# Patient Record
Sex: MALE | Race: BLACK OR AFRICAN AMERICAN | NOT HISPANIC OR LATINO | Employment: UNEMPLOYED | RURAL
[De-identification: names, ages, dates, MRNs, and addresses within clinical notes are randomized per-mention and may not be internally consistent; named-entity substitution may affect disease eponyms.]

---

## 2019-05-03 ENCOUNTER — HISTORICAL (OUTPATIENT)
Dept: ADMINISTRATIVE | Facility: HOSPITAL | Age: 84
End: 2019-05-03

## 2019-05-09 LAB
LAB AP CLINICAL INFORMATION: NORMAL
LAB AP DIAGNOSIS - HISTORICAL: NORMAL
LAB AP GROSS PATHOLOGY - HISTORICAL: NORMAL
LAB AP SPECIMEN SUBMITTED - HISTORICAL: NORMAL

## 2020-06-18 ENCOUNTER — HISTORICAL (OUTPATIENT)
Dept: ADMINISTRATIVE | Facility: HOSPITAL | Age: 85
End: 2020-06-18

## 2020-06-18 LAB
ALBUMIN SERPL BCP-MCNC: 3.7 G/DL (ref 3.5–5)
ALBUMIN/GLOB SERPL: 0.9 {RATIO}
ALP SERPL-CCNC: 69 U/L (ref 45–115)
ALT SERPL W P-5'-P-CCNC: 17 U/L (ref 16–61)
AMYLASE SERPL-CCNC: 144 U/L (ref 25–115)
ANISOCYTOSIS BLD QL SMEAR: ABNORMAL
AST SERPL W P-5'-P-CCNC: 22 U/L (ref 15–37)
BASOPHILS # BLD AUTO: 0.05 X10E3/UL (ref 0–0.2)
BASOPHILS NFR BLD AUTO: 0.3 % (ref 0–1)
BILIRUB SERPL-MCNC: 0.5 MG/DL (ref 0–1.2)
BUN SERPL-MCNC: 19 MG/DL (ref 7–18)
BUN/CREAT SERPL: 3.1
CALCIUM SERPL-MCNC: 8.9 MG/DL (ref 8.5–10.1)
CHLORIDE SERPL-SCNC: 92 MMOL/L (ref 98–107)
CO2 SERPL-SCNC: 27 MMOL/L (ref 21–32)
CREAT SERPL-MCNC: 6.22 MG/DL (ref 0.7–1.3)
EOSINOPHIL # BLD AUTO: 0 X10E3/UL (ref 0–0.5)
EOSINOPHIL NFR BLD AUTO: 0 % (ref 1–4)
ERYTHROCYTE [DISTWIDTH] IN BLOOD BY AUTOMATED COUNT: 14.9 % (ref 11.5–14.5)
GLOBULIN SER-MCNC: 4.1 G/DL (ref 2–4)
GLUCOSE SERPL-MCNC: 178 MG/DL (ref 74–106)
HCT VFR BLD AUTO: 37 % (ref 40–54)
HGB BLD-MCNC: 11.7 G/DL (ref 13.5–18)
HYPOCHROMIA BLD QL SMEAR: SLIGHT
IMM GRANULOCYTES # BLD AUTO: 0.12 X10E3/UL (ref 0–0.04)
IMM GRANULOCYTES NFR BLD: 0.8 % (ref 0–0.4)
LIPASE SERPL-CCNC: 100 U/L (ref 73–393)
LYMPHOCYTES # BLD AUTO: 0.22 X10E3/UL (ref 1–4.8)
LYMPHOCYTES NFR BLD AUTO: 1.5 % (ref 27–41)
LYMPHOCYTES NFR BLD MANUAL: 2 % (ref 27–41)
MCH RBC QN AUTO: 30.7 PG (ref 27–31)
MCHC RBC AUTO-ENTMCNC: 31.6 G/DL (ref 32–36)
MCV RBC AUTO: 97.1 FL (ref 80–96)
MONOCYTES # BLD AUTO: 0.7 X10E3/UL (ref 0–0.8)
MONOCYTES NFR BLD AUTO: 4.8 % (ref 2–6)
MONOCYTES NFR BLD MANUAL: 3 % (ref 2–6)
MPC BLD CALC-MCNC: 12 FL (ref 9.4–12.4)
NEUTROPHILS # BLD AUTO: 13.64 X10E3/UL (ref 1.8–7.7)
NEUTROPHILS NFR BLD AUTO: 92.6 % (ref 53–65)
NEUTS BAND NFR BLD MANUAL: 12 % (ref 1–5)
NEUTS SEG NFR BLD MANUAL: 83 % (ref 50–62)
NRBC # BLD AUTO: 0 X10E3/UL (ref 0–0)
NRBC, AUTO (.00): 0 /100 (ref 0–0)
PLATELET # BLD AUTO: 177 X10E3/UL (ref 150–400)
PLATELET MORPHOLOGY: ABNORMAL
POLYCHROMASIA BLD QL SMEAR: ABNORMAL
POTASSIUM SERPL-SCNC: 4.2 MMOL/L (ref 3.5–5.1)
PROT SERPL-MCNC: 7.8 G/DL (ref 6.4–8.2)
RBC # BLD AUTO: 3.81 X10E6/UL (ref 4.6–6.2)
SODIUM SERPL-SCNC: 129 MMOL/L (ref 136–145)
WBC # BLD AUTO: 14.73 X10E3/UL (ref 4.5–11)

## 2020-07-10 ENCOUNTER — HISTORICAL (OUTPATIENT)
Dept: ADMINISTRATIVE | Facility: HOSPITAL | Age: 85
End: 2020-07-10

## 2020-07-13 ENCOUNTER — HISTORICAL (OUTPATIENT)
Dept: ADMINISTRATIVE | Facility: HOSPITAL | Age: 85
End: 2020-07-13

## 2020-07-13 LAB
GLUCOSE SERPL-MCNC: 92 MG/DL (ref 70–105)
GLUCOSE SERPL-MCNC: 93 MG/DL (ref 70–105)

## 2020-07-14 LAB

## 2021-02-12 ENCOUNTER — HISTORICAL (OUTPATIENT)
Dept: ADMINISTRATIVE | Facility: HOSPITAL | Age: 86
End: 2021-02-12

## 2021-02-12 LAB
GLUCOSE SERPL-MCNC: 82 MG/DL (ref 70–105)
GLUCOSE SERPL-MCNC: 86 MG/DL (ref 70–105)
POTASSIUM SERPL-SCNC: 4.7 MMOL/L (ref 3.5–5.1)

## 2021-02-15 LAB

## 2022-01-02 ENCOUNTER — HOSPITAL ENCOUNTER (EMERGENCY)
Facility: HOSPITAL | Age: 87
Discharge: HOME | End: 2022-01-02
Payer: COMMERCIAL

## 2022-01-02 VITALS
DIASTOLIC BLOOD PRESSURE: 58 MMHG | HEIGHT: 66 IN | TEMPERATURE: 98 F | WEIGHT: 170 LBS | BODY MASS INDEX: 27.32 KG/M2 | RESPIRATION RATE: 16 BRPM | SYSTOLIC BLOOD PRESSURE: 177 MMHG | OXYGEN SATURATION: 98 % | HEART RATE: 61 BPM

## 2022-01-02 DIAGNOSIS — D63.1 ANEMIA IN CHRONIC KIDNEY DISEASE: ICD-10-CM

## 2022-01-02 DIAGNOSIS — I20.9 ANGINA PECTORIS, UNSPECIFIED: ICD-10-CM

## 2022-01-02 DIAGNOSIS — Z99.2 DEPENDENCE ON RENAL DIALYSIS: ICD-10-CM

## 2022-01-02 DIAGNOSIS — E88.09 OTHER DISORDERS OF PLASMA-PROTEIN METABOLISM, NOT ELSEWHERE CLASSIFIED: ICD-10-CM

## 2022-01-02 DIAGNOSIS — I10 ESSENTIAL (PRIMARY) HYPERTENSION: ICD-10-CM

## 2022-01-02 DIAGNOSIS — D50.9 IRON DEFICIENCY ANEMIA, UNSPECIFIED: ICD-10-CM

## 2022-01-02 DIAGNOSIS — N18.6 END STAGE RENAL DISEASE: ICD-10-CM

## 2022-01-02 DIAGNOSIS — N18.9 ANEMIA IN CHRONIC KIDNEY DISEASE: ICD-10-CM

## 2022-01-02 DIAGNOSIS — E11.29 TYPE 2 DIABETES MELLITUS WITH OTHER DIABETIC KIDNEY COMPLICATION: ICD-10-CM

## 2022-01-02 DIAGNOSIS — N25.81 SECONDARY HYPERPARATHYROIDISM OF RENAL ORIGIN: ICD-10-CM

## 2022-01-02 DIAGNOSIS — R07.9 CHEST PAIN: ICD-10-CM

## 2022-01-02 DIAGNOSIS — D68.9 COAGULATION DEFECT, UNSPECIFIED: ICD-10-CM

## 2022-01-02 DIAGNOSIS — R11.2 NON-INTRACTABLE VOMITING WITH NAUSEA, UNSPECIFIED VOMITING TYPE: Primary | ICD-10-CM

## 2022-01-02 DIAGNOSIS — E83.39 OTHER DISORDERS OF PHOSPHORUS METABOLISM: ICD-10-CM

## 2022-01-02 DIAGNOSIS — E46 UNSPECIFIED PROTEIN-CALORIE MALNUTRITION: ICD-10-CM

## 2022-01-02 LAB
ALBUMIN SERPL BCP-MCNC: 3.8 G/DL (ref 3.5–5)
ALBUMIN/GLOB SERPL: 0.9 {RATIO}
ALP SERPL-CCNC: 78 U/L (ref 45–115)
ALT SERPL W P-5'-P-CCNC: 13 U/L (ref 16–61)
ANION GAP SERPL CALCULATED.3IONS-SCNC: 16 MMOL/L (ref 7–16)
AST SERPL W P-5'-P-CCNC: 17 U/L (ref 15–37)
BASOPHILS # BLD AUTO: 0.05 K/UL (ref 0–0.2)
BASOPHILS NFR BLD AUTO: 0.6 % (ref 0–1)
BILIRUB SERPL-MCNC: 0.4 MG/DL (ref 0–1.2)
BUN SERPL-MCNC: 34 MG/DL (ref 7–18)
BUN/CREAT SERPL: 4 (ref 6–20)
CALCIUM SERPL-MCNC: 8.5 MG/DL (ref 8.5–10.1)
CHLORIDE SERPL-SCNC: 95 MMOL/L (ref 98–107)
CO2 SERPL-SCNC: 29 MMOL/L (ref 21–32)
CREAT SERPL-MCNC: 7.79 MG/DL (ref 0.7–1.3)
DIFFERENTIAL METHOD BLD: ABNORMAL
EOSINOPHIL # BLD AUTO: 0.02 K/UL (ref 0–0.5)
EOSINOPHIL NFR BLD AUTO: 0.2 % (ref 1–4)
EOSINOPHIL NFR BLD MANUAL: 2 % (ref 1–4)
ERYTHROCYTE [DISTWIDTH] IN BLOOD BY AUTOMATED COUNT: 14 % (ref 11.5–14.5)
GLOBULIN SER-MCNC: 4.3 G/DL (ref 2–4)
GLUCOSE SERPL-MCNC: 129 MG/DL (ref 74–106)
HCT VFR BLD AUTO: 32.2 % (ref 40–54)
HGB BLD-MCNC: 11.1 G/DL (ref 13.5–18)
IMM GRANULOCYTES # BLD AUTO: 0.05 K/UL (ref 0–0.04)
IMM GRANULOCYTES NFR BLD: 0.6 % (ref 0–0.4)
LYMPHOCYTES # BLD AUTO: 0.39 K/UL (ref 1–4.8)
LYMPHOCYTES NFR BLD AUTO: 4.7 % (ref 27–41)
LYMPHOCYTES NFR BLD MANUAL: 2 % (ref 27–41)
MCH RBC QN AUTO: 30.2 PG (ref 27–31)
MCHC RBC AUTO-ENTMCNC: 34.5 G/DL (ref 32–36)
MCV RBC AUTO: 87.5 FL (ref 80–96)
MONOCYTES # BLD AUTO: 0.39 K/UL (ref 0–0.8)
MONOCYTES NFR BLD AUTO: 4.7 % (ref 2–6)
MONOCYTES NFR BLD MANUAL: 3 % (ref 2–6)
MPC BLD CALC-MCNC: 12.3 FL (ref 9.4–12.4)
NEUTROPHILS # BLD AUTO: 7.45 K/UL (ref 1.8–7.7)
NEUTROPHILS NFR BLD AUTO: 89.2 % (ref 53–65)
NEUTS BAND NFR BLD MANUAL: 1 % (ref 1–5)
NEUTS SEG NFR BLD MANUAL: 92 % (ref 50–62)
NRBC # BLD AUTO: 0 X10E3/UL
NRBC, AUTO (.00): 0 %
PLATELET # BLD AUTO: 165 K/UL (ref 150–400)
PLATELET MORPHOLOGY: ABNORMAL
POTASSIUM SERPL-SCNC: 3.6 MMOL/L (ref 3.5–5.1)
PROT SERPL-MCNC: 8.1 G/DL (ref 6.4–8.2)
RBC # BLD AUTO: 3.68 M/UL (ref 4.6–6.2)
RBC MORPH BLD: NORMAL
SODIUM SERPL-SCNC: 136 MMOL/L (ref 136–145)
WBC # BLD AUTO: 8.35 K/UL (ref 4.5–11)

## 2022-01-02 PROCEDURE — 85025 COMPLETE CBC W/AUTO DIFF WBC: CPT | Performed by: NURSE PRACTITIONER

## 2022-01-02 PROCEDURE — 96374 THER/PROPH/DIAG INJ IV PUSH: CPT

## 2022-01-02 PROCEDURE — 36415 COLL VENOUS BLD VENIPUNCTURE: CPT | Performed by: NURSE PRACTITIONER

## 2022-01-02 PROCEDURE — 99285 EMERGENCY DEPT VISIT HI MDM: CPT | Mod: 25

## 2022-01-02 PROCEDURE — 96375 TX/PRO/DX INJ NEW DRUG ADDON: CPT

## 2022-01-02 PROCEDURE — 93010 ELECTROCARDIOGRAM REPORT: CPT | Mod: ,,, | Performed by: HOSPITALIST

## 2022-01-02 PROCEDURE — 93005 ELECTROCARDIOGRAM TRACING: CPT

## 2022-01-02 PROCEDURE — 99284 PR EMERGENCY DEPT VISIT,LEVEL IV: ICD-10-PCS | Mod: ,,, | Performed by: NURSE PRACTITIONER

## 2022-01-02 PROCEDURE — 96376 TX/PRO/DX INJ SAME DRUG ADON: CPT

## 2022-01-02 PROCEDURE — 80053 COMPREHEN METABOLIC PANEL: CPT | Performed by: NURSE PRACTITIONER

## 2022-01-02 PROCEDURE — 99284 EMERGENCY DEPT VISIT MOD MDM: CPT | Mod: ,,, | Performed by: NURSE PRACTITIONER

## 2022-01-02 PROCEDURE — 63600175 PHARM REV CODE 636 W HCPCS: Performed by: NURSE PRACTITIONER

## 2022-01-02 PROCEDURE — 93010 EKG 12-LEAD: ICD-10-PCS | Mod: ,,, | Performed by: HOSPITALIST

## 2022-01-02 RX ORDER — ASPIRIN 325 MG
325 TABLET ORAL
Status: DISCONTINUED | OUTPATIENT
Start: 2022-01-02 | End: 2022-01-02

## 2022-01-02 RX ORDER — HYDRALAZINE HYDROCHLORIDE 20 MG/ML
10 INJECTION INTRAMUSCULAR; INTRAVENOUS
Status: DISCONTINUED | OUTPATIENT
Start: 2022-01-02 | End: 2022-01-02

## 2022-01-02 RX ORDER — ONDANSETRON 4 MG/1
4 TABLET, FILM COATED ORAL EVERY 6 HOURS
Qty: 12 TABLET | Refills: 0 | Status: ON HOLD | OUTPATIENT
Start: 2022-01-02 | End: 2023-08-04 | Stop reason: HOSPADM

## 2022-01-02 RX ORDER — ONDANSETRON 2 MG/ML
4 INJECTION INTRAMUSCULAR; INTRAVENOUS
Status: COMPLETED | OUTPATIENT
Start: 2022-01-02 | End: 2022-01-02

## 2022-01-02 RX ORDER — HYDRALAZINE HYDROCHLORIDE 20 MG/ML
10 INJECTION INTRAMUSCULAR; INTRAVENOUS
Status: COMPLETED | OUTPATIENT
Start: 2022-01-02 | End: 2022-01-02

## 2022-01-02 RX ADMIN — HYDRALAZINE HYDROCHLORIDE 10 MG: 20 INJECTION INTRAMUSCULAR; INTRAVENOUS at 12:01

## 2022-01-02 RX ADMIN — ONDANSETRON 4 MG: 2 INJECTION INTRAMUSCULAR; INTRAVENOUS at 12:01

## 2022-01-02 NOTE — ED PROVIDER NOTES
Encounter Date: 1/2/2022       History     Chief Complaint   Patient presents with    Vomiting     86-year-old male patient presents emergency department complaint of nausea and vomiting that started while on hemodialysis this a.m..  States the nausea has improved.  He reports that he is dizzy.  States when he was sitting in a chair he felt as if the chair was sliding back and forth.  Denies chest discomfort.  Denies shortness of breath.           Review of patient's allergies indicates:  No Known Allergies  Past Medical History:   Diagnosis Date    Chronic kidney disease (CKD)     Diabetes mellitus     Hypertension     PVD (peripheral vascular disease)      No past surgical history on file.  No family history on file.  Social History     Tobacco Use    Smoking status: Never Smoker    Smokeless tobacco: Never Used   Substance Use Topics    Alcohol use: Not Currently    Drug use: Never     Review of Systems   Constitutional: Negative for activity change, appetite change, fatigue and fever.   Respiratory: Negative for cough, choking, chest tightness and shortness of breath.    Cardiovascular: Negative for chest pain and palpitations.   Gastrointestinal: Positive for nausea and vomiting. Negative for abdominal pain, constipation and diarrhea.   Neurological: Positive for dizziness. Negative for syncope, speech difficulty, weakness, light-headedness, numbness and headaches.       Physical Exam     Initial Vitals   BP Pulse Resp Temp SpO2   01/02/22 1126 01/02/22 1122 01/02/22 1122 01/02/22 1122 01/02/22 1122   (!) 217/70 60 18 97.6 °F (36.4 °C) 99 %      MAP       --                Physical Exam    Constitutional: He appears well-developed and well-nourished.   HENT:   Head: Normocephalic and atraumatic.   Eyes: Conjunctivae and EOM are normal. Pupils are equal, round, and reactive to light.   Neck: Neck supple. No JVD present.   Normal range of motion.  Cardiovascular: Normal rate, regular rhythm, normal heart  sounds and intact distal pulses.   Pulmonary/Chest: Breath sounds normal.   Abdominal: Abdomen is soft. Bowel sounds are normal. There is no abdominal tenderness. There is no guarding.   Musculoskeletal:      Cervical back: Normal range of motion and neck supple.     Neurological: He is alert and oriented to person, place, and time. He has normal strength. GCS score is 15. GCS eye subscore is 4. GCS verbal subscore is 5. GCS motor subscore is 6.   Skin: Skin is warm and dry. Capillary refill takes less than 2 seconds.   Psychiatric: He has a normal mood and affect. His behavior is normal. Judgment and thought content normal.         Medical Screening Exam   See Full Note    ED Course   Procedures  Labs Reviewed   COMPREHENSIVE METABOLIC PANEL - Abnormal; Notable for the following components:       Result Value    Chloride 95 (*)     Glucose 129 (*)     BUN 34 (*)     Creatinine 7.79 (*)     BUN/Creatinine Ratio 4 (*)     Globulin 4.3 (*)     ALT 13 (*)     eGFR 7 (*)     All other components within normal limits   CBC WITH DIFFERENTIAL - Abnormal; Notable for the following components:    RBC 3.68 (*)     Hemoglobin 11.1 (*)     Hematocrit 32.2 (*)     Neutrophils % 89.2 (*)     Lymphocytes % 4.7 (*)     Eosinophils % 0.2 (*)     Immature Granulocytes % 0.6 (*)     Lymphocytes, Absolute 0.39 (*)     Immature Granulocytes, Absolute 0.05 (*)     All other components within normal limits   MANUAL DIFFERENTIAL - Abnormal; Notable for the following components:    Segmented Neutrophils, Man % 92 (*)     Lymphocytes, Man % 2 (*)     Platelet Morphology Few Large Platelets (*)     All other components within normal limits   CBC W/ AUTO DIFFERENTIAL    Narrative:     The following orders were created for panel order CBC auto differential.  Procedure                               Abnormality         Status                     ---------                               -----------         ------                     CBC with  Differential[[id number]]        Abnormal            Final result               Manual Differential[[id number]]          Abnormal            Final result                 Please view results for these tests on the individual orders.        ECG Results          EKG 12-lead (Final result)  Result time 01/02/22 14:24:33    Final result by Interface, Lab In [facility] (01/02/22 14:24:33)                 Narrative:    Test Reason : R07.9,    Vent. Rate : 065 BPM     Atrial Rate : 000 BPM     P-R Int : 136 ms          QRS Dur : 118 ms      QT Int : 434 ms       P-R-T Axes : 058 009 081 degrees     QTc Int : 443 ms    Sinus rhythm  Q in V1/V2 may be due to lead placement error though septal infarct cannot  be excluded  Lateral T wave abnormality  may be due to myocardial ischemia  Abnormal ECG    Confirmed by Tab Gray MD ([id number]) on 1/2/2022 2:24:23 PM    Referred By: [email or web address]   SELF           Confirmed By:Tab Gray MD                            Imaging Results          X-Ray Chest AP Portable (Final result)  Result time 01/02/22 11:51:02    Final result by Miladis Jacobsen MD (01/02/22 11:51:02)                 Impression:      No acute cardiopulmonary abnormality is seen.      Electronically signed by: Miladis Jacobsen  Date:    01/02/2022  Time:    11:51             Narrative:    EXAMINATION:  XR CHEST AP PORTABLE    CLINICAL HISTORY:  Chest Pain;    TECHNIQUE:  Single frontal view of the chest was performed.    COMPARISON:  07/10/2020    FINDINGS:  The heart size is normal.  There is mild uncoiling of the thoracic aorta, a stable finding.  Calcific plaque is present within the aortic knob.  The pulmonary vasculature is normal.  The lung fields are clear.  No pneumothorax.  No pleural effusion.  Degenerative changes of the acromioclavicular joints and spinal column with bridging osteophytes of the thoracic level.                                 Medications   ondansetron injection 4 mg (4 mg Intravenous Given 1/2/22 1203)    hydrALAZINE injection 10 mg (10 mg Intravenous Given 1/2/22 1203)                 ED Course as of 01/06/22 1009   Sun Jan 02, 2022   1319 Nausea resolved.  Patient denies pain or discomfort.  Denies weakness or dizziness. [CM]      ED Course User Index  [CM] USHA Tilley          Clinical Impression:   Final diagnoses:  [R07.9] Chest pain  [R11.2] Non-intractable vomiting with nausea, unspecified vomiting type (Primary)          ED Disposition Condition    Discharge Stable        ED Prescriptions     Medication Sig Dispense Start Date End Date Auth. Provider    ondansetron (ZOFRAN) 4 MG tablet Take 1 tablet (4 mg total) by mouth every 6 (six) hours. 12 tablet 1/2/2022  USHA Tilley        Follow-up Information    None          USHA Tilley  01/02/22 1317       USHA Tilley  01/06/22 1009

## 2022-01-02 NOTE — ED TRIAGE NOTES
Patient present with vomiting that started during dialysis this morning. Patient did finish dialysis.

## 2022-01-02 NOTE — DISCHARGE INSTRUCTIONS
Medications as directed.  Follow-up with primary care provider.  Call their office in the a.m. of 01/03/2022.  Return emergency department as needed.

## 2022-03-24 ENCOUNTER — HOSPITAL ENCOUNTER (EMERGENCY)
Facility: HOSPITAL | Age: 87
Discharge: HOME | End: 2022-03-25
Attending: EMERGENCY MEDICINE
Payer: COMMERCIAL

## 2022-03-24 VITALS
SYSTOLIC BLOOD PRESSURE: 173 MMHG | DIASTOLIC BLOOD PRESSURE: 49 MMHG | TEMPERATURE: 98 F | RESPIRATION RATE: 18 BRPM | OXYGEN SATURATION: 98 % | HEART RATE: 73 BPM

## 2022-03-24 DIAGNOSIS — N25.81 SECONDARY HYPERPARATHYROIDISM OF RENAL ORIGIN: ICD-10-CM

## 2022-03-24 DIAGNOSIS — D63.1 ANEMIA IN CHRONIC KIDNEY DISEASE: ICD-10-CM

## 2022-03-24 DIAGNOSIS — E46 UNSPECIFIED PROTEIN-CALORIE MALNUTRITION: ICD-10-CM

## 2022-03-24 DIAGNOSIS — E83.39 OTHER DISORDERS OF PHOSPHORUS METABOLISM: ICD-10-CM

## 2022-03-24 DIAGNOSIS — N18.6 END STAGE RENAL DISEASE: ICD-10-CM

## 2022-03-24 DIAGNOSIS — D68.9 COAGULATION DEFECT, UNSPECIFIED: ICD-10-CM

## 2022-03-24 DIAGNOSIS — N18.9 ANEMIA IN CHRONIC KIDNEY DISEASE: ICD-10-CM

## 2022-03-24 DIAGNOSIS — E88.09 OTHER DISORDERS OF PLASMA-PROTEIN METABOLISM, NOT ELSEWHERE CLASSIFIED: ICD-10-CM

## 2022-03-24 DIAGNOSIS — D50.9 IRON DEFICIENCY ANEMIA, UNSPECIFIED: ICD-10-CM

## 2022-03-24 DIAGNOSIS — E16.2 HYPOGLYCEMIA: Primary | ICD-10-CM

## 2022-03-24 DIAGNOSIS — I20.9 ANGINA PECTORIS, UNSPECIFIED: ICD-10-CM

## 2022-03-24 DIAGNOSIS — I10 ESSENTIAL (PRIMARY) HYPERTENSION: ICD-10-CM

## 2022-03-24 DIAGNOSIS — Z99.2 DEPENDENCE ON RENAL DIALYSIS: ICD-10-CM

## 2022-03-24 DIAGNOSIS — E11.29 TYPE 2 DIABETES MELLITUS WITH OTHER DIABETIC KIDNEY COMPLICATION: ICD-10-CM

## 2022-03-24 LAB
ALBUMIN SERPL BCP-MCNC: 3.4 G/DL (ref 3.5–5)
ALBUMIN/GLOB SERPL: 0.8 {RATIO}
ALP SERPL-CCNC: 72 U/L (ref 45–115)
ALT SERPL W P-5'-P-CCNC: 18 U/L (ref 16–61)
ANION GAP SERPL CALCULATED.3IONS-SCNC: 8 MMOL/L (ref 7–16)
AST SERPL W P-5'-P-CCNC: 18 U/L (ref 15–37)
BASOPHILS # BLD AUTO: 0.08 K/UL (ref 0–0.2)
BASOPHILS NFR BLD AUTO: 0.9 % (ref 0–1)
BILIRUB SERPL-MCNC: 0.5 MG/DL (ref 0–1.2)
BUN SERPL-MCNC: 26 MG/DL (ref 7–18)
BUN/CREAT SERPL: 4 (ref 6–20)
CALCIUM SERPL-MCNC: 8.9 MG/DL (ref 8.5–10.1)
CHLORIDE SERPL-SCNC: 100 MMOL/L (ref 98–107)
CO2 SERPL-SCNC: 34 MMOL/L (ref 21–32)
CREAT SERPL-MCNC: 6.59 MG/DL (ref 0.7–1.3)
DIFFERENTIAL METHOD BLD: ABNORMAL
EOSINOPHIL # BLD AUTO: 0.14 K/UL (ref 0–0.5)
EOSINOPHIL NFR BLD AUTO: 1.5 % (ref 1–4)
ERYTHROCYTE [DISTWIDTH] IN BLOOD BY AUTOMATED COUNT: 15.3 % (ref 11.5–14.5)
GLOBULIN SER-MCNC: 4.5 G/DL (ref 2–4)
GLUCOSE SERPL-MCNC: 107 MG/DL (ref 70–105)
GLUCOSE SERPL-MCNC: 30 MG/DL (ref 74–106)
GLUCOSE SERPL-MCNC: 71 MG/DL (ref 70–105)
GLUCOSE SERPL-MCNC: 89 MG/DL (ref 70–105)
HCT VFR BLD AUTO: 30.4 % (ref 40–54)
HGB BLD-MCNC: 9.2 G/DL (ref 13.5–18)
IMM GRANULOCYTES # BLD AUTO: 0.04 K/UL (ref 0–0.04)
IMM GRANULOCYTES NFR BLD: 0.4 % (ref 0–0.4)
LYMPHOCYTES # BLD AUTO: 0.58 K/UL (ref 1–4.8)
LYMPHOCYTES NFR BLD AUTO: 6.2 % (ref 27–41)
MCH RBC QN AUTO: 28.8 PG (ref 27–31)
MCHC RBC AUTO-ENTMCNC: 30.3 G/DL (ref 32–36)
MCV RBC AUTO: 95 FL (ref 80–96)
MONOCYTES # BLD AUTO: 0.67 K/UL (ref 0–0.8)
MONOCYTES NFR BLD AUTO: 7.2 % (ref 2–6)
MPC BLD CALC-MCNC: 13 FL (ref 9.4–12.4)
NEUTROPHILS # BLD AUTO: 7.81 K/UL (ref 1.8–7.7)
NEUTROPHILS NFR BLD AUTO: 83.8 % (ref 53–65)
NRBC # BLD AUTO: 0 X10E3/UL
NRBC, AUTO (.00): 0 %
PLATELET # BLD AUTO: 154 K/UL (ref 150–400)
POTASSIUM SERPL-SCNC: 3.5 MMOL/L (ref 3.5–5.1)
PROT SERPL-MCNC: 7.9 G/DL (ref 6.4–8.2)
RBC # BLD AUTO: 3.2 M/UL (ref 4.6–6.2)
SODIUM SERPL-SCNC: 138 MMOL/L (ref 136–145)
WBC # BLD AUTO: 9.32 K/UL (ref 4.5–11)

## 2022-03-24 PROCEDURE — 99283 EMERGENCY DEPT VISIT LOW MDM: CPT | Mod: ,,, | Performed by: EMERGENCY MEDICINE

## 2022-03-24 PROCEDURE — 82962 GLUCOSE BLOOD TEST: CPT

## 2022-03-24 PROCEDURE — 85025 COMPLETE CBC W/AUTO DIFF WBC: CPT | Performed by: EMERGENCY MEDICINE

## 2022-03-24 PROCEDURE — 36415 COLL VENOUS BLD VENIPUNCTURE: CPT | Performed by: EMERGENCY MEDICINE

## 2022-03-24 PROCEDURE — 80053 COMPREHEN METABOLIC PANEL: CPT | Performed by: EMERGENCY MEDICINE

## 2022-03-24 PROCEDURE — 99283 EMERGENCY DEPT VISIT LOW MDM: CPT

## 2022-03-24 PROCEDURE — 99283 PR EMERGENCY DEPT VISIT,LEVEL III: ICD-10-PCS | Mod: ,,, | Performed by: EMERGENCY MEDICINE

## 2022-03-24 NOTE — ED PROVIDER NOTES
"Encounter Date: 3/24/2022    SCRIBE #1 NOTE: I, Yeni Ennis, am scribing for, and in the presence of,  Keegan Lerma MD. . I have scribed the entire note.       History     Chief Complaint   Patient presents with    Altered Mental Status    Weakness     Ems called - bs low - 21 - a/a/o p d50 given - pt tried to refuse coming      This is an 86 y/o black  male,who presents to the ED via EMS with complaints of AMS. Per EMS, pt's blood sugar was 21 and he was given an amp of D50 which increased his BS. His family states the pt was seen at dialysis this morning. He denies a fever, cough, SOB, CP, nausea, vomiting, or diarrhea. His daughter notes when she was able to get to the pt he was diaphoretic and confused.  He reports he "feels fine" now in the ED. He takes insulin but is unsure of how much. There are no other complaints/pain in the ED at this time.     The history is provided by the patient, the EMS personnel and a relative. No  was used.     Review of patient's allergies indicates:  No Known Allergies  Past Medical History:   Diagnosis Date    Chronic kidney disease (CKD)     Diabetes mellitus     Hypertension     PVD (peripheral vascular disease)      No past surgical history on file.  No family history on file.  Social History     Tobacco Use    Smoking status: Never Smoker    Smokeless tobacco: Never Used   Substance Use Topics    Alcohol use: Not Currently    Drug use: Never     Review of Systems   Constitutional: Positive for diaphoresis. Negative for fever.        Decreased BS.    Respiratory: Negative for cough and shortness of breath.    Cardiovascular: Negative for chest pain.   Gastrointestinal: Negative for diarrhea, nausea and vomiting.   Neurological:        Confused.    All other systems reviewed and are negative.      Physical Exam     Initial Vitals   BP Pulse Resp Temp SpO2   03/24/22 1845 03/24/22 1904 03/24/22 1845 03/24/22 1939 03/24/22 1904   (!) 157/52 " (!) 57 18 97.8 °F (36.6 °C) 98 %      MAP       --                Physical Exam    Nursing note and vitals reviewed.  Constitutional: He appears well-developed and well-nourished.   HENT:   Head: Normocephalic and atraumatic.   Eyes: Conjunctivae and EOM are normal. Pupils are equal, round, and reactive to light.   Neck: Neck supple.   Normal range of motion.  Cardiovascular: Normal rate, regular rhythm, normal heart sounds and intact distal pulses.   Pulmonary/Chest: Breath sounds normal.   Abdominal: Abdomen is soft. Bowel sounds are normal.   Musculoskeletal:         General: Normal range of motion.      Cervical back: Normal range of motion and neck supple.     Neurological: He is alert and oriented to person, place, and time. He has normal strength.   Skin: Skin is warm and dry. Capillary refill takes less than 2 seconds.   Psychiatric: He has a normal mood and affect. Thought content normal.         ED Course   Procedures  Labs Reviewed   COMPREHENSIVE METABOLIC PANEL - Abnormal; Notable for the following components:       Result Value    CO2 34 (*)     Glucose 30 (*)     BUN 26 (*)     Creatinine 6.59 (*)     BUN/Creatinine Ratio 4 (*)     Albumin 3.4 (*)     Globulin 4.5 (*)     eGFR 9 (*)     All other components within normal limits   CBC WITH DIFFERENTIAL - Abnormal; Notable for the following components:    RBC 3.20 (*)     Hemoglobin 9.2 (*)     Hematocrit 30.4 (*)     MCHC 30.3 (*)     RDW 15.3 (*)     MPV 13.0 (*)     Neutrophils % 83.8 (*)     Lymphocytes % 6.2 (*)     Monocytes % 7.2 (*)     Neutrophils, Abs 7.81 (*)     Lymphocytes, Absolute 0.58 (*)     All other components within normal limits   POCT GLUCOSE MONITORING CONTINUOUS - Abnormal; Notable for the following components:    POC Glucose 107 (*)     All other components within normal limits   CBC W/ AUTO DIFFERENTIAL    Narrative:     The following orders were created for panel order CBC auto differential.  Procedure                                Abnormality         Status                     ---------                               -----------         ------                     CBC with Differential[[id number]]        Abnormal            Final result                 Please view results for these tests on the individual orders.   EXTRA TUBES    Narrative:     The following orders were created for panel order EXTRA TUBES.  Procedure                               Abnormality         Status                     ---------                               -----------         ------                     Light Blue Top Hold[[id number]]                              In process                   Please view results for these tests on the individual orders.   LIGHT BLUE TOP HOLD   POCT GLUCOSE MONITORING CONTINUOUS          Imaging Results    None          Medications - No data to display             Attending Attestation:           Physician Attestation for Scribe:  Physician Attestation Statement for Scribe #1: I, Raphael Lerma MD. , reviewed documentation, as scribed by Yeni Ennis in my presence, and it is both accurate and complete.                      Clinical Impression:   Final diagnoses:  [E16.2] Hypoglycemia (Primary)          ED Disposition Condition    Discharge Stable        ED Prescriptions     None        Follow-up Information    None          Keegan Lerma MD  03/24/22 7119

## 2022-03-24 NOTE — Clinical Note
Yany Ferrara accompanied their father to the emergency department on 3/24/2022. They may return to work on 03/26/2022.      If you have any questions or concerns, please don't hesitate to call.      BERTA Bermudez RN

## 2022-03-25 LAB — GLUCOSE SERPL-MCNC: 133 MG/DL (ref 70–105)

## 2022-03-25 PROCEDURE — 82962 GLUCOSE BLOOD TEST: CPT

## 2022-03-25 NOTE — DISCHARGE INSTRUCTIONS
DECREASE YOUR INSULIN DOSE BY 5 UNITS PENDING FOLLOW UP WITH YOUR PRIMARY CARE PROVIDER.  MAKE SURE YOU ARE EATING REGULAR MEALS.  RETURN TO THE EMERGENCY DEPARTMENT AS NEEDED.

## 2023-08-01 ENCOUNTER — HOSPITAL ENCOUNTER (OUTPATIENT)
Facility: HOSPITAL | Age: 88
Discharge: HOME | End: 2023-08-04
Attending: FAMILY MEDICINE | Admitting: INTERNAL MEDICINE
Payer: MEDICARE

## 2023-08-01 DIAGNOSIS — I10 ESSENTIAL (PRIMARY) HYPERTENSION: ICD-10-CM

## 2023-08-01 DIAGNOSIS — I73.9 PAD (PERIPHERAL ARTERY DISEASE): ICD-10-CM

## 2023-08-01 DIAGNOSIS — D63.1 ANEMIA DUE TO CHRONIC KIDNEY DISEASE, ON CHRONIC DIALYSIS: ICD-10-CM

## 2023-08-01 DIAGNOSIS — D62 ACUTE BLOOD LOSS ANEMIA: ICD-10-CM

## 2023-08-01 DIAGNOSIS — N18.6 ANEMIA IN CHRONIC KIDNEY DISEASE, ON CHRONIC DIALYSIS: ICD-10-CM

## 2023-08-01 DIAGNOSIS — I48.91 NEW ONSET A-FIB: Primary | ICD-10-CM

## 2023-08-01 DIAGNOSIS — D63.1 ANEMIA IN CHRONIC KIDNEY DISEASE, ON CHRONIC DIALYSIS: ICD-10-CM

## 2023-08-01 DIAGNOSIS — Z99.2 ANEMIA DUE TO CHRONIC KIDNEY DISEASE, ON CHRONIC DIALYSIS: ICD-10-CM

## 2023-08-01 DIAGNOSIS — I48.91 A-FIB: ICD-10-CM

## 2023-08-01 DIAGNOSIS — Z99.2 DEPENDENCE ON RENAL DIALYSIS: ICD-10-CM

## 2023-08-01 DIAGNOSIS — I50.9 ACUTE ON CHRONIC CONGESTIVE HEART FAILURE, UNSPECIFIED HEART FAILURE TYPE: ICD-10-CM

## 2023-08-01 DIAGNOSIS — Z99.2 ANEMIA IN CHRONIC KIDNEY DISEASE, ON CHRONIC DIALYSIS: ICD-10-CM

## 2023-08-01 DIAGNOSIS — R79.89 ELEVATED TROPONIN: ICD-10-CM

## 2023-08-01 DIAGNOSIS — R06.02 SHORTNESS OF BREATH: ICD-10-CM

## 2023-08-01 DIAGNOSIS — R94.31 ABNORMAL EKG: ICD-10-CM

## 2023-08-01 DIAGNOSIS — N18.6 ANEMIA DUE TO CHRONIC KIDNEY DISEASE, ON CHRONIC DIALYSIS: ICD-10-CM

## 2023-08-01 DIAGNOSIS — I25.10 CORONARY ARTERY DISEASE, UNSPECIFIED VESSEL OR LESION TYPE, UNSPECIFIED WHETHER ANGINA PRESENT, UNSPECIFIED WHETHER NATIVE OR TRANSPLANTED HEART: ICD-10-CM

## 2023-08-01 DIAGNOSIS — N18.6 END STAGE RENAL DISEASE: ICD-10-CM

## 2023-08-01 DIAGNOSIS — R07.9 CHEST PAIN: ICD-10-CM

## 2023-08-01 LAB
ABO + RH BLD: NORMAL
ABO + RH BLD: NORMAL
ALBUMIN SERPL BCP-MCNC: 2.9 G/DL (ref 3.5–5)
ALBUMIN/GLOB SERPL: 0.9 {RATIO}
ALP SERPL-CCNC: 79 U/L (ref 45–115)
ALT SERPL W P-5'-P-CCNC: 25 U/L (ref 16–61)
ANION GAP SERPL CALCULATED.3IONS-SCNC: 9 MMOL/L (ref 7–16)
ANISOCYTOSIS BLD QL SMEAR: ABNORMAL
APTT PPP: 36.9 SECONDS (ref 25.2–37.3)
AST SERPL W P-5'-P-CCNC: 22 U/L (ref 15–37)
BASOPHILS # BLD AUTO: 0.05 K/UL (ref 0–0.2)
BASOPHILS NFR BLD AUTO: 0.7 % (ref 0–1)
BILIRUB SERPL-MCNC: 0.5 MG/DL (ref ?–1.2)
BLD PROD TYP BPU: NORMAL
BLD PROD TYP BPU: NORMAL
BLOOD UNIT EXPIRATION DATE: NORMAL
BLOOD UNIT EXPIRATION DATE: NORMAL
BLOOD UNIT TYPE CODE: 5100
BLOOD UNIT TYPE CODE: 5100
BUN SERPL-MCNC: 19 MG/DL (ref 7–18)
BUN/CREAT SERPL: 4 (ref 6–20)
CALCIUM SERPL-MCNC: 8.3 MG/DL (ref 8.5–10.1)
CHLORIDE SERPL-SCNC: 98 MMOL/L (ref 98–107)
CO2 SERPL-SCNC: 34 MMOL/L (ref 21–32)
CREAT SERPL-MCNC: 4.35 MG/DL (ref 0.7–1.3)
CROSSMATCH INTERPRETATION: NORMAL
CROSSMATCH INTERPRETATION: NORMAL
DIFFERENTIAL METHOD BLD: ABNORMAL
DISPENSE STATUS: NORMAL
DISPENSE STATUS: NORMAL
EGFR (NO RACE VARIABLE) (RUSH/TITUS): 12 ML/MIN/1.73M2
EOSINOPHIL # BLD AUTO: 0.08 K/UL (ref 0–0.5)
EOSINOPHIL NFR BLD AUTO: 1.2 % (ref 1–4)
ERYTHROCYTE [DISTWIDTH] IN BLOOD BY AUTOMATED COUNT: 17.2 % (ref 11.5–14.5)
GLOBULIN SER-MCNC: 3.4 G/DL (ref 2–4)
GLUCOSE SERPL-MCNC: 114 MG/DL (ref 74–106)
HCT VFR BLD AUTO: 18.5 % (ref 40–54)
HGB BLD-MCNC: 5.9 G/DL (ref 13.5–18)
IMM GRANULOCYTES # BLD AUTO: 0.01 K/UL (ref 0–0.04)
IMM GRANULOCYTES NFR BLD: 0.1 % (ref 0–0.4)
INDIRECT COOMBS: NORMAL
INR BLD: 1.34
LYMPHOCYTES # BLD AUTO: 0.53 K/UL (ref 1–4.8)
LYMPHOCYTES NFR BLD AUTO: 7.8 % (ref 27–41)
MAGNESIUM SERPL-MCNC: 2.3 MG/DL (ref 1.7–2.3)
MCH RBC QN AUTO: 29.9 PG (ref 27–31)
MCHC RBC AUTO-ENTMCNC: 31.9 G/DL (ref 32–36)
MCV RBC AUTO: 93.9 FL (ref 80–96)
MONOCYTES # BLD AUTO: 0.48 K/UL (ref 0–0.8)
MONOCYTES NFR BLD AUTO: 7 % (ref 2–6)
MPC BLD CALC-MCNC: 12.5 FL (ref 9.4–12.4)
NEUTROPHILS # BLD AUTO: 5.66 K/UL (ref 1.8–7.7)
NEUTROPHILS NFR BLD AUTO: 83.2 % (ref 53–65)
NRBC # BLD AUTO: 0 X10E3/UL
NRBC, AUTO (.00): 0 %
PLATELET # BLD AUTO: 144 K/UL (ref 150–400)
PLATELET MORPHOLOGY: ABNORMAL
POTASSIUM SERPL-SCNC: 3.5 MMOL/L (ref 3.5–5.1)
PROT SERPL-MCNC: 6.3 G/DL (ref 6.4–8.2)
PROTHROMBIN TIME: 16.4 SECONDS (ref 11.7–14.7)
RBC # BLD AUTO: 1.97 M/UL (ref 4.6–6.2)
RH BLD: NORMAL
SODIUM SERPL-SCNC: 137 MMOL/L (ref 136–145)
SPECIMEN OUTDATE: NORMAL
TROPONIN I SERPL DL<=0.01 NG/ML-MCNC: 111.1 PG/ML
TROPONIN I SERPL DL<=0.01 NG/ML-MCNC: 112.1 PG/ML
TROPONIN I SERPL DL<=0.01 NG/ML-MCNC: 118.1 PG/ML
UNIT NUMBER: NORMAL
UNIT NUMBER: NORMAL
WBC # BLD AUTO: 6.81 K/UL (ref 4.5–11)

## 2023-08-01 PROCEDURE — 99223 1ST HOSP IP/OBS HIGH 75: CPT | Mod: ,,, | Performed by: INTERNAL MEDICINE

## 2023-08-01 PROCEDURE — 80053 COMPREHEN METABOLIC PANEL: CPT | Performed by: NURSE PRACTITIONER

## 2023-08-01 PROCEDURE — 85025 COMPLETE CBC W/AUTO DIFF WBC: CPT | Performed by: NURSE PRACTITIONER

## 2023-08-01 PROCEDURE — 86900 BLOOD TYPING SEROLOGIC ABO: CPT | Performed by: NURSE PRACTITIONER

## 2023-08-01 PROCEDURE — 99285 PR EMERGENCY DEPT VISIT,LEVEL V: ICD-10-PCS | Mod: ,,, | Performed by: NURSE PRACTITIONER

## 2023-08-01 PROCEDURE — 86923 COMPATIBILITY TEST ELECTRIC: CPT | Mod: 91 | Performed by: INTERNAL MEDICINE

## 2023-08-01 PROCEDURE — 25000003 PHARM REV CODE 250: Performed by: NURSE PRACTITIONER

## 2023-08-01 PROCEDURE — 84484 ASSAY OF TROPONIN QUANT: CPT | Performed by: NURSE PRACTITIONER

## 2023-08-01 PROCEDURE — 93005 ELECTROCARDIOGRAM TRACING: CPT

## 2023-08-01 PROCEDURE — 93010 EKG 12-LEAD: ICD-10-PCS | Mod: 76,,, | Performed by: INTERNAL MEDICINE

## 2023-08-01 PROCEDURE — 86923 COMPATIBILITY TEST ELECTRIC: CPT | Performed by: NURSE PRACTITIONER

## 2023-08-01 PROCEDURE — 99285 EMERGENCY DEPT VISIT HI MDM: CPT | Mod: ,,, | Performed by: NURSE PRACTITIONER

## 2023-08-01 PROCEDURE — P9016 RBC LEUKOCYTES REDUCED: HCPCS | Performed by: NURSE PRACTITIONER

## 2023-08-01 PROCEDURE — 36430 TRANSFUSION BLD/BLD COMPNT: CPT

## 2023-08-01 PROCEDURE — 83735 ASSAY OF MAGNESIUM: CPT | Performed by: NURSE PRACTITIONER

## 2023-08-01 PROCEDURE — 99285 EMERGENCY DEPT VISIT HI MDM: CPT | Mod: 25

## 2023-08-01 PROCEDURE — 85730 THROMBOPLASTIN TIME PARTIAL: CPT | Performed by: NURSE PRACTITIONER

## 2023-08-01 PROCEDURE — 99223 PR INITIAL HOSPITAL CARE,LEVL III: ICD-10-PCS | Mod: ,,, | Performed by: INTERNAL MEDICINE

## 2023-08-01 PROCEDURE — 85610 PROTHROMBIN TIME: CPT | Performed by: NURSE PRACTITIONER

## 2023-08-01 PROCEDURE — 93010 ELECTROCARDIOGRAM REPORT: CPT | Mod: ,,, | Performed by: INTERNAL MEDICINE

## 2023-08-01 RX ORDER — NEBIVOLOL 2.5 MG/1
1 TABLET ORAL
Status: ON HOLD | COMMUNITY
Start: 2023-06-08 | End: 2023-08-03 | Stop reason: HOSPADM

## 2023-08-01 RX ORDER — SEVELAMER CARBONATE 800 MG/1
3 TABLET, FILM COATED ORAL
COMMUNITY
Start: 2021-06-03

## 2023-08-01 RX ORDER — HYDRALAZINE HYDROCHLORIDE 100 MG/1
1 TABLET, FILM COATED ORAL
COMMUNITY
Start: 2023-06-08

## 2023-08-01 RX ORDER — HYDROCODONE BITARTRATE AND ACETAMINOPHEN 500; 5 MG/1; MG/1
TABLET ORAL
Status: DISCONTINUED | OUTPATIENT
Start: 2023-08-01 | End: 2023-08-04 | Stop reason: HOSPADM

## 2023-08-01 RX ADMIN — SODIUM CHLORIDE: 9 INJECTION, SOLUTION INTRAVENOUS at 03:08

## 2023-08-01 NOTE — Clinical Note
Patient transported to [facility] via stretcher.  Patient tolerated well.  Bedside report and assessment with RENATO Ring.  Puncture site stable.  No signs of bleeding or hematoma.  Dressing in place.  Pulse dopplered.  VS stable.  Patient education complete.  Family at bedside.

## 2023-08-01 NOTE — Clinical Note
The catheter was inserted over the wire into the left ventricle. The angiography was performed via power injection. The injected amount was 25 mL contrast at 14 mL/s. The PSI from the power injection was 800.

## 2023-08-01 NOTE — Clinical Note
The right groin was prepped. The site was prepped with ChloraPrep. The site was clipped. The patient was draped. Chloraprep at 1214  Shailesh @ 1633

## 2023-08-01 NOTE — Clinical Note
The catheter was removed from the ostium   right coronary artery. An angiography was performed of the right coronary arteries.

## 2023-08-01 NOTE — Clinical Note
The catheter was inserted over the wire into the ostium   right coronary artery. Hemodynamics were performed.  An angiography was performed of the right coronary arteries. Multiple views were taken.

## 2023-08-01 NOTE — Clinical Note
The catheter was inserted into the and was inserted over the wire into the ostium   right coronary artery. An angiography was performed of the right coronary arteries.

## 2023-08-02 ENCOUNTER — ANESTHESIA EVENT (OUTPATIENT)
Dept: GASTROENTEROLOGY | Facility: HOSPITAL | Age: 88
End: 2023-08-02
Payer: MEDICARE

## 2023-08-02 ENCOUNTER — ANESTHESIA (OUTPATIENT)
Dept: GASTROENTEROLOGY | Facility: HOSPITAL | Age: 88
End: 2023-08-02
Payer: MEDICARE

## 2023-08-02 PROBLEM — R79.89 ELEVATED TROPONIN: Status: ACTIVE | Noted: 2023-08-02

## 2023-08-02 PROBLEM — I48.0 PAROXYSMAL A-FIB: Status: ACTIVE | Noted: 2023-08-02

## 2023-08-02 PROBLEM — R94.31 ABNORMAL EKG: Status: ACTIVE | Noted: 2023-08-02

## 2023-08-02 PROBLEM — R63.4 WEIGHT LOSS: Status: ACTIVE | Noted: 2023-08-02

## 2023-08-02 LAB
ANION GAP SERPL CALCULATED.3IONS-SCNC: 10 MMOL/L (ref 7–16)
ANISOCYTOSIS BLD QL SMEAR: ABNORMAL
BASOPHILS # BLD AUTO: 0.07 K/UL (ref 0–0.2)
BASOPHILS # BLD AUTO: 0.07 K/UL (ref 0–0.2)
BASOPHILS NFR BLD AUTO: 0.9 % (ref 0–1)
BASOPHILS NFR BLD AUTO: 1 % (ref 0–1)
BUN SERPL-MCNC: 27 MG/DL (ref 7–18)
BUN/CREAT SERPL: 5 (ref 6–20)
CALCIUM SERPL-MCNC: 8.3 MG/DL (ref 8.5–10.1)
CHLORIDE SERPL-SCNC: 100 MMOL/L (ref 98–107)
CO2 SERPL-SCNC: 32 MMOL/L (ref 21–32)
CREAT SERPL-MCNC: 5.46 MG/DL (ref 0.7–1.3)
DIFFERENTIAL METHOD BLD: ABNORMAL
DIFFERENTIAL METHOD BLD: ABNORMAL
EGFR (NO RACE VARIABLE) (RUSH/TITUS): 10 ML/MIN/1.73M2
EOSINOPHIL # BLD AUTO: 0.1 K/UL (ref 0–0.5)
EOSINOPHIL # BLD AUTO: 0.11 K/UL (ref 0–0.5)
EOSINOPHIL NFR BLD AUTO: 1.4 % (ref 1–4)
EOSINOPHIL NFR BLD AUTO: 1.5 % (ref 1–4)
ERYTHROCYTE [DISTWIDTH] IN BLOOD BY AUTOMATED COUNT: 17.6 % (ref 11.5–14.5)
ERYTHROCYTE [DISTWIDTH] IN BLOOD BY AUTOMATED COUNT: 17.6 % (ref 11.5–14.5)
GLUCOSE SERPL-MCNC: 90 MG/DL (ref 70–105)
GLUCOSE SERPL-MCNC: 94 MG/DL (ref 70–105)
GLUCOSE SERPL-MCNC: 99 MG/DL (ref 74–106)
HCT VFR BLD AUTO: 23.1 % (ref 40–54)
HCT VFR BLD AUTO: 25.7 % (ref 40–54)
HCT VFR BLD AUTO: 26.8 % (ref 40–54)
HGB BLD-MCNC: 7.7 G/DL (ref 13.5–18)
HGB BLD-MCNC: 8.6 G/DL (ref 13.5–18)
HGB BLD-MCNC: 8.8 G/DL (ref 13.5–18)
IMM GRANULOCYTES # BLD AUTO: 0.02 K/UL (ref 0–0.04)
IMM GRANULOCYTES # BLD AUTO: 0.03 K/UL (ref 0–0.04)
IMM GRANULOCYTES NFR BLD: 0.3 % (ref 0–0.4)
IMM GRANULOCYTES NFR BLD: 0.4 % (ref 0–0.4)
LYMPHOCYTES # BLD AUTO: 0.38 K/UL (ref 1–4.8)
LYMPHOCYTES # BLD AUTO: 0.51 K/UL (ref 1–4.8)
LYMPHOCYTES NFR BLD AUTO: 5.5 % (ref 27–41)
LYMPHOCYTES NFR BLD AUTO: 6.7 % (ref 27–41)
MCH RBC QN AUTO: 30.1 PG (ref 27–31)
MCH RBC QN AUTO: 30.1 PG (ref 27–31)
MCHC RBC AUTO-ENTMCNC: 33.3 G/DL (ref 32–36)
MCHC RBC AUTO-ENTMCNC: 33.5 G/DL (ref 32–36)
MCV RBC AUTO: 89.9 FL (ref 80–96)
MCV RBC AUTO: 90.2 FL (ref 80–96)
MONOCYTES # BLD AUTO: 0.56 K/UL (ref 0–0.8)
MONOCYTES # BLD AUTO: 0.6 K/UL (ref 0–0.8)
MONOCYTES NFR BLD AUTO: 7.4 % (ref 2–6)
MONOCYTES NFR BLD AUTO: 8.7 % (ref 2–6)
MPC BLD CALC-MCNC: 11.1 FL (ref 9.4–12.4)
MPC BLD CALC-MCNC: 11.3 FL (ref 9.4–12.4)
NEUTROPHILS # BLD AUTO: 5.71 K/UL (ref 1.8–7.7)
NEUTROPHILS # BLD AUTO: 6.33 K/UL (ref 1.8–7.7)
NEUTROPHILS NFR BLD AUTO: 82.9 % (ref 53–65)
NEUTROPHILS NFR BLD AUTO: 83.3 % (ref 53–65)
NRBC # BLD AUTO: 0 X10E3/UL
NRBC # BLD AUTO: 0 X10E3/UL
NRBC, AUTO (.00): 0 %
NRBC, AUTO (.00): 0 %
NT-PROBNP SERPL-MCNC: ABNORMAL PG/ML (ref 1–450)
PLATELET # BLD AUTO: 138 K/UL (ref 150–400)
PLATELET # BLD AUTO: 163 K/UL (ref 150–400)
PLATELET MORPHOLOGY: ABNORMAL
POLYCHROMASIA BLD QL SMEAR: ABNORMAL
POTASSIUM SERPL-SCNC: 3.4 MMOL/L (ref 3.5–5.1)
RBC # BLD AUTO: 2.56 M/UL (ref 4.6–6.2)
RBC # BLD AUTO: 2.86 M/UL (ref 4.6–6.2)
SODIUM SERPL-SCNC: 139 MMOL/L (ref 136–145)
WBC # BLD AUTO: 6.89 K/UL (ref 4.5–11)
WBC # BLD AUTO: 7.6 K/UL (ref 4.5–11)

## 2023-08-02 PROCEDURE — 85025 COMPLETE CBC W/AUTO DIFF WBC: CPT

## 2023-08-02 PROCEDURE — G0378 HOSPITAL OBSERVATION PER HR: HCPCS

## 2023-08-02 PROCEDURE — 97161 PT EVAL LOW COMPLEX 20 MIN: CPT

## 2023-08-02 PROCEDURE — 99223 PR INITIAL HOSPITAL CARE,LEVL III: ICD-10-PCS | Mod: AI,,, | Performed by: INTERNAL MEDICINE

## 2023-08-02 PROCEDURE — 82962 GLUCOSE BLOOD TEST: CPT | Mod: 91

## 2023-08-02 PROCEDURE — 83880 ASSAY OF NATRIURETIC PEPTIDE: CPT

## 2023-08-02 PROCEDURE — 97165 OT EVAL LOW COMPLEX 30 MIN: CPT

## 2023-08-02 PROCEDURE — 43235 EGD DIAGNOSTIC BRUSH WASH: CPT | Mod: ,,, | Performed by: INTERNAL MEDICINE

## 2023-08-02 PROCEDURE — 85014 HEMATOCRIT: CPT | Mod: 91 | Performed by: NURSE PRACTITIONER

## 2023-08-02 PROCEDURE — C9113 INJ PANTOPRAZOLE SODIUM, VIA: HCPCS | Performed by: INTERNAL MEDICINE

## 2023-08-02 PROCEDURE — 99499 NO LOS: ICD-10-PCS | Mod: $0,,, | Performed by: HOSPITALIST

## 2023-08-02 PROCEDURE — 99223 1ST HOSP IP/OBS HIGH 75: CPT | Mod: AI,,, | Performed by: INTERNAL MEDICINE

## 2023-08-02 PROCEDURE — D9220A PRA ANESTHESIA: ICD-10-PCS | Mod: ,,, | Performed by: NURSE ANESTHETIST, CERTIFIED REGISTERED

## 2023-08-02 PROCEDURE — 63600175 PHARM REV CODE 636 W HCPCS: Performed by: NURSE ANESTHETIST, CERTIFIED REGISTERED

## 2023-08-02 PROCEDURE — 99499 UNLISTED E&M SERVICE: CPT | Mod: $0,,, | Performed by: HOSPITALIST

## 2023-08-02 PROCEDURE — 96374 THER/PROPH/DIAG INJ IV PUSH: CPT | Mod: 59

## 2023-08-02 PROCEDURE — 43235 PR EGD, FLEX, DIAGNOSTIC: ICD-10-PCS | Mod: ,,, | Performed by: INTERNAL MEDICINE

## 2023-08-02 PROCEDURE — 80048 BASIC METABOLIC PNL TOTAL CA: CPT

## 2023-08-02 PROCEDURE — 25000003 PHARM REV CODE 250

## 2023-08-02 PROCEDURE — 63600175 PHARM REV CODE 636 W HCPCS: Performed by: INTERNAL MEDICINE

## 2023-08-02 PROCEDURE — 83036 HEMOGLOBIN GLYCOSYLATED A1C: CPT | Mod: 90 | Performed by: HOSPITALIST

## 2023-08-02 PROCEDURE — 99223 1ST HOSP IP/OBS HIGH 75: CPT | Mod: 25,,, | Performed by: INTERNAL MEDICINE

## 2023-08-02 PROCEDURE — 25000003 PHARM REV CODE 250: Performed by: NURSE ANESTHETIST, CERTIFIED REGISTERED

## 2023-08-02 PROCEDURE — 99223 PR INITIAL HOSPITAL CARE,LEVL III: ICD-10-PCS | Mod: 25,,, | Performed by: INTERNAL MEDICINE

## 2023-08-02 PROCEDURE — 96376 TX/PRO/DX INJ SAME DRUG ADON: CPT | Mod: 59

## 2023-08-02 PROCEDURE — D9220A PRA ANESTHESIA: Mod: ,,, | Performed by: NURSE ANESTHETIST, CERTIFIED REGISTERED

## 2023-08-02 PROCEDURE — 25000003 PHARM REV CODE 250: Performed by: HOSPITALIST

## 2023-08-02 PROCEDURE — 43235 EGD DIAGNOSTIC BRUSH WASH: CPT | Performed by: INTERNAL MEDICINE

## 2023-08-02 RX ORDER — ONDANSETRON 2 MG/ML
4 INJECTION INTRAMUSCULAR; INTRAVENOUS EVERY 8 HOURS PRN
Status: DISCONTINUED | OUTPATIENT
Start: 2023-08-02 | End: 2023-08-04 | Stop reason: HOSPADM

## 2023-08-02 RX ORDER — SODIUM CHLORIDE 0.9 % (FLUSH) 0.9 %
10 SYRINGE (ML) INJECTION EVERY 12 HOURS PRN
Status: DISCONTINUED | OUTPATIENT
Start: 2023-08-02 | End: 2023-08-04 | Stop reason: HOSPADM

## 2023-08-02 RX ORDER — HYDRALAZINE HYDROCHLORIDE 100 MG/1
100 TABLET, FILM COATED ORAL EVERY 8 HOURS
Status: DISCONTINUED | OUTPATIENT
Start: 2023-08-02 | End: 2023-08-04 | Stop reason: HOSPADM

## 2023-08-02 RX ORDER — INSULIN ASPART 100 [IU]/ML
0-5 INJECTION, SOLUTION INTRAVENOUS; SUBCUTANEOUS
Status: DISCONTINUED | OUTPATIENT
Start: 2023-08-02 | End: 2023-08-02

## 2023-08-02 RX ORDER — EPHEDRINE SULFATE 50 MG/ML
INJECTION, SOLUTION INTRAVENOUS
Status: DISCONTINUED | OUTPATIENT
Start: 2023-08-02 | End: 2023-08-02

## 2023-08-02 RX ORDER — PANTOPRAZOLE SODIUM 40 MG/10ML
40 INJECTION, POWDER, LYOPHILIZED, FOR SOLUTION INTRAVENOUS 2 TIMES DAILY
Status: DISCONTINUED | OUTPATIENT
Start: 2023-08-02 | End: 2023-08-02

## 2023-08-02 RX ORDER — PROPOFOL 10 MG/ML
VIAL (ML) INTRAVENOUS
Status: DISCONTINUED | OUTPATIENT
Start: 2023-08-02 | End: 2023-08-02

## 2023-08-02 RX ORDER — SODIUM CHLORIDE 9 MG/ML
INJECTION, SOLUTION INTRAVENOUS CONTINUOUS
Status: DISCONTINUED | OUTPATIENT
Start: 2023-08-02 | End: 2023-08-02

## 2023-08-02 RX ORDER — METOPROLOL TARTRATE 25 MG/1
12.5 TABLET ORAL 2 TIMES DAILY
Status: DISCONTINUED | OUTPATIENT
Start: 2023-08-02 | End: 2023-08-03

## 2023-08-02 RX ORDER — IBUPROFEN 200 MG
16 TABLET ORAL
Status: DISCONTINUED | OUTPATIENT
Start: 2023-08-02 | End: 2023-08-04 | Stop reason: HOSPADM

## 2023-08-02 RX ORDER — NALOXONE HCL 0.4 MG/ML
0.02 VIAL (ML) INJECTION
Status: DISCONTINUED | OUTPATIENT
Start: 2023-08-02 | End: 2023-08-04 | Stop reason: HOSPADM

## 2023-08-02 RX ORDER — PANTOPRAZOLE SODIUM 40 MG/1
40 TABLET, DELAYED RELEASE ORAL DAILY
Status: DISCONTINUED | OUTPATIENT
Start: 2023-08-03 | End: 2023-08-04 | Stop reason: HOSPADM

## 2023-08-02 RX ORDER — GLUCAGON 1 MG
1 KIT INJECTION
Status: DISCONTINUED | OUTPATIENT
Start: 2023-08-02 | End: 2023-08-04 | Stop reason: HOSPADM

## 2023-08-02 RX ORDER — PANTOPRAZOLE SODIUM 40 MG/10ML
80 INJECTION, POWDER, LYOPHILIZED, FOR SOLUTION INTRAVENOUS ONCE
Status: COMPLETED | OUTPATIENT
Start: 2023-08-02 | End: 2023-08-02

## 2023-08-02 RX ORDER — ACETAMINOPHEN 325 MG/1
650 TABLET ORAL EVERY 4 HOURS PRN
Status: DISCONTINUED | OUTPATIENT
Start: 2023-08-02 | End: 2023-08-04 | Stop reason: HOSPADM

## 2023-08-02 RX ORDER — LIDOCAINE HYDROCHLORIDE 20 MG/ML
INJECTION, SOLUTION EPIDURAL; INFILTRATION; INTRACAUDAL; PERINEURAL
Status: DISCONTINUED | OUTPATIENT
Start: 2023-08-02 | End: 2023-08-02

## 2023-08-02 RX ORDER — INSULIN ASPART 100 [IU]/ML
0-5 INJECTION, SOLUTION INTRAVENOUS; SUBCUTANEOUS EVERY 6 HOURS PRN
Status: DISCONTINUED | OUTPATIENT
Start: 2023-08-02 | End: 2023-08-04 | Stop reason: HOSPADM

## 2023-08-02 RX ORDER — ETOMIDATE 2 MG/ML
INJECTION INTRAVENOUS
Status: DISCONTINUED | OUTPATIENT
Start: 2023-08-02 | End: 2023-08-02

## 2023-08-02 RX ORDER — IBUPROFEN 200 MG
24 TABLET ORAL
Status: DISCONTINUED | OUTPATIENT
Start: 2023-08-02 | End: 2023-08-04 | Stop reason: HOSPADM

## 2023-08-02 RX ADMIN — ETOMIDATE 4 MG: 20 INJECTION, SOLUTION INTRAVENOUS at 03:08

## 2023-08-02 RX ADMIN — PROPOFOL 20 MG: 10 INJECTION, EMULSION INTRAVENOUS at 03:08

## 2023-08-02 RX ADMIN — PANTOPRAZOLE SODIUM 80 MG: 40 INJECTION, POWDER, FOR SOLUTION INTRAVENOUS at 06:08

## 2023-08-02 RX ADMIN — ETOMIDATE 2 MG: 20 INJECTION, SOLUTION INTRAVENOUS at 03:08

## 2023-08-02 RX ADMIN — PANTOPRAZOLE SODIUM 40 MG: 40 INJECTION, POWDER, FOR SOLUTION INTRAVENOUS at 09:08

## 2023-08-02 RX ADMIN — HYDRALAZINE HYDROCHLORIDE 100 MG: 100 TABLET, FILM COATED ORAL at 06:08

## 2023-08-02 RX ADMIN — LIDOCAINE HYDROCHLORIDE 100 MG: 20 INJECTION, SOLUTION INTRAVENOUS at 03:08

## 2023-08-02 RX ADMIN — HYDRALAZINE HYDROCHLORIDE 100 MG: 100 TABLET, FILM COATED ORAL at 04:08

## 2023-08-02 RX ADMIN — EPHEDRINE SULFATE 10 MG: 50 INJECTION INTRAVENOUS at 03:08

## 2023-08-02 RX ADMIN — SODIUM CHLORIDE: 9 INJECTION, SOLUTION INTRAVENOUS at 03:08

## 2023-08-02 RX ADMIN — POTASSIUM BICARBONATE 50 MEQ: 977.5 TABLET, EFFERVESCENT ORAL at 09:08

## 2023-08-02 NOTE — H&P
I placed an order for naproxen 500 mg twice daily with food instead of Celebrex and sent it to pharmacy.  This should be covered by insurance.  Please notify patient   [facility] Short [facility] Medicine  History & Physical    Patient Name: Joseph Mcdonald  MRN: [id number]  Patient Class: OP- Observation  Admission Date: 8/1/2023  Attending Physician: Jim Hennessy MD   Primary Care Provider: [facility]         Patient information was obtained from patient, relative(s) and ER records.     Subjective:     Principal Problem:Anemia in chronic kidney disease    Chief Complaint:   Chief Complaint   Patient presents with    Abnormal Lab     Low H&H according to dialysis        HPI: 88 year old male with PMHX ESRD on dialysis on T/TH/Sat came to the ED due to abnormal labs. Patient went to dialysis this morning and had a CBC done that resulted Hgb of 5.9 after he left the dialysis. dialysis called the daughter and asked her to bring him into the ED. At Ed he received 2 units of pRBCs and found to be A fib. He is not on anticoagulation and reported to the ED doctor he has never had any heart disease or A fib. He has no complaints, feel at his baseline, denies palpitation, chest pain, fever chills, headaches, numbness or weakness, black stool or blood in stool, denies any issues with urine. He reports he has lost 50 lbs in the last year and has a very low appetite. Patient has had colonoscopies and EGDs in the past but does not remember the date.     Patient has ESRD on dialysis managed by Dr. Herman. Patient reports he is diabetic but is not on any medication for diabetes. 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     Upon questioning the patient and the daughter and explaining what are plans are including getting an Echo, patient all the sudden said he did an Echo last week at [facility]. Then he was asked again if he has ever seen a heart doctor for the second time and he said he has seen Dr. Rivers 2 weeks ago at [facility]. He said his PCP, Dr Genao, sent him to [facility] for some heart issues that he has  no idea what it is.     Patient will be placed on observation until more information is gathered regarding his heart disease.             Past Medical History:   Diagnosis Date    Chronic kidney disease (CKD)     Diabetes mellitus     Hypertension     PVD (peripheral vascular disease)        History reviewed. No pertinent surgical history.    Review of patient's allergies indicates:  No Known Allergies    No current facility-administered medications on file prior to encounter.     Current Outpatient Medications on File Prior to Encounter   Medication Sig    hydrALAZINE (APRESOLINE) 100 MG tablet Take 1 tablet by mouth.    methoxy peg-epoetin beta (MIRCERA INJ) 50 mcg.    nebivoloL (BYSTOLIC) 2.5 MG Tab Take 1 tablet by mouth.    sevelamer carbonate (RENVELA) 800 mg Tab Take 3 tablets by mouth.    ondansetron (ZOFRAN) 4 MG tablet Take 1 tablet (4 mg total) by mouth every 6 (six) hours.     Family History    None       Tobacco Use    Smoking status: Former     Current packs/day: 0.00     Types: Cigarettes    Smokeless tobacco: Never   Substance and Sexual Activity    Alcohol use: Not Currently    Drug use: Never    Sexual activity: Not on file     Review of Systems   Constitutional:  Positive for unexpected weight change. Negative for activity change, chills and fever.   HENT:  Negative for congestion.    Respiratory:  Negative for cough, chest tightness, shortness of breath, wheezing and stridor.    Cardiovascular:  Negative for chest pain, palpitations and leg swelling.   Gastrointestinal:  Negative for abdominal distention, abdominal pain, anal bleeding, blood in stool, constipation, diarrhea, nausea, rectal pain and vomiting.   Genitourinary:  Negative for difficulty urinating, dysuria and enuresis.   Musculoskeletal:  Negative for arthralgias.   Skin:  Negative for color change.   Neurological:  Negative for dizziness, facial asymmetry, weakness and headaches.   Hematological:  Negative for adenopathy.    Psychiatric/Behavioral:  Negative for agitation.      Objective:     Vital Signs (Most Recent):  Temp: 98.1 °F (36.7 °C) (08/01/23 2200)  Pulse: 67 (08/02/23 0101)  Resp: 19 (08/02/23 0101)  BP: (!) 167/56 (08/01/23 2343)  SpO2: 98 % (08/02/23 0101) Vital Signs (24h Range):  Temp:  [97.6 °F (36.4 °C)-98.4 °F (36.9 °C)] 98.1 °F (36.7 °C)  Pulse:  [54-67] 67  Resp:  [11-22] 19  SpO2:  [93 %-99 %] 98 %  BP: (119-185)/(17-56) 167/56        Body mass index is 27.44 kg/m².     Physical Exam  Vitals and nursing note reviewed.   Constitutional:       General: He is not in acute distress.     Appearance: Normal appearance. He is normal weight. He is not ill-appearing, toxic-appearing or diaphoretic.   HENT:      Head: Normocephalic and atraumatic.      Right Ear: External ear normal.      Left Ear: External ear normal.      Nose: Nose normal. No congestion or rhinorrhea.      Mouth/Throat:      Mouth: Mucous membranes are moist.   Eyes:      Conjunctiva/sclera: Conjunctivae normal.   Neck:      Vascular: No carotid bruit.   Cardiovascular:      Rate and Rhythm: Normal rate and regular rhythm.      Pulses: Normal pulses.      Heart sounds: Normal heart sounds. No murmur heard.     No friction rub.   Pulmonary:      Effort: Pulmonary effort is normal. No respiratory distress.      Breath sounds: Normal breath sounds. No wheezing or rales.   Abdominal:      General: Abdomen is flat. Bowel sounds are normal. There is no distension.      Palpations: Abdomen is soft.      Tenderness: There is no abdominal tenderness. There is no guarding.      Hernia: No hernia is present.   Musculoskeletal:         General: No swelling or tenderness. Normal range of motion.      Cervical back: Neck supple. No tenderness.      Right lower leg: No edema.      Left lower leg: No edema.   Skin:     General: Skin is warm.      Coloration: Skin is not jaundiced.      Findings: No bruising or lesion.   Neurological:      Mental Status: He is alert and  "oriented to person, place, and time.   Psychiatric:         Behavior: Behavior normal.                Significant Labs: All pertinent labs within the past 24 hours have been reviewed.    Significant Imaging: I have reviewed all pertinent imaging results/findings within the past 24 hours.    Assessment/Plan:     * Anemia in chronic kidney disease    Presented with H/H of 5.9/18.5  Normocytic Anemia with MCV of over 90 most likely due to CKD. Patient on dialysis most likely gets a CBC on weekly basis and this seems acute. Bleeding has not been r/o completley. FOBT pending and will consult GI.   Patient received 2 units of pRBC  CBC pending currently and another one after that  Patient reports he has lost 50 lbs in the last year due to low appetite. Malignancies should be in considerations. Protein GAP was 3.4, WBC is normal, Platelets are ~150. FOBT pending.     Paroxysmal A-fib  Patient with A fib and rate of 67  Will have to contac [facility] and make sure they are aware of the situation. I do wonder when patient visited them 2 weeks ago for the very first time and was sent to get an Echo at [facility] a week ago was due to him being A fob  Continue equivalent dose of his beta blocker at home with lopressor 12.5 mg BID  Will hold off on cardiology consult and Echo for now since patient is currently under the care at [facility] and being worked up for heart disease.   Cardiac monitoring  Will hold of on eliquis 2.5 mg BID for now until Anemia due to bleeding is r/o    End stage renal disease    Received dialysis this morning  He gets dialysis T/TH/ Sat  Patient is currently on observation and might be discharged tomorrow. If not discharged nephrology needs to be consulted to proceed with dialysis on Thursday.     DM2 (diabetes mellitus, type 2)  Patient's FSGs are controlled on current medication regimen.  Last A1c reviewed- No results found for: "LABA1C", "HGBA1C"- A1C is pending   Most recent fingerstick glucose reviewed- No " "results for input(s): "POCTGLUCOSE" in the last 24 hours.  Current correctional scale  Medium  Maintain anti-hyperglycemic dose as follows-   Antihyperglycemics (From admission, onward)      Start     Stop Route Frequency Ordered    08/02/23 0244  insulin aspart U-100 injection 0-5 Units         -- SubQ Before meals & nightly PRN 08/02/23 0144          Hold Oral hypoglycemics while patient is in the hospital.    Essential (primary) hypertension    Continue home meds which includes a beta blocker and Hydaralazine 100 mg TID      VTE Risk Mitigation (From admission, onward)           Ordered     apixaban tablet 2.5 mg  2 times daily         08/02/23 0145     Reason for No Pharmacological VTE Prophylaxis  Once        Question:  Reasons:  Answer:  Physician Provided (leave comment)  Comment:  Starting patient on eliquis    08/02/23 0144     IP VTE HIGH RISK PATIENT  Once         08/02/23 0144     Place sequential compression device  Until discontinued         08/02/23 0144                           On 08/02/2023, patient should be placed in hospital observation services under my care in collaboration with Dr. Jim Hennessy.    Michael Best MD  Department of Hospital Medicine  [facility] Short Stay [facility]  "

## 2023-08-02 NOTE — PROGRESS NOTES
[facility] - Short Stay [facility] Medicine  Progress Note    Patient Name: Joseph Mcdonald  MRN: [id number]  Patient Class: OP- Observation   Admission Date: 8/1/2023  Length of Stay: 0 days  Attending Physician: Shiv Mcknight MD  Primary Care Provider: [facility]        Subjective:     Principal Problem:Acute blood loss anemia    HPI:  88 year old male with PMHX ESRD on dialysis on T/TH/Sat came to the ED due to abnormal labs. Patient went to dialysis this morning and had a CBC done that resulted Hgb of 5.9 after he left the dialysis. dialysis called the daughter and asked her to bring him into the ED. At Ed he received 2 units of pRBCs and found to be A fib. He is not on anticoagulation and reported to the ED doctor he has never had any heart disease or A fib. He has no complaints, feel at his baseline, denies palpitation, chest pain, fever chills, headaches, numbness or weakness, black stool or blood in stool, denies any issues with urine. He reports he has lost 50 lbs in the last year and has a very low appetite. Patient has had colonoscopies and EGDs in the past but does not remember the date.     Patient has ESRD on dialysis managed by Dr. Herman. Patient reports he is diabetic but is not on any medication for diabetes. 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     Upon questioning the patient and the daughter and explaining what are plans are including getting an Echo, patient all the sudden said he did an Echo last week at [facility]. Then he was asked again if he has ever seen a heart doctor for the second time and he said he has seen Dr. Rivers 2 weeks ago at [facility]. He said his PCP, Dr Genao, sent him to [facility] for some heart issues that he has no idea what it is.     Patient will be placed on observation until more information is gathered regarding his heart disease.             Overview/Hospital Course:  No notes  on file    Interval History:     Review of Systems   Constitutional:  Positive for unexpected weight change. Negative for activity change, chills and fever.   HENT:  Negative for congestion.    Respiratory:  Positive for shortness of breath. Negative for cough, chest tightness, wheezing and stridor.    Cardiovascular:  Positive for palpitations. Negative for chest pain and leg swelling.   Gastrointestinal:  Negative for abdominal distention, abdominal pain, anal bleeding, blood in stool, constipation, diarrhea, nausea, rectal pain and vomiting.   Genitourinary:  Negative for difficulty urinating, dysuria and enuresis.   Musculoskeletal:  Negative for arthralgias.   Skin:  Negative for color change.   Neurological:  Negative for dizziness, facial asymmetry, weakness and headaches.   Hematological:  Negative for adenopathy.   Psychiatric/Behavioral:  Negative for agitation.      Objective:     Vital Signs (Most Recent):  Temp: 98 °F (36.7 °C) (08/02/23 0713)  Pulse: (!) 59 (08/02/23 0713)  Resp: 16 (08/02/23 0713)  BP: (!) 116/35 (08/02/23 0713)  SpO2: 96 % (08/02/23 0713) Vital Signs (24h Range):  Temp:  [97.6 °F (36.4 °C)-98.4 °F (36.9 °C)] 98 °F (36.7 °C)  Pulse:  [54-68] 59  Resp:  [11-22] 16  SpO2:  [93 %-99 %] 96 %  BP: (116-185)/(17-58) 116/35     Weight: 70.4 kg (155 lb 2.6 oz)  Body mass index is 25.04 kg/m².    Intake/Output Summary (Last 24 hours) at 8/2/2023 1029  Last data filed at 8/1/2023 2201  Gross per 24 hour   Intake 931 ml   Output --   Net 931 ml         Physical Exam  Vitals and nursing note reviewed.   Constitutional:       General: He is not in acute distress.     Appearance: Normal appearance. He is normal weight. He is not ill-appearing, toxic-appearing or diaphoretic.   HENT:      Head: Normocephalic and atraumatic.      Right Ear: External ear normal.      Left Ear: External ear normal.      Nose: Nose normal. No congestion or rhinorrhea.      Mouth/Throat:      Mouth: Mucous membranes are  moist.   Eyes:      Conjunctiva/sclera: Conjunctivae normal.   Neck:      Vascular: No carotid bruit.   Cardiovascular:      Rate and Rhythm: Normal rate and regular rhythm.      Pulses: Normal pulses.      Heart sounds: Normal heart sounds. No murmur heard.     No friction rub.   Pulmonary:      Effort: Pulmonary effort is normal. No respiratory distress.      Breath sounds: Normal breath sounds. No wheezing or rales.   Abdominal:      General: Abdomen is flat. Bowel sounds are normal. There is no distension.      Palpations: Abdomen is soft.      Tenderness: There is no abdominal tenderness. There is no guarding.      Hernia: No hernia is present.   Musculoskeletal:         General: No swelling or tenderness. Normal range of motion.      Cervical back: Neck supple. No tenderness.      Right lower leg: No edema.      Left lower leg: No edema.   Skin:     General: Skin is warm.      Coloration: Skin is not jaundiced.      Findings: No bruising or lesion.   Neurological:      Mental Status: He is alert and oriented to person, place, and time.   Psychiatric:         Behavior: Behavior normal.             Significant Labs: All pertinent labs within the past 24 hours have been reviewed.    Significant Imaging: I have reviewed all pertinent imaging results/findings within the past 24 hours.      Assessment/Plan:      * Acute blood loss anemia    Presented with H/H of 5.9/18.5  Normocytic Anemia with MCV of over 90. Patient on dialysis most likely gets a CBC on weekly basis and this seems acute. Bleeding has not been r/o completley. FOBT pending and will consult GI.   Patient received 2 units of pRBC  CBC pending currently and another one after that  Patient reports he has lost 50 lbs in the last year due to low appetite. Malignancies should be in considerations. Protein GAP was 3.4, WBC is normal, Platelets are ~150. FOBT pending.     8/2: FOBT pending.  GI consulted.      End stage renal disease    Received dialysis  "this morning  He gets dialysis T/TH/ Sat  Patient is currently on observation and might be discharged tomorrow. If not discharged nephrology needs to be consulted to proceed with dialysis on Thursday.     8/2: nephrology consulted.      Paroxysmal A-fib  Patient with A fib and rate of 67  Will have to contac [facility] and make sure they are aware of the situation. I do wonder when patient visited them 2 weeks ago for the very first time and was sent to get an Echo at [place] a week ago was due to him being A fob  Continue equivalent dose of his beta blocker at home with lopressor 12.5 mg BID  Will hold off on cardiology consult and Echo for now since patient is currently under the care at [facility] and being worked up for heart disease.   Cardiac monitoring  Will hold of on eliquis 2.5 mg BID for now until Anemia due to bleeding is r/o.     8/2: cardiology consulted for new onset atrial fibrillation.   Irregular rhythm this morning more consistent with PVCs or other abnormality rather than atrial fibrillation.      Weight loss  Nutrition consulted. Most recent weight and BMI monitored-     Measurements:  Wt Readings from Last 1 Encounters:   08/02/23 70.4 kg (155 lb 2.6 oz)   Body mass index is 25.04 kg/m².    Patient has been screened and assessed by RD.    Malnutrition Type:  Context:    Level:      Malnutrition Characteristic Summary:   Dietary consult.    Interventions/Recommendations (treatment strategy):   f/u with nutrition/dietary.     Age appropriate malignancy screening with PCP.      DM2 (diabetes mellitus, type 2)  Patient's FSGs are controlled on current medication regimen.  Last A1c reviewed- No results found for: "LABA1C", "HGBA1C"- A1C is pending   Most recent fingerstick glucose reviewed- No results for input(s): "POCTGLUCOSE" in the last 24 hours.  Current correctional scale  Medium  Maintain anti-hyperglycemic dose as follows-   Antihyperglycemics (From admission, onward)    Start     Stop Route Frequency " Ordered    08/02/23 0624  insulin aspart U-100 injection 0-5 Units         -- SubQ Every 6 hours PRN 08/02/23 0524        Hold Oral hypoglycemics while patient is in the hospital.    F/u A1c.      Essential (primary) hypertension    Continue home meds which includes a beta blocker and Hydaralazine 100 mg TID      VTE Risk Mitigation (From admission, onward)         Ordered     Reason for No Pharmacological VTE Prophylaxis  Once        Question:  Reasons:  Answer:  Physician Provided (leave comment)  Comment:  Starting patient on eliquis    08/02/23 0144     IP VTE HIGH RISK PATIENT  Once         08/02/23 0144     Place sequential compression device  Until discontinued         08/02/23 0144                Discharge Planning   BRIAN: 8/3/2023     Code Status: Full Code   Is the patient medically ready for discharge?:     Reason for patient still in hospital (select all that apply): Treatment  Discharge Plan A: Home                  Shiv Mcknight MD  Department of Hospital Medicine   [facility] - Short Stay Unit

## 2023-08-02 NOTE — ANESTHESIA PREPROCEDURE EVALUATION
08/02/2023  Joseph Mcdonald is a 88 y.o., male.      Pre-op Assessment    I have reviewed the Patient Summary Reports.    I have reviewed the NPO Status.   I have reviewed the Medications.     Review of Systems  Anesthesia Hx:  History of prior surgery of interest to airway management or planning: Denies Family Hx of Anesthesia complications.   Denies Personal Hx of Anesthesia complications.   Social:  Former Smoker    Cardiovascular:   Hypertension PVD    Renal/:   Chronic Renal Disease, ESRD, Dialysis    Endocrine:   Diabetes, type 2        Physical Exam  General: Well nourished and Cooperative    Airway:  Mallampati: III / II  Mouth Opening: Normal  TM Distance: Normal  Tongue: Normal  Neck ROM: Normal ROM    Dental:  Intact        Anesthesia Plan  Type of Anesthesia, risks & benefits discussed:    Anesthesia Type: Gen Natural Airway  Intra-op Monitoring Plan: Standard ASA Monitors  Post Op Pain Control Plan: multimodal analgesia  Induction:  IV  Informed Consent: Informed consent signed with the Patient and all parties understand the risks and agree with anesthesia plan.  All questions answered. Patient consented to blood products? Yes  ASA Score: 3  Day of Surgery Review of History & Physical: I have interviewed and examined the patient. I have reviewed the patient's H&P dated: There are no significant changes.     Ready For Surgery From Anesthesia Perspective.     .    Past Medical History:   Diagnosis Date    Chronic kidney disease (CKD)     Diabetes mellitus     Hypertension     PVD (peripheral vascular disease)        Past Surgical History:   Procedure Laterality Date    left toe amputation      PLACEMENT OF DIALYSIS ACCESS         History reviewed. No pertinent family history.    Social History     Socioeconomic History    Marital status:    Tobacco Use    Smoking status: Former      Current packs/day: 0.00     Types: Cigarettes    Smokeless tobacco: Never   Substance and Sexual Activity    Alcohol use: Not Currently    Drug use: Never     Social Determinants of Health     Financial Resource Strain: Low Risk  (8/2/2023)    Overall Financial Resource Strain (CARDIA)     Difficulty of Paying Living Expenses: Not hard at all   Food Insecurity: No Food Insecurity (8/2/2023)    Hunger Vital Sign     Worried About Running Out of Food in the Last Year: Never true     Ran Out of Food in the Last Year: Never true   Transportation Needs: No Transportation Needs (8/2/2023)    PRAPARE - Transportation     Lack of Transportation (Medical): No     Lack of Transportation (Non-Medical): No   Physical Activity: Sufficiently Active (8/2/2023)    Exercise Vital Sign     Days of Exercise per Week: 5 days     Minutes of Exercise per Session: 30 min   Stress: No Stress Concern Present (8/2/2023)    [place] [employer] - Occupational Stress Questionnaire     Feeling of Stress : Not at all   Social Connections: Moderately Isolated (8/2/2023)    Social Connection and Isolation Panel [NHANES]     Frequency of Communication with Friends and Family: More than three times a week     Frequency of Social Gatherings with Friends and Family: More than three times a week     Attends Christian Services: More than 4 times per year     Active Member of Clubs or Organizations: No     Attends Club or Organization Meetings: Never     Marital Status:    Housing Stability: Low Risk  (8/2/2023)    Housing Stability Vital Sign     Unable to Pay for Housing in the Last Year: No     Number of Places Lived in the Last Year: 1     Unstable Housing in the Last Year: No       Current Facility-Administered Medications   Medication Dose Route Frequency Provider Last Rate Last Admin    0.9%  NaCl infusion (for blood administration)   Intravenous Q24H PRN USHA Barfield   Stopped at 08/01/23 7047     acetaminophen tablet 650 mg  650 mg Oral Q4H PRN Michael Best MD        glucagon (human recombinant) injection 1 mg  1 mg Intramuscular PRN Michael Best MD        glucose chewable tablet 16 g  16 g Oral PRN Michael Best MD        glucose chewable tablet 24 g  24 g Oral PRN Michael Best MD        hydrALAZINE tablet 100 mg  100 mg Oral Q8H Michael Best MD   100 mg at 08/02/23 0628    insulin aspart U-100 injection 0-5 Units  0-5 Units Subcutaneous Q6H PRN Jim Hennessy MD        metoprolol tartrate (LOPRESSOR) split tablet 12.5 mg  12.5 mg Oral BID Michael Best MD        naloxone 0.4 mg/mL injection 0.02 mg  0.02 mg Intravenous PRN Michael Best MD        ondansetron injection 4 mg  4 mg Intravenous Q8H PRN Michael Best MD        pantoprazole injection 40 mg  40 mg Intravenous BID Jim Hennessy MD   40 mg at 08/02/23 0940    sodium chloride 0.9% flush 10 mL  10 mL Intravenous Q12H PRN Michael Best MD        trazodone split tablet 25 mg  25 mg Oral Nightly PRN Michael Best MD         No current facility-administered medications on file prior to encounter.     Current Outpatient Medications on File Prior to Encounter   Medication Sig Dispense Refill    hydrALAZINE (APRESOLINE) 100 MG tablet Take 1 tablet by mouth 3 (three) times daily with meals.      methoxy peg-epoetin beta (MIRCERA INJ) 50 mcg.      nebivoloL (BYSTOLIC) 2.5 MG Tab Take 1 tablet by mouth.      ondansetron (ZOFRAN) 4 MG tablet Take 1 tablet (4 mg total) by mouth every 6 (six) hours. 12 tablet 0    sevelamer carbonate (RENVELA) 800 mg Tab Take 3 tablets by mouth.         Review of patient's allergies indicates:  No Known Allergies       Chemistry        Component Value Date/Time     08/02/2023 0441    K 3.4 (L) 08/02/2023 0441     08/02/2023 0441    CO2 32 08/02/2023 0441    BUN 27 (H) 08/02/2023 0441    CREATININE 5.46 (H) 08/02/2023 0441    GLU 99 08/02/2023 0441        Component Value  Date/Time    CALCIUM 8.3 (L) 08/02/2023 0441    ALKPHOS 79 08/01/2023 1235    AST 22 08/01/2023 1235    ALT 25 08/01/2023 1235    BILITOT 0.5 08/01/2023 1235    ESTGFRAFRICA 11 06/18/2020 1715    EGFRNONAA 9 (L) 03/24/2022 1918        Component Ref Range & Units 04:41  (8/2/23) 1 d ago  (8/1/23) 1 d ago  (8/1/23) 1 yr ago  (3/24/22) 1 yr ago  (1/2/22) 1 yr ago  (1/2/22) 3 yr ago  (6/18/20)   WBC 4.50 - 11.00 K/uL 6.89  7.60  6.81  9.32   8.35  14.73 High  R    RBC 4.60 - 6.20 M/uL 2.56 Low   2.86 Low   1.97 Low   3.20 Low    3.68 Low   3.81 Low  R    Hemoglobin 13.5 - 18.0 g/dL 7.7 Low   8.6 Low   5.9 Low Panic   9.2 Low    11.1 Low   11.7 Low     Hematocrit 40.0 - 54.0 % 23.1 Low

## 2023-08-02 NOTE — ASSESSMENT & PLAN NOTE
Received dialysis this morning  He gets dialysis T/TH/ Sat  Patient is currently on observation and might be discharged tomorrow. If not discharged nephrology needs to be consulted to proceed with dialysis on Thursday.     8/2: nephrology consulted.

## 2023-08-02 NOTE — ASSESSMENT & PLAN NOTE
Patient with A fib and rate of 67  Will have to contac [facility] and make sure they are aware of the situation. I do wonder when patient visited them 2 weeks ago for the very first time and was sent to get an Echo at [place] a week ago was due to him being A fob  Continue equivalent dose of his beta blocker at home with lopressor 12.5 mg BID  Will hold off on cardiology consult and Echo for now since patient is currently under the care at [facility] and being worked up for heart disease.   Cardiac monitoring  Will hold of on eliquis 2.5 mg BID for now until Anemia due to bleeding is r/o.     8/2: cardiology consulted for new onset atrial fibrillation.   Irregular rhythm this morning more consistent with PVCs or other abnormality rather than atrial fibrillation.

## 2023-08-02 NOTE — ASSESSMENT & PLAN NOTE
Patient with A fib and rate of 67  Will have to contac [facility] and make sure they are aware of the situation. I do wonder when patient visited them 2 weeks ago for the very first time and was sent to get an Echo at [place] a week ago was due to him being A fob  Continue equivalent dose of his beta blocker at home with lopressor 12.5 mg BID  Will hold off on cardiology consult and Echo for now since patient is currently under the care at [facility] and being worked up for heart disease.   Cardiac monitoring  Started patient on Eliquis 2.5 mg BID

## 2023-08-02 NOTE — PT/OT/SLP EVAL
"Physical Therapy Evaluation and Treatment    Patient Name: Joseph Mcdonald   MRN: [id number]  Recent Surgery: Procedure(s) (LRB):  Left heart cath (N/A)      Recommendations:     Discharge Recommendations: home, home with home health   Discharge Equipment Recommendations: none   Barriers to discharge: Ongoing medical treatment    Assessment:     Joseph Mcdonald is a 88 y.o. male admitted with a medical diagnosis of Acute blood loss anemia. He presents with the following impairments/functional limitations: impaired endurance, weakness, impaired self care skills, impaired functional mobility, impaired balance, gait instability.     Patient demonstrates a mild decline in functional mobility from reported baseline but no BRIGHT/SOB during gait trial. Pt will follow to encourage increased activity as medical work up completed. Anticipate home when medically stable. HHPT if needed.    Rehab Prognosis: Good; patient would benefit from acute PT services to address these deficits and reach maximum level of function.    Plan:     During this hospitalization, patient to be seen 5 x/week to address the above listed problems via gait training, therapeutic activities, therapeutic exercises    Plan of Care Expires: 09/02/23    Subjective     Chief Complaint: anemia; SOB/weakness at dialysis treatment  Patient Comments/Goals: "I can usually do for myself but I got weak and SOB going in to dialysis"  Pain/Comfort:  Pain Rating 1: 0/10  Pain Rating Post-Intervention 1: 0/10    Social History:  Living Environment: Patient lives alone in a single story home with number of outside stair(s): 0  Prior Level of Function: Prior to admission, patient was modified independent with ADLs using straight cane for mobility; including driving and getting groceries  Equipment Used at Home: cane, straight, rollator  DME owned (not currently used): rollator  Assistance Upon Discharge: family, friend(s), and home health if required    Objective: "     Communicated with Liliane Mulligan LPN prior to session. Patient found supine with peripheral IV, telemetry upon PT entry to room.    General Precautions: Standard, fall   Orthopedic Precautions: N/A   Braces: N/A    Respiratory Status: Room air    Exams:  Cognition: Patient is oriented to Person, Place, Time, Situation  RLE ROM: WFL  RLE Strength: WFL  LLE ROM: WFL except remote TMA  LLE Strength: WFL  Sensation:    -       Impaired  light/touch in left toes but deep pressure intact    Functional Mobility:  Gait belt applied - Yes  Bed Mobility  Supine to Sit: modified independence   Transfers  Sit to Stand: stand by assistance with straight cane  Bed to Chair: stand by assistance with straight cane using Step Transfer  Gait  Patient ambulated 80 with straight cane and stand by assistance. Patient demonstrates steady gait, decreased step length, decreased foot clearance, decreased keiko, decreased arm swing, mild guarding, and no LOB or SOB/BRIGHT. All lines remained intact throughout ambulation trail.  Balance  Sitting: modified independence  Standing: stand by assistance and spc      Therapeutic Activities and Exercises:   Patient educated on role of acute care PT and PT POC, safety while in hospital including calling nurse for mobility, and call light usage  Patient educated about importance of OOB mobility and remaining up in chair most of the day.      AM-PAC 6 CLICK MOBILITY  Total Score:23    Patient left up in chair with all lines intact, call button in reach, and RN notified.    GOALS:   Multidisciplinary Problems       Physical Therapy Goals          Problem: Physical Therapy    Goal Priority Disciplines Outcome Goal Variances Interventions   Physical Therapy Goal     PT, PT/OT Ongoing, Progressing     Description: Short Term Goals to be met by: 2023    Patient will increase functional independence with mobility by performin. Sit to stand transfer modified independently using straight  cane  2. Bed to chair transfer modified independently using straight cane  3. Gait  x 400 feet modified independently using straight cane  4. Standing lower extremity exercise program x30 reps per handout, with assistance as needed    Long Term Goals to be met by: 9/2/2023    Pt will modified independent functional mobility with straight cane to return to home situation and prior activities of daily living.                        History:     Past Medical History:   Diagnosis Date    Chronic kidney disease (CKD)     Diabetes mellitus     Hypertension     PVD (peripheral vascular disease)        Past Surgical History:   Procedure Laterality Date    left toe amputation      PLACEMENT OF DIALYSIS ACCESS         Time Tracking:     PT Received On: 08/02/23  PT Start Time: 1331  PT Stop Time: 1348  PT Total Time (min): 17 min     Billable Minutes: Evaluation low complexity    8/2/2023

## 2023-08-02 NOTE — ASSESSMENT & PLAN NOTE
"Patient's FSGs are controlled on current medication regimen.  Last A1c reviewed- No results found for: "LABA1C", "HGBA1C"- A1C is pending   Most recent fingerstick glucose reviewed- No results for input(s): "POCTGLUCOSE" in the last 24 hours.  Current correctional scale  Medium  Maintain anti-hyperglycemic dose as follows-   Antihyperglycemics (From admission, onward)    Start     Stop Route Frequency Ordered    08/02/23 0244  insulin aspart U-100 injection 0-5 Units         -- SubQ Before meals & nightly PRN 08/02/23 0144        Hold Oral hypoglycemics while patient is in the hospital.  "

## 2023-08-02 NOTE — ASSESSMENT & PLAN NOTE
Received dialysis this morning  He gets dialysis T/TH/ Sat  Patient is currently on observation and might be discharged tomorrow. If not discharged nephrology needs to be consulted to proceed with dialysis on Thursday.

## 2023-08-02 NOTE — PLAN OF CARE
Problem: Physical Therapy  Goal: Physical Therapy Goal  Description: Short Term Goals to be met by: 2023    Patient will increase functional independence with mobility by performin. Sit to stand transfer modified independently using straight cane  2. Bed to chair transfer modified independently using straight cane  3. Gait  x 400 feet modified independently using straight cane  4. Standing lower extremity exercise program x30 reps per handout, with assistance as needed    Long Term Goals to be met by: 2023    Pt will modified independent functional mobility with straight cane to return to home situation and prior activities of daily living.   Outcome: Ongoing, Progressing     Patient demonstrates a mild decline in functional mobility from reported baseline but no BRIGHT/SOB during gait trial. Pt will follow to encourage increased activity as medical work up completed. Anticipate home when medically stable. HHPT if needed.

## 2023-08-02 NOTE — PLAN OF CARE
Problem: Occupational Therapy  Goal: Occupational Therapy Goal  Description: STG:  Pt will perform grooming with setup  Pt will bathe with SBA with setup at EOB  Pt will perform UE dressing with Rubin  Pt will perform LE dressing with Rubin   Pt will sit EOB x 10 min with SBA   Pt will transfer bed/chair/bsc with SBA with cane  Pt will perform standing task x 2 min with SBA with cane   Pt will tolerate 15 minutes of tx without fatigue      LT.Restore to max I with self care and mobility.      Outcome: Ongoing, Progressing

## 2023-08-02 NOTE — ASSESSMENT & PLAN NOTE
Presented with H/H of 5.9/18.5  Normocytic Anemia with MCV of over 90. Patient on dialysis most likely gets a CBC on weekly basis and this seems acute. Bleeding has not been r/o completley. FOBT pending and will consult GI.   Patient received 2 units of pRBC  CBC pending currently and another one after that  Patient reports he has lost 50 lbs in the last year due to low appetite. Malignancies should be in considerations. Protein GAP was 3.4, WBC is normal, Platelets are ~150. FOBT pending.     8/2: FOBT pending.  GI consulted.

## 2023-08-02 NOTE — SUBJECTIVE & OBJECTIVE
Past Medical History:   Diagnosis Date    Chronic kidney disease (CKD)     Diabetes mellitus     Hypertension     PVD (peripheral vascular disease)        History reviewed. No pertinent surgical history.    Review of patient's allergies indicates:  No Known Allergies    No current facility-administered medications on file prior to encounter.     Current Outpatient Medications on File Prior to Encounter   Medication Sig    hydrALAZINE (APRESOLINE) 100 MG tablet Take 1 tablet by mouth.    methoxy peg-epoetin beta (MIRCERA INJ) 50 mcg.    nebivoloL (BYSTOLIC) 2.5 MG Tab Take 1 tablet by mouth.    sevelamer carbonate (RENVELA) 800 mg Tab Take 3 tablets by mouth.    ondansetron (ZOFRAN) 4 MG tablet Take 1 tablet (4 mg total) by mouth every 6 (six) hours.     Family History    None       Tobacco Use    Smoking status: Former     Current packs/day: 0.00     Types: Cigarettes    Smokeless tobacco: Never   Substance and Sexual Activity    Alcohol use: Not Currently    Drug use: Never    Sexual activity: Not on file     Review of Systems   Constitutional:  Positive for unexpected weight change. Negative for activity change, chills and fever.   HENT:  Negative for congestion.    Respiratory:  Negative for cough, chest tightness, shortness of breath, wheezing and stridor.    Cardiovascular:  Negative for chest pain, palpitations and leg swelling.   Gastrointestinal:  Negative for abdominal distention, abdominal pain, anal bleeding, blood in stool, constipation, diarrhea, nausea, rectal pain and vomiting.   Genitourinary:  Negative for difficulty urinating, dysuria and enuresis.   Musculoskeletal:  Negative for arthralgias.   Skin:  Negative for color change.   Neurological:  Negative for dizziness, facial asymmetry, weakness and headaches.   Hematological:  Negative for adenopathy.   Psychiatric/Behavioral:  Negative for agitation.      Objective:     Vital Signs (Most Recent):  Temp: 98.1 °F (36.7 °C) (08/01/23 2200)  Pulse:  67 (08/02/23 0101)  Resp: 19 (08/02/23 0101)  BP: (!) 167/56 (08/01/23 2343)  SpO2: 98 % (08/02/23 0101) Vital Signs (24h Range):  Temp:  [97.6 °F (36.4 °C)-98.4 °F (36.9 °C)] 98.1 °F (36.7 °C)  Pulse:  [54-67] 67  Resp:  [11-22] 19  SpO2:  [93 %-99 %] 98 %  BP: (119-185)/(17-56) 167/56        Body mass index is 27.44 kg/m².     Physical Exam  Vitals and nursing note reviewed.   Constitutional:       General: He is not in acute distress.     Appearance: Normal appearance. He is normal weight. He is not ill-appearing, toxic-appearing or diaphoretic.   HENT:      Head: Normocephalic and atraumatic.      Right Ear: External ear normal.      Left Ear: External ear normal.      Nose: Nose normal. No congestion or rhinorrhea.      Mouth/Throat:      Mouth: Mucous membranes are moist.   Eyes:      Conjunctiva/sclera: Conjunctivae normal.   Neck:      Vascular: No carotid bruit.   Cardiovascular:      Rate and Rhythm: Normal rate and regular rhythm.      Pulses: Normal pulses.      Heart sounds: Normal heart sounds. No murmur heard.     No friction rub.   Pulmonary:      Effort: Pulmonary effort is normal. No respiratory distress.      Breath sounds: Normal breath sounds. No wheezing or rales.   Abdominal:      General: Abdomen is flat. Bowel sounds are normal. There is no distension.      Palpations: Abdomen is soft.      Tenderness: There is no abdominal tenderness. There is no guarding.      Hernia: No hernia is present.   Musculoskeletal:         General: No swelling or tenderness. Normal range of motion.      Cervical back: Neck supple. No tenderness.      Right lower leg: No edema.      Left lower leg: No edema.   Skin:     General: Skin is warm.      Coloration: Skin is not jaundiced.      Findings: No bruising or lesion.   Neurological:      Mental Status: He is alert and oriented to person, place, and time.   Psychiatric:         Behavior: Behavior normal.                Significant Labs: All pertinent labs within  the past 24 hours have been reviewed.    Significant Imaging: I have reviewed all pertinent imaging results/findings within the past 24 hours.

## 2023-08-02 NOTE — PLAN OF CARE
[facility] - Short Stay Unit  Initial Discharge Assessment       Primary Care Provider: [facility]    Admission Diagnosis: Shortness of breath [R06.02]  New onset a-fib [I48.91]  Elevated troponin [R77.8]  Chest pain [R07.9]  Acute on chronic congestive heart failure, unspecified heart failure type [I50.9]  Anemia due to chronic kidney disease, on chronic dialysis [N18.6, D63.1, Z99.2]    Admission Date: 8/1/2023  Expected Discharge Date: 8/3/2023    Transition of Care Barriers: None    Payor: HU[employer] MANAGED MEDICARE / Plan: HU[employer]NA MEDICARE PPO / Product Type: Medicare Advantage /     Extended Emergency Contact Information  Primary Emergency Contact: Yany Ferrara  Mobile Phone: [phone]  Relation: Daughter  Preferred language: English   needed? No    Discharge Plan A: Home  Discharge Plan B: Home, Home Health      [facility] [address] [place] [address] [address] [place] [address]  [address] [place], [place] [address]  Phone: [phone] Fax: [phone]    [facility] [place] [place] - [address] [place] [address]  [address] [place] [id number]  Phone: [phone] Fax: [phone]      Initial Assessment (most recent)       Adult Discharge Assessment - 08/02/23 0944          Discharge Assessment    Assessment Type Discharge Planning Assessment     Source of Information patient     Communicated BRIAN with patient/caregiver Date not available/Unable to determine     People in Home alone     Do you expect to return to your current living situation? Yes     Do you have help at home or someone to help you manage your care at home? No     Prior to hospitilization cognitive status: Unable to Assess     Current cognitive status: Alert/Oriented     Walking or Climbing Stairs ambulation difficulty, requires equipment     Mobility Management cane/ rollator     Home Accessibility wheelchair accessible     Home Layout Able to live on 1st floor     Equipment Currently Used at Home  cane, straight;rollator     Patient currently being followed by outpatient case management? No     Do you currently have service(s) that help you manage your care at home? No     Do you take prescription medications? Yes     Do you have prescription coverage? Yes     Do you have any problems affording any of your prescribed medications? No     Is the patient taking medications as prescribed? yes     How do you get to doctors appointments? family or friend will provide     Are you on dialysis? No     Do you take coumadin? No     Discharge Plan A Home     Discharge Plan B Home;Home Health     DME Needed Upon Discharge  none     Discharge Plan discussed with: Patient     Transition of Care Barriers None        Physical Activity    On average, how many days per week do you engage in moderate to strenuous exercise (like a brisk walk)? 5 days     On average, how many minutes do you engage in exercise at this level? 30 min        Financial Resource Strain    How hard is it for you to pay for the very basics like food, housing, medical care, and heating? Not hard at all        Housing Stability    In the last 12 months, was there a time when you were not able to pay the mortgage or rent on time? No     In the last 12 months, how many places have you lived? 1     In the last 12 months, was there a time when you did not have a steady place to sleep or slept in a shelter (including now)? No        Transportation Needs    In the past 12 months, has lack of transportation kept you from medical appointments or from getting medications? No     In the past 12 months, has lack of transportation kept you from meetings, work, or from getting things needed for daily living? No        Food Insecurity    Within the past 12 months, you worried that your food would run out before you got the money to buy more. Never true     Within the past 12 months, the food you bought just didn't last and you didn't have money to get more. Never true         Stress    Do you feel stress - tense, restless, nervous, or anxious, or unable to sleep at night because your mind is troubled all the time - these days? Not at all        Social Connections    In a typical week, how many times do you talk on the phone with family, friends, or neighbors? More than three times a week     How often do you get together with friends or relatives? More than three times a week     How often do you attend Anglican or Yarsanism services? More than 4 times per year     Do you belong to any clubs or organizations such as Anglican groups, unions, fraternal or athletic groups, or school groups? No     How often do you attend meetings of the clubs or organizations you belong to? Never     Are you , , , , never , or living with a partner?         Alcohol Use    Q1: How often do you have a drink containing alcohol? Never     Q2: How many drinks containing alcohol do you have on a typical day when you are drinking? Patient does not drink     Q3: How often do you have six or more drinks on one occasion? Never                          SS spoke with pt in room. Pt lives at home alone. Pt is not current with home health. Pt uses a straight cane to get around. Pt goes to dialysis on TTS schedule. Discharge plan is to return home with no anticipated needs at this time.

## 2023-08-02 NOTE — CONSULTS
[facility] - Short Stay Unit  Nephrology  Consult Note    Patient Name: Joseph Mcdonald  MRN: [id number]  Admission Date: 8/1/2023  Hospital Length of Stay: 0 days  Attending Provider: Shiv Mcknight MD   Primary Care Physician: [facility]  Principal Problem:Anemia in chronic kidney disease    Consults  Subjective:     HPI: Mr. Mcdonald has ESRD and dialyzes in [facility]. He says his legs have been weak and he's been tired for one week. Felt poorly post dialysis yesterday and came to ER. Profoundly anemic there. He denies hematochezia. Says stools have been dark.     Past Medical History:   Diagnosis Date    Chronic kidney disease (CKD)     Diabetes mellitus     Hypertension     PVD (peripheral vascular disease)        History reviewed. No pertinent surgical history.    Review of patient's allergies indicates:  No Known Allergies  Current Facility-Administered Medications   Medication Frequency    0.9%  NaCl infusion (for blood administration) Q24H PRN    acetaminophen tablet 650 mg Q4H PRN    glucagon (human recombinant) injection 1 mg PRN    glucose chewable tablet 16 g PRN    glucose chewable tablet 24 g PRN    hydrALAZINE tablet 100 mg Q8H    insulin aspart U-100 injection 0-5 Units Q6H PRN    metoprolol tartrate (LOPRESSOR) split tablet 12.5 mg BID    naloxone 0.4 mg/mL injection 0.02 mg PRN    ondansetron injection 4 mg Q8H PRN    pantoprazole injection 40 mg BID    potassium bicarbonate disintegrating tablet 50 mEq Once    sodium chloride 0.9% flush 10 mL Q12H PRN    trazodone split tablet 25 mg Nightly PRN     Family History    None       Tobacco Use    Smoking status: Former     Current packs/day: 0.00     Types: Cigarettes    Smokeless tobacco: Never   Substance and Sexual Activity    Alcohol use: Not Currently    Drug use: Never    Sexual activity: Not on file     Review of Systems  Objective:     Vital Signs (Most Recent):  Temp: 98 °F (36.7 °C) (08/02/23 0713)  Pulse:  (!) 59 (08/02/23 0713)  Resp: 16 (08/02/23 0713)  BP: (!) 116/35 (08/02/23 0713)  SpO2: 96 % (08/02/23 0713) Vital Signs (24h Range):  Temp:  [97.6 °F (36.4 °C)-98.4 °F (36.9 °C)] 98 °F (36.7 °C)  Pulse:  [54-68] 59  Resp:  [11-22] 16  SpO2:  [93 %-99 %] 96 %  BP: (116-185)/(17-58) 116/35     Weight: 70.4 kg (155 lb 2.6 oz) (08/02/23 0239)  Body mass index is 25.04 kg/m².  Body surface area is 1.81 meters squared.    I/O last 3 completed shifts:  In: 931 [I.V.:200; Blood:731]  Out: -     Physical ExamNo distress lying flat. Chest clear. No pericardial rub. No edema    Significant Labs:  BMP:   Recent Labs   Lab 08/01/23  1235 08/02/23  0441   * 99    139   K 3.5 3.4*   CL 98 100   CO2 34* 32   BUN 19* 27*   CREATININE 4.35* 5.46*   CALCIUM 8.3* 8.3*   MG 2.3  --      CBC:   Recent Labs   Lab 08/02/23  0441   WBC 6.89   RBC 2.56*   HGB 7.7*   HCT 23.1*   *   MCV 90.2   MCH 30.1   MCHC 33.3     All labs within the past 24 hours have been reviewed.    Significant Imaging:  Labs: Reviewed    Assessment/Plan:     Active Diagnoses:    Diagnosis Date Noted POA    PRINCIPAL PROBLEM:  Anemia in chronic kidney disease [N18.9, D63.1] 11/06/2014 Yes    Paroxysmal A-fib [I48.0] 08/02/2023 Yes    DM2 (diabetes mellitus, type 2) [E11.9] 11/18/2014 Yes    End stage renal disease [N18.6] 11/06/2014 Yes    Essential (primary) hypertension [I10] 11/06/2014 Yes      Problems Resolved During this Admission:       Hemodialysis tomorrow. Will likely require further transfusion tomorrow.  GI blood loss seems likely.    Thank you for your consult. I will follow-up with patient. Please contact us if you have any additional questions.    Ovi Alvarado MD  Nephrology  [facility] Short Stay [facility]

## 2023-08-02 NOTE — ASSESSMENT & PLAN NOTE
Nutrition consulted. Most recent weight and BMI monitored-     Measurements:  Wt Readings from Last 1 Encounters:   08/02/23 70.4 kg (155 lb 2.6 oz)   Body mass index is 25.04 kg/m².    Patient has been screened and assessed by RD.    Malnutrition Type:  Context:    Level:      Malnutrition Characteristic Summary:   Dietary consult.    Interventions/Recommendations (treatment strategy):   f/u with nutrition/dietary.     Age appropriate malignancy screening with PCP.

## 2023-08-02 NOTE — HPI
88 year old male with PMHX ESRD on dialysis on T/TH/Sat came to the ED due to abnormal labs. Patient went to dialysis this morning and had a CBC done that resulted Hgb of 5.9 after he left the dialysis. dialysis called the daughter and asked her to bring him into the ED. At Ed he received 2 units of pRBCs and found to be A fib. He is not on anticoagulation and reported to the ED doctor he has never had any heart disease or A fib. He has no complaints, feel at his baseline, denies palpitation, chest pain, fever chills, headaches, numbness or weakness, black stool or blood in stool, denies any issues with urine. He reports he has lost 50 lbs in the last year and has a very low appetite. Patient has had colonoscopies and EGDs in the past but does not remember the date.     Patient has ESRD on dialysis managed by Dr. Herman. Patient reports he is diabetic but is not on any medication for diabetes. 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     Upon questioning the patient and the daughter and explaining what are plans are including getting an Echo, patient all the sudden said he did an Echo last week at [facility]. Then he was asked again if he has ever seen a heart doctor for the second time and he said he has seen Dr. Rivers 2 weeks ago at [facility]. He said his PCP, Dr Genao, sent him to [facility] for some heart issues that he has no idea what it is.     Patient will be placed on observation until more information is gathered regarding his heart disease.

## 2023-08-02 NOTE — ANESTHESIA POSTPROCEDURE EVALUATION
Anesthesia Post Evaluation    Patient: Joseph Mcdonald    Procedure(s) Performed: * No procedures listed *    Final Anesthesia Type: general      Patient location during evaluation: GI PACU  Patient participation: Yes- Able to Participate  Level of consciousness: awake and alert  Post-procedure vital signs: reviewed and stable  Pain management: adequate  Airway patency: patent  DANE mitigation strategies: Multimodal analgesia  PONV status at discharge: No PONV  Anesthetic complications: no      Cardiovascular status: blood pressure returned to baseline and hemodynamically stable  Respiratory status: spontaneous ventilation and room air  Hydration status: euvolemic  Follow-up not needed.          Vitals Value Taken Time   /59 08/02/23 1550   Temp 36.7 08/02/23 1553   Pulse 59 08/02/23 1552   Resp 18 08/02/23 1552   SpO2 100 % 08/02/23 1552   Vitals shown include unvalidated device data.      No case tracking events are documented in the log.      Pain/Abbey Score: Abbey Score: 8 (8/2/2023  3:30 PM)

## 2023-08-02 NOTE — ASSESSMENT & PLAN NOTE
Presented with H/H of 5.9/18.5  Normocytic Anemia with MCV of over 90  Patient received 2 units of pRBC  CBC pending currently and another one after that  FOBT  Patient reports he has lost 50 lbs in the last year due to low appetite. Malignancies should be in considerations. Protein GAP was 3.4, WBC is normal, Platelets are ~150. FOBT pending.

## 2023-08-02 NOTE — ED PROVIDER NOTES
Encounter Date: 8/1/2023       History     Chief Complaint   Patient presents with    Abnormal Lab     Low H&H according to dialysis     Patient presents to the ER with complaint of abnormal lab work.  Patient was referred to the ER from the dialysis clinic.  He was told that his blood count was low.  Patient reports shortness of breath.  He denies chest pain.  Patient was history of anemia diabetes hypertension and peripheral vascular disease.  He states his shortness of breath is worse when he is moving around/walking.  He denies nausea vomiting or diarrhea.  He does report dizziness but denies syncope.  Patient was a Tuesday Thursday Saturday dialysis patient.    The history is provided by the patient. No  was used.     Review of patient's allergies indicates:  No Known Allergies  Past Medical History:   Diagnosis Date    Chronic kidney disease (CKD)     Diabetes mellitus     Hypertension     PVD (peripheral vascular disease)      Past Surgical History:   Procedure Laterality Date    LEFT HEART CATHETERIZATION N/A 8/3/2023    Procedure: Left heart cath;  Surgeon: Vazquez Aguilar MD;  Location: [facility] CATH LAB;  Service: Cardiology;  Laterality: N/A;    left toe amputation      PLACEMENT OF DIALYSIS ACCESS       History reviewed. No pertinent family history.  Social History     Tobacco Use    Smoking status: Former     Current packs/day: 0.00     Types: Cigarettes    Smokeless tobacco: Never   Substance Use Topics    Alcohol use: Not Currently    Drug use: Never     Review of Systems   Constitutional:  Positive for activity change and fatigue.        Abnormal lab work   Respiratory:  Positive for shortness of breath.    Neurological:  Positive for dizziness and weakness.   All other systems reviewed and are negative.      Physical Exam     Initial Vitals [08/01/23 1144]   BP Pulse Resp Temp SpO2   (!) 148/40 (!) 59 18 98.4 °F (36.9 °C) 96 %      MAP       --         Physical  Exam    Constitutional: He appears well-developed and well-nourished.   HENT:   Head: Normocephalic.   Right Ear: External ear normal.   Left Ear: External ear normal.   Nose: Nose normal.   Mouth/Throat: Oropharynx is clear and moist.   Eyes: Conjunctivae and EOM are normal. Pupils are equal, round, and reactive to light.   Neck: Neck supple.   Normal range of motion.  Cardiovascular:  Normal rate, normal heart sounds and intact distal pulses.           Pulmonary/Chest: He has rhonchi.   Abdominal: Abdomen is soft. Bowel sounds are normal.   Musculoskeletal:         General: Normal range of motion.      Cervical back: Normal range of motion and neck supple.     Neurological: He is alert and oriented to person, place, and time. He has normal strength. GCS score is 15. GCS eye subscore is 4. GCS verbal subscore is 5. GCS motor subscore is 6.   Skin: Skin is warm and dry. Capillary refill takes less than 2 seconds. There is pallor.   Psychiatric: He has a normal mood and affect. His behavior is normal. Judgment and thought content normal.         Medical Screening Exam   See Full Note    ED Course   Procedures  Labs Reviewed   COMPREHENSIVE METABOLIC PANEL - Abnormal; Notable for the following components:       Result Value    CO2 34 (*)     Glucose 114 (*)     BUN 19 (*)     Creatinine 4.35 (*)     BUN/Creatinine Ratio 4 (*)     Calcium 8.3 (*)     Total Protein 6.3 (*)     Albumin 2.9 (*)     eGFR 12 (*)     All other components within normal limits   TROPONIN I - Abnormal; Notable for the following components:    Troponin I High Sensitivity 118.1 (*)     All other components within normal limits   PROTIME-INR - Abnormal; Notable for the following components:    PT 16.4 (*)     All other components within normal limits   CBC WITH DIFFERENTIAL - Abnormal; Notable for the following components:    RBC 1.97 (*)     Hemoglobin 5.9 (*)     Hematocrit 18.5 (*)     MCHC 31.9 (*)     RDW 17.2 (*)     Platelet Count 144 (*)      MPV 12.5 (*)     Neutrophils % 83.2 (*)     Lymphocytes % 7.8 (*)     Monocytes % 7.0 (*)     Lymphocytes, Absolute 0.53 (*)     All other components within normal limits   CBC MORPHOLOGY - Abnormal; Notable for the following components:    Platelet Morphology Few Large Platelets (*)     All other components within normal limits   TROPONIN I - Abnormal; Notable for the following components:    Troponin I High Sensitivity 112.1 (*)     All other components within normal limits   TROPONIN I - Abnormal; Notable for the following components:    Troponin I High Sensitivity 111.1 (*)     All other components within normal limits   MAGNESIUM - Normal   APTT - Normal   CBC W/ AUTO DIFFERENTIAL    Narrative:     The following orders were created for panel order CBC auto differential.  Procedure                               Abnormality         Status                     ---------                               -----------         ------                     CBC with Differential[[id number]]        Abnormal            Final result                 Please view results for these tests on the individual orders.   EXTRA TUBES    Narrative:     The following orders were created for panel order EXTRA TUBES.  Procedure                               Abnormality         Status                     ---------                               -----------         ------                     Gold Top Hold[[id number]]                                    In process                   Please view results for these tests on the individual orders.   GOLD TOP HOLD   TYPE & SCREEN   PREPARE RBC SOFT        ECG Results              EKG 12-lead (Final result)  Result time 08/03/23 04:34:43      Final result by Interface, Lab In [facility] (08/03/23 04:34:43)                   Narrative:    Test Reason : R77.8,    Vent. Rate : 060 BPM     Atrial Rate : 000 BPM     P-R Int : 000 ms          QRS Dur : 100 ms      QT Int : 450 ms       P-R-T Axes : 000 -19 238  degrees     QTc Int : 450 ms    Atrial fibrillation with PVC(s)  rSr'(V1) - probable normal variant  Left ventricular hypertrophy  Widespread ST-T abnormality may be due to the hypertrophy and/or ischemia  Abnormal ECG    Confirmed by Adin Serna MD ([id number]) on 8/3/2023 4:34:34 AM    Referred By: [email or web address]   SELF           Confirmed By:Adin Serna MD                                     EKG 12-lead (Final result)  Result time 08/03/23 04:35:35      Final result by Interface, Lab In [facility] (08/03/23 04:35:35)                   Narrative:    Test Reason : R06.02,    Vent. Rate : 056 BPM     Atrial Rate : 000 BPM     P-R Int : 000 ms          QRS Dur : 104 ms      QT Int : 462 ms       P-R-T Axes : 000 -26 237 degrees     QTc Int : 455 ms    Atrial fibrillation with PVC(s) or aberrant ventricular conduction  Leftward axis  Left ventricular hypertrophy  Widespread ST-T abnormality may be due to the hypertrophy and/or ischemia  Abnormal ECG    Confirmed by Adin Serna MD ([id number]) on 8/3/2023 4:35:22 AM    Referred By: [email or web address]   SELF           Confirmed By:Adin Serna MD                                  Imaging Results              X-Ray Chest AP Portable (Final result)  Result time 08/01/23 13:05:37      Final result by Dimitrios Levin MD (08/01/23 13:05:37)                   Impression:      Cardiomegaly and interstitial prominence favor CHF changes.      Electronically signed by: Dimitrios Levin  Date:    08/01/2023  Time:    13:05               Narrative:    EXAMINATION:  XR CHEST AP PORTABLE    CLINICAL HISTORY:  shortness of breath;    TECHNIQUE:  Single frontal view of the chest was performed.    COMPARISON:  01/02/2022    FINDINGS:  Cardiomegaly is again seen.  Interstitial prominence.  There is linear atelectasis of the right mid lung.  No pneumothorax.  Suspect trace pleural effusions.                                       Medications   pantoprazole injection 80 mg (80 mg Intravenous Given  8/2/23 5631)   potassium bicarbonate disintegrating tablet 50 mEq (50 mEq Oral Given 8/2/23 0946)     Medical Decision Making:   Initial Assessment:   Patient presents to the ER with complaint of abnormal lab work.  Patient was referred to the ER from the dialysis clinic.  He was told that his blood count was low.  Patient reports shortness of breath.  He denies chest pain.  Patient was history of anemia diabetes hypertension and peripheral vascular disease.  He states his shortness of breath is worse when he is moving around/walking.  He denies nausea vomiting or diarrhea.  He does report dizziness but denies syncope.  Patient was a Tuesday Thursday Saturday dialysis patient.    Differential Diagnosis:   Anemic r/t CRF  Chf exacerbation   Caridiac dysrhythmia    Clinical Tests:   Lab Tests: Ordered and Reviewed  The following lab test(s) were unremarkable: CBC       <> Summary of Lab: H/H 5.9/18.5  Creatinine 4.35  BUN 19  Potassium 3.5  Sodium 137  White blood count 6.8  Platelets 144  Troponin 118.1, 112.1, 111.  Radiological Study: Ordered and Reviewed  Medical Tests: Ordered and Reviewed  ED Management:  EKG AFib with RVR rate of 102 beats per minute    Initiate IV, collect lab work  Type and screen  Transfused 2 units packed red blood cells in ER.  Repeat troponin at 2 and 4 hour intervals  Repeat H&H when blood transfusion is done  Discussed patient with Dr. Lerma who is also assisting in the medical management of this patient.  Recommend transfused 2 units and have patient follow up with Cardiology.     Other:   I have discussed this case with another health care provider.       <> Summary of the Discussion: Dr Hennessy called in consult, will admit patient for observation to hospitalist service.                         Clinical Impression:   Final diagnoses:  [R06.02] Shortness of breath  [R77.8] Elevated troponin  [I48.91] New onset a-fib (Primary)  [I50.9] Acute on chronic congestive heart failure,  unspecified heart failure type  [N18.6, D63.1, Z99.2] Anemia due to chronic kidney disease, on chronic dialysis        ED Disposition Condition    Observation Stable                USHA Barfield  08/02/23 0050       Milady Barrera FNP  08/10/23 0002

## 2023-08-02 NOTE — PT/OT/SLP EVAL
Occupational Therapy   Evaluation    Name: Joseph Mcdonald  MRN: [id number]  Admitting Diagnosis: Acute blood loss anemia  Recent Surgery: Procedure(s) (LRB):  Left heart cath (N/A)      Recommendations:     Discharge Recommendations: home with home health  Discharge Equipment Recommendations:  none  Barriers to discharge:  None    Assessment:     Joseph Mcdonald is a 88 y.o. male with a medical diagnosis of Acute blood loss anemia.  He presents with weakness and decline in ADLs. Performance deficits affecting function: weakness, impaired endurance, impaired self care skills, impaired functional mobility, gait instability, impaired balance.      Rehab Prognosis: Good; patient would benefit from acute skilled OT services to address these deficits and reach maximum level of function.       Plan:     Patient to be seen 5 x/week to address the above listed problems via self-care/home management, therapeutic activities, therapeutic exercises  Plan of Care Expires: 08/30/23  Plan of Care Reviewed with: patient    Subjective     Chief Complaint: SOB  Patient/Family Comments/goals: pt agreeable to OT eval    Occupational Profile:  Living Environment: pt lives alone in one story home with no steps to enter  Previous level of function: independent with all ADL tasks, IADL tasks, and utilized cane for functional mobility   Roles and Routines: perform self care  Equipment Used at Home: rollator, cane, straight  Assistance upon Discharge: home with home health    Pain/Comfort:  Pain Rating 1: 0/10    Patients cultural, spiritual, L[employer]-day conflicts given the current situation: no    Objective:     Communicated with: SUNNI Momin prior to session.  Patient found supine with peripheral IV, telemetry upon OT entry to room.    General Precautions: Standard, fall  Orthopedic Precautions: N/A  Braces: N/A  Respiratory Status: Room air    Occupational Performance:    Bed Mobility:    Patient completed Supine to Sit with modified  independence    Functional Mobility/Transfers:  Patient completed Sit <> Stand Transfer with stand by assistance  with  straight cane   Patient completed Bed <> Chair Transfer using Step Transfer technique with stand by assistance with straight cane  Functional Mobility: pt performed functional mobility into hallway and back to chair with SBA with cane    Activities of Daily Living:  Lower Body Dressing: stand by assistance to librado sock with increased time and effort    Cognitive/Visual Perceptual:  Cognitive/Psychosocial Skills:     -       Oriented to: Person, Place, Time, and Situation   -       Follows Commands/attention:Follows multistep  commands  -       Mood/Affect/Coping skills/emotional control: Cooperative    Physical Exam:  Balance:    -       sitting balance WFL; standing balance fair  Upper Extremity Range of Motion:     -       Right Upper Extremity: WFL  -       Left Upper Extremity: WFL  Upper Extremity Strength:    -       Right Upper Extremity: 4/5  -       Left Upper Extremity: 4/5  Gross motor coordination:   WFL    AMPAC 6 Click ADL:  AMPAC Total Score: 22    Treatment & Education:  Pt educated on OT role/POC.   Importance of OOB activity with staff assistance.  Importance of sitting up in the chair throughout the day as tolerated, especially for meals   Safety during functional t/f and mobility with use of cane  Importance of assisting with self-care activities   All questions/concerns answered within OT scope of practice      Patient left up in chair with all lines intact, call button in reach, and SUNNI Momin notified    GOALS:   Multidisciplinary Problems       Occupational Therapy Goals          Problem: Occupational Therapy    Goal Priority Disciplines Outcome Interventions   Occupational Therapy Goal     OT, PT/OT Ongoing, Progressing    Description: STG:  Pt will perform grooming with setup  Pt will bathe with SBA with setup at EOB  Pt will perform UE dressing with Rubin  Pt will perform  LE dressing with Rubin   Pt will sit EOB x 10 min with SBA   Pt will transfer bed/chair/bsc with SBA with cane  Pt will perform standing task x 2 min with SBA with cane   Pt will tolerate 15 minutes of tx without fatigue      LT.Restore to max I with self care and mobility.                           History:     Past Medical History:   Diagnosis Date    Chronic kidney disease (CKD)     Diabetes mellitus     Hypertension     PVD (peripheral vascular disease)        History reviewed. No pertinent surgical history.    Time Tracking:     OT Date of Treatment: 23  OT Start Time: 1331  OT Stop Time: 1348  OT Total Time (min): 17 min    Billable Minutes:Evaluation OT min complexity eval    2023

## 2023-08-02 NOTE — SUBJECTIVE & OBJECTIVE
Interval History:     Review of Systems   Constitutional:  Positive for unexpected weight change. Negative for activity change, chills and fever.   HENT:  Negative for congestion.    Respiratory:  Positive for shortness of breath. Negative for cough, chest tightness, wheezing and stridor.    Cardiovascular:  Positive for palpitations. Negative for chest pain and leg swelling.   Gastrointestinal:  Negative for abdominal distention, abdominal pain, anal bleeding, blood in stool, constipation, diarrhea, nausea, rectal pain and vomiting.   Genitourinary:  Negative for difficulty urinating, dysuria and enuresis.   Musculoskeletal:  Negative for arthralgias.   Skin:  Negative for color change.   Neurological:  Negative for dizziness, facial asymmetry, weakness and headaches.   Hematological:  Negative for adenopathy.   Psychiatric/Behavioral:  Negative for agitation.      Objective:     Vital Signs (Most Recent):  Temp: 98 °F (36.7 °C) (08/02/23 0713)  Pulse: (!) 59 (08/02/23 0713)  Resp: 16 (08/02/23 0713)  BP: (!) 116/35 (08/02/23 0713)  SpO2: 96 % (08/02/23 0713) Vital Signs (24h Range):  Temp:  [97.6 °F (36.4 °C)-98.4 °F (36.9 °C)] 98 °F (36.7 °C)  Pulse:  [54-68] 59  Resp:  [11-22] 16  SpO2:  [93 %-99 %] 96 %  BP: (116-185)/(17-58) 116/35     Weight: 70.4 kg (155 lb 2.6 oz)  Body mass index is 25.04 kg/m².    Intake/Output Summary (Last 24 hours) at 8/2/2023 1029  Last data filed at 8/1/2023 2201  Gross per 24 hour   Intake 931 ml   Output --   Net 931 ml         Physical Exam  Vitals and nursing note reviewed.   Constitutional:       General: He is not in acute distress.     Appearance: Normal appearance. He is normal weight. He is not ill-appearing, toxic-appearing or diaphoretic.   HENT:      Head: Normocephalic and atraumatic.      Right Ear: External ear normal.      Left Ear: External ear normal.      Nose: Nose normal. No congestion or rhinorrhea.      Mouth/Throat:      Mouth: Mucous membranes are moist.    Eyes:      Conjunctiva/sclera: Conjunctivae normal.   Neck:      Vascular: No carotid bruit.   Cardiovascular:      Rate and Rhythm: Normal rate and regular rhythm.      Pulses: Normal pulses.      Heart sounds: Normal heart sounds. No murmur heard.     No friction rub.   Pulmonary:      Effort: Pulmonary effort is normal. No respiratory distress.      Breath sounds: Normal breath sounds. No wheezing or rales.   Abdominal:      General: Abdomen is flat. Bowel sounds are normal. There is no distension.      Palpations: Abdomen is soft.      Tenderness: There is no abdominal tenderness. There is no guarding.      Hernia: No hernia is present.   Musculoskeletal:         General: No swelling or tenderness. Normal range of motion.      Cervical back: Neck supple. No tenderness.      Right lower leg: No edema.      Left lower leg: No edema.   Skin:     General: Skin is warm.      Coloration: Skin is not jaundiced.      Findings: No bruising or lesion.   Neurological:      Mental Status: He is alert and oriented to person, place, and time.   Psychiatric:         Behavior: Behavior normal.             Significant Labs: All pertinent labs within the past 24 hours have been reviewed.    Significant Imaging: I have reviewed all pertinent imaging results/findings within the past 24 hours.

## 2023-08-02 NOTE — ASSESSMENT & PLAN NOTE
"Patient's FSGs are controlled on current medication regimen.  Last A1c reviewed- No results found for: "LABA1C", "HGBA1C"- A1C is pending   Most recent fingerstick glucose reviewed- No results for input(s): "POCTGLUCOSE" in the last 24 hours.  Current correctional scale  Medium  Maintain anti-hyperglycemic dose as follows-   Antihyperglycemics (From admission, onward)    Start     Stop Route Frequency Ordered    08/02/23 0624  insulin aspart U-100 injection 0-5 Units         -- SubQ Every 6 hours PRN 08/02/23 0524        Hold Oral hypoglycemics while patient is in the hospital.    F/u A1c.    "

## 2023-08-02 NOTE — CONSULTS
"Gastroenterology Consult Note    Chief Complaint: Acute blood loss anemia    Consulted by:   Dr. Mcknight    HPI:  Joseph Mcdonald is a 88 y.o. AAM with HTN, DM, ESRD on iHD (RUE AVF) that presents from home with symptomatic anemia. Patient reports several days of melenic stools; denies N/V, hematemesis, dysphagia, odynophagia, abdominal pain, NSAIDs, alcohol, illicits, hematochezia. He started feeling weak after dialysis yesterday and labs revealed a relatively acute anemia, and he was told to go to the ER. He was also found to have new onset Afib. Reports a possible +FMH CRC in father? Has had colonoscopy previously, but it was many years ago.     Review of Systems   Constitutional:  Positive for malaise/fatigue. Negative for weight loss.   Gastrointestinal:  Positive for melena. Negative for abdominal pain, blood in stool, constipation, diarrhea, heartburn, nausea and vomiting.   All other systems reviewed and are negative.      Past Medical History:   Diagnosis Date    Chronic kidney disease (CKD)     Diabetes mellitus     Hypertension     PVD (peripheral vascular disease)      Past Surgical History:   Procedure Laterality Date    left toe amputation      PLACEMENT OF DIALYSIS ACCESS       History reviewed. No pertinent family history.    OBJECTIVE:  BP (!) 180/48 (Patient Position: Lying)   Pulse 62   Temp 97.9 °F (36.6 °C)   Resp 12   Ht 5' 6" (1.676 m)   Wt 70.3 kg (155 lb)   SpO2 96%   BMI 25.02 kg/m²   GEN: non-toxic appearing, cooperative, NAD  HEENT: NCAT, OP benign, MMM  NECK: Supple, no LAD  CV: normal rate, irregular rhythm  RESP: CTA bilaterally, unlabored  ABD: NABS, ND, NT, soft, no guarding  EXT: No clubbing, cyanosis, or edema. RUE AVF with thrill  SKIN: Warm and dry  NEURO: AAO x4. Afocal.    LABS:  CMP  Sodium   Date Value Ref Range Status   08/02/2023 139 136 - 145 mmol/L Final     Potassium   Date Value Ref Range Status   08/02/2023 3.4 (L) 3.5 - 5.1 mmol/L Final     Chloride   Date Value " Ref Range Status   08/02/2023 100 98 - 107 mmol/L Final     CO2   Date Value Ref Range Status   08/02/2023 32 21 - 32 mmol/L Final     Glucose   Date Value Ref Range Status   08/02/2023 99 74 - 106 mg/dL Final     BUN   Date Value Ref Range Status   08/02/2023 27 (H) 7 - 18 mg/dL Final     Creatinine   Date Value Ref Range Status   08/02/2023 5.46 (H) 0.70 - 1.30 mg/dL Final     Calcium   Date Value Ref Range Status   08/02/2023 8.3 (L) 8.5 - 10.1 mg/dL Final     Total Protein   Date Value Ref Range Status   08/01/2023 6.3 (L) 6.4 - 8.2 g/dL Final     Albumin   Date Value Ref Range Status   08/01/2023 2.9 (L) 3.5 - 5.0 g/dL Final     Bilirubin, Total   Date Value Ref Range Status   08/01/2023 0.5 >0.0 - 1.2 mg/dL Final     Alk Phos   Date Value Ref Range Status   08/01/2023 79 45 - 115 U/L Final     AST   Date Value Ref Range Status   08/01/2023 22 15 - 37 U/L Final     ALT   Date Value Ref Range Status   08/01/2023 25 16 - 61 U/L Final     Anion Gap   Date Value Ref Range Status   08/02/2023 10 7 - 16 mmol/L Final     eGFR   Date Value Ref Range Status   08/02/2023 10 (L) >=60 mL/min/1.73m2 Final     Recent Results (from the past 336 hour(s))   CBC with Differential    Collection Time: 08/02/23  4:41 AM   Result Value Ref Range    WBC 6.89 4.50 - 11.00 K/uL    Hemoglobin 7.7 (L) 13.5 - 18.0 g/dL    Hematocrit 23.1 (L) 40.0 - 54.0 %    Platelet Count 138 (L) 150 - 400 K/uL   CBC with Differential    Collection Time: 08/01/23 11:21 PM   Result Value Ref Range    WBC 7.60 4.50 - 11.00 K/uL    Hemoglobin 8.6 (L) 13.5 - 18.0 g/dL    Hematocrit 25.7 (L) 40.0 - 54.0 %    Platelet Count 163 150 - 400 K/uL   CBC with Differential    Collection Time: 08/01/23 12:35 PM   Result Value Ref Range    WBC 6.81 4.50 - 11.00 K/uL    Hemoglobin 5.9 (LL) 13.5 - 18.0 g/dL    Hematocrit 18.5 (L) 40.0 - 54.0 %    Platelet Count 144 (L) 150 - 400 K/uL     Lab Results   Component Value Date    INR 1.34 08/01/2023          IMAGING:  Imaging  Results              X-Ray Chest AP Portable (Final result)  Result time 08/01/23 13:05:37      Final result by Dimitrios Levin MD (08/01/23 13:05:37)                   Impression:      Cardiomegaly and interstitial prominence favor CHF changes.      Electronically signed by: Dimitrios Levin  Date:    08/01/2023  Time:    13:05               Narrative:    EXAMINATION:  XR CHEST AP PORTABLE    CLINICAL HISTORY:  shortness of breath;    TECHNIQUE:  Single frontal view of the chest was performed.    COMPARISON:  01/02/2022    FINDINGS:  Cardiomegaly is again seen.  Interstitial prominence.  There is linear atelectasis of the right mid lung.  No pneumothorax.  Suspect trace pleural effusions.                                  ASSESSMENT:    88 y.o. AAM with HTN, DM, ESRD on iHD (RUE AVF) that presents from home with symptomatic anemia and melena with 3g drop in Hgb and new onset Afib.    PLAN:    Acute GI Hemorrhage   - Hgb 5.9 (9), melena, elevated BUN, 2u pRBC transfusion  - PUD vs AVM vs dieulafoy vs esophagitis/gastritis  - agree with PPI BID, monitoring H&H, pRBC as needed  - NPO for EGD today       Thank you for the consult and including me in the care of this patient. Please call me with questions.     Enrrique Ac MD  Gastroenterology

## 2023-08-02 NOTE — DISCHARGE INSTRUCTIONS
Physical Therapy Instructions    Complete the exercises standing at the kitchen sink for stability. Work up to 30 repetitions each 2 times a day. Take rest breaks as needed and keep a chair close by to sit down if needed.      Toe/Heel Raises            Stand while holding a stable object. Rise up on toes. Then rock back on heels. Hold each position 2 seconds.  Repeat 30 times per session. Do 2 sessions per day.            Squat: Double Leg (Supported)        With feet shoulder width apart, hold support. Squat, keeping lower leg vertical, knee in line with second toe. Use legs, do not pull up and down with arms. Only squat down as low as you are comfortable. Repeat 30 times with rest breaks as needed. Do 2 sessions per day         Hip Flexion (Standing)        Stand with support. Lift right knee upward. Repeat 30 times. Repeat with other leg.   Do 2 sessions per day.      HIP: Abduction - Standing        Lift one leg out to the side holding on to a support. Keep toe pointing forward to focus the exercise on the muscles on the outside of your hip.  Repeat 30 times per side. 2 times per day        SINGLE LIMB STANCE        Stand facing your kitchen counter for safety.  Balance on one leg. Hold as long as possible.    Repeat with 5 times per leg.    Copyright © I. All rights reserved.     Procedure Date  8/2/23     Impression  Overall Impression:   Subcentimeter polyp in the cardia  The upper third of the esophagus, middle third of the esophagus, lower third of the esophagus, Z-line, cardia, fundus of the stomach, body of the stomach, incisura, antrum, duodenal bulb, 1st part of the duodenum, 2nd part of the duodenum, 3rd part of the duodenum and 4th part of the duodenum appeared normal.  No esophagitis, varices, blood/bleeding, peptic ulcer, AVM        Recommendation    Deescalate PPI to once daily dosing  No findings on EGD to explain his acute anemia  H&H has responded well to transfusion - seemingly no ongoing  acute blood loss (last BM prior to presentation)  OK to advance diet to clear liquids  If he has further significant drop in H&H or additional overt bleeding, will plan to prep for colonoscopy; otherwise, will monitor for now given his acute cardiac issues; if he is amenable to it, he will likely need colonoscopy in the near future either inpatient or shortly following discharge      Outcome of procedure: successful EGD  Disposition: patient to recovery following procedure; return to [facility] when appropriate parameters met  Provisions for follow up: please call my office for any unexpected symptoms like chest or abdominal pain or bleeding following your procedure.  Final Diagnosis: acute blood loss anemia

## 2023-08-03 LAB
BASOPHILS # BLD AUTO: 0.07 K/UL (ref 0–0.2)
BASOPHILS NFR BLD AUTO: 0.9 % (ref 0–1)
CATH EF QUANTITATIVE: 45 %
DIFFERENTIAL METHOD BLD: ABNORMAL
EOSINOPHIL # BLD AUTO: 0.22 K/UL (ref 0–0.5)
EOSINOPHIL NFR BLD AUTO: 2.9 % (ref 1–4)
ERYTHROCYTE [DISTWIDTH] IN BLOOD BY AUTOMATED COUNT: 17.8 % (ref 11.5–14.5)
GLUCOSE SERPL-MCNC: 124 MG/DL (ref 70–105)
GLUCOSE SERPL-MCNC: 80 MG/DL (ref 70–105)
GLUCOSE SERPL-MCNC: 87 MG/DL (ref 70–105)
HBA1C MFR BLD: 4.7 % (ref 4–5.6)
HCT VFR BLD AUTO: 24.9 % (ref 40–54)
HGB BLD-MCNC: 8.3 G/DL (ref 13.5–18)
IMM GRANULOCYTES # BLD AUTO: 0.02 K/UL (ref 0–0.04)
IMM GRANULOCYTES NFR BLD: 0.3 % (ref 0–0.4)
LYMPHOCYTES # BLD AUTO: 0.55 K/UL (ref 1–4.8)
LYMPHOCYTES NFR BLD AUTO: 7.3 % (ref 27–41)
MCH RBC QN AUTO: 30.5 PG (ref 27–31)
MCHC RBC AUTO-ENTMCNC: 33.3 G/DL (ref 32–36)
MCV RBC AUTO: 91.5 FL (ref 80–96)
MONOCYTES # BLD AUTO: 0.65 K/UL (ref 0–0.8)
MONOCYTES NFR BLD AUTO: 8.6 % (ref 2–6)
MPC BLD CALC-MCNC: 10.9 FL (ref 9.4–12.4)
NEUTROPHILS # BLD AUTO: 6.02 K/UL (ref 1.8–7.7)
NEUTROPHILS NFR BLD AUTO: 80 % (ref 53–65)
NRBC # BLD AUTO: 0 X10E3/UL
NRBC, AUTO (.00): 0 %
PLATELET # BLD AUTO: 141 K/UL (ref 150–400)
RBC # BLD AUTO: 2.72 M/UL (ref 4.6–6.2)
WBC # BLD AUTO: 7.53 K/UL (ref 4.5–11)

## 2023-08-03 PROCEDURE — C1894 INTRO/SHEATH, NON-LASER: HCPCS | Performed by: INTERNAL MEDICINE

## 2023-08-03 PROCEDURE — 99152 MOD SED SAME PHYS/QHP 5/>YRS: CPT | Mod: ,,, | Performed by: INTERNAL MEDICINE

## 2023-08-03 PROCEDURE — 25500020 PHARM REV CODE 255: Performed by: INTERNAL MEDICINE

## 2023-08-03 PROCEDURE — 99233 SBSQ HOSP IP/OBS HIGH 50: CPT | Mod: ,,, | Performed by: FAMILY MEDICINE

## 2023-08-03 PROCEDURE — G0378 HOSPITAL OBSERVATION PER HR: HCPCS

## 2023-08-03 PROCEDURE — 27201423 OPTIME MED/SURG SUP & DEVICES STERILE SUPPLY: Performed by: INTERNAL MEDICINE

## 2023-08-03 PROCEDURE — 93458 L HRT ARTERY/VENTRICLE ANGIO: CPT | Performed by: INTERNAL MEDICINE

## 2023-08-03 PROCEDURE — 99152 MOD SED SAME PHYS/QHP 5/>YRS: CPT | Performed by: INTERNAL MEDICINE

## 2023-08-03 PROCEDURE — 90935 HEMODIALYSIS ONE EVALUATION: CPT

## 2023-08-03 PROCEDURE — 93458 PR CATH PLACE/CORON ANGIO, IMG SUPER/INTERP,W LEFT HEART VENTRICULOGRAPHY: ICD-10-PCS | Mod: 26,,, | Performed by: INTERNAL MEDICINE

## 2023-08-03 PROCEDURE — 99152 PR MOD CONSCIOUS SEDATION, SAME PHYS, 5+ YRS, FIRST 15 MIN: ICD-10-PCS | Mod: ,,, | Performed by: INTERNAL MEDICINE

## 2023-08-03 PROCEDURE — 99232 PR SUBSEQUENT HOSPITAL CARE,LEVL II: ICD-10-PCS | Mod: ,,, | Performed by: INTERNAL MEDICINE

## 2023-08-03 PROCEDURE — 99232 SBSQ HOSP IP/OBS MODERATE 35: CPT | Mod: ,,, | Performed by: INTERNAL MEDICINE

## 2023-08-03 PROCEDURE — 99153 MOD SED SAME PHYS/QHP EA: CPT | Performed by: INTERNAL MEDICINE

## 2023-08-03 PROCEDURE — 25000003 PHARM REV CODE 250: Performed by: INTERNAL MEDICINE

## 2023-08-03 PROCEDURE — 25000003 PHARM REV CODE 250: Performed by: NURSE PRACTITIONER

## 2023-08-03 PROCEDURE — C1760 CLOSURE DEV, VASC: HCPCS | Performed by: INTERNAL MEDICINE

## 2023-08-03 PROCEDURE — 93458 L HRT ARTERY/VENTRICLE ANGIO: CPT | Mod: 26,,, | Performed by: INTERNAL MEDICINE

## 2023-08-03 PROCEDURE — 85025 COMPLETE CBC W/AUTO DIFF WBC: CPT | Performed by: INTERNAL MEDICINE

## 2023-08-03 PROCEDURE — 99233 PR SUBSEQUENT HOSPITAL CARE,LEVL III: ICD-10-PCS | Mod: ,,, | Performed by: FAMILY MEDICINE

## 2023-08-03 PROCEDURE — 63600175 PHARM REV CODE 636 W HCPCS: Performed by: INTERNAL MEDICINE

## 2023-08-03 PROCEDURE — 82962 GLUCOSE BLOOD TEST: CPT

## 2023-08-03 PROCEDURE — G0257 UNSCHED DIALYSIS ESRD PT HOS: HCPCS

## 2023-08-03 RX ORDER — LIDOCAINE HYDROCHLORIDE 10 MG/ML
INJECTION, SOLUTION EPIDURAL; INFILTRATION; INTRACAUDAL; PERINEURAL
Status: DISCONTINUED | OUTPATIENT
Start: 2023-08-03 | End: 2023-08-03 | Stop reason: HOSPADM

## 2023-08-03 RX ORDER — HEPARIN SOD,PORCINE/0.9 % NACL 1000/500ML
INTRAVENOUS SOLUTION INTRAVENOUS
Status: DISCONTINUED | OUTPATIENT
Start: 2023-08-03 | End: 2023-08-03 | Stop reason: HOSPADM

## 2023-08-03 RX ORDER — SODIUM CHLORIDE 450 MG/100ML
INJECTION, SOLUTION INTRAVENOUS CONTINUOUS
Status: DISCONTINUED | OUTPATIENT
Start: 2023-08-03 | End: 2023-08-04

## 2023-08-03 RX ORDER — MIDAZOLAM HYDROCHLORIDE 1 MG/ML
INJECTION INTRAMUSCULAR; INTRAVENOUS
Status: DISCONTINUED | OUTPATIENT
Start: 2023-08-03 | End: 2023-08-03 | Stop reason: HOSPADM

## 2023-08-03 RX ORDER — FENTANYL CITRATE 50 UG/ML
INJECTION, SOLUTION INTRAMUSCULAR; INTRAVENOUS
Status: DISCONTINUED | OUTPATIENT
Start: 2023-08-03 | End: 2023-08-03 | Stop reason: HOSPADM

## 2023-08-03 RX ORDER — ISOSORBIDE MONONITRATE 30 MG/1
30 TABLET, EXTENDED RELEASE ORAL DAILY
Status: DISCONTINUED | OUTPATIENT
Start: 2023-08-03 | End: 2023-08-04 | Stop reason: HOSPADM

## 2023-08-03 RX ORDER — ASPIRIN 81 MG/1
81 TABLET ORAL DAILY
Status: DISCONTINUED | OUTPATIENT
Start: 2023-08-04 | End: 2023-08-04 | Stop reason: HOSPADM

## 2023-08-03 RX ORDER — ATORVASTATIN CALCIUM 40 MG/1
40 TABLET, FILM COATED ORAL NIGHTLY
Status: DISCONTINUED | OUTPATIENT
Start: 2023-08-03 | End: 2023-08-04 | Stop reason: HOSPADM

## 2023-08-03 RX ORDER — METOPROLOL SUCCINATE 25 MG/1
25 TABLET, EXTENDED RELEASE ORAL DAILY
Status: DISCONTINUED | OUTPATIENT
Start: 2023-08-03 | End: 2023-08-04 | Stop reason: HOSPADM

## 2023-08-03 RX ORDER — SODIUM CHLORIDE 9 MG/ML
INJECTION, SOLUTION INTRAVENOUS
Status: DISCONTINUED | OUTPATIENT
Start: 2023-08-03 | End: 2023-08-04 | Stop reason: HOSPADM

## 2023-08-03 RX ADMIN — ATORVASTATIN CALCIUM 40 MG: 40 TABLET, FILM COATED ORAL at 08:08

## 2023-08-03 RX ADMIN — ISOSORBIDE MONONITRATE 30 MG: 30 TABLET, EXTENDED RELEASE ORAL at 06:08

## 2023-08-03 RX ADMIN — PANTOPRAZOLE SODIUM 40 MG: 40 TABLET, DELAYED RELEASE ORAL at 09:08

## 2023-08-03 RX ADMIN — METOPROLOL SUCCINATE 25 MG: 25 TABLET, EXTENDED RELEASE ORAL at 06:08

## 2023-08-03 NOTE — ASSESSMENT & PLAN NOTE
- Hgb 5.9 on admit, received 2 units PRBCs, now Hgb 8.8  - Being followed by primary team and GI; EGD scheduled for today

## 2023-08-03 NOTE — PROGRESS NOTES
"Gastroenterology Consult Note    Chief Complaint: Acute blood loss anemia    Consulted by:   Dr. Mcknight    HPI:  Joseph Mcdonald is a 88 y.o. AAM with HTN, DM, ESRD on iHD (RUE AVF) that presents from home with symptomatic anemia. Patient reports several days of melenic stools; denies N/V, hematemesis, dysphagia, odynophagia, abdominal pain, NSAIDs, alcohol, illicits, hematochezia. He started feeling weak after dialysis yesterday and labs revealed a relatively acute anemia, and he was told to go to the ER. He was also found to have new onset Afib. Reports a possible +FMH CRC in father? Has had colonoscopy previously, but it was many years ago.     INTERVAL  - doing well this morning  - feels better overall  - no overt bleeding, plan for for C today     Review of Systems   Constitutional:  Positive for malaise/fatigue. Negative for weight loss.   Gastrointestinal:  Negative for abdominal pain, blood in stool, constipation, diarrhea, heartburn, melena, nausea and vomiting.   All other systems reviewed and are negative.      Past Medical History:   Diagnosis Date    Chronic kidney disease (CKD)     Diabetes mellitus     Hypertension     PVD (peripheral vascular disease)      Past Surgical History:   Procedure Laterality Date    left toe amputation      PLACEMENT OF DIALYSIS ACCESS       History reviewed. No pertinent family history.    OBJECTIVE:  BP (!) 123/33 (BP Location: Left arm, Patient Position: Lying)   Pulse 62   Temp 98.2 °F (36.8 °C)   Resp 18   Ht 5' 6" (1.676 m)   Wt 70.3 kg (155 lb)   SpO2 (!) 94%   BMI 25.02 kg/m²   GEN: non-toxic appearing, cooperative, NAD  HEENT: NCAT, OP benign, MMM  NECK: Supple, no LAD  CV: normal rate, irregular rhythm  RESP: CTA bilaterally, unlabored  ABD: NABS, ND, NT, soft, no guarding  EXT: No clubbing, cyanosis, or edema. RUE AVF with thrill  SKIN: Warm and dry  NEURO: AAO x4. Afocal.    LABS:  CMP  Sodium   Date Value Ref Range Status   08/02/2023 139 136 - 145 " mmol/L Final     Potassium   Date Value Ref Range Status   08/02/2023 3.4 (L) 3.5 - 5.1 mmol/L Final     Chloride   Date Value Ref Range Status   08/02/2023 100 98 - 107 mmol/L Final     CO2   Date Value Ref Range Status   08/02/2023 32 21 - 32 mmol/L Final     Glucose   Date Value Ref Range Status   08/02/2023 99 74 - 106 mg/dL Final     BUN   Date Value Ref Range Status   08/02/2023 27 (H) 7 - 18 mg/dL Final     Creatinine   Date Value Ref Range Status   08/02/2023 5.46 (H) 0.70 - 1.30 mg/dL Final     Calcium   Date Value Ref Range Status   08/02/2023 8.3 (L) 8.5 - 10.1 mg/dL Final     Total Protein   Date Value Ref Range Status   08/01/2023 6.3 (L) 6.4 - 8.2 g/dL Final     Albumin   Date Value Ref Range Status   08/01/2023 2.9 (L) 3.5 - 5.0 g/dL Final     Bilirubin, Total   Date Value Ref Range Status   08/01/2023 0.5 >0.0 - 1.2 mg/dL Final     Alk Phos   Date Value Ref Range Status   08/01/2023 79 45 - 115 U/L Final     AST   Date Value Ref Range Status   08/01/2023 22 15 - 37 U/L Final     ALT   Date Value Ref Range Status   08/01/2023 25 16 - 61 U/L Final     Anion Gap   Date Value Ref Range Status   08/02/2023 10 7 - 16 mmol/L Final     eGFR   Date Value Ref Range Status   08/02/2023 10 (L) >=60 mL/min/1.73m2 Final     Recent Results (from the past 336 hour(s))   CBC with Differential    Collection Time: 08/03/23  3:04 AM   Result Value Ref Range    WBC 7.53 4.50 - 11.00 K/uL    Hemoglobin 8.3 (L) 13.5 - 18.0 g/dL    Hematocrit 24.9 (L) 40.0 - 54.0 %    Platelet Count 141 (L) 150 - 400 K/uL   CBC with Differential    Collection Time: 08/02/23  4:41 AM   Result Value Ref Range    WBC 6.89 4.50 - 11.00 K/uL    Hemoglobin 7.7 (L) 13.5 - 18.0 g/dL    Hematocrit 23.1 (L) 40.0 - 54.0 %    Platelet Count 138 (L) 150 - 400 K/uL   CBC with Differential    Collection Time: 08/01/23 11:21 PM   Result Value Ref Range    WBC 7.60 4.50 - 11.00 K/uL    Hemoglobin 8.6 (L) 13.5 - 18.0 g/dL    Hematocrit 25.7 (L) 40.0 - 54.0 %     Platelet Count 163 150 - 400 K/uL     Lab Results   Component Value Date    INR 1.34 08/01/2023          IMAGING:  Imaging Results              X-Ray Chest AP Portable (Final result)  Result time 08/01/23 13:05:37      Final result by Dimitrios Levin MD (08/01/23 13:05:37)                   Impression:      Cardiomegaly and interstitial prominence favor CHF changes.      Electronically signed by: Dimitrios Levin  Date:    08/01/2023  Time:    13:05               Narrative:    EXAMINATION:  XR CHEST AP PORTABLE    CLINICAL HISTORY:  shortness of breath;    TECHNIQUE:  Single frontal view of the chest was performed.    COMPARISON:  01/02/2022    FINDINGS:  Cardiomegaly is again seen.  Interstitial prominence.  There is linear atelectasis of the right mid lung.  No pneumothorax.  Suspect trace pleural effusions.                                  ASSESSMENT:    88 y.o. AAM with HTN, DM, ESRD on iHD (RUE AVF) that presents from home with symptomatic anemia and melena with 3g drop in Hgb and new onset Afib.    PLAN:    Acute GI Hemorrhage   - Hgb 5.9 (9), melena, elevated BUN, 2u pRBC transfusion  - EGD was unremarkable with no definitive source of anemia  - hemorrhage has resolved - Hgb stable and no further bleeding  - Cardiology proceed with [facility] today  - given resolution/stabilization of bleeding, no urgent indication for colonoscopy  - I did discuss outpatient colonoscopy with patient, and he was not ready to commit but will think about it and make a decision prior to discharge; it sounds like he has +FMH CRC in father, and I would encourage outpatient colonoscopy  - if patient is amenable to colonoscopy at time of discharge, please message me, and we will set him up for an expedited outpatient colonoscopy  - please alert me if there is a clinical change       Thank you for the consult and including me in the care of this patient. Please call me with questions.     Enrrique Ac MD  Gastroenterology

## 2023-08-03 NOTE — ASSESSMENT & PLAN NOTE
- Patient seen and evaluated by Dr. Berger  - Troponinin 118, 112, 111; flat. Likely demand ischemia in setting of acute anemia and ESRD on dialysis  - However, with abnormal EKG and multiple risk factors (HTN, DM, HLD, former smoker, PAD, hx of cardiac arrest) Dr. Berger recommends diagnostic LHC to eval for high risk ischemia. Will plan to evaluate coronaries but hold off on intervention until GI workup complete and anemia stable.  - Procedure, risk, and benefits discussed in detail with patient that morning and again later with daughter at bedside (family via speaker phone). They verbalized understanding and agree with plan of care. Consent obtained by pt's daughter and placed on chart.  - EGD scheduled for today, will plan for LHC once EGD complete  - Further recommendations to follow

## 2023-08-03 NOTE — SUBJECTIVE & OBJECTIVE
Past Medical History:   Diagnosis Date    Chronic kidney disease (CKD)     Diabetes mellitus     Hypertension     PVD (peripheral vascular disease)        Past Surgical History:   Procedure Laterality Date    left toe amputation      PLACEMENT OF DIALYSIS ACCESS         Review of patient's allergies indicates:  No Known Allergies    No current facility-administered medications on file prior to encounter.     Current Outpatient Medications on File Prior to Encounter   Medication Sig    hydrALAZINE (APRESOLINE) 100 MG tablet Take 1 tablet by mouth 3 (three) times daily with meals.    methoxy peg-epoetin beta (MIRCERA INJ) 50 mcg.    nebivoloL (BYSTOLIC) 2.5 MG Tab Take 1 tablet by mouth.    ondansetron (ZOFRAN) 4 MG tablet Take 1 tablet (4 mg total) by mouth every 6 (six) hours.    sevelamer carbonate (RENVELA) 800 mg Tab Take 3 tablets by mouth.     Family History    None       Tobacco Use    Smoking status: Former     Current packs/day: 0.00     Types: Cigarettes    Smokeless tobacco: Never   Substance and Sexual Activity    Alcohol use: Not Currently    Drug use: Never    Sexual activity: Not on file     Review of Systems   Constitutional: Positive for malaise/fatigue. Negative for chills, diaphoresis and fever.   Cardiovascular:  Negative for chest pain, leg swelling, orthopnea, palpitations and syncope.   Respiratory:  Positive for shortness of breath.    Gastrointestinal:  Positive for melena. Negative for abdominal pain, nausea and vomiting.   Neurological:  Positive for weakness.   All other systems reviewed and are negative.    Objective:     Vital Signs (Most Recent):  Temp: 97.9 °F (36.6 °C) (08/02/23 1207)  Pulse: 66 (08/02/23 1630)  Resp: 17 (08/02/23 1630)  BP: (!) 170/42 (08/02/23 1630)  SpO2: 95 % (08/02/23 1630) Vital Signs (24h Range):  Temp:  [97.6 °F (36.4 °C)-98.1 °F (36.7 °C)] 97.9 °F (36.6 °C)  Pulse:  [59-68] 66  Resp:  [12-23] 17  SpO2:  [93 %-100 %] 95 %  BP: ()/(14-61) 170/42  "    Weight: 70.3 kg (155 lb)  Body mass index is 25.02 kg/m².    SpO2: 95 %         Intake/Output Summary (Last 24 hours) at 8/2/2023 1959  Last data filed at 8/2/2023 1602  Gross per 24 hour   Intake 687 ml   Output 0 ml   Net 687 ml       Lines/Drains/Airways       Peripheral Intravenous Line  Duration                  Hemodialysis AV Fistula Right upper arm -- days         Peripheral IV - Single Lumen 08/01/23 1325 20 G Left;Posterior Hand 1 day                     Physical Exam  Vitals reviewed.   Constitutional:       General: He is not in acute distress.  HENT:      Nose: Nose normal.      Mouth/Throat:      Mouth: Mucous membranes are moist.      Pharynx: Oropharynx is clear.   Eyes:      General: No scleral icterus.     Pupils: Pupils are equal, round, and reactive to light.   Cardiovascular:      Rate and Rhythm: Normal rate and regular rhythm.      Heart sounds: Normal heart sounds.   Pulmonary:      Effort: Pulmonary effort is normal.      Breath sounds: Normal breath sounds. No wheezing, rhonchi or rales.   Abdominal:      General: Bowel sounds are normal.      Palpations: Abdomen is soft.   Musculoskeletal:      Cervical back: Neck supple.      Right lower leg: No edema.      Left lower leg: No edema.   Skin:     General: Skin is warm and dry.      Coloration: Skin is not jaundiced or pale.   Neurological:      Mental Status: He is alert and oriented to person, place, and time.   Psychiatric:         Behavior: Behavior normal.          Significant Labs: ABG: No results for input(s): "PH", "PCO2", "HCO3", "POCSATURATED", "BE" in the last 48 hours., Blood Culture: No results for input(s): "LABBLOO" in the last 48 hours., BMP:   Recent Labs   Lab 08/01/23  1235 08/02/23  0441   * 99    139   K 3.5 3.4*   CL 98 100   CO2 34* 32   BUN 19* 27*   CREATININE 4.35* 5.46*   CALCIUM 8.3* 8.3*   MG 2.3  --    , CMP   Recent Labs   Lab 08/01/23  1235 08/02/23  0441    139   K 3.5 3.4*   CL 98 100 " "  CO2 34* 32   * 99   BUN 19* 27*   CREATININE 4.35* 5.46*   CALCIUM 8.3* 8.3*   PROT 6.3*  --    ALBUMIN 2.9*  --    BILITOT 0.5  --    ALKPHOS 79  --    AST 22  --    ALT 25  --    ANIONGAP 9 10   , CBC   Recent Labs   Lab 08/01/23  1235 08/01/23  2321 08/02/23  0441 08/02/23  1314   WBC 6.81 7.60 6.89  --    HGB 5.9* 8.6* 7.7* 8.8*   HCT 18.5* 25.7* 23.1* 26.8*   * 163 138*  --    , Lipid Panel No results for input(s): "CHOL", "HDL", "LDLCALC", "TRIG", "CHOLHDL" in the last 48 hours., and Troponin No results for input(s): "TROPONINI" in the last 48 hours.    Significant Imaging: Cardiac Cath: No results found for this or any previous visit. , Echocardiogram: Transthoracic echo (TTE) complete ([facility] Only):   Results for orders placed or performed during the hospital encounter of 08/01/23   Echo   Result Value Ref Range    BSA 1.81 m2    LVOT stroke volume 66.96 cm3    LVIDd 4.74 3.5 - 6.0 cm    LV Systolic Volume 26.21 mL    LV Systolic Volume Index 14.6 mL/m2    LVIDs 2.67 2.1 - 4.0 cm    LV Diastolic Volume 104.16 mL    LV Diastolic Volume Index 57.87 mL/m2    IVS 1.70 (A) 0.6 - 1.1 cm    LVOT diameter 1.73 cm    LVOT area 2.3 cm2    FS 44 28 - 44 %    Left Ventricle Relative Wall Thickness 0.70 cm    Posterior Wall 1.66 (A) 0.6 - 1.1 cm    LV mass 354.15 g    LV Mass Index 197 g/m2    MV Peak E Mark 1.31 m/s    TDI LATERAL 0.06 m/s    TDI SEPTAL 0.04 m/s    E/E' ratio 26.20 m/s    MV Peak A Mark 0.64 m/s    TR Max Mark 4.19 m/s    E/A ratio 2.05     E wave deceleration time 259.41 msec    LV SEPTAL E/E' RATIO 32.75 m/s    LV LATERAL E/E' RATIO 21.83 m/s    LVOT peak mark 1.09 m/s    Left Ventricular Outflow Tract Mean Velocity 0.75 cm/s    Left Ventricular Outflow Tract Mean Gradient 2.46 mmHg    LA size 4.18 cm    TAPSE 1.30 cm    RA Major Axis 4.08 cm    AV mean gradient 4 mmHg    AV peak gradient 9 mmHg    Ao peak mark 1.50 m/s    Ao VTI 34.40 cm    LVOT peak VTI 28.50 cm    AV valve area 1.95 cm² "    AV Velocity Ratio 0.73     AV index (prosthetic) 0.83     NORA by Velocity Ratio 1.71 cm²    Mr max moni 4.72 m/s    MV mean gradient 3 mmHg    MV peak gradient 9 mmHg    MV stenosis pressure 1/2 time 95.20 ms    MV valve area p 1/2 method 2.31 cm2    MV valve area by continuity eq 1.33 cm2    MV VTI 50.2 cm    Triscuspid Valve Regurgitation Peak Gradient 70 mmHg    PV PEAK VELOCITY 0.62 m/s    PV peak gradient 2 mmHg    Ao root annulus 2.76 cm    IVC diameter 2.21 cm    Mean e' 0.05 m/s    ZLVIDS -1.12     ZLVIDD -0.49     RVDD 5.07 cm    AORTIC VALVE CUSP SEPERATION 2.05 cm   , EKG:   Results for orders placed or performed during the hospital encounter of 01/02/22   EKG 12-lead    Collection Time: 01/02/22 11:37 AM    Narrative    Test Reason : R07.9,    Vent. Rate : 065 BPM     Atrial Rate : 000 BPM     P-R Int : 136 ms          QRS Dur : 118 ms      QT Int : 434 ms       P-R-T Axes : 058 009 081 degrees     QTc Int : 443 ms    Sinus rhythm  Q in V1/V2 may be due to lead placement error though septal infarct cannot  be excluded  Lateral T wave abnormality  may be due to myocardial ischemia  Abnormal ECG    Confirmed by Isaac PORRAS, Tab IRELAND ([id number]) on 1/2/2022 2:24:23 PM    Referred By: [email or web address]   SELF           Confirmed By:Tab Gray MD    , and X-Ray: CXR: X-Ray Chest 1 View (CXR): No results found for this visit on 08/01/23.

## 2023-08-03 NOTE — SUBJECTIVE & OBJECTIVE
Interval History: Patient s/p C yesterday which revealed 2 vessel CAD; 75% ostial-prox RCA and 75% mid Lcx, no intervention. Can consider high-risk angioplasty if remains symptomatic on good medical therapy. Calcification of the vessel raises level of difficulty.    Review of Systems   Constitutional: Positive for malaise/fatigue. Negative for chills, diaphoresis and fever.   Cardiovascular:  Negative for chest pain, leg swelling, orthopnea, palpitations and syncope.   Respiratory:  Positive for shortness of breath.    Gastrointestinal:  Positive for melena. Negative for abdominal pain, nausea and vomiting.   Neurological:  Positive for weakness.   All other systems reviewed and are negative.    Objective:     Vital Signs (Most Recent):  Temp: 98.2 °F (36.8 °C) (08/03/23 0657)  Pulse: 64 (08/03/23 1400)  Resp: 18 (08/03/23 1400)  BP: (!) 174/47 (08/03/23 1400)  SpO2: (!) 94 % (08/03/23 0657) Vital Signs (24h Range):  Temp:  [97.1 °F (36.2 °C)-98.2 °F (36.8 °C)] 98.2 °F (36.8 °C)  Pulse:  [60-66] 64  Resp:  [12-23] 18  SpO2:  [93 %-100 %] 94 %  BP: ()/(14-93) 174/47     Weight: 70.3 kg (155 lb)  Body mass index is 25.02 kg/m².     SpO2: (!) 94 %         Intake/Output Summary (Last 24 hours) at 8/3/2023 1427  Last data filed at 8/2/2023 1602  Gross per 24 hour   Intake 100 ml   Output 0 ml   Net 100 ml       Lines/Drains/Airways       Peripheral Intravenous Line  Duration                  Hemodialysis AV Fistula Right upper arm -- days         Peripheral IV - Single Lumen 08/01/23 1325 20 G Left;Posterior Hand 2 days                       Physical Exam  Vitals reviewed.   Constitutional:       General: He is not in acute distress.  HENT:      Nose: Nose normal.      Mouth/Throat:      Mouth: Mucous membranes are moist.      Pharynx: Oropharynx is clear.   Eyes:      General: No scleral icterus.     Pupils: Pupils are equal, round, and reactive to light.   Cardiovascular:      Rate and Rhythm: Normal rate and  "regular rhythm.      Heart sounds: Normal heart sounds.   Pulmonary:      Effort: Pulmonary effort is normal.      Breath sounds: Normal breath sounds. No wheezing, rhonchi or rales.   Abdominal:      General: Bowel sounds are normal.      Palpations: Abdomen is soft.   Musculoskeletal:      Cervical back: Neck supple.      Right lower leg: No edema.      Left lower leg: No edema.   Skin:     General: Skin is warm and dry.      Coloration: Skin is not jaundiced or pale.   Neurological:      Mental Status: He is alert and oriented to person, place, and time.   Psychiatric:         Behavior: Behavior normal.            Significant Labs: ABG: No results for input(s): "PH", "PCO2", "HCO3", "POCSATURATED", "BE" in the last 48 hours., Blood Culture: No results for input(s): "LABBLOO" in the last 48 hours., BMP:   Recent Labs   Lab 08/02/23  0441   GLU 99      K 3.4*      CO2 32   BUN 27*   CREATININE 5.46*   CALCIUM 8.3*   , CMP   Recent Labs   Lab 08/02/23  0441      K 3.4*      CO2 32   GLU 99   BUN 27*   CREATININE 5.46*   CALCIUM 8.3*   ANIONGAP 10   , CBC   Recent Labs   Lab 08/01/23  2321 08/02/23  0441 08/02/23  1314 08/03/23  0304   WBC 7.60 6.89  --  7.53   HGB 8.6* 7.7* 8.8* 8.3*   HCT 25.7* 23.1* 26.8* 24.9*    138*  --  141*   , INR No results for input(s): "INR", "PROTIME" in the last 48 hours., Lipid Panel No results for input(s): "CHOL", "HDL", "LDLCALC", "TRIG", "CHOLHDL" in the last 48 hours., and Troponin No results for input(s): "TROPONINI" in the last 48 hours.    Significant Imaging: Cardiac Cath: Accession #: [id number]  Cardiac catheterization    Height:  5' 6"   Weight:  155 lb   Blood Pressure:  Not recorded   Heart Rate:  Not recorded    Date of Study:  8/3/23   Ordering Provider:  Shana Tang FNP   Referring:  Self, Aaareferral   Clinical Indications:  Shortness of breath [R06.02 (ICD-10-CM)], Elevated troponin [R77.8 (ICD-10-CM)], End stage renal disease " [N18.6 (ICD-10-CM)], Essential (primary) hypertension [I10 (ICD-10-CM)], Dependence on renal dialysis [Z99.2 (ICD-10-CM)], PAD (peripheral artery disease) [I73.9 (ICD-10-CM)]          Patient Information    Name MRN Adriana Mcdonald [id number] 88 y.o. male     Physicians    Panel Physicians Referring Physician Case Authorizing Physician   Vazquez Aguilar MD (Primary) Self, Aaareferral Shana Tang, FNP     Indications    Shortness of breath [R06.02 (ICD-10-CM)]   Elevated troponin [R77.8 (ICD-10-CM)]   End stage renal disease [N18.6 (ICD-10-CM)]   Essential (primary) hypertension [I10 (ICD-10-CM)]   Dependence on renal dialysis [Z99.2 (ICD-10-CM)]   PAD (peripheral artery disease) [I73.9 (ICD-10-CM)]     Summary         The Mid Cx lesion was 75% stenosed.    The Ost RCA to Prox RCA lesion was 75% stenosed.    The ejection fraction was calculated to be 45%.    There was mild left ventricular systolic dysfunction.    The pre-procedure left ventricular end diastolic pressure was 30.    The estimated blood loss was none.    There was two vessel coronary artery disease.    There was diastolic dysfunction.    There was no mitral valve regurgitation.     The procedure log was documented by Documenter: Nenita Ward RN and verified by Vazquez Aguilar MD.     Date: 8/3/2023  Time: 1:21 PM     Impression:      1. Severe two-vessel coronary artery disease involving the ostial RCA and distal left circumflex.       2. Top-normal left ventricular size with inferior wall hypokinesis and mildly impaired left ventricular systolic function, ejection fraction 45%.       3. No significant mitral regurgitation.       4. Left ventricular diastolic dysfunction, LVEDP 30 mmHg.       Plan:     1. Medical management of coronary artery disease.     2. High-risk angioplasty of ostial RCA if remains symptomatic on good medical therapy.  Calcification of this vessel raises level of difficulty.      , Echocardiogram:  Transthoracic echo (TTE) complete (Cupid Only):   Results for orders placed or performed during the hospital encounter of 08/01/23   Echo   Result Value Ref Range    BSA 1.81 m2    LVOT stroke volume 66.96 cm3    LVIDd 4.74 3.5 - 6.0 cm    LV Systolic Volume 26.21 mL    LV Systolic Volume Index 14.6 mL/m2    LVIDs 2.67 2.1 - 4.0 cm    LV Diastolic Volume 104.16 mL    LV Diastolic Volume Index 57.87 mL/m2    IVS 1.70 (A) 0.6 - 1.1 cm    LVOT diameter 1.73 cm    LVOT area 2.3 cm2    FS 44 28 - 44 %    Left Ventricle Relative Wall Thickness 0.70 cm    Posterior Wall 1.66 (A) 0.6 - 1.1 cm    LV mass 354.15 g    LV Mass Index 197 g/m2    MV Peak E Mark 1.31 m/s    TDI LATERAL 0.06 m/s    TDI SEPTAL 0.04 m/s    E/E' ratio 26.20 m/s    MV Peak A Mark 0.64 m/s    TR Max Mark 4.19 m/s    E/A ratio 2.05     E wave deceleration time 259.41 msec    LV SEPTAL E/E' RATIO 32.75 m/s    LV LATERAL E/E' RATIO 21.83 m/s    LVOT peak mark 1.09 m/s    Left Ventricular Outflow Tract Mean Velocity 0.75 cm/s    Left Ventricular Outflow Tract Mean Gradient 2.46 mmHg    LA size 4.18 cm    TAPSE 1.30 cm    RA Major Axis 4.08 cm    AV mean gradient 4 mmHg    AV peak gradient 9 mmHg    Ao peak mark 1.50 m/s    Ao VTI 34.40 cm    LVOT peak VTI 28.50 cm    AV valve area 1.95 cm²    AV Velocity Ratio 0.73     AV index (prosthetic) 0.83     NORA by Velocity Ratio 1.71 cm²    Mr max mark 4.72 m/s    MV mean gradient 3 mmHg    MV peak gradient 9 mmHg    MV stenosis pressure 1/2 time 95.20 ms    MV valve area p 1/2 method 2.31 cm2    MV valve area by continuity eq 1.33 cm2    MV VTI 50.2 cm    Triscuspid Valve Regurgitation Peak Gradient 70 mmHg    PV PEAK VELOCITY 0.62 m/s    PV peak gradient 2 mmHg    Ao root annulus 2.76 cm    IVC diameter 2.21 cm    Mean e' 0.05 m/s    ZLVIDS -1.12     ZLVIDD -0.49     RVDD 5.07 cm    AORTIC VALVE CUSP SEPERATION 2.05 cm   , EKG:   Results for orders placed or performed during the hospital encounter of 08/01/23   EKG  12-lead    Collection Time: 08/01/23  3:23 PM    Narrative    Test Reason : R77.8,    Vent. Rate : 060 BPM     Atrial Rate : 000 BPM     P-R Int : 000 ms          QRS Dur : 100 ms      QT Int : 450 ms       P-R-T Axes : 000 -19 238 degrees     QTc Int : 450 ms    Atrial fibrillation with PVC(s)  rSr'(V1) - probable normal variant  Left ventricular hypertrophy  Widespread ST-T abnormality may be due to the hypertrophy and/or ischemia  Abnormal ECG    Confirmed by Kareem PORRAS, Adin AGGARWAL ([id number]) on 8/3/2023 4:34:34 AM    Referred By: AAAREFERR   SELF           Confirmed By:Adin Serna MD    , and X-Ray: CXR: X-Ray Chest 1 View (CXR): No results found for this visit on 08/01/23.

## 2023-08-03 NOTE — ASSESSMENT & PLAN NOTE
- Hgb 5.9 on admit, received 2 units PRBCs, now Hgb 8.8  - Being followed by primary team and GI; EGD scheduled for today    8/3/2023:  - Hgb 8.3, stable  - EGD without source of bleeding, colonoscopy to be performed outpatient as Hgb remains stable    8/4/2023:  - Hgb 7.7

## 2023-08-03 NOTE — PT/OT/SLP PROGRESS
Occupational Therapy      Patient Name:  Joseph Mcdonald   MRN:  [id number]    Patient not seen today secondary to  (being taken for heart cath). Will follow-up on next treatment day.    8/3/2023

## 2023-08-03 NOTE — HPI
88 year old male with PMH of HTN, HLD, DM, ESRD on dialysis on T/TH/Sat, and PAD (s/p left SFA stent 2019 [facility]) who came to the ED due to abnormal labs. Patient went to dialysis this morning and had a CBC done that resulted Hgb of 5.9 after he left the dialysis. Dialysis called the daughter and asked her to bring him into the ED. At Ed he received 2 units of pRBCs and found to be A fib. He is not on anticoagulation and reported to the ED doctor he has never had any heart disease or A fib. He has no complaints, feel at his baseline, denies palpitation, chest pain, fever chills, headaches, numbness or weakness, black stool or blood in stool, denies any issues with urine. He reports he has lost 50 lbs in the last year and has a very low appetite. Patient has had colonoscopies and EGDs in the past but does not remember the date.      Patient has ESRD on dialysis managed by Dr. Herman. Patient reports he is diabetic but is not on any medication for diabetes. 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      Upon questioning the patient and the daughter and explaining what are plans are including getting an Echo, patient all the sudden said he did an Echo last week at [facility]. Then he was asked again if he has ever seen a heart doctor for the second time and he said he has seen Dr. Rivers 2 weeks ago at [facility]. He said his PCP, Dr Genao, sent him to [facility] for some heart issues that he has no idea what it is.     Cardiology 8/2/2023:  Cardiology consulted for preop eval for possible GI intervention, elevated troponin, new onset afib with severe anemia. Patient seen today, unable to give a reliable hx, thinks he may have had a LHC many years ago. Records received from [facility], he was last seen there 4/1/2022 for PAD (s/p left SFA stent 2019, Dr. Segovia) at which time he was told to follow up as needed. Echo there 4/26/2019 with LVH, normal EF 60%, mild MR, moderate TR, and  pulmonary HTN with PASP 78mmHg. No mention of afib or hx of CAD. After talking with his daughter later today at bedside, she reports history of cardiac arrest a few years ago and patient was brought here to [facility]. He had a C 9/9/2019 here, report not available. Patient is scheduled for EGD today.    EKG reviewed by Dr. Berger, SB with PACs and SR with PACs/PVCs both with widespread ST-T abnormality. Troponin 118, 112, 111. Echo pending.

## 2023-08-03 NOTE — PROGRESS NOTES
[facility] - Short Stay Unit  Cardiology  Progress Note    Patient Name: Joseph Mcdonald  MRN: [id number]  Admission Date: 8/1/2023  Hospital Length of Stay: 0 days  Code Status: Full Code   Attending Physician: Manas Sin Jr., MD   Primary Care Physician: Prakash, [facility]  Expected Discharge Date: 8/3/2023  Principal Problem:Acute blood loss anemia    Subjective:     Hospital Course:   No notes on file    Interval History: Patient s/p LHC yesterday which revealed 2 vessel CAD; 75% ostial-prox RCA and 75% mid Lcx, no intervention. Can consider high-risk angioplasty if remains symptomatic on good medical therapy. Calcification of the vessel raises level of difficulty.    Review of Systems   Constitutional: Positive for malaise/fatigue. Negative for chills, diaphoresis and fever.   Cardiovascular:  Negative for chest pain, leg swelling, orthopnea, palpitations and syncope.   Respiratory:  Positive for shortness of breath.    Gastrointestinal:  Positive for melena. Negative for abdominal pain, nausea and vomiting.   Neurological:  Positive for weakness.   All other systems reviewed and are negative.    Objective:     Vital Signs (Most Recent):  Temp: 98.2 °F (36.8 °C) (08/03/23 0657)  Pulse: 64 (08/03/23 1400)  Resp: 18 (08/03/23 1400)  BP: (!) 174/47 (08/03/23 1400)  SpO2: (!) 94 % (08/03/23 0657) Vital Signs (24h Range):  Temp:  [97.1 °F (36.2 °C)-98.2 °F (36.8 °C)] 98.2 °F (36.8 °C)  Pulse:  [60-66] 64  Resp:  [12-23] 18  SpO2:  [93 %-100 %] 94 %  BP: ()/(14-93) 174/47     Weight: 70.3 kg (155 lb)  Body mass index is 25.02 kg/m².     SpO2: (!) 94 %         Intake/Output Summary (Last 24 hours) at 8/3/2023 1427  Last data filed at 8/2/2023 1602  Gross per 24 hour   Intake 100 ml   Output 0 ml   Net 100 ml       Lines/Drains/Airways       Peripheral Intravenous Line  Duration                  Hemodialysis AV Fistula Right upper arm -- days         Peripheral IV - Single Lumen  "08/01/23 1325 20 G Left;Posterior Hand 2 days                       Physical Exam  Vitals reviewed.   Constitutional:       General: He is not in acute distress.  HENT:      Nose: Nose normal.      Mouth/Throat:      Mouth: Mucous membranes are moist.      Pharynx: Oropharynx is clear.   Eyes:      General: No scleral icterus.     Pupils: Pupils are equal, round, and reactive to light.   Cardiovascular:      Rate and Rhythm: Normal rate and regular rhythm.      Heart sounds: Normal heart sounds.   Pulmonary:      Effort: Pulmonary effort is normal.      Breath sounds: Normal breath sounds. No wheezing, rhonchi or rales.   Abdominal:      General: Bowel sounds are normal.      Palpations: Abdomen is soft.   Musculoskeletal:      Cervical back: Neck supple.      Right lower leg: No edema.      Left lower leg: No edema.   Skin:     General: Skin is warm and dry.      Coloration: Skin is not jaundiced or pale.   Neurological:      Mental Status: He is alert and oriented to person, place, and time.   Psychiatric:         Behavior: Behavior normal.            Significant Labs: ABG: No results for input(s): "PH", "PCO2", "HCO3", "POCSATURATED", "BE" in the last 48 hours., Blood Culture: No results for input(s): "LABBLOO" in the last 48 hours., BMP:   Recent Labs   Lab 08/02/23  0441   GLU 99      K 3.4*      CO2 32   BUN 27*   CREATININE 5.46*   CALCIUM 8.3*   , CMP   Recent Labs   Lab 08/02/23  0441      K 3.4*      CO2 32   GLU 99   BUN 27*   CREATININE 5.46*   CALCIUM 8.3*   ANIONGAP 10   , CBC   Recent Labs   Lab 08/01/23  2321 08/02/23  0441 08/02/23  1314 08/03/23  0304   WBC 7.60 6.89  --  7.53   HGB 8.6* 7.7* 8.8* 8.3*   HCT 25.7* 23.1* 26.8* 24.9*    138*  --  141*   , INR No results for input(s): "INR", "PROTIME" in the last 48 hours., Lipid Panel No results for input(s): "CHOL", "HDL", "LDLCALC", "TRIG", "CHOLHDL" in the last 48 hours., and Troponin No results for input(s): " ""TROPONINI" in the last 48 hours.    Significant Imaging: Cardiac Cath: Accession #: [id number]  Cardiac catheterization    Height:  5' 6"   Weight:  155 lb   Blood Pressure:  Not recorded   Heart Rate:  Not recorded    Date of Study:  8/3/23   Ordering Provider:  Shana Tang FNP   Referring:  Self, Aaareferral   Clinical Indications:  Shortness of breath [R06.02 (ICD-10-CM)], Elevated troponin [R77.8 (ICD-10-CM)], End stage renal disease [N18.6 (ICD-10-CM)], Essential (primary) hypertension [I10 (ICD-10-CM)], Dependence on renal dialysis [Z99.2 (ICD-10-CM)], PAD (peripheral artery disease) [I73.9 (ICD-10-CM)]          Patient Information    Name MRN Adriana Mcdonald [id number] 88 y.o. male     Physicians    Panel Physicians Referring Physician Case Authorizing Physician   Vazquez Aguilar MD (Primary) Self, Aaareferral Shana Tang FNP     Indications    Shortness of breath [R06.02 (ICD-10-CM)]   Elevated troponin [R77.8 (ICD-10-CM)]   End stage renal disease [N18.6 (ICD-10-CM)]   Essential (primary) hypertension [I10 (ICD-10-CM)]   Dependence on renal dialysis [Z99.2 (ICD-10-CM)]   PAD (peripheral artery disease) [I73.9 (ICD-10-CM)]     Summary         The Mid Cx lesion was 75% stenosed.    The Ost RCA to Prox RCA lesion was 75% stenosed.    The ejection fraction was calculated to be 45%.    There was mild left ventricular systolic dysfunction.    The pre-procedure left ventricular end diastolic pressure was 30.    The estimated blood loss was none.    There was two vessel coronary artery disease.    There was diastolic dysfunction.    There was no mitral valve regurgitation.     The procedure log was documented by Documenter: Nenita Ward RN and verified by Vazquez Aguilar MD.     Date: 8/3/2023  Time: 1:21 PM     Impression:      1. Severe two-vessel coronary artery disease involving the ostial RCA and distal left circumflex.       2. Top-normal left ventricular size with " inferior wall hypokinesis and mildly impaired left ventricular systolic function, ejection fraction 45%.       3. No significant mitral regurgitation.       4. Left ventricular diastolic dysfunction, LVEDP 30 mmHg.       Plan:     1. Medical management of coronary artery disease.     2. High-risk angioplasty of ostial RCA if remains symptomatic on good medical therapy.  Calcification of this vessel raises level of difficulty.      , Echocardiogram: Transthoracic echo (TTE) complete (Cupid Only):   Results for orders placed or performed during the hospital encounter of 08/01/23   Echo   Result Value Ref Range    BSA 1.81 m2    LVOT stroke volume 66.96 cm3    LVIDd 4.74 3.5 - 6.0 cm    LV Systolic Volume 26.21 mL    LV Systolic Volume Index 14.6 mL/m2    LVIDs 2.67 2.1 - 4.0 cm    LV Diastolic Volume 104.16 mL    LV Diastolic Volume Index 57.87 mL/m2    IVS 1.70 (A) 0.6 - 1.1 cm    LVOT diameter 1.73 cm    LVOT area 2.3 cm2    FS 44 28 - 44 %    Left Ventricle Relative Wall Thickness 0.70 cm    Posterior Wall 1.66 (A) 0.6 - 1.1 cm    LV mass 354.15 g    LV Mass Index 197 g/m2    MV Peak E Mark 1.31 m/s    TDI LATERAL 0.06 m/s    TDI SEPTAL 0.04 m/s    E/E' ratio 26.20 m/s    MV Peak A Mark 0.64 m/s    TR Max Mark 4.19 m/s    E/A ratio 2.05     E wave deceleration time 259.41 msec    LV SEPTAL E/E' RATIO 32.75 m/s    LV LATERAL E/E' RATIO 21.83 m/s    LVOT peak mark 1.09 m/s    Left Ventricular Outflow Tract Mean Velocity 0.75 cm/s    Left Ventricular Outflow Tract Mean Gradient 2.46 mmHg    LA size 4.18 cm    TAPSE 1.30 cm    RA Major Axis 4.08 cm    AV mean gradient 4 mmHg    AV peak gradient 9 mmHg    Ao peak mark 1.50 m/s    Ao VTI 34.40 cm    LVOT peak VTI 28.50 cm    AV valve area 1.95 cm²    AV Velocity Ratio 0.73     AV index (prosthetic) 0.83     NORA by Velocity Ratio 1.71 cm²    Mr max mark 4.72 m/s    MV mean gradient 3 mmHg    MV peak gradient 9 mmHg    MV stenosis pressure 1/2 time 95.20 ms    MV valve area p  1/2 method 2.31 cm2    MV valve area by continuity eq 1.33 cm2    MV VTI 50.2 cm    Triscuspid Valve Regurgitation Peak Gradient 70 mmHg    PV PEAK VELOCITY 0.62 m/s    PV peak gradient 2 mmHg    Ao root annulus 2.76 cm    IVC diameter 2.21 cm    Mean e' 0.05 m/s    ZLVIDS -1.12     ZLVIDD -0.49     RVDD 5.07 cm    AORTIC VALVE CUSP SEPERATION 2.05 cm   , EKG:   Results for orders placed or performed during the hospital encounter of 08/01/23   EKG 12-lead    Collection Time: 08/01/23  3:23 PM    Narrative    Test Reason : R77.8,    Vent. Rate : 060 BPM     Atrial Rate : 000 BPM     P-R Int : 000 ms          QRS Dur : 100 ms      QT Int : 450 ms       P-R-T Axes : 000 -19 238 degrees     QTc Int : 450 ms    Atrial fibrillation with PVC(s)  rSr'(V1) - probable normal variant  Left ventricular hypertrophy  Widespread ST-T abnormality may be due to the hypertrophy and/or ischemia  Abnormal ECG    Confirmed by Adin Serna MD ([id number]) on 8/3/2023 4:34:34 AM    Referred By: [email or web address]   SELF           Confirmed By:Adin Serna MD    , and X-Ray: CXR: X-Ray Chest 1 View (CXR): No results found for this visit on 08/01/23.    Assessment and Plan:     Brief HPI: 88 year old male with PMH of HTN, HLD, DM, ESRD on dialysis on T/TH/Sat, and PAD (s/p left SFA stent 2019 [facility]) who came to the ED due to abnormal labs. Patient went to dialysis this morning and had a CBC done that resulted Hgb of 5.9 after he left the dialysis. Dialysis called the daughter and asked her to bring him into the ED. At Ed he received 2 units of pRBCs and found to be A fib. He is not on anticoagulation and reported to the ED doctor he has never had any heart disease or A fib. He has no complaints, feel at his baseline, denies palpitation, chest pain, fever chills, headaches, numbness or weakness, black stool or blood in stool, denies any issues with urine. He reports he has lost 50 lbs in the last year and has a very low appetite. Patient has  had colonoscopies and EGDs in the past but does not remember the date.      Patient has ESRD on dialysis managed by Dr. Herman. Patient reports he is diabetic but is not on any medication for diabetes. 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      Upon questioning the patient and the daughter and explaining what are plans are including getting an Echo, patient all the sudden said he did an Echo last week at [facility]. Then he was asked again if he has ever seen a heart doctor for the second time and he said he has seen Dr. Rivers 2 weeks ago at [facility]. He said his PCP, Dr Genao, sent him to [facility] for some heart issues that he has no idea what it is.     Cardiology 8/2/2023:  Cardiology consulted for preop eval for possible GI intervention, elevated troponin, new onset afib with severe anemia. Patient seen today, unable to give a reliable hx, thinks he may have had a LHC many years ago. Records received from [facility], he was last seen there 4/1/2022 for PAD (s/p left SFA stent 2019, Dr. Segovia) at which time he was told to follow up as needed. Echo there 4/26/2019 with LVH, normal EF 60%, mild MR, moderate TR, and pulmonary HTN with PASP 78mmHg. No mention of afib or hx of CAD. After talking with his daughter later today at bedside, she reports history of cardiac arrest a few years ago and patient was brought here to [facility]. He had a LHC 9/9/2019 here, report not available. Patient is scheduled for EGD today.    EKG reviewed by Dr. Berger, SB with PACs and SR with PACs/PVCs both with widespread ST-T abnormality. Troponin 118, 112, 111. Echo pending.    * Acute blood loss anemia  - Hgb 5.9 on admit, received 2 units PRBCs, now Hgb 8.8  - Being followed by primary team and GI; EGD scheduled for today    8/3/2023:  - Hgb 8.3, stable  - EGD without source of bleeding, colonoscopy to be performed outpatient as Hgb remains stable    8/4/2023:  - Hgb 7.7    Elevated  troponin  - Patient seen and evaluated by Dr. Berger  - Troponinin 118, 112, 111; flat. Likely demand ischemia in setting of acute anemia and ESRD on dialysis  - However, with abnormal EKG and multiple risk factors (HTN, DM, HLD, former smoker, PAD, hx of cardiac arrest) Dr. Berger recommends diagnostic LHC to eval for high risk ischemia. Will plan to evaluate coronaries but hold off on intervention until GI workup complete and anemia stable.  - Procedure, risk, and benefits discussed in detail with patient that morning and again later with daughter at bedside (family via speaker phone). They verbalized understanding and agree with plan of care. Consent obtained by pt's daughter and placed on chart.  - EGD scheduled for today, will plan for LHC once EGD complete  - Further recommendations to follow    8/4/2023:  - LHC yesterday revealed 2 vessel CAD; 75% ost-prox RCA & 75% mid LCx  - Recommend good medical therapy, may consider high risk angioplasty of ostial RCA once GI workup complete (colonoscopy planned as outpatient) if becomes symptomatic  - Will need to discharge home on asa, BB, Imdur, and statin  - Follow up with [facility] in 2 weeks; Dr. Berger in 6 weeks    Abnormal EKG  - Patient seen and evaluated by Dr. Berger  - EKGs reviewed, both appear sinus rhythm with PACs and PVCs and widespread ST-T wave abnormality  - Telemetry reviewed, sinus rhythm with PACs/PVCs, no evidence of atrial fibrillation  - No anticoagulation needed at this time    End stage renal disease  - On dialysis        VTE Risk Mitigation (From admission, onward)           Ordered     Reason for No Pharmacological VTE Prophylaxis  Once        Question:  Reasons:  Answer:  Physician Provided (leave comment)  Comment:  Starting patient on eliquis    08/02/23 0144     IP VTE HIGH RISK PATIENT  Once         08/02/23 0144     Place sequential compression device  Until discontinued         08/02/23 0144                    Shana Tang,  FNP  Cardiology  [facility] - Short Stay Unit

## 2023-08-03 NOTE — PROGRESS NOTES
LHC rescheduled for today due to patient being in GI lab yesterday afternoon for EGD. Will follow up with patient tomorrow after LHC.

## 2023-08-03 NOTE — ASSESSMENT & PLAN NOTE
- Patient seen and evaluated by Dr. Berger  - EKGs reviewed, both appear sinus rhythm with PACs and PVCs and widespread ST-T wave abnormality  - Telemetry reviewed, sinus rhythm with PACs/PVCs, no evidence of atrial fibrillation  - No anticoagulation needed at this time

## 2023-08-03 NOTE — CONSULTS
[facility] - Short Stay Unit  Cardiology  Consult Note    Patient Name: Joseph Mcdonald  MRN: [id number]  Admission Date: 8/1/2023  Hospital Length of Stay: 0 days  Code Status: Full Code   Attending Provider: Shiv Mcknight MD   Consulting Provider: USHA Fajardo  Primary Care Physician: Clinic, [facility]  Principal Problem:Acute blood loss anemia    Patient information was obtained from patient, relative(s), past medical records, ER records and primary team.     Inpatient consult to Cardiology  Consult performed by: Shana Tang FNP  Consult ordered by: Shiv Mcknight MD  Reason for consult: preop eval for possible GI intervention, elevated troponin, new onset atrial fibrillation with severe anemia.        Subjective:     Chief Complaint:  Abnormal lab work/acute anemia     HPI:   88 year old male with PMH of HTN, HLD, DM, ESRD on dialysis on T/TH/Sat, and PAD (s/p left SFA stent 2019 [facility]) who came to the ED due to abnormal labs. Patient went to dialysis this morning and had a CBC done that resulted Hgb of 5.9 after he left the dialysis. Dialysis called the daughter and asked her to bring him into the ED. At Ed he received 2 units of pRBCs and found to be A fib. He is not on anticoagulation and reported to the ED doctor he has never had any heart disease or A fib. He has no complaints, feel at his baseline, denies palpitation, chest pain, fever chills, headaches, numbness or weakness, black stool or blood in stool, denies any issues with urine. He reports he has lost 50 lbs in the last year and has a very low appetite. Patient has had colonoscopies and EGDs in the past but does not remember the date.      Patient has ESRD on dialysis managed by Dr. Herman. Patient reports he is diabetic but is not on any medication for diabetes. 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      Upon questioning the patient and the  daughter and explaining what are plans are including getting an Echo, patient all the sudden said he did an Echo last week at [facility]. Then he was asked again if he has ever seen a heart doctor for the second time and he said he has seen Dr. Rivers 2 weeks ago at [facility]. He said his PCP, Dr Genao, sent him to [facility] for some heart issues that he has no idea what it is.     Cardiology 8/2/2023:  Cardiology consulted for preop eval for possible GI intervention, elevated troponin, new onset afib with severe anemia. Patient seen today, unable to give a reliable hx, thinks he may have had a LHC many years ago. Records received from [facility], he was last seen there 4/1/2022 for PAD (s/p left SFA stent 2019, Dr. Segovia) at which time he was told to follow up as needed. Echo there 4/26/2019 with LVH, normal EF 60%, mild MR, moderate TR, and pulmonary HTN with PASP 78mmHg. No mention of afib or hx of CAD. After talking with his daughter later today at bedside, she reports history of cardiac arrest a few years ago and patient was brought here to [facility]. He had a LHC 9/9/2019 here, report not available. Patient is scheduled for EGD today.    EKG reviewed by Dr. Berger, SB with PACs and SR with PACs/PVCs both with widespread ST-T abnormality. Troponin 118, 112, 111. Echo pending.              Past Medical History:   Diagnosis Date    Chronic kidney disease (CKD)     Diabetes mellitus     Hypertension     PVD (peripheral vascular disease)        Past Surgical History:   Procedure Laterality Date    left toe amputation      PLACEMENT OF DIALYSIS ACCESS         Review of patient's allergies indicates:  No Known Allergies    No current facility-administered medications on file prior to encounter.     Current Outpatient Medications on File Prior to Encounter   Medication Sig    hydrALAZINE (APRESOLINE) 100 MG tablet Take 1 tablet by mouth 3 (three) times daily with meals.    methoxy peg-epoetin beta (MIRCERA INJ) 50 mcg.    nebivoloL  (BYSTOLIC) 2.5 MG Tab Take 1 tablet by mouth.    ondansetron (ZOFRAN) 4 MG tablet Take 1 tablet (4 mg total) by mouth every 6 (six) hours.    sevelamer carbonate (RENVELA) 800 mg Tab Take 3 tablets by mouth.     Family History    None       Tobacco Use    Smoking status: Former     Current packs/day: 0.00     Types: Cigarettes    Smokeless tobacco: Never   Substance and Sexual Activity    Alcohol use: Not Currently    Drug use: Never    Sexual activity: Not on file     Review of Systems   Constitutional: Positive for malaise/fatigue. Negative for chills, diaphoresis and fever.   Cardiovascular:  Negative for chest pain, leg swelling, orthopnea, palpitations and syncope.   Respiratory:  Positive for shortness of breath.    Gastrointestinal:  Positive for melena. Negative for abdominal pain, nausea and vomiting.   Neurological:  Positive for weakness.   All other systems reviewed and are negative.    Objective:     Vital Signs (Most Recent):  Temp: 97.9 °F (36.6 °C) (08/02/23 1207)  Pulse: 66 (08/02/23 1630)  Resp: 17 (08/02/23 1630)  BP: (!) 170/42 (08/02/23 1630)  SpO2: 95 % (08/02/23 1630) Vital Signs (24h Range):  Temp:  [97.6 °F (36.4 °C)-98.1 °F (36.7 °C)] 97.9 °F (36.6 °C)  Pulse:  [59-68] 66  Resp:  [12-23] 17  SpO2:  [93 %-100 %] 95 %  BP: ()/(14-61) 170/42     Weight: 70.3 kg (155 lb)  Body mass index is 25.02 kg/m².    SpO2: 95 %         Intake/Output Summary (Last 24 hours) at 8/2/2023 1959  Last data filed at 8/2/2023 1602  Gross per 24 hour   Intake 687 ml   Output 0 ml   Net 687 ml       Lines/Drains/Airways       Peripheral Intravenous Line  Duration                  Hemodialysis AV Fistula Right upper arm -- days         Peripheral IV - Single Lumen 08/01/23 1325 20 G Left;Posterior Hand 1 day                     Physical Exam  Vitals reviewed.   Constitutional:       General: He is not in acute distress.  HENT:      Nose: Nose normal.      Mouth/Throat:      Mouth: Mucous membranes are moist.  "     Pharynx: Oropharynx is clear.   Eyes:      General: No scleral icterus.     Pupils: Pupils are equal, round, and reactive to light.   Cardiovascular:      Rate and Rhythm: Normal rate and regular rhythm.      Heart sounds: Normal heart sounds.   Pulmonary:      Effort: Pulmonary effort is normal.      Breath sounds: Normal breath sounds. No wheezing, rhonchi or rales.   Abdominal:      General: Bowel sounds are normal.      Palpations: Abdomen is soft.   Musculoskeletal:      Cervical back: Neck supple.      Right lower leg: No edema.      Left lower leg: No edema.   Skin:     General: Skin is warm and dry.      Coloration: Skin is not jaundiced or pale.   Neurological:      Mental Status: He is alert and oriented to person, place, and time.   Psychiatric:         Behavior: Behavior normal.          Significant Labs: ABG: No results for input(s): "PH", "PCO2", "HCO3", "POCSATURATED", "BE" in the last 48 hours., Blood Culture: No results for input(s): "LABBLOO" in the last 48 hours., BMP:   Recent Labs   Lab 08/01/23  1235 08/02/23  0441   * 99    139   K 3.5 3.4*   CL 98 100   CO2 34* 32   BUN 19* 27*   CREATININE 4.35* 5.46*   CALCIUM 8.3* 8.3*   MG 2.3  --    , CMP   Recent Labs   Lab 08/01/23 1235 08/02/23  0441    139   K 3.5 3.4*   CL 98 100   CO2 34* 32   * 99   BUN 19* 27*   CREATININE 4.35* 5.46*   CALCIUM 8.3* 8.3*   PROT 6.3*  --    ALBUMIN 2.9*  --    BILITOT 0.5  --    ALKPHOS 79  --    AST 22  --    ALT 25  --    ANIONGAP 9 10   , CBC   Recent Labs   Lab 08/01/23  1235 08/01/23  2321 08/02/23  0441 08/02/23  1314   WBC 6.81 7.60 6.89  --    HGB 5.9* 8.6* 7.7* 8.8*   HCT 18.5* 25.7* 23.1* 26.8*   * 163 138*  --    , Lipid Panel No results for input(s): "CHOL", "HDL", "LDLCALC", "TRIG", "CHOLHDL" in the last 48 hours., and Troponin No results for input(s): "TROPONINI" in the last 48 hours.    Significant Imaging: Cardiac Cath: No results found for this or any " previous visit. , Echocardiogram: Transthoracic echo (TTE) complete (Cupid Only):   Results for orders placed or performed during the hospital encounter of 08/01/23   Echo   Result Value Ref Range    BSA 1.81 m2    LVOT stroke volume 66.96 cm3    LVIDd 4.74 3.5 - 6.0 cm    LV Systolic Volume 26.21 mL    LV Systolic Volume Index 14.6 mL/m2    LVIDs 2.67 2.1 - 4.0 cm    LV Diastolic Volume 104.16 mL    LV Diastolic Volume Index 57.87 mL/m2    IVS 1.70 (A) 0.6 - 1.1 cm    LVOT diameter 1.73 cm    LVOT area 2.3 cm2    FS 44 28 - 44 %    Left Ventricle Relative Wall Thickness 0.70 cm    Posterior Wall 1.66 (A) 0.6 - 1.1 cm    LV mass 354.15 g    LV Mass Index 197 g/m2    MV Peak E Mark 1.31 m/s    TDI LATERAL 0.06 m/s    TDI SEPTAL 0.04 m/s    E/E' ratio 26.20 m/s    MV Peak A Mark 0.64 m/s    TR Max Mark 4.19 m/s    E/A ratio 2.05     E wave deceleration time 259.41 msec    LV SEPTAL E/E' RATIO 32.75 m/s    LV LATERAL E/E' RATIO 21.83 m/s    LVOT peak mark 1.09 m/s    Left Ventricular Outflow Tract Mean Velocity 0.75 cm/s    Left Ventricular Outflow Tract Mean Gradient 2.46 mmHg    LA size 4.18 cm    TAPSE 1.30 cm    RA Major Axis 4.08 cm    AV mean gradient 4 mmHg    AV peak gradient 9 mmHg    Ao peak mark 1.50 m/s    Ao VTI 34.40 cm    LVOT peak VTI 28.50 cm    AV valve area 1.95 cm²    AV Velocity Ratio 0.73     AV index (prosthetic) 0.83     NORA by Velocity Ratio 1.71 cm²    Mr max mark 4.72 m/s    MV mean gradient 3 mmHg    MV peak gradient 9 mmHg    MV stenosis pressure 1/2 time 95.20 ms    MV valve area p 1/2 method 2.31 cm2    MV valve area by continuity eq 1.33 cm2    MV VTI 50.2 cm    Triscuspid Valve Regurgitation Peak Gradient 70 mmHg    PV PEAK VELOCITY 0.62 m/s    PV peak gradient 2 mmHg    Ao root annulus 2.76 cm    IVC diameter 2.21 cm    Mean e' 0.05 m/s    ZLVIDS -1.12     ZLVIDD -0.49     RVDD 5.07 cm    AORTIC VALVE CUSP SEPERATION 2.05 cm   , EKG:   Results for orders placed or performed during the  hospital encounter of 01/02/22   EKG 12-lead    Collection Time: 01/02/22 11:37 AM    Narrative    Test Reason : R07.9,    Vent. Rate : 065 BPM     Atrial Rate : 000 BPM     P-R Int : 136 ms          QRS Dur : 118 ms      QT Int : 434 ms       P-R-T Axes : 058 009 081 degrees     QTc Int : 443 ms    Sinus rhythm  Q in V1/V2 may be due to lead placement error though septal infarct cannot  be excluded  Lateral T wave abnormality  may be due to myocardial ischemia  Abnormal ECG    Confirmed by Tab Gray MD ([id number]) on 1/2/2022 2:24:23 PM    Referred By: GIO   SELF           Confirmed By:Tab Gray MD    , and X-Ray: CXR: X-Ray Chest 1 View (CXR): No results found for this visit on 08/01/23.    Assessment and Plan:     * Acute blood loss anemia  - Hgb 5.9 on admit, received 2 units PRBCs, now Hgb 8.8  - Being followed by primary team and GI; EGD scheduled for today    Elevated troponin  - Patient seen and evaluated by Dr. Berger  - Troponinin 118, 112, 111; flat. Likely demand ischemia in setting of acute anemia and ESRD on dialysis  - However, with abnormal EKG and multiple risk factors (HTN, DM, HLD, former smoker, PAD, hx of cardiac arrest) Dr. Berger recommends diagnostic LHC to eval for high risk ischemia. Will plan to evaluate coronaries but hold off on intervention until GI workup complete and anemia stable.  - Procedure, risk, and benefits discussed in detail with patient that morning and again later with daughter at bedside (family via speaker phone). They verbalized understanding and agree with plan of care. Consent obtained by pt's daughter and placed on chart.  - EGD scheduled for today, will plan for LHC once EGD complete  - Further recommendations to follow    Abnormal EKG  - Patient seen and evaluated by Dr. Berger  - EKGs reviewed, both appear sinus rhythm with PACs and PVCs and widespread ST-T wave abnormality  - Telemetry reviewed, sinus rhythm with PACs/PVCs, no evidence of atrial  fibrillation  - No anticoagulation needed at this time    End stage renal disease  - On dialysis        VTE Risk Mitigation (From admission, onward)           Ordered     Reason for No Pharmacological VTE Prophylaxis  Once        Question:  Reasons:  Answer:  Physician Provided (leave comment)  Comment:  Starting patient on eliquis    08/02/23 0144     IP VTE HIGH RISK PATIENT  Once         08/02/23 0144     Place sequential compression device  Until discontinued         08/02/23 0144                    Thank you for your consult. I will follow-up with patient. Please contact us if you have any additional questions.    USHA Fajardo  Cardiology   [facility] Short Stay [facility]  Pt seen and examined, chart reviewed, essentially agree with findings above.  Discussed risks and benefits of diagnostic LHC with pts caregivers, elect to proceed.

## 2023-08-03 NOTE — PROGRESS NOTES
[facility] Short Stay Unit  Adult Nutrition  First Assessment Note         Reason for Assessment  Reason For Assessment: identified at risk by screening criteria   Nutrition Risk Screen: no indicators present     Patient seen for MST. RD spoke with patient to obtain weight history. Medical history reviewed. Patient is a weight loss of 8.9% x 1 year-no significant. NFPE performed. Patient currently does not meet ASPEN criteria for malnutrition.     Patient is 155#. He receives dialysis 3 x per week. His diet is currently clear liquids. Recommend advancing diet to reanl diet as medically appropriate. Recommend addition of Nepro BID.     Malnutrition  Is Patient Malnourished: No  Skin Integrity  Cornel Risk Assessment  Sensory Perception: 4-->no impairment  Moisture: 3-->occasionally moist  Activity: 3-->walks occasionally  Mobility: 3-->slightly limited  Nutrition: 4-->excellent  Friction and Shear: 3-->no apparent problem  Cornel Score: 20  Comments on skin integrity: no issues  Nutrition Diagnosis  Altered Nutrition Related Lab Values   related to Renal dysfunction as evidenced by ESRD with dialysis    Nutrition Diagnosis Status: Chronic/ continues      Nutrition Risk  Level of Risk/Frequency of Follow-up: moderate    Recent Labs   Lab 08/02/23  0441 08/02/23  0554 08/02/23  2350   GLU 99  --   --    POCGLU  --    < > 80    < > = values in this interval not displayed.     Comments on Glucose: WNL  Nutrition Prescription / Recommendations  Recommendation/Intervention: Recommend advance diet to renal high protein with Nepro BID as medically tolerated/able.  Goals: weight maintenance, intake %  Nutrition Goal Status: new  Current Diet Order: clear liquids  Chewing or Swallowing Difficulty?: No Chewing or swallowing difficulty  Recommended Diet: Renal (High Protein)  Recommended Oral Supplement: No Oral Supplements  Is Nutrition Support Recommended: No  Is Education Recommended: No  Monitor and  "Evaluation  % current Intake: Unknown/ No P.O. intake documented  % intake to meet estimated needs: 75 - 100 %  Food and Nutrient Intake: energy intake  Food and Nutrient Adminstration: diet order  Anthropometric Measurements: weight, weight change, body mass index  Biochemical Data, Medical Tests and Procedures: electrolyte and renal panel, gastrointestinal profile, glucose/endocrine profile, inflammatory profile, lipid profile  Nutrition-Focused Physical Findings: overall appearance, extremities, muscles and bones, head and eyes, skin     Current Medical Diagnosis and Past Medical History     Past Medical History:   Diagnosis Date    Chronic kidney disease (CKD)     Diabetes mellitus     Hypertension     PVD (peripheral vascular disease)      Nutrition/Diet History  Spiritual, Cultural Beliefs, Congregation Practices, Values that Affect Care: no  Factors Affecting Nutritional Intake: clear liquid diet  Lab/Procedures/Meds  Recent Labs   Lab 08/01/23  1235 08/02/23  0441    139   K 3.5 3.4*   BUN 19* 27*   CREATININE 4.35* 5.46*   CALCIUM 8.3* 8.3*   ALBUMIN 2.9*  --    CL 98 100   ALT 25  --    AST 22  --      Last A1c: No results found for: "HGBA1C"  Lab Results   Component Value Date    RBC 2.72 (L) 08/03/2023    HGB 8.3 (L) 08/03/2023    HCT 24.9 (L) 08/03/2023    MCV 91.5 08/03/2023    MCH 30.5 08/03/2023    MCHC 33.3 08/03/2023     Pertinent Labs Reviewed: reviewed  Pertinent Labs Comments: Sodium: 139  Potassium: 3.4 (L)  Chloride: 100  CO2: 32  Anion Gap: 10  BUN: 27 (H)  Creatinine: 5.46 (H)  BUN/CREAT RATIO: 5 (L)  eGFR: 10 (L)  Glucose: 99  Calcium: 8.3 (L)  Pertinent Medications Reviewed: reviewed  Pertinent Medications Comments: pantoprazole, lopressor, hydralazine  Anthropometrics  Temp: 98.2 °F (36.8 °C)  Height Method: Stated  Height: 5' 6" (167.6 cm)  Height (inches): 66 in  Weight Method: Bed Scale  Weight: 70.3 kg (155 lb)  Weight (lb): 155 lb  Ideal Body Weight (IBW), Male: 142 lb  % " Ideal Body Weight, Male (lb): 109.15 %  BMI (Calculated): 25  BMI Grade: 25 - 29.9 - overweight     Estimated/Assessed Needs      Temp: 98.2 °F (36.8 °C)Oral  Weight Used For Calorie Calculations: 70.3 kg (154 lb 15.7 oz)   Energy Need Method: Kcal/kg Energy Calorie Requirements (kcal): 4310-2708  Weight Used For Protein Calculations: 70.3 kg (154 lb 15.7 oz)  Protein Requirements:        RDA Method (mL): 1757     Nutrition by Nursing              Last Bowel Movement: 08/03/23              Nutrition Follow-Up  RD Follow-up?: Yes

## 2023-08-03 NOTE — PT/OT/SLP PROGRESS
Physical Therapy      Patient Name:  Joseph Mcdonald   MRN:  [id number]    Patient not seen today secondary to Off the floor for procedure/surgery (pt gone to cath lab). Will follow-up 8/4/23.

## 2023-08-03 NOTE — ASSESSMENT & PLAN NOTE
- Patient seen and evaluated by Dr. Berger  - Troponinin 118, 112, 111; flat. Likely demand ischemia in setting of acute anemia and ESRD on dialysis  - However, with abnormal EKG and multiple risk factors (HTN, DM, HLD, former smoker, PAD, hx of cardiac arrest) Dr. Berger recommends diagnostic LHC to eval for high risk ischemia. Will plan to evaluate coronaries but hold off on intervention until GI workup complete and anemia stable.  - Procedure, risk, and benefits discussed in detail with patient that morning and again later with daughter at bedside (family via speaker phone). They verbalized understanding and agree with plan of care. Consent obtained by pt's daughter and placed on chart.  - EGD scheduled for today, will plan for LHC once EGD complete  - Further recommendations to follow    8/4/2023:  - LHC yesterday revealed 2 vessel CAD; 75% ost-prox RCA & 75% mid LCx  - Recommend good medical therapy, may consider high risk angioplasty of ostial RCA once GI workup complete (colonoscopy planned as outpatient) if becomes symptomatic  - Will need to discharge home on asa, BB, Imdur, and statin  - Follow up with USHA Coleman in 2 weeks; Dr. Berger in 6 weeks

## 2023-08-03 NOTE — PROGRESS NOTES
[facility]  Nephrology  Progress Note    Patient Name: Joseph Mcdonald  MRN: [id number]  Admission Date: 8/1/2023  Hospital Length of Stay: 0 days  Attending Provider: Manas Sin Jr., MD   Primary Care Physician: [facility], [facility]  Principal Problem:Acute blood loss anemia    Consults  Subjective:     Interval History: Mr. Mcdonald is seen in f/u of his ESRD. Hct stable at 25. EGD w/o source of blood loss. He underwent cardiac cath today and is stable post procedure.    Review of patient's allergies indicates:  No Known Allergies  Current Facility-Administered Medications   Medication Frequency    0.45% NaCl infusion Continuous    0.9%  NaCl infusion (for blood administration) Q24H PRN    acetaminophen tablet 650 mg Q4H PRN    fentaNYL injection PRN    glucagon (human recombinant) injection 1 mg PRN    glucose chewable tablet 16 g PRN    glucose chewable tablet 24 g PRN    heparin infusion 1,000 units/500 ml in 0.9% NaCl (on sterile field) PRN    hydrALAZINE tablet 100 mg Q8H    insulin aspart U-100 injection 0-5 Units Q6H PRN    iopamidoL (ISOVUE-370) injection PRN    LIDOcaine (PF) 10 mg/ml (1%) injection PRN    metoprolol tartrate (LOPRESSOR) split tablet 12.5 mg BID    midazolam (VERSED) 1 mg/mL injection PRN    naloxone 0.4 mg/mL injection 0.02 mg PRN    ondansetron injection 4 mg Q8H PRN    pantoprazole EC tablet 40 mg Daily    sodium chloride 0.9% flush 10 mL Q12H PRN    trazodone split tablet 25 mg Nightly PRN       Objective:     Vital Signs (Most Recent):  Temp: 98.2 °F (36.8 °C) (08/03/23 0657)  Pulse: 64 (08/03/23 0657)  Resp: 16 (08/03/23 0657)  BP: (!) 159/41 (08/03/23 0657)  SpO2: (!) 94 % (08/03/23 0657) Vital Signs (24h Range):  Temp:  [97.1 °F (36.2 °C)-98.2 °F (36.8 °C)] 98.2 °F (36.8 °C)  Pulse:  [60-66] 64  Resp:  [12-23] 16  SpO2:  [93 %-100 %] 94 %  BP: ()/(14-93) 159/41     Weight: 70.3 kg (155 lb) (08/02/23 1452)  Body mass index is 25.02  kg/m².  Body surface area is 1.81 meters squared.    I/O last 3 completed shifts:  In: 687 [I.V.:250; Blood:387; IV Piggyback:50]  Out: 0     Physical Exam No edema. Chest clear. Alert    Significant Labs:sureBMP:   Recent Labs   Lab 08/01/23  1235 08/02/23  0441   * 99    139   K 3.5 3.4*   CL 98 100   CO2 34* 32   BUN 19* 27*   CREATININE 4.35* 5.46*   CALCIUM 8.3* 8.3*   MG 2.3  --      CBC:   Recent Labs   Lab 08/03/23  0304   WBC 7.53   RBC 2.72*   HGB 8.3*   HCT 24.9*   *   MCV 91.5   MCH 30.5   MCHC 33.3     All labs within the past 24 hours have been reviewed.    Significant Imaging:  Labs: Reviewed    Assessment/Plan:     Active Diagnoses:    Diagnosis Date Noted POA    PRINCIPAL PROBLEM:  Acute blood loss anemia [D62] 11/06/2014 Yes    Abnormal EKG [R94.31] 08/02/2023 Yes    Weight loss [R63.4] 08/02/2023 Yes    Elevated troponin [R77.8] 08/02/2023 Unknown    DM2 (diabetes mellitus, type 2) [E11.9] 11/18/2014 Yes    End stage renal disease [N18.6] 11/06/2014 Yes    Essential (primary) hypertension [I10] 11/06/2014 Yes      Problems Resolved During this Admission:       Dialysis today.   C scope as outpatient as per GI    Thank you for your consult. I will follow-up with patient. Please contact us if you have any additional questions.    Ovi Alvarado MD  Nephrology  [facility] Lab

## 2023-08-04 VITALS
SYSTOLIC BLOOD PRESSURE: 158 MMHG | HEIGHT: 66 IN | RESPIRATION RATE: 18 BRPM | DIASTOLIC BLOOD PRESSURE: 50 MMHG | TEMPERATURE: 98 F | WEIGHT: 155 LBS | OXYGEN SATURATION: 96 % | HEART RATE: 62 BPM | BODY MASS INDEX: 24.91 KG/M2

## 2023-08-04 PROBLEM — I25.10 CORONARY ARTERY DISEASE: Status: ACTIVE | Noted: 2023-08-04

## 2023-08-04 LAB
ABO + RH BLD: NORMAL
ANISOCYTOSIS BLD QL SMEAR: ABNORMAL
BASOPHILS # BLD AUTO: 0.06 K/UL (ref 0–0.2)
BASOPHILS NFR BLD AUTO: 0.6 % (ref 0–1)
BLD PROD TYP BPU: NORMAL
BLOOD UNIT EXPIRATION DATE: NORMAL
BLOOD UNIT TYPE CODE: 5100
CROSSMATCH INTERPRETATION: NORMAL
DIFFERENTIAL METHOD BLD: ABNORMAL
DISPENSE STATUS: NORMAL
EOSINOPHIL # BLD AUTO: 0.18 K/UL (ref 0–0.5)
EOSINOPHIL NFR BLD AUTO: 1.8 % (ref 1–4)
ERYTHROCYTE [DISTWIDTH] IN BLOOD BY AUTOMATED COUNT: 17.2 % (ref 11.5–14.5)
GLUCOSE SERPL-MCNC: 209 MG/DL (ref 70–105)
HCT VFR BLD AUTO: 23.4 % (ref 40–54)
HGB BLD-MCNC: 7.7 G/DL (ref 13.5–18)
IMM GRANULOCYTES # BLD AUTO: 0.03 K/UL (ref 0–0.04)
IMM GRANULOCYTES NFR BLD: 0.3 % (ref 0–0.4)
LYMPHOCYTES # BLD AUTO: 0.35 K/UL (ref 1–4.8)
LYMPHOCYTES NFR BLD AUTO: 3.5 % (ref 27–41)
MCH RBC QN AUTO: 30.4 PG (ref 27–31)
MCHC RBC AUTO-ENTMCNC: 32.9 G/DL (ref 32–36)
MCV RBC AUTO: 92.5 FL (ref 80–96)
MONOCYTES # BLD AUTO: 0.7 K/UL (ref 0–0.8)
MONOCYTES NFR BLD AUTO: 7 % (ref 2–6)
MPC BLD CALC-MCNC: 11.7 FL (ref 9.4–12.4)
NEUTROPHILS # BLD AUTO: 8.61 K/UL (ref 1.8–7.7)
NEUTROPHILS NFR BLD AUTO: 86.8 % (ref 53–65)
NRBC # BLD AUTO: 0 X10E3/UL
NRBC, AUTO (.00): 0 %
PLATELET # BLD AUTO: 122 K/UL (ref 150–400)
PLATELET MORPHOLOGY: ABNORMAL
POLYCHROMASIA BLD QL SMEAR: ABNORMAL
RBC # BLD AUTO: 2.53 M/UL (ref 4.6–6.2)
UNIT NUMBER: NORMAL
WBC # BLD AUTO: 9.93 K/UL (ref 4.5–11)

## 2023-08-04 PROCEDURE — 25000003 PHARM REV CODE 250

## 2023-08-04 PROCEDURE — 99233 SBSQ HOSP IP/OBS HIGH 50: CPT | Mod: ,,, | Performed by: NURSE PRACTITIONER

## 2023-08-04 PROCEDURE — 99233 PR SUBSEQUENT HOSPITAL CARE,LEVL III: ICD-10-PCS | Mod: ,,, | Performed by: NURSE PRACTITIONER

## 2023-08-04 PROCEDURE — 82962 GLUCOSE BLOOD TEST: CPT

## 2023-08-04 PROCEDURE — 25000003 PHARM REV CODE 250: Performed by: INTERNAL MEDICINE

## 2023-08-04 PROCEDURE — 99239 HOSP IP/OBS DSCHRG MGMT >30: CPT | Mod: ,,, | Performed by: INTERNAL MEDICINE

## 2023-08-04 PROCEDURE — 99239 PR HOSPITAL DISCHARGE DAY,>30 MIN: ICD-10-PCS | Mod: ,,, | Performed by: INTERNAL MEDICINE

## 2023-08-04 PROCEDURE — 25000003 PHARM REV CODE 250: Performed by: NURSE PRACTITIONER

## 2023-08-04 PROCEDURE — P9016 RBC LEUKOCYTES REDUCED: HCPCS | Performed by: INTERNAL MEDICINE

## 2023-08-04 PROCEDURE — 85025 COMPLETE CBC W/AUTO DIFF WBC: CPT | Performed by: INTERNAL MEDICINE

## 2023-08-04 PROCEDURE — 94761 N-INVAS EAR/PLS OXIMETRY MLT: CPT

## 2023-08-04 PROCEDURE — 36430 TRANSFUSION BLD/BLD COMPNT: CPT

## 2023-08-04 RX ORDER — ASPIRIN 81 MG/1
81 TABLET ORAL DAILY
Qty: 30 TABLET | Refills: 0 | Status: SHIPPED | OUTPATIENT
Start: 2023-08-05 | End: 2023-09-04

## 2023-08-04 RX ORDER — HYDROCODONE BITARTRATE AND ACETAMINOPHEN 500; 5 MG/1; MG/1
TABLET ORAL
Status: DISCONTINUED | OUTPATIENT
Start: 2023-08-04 | End: 2023-08-04 | Stop reason: HOSPADM

## 2023-08-04 RX ORDER — ISOSORBIDE MONONITRATE 30 MG/1
30 TABLET, EXTENDED RELEASE ORAL DAILY
Qty: 30 TABLET | Refills: 0 | Status: SHIPPED | OUTPATIENT
Start: 2023-08-05 | End: 2023-09-04

## 2023-08-04 RX ORDER — ATORVASTATIN CALCIUM 40 MG/1
40 TABLET, FILM COATED ORAL NIGHTLY
Qty: 30 TABLET | Refills: 0 | Status: SHIPPED | OUTPATIENT
Start: 2023-08-04 | End: 2023-09-03

## 2023-08-04 RX ORDER — METOPROLOL SUCCINATE 25 MG/1
25 TABLET, EXTENDED RELEASE ORAL DAILY
Qty: 30 TABLET | Refills: 0 | Status: SHIPPED | OUTPATIENT
Start: 2023-08-05 | End: 2023-09-04

## 2023-08-04 RX ORDER — PANTOPRAZOLE SODIUM 40 MG/1
40 TABLET, DELAYED RELEASE ORAL DAILY
Qty: 30 TABLET | Refills: 0 | Status: SHIPPED | OUTPATIENT
Start: 2023-08-05 | End: 2023-09-04

## 2023-08-04 RX ADMIN — SODIUM CHLORIDE: 9 INJECTION, SOLUTION INTRAVENOUS at 01:08

## 2023-08-04 RX ADMIN — PANTOPRAZOLE SODIUM 40 MG: 40 TABLET, DELAYED RELEASE ORAL at 09:08

## 2023-08-04 RX ADMIN — HYDRALAZINE HYDROCHLORIDE 100 MG: 100 TABLET, FILM COATED ORAL at 03:08

## 2023-08-04 RX ADMIN — ASPIRIN 81 MG: 81 TABLET, COATED ORAL at 09:08

## 2023-08-04 RX ADMIN — ISOSORBIDE MONONITRATE 30 MG: 30 TABLET, EXTENDED RELEASE ORAL at 09:08

## 2023-08-04 NOTE — ASSESSMENT & PLAN NOTE
Presented with H/H of 5.9/18.5  Normocytic Anemia with MCV of over 90. Patient on dialysis most likely gets a CBC on weekly basis and this seems acute. Bleeding has not been r/o completley. FOBT pending and will consult GI.   Patient received 2 units of pRBC  CBC pending currently and another one after that  Patient reports he has lost 50 lbs in the last year due to low appetite. Malignancies should be in considerations. Protein GAP was 3.4, WBC is normal, Platelets are ~150. FOBT pending.     8/2: FOBT pending.  GI consulted.      8/3 - H/H stable. EGD done yesterday, no evidence of bleeding. Colonoscopy recommended as outpatient if patient agrees. Considering.

## 2023-08-04 NOTE — PLAN OF CARE
Problem: Adult Inpatient Plan of Care  Goal: Plan of Care Review  Outcome: Ongoing, Progressing  Goal: Patient-Specific Goal (Individualized)  Outcome: Ongoing, Progressing  Goal: Absence of Hospital-Acquired Illness or Injury  Outcome: Ongoing, Progressing  Goal: Optimal Comfort and Wellbeing  Outcome: Ongoing, Progressing  Goal: Readiness for Transition of Care  Outcome: Ongoing, Progressing     Problem: Diabetes Comorbidity  Goal: Blood Glucose Level Within Targeted Range  Outcome: Ongoing, Progressing     Problem: Fall Injury Risk  Goal: Absence of Fall and Fall-Related Injury  Outcome: Ongoing, Progressing     Problem: Device-Related Complication Risk (Hemodialysis)  Goal: Safe, Effective Therapy Delivery  Outcome: Ongoing, Progressing     Problem: Hemodynamic Instability (Hemodialysis)  Goal: Effective Tissue Perfusion  Outcome: Ongoing, Progressing     Problem: Infection (Hemodialysis)  Goal: Absence of Infection Signs and Symptoms  Outcome: Ongoing, Progressing

## 2023-08-04 NOTE — ASSESSMENT & PLAN NOTE
Patient with A fib and rate of 67  Will have to contac [facility] and make sure they are aware of the situation. I do wonder when patient visited them 2 weeks ago for the very first time and was sent to get an Echo at [facility] a week ago was due to him being A fob  Continue equivalent dose of his beta blocker at home with lopressor 12.5 mg BID  Will hold off on cardiology consult and Echo for now since patient is currently under the care at [facility] and being worked up for heart disease.   Cardiac monitoring  Will hold of on eliquis 2.5 mg BID for now until Anemia due to bleeding is r/o.     8/2: cardiology consulted for new onset atrial fibrillation.   Irregular rhythm this morning more consistent with PVCs or other abnormality rather than atrial fibrillation.      8/3 - Abnormal EKG with elevated troponins. [facility] today. 2 vessel disease, further recs pending.

## 2023-08-04 NOTE — HOSPITAL COURSE
For a more detailed hospital course see IP notes briefly, pt was a/w anemia 2/2 gi bleeding and nstemi. Pt underwent egd and LHC, he has severe CAD for which cardiology recommended medical mx. Per GI cont present mx, pt not wanting c scope. H/h has been stable in the last 24 hours.   Pt is to see cardiology , gi and pcp in clinic. Pt is HDS and wants to go home today.    Dispo-home  Condition-[facility]

## 2023-08-04 NOTE — NURSING
Sn reviewed discharge instructions with pt and son in law. Pt and cg aware of follow up appts and medications to take and to follow dc med sheet.  [facility] has delivered medications to \pt and they are in hand. Iv dcd with cath tip intact and dressing applied,  pt taken to pov with son in law as  per wc. Nad observed.

## 2023-08-04 NOTE — PROGRESS NOTES
[facility] - Short Stay [facility] Medicine  Progress Note    Patient Name: Joseph Mcdonald  MRN: [id number]  Patient Class: OP- Outpatient Recovery   Admission Date: 8/1/2023  Length of Stay: 0 days  Attending Physician: Manas Sin Jr., MD  Primary Care Provider: Clinic, [facility]        Subjective:     Principal Problem:Acute blood loss anemia        HPI:  88 year old male with PMHX ESRD on dialysis on T/TH/Sat came to the ED due to abnormal labs. Patient went to dialysis this morning and had a CBC done that resulted Hgb of 5.9 after he left the dialysis. dialysis called the daughter and asked her to bring him into the ED. At Ed he received 2 units of pRBCs and found to be A fib. He is not on anticoagulation and reported to the ED doctor he has never had any heart disease or A fib. He has no complaints, feel at his baseline, denies palpitation, chest pain, fever chills, headaches, numbness or weakness, black stool or blood in stool, denies any issues with urine. He reports he has lost 50 lbs in the last year and has a very low appetite. Patient has had colonoscopies and EGDs in the past but does not remember the date.     Patient has ESRD on dialysis managed by Dr. Herman. Patient reports he is diabetic but is not on any medication for diabetes. 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     Upon questioning the patient and the daughter and explaining what are plans are including getting an Echo, patient all the sudden said he did an Echo last week at [facility]. Then he was asked again if he has ever seen a heart doctor for the second time and he said he has seen Dr. Rivers 2 weeks ago at [facility]. He said his PCP, Dr Genao, sent him to [facility] for some heart issues that he has no idea what it is.     Patient will be placed on observation until more information is gathered regarding his heart disease.             Overview/Hospital  Course:  No notes on file    Interval History: LHC done today. 80% ostial RCA and 75% Lcx.  Medical management recommended with close f/u . Could require high risk intervention to RCA.      No chest pain.     Review of Systems  Objective:     Vital Signs (Most Recent):  Temp: 97.4 °F (36.3 °C) (08/03/23 1946)  Pulse: 77 (08/03/23 1946)  Resp: 18 (08/03/23 1946)  BP: (!) 111/43 (08/03/23 1946)  SpO2: 95 % (08/03/23 1946) Vital Signs (24h Range):  Temp:  [97.1 °F (36.2 °C)-98.2 °F (36.8 °C)] 97.4 °F (36.3 °C)  Pulse:  [62-77] 77  Resp:  [16-19] 18  SpO2:  [94 %-97 %] 95 %  BP: (111-188)/(26-93) 111/43     Weight: 70.3 kg (155 lb)  Body mass index is 25.02 kg/m².    Intake/Output Summary (Last 24 hours) at 8/3/2023 2022  Last data filed at 8/3/2023 1705  Gross per 24 hour   Intake 0 ml   Output 2500 ml   Net -2500 ml         Physical Exam  Vitals and nursing note reviewed.   Constitutional:       General: He is not in acute distress.     Appearance: Normal appearance. He is normal weight. He is not ill-appearing, toxic-appearing or diaphoretic.   HENT:      Head: Normocephalic and atraumatic.      Right Ear: External ear normal.      Left Ear: External ear normal.      Nose: Nose normal. No congestion or rhinorrhea.      Mouth/Throat:      Mouth: Mucous membranes are moist.   Eyes:      Conjunctiva/sclera: Conjunctivae normal.   Neck:      Vascular: No carotid bruit.   Cardiovascular:      Rate and Rhythm: Normal rate and regular rhythm.      Pulses: Normal pulses.      Heart sounds: Normal heart sounds. No murmur heard.     No friction rub.   Pulmonary:      Effort: Pulmonary effort is normal. No respiratory distress.      Breath sounds: Normal breath sounds. No wheezing or rales.   Abdominal:      General: Abdomen is flat. Bowel sounds are normal. There is no distension.      Palpations: Abdomen is soft.      Tenderness: There is no abdominal tenderness. There is no guarding.      Hernia: No hernia is present.    Musculoskeletal:         General: No swelling or tenderness. Normal range of motion.      Cervical back: Neck supple. No tenderness.      Right lower leg: No edema.      Left lower leg: No edema.   Skin:     General: Skin is warm.      Coloration: Skin is not jaundiced.      Findings: No bruising or lesion.   Neurological:      Mental Status: He is alert and oriented to person, place, and time.   Psychiatric:         Behavior: Behavior normal.             Significant Labs: All pertinent labs within the past 24 hours have been reviewed.  BMP:   Recent Labs   Lab 08/02/23  0441   GLU 99      K 3.4*      CO2 32   BUN 27*   CREATININE 5.46*   CALCIUM 8.3*     CBC:   Recent Labs   Lab 08/01/23  2321 08/02/23  0441 08/02/23  1314 08/03/23  0304   WBC 7.60 6.89  --  7.53   HGB 8.6* 7.7* 8.8* 8.3*   HCT 25.7* 23.1* 26.8* 24.9*    138*  --  141*       Significant Imaging: I have reviewed all pertinent imaging results/findings within the past 24 hours.      Assessment/Plan:      * Acute blood loss anemia    Presented with H/H of 5.9/18.5  Normocytic Anemia with MCV of over 90. Patient on dialysis most likely gets a CBC on weekly basis and this seems acute. Bleeding has not been r/o completley. FOBT pending and will consult GI.   Patient received 2 units of pRBC  CBC pending currently and another one after that  Patient reports he has lost 50 lbs in the last year due to low appetite. Malignancies should be in considerations. Protein GAP was 3.4, WBC is normal, Platelets are ~150. FOBT pending.     8/2: FOBT pending.  GI consulted.      8/3 - H/H stable. EGD done yesterday, no evidence of bleeding. Colonoscopy recommended as outpatient if patient agrees. Considering.     Abnormal EKG  Patient with A fib and rate of 67  Will have to contac [facility] and make sure they are aware of the situation. I do wonder when patient visited them 2 weeks ago for the very first time and was sent to get an Echo at [place] a week  "ago was due to him being A fob  Continue equivalent dose of his beta blocker at home with lopressor 12.5 mg BID  Will hold off on cardiology consult and Echo for now since patient is currently under the care at [facility] and being worked up for heart disease.   Cardiac monitoring  Will hold of on eliquis 2.5 mg BID for now until Anemia due to bleeding is r/o.     8/2: cardiology consulted for new onset atrial fibrillation.   Irregular rhythm this morning more consistent with PVCs or other abnormality rather than atrial fibrillation.      8/3 - Abnormal EKG with elevated troponins. [facility] today. 2 vessel disease, further recs pending.     End stage renal disease    Received dialysis this morning  He gets dialysis T/TH/ Sat  Patient is currently on observation and might be discharged tomorrow. If not discharged nephrology needs to be consulted to proceed with dialysis on Thursday.     8/2: nephrology consulted.      8/3 - T/TH/Sat    DM2 (diabetes mellitus, type 2)  Patient's FSGs are controlled on current medication regimen.  Last A1c reviewed- No results found for: "LABA1C", "HGBA1C"- A1C is pending   Most recent fingerstick glucose reviewed- No results for input(s): "POCTGLUCOSE" in the last 24 hours.  Current correctional scale  Medium  Maintain anti-hyperglycemic dose as follows-   Antihyperglycemics (From admission, onward)    Start     Stop Route Frequency Ordered    08/02/23 0624  insulin aspart U-100 injection 0-5 Units         -- SubQ Every 6 hours PRN 08/02/23 0524        Hold Oral hypoglycemics while patient is in the hospital.    F/u A1c.      Weight loss  Nutrition consulted. Most recent weight and BMI monitored-     Measurements:  Wt Readings from Last 1 Encounters:   08/02/23 70.4 kg (155 lb 2.6 oz)   Body mass index is 25.04 kg/m².    Patient has been screened and assessed by RD.    Malnutrition Type:  Context:    Level:      Malnutrition Characteristic Summary:   Dietary " consult.    Interventions/Recommendations (treatment strategy):   f/u with nutrition/dietary.     Age appropriate malignancy screening with PCP.      Essential (primary) hypertension    Continue home meds which includes a beta blocker and Hydaralazine 100 mg TID      VTE Risk Mitigation (From admission, onward)         Ordered     Reason for No Pharmacological VTE Prophylaxis  Once        Question:  Reasons:  Answer:  Physician Provided (leave comment)  Comment:  Starting patient on eliquis    08/02/23 0144     IP VTE HIGH RISK PATIENT  Once         08/02/23 0144     Place sequential compression device  Until discontinued         08/02/23 0144                Discharge Planning   BRIAN: 8/3/2023     Code Status: Full Code   Is the patient medically ready for discharge?:     Reason for patient still in hospital (select all that apply): Treatment  Discharge Plan A: Home                  Manas Sin Jr, MD  Department of Hospital Medicine   [facility] - Short Stay Unit

## 2023-08-04 NOTE — PROGRESS NOTES
[facility] - Short Stay Unit  Nephrology  Progress Note    Patient Name: Joseph Mcdonald  MRN: [id number]  Admission Date: 8/1/2023  Hospital Length of Stay: 0 days  Attending Provider: Caitlin Gaspar MD   Primary Care Physician: Prakash, [facility]  Principal Problem:Acute blood loss anemia    Consults  Subjective:     Interval History: Mr. Mcdonald is seen in f/u of his ESRD and acute anemia. He's received two units prbc's since admit and hgb is 7.7. He feels well but is not moving about much in his room.    Review of patient's allergies indicates:  No Known Allergies  Current Facility-Administered Medications   Medication Frequency    0.9%  NaCl infusion (for blood administration) Q24H PRN    0.9%  NaCl infusion (for blood administration) Q24H PRN    0.9%  NaCl infusion PRN    acetaminophen tablet 650 mg Q4H PRN    aspirin EC tablet 81 mg Daily    atorvastatin tablet 40 mg QHS    glucagon (human recombinant) injection 1 mg PRN    glucose chewable tablet 16 g PRN    glucose chewable tablet 24 g PRN    hydrALAZINE tablet 100 mg Q8H    insulin aspart U-100 injection 0-5 Units Q6H PRN    isosorbide mononitrate 24 hr tablet 30 mg Daily    metoprolol succinate (TOPROL-XL) 24 hr tablet 25 mg Daily    naloxone 0.4 mg/mL injection 0.02 mg PRN    ondansetron injection 4 mg Q8H PRN    pantoprazole EC tablet 40 mg Daily    sodium chloride 0.9% flush 10 mL Q12H PRN    trazodone split tablet 25 mg Nightly PRN       Objective:     Vital Signs (Most Recent):  Temp: 98.2 °F (36.8 °C) (08/04/23 0709)  Pulse: 60 (08/04/23 0709)  Resp: 16 (08/04/23 0709)  BP: (!) 157/45 (08/04/23 0709)  SpO2: 96 % (08/04/23 0709) Vital Signs (24h Range):  Temp:  [97.4 °F (36.3 °C)-98.3 °F (36.8 °C)] 98.2 °F (36.8 °C)  Pulse:  [60-77] 60  Resp:  [16-18] 16  SpO2:  [92 %-97 %] 96 %  BP: (111-188)/(20-56) 157/45     Weight: 70.3 kg (155 lb) (08/02/23 1452)  Body mass index is 25.02 kg/m².  Body surface area is 1.81 meters  squared.    I/O last 3 completed shifts:  In: 0   Out: 2500 [Other:2500]    Physical Exam No edema. Chest clear. Stable bp    Significant Labs:sureCBC:   Recent Labs   Lab 08/04/23  0256   WBC 9.93   RBC 2.53*   HGB 7.7*   HCT 23.4*   *   MCV 92.5   MCH 30.4   MCHC 32.9     All labs within the past 24 hours have been reviewed.    Significant Imaging:  Labs: Reviewed    Assessment/Plan:     Active Diagnoses:    Diagnosis Date Noted POA    PRINCIPAL PROBLEM:  Acute blood loss anemia [D62] 11/06/2014 Yes    Abnormal EKG [R94.31] 08/02/2023 Yes    Weight loss [R63.4] 08/02/2023 Yes    Elevated troponin [R77.8] 08/02/2023 Unknown    DM2 (diabetes mellitus, type 2) [E11.9] 11/18/2014 Yes    End stage renal disease [N18.6] 11/06/2014 Yes    Essential (primary) hypertension [I10] 11/06/2014 Yes      Problems Resolved During this Admission:       Have ordered one unit prbc's to be given prior to planned discharge. We'll follow in the outpatient dialysis unit and have encouraged him to proceed with colonoscopy as an outpatient.    Thank you for your consult. I will follow-up with patient. Please contact us if you have any additional questions.    Ovi Alvarado MD  Nephrology  [facility] - Short Stay Unit

## 2023-08-04 NOTE — DISCHARGE SUMMARY
[facility] - Short Stay Unit  Hospital Medicine  Discharge Summary      Patient Name: Joseph Mcdonald  MRN: [id number]  LUKE: [id number]  Patient Class: OP- Outpatient Recovery  Admission Date: 8/1/2023  Hospital Length of Stay: 0 days  Discharge Date and Time:  08/04/2023 12:20 PM  Attending Physician: Caitlin Gaspar MD   Discharging Provider: Caitlin Gaspar MD  Primary Care Provider: Clinic, [facility]    Primary Care Team: Networked reference to record PCT     HPI:   88 year old male with PMHX ESRD on dialysis on T/TH/Sat came to the ED due to abnormal labs. Patient went to dialysis this morning and had a CBC done that resulted Hgb of 5.9 after he left the dialysis. dialysis called the daughter and asked her to bring him into the ED. At Ed he received 2 units of pRBCs and found to be A fib. He is not on anticoagulation and reported to the ED doctor he has never had any heart disease or A fib. He has no complaints, feel at his baseline, denies palpitation, chest pain, fever chills, headaches, numbness or weakness, black stool or blood in stool, denies any issues with urine. He reports he has lost 50 lbs in the last year and has a very low appetite. Patient has had colonoscopies and EGDs in the past but does not remember the date.     Patient has ESRD on dialysis managed by Dr. Herman. Patient reports he is diabetic but is not on any medication for diabetes. 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     Upon questioning the patient and the daughter and explaining what are plans are including getting an Echo, patient all the sudden said he did an Echo last week at [facility]. Then he was asked again if he has ever seen a heart doctor for the second time and he said he has seen Dr. Rivers 2 weeks ago at [facility]. He said his PCP, Dr Genao, sent him to [facility] for some heart issues that he has no idea what it is.     Patient will be placed on  observation until more information is gathered regarding his heart disease.             Procedure(s) (LRB):  Left heart cath (N/A)      Hospital Course:   For a more detailed hospital course see IP notes briefly, pt was a/w anemia 2/2 gi bleeding and nstemi. Pt underwent egd and LHC, he has severe CAD for which cardiology recommended medical mx. Per GI cont present mx, pt not wanting c scope. H/h has been stable in the last 24 hours.   Pt is to see cardiology , gi and pcp in clinic. Pt is HDS and wants to go home today.    Dispo-home  Condition-stalbe       Goals of Care Treatment Preferences:  Code Status: Full Code      Consults:   Consults (From admission, onward)        Status Ordering Provider     Inpatient consult to Social Work  Once        Provider:  (Not yet assigned)    Ordered CHELITA, REHMAT U     Inpatient consult to Social Work  Once        Provider:  (Not yet assigned)    Acknowledged CHELITA, REHMAT U     Inpatient consult to Cardiology  Once        Provider:  Benito Berger DO    Completed TELLY HUTN     Inpatient consult to Nephrology  Once        Provider:  Ovi Alvarado MD    Acknowledged ASHLEY SIMS     Inpatient consult to Gastroenterology  Once        Provider:  (Not yet assigned)    Completed HERBERT TINSLEY          No new Assessment & Plan notes have been filed under this hospital service since the last note was generated.  Service: Hospital Medicine    Final Active Diagnoses:    Diagnosis Date Noted POA    PRINCIPAL PROBLEM:  Acute blood loss anemia [D62] 11/06/2014 Yes    Abnormal EKG [R94.31] 08/02/2023 Yes    Weight loss [R63.4] 08/02/2023 Yes    Elevated troponin [R77.8] 08/02/2023 Unknown    DM2 (diabetes mellitus, type 2) [E11.9] 11/18/2014 Yes    End stage renal disease [N18.6] 11/06/2014 Yes    Essential (primary) hypertension [I10] 11/06/2014 Yes      Problems Resolved During this Admission:       Discharged Condition: stable    Disposition: Home or Self  Care    Follow Up:   Follow-up Information     Shana Tang FNP Follow up in 2 week(s).    Specialties: Family Medicine, Cardiology  Why: Hospital discharge; acute anemia, CAD  Contact information:  [address] [place] [address] [facility] [place] [address]  [phone]             Benito Berger DO Follow up in 8 week(s).    Specialties: Interventional Cardiology, Cardiology  Contact information:  [address] [place] [address]  [phone]             [facility] Follow up in 1 week(s).    Contact information:  [address] [place] [address]  [phone]             Enrrique Ac MD Follow up in 2 week(s).    Specialty: Gastroenterology  Contact information:  [address] [place] [address]  [phone]                       Patient Instructions:      CBC auto differential   Standing Status: Future Standing Exp. Date: 10/02/24     Ambulatory referral/consult to Gastroenterology   Standing Status: Future   Referral Priority: Routine Referral Type: Consultation   Referral Reason: Specialty Services Required   Requested Specialty: Gastroenterology   Number of Visits Requested: 1     Diet Cardiac     Notify your health care provider if you experience any of the following:  temperature >100.4     Notify your health care provider if you experience any of the following:  persistent nausea and vomiting or diarrhea     Notify your health care provider if you experience any of the following:  severe uncontrolled pain     Notify your health care provider if you experience any of the following:  difficulty breathing or increased cough     Notify your health care provider if you experience any of the following:  severe persistent headache     Notify your health care provider if you experience any of the following:  worsening rash     Notify your health care provider if you experience any of the following:  persistent dizziness, light-headedness, or visual  disturbances     Notify your health care provider if you experience any of the following:  increased confusion or weakness     Activity as tolerated       Significant Diagnostic Studies: N/A    Pending Diagnostic Studies:     Procedure Component Value Units Date/Time    EXTRA TUBES [[id number]] Collected: 08/01/23 1421    Order Status: Sent Lab Status: In process Updated: 08/01/23 1421    Specimen: Blood, Venous     Narrative:      The following orders were created for panel order EXTRA TUBES.  Procedure                               Abnormality         Status                     ---------                               -----------         ------                     Gold Top Hold[[id number]]                                    In process                   Please view results for these tests on the individual orders.    Echo [[id number]]  (Abnormal) Resulted: 08/02/23 1013    Order Status: Sent Lab Status: In process Updated: 08/02/23 1014     BSA 1.81 m2      LVOT stroke volume 66.96 cm3      LVIDd 4.74 cm      LV Systolic Volume 26.21 mL      LV Systolic Volume Index 14.6 mL/m2      LVIDs 2.67 cm      LV Diastolic Volume 104.16 mL      LV Diastolic Volume Index 57.87 mL/m2      IVS 1.70 cm      LVOT diameter 1.73 cm      LVOT area 2.3 cm2      FS 44 %      Left Ventricle Relative Wall Thickness 0.70 cm      Posterior Wall 1.66 cm      LV mass 354.15 g      LV Mass Index 197 g/m2      MV Peak E Mark 1.31 m/s      TDI LATERAL 0.06 m/s      TDI SEPTAL 0.04 m/s      E/E' ratio 26.20 m/s      MV Peak A Mark 0.64 m/s      TR Max Mark 4.19 m/s      E/A ratio 2.05     E wave deceleration time 259.41 msec      LV SEPTAL E/E' RATIO 32.75 m/s      LV LATERAL E/E' RATIO 21.83 m/s      LVOT peak mark 1.09 m/s      Left Ventricular Outflow Tract Mean Velocity 0.75 cm/s      Left Ventricular Outflow Tract Mean Gradient 2.46 mmHg      LA size 4.18 cm      TAPSE 1.30 cm      RA Major Axis 4.08 cm      AV mean gradient 4 mmHg      AV peak  gradient 9 mmHg      Ao peak moni 1.50 m/s      Ao VTI 34.40 cm      LVOT peak VTI 28.50 cm      AV valve area 1.95 cm²      AV Velocity Ratio 0.73     AV index (prosthetic) 0.83     NORA by Velocity Ratio 1.71 cm²      Mr max moni 4.72 m/s      MV mean gradient 3 mmHg      MV peak gradient 9 mmHg      MV stenosis pressure 1/2 time 95.20 ms      MV valve area p 1/2 method 2.31 cm2      MV valve area by continuity eq 1.33 cm2      MV VTI 50.2 cm      Triscuspid Valve Regurgitation Peak Gradient 70 mmHg      PV PEAK VELOCITY 0.62 m/s      PV peak gradient 2 mmHg      Ao root annulus 2.76 cm      IVC diameter 2.21 cm      Mean e' 0.05 m/s      ZLVIDS -1.12     ZLVIDD -0.49     RVDD 5.07 cm      AORTIC VALVE CUSP SEPERATION 2.05 cm     Occult blood x 1, stool [[id number]]     Order Status: Sent Lab Status: No result     Specimen: Stool          Medications:  Reconciled Home Medications:      Medication List      START taking these medications    aspirin 81 MG EC tablet  Commonly known as: ECOTRIN  Take 1 tablet (81 mg total) by mouth once daily.  Start taking on: August 5, 2023     atorvastatin 40 MG tablet  Commonly known as: LIPITOR  Take 1 tablet (40 mg total) by mouth every evening.     isosorbide mononitrate 30 MG 24 hr tablet  Commonly known as: IMDUR  Take 1 tablet (30 mg total) by mouth once daily.  Start taking on: August 5, 2023     metoprolol succinate 25 MG 24 hr tablet  Commonly known as: TOPROL-XL  Take 1 tablet (25 mg total) by mouth once daily.  Start taking on: August 5, 2023     pantoprazole 40 MG tablet  Commonly known as: PROTONIX  Take 1 tablet (40 mg total) by mouth once daily.  Start taking on: August 5, 2023        CONTINUE taking these medications    hydrALAZINE 100 MG tablet  Commonly known as: APRESOLINE  Take 1 tablet by mouth 3 (three) times daily with meals.     MIRCERA INJ  50 mcg.     RENVELA 800 mg Tab  Generic drug: sevelamer carbonate  Take 3 tablets by mouth.        STOP taking these  medications    nebivoloL 2.5 MG Tab  Commonly known as: BYSTOLIC     ondansetron 4 MG tablet  Commonly known as: ZOFRAN            Indwelling Lines/Drains at time of discharge:   Lines/Drains/Airways     Drain  Duration                Hemodialysis AV Fistula Right upper arm -- days                Time spent on the discharge of patient: >30 minutes         Rehmat SUKHJINDER Gaspar MD  Department of Hospital Medicine  [facility] - Short Stay Unit

## 2023-08-04 NOTE — PT/OT/SLP PROGRESS
Physical Therapy      Patient Name:  Joseph Mcdonald   MRN:  [id number]    Patient not seen today secondary to Blood transfusion (pt to d/c home after transfusion). Will follow-up if pt remains in hospital.

## 2023-08-04 NOTE — ASSESSMENT & PLAN NOTE
Received dialysis this morning  He gets dialysis T/TH/ Sat  Patient is currently on observation and might be discharged tomorrow. If not discharged nephrology needs to be consulted to proceed with dialysis on Thursday.     8/2: nephrology consulted.      8/3 - T/TH/Sat

## 2023-08-04 NOTE — PLAN OF CARE
[facility] - Short Stay Unit  Discharge Final Note    Primary Care Provider: Clinic, [facility]    Expected Discharge Date: 8/4/2023    Final Discharge Note (most recent)       Final Note - 08/04/23 1435          Final Note    Assessment Type Final Discharge Note     Anticipated Discharge Disposition Home-Health Care Svc        Post-Acute Status    Post-Acute Authorization Home Health     Home Health Status Set-up Complete/Auth obtained     Patient choice form signed by patient/caregiver List with quality metrics by geographic area provided;List from CMS Compare;List from System Post-Acute Care     Discharge Delays None known at this time                   SS received consult for home health. SS spoke with patient's daughter, Yany. Choice obtained for [facility]. Referral faxed at this time. Patient discharging home today. No further discharge needs at this time.         Follow-up providers       Shana Tang FNP   Specialty: Family Medicine, Cardiology    [address] [place] [address] [facility]  [place] [address]   Phone: [phone]       Next Steps: Follow up in 2 week(s)    Instructions: Hospital discharge; acute anemia, CAD    Benito Berger DO   Specialty: Interventional Cardiology, Cardiology    [address] [place] [address]   Phone: [phone]       Next Steps: Follow up in 8 week(s)    [facility]   Relationship: PCP - General    [address] [place] [address]   Phone: [phone]       Next Steps: Follow up in 1 week(s)    Enrrique Ac MD   Specialty: Gastroenterology    [address] [place] [id number]   Phone: [phone]       Next Steps: Follow up in 2 week(s)              After-discharge care                Home Medical Care       [facility]   Service: Home Health Services    [address]  [address]  [place] [id number]   Phone: [phone]

## 2023-08-04 NOTE — PT/OT/SLP PROGRESS
Occupational Therapy      Patient Name:  Joseph Mcdonald   MRN:  [id number]    Patient not seen today secondary to Blood transfusion. Will follow-up on next treatment day.    8/4/2023

## 2023-08-04 NOTE — SUBJECTIVE & OBJECTIVE
Interval History: LHC done today. 80% ostial RCA and 75% Lcx.  Medical management recommended with close f/u . Could require high risk intervention to RCA.      No chest pain.     Review of Systems  Objective:     Vital Signs (Most Recent):  Temp: 97.4 °F (36.3 °C) (08/03/23 1946)  Pulse: 77 (08/03/23 1946)  Resp: 18 (08/03/23 1946)  BP: (!) 111/43 (08/03/23 1946)  SpO2: 95 % (08/03/23 1946) Vital Signs (24h Range):  Temp:  [97.1 °F (36.2 °C)-98.2 °F (36.8 °C)] 97.4 °F (36.3 °C)  Pulse:  [62-77] 77  Resp:  [16-19] 18  SpO2:  [94 %-97 %] 95 %  BP: (111-188)/(26-93) 111/43     Weight: 70.3 kg (155 lb)  Body mass index is 25.02 kg/m².    Intake/Output Summary (Last 24 hours) at 8/3/2023 2022  Last data filed at 8/3/2023 1705  Gross per 24 hour   Intake 0 ml   Output 2500 ml   Net -2500 ml         Physical Exam  Vitals and nursing note reviewed.   Constitutional:       General: He is not in acute distress.     Appearance: Normal appearance. He is normal weight. He is not ill-appearing, toxic-appearing or diaphoretic.   HENT:      Head: Normocephalic and atraumatic.      Right Ear: External ear normal.      Left Ear: External ear normal.      Nose: Nose normal. No congestion or rhinorrhea.      Mouth/Throat:      Mouth: Mucous membranes are moist.   Eyes:      Conjunctiva/sclera: Conjunctivae normal.   Neck:      Vascular: No carotid bruit.   Cardiovascular:      Rate and Rhythm: Normal rate and regular rhythm.      Pulses: Normal pulses.      Heart sounds: Normal heart sounds. No murmur heard.     No friction rub.   Pulmonary:      Effort: Pulmonary effort is normal. No respiratory distress.      Breath sounds: Normal breath sounds. No wheezing or rales.   Abdominal:      General: Abdomen is flat. Bowel sounds are normal. There is no distension.      Palpations: Abdomen is soft.      Tenderness: There is no abdominal tenderness. There is no guarding.      Hernia: No hernia is present.   Musculoskeletal:         General:  No swelling or tenderness. Normal range of motion.      Cervical back: Neck supple. No tenderness.      Right lower leg: No edema.      Left lower leg: No edema.   Skin:     General: Skin is warm.      Coloration: Skin is not jaundiced.      Findings: No bruising or lesion.   Neurological:      Mental Status: He is alert and oriented to person, place, and time.   Psychiatric:         Behavior: Behavior normal.             Significant Labs: All pertinent labs within the past 24 hours have been reviewed.  BMP:   Recent Labs   Lab 08/02/23  0441   GLU 99      K 3.4*      CO2 32   BUN 27*   CREATININE 5.46*   CALCIUM 8.3*     CBC:   Recent Labs   Lab 08/01/23  2321 08/02/23  0441 08/02/23  1314 08/03/23  0304   WBC 7.60 6.89  --  7.53   HGB 8.6* 7.7* 8.8* 8.3*   HCT 25.7* 23.1* 26.8* 24.9*    138*  --  141*       Significant Imaging: I have reviewed all pertinent imaging results/findings within the past 24 hours.

## 2023-08-05 LAB
AORTIC ROOT ANNULUS: 2.76 CM
AORTIC VALVE CUSP SEPERATION: 2.05 CM
AV INDEX (PROSTH): 0.83
AV MEAN GRADIENT: 4 MMHG
AV PEAK GRADIENT: 9 MMHG
AV VALVE AREA BY VELOCITY RATIO: 1.71 CM²
AV VALVE AREA: 1.95 CM²
AV VELOCITY RATIO: 0.73
BSA FOR ECHO PROCEDURE: 1.81 M2
CV ECHO LV RWT: 0.7 CM
DOP CALC AO PEAK VEL: 1.5 M/S
DOP CALC AO VTI: 34.4 CM
DOP CALC LVOT AREA: 2.3 CM2
DOP CALC LVOT DIAMETER: 1.73 CM
DOP CALC LVOT PEAK VEL: 1.09 M/S
DOP CALC LVOT STROKE VOLUME: 66.96 CM3
DOP CALC MV VTI: 50.2 CM
DOP CALCLVOT PEAK VEL VTI: 28.5 CM
E WAVE DECELERATION TIME: 259.41 MSEC
E/A RATIO: 2.05
E/E' RATIO: 26.2 M/S
ECHO LV POSTERIOR WALL: 1.66 CM (ref 0.6–1.1)
FRACTIONAL SHORTENING: 44 % (ref 28–44)
INTERVENTRICULAR SEPTUM: 1.7 CM (ref 0.6–1.1)
IVC DIAMETER: 2.21 CM
LEFT ATRIUM SIZE: 4.18 CM
LEFT INTERNAL DIMENSION IN SYSTOLE: 2.67 CM (ref 2.1–4)
LEFT VENTRICLE DIASTOLIC VOLUME INDEX: 57.87 ML/M2
LEFT VENTRICLE DIASTOLIC VOLUME: 104.16 ML
LEFT VENTRICLE MASS INDEX: 197 G/M2
LEFT VENTRICLE SYSTOLIC VOLUME INDEX: 14.6 ML/M2
LEFT VENTRICLE SYSTOLIC VOLUME: 26.21 ML
LEFT VENTRICULAR INTERNAL DIMENSION IN DIASTOLE: 4.74 CM (ref 3.5–6)
LEFT VENTRICULAR MASS: 354.15 G
LV LATERAL E/E' RATIO: 21.83 M/S
LV SEPTAL E/E' RATIO: 32.75 M/S
LVOT MG: 2.46 MMHG
LVOT MV: 0.75 CM/S
MV MEAN GRADIENT: 3 MMHG
MV PEAK A VEL: 0.64 M/S
MV PEAK E VEL: 1.31 M/S
MV PEAK GRADIENT: 9 MMHG
MV STENOSIS PRESSURE HALF TIME: 95.2 MS
MV VALVE AREA BY CONTINUITY EQUATION: 1.33 CM2
MV VALVE AREA P 1/2 METHOD: 2.31 CM2
PISA MRMAX VEL: 4.72 M/S
PISA TR MAX VEL: 4.19 M/S
PV PEAK GRADIENT: 2 MMHG
PV PEAK VELOCITY: 0.62 M/S
RA MAJOR: 4.08 CM
RIGHT VENTRICULAR END-DIASTOLIC DIMENSION: 5.07 CM
TDI LATERAL: 0.06 M/S
TDI SEPTAL: 0.04 M/S
TDI: 0.05 M/S
TR MAX PG: 70 MMHG
TRICUSPID ANNULAR PLANE SYSTOLIC EXCURSION: 1.3 CM
Z-SCORE OF LEFT VENTRICULAR DIMENSION IN END DIASTOLE: -0.49
Z-SCORE OF LEFT VENTRICULAR DIMENSION IN END SYSTOLE: -1.12

## 2023-08-10 LAB — GLUCOSE SERPL-MCNC: 95 MG/DL (ref 70–105)

## 2024-01-01 ENCOUNTER — HOSPITAL ENCOUNTER (EMERGENCY)
Facility: HOSPITAL | Age: 89
End: 2024-06-21
Attending: EMERGENCY MEDICINE
Payer: MEDICARE

## 2024-01-01 ENCOUNTER — HOSPITAL ENCOUNTER (INPATIENT)
Facility: HOSPITAL | Age: 89
LOS: 26 days | Discharge: NURSING, REHAB OR LONG-TERM CARE | DRG: 871 | End: 2024-06-18
Attending: EMERGENCY MEDICINE | Admitting: STUDENT IN AN ORGANIZED HEALTH CARE EDUCATION/TRAINING PROGRAM
Payer: MEDICARE

## 2024-01-01 VITALS
OXYGEN SATURATION: 98 % | HEIGHT: 65 IN | RESPIRATION RATE: 16 BRPM | SYSTOLIC BLOOD PRESSURE: 112 MMHG | DIASTOLIC BLOOD PRESSURE: 32 MMHG | TEMPERATURE: 98 F | HEART RATE: 81 BPM | BODY MASS INDEX: 21.09 KG/M2 | WEIGHT: 126.56 LBS

## 2024-01-01 VITALS — BODY MASS INDEX: 20.8 KG/M2 | WEIGHT: 125 LBS | TEMPERATURE: 95 F

## 2024-01-01 DIAGNOSIS — E87.5 HYPERKALEMIA: Primary | ICD-10-CM

## 2024-01-01 DIAGNOSIS — E87.6 HYPOKALEMIA: ICD-10-CM

## 2024-01-01 DIAGNOSIS — I46.9 CARDIORESPIRATORY ARREST: ICD-10-CM

## 2024-01-01 DIAGNOSIS — I46.9 CARDIAC ARREST: ICD-10-CM

## 2024-01-01 DIAGNOSIS — R53.81 DEBILITY: ICD-10-CM

## 2024-01-01 DIAGNOSIS — N18.6 ESRD ON DIALYSIS: ICD-10-CM

## 2024-01-01 DIAGNOSIS — R79.89 TROPONIN LEVEL ELEVATED: ICD-10-CM

## 2024-01-01 DIAGNOSIS — Z92.89 HISTORY OF ETT: ICD-10-CM

## 2024-01-01 DIAGNOSIS — R53.1 GENERAL WEAKNESS: ICD-10-CM

## 2024-01-01 DIAGNOSIS — J90 PLEURAL EFFUSION: ICD-10-CM

## 2024-01-01 DIAGNOSIS — I50.33 ACUTE ON CHRONIC HEART FAILURE WITH PRESERVED EJECTION FRACTION: ICD-10-CM

## 2024-01-01 DIAGNOSIS — D63.1 ANEMIA OF RENAL DISEASE: ICD-10-CM

## 2024-01-01 DIAGNOSIS — J96.01 ACUTE HYPOXIC RESPIRATORY FAILURE: ICD-10-CM

## 2024-01-01 DIAGNOSIS — N18.9 ANEMIA OF RENAL DISEASE: ICD-10-CM

## 2024-01-01 DIAGNOSIS — G93.41 ACUTE METABOLIC ENCEPHALOPATHY: ICD-10-CM

## 2024-01-01 DIAGNOSIS — J90 LOCULATED PLEURAL EFFUSION: Primary | ICD-10-CM

## 2024-01-01 DIAGNOSIS — Z99.2 ESRD ON DIALYSIS: ICD-10-CM

## 2024-01-01 DIAGNOSIS — E87.20 LACTIC ACIDOSIS: ICD-10-CM

## 2024-01-01 DIAGNOSIS — R13.12 OROPHARYNGEAL DYSPHAGIA: ICD-10-CM

## 2024-01-01 DIAGNOSIS — D50.0 ANEMIA DUE TO GI BLOOD LOSS: ICD-10-CM

## 2024-01-01 DIAGNOSIS — J18.9 PARAPNEUMONIC EFFUSION: ICD-10-CM

## 2024-01-01 DIAGNOSIS — I25.10 CORONARY ARTERY DISEASE INVOLVING NATIVE CORONARY ARTERY OF NATIVE HEART WITHOUT ANGINA PECTORIS: ICD-10-CM

## 2024-01-01 DIAGNOSIS — K62.5 BLOOD PER RECTUM: ICD-10-CM

## 2024-01-01 DIAGNOSIS — N18.6 ESRD (END STAGE RENAL DISEASE): ICD-10-CM

## 2024-01-01 DIAGNOSIS — R07.9 CHEST PAIN: ICD-10-CM

## 2024-01-01 DIAGNOSIS — J91.8 PARAPNEUMONIC EFFUSION: ICD-10-CM

## 2024-01-01 LAB
ABO + RH BLD: NORMAL
ALBUMIN FLD-MCNC: 1.5 G/DL
ALBUMIN SERPL BCP-MCNC: 1.1 G/DL (ref 3.5–5)
ALBUMIN SERPL BCP-MCNC: 1.2 G/DL (ref 3.5–5)
ALBUMIN SERPL BCP-MCNC: 1.5 G/DL (ref 3.5–5)
ALBUMIN SERPL BCP-MCNC: 1.6 G/DL (ref 3.5–5)
ALBUMIN SERPL BCP-MCNC: 1.7 G/DL (ref 3.5–5)
ALBUMIN SERPL BCP-MCNC: 1.8 G/DL (ref 3.5–5)
ALBUMIN SERPL BCP-MCNC: 1.8 G/DL (ref 3.5–5)
ALBUMIN SERPL BCP-MCNC: 1.9 G/DL (ref 3.5–5)
ALBUMIN SERPL BCP-MCNC: 2.5 G/DL (ref 3.5–5)
ALBUMIN/GLOB SERPL: 0.3 {RATIO}
ALBUMIN/GLOB SERPL: 0.4 {RATIO}
ALBUMIN/GLOB SERPL: 0.5 {RATIO}
ALBUMIN/GLOB SERPL: 0.6 {RATIO}
ALP SERPL-CCNC: 109 U/L (ref 45–115)
ALP SERPL-CCNC: 110 U/L (ref 45–115)
ALP SERPL-CCNC: 189 U/L (ref 45–115)
ALP SERPL-CCNC: 66 U/L (ref 45–115)
ALP SERPL-CCNC: 72 U/L (ref 45–115)
ALP SERPL-CCNC: 81 U/L (ref 45–115)
ALP SERPL-CCNC: 82 U/L (ref 45–115)
ALP SERPL-CCNC: 93 U/L (ref 45–115)
ALP SERPL-CCNC: 99 U/L (ref 45–115)
ALT SERPL W P-5'-P-CCNC: 11 U/L (ref 16–61)
ALT SERPL W P-5'-P-CCNC: 1128 U/L (ref 16–61)
ALT SERPL W P-5'-P-CCNC: 16 U/L (ref 16–61)
ALT SERPL W P-5'-P-CCNC: 17 U/L (ref 16–61)
ALT SERPL W P-5'-P-CCNC: 17 U/L (ref 16–61)
ALT SERPL W P-5'-P-CCNC: 20 U/L (ref 16–61)
ALT SERPL W P-5'-P-CCNC: 21 U/L (ref 16–61)
ALT SERPL W P-5'-P-CCNC: 26 U/L (ref 16–61)
ALT SERPL W P-5'-P-CCNC: 9 U/L (ref 16–61)
ANION GAP SERPL CALCULATED.3IONS-SCNC: 10 MMOL/L (ref 7–16)
ANION GAP SERPL CALCULATED.3IONS-SCNC: 11 MMOL/L (ref 7–16)
ANION GAP SERPL CALCULATED.3IONS-SCNC: 12 MMOL/L (ref 7–16)
ANION GAP SERPL CALCULATED.3IONS-SCNC: 13 MMOL/L (ref 7–16)
ANION GAP SERPL CALCULATED.3IONS-SCNC: 14 MMOL/L (ref 7–16)
ANION GAP SERPL CALCULATED.3IONS-SCNC: 15 MMOL/L (ref 7–16)
ANION GAP SERPL CALCULATED.3IONS-SCNC: 15 MMOL/L (ref 7–16)
ANION GAP SERPL CALCULATED.3IONS-SCNC: 22 MMOL/L (ref 7–16)
ANION GAP SERPL CALCULATED.3IONS-SCNC: 8 MMOL/L (ref 7–16)
ANISOCYTOSIS BLD QL SMEAR: ABNORMAL
AORTIC ROOT ANNULUS: 2.6 CM
AORTIC ROOT ANNULUS: 2.77 CM
AORTIC VALVE CUSP SEPERATION: 1.75 CM
AORTIC VALVE CUSP SEPERATION: 1.76 CM
APTT PPP: 29.8 SECONDS (ref 25.2–37.3)
APTT PPP: 42.1 SECONDS (ref 25.2–37.3)
APTT PPP: 43.4 SECONDS (ref 25.2–37.3)
APTT PPP: 76.9 SECONDS (ref 25.2–37.3)
AST SERPL W P-5'-P-CCNC: 27 U/L (ref 15–37)
AST SERPL W P-5'-P-CCNC: 27 U/L (ref 15–37)
AST SERPL W P-5'-P-CCNC: 31 U/L (ref 15–37)
AST SERPL W P-5'-P-CCNC: 33 U/L (ref 15–37)
AST SERPL W P-5'-P-CCNC: 38 U/L (ref 15–37)
AST SERPL W P-5'-P-CCNC: 42 U/L (ref 15–37)
AST SERPL W P-5'-P-CCNC: 44 U/L (ref 15–37)
AST SERPL W P-5'-P-CCNC: 4801 U/L (ref 15–37)
AST SERPL W P-5'-P-CCNC: 60 U/L (ref 15–37)
ATYPICAL LYMPHOCYTES: ABNORMAL
AV INDEX (PROSTH): 0.58
AV INDEX (PROSTH): 0.6
AV MEAN GRADIENT: 10 MMHG
AV MEAN GRADIENT: 4 MMHG
AV PEAK GRADIENT: 16 MMHG
AV PEAK GRADIENT: 9 MMHG
AV VALVE AREA BY VELOCITY RATIO: 1.62 CM²
AV VALVE AREA BY VELOCITY RATIO: 2.06 CM²
AV VALVE AREA: 1.59 CM²
AV VALVE AREA: 1.64 CM²
AV VELOCITY RATIO: 0.61
AV VELOCITY RATIO: 0.73
BACTERIA BLD CULT: NORMAL
BACTERIA BLD CULT: NORMAL
BACTERIA SPEC BFLD CULT: NORMAL
BASOPHILS # BLD AUTO: 0.01 K/UL (ref 0–0.2)
BASOPHILS # BLD AUTO: 0.01 K/UL (ref 0–0.2)
BASOPHILS # BLD AUTO: 0.02 K/UL (ref 0–0.2)
BASOPHILS # BLD AUTO: 0.03 K/UL (ref 0–0.2)
BASOPHILS # BLD AUTO: 0.04 K/UL (ref 0–0.2)
BASOPHILS # BLD AUTO: 0.06 K/UL (ref 0–0.2)
BASOPHILS # BLD AUTO: 0.06 K/UL (ref 0–0.2)
BASOPHILS # BLD AUTO: 0.07 K/UL (ref 0–0.2)
BASOPHILS # BLD AUTO: 0.11 K/UL (ref 0–0.2)
BASOPHILS # BLD AUTO: 0.12 K/UL (ref 0–0.2)
BASOPHILS # BLD AUTO: 0.14 K/UL (ref 0–0.2)
BASOPHILS NFR BLD AUTO: 0.1 % (ref 0–1)
BASOPHILS NFR BLD AUTO: 0.2 % (ref 0–1)
BASOPHILS NFR BLD AUTO: 0.2 % (ref 0–1)
BASOPHILS NFR BLD AUTO: 0.3 % (ref 0–1)
BASOPHILS NFR BLD AUTO: 0.4 % (ref 0–1)
BASOPHILS NFR BLD AUTO: 0.4 % (ref 0–1)
BASOPHILS NFR BLD AUTO: 0.5 % (ref 0–1)
BASOPHILS NFR BLD AUTO: 0.6 % (ref 0–1)
BASOPHILS NFR BLD AUTO: 0.9 % (ref 0–1)
BASOPHILS NFR BLD AUTO: 1 % (ref 0–1)
BASOPHILS NFR BLD AUTO: 1.1 % (ref 0–1)
BILIRUB SERPL-MCNC: 0.4 MG/DL (ref ?–1.2)
BILIRUB SERPL-MCNC: 0.5 MG/DL (ref ?–1.2)
BILIRUB SERPL-MCNC: 0.5 MG/DL (ref ?–1.2)
BILIRUB SERPL-MCNC: 0.6 MG/DL (ref ?–1.2)
BILIRUB SERPL-MCNC: 0.7 MG/DL (ref ?–1.2)
BILIRUB SERPL-MCNC: 0.8 MG/DL (ref ?–1.2)
BILIRUB SERPL-MCNC: 0.9 MG/DL (ref ?–1.2)
BLD PROD TYP BPU: NORMAL
BLOOD UNIT EXPIRATION DATE: NORMAL
BLOOD UNIT TYPE CODE: 5100
BLOOD UNIT TYPE CODE: 9500
BSA FOR ECHO PROCEDURE: 1.72 M2
BSA FOR ECHO PROCEDURE: 1.72 M2
BUN SERPL-MCNC: 19 MG/DL (ref 7–18)
BUN SERPL-MCNC: 22 MG/DL (ref 7–18)
BUN SERPL-MCNC: 26 MG/DL (ref 7–18)
BUN SERPL-MCNC: 29 MG/DL (ref 7–18)
BUN SERPL-MCNC: 30 MG/DL (ref 7–18)
BUN SERPL-MCNC: 33 MG/DL (ref 7–18)
BUN SERPL-MCNC: 34 MG/DL (ref 7–18)
BUN SERPL-MCNC: 34 MG/DL (ref 7–18)
BUN SERPL-MCNC: 35 MG/DL (ref 7–18)
BUN SERPL-MCNC: 35 MG/DL (ref 7–18)
BUN SERPL-MCNC: 36 MG/DL (ref 7–18)
BUN SERPL-MCNC: 41 MG/DL (ref 7–18)
BUN SERPL-MCNC: 41 MG/DL (ref 7–18)
BUN SERPL-MCNC: 42 MG/DL (ref 7–18)
BUN SERPL-MCNC: 44 MG/DL (ref 7–18)
BUN SERPL-MCNC: 44 MG/DL (ref 7–18)
BUN SERPL-MCNC: 51 MG/DL (ref 7–18)
BUN SERPL-MCNC: 52 MG/DL (ref 7–18)
BUN SERPL-MCNC: 54 MG/DL (ref 7–18)
BUN SERPL-MCNC: 63 MG/DL (ref 7–18)
BUN SERPL-MCNC: 64 MG/DL (ref 7–18)
BUN SERPL-MCNC: 65 MG/DL (ref 7–18)
BUN SERPL-MCNC: 68 MG/DL (ref 7–18)
BUN/CREAT SERPL: 10 (ref 6–20)
BUN/CREAT SERPL: 11 (ref 6–20)
BUN/CREAT SERPL: 12 (ref 6–20)
BUN/CREAT SERPL: 5 (ref 6–20)
BUN/CREAT SERPL: 5 (ref 6–20)
BUN/CREAT SERPL: 6 (ref 6–20)
BUN/CREAT SERPL: 7 (ref 6–20)
BUN/CREAT SERPL: 8 (ref 6–20)
BUN/CREAT SERPL: 9 (ref 6–20)
BUN/CREAT SERPL: 9 (ref 6–20)
CALCIUM SERPL-MCNC: 10.2 MG/DL (ref 8.5–10.1)
CALCIUM SERPL-MCNC: 7.7 MG/DL (ref 8.5–10.1)
CALCIUM SERPL-MCNC: 7.8 MG/DL (ref 8.5–10.1)
CALCIUM SERPL-MCNC: 7.9 MG/DL (ref 8.5–10.1)
CALCIUM SERPL-MCNC: 7.9 MG/DL (ref 8.5–10.1)
CALCIUM SERPL-MCNC: 8 MG/DL (ref 8.5–10.1)
CALCIUM SERPL-MCNC: 8.1 MG/DL (ref 8.5–10.1)
CALCIUM SERPL-MCNC: 8.2 MG/DL (ref 8.5–10.1)
CALCIUM SERPL-MCNC: 8.3 MG/DL (ref 8.5–10.1)
CALCIUM SERPL-MCNC: 8.3 MG/DL (ref 8.5–10.1)
CALCIUM SERPL-MCNC: 8.4 MG/DL (ref 8.5–10.1)
CALCIUM SERPL-MCNC: 8.5 MG/DL (ref 8.5–10.1)
CALCIUM SERPL-MCNC: 9 MG/DL (ref 8.5–10.1)
CHLORIDE SERPL-SCNC: 100 MMOL/L (ref 98–107)
CHLORIDE SERPL-SCNC: 101 MMOL/L (ref 98–107)
CHLORIDE SERPL-SCNC: 101 MMOL/L (ref 98–107)
CHLORIDE SERPL-SCNC: 102 MMOL/L (ref 98–107)
CHLORIDE SERPL-SCNC: 102 MMOL/L (ref 98–107)
CHLORIDE SERPL-SCNC: 103 MMOL/L (ref 98–107)
CHLORIDE SERPL-SCNC: 104 MMOL/L (ref 98–107)
CHLORIDE SERPL-SCNC: 105 MMOL/L (ref 98–107)
CHLORIDE SERPL-SCNC: 108 MMOL/L (ref 98–107)
CHLORIDE SERPL-SCNC: 114 MMOL/L (ref 98–107)
CHLORIDE SERPL-SCNC: 114 MMOL/L (ref 98–107)
CHLORIDE SERPL-SCNC: 96 MMOL/L (ref 98–107)
CHLORIDE SERPL-SCNC: 97 MMOL/L (ref 98–107)
CHLORIDE SERPL-SCNC: 98 MMOL/L (ref 98–107)
CHLORIDE SERPL-SCNC: 99 MMOL/L (ref 98–107)
CHOLEST FLD-MCNC: <50 MG/DL
CK SERPL-CCNC: 36 U/L (ref 39–308)
CLARITY FLD: ABNORMAL
CO2 SERPL-SCNC: 20 MMOL/L (ref 21–32)
CO2 SERPL-SCNC: 20 MMOL/L (ref 21–32)
CO2 SERPL-SCNC: 22 MMOL/L (ref 21–32)
CO2 SERPL-SCNC: 22 MMOL/L (ref 21–32)
CO2 SERPL-SCNC: 23 MMOL/L (ref 21–32)
CO2 SERPL-SCNC: 25 MMOL/L (ref 21–32)
CO2 SERPL-SCNC: 26 MMOL/L (ref 21–32)
CO2 SERPL-SCNC: 27 MMOL/L (ref 21–32)
CO2 SERPL-SCNC: 27 MMOL/L (ref 21–32)
CO2 SERPL-SCNC: 28 MMOL/L (ref 21–32)
CO2 SERPL-SCNC: 28 MMOL/L (ref 21–32)
CO2 SERPL-SCNC: 29 MMOL/L (ref 21–32)
CO2 SERPL-SCNC: 29 MMOL/L (ref 21–32)
CO2 SERPL-SCNC: 31 MMOL/L (ref 21–32)
CO2 SERPL-SCNC: 32 MMOL/L (ref 21–32)
CO2 SERPL-SCNC: 32 MMOL/L (ref 21–32)
COLOR FLD: YELLOW
CREAT SERPL-MCNC: 3.28 MG/DL (ref 0.7–1.3)
CREAT SERPL-MCNC: 3.36 MG/DL (ref 0.7–1.3)
CREAT SERPL-MCNC: 3.66 MG/DL (ref 0.7–1.3)
CREAT SERPL-MCNC: 3.74 MG/DL (ref 0.7–1.3)
CREAT SERPL-MCNC: 3.95 MG/DL (ref 0.7–1.3)
CREAT SERPL-MCNC: 4.01 MG/DL (ref 0.7–1.3)
CREAT SERPL-MCNC: 4.16 MG/DL (ref 0.7–1.3)
CREAT SERPL-MCNC: 4.24 MG/DL (ref 0.7–1.3)
CREAT SERPL-MCNC: 4.33 MG/DL (ref 0.7–1.3)
CREAT SERPL-MCNC: 4.41 MG/DL (ref 0.7–1.3)
CREAT SERPL-MCNC: 4.41 MG/DL (ref 0.7–1.3)
CREAT SERPL-MCNC: 4.51 MG/DL (ref 0.7–1.3)
CREAT SERPL-MCNC: 4.56 MG/DL (ref 0.7–1.3)
CREAT SERPL-MCNC: 5.12 MG/DL (ref 0.7–1.3)
CREAT SERPL-MCNC: 5.21 MG/DL (ref 0.7–1.3)
CREAT SERPL-MCNC: 5.24 MG/DL (ref 0.7–1.3)
CREAT SERPL-MCNC: 5.52 MG/DL (ref 0.7–1.3)
CREAT SERPL-MCNC: 5.57 MG/DL (ref 0.7–1.3)
CREAT SERPL-MCNC: 5.57 MG/DL (ref 0.7–1.3)
CREAT SERPL-MCNC: 5.64 MG/DL (ref 0.7–1.3)
CREAT SERPL-MCNC: 6.11 MG/DL (ref 0.7–1.3)
CREAT SERPL-MCNC: 6.16 MG/DL (ref 0.7–1.3)
CREAT SERPL-MCNC: 6.19 MG/DL (ref 0.7–1.3)
CRENATED CELLS: ABNORMAL
CRENATED CELLS: ABNORMAL
CROSSMATCH INTERPRETATION: NORMAL
CULTURE, FUNGUS (OTHER): NORMAL
CV ECHO LV RWT: 0.71 CM
CV ECHO LV RWT: 1.33 CM
DIFFERENTIAL METHOD BLD: ABNORMAL
DISPENSE STATUS: NORMAL
DOP CALC AO PEAK VEL: 1.48 M/S
DOP CALC AO PEAK VEL: 2.03 M/S
DOP CALC AO VTI: 35.3 CM
DOP CALC AO VTI: 35.6 CM
DOP CALC LVOT AREA: 2.6 CM2
DOP CALC LVOT AREA: 2.8 CM2
DOP CALC LVOT DIAMETER: 1.83 CM
DOP CALC LVOT DIAMETER: 1.89 CM
DOP CALC LVOT PEAK VEL: 0.91 M/S
DOP CALC LVOT PEAK VEL: 1.49 M/S
DOP CALC LVOT STROKE VOLUME: 56.52 CM3
DOP CALC LVOT STROKE VOLUME: 57.76 CM3
DOP CALC MV VTI: 8 CM
DOP CALCLVOT PEAK VEL VTI: 20.6 CM
DOP CALCLVOT PEAK VEL VTI: 21.5 CM
E WAVE DECELERATION TIME: 121.91 MSEC
E WAVE DECELERATION TIME: 255.41 MSEC
E/A RATIO: 1.2
E/E' RATIO: 15.85 M/S
ECHO LV POSTERIOR WALL: 1.3 CM (ref 0.6–1.1)
ECHO LV POSTERIOR WALL: 1.8 CM (ref 0.6–1.1)
EGFR (NO RACE VARIABLE) (RUSH/TITUS): 10 ML/MIN/1.73M2
EGFR (NO RACE VARIABLE) (RUSH/TITUS): 12 ML/MIN/1.73M2
EGFR (NO RACE VARIABLE) (RUSH/TITUS): 13 ML/MIN/1.73M2
EGFR (NO RACE VARIABLE) (RUSH/TITUS): 13 ML/MIN/1.73M2
EGFR (NO RACE VARIABLE) (RUSH/TITUS): 14 ML/MIN/1.73M2
EGFR (NO RACE VARIABLE) (RUSH/TITUS): 14 ML/MIN/1.73M2
EGFR (NO RACE VARIABLE) (RUSH/TITUS): 15 ML/MIN/1.73M2
EGFR (NO RACE VARIABLE) (RUSH/TITUS): 15 ML/MIN/1.73M2
EGFR (NO RACE VARIABLE) (RUSH/TITUS): 17 ML/MIN/1.73M2
EGFR (NO RACE VARIABLE) (RUSH/TITUS): 17 ML/MIN/1.73M2
EGFR (NO RACE VARIABLE) (RUSH/TITUS): 8 ML/MIN/1.73M2
EGFR (NO RACE VARIABLE) (RUSH/TITUS): 9 ML/MIN/1.73M2
EOSINOPHIL # BLD AUTO: 0 K/UL (ref 0–0.5)
EOSINOPHIL # BLD AUTO: 0 K/UL (ref 0–0.5)
EOSINOPHIL # BLD AUTO: 0.01 K/UL (ref 0–0.5)
EOSINOPHIL # BLD AUTO: 0.02 K/UL (ref 0–0.5)
EOSINOPHIL # BLD AUTO: 0.02 K/UL (ref 0–0.5)
EOSINOPHIL # BLD AUTO: 0.03 K/UL (ref 0–0.5)
EOSINOPHIL # BLD AUTO: 0.04 K/UL (ref 0–0.5)
EOSINOPHIL # BLD AUTO: 0.04 K/UL (ref 0–0.5)
EOSINOPHIL # BLD AUTO: 0.05 K/UL (ref 0–0.5)
EOSINOPHIL # BLD AUTO: 0.06 K/UL (ref 0–0.5)
EOSINOPHIL # BLD AUTO: 0.06 K/UL (ref 0–0.5)
EOSINOPHIL # BLD AUTO: 0.08 K/UL (ref 0–0.5)
EOSINOPHIL # BLD AUTO: 0.08 K/UL (ref 0–0.5)
EOSINOPHIL # BLD AUTO: 0.09 K/UL (ref 0–0.5)
EOSINOPHIL # BLD AUTO: 0.1 K/UL (ref 0–0.5)
EOSINOPHIL # BLD AUTO: 0.12 K/UL (ref 0–0.5)
EOSINOPHIL # BLD AUTO: 0.13 K/UL (ref 0–0.5)
EOSINOPHIL # BLD AUTO: 0.14 K/UL (ref 0–0.5)
EOSINOPHIL NFR BLD AUTO: 0 % (ref 1–4)
EOSINOPHIL NFR BLD AUTO: 0 % (ref 1–4)
EOSINOPHIL NFR BLD AUTO: 0.1 % (ref 1–4)
EOSINOPHIL NFR BLD AUTO: 0.1 % (ref 1–4)
EOSINOPHIL NFR BLD AUTO: 0.3 % (ref 1–4)
EOSINOPHIL NFR BLD AUTO: 0.4 % (ref 1–4)
EOSINOPHIL NFR BLD AUTO: 0.6 % (ref 1–4)
EOSINOPHIL NFR BLD AUTO: 0.6 % (ref 1–4)
EOSINOPHIL NFR BLD AUTO: 0.7 % (ref 1–4)
EOSINOPHIL NFR BLD AUTO: 0.7 % (ref 1–4)
EOSINOPHIL NFR BLD AUTO: 0.8 % (ref 1–4)
EOSINOPHIL NFR BLD AUTO: 0.8 % (ref 1–4)
EOSINOPHIL NFR BLD AUTO: 0.9 % (ref 1–4)
EOSINOPHIL NFR BLD AUTO: 1 % (ref 1–4)
EOSINOPHIL NFR BLD AUTO: 1.1 % (ref 1–4)
ERYTHROCYTE [DISTWIDTH] IN BLOOD BY AUTOMATED COUNT: 16.3 % (ref 11.5–14.5)
ERYTHROCYTE [DISTWIDTH] IN BLOOD BY AUTOMATED COUNT: 16.5 % (ref 11.5–14.5)
ERYTHROCYTE [DISTWIDTH] IN BLOOD BY AUTOMATED COUNT: 16.7 % (ref 11.5–14.5)
ERYTHROCYTE [DISTWIDTH] IN BLOOD BY AUTOMATED COUNT: 17 % (ref 11.5–14.5)
ERYTHROCYTE [DISTWIDTH] IN BLOOD BY AUTOMATED COUNT: 17.1 % (ref 11.5–14.5)
ERYTHROCYTE [DISTWIDTH] IN BLOOD BY AUTOMATED COUNT: 17.1 % (ref 11.5–14.5)
ERYTHROCYTE [DISTWIDTH] IN BLOOD BY AUTOMATED COUNT: 17.2 % (ref 11.5–14.5)
ERYTHROCYTE [DISTWIDTH] IN BLOOD BY AUTOMATED COUNT: 17.4 % (ref 11.5–14.5)
ERYTHROCYTE [DISTWIDTH] IN BLOOD BY AUTOMATED COUNT: 17.5 % (ref 11.5–14.5)
ERYTHROCYTE [DISTWIDTH] IN BLOOD BY AUTOMATED COUNT: 17.6 % (ref 11.5–14.5)
ERYTHROCYTE [DISTWIDTH] IN BLOOD BY AUTOMATED COUNT: 17.6 % (ref 11.5–14.5)
ERYTHROCYTE [DISTWIDTH] IN BLOOD BY AUTOMATED COUNT: 17.8 % (ref 11.5–14.5)
ERYTHROCYTE [DISTWIDTH] IN BLOOD BY AUTOMATED COUNT: 17.8 % (ref 11.5–14.5)
ERYTHROCYTE [DISTWIDTH] IN BLOOD BY AUTOMATED COUNT: 18 % (ref 11.5–14.5)
ERYTHROCYTE [DISTWIDTH] IN BLOOD BY AUTOMATED COUNT: 18.1 % (ref 11.5–14.5)
ERYTHROCYTE [DISTWIDTH] IN BLOOD BY AUTOMATED COUNT: 18.2 % (ref 11.5–14.5)
ERYTHROCYTE [DISTWIDTH] IN BLOOD BY AUTOMATED COUNT: 18.2 % (ref 11.5–14.5)
FERRITIN SERPL-MCNC: 1925 NG/ML (ref 26–388)
FRACTIONAL SHORTENING: 15 % (ref 28–44)
FRACTIONAL SHORTENING: 31 % (ref 28–44)
GLOBULIN SER-MCNC: 3.4 G/DL (ref 2–4)
GLOBULIN SER-MCNC: 3.6 G/DL (ref 2–4)
GLOBULIN SER-MCNC: 4 G/DL (ref 2–4)
GLOBULIN SER-MCNC: 4.2 G/DL (ref 2–4)
GLOBULIN SER-MCNC: 4.3 G/DL (ref 2–4)
GLOBULIN SER-MCNC: 4.4 G/DL (ref 2–4)
GLOBULIN SER-MCNC: 4.7 G/DL (ref 2–4)
GLUCOSE FLD-MCNC: 45 MG/DL
GLUCOSE SERPL-MCNC: 101 MG/DL (ref 70–105)
GLUCOSE SERPL-MCNC: 112 MG/DL (ref 74–106)
GLUCOSE SERPL-MCNC: 112 MG/DL (ref 74–106)
GLUCOSE SERPL-MCNC: 113 MG/DL (ref 74–106)
GLUCOSE SERPL-MCNC: 114 MG/DL (ref 74–106)
GLUCOSE SERPL-MCNC: 116 MG/DL (ref 74–106)
GLUCOSE SERPL-MCNC: 118 MG/DL (ref 74–106)
GLUCOSE SERPL-MCNC: 120 MG/DL (ref 74–106)
GLUCOSE SERPL-MCNC: 134 MG/DL (ref 70–105)
GLUCOSE SERPL-MCNC: 134 MG/DL (ref 74–106)
GLUCOSE SERPL-MCNC: 143 MG/DL (ref 70–105)
GLUCOSE SERPL-MCNC: 144 MG/DL (ref 74–106)
GLUCOSE SERPL-MCNC: 152 MG/DL (ref 70–105)
GLUCOSE SERPL-MCNC: 153 MG/DL (ref 70–105)
GLUCOSE SERPL-MCNC: 170 MG/DL (ref 74–106)
GLUCOSE SERPL-MCNC: 63 MG/DL (ref 74–106)
GLUCOSE SERPL-MCNC: 75 MG/DL (ref 74–106)
GLUCOSE SERPL-MCNC: 76 MG/DL (ref 74–106)
GLUCOSE SERPL-MCNC: 77 MG/DL (ref 74–106)
GLUCOSE SERPL-MCNC: 78 MG/DL (ref 74–106)
GLUCOSE SERPL-MCNC: 84 MG/DL (ref 74–106)
GLUCOSE SERPL-MCNC: 85 MG/DL (ref 74–106)
GLUCOSE SERPL-MCNC: 86 MG/DL (ref 74–106)
GLUCOSE SERPL-MCNC: 86 MG/DL (ref 74–106)
GLUCOSE SERPL-MCNC: 89 MG/DL (ref 74–106)
GLUCOSE SERPL-MCNC: 89 MG/DL (ref 74–106)
GLUCOSE SERPL-MCNC: 90 MG/DL (ref 70–105)
GLUCOSE SERPL-MCNC: 91 MG/DL (ref 74–106)
GLUCOSE SERPL-MCNC: 92 MG/DL (ref 74–106)
GRANULOCYTES NFR FLD MANUAL: 30 % (ref 0–25)
HBA1C MFR BLD: 4.5 % (ref 4–5.6)
HCO3 UR-SCNC: 19.5 MMOL/L (ref 24–28)
HCO3 UR-SCNC: 22.7 MMOL/L (ref 21–28)
HCO3 UR-SCNC: 36.8 MMOL/L (ref 24–28)
HCT VFR BLD AUTO: 17.1 % (ref 40–54)
HCT VFR BLD AUTO: 20.1 % (ref 40–54)
HCT VFR BLD AUTO: 22.3 % (ref 40–54)
HCT VFR BLD AUTO: 23.4 % (ref 40–54)
HCT VFR BLD AUTO: 23.9 % (ref 40–54)
HCT VFR BLD AUTO: 24.1 % (ref 40–54)
HCT VFR BLD AUTO: 24.2 % (ref 40–54)
HCT VFR BLD AUTO: 24.4 % (ref 40–54)
HCT VFR BLD AUTO: 24.4 % (ref 40–54)
HCT VFR BLD AUTO: 24.9 % (ref 40–54)
HCT VFR BLD AUTO: 25.1 % (ref 40–54)
HCT VFR BLD AUTO: 25.5 % (ref 40–54)
HCT VFR BLD AUTO: 25.6 % (ref 40–54)
HCT VFR BLD AUTO: 25.9 % (ref 40–54)
HCT VFR BLD AUTO: 26 % (ref 40–54)
HCT VFR BLD AUTO: 26.6 % (ref 40–54)
HCT VFR BLD AUTO: 26.9 % (ref 40–54)
HCT VFR BLD AUTO: 27.1 % (ref 40–54)
HCT VFR BLD AUTO: 28 % (ref 40–54)
HCT VFR BLD AUTO: 28.6 % (ref 40–54)
HCT VFR BLD AUTO: 28.7 % (ref 40–54)
HCT VFR BLD AUTO: 29 % (ref 40–54)
HCT VFR BLD AUTO: 30.4 % (ref 40–54)
HCT VFR BLD AUTO: 31 % (ref 40–54)
HCT VFR BLD AUTO: 32.6 % (ref 40–54)
HCT VFR BLD AUTO: 32.7 % (ref 40–54)
HCT VFR BLD CALC: 32 % (ref 35–51)
HCT VFR BLD CALC: <15 % (ref 35–51)
HGB BLD-MCNC: 10 G/DL (ref 13.5–18)
HGB BLD-MCNC: 10.2 G/DL (ref 13.5–18)
HGB BLD-MCNC: 10.2 G/DL (ref 13.5–18)
HGB BLD-MCNC: 4.9 G/DL (ref 13.5–18)
HGB BLD-MCNC: 6 G/DL (ref 13.5–18)
HGB BLD-MCNC: 6.7 G/DL (ref 13.5–18)
HGB BLD-MCNC: 6.9 G/DL (ref 13.5–18)
HGB BLD-MCNC: 6.9 G/DL (ref 13.5–18)
HGB BLD-MCNC: 7.3 G/DL (ref 13.5–18)
HGB BLD-MCNC: 7.4 G/DL (ref 13.5–18)
HGB BLD-MCNC: 7.5 G/DL (ref 13.5–18)
HGB BLD-MCNC: 7.5 G/DL (ref 13.5–18)
HGB BLD-MCNC: 7.6 G/DL (ref 13.5–18)
HGB BLD-MCNC: 7.6 G/DL (ref 13.5–18)
HGB BLD-MCNC: 7.8 G/DL (ref 13.5–18)
HGB BLD-MCNC: 7.9 G/DL (ref 13.5–18)
HGB BLD-MCNC: 8.1 G/DL (ref 13.5–18)
HGB BLD-MCNC: 8.2 G/DL (ref 13.5–18)
HGB BLD-MCNC: 8.2 G/DL (ref 13.5–18)
HGB BLD-MCNC: 8.3 G/DL (ref 13.5–18)
HGB BLD-MCNC: 8.5 G/DL (ref 13.5–18)
HGB BLD-MCNC: 8.7 G/DL (ref 13.5–18)
HGB BLD-MCNC: 8.8 G/DL (ref 13.5–18)
HGB BLD-MCNC: 8.8 G/DL (ref 13.5–18)
HGB BLD-MCNC: 9.6 G/DL (ref 13.5–18)
HGB BLD-MCNC: 9.8 G/DL (ref 13.5–18)
HYPOCHROMIA BLD QL SMEAR: ABNORMAL
IMM GRANULOCYTES # BLD AUTO: 0.05 K/UL (ref 0–0.04)
IMM GRANULOCYTES # BLD AUTO: 0.06 K/UL (ref 0–0.04)
IMM GRANULOCYTES # BLD AUTO: 0.07 K/UL (ref 0–0.04)
IMM GRANULOCYTES # BLD AUTO: 0.08 K/UL (ref 0–0.04)
IMM GRANULOCYTES # BLD AUTO: 0.09 K/UL (ref 0–0.04)
IMM GRANULOCYTES # BLD AUTO: 0.1 K/UL (ref 0–0.04)
IMM GRANULOCYTES # BLD AUTO: 0.1 K/UL (ref 0–0.04)
IMM GRANULOCYTES # BLD AUTO: 0.11 K/UL (ref 0–0.04)
IMM GRANULOCYTES # BLD AUTO: 0.12 K/UL (ref 0–0.04)
IMM GRANULOCYTES # BLD AUTO: 0.15 K/UL (ref 0–0.04)
IMM GRANULOCYTES # BLD AUTO: 0.19 K/UL (ref 0–0.04)
IMM GRANULOCYTES # BLD AUTO: 0.28 K/UL (ref 0–0.04)
IMM GRANULOCYTES NFR BLD: 0.4 % (ref 0–0.4)
IMM GRANULOCYTES NFR BLD: 0.5 % (ref 0–0.4)
IMM GRANULOCYTES NFR BLD: 0.5 % (ref 0–0.4)
IMM GRANULOCYTES NFR BLD: 0.6 % (ref 0–0.4)
IMM GRANULOCYTES NFR BLD: 0.7 % (ref 0–0.4)
IMM GRANULOCYTES NFR BLD: 0.8 % (ref 0–0.4)
IMM GRANULOCYTES NFR BLD: 0.9 % (ref 0–0.4)
IMM GRANULOCYTES NFR BLD: 1 % (ref 0–0.4)
IMM GRANULOCYTES NFR BLD: 1.1 % (ref 0–0.4)
IMM GRANULOCYTES NFR BLD: 2.7 % (ref 0–0.4)
IMM GRANULOCYTES NFR BLD: 2.7 % (ref 0–0.4)
INDIRECT COOMBS: NORMAL
INR BLD: 1.26
INR BLD: 1.32
INR BLD: 1.42
INR BLD: 1.54
INR BLD: 2.9
INSULIN SERPL-ACNC: NORMAL U[IU]/ML
INTERVENTRICULAR SEPTUM: 1.37 CM (ref 0.6–1.1)
INTERVENTRICULAR SEPTUM: 1.8 CM (ref 0.6–1.1)
IRON SATN MFR SERPL: 22 % (ref 14–50)
IRON SERPL-MCNC: 23 ΜG/DL (ref 65–175)
IVC DIAMETER: 2.17 CM
IVC DIAMETER: 2.69 CM
LA MAJOR: 4.42 CM
LA MAJOR: 4.48 CM
LAB AP GROSS DESCRIPTION: NORMAL
LAB AP LABORATORY NOTES: NORMAL
LAB AP SPECIMEN A NON-GYN GENERAL CATEGORIZATION: NEGATIVE
LAB AP SPECIMEN A NON-GYN INTERPETATION: NORMAL
LACTATE SERPL-SCNC: 0.7 MMOL/L (ref 0.4–2)
LACTATE SERPL-SCNC: 0.8 MMOL/L (ref 0.4–2)
LACTATE SERPL-SCNC: 1 MMOL/L (ref 0.4–2)
LACTATE SERPL-SCNC: 1.6 MMOL/L (ref 0.4–2)
LACTATE SERPL-SCNC: 16.1 MMOL/L (ref 0.4–2)
LDH FLD-CCNC: 270 U/L
LDH SERPL L TO P-CCNC: 1.4 MMOL/L (ref 0.3–1.2)
LDH SERPL L TO P-CCNC: 11.7 MMOL/L (ref 0.3–1.2)
LDH SERPL-CCNC: 279 U/L (ref 87–241)
LEFT ATRIUM SIZE: 3.6 CM
LEFT ATRIUM SIZE: 4.14 CM
LEFT ATRIUM VOLUME INDEX MOD: 53.8 ML/M2
LEFT ATRIUM VOLUME MOD: 92 CM3
LEFT INTERNAL DIMENSION IN SYSTOLE: 2.29 CM (ref 2.1–4)
LEFT INTERNAL DIMENSION IN SYSTOLE: 2.52 CM (ref 2.1–4)
LEFT VENTRICLE DIASTOLIC VOLUME INDEX: 21.03 ML/M2
LEFT VENTRICLE DIASTOLIC VOLUME INDEX: 33.25 ML/M2
LEFT VENTRICLE DIASTOLIC VOLUME: 35.96 ML
LEFT VENTRICLE DIASTOLIC VOLUME: 56.85 ML
LEFT VENTRICLE MASS INDEX: 100 G/M2
LEFT VENTRICLE MASS INDEX: 112 G/M2
LEFT VENTRICLE SYSTOLIC VOLUME INDEX: 10.5 ML/M2
LEFT VENTRICLE SYSTOLIC VOLUME INDEX: 13.4 ML/M2
LEFT VENTRICLE SYSTOLIC VOLUME: 17.98 ML
LEFT VENTRICLE SYSTOLIC VOLUME: 22.84 ML
LEFT VENTRICULAR INTERNAL DIMENSION IN DIASTOLE: 2.7 CM (ref 3.5–6)
LEFT VENTRICULAR INTERNAL DIMENSION IN DIASTOLE: 3.67 CM (ref 3.5–6)
LEFT VENTRICULAR MASS: 171.5 G
LEFT VENTRICULAR MASS: 192.26 G
LV LATERAL E/E' RATIO: 14.71 M/S
LV SEPTAL E/E' RATIO: 17.17 M/S
LVOT MG: 1.52 MMHG
LVOT MG: 3.92 MMHG
LVOT MV: 0.58 CM/S
LVOT MV: 0.94 CM/S
LYMPHOCYTES # BLD AUTO: 0.17 K/UL (ref 1–4.8)
LYMPHOCYTES # BLD AUTO: 0.18 K/UL (ref 1–4.8)
LYMPHOCYTES # BLD AUTO: 0.2 K/UL (ref 1–4.8)
LYMPHOCYTES # BLD AUTO: 0.2 K/UL (ref 1–4.8)
LYMPHOCYTES # BLD AUTO: 0.22 K/UL (ref 1–4.8)
LYMPHOCYTES # BLD AUTO: 0.23 K/UL (ref 1–4.8)
LYMPHOCYTES # BLD AUTO: 0.26 K/UL (ref 1–4.8)
LYMPHOCYTES # BLD AUTO: 0.26 K/UL (ref 1–4.8)
LYMPHOCYTES # BLD AUTO: 0.28 K/UL (ref 1–4.8)
LYMPHOCYTES # BLD AUTO: 0.29 K/UL (ref 1–4.8)
LYMPHOCYTES # BLD AUTO: 0.3 K/UL (ref 1–4.8)
LYMPHOCYTES # BLD AUTO: 0.31 K/UL (ref 1–4.8)
LYMPHOCYTES # BLD AUTO: 0.33 K/UL (ref 1–4.8)
LYMPHOCYTES # BLD AUTO: 0.33 K/UL (ref 1–4.8)
LYMPHOCYTES # BLD AUTO: 0.34 K/UL (ref 1–4.8)
LYMPHOCYTES # BLD AUTO: 0.36 K/UL (ref 1–4.8)
LYMPHOCYTES # BLD AUTO: 0.36 K/UL (ref 1–4.8)
LYMPHOCYTES # BLD AUTO: 0.39 K/UL (ref 1–4.8)
LYMPHOCYTES # BLD AUTO: 1.32 K/UL (ref 1–4.8)
LYMPHOCYTES NFR BLD AUTO: 1.6 % (ref 27–41)
LYMPHOCYTES NFR BLD AUTO: 1.6 % (ref 27–41)
LYMPHOCYTES NFR BLD AUTO: 1.7 % (ref 27–41)
LYMPHOCYTES NFR BLD AUTO: 1.7 % (ref 27–41)
LYMPHOCYTES NFR BLD AUTO: 1.9 % (ref 27–41)
LYMPHOCYTES NFR BLD AUTO: 18.6 % (ref 27–41)
LYMPHOCYTES NFR BLD AUTO: 2 % (ref 27–41)
LYMPHOCYTES NFR BLD AUTO: 2.3 % (ref 27–41)
LYMPHOCYTES NFR BLD AUTO: 2.4 % (ref 27–41)
LYMPHOCYTES NFR BLD AUTO: 2.5 % (ref 27–41)
LYMPHOCYTES NFR BLD AUTO: 2.7 % (ref 27–41)
LYMPHOCYTES NFR BLD AUTO: 2.7 % (ref 27–41)
LYMPHOCYTES NFR BLD AUTO: 2.8 % (ref 27–41)
LYMPHOCYTES NFR BLD AUTO: 2.9 % (ref 27–41)
LYMPHOCYTES NFR BLD AUTO: 3.2 % (ref 27–41)
LYMPHOCYTES NFR BLD MANUAL: 29 % (ref 27–41)
LYMPHOCYTES NFR FLD MANUAL: 56 %
MAGNESIUM SERPL-MCNC: 2 MG/DL (ref 1.7–2.3)
MAGNESIUM SERPL-MCNC: 2.1 MG/DL (ref 1.7–2.3)
MAGNESIUM SERPL-MCNC: 2.9 MG/DL (ref 1.7–2.3)
MCH RBC QN AUTO: 26 PG (ref 27–31)
MCH RBC QN AUTO: 26.3 PG (ref 27–31)
MCH RBC QN AUTO: 26.4 PG (ref 27–31)
MCH RBC QN AUTO: 26.5 PG (ref 27–31)
MCH RBC QN AUTO: 26.5 PG (ref 27–31)
MCH RBC QN AUTO: 26.6 PG (ref 27–31)
MCH RBC QN AUTO: 26.7 PG (ref 27–31)
MCH RBC QN AUTO: 26.7 PG (ref 27–31)
MCH RBC QN AUTO: 26.8 PG (ref 27–31)
MCH RBC QN AUTO: 26.8 PG (ref 27–31)
MCH RBC QN AUTO: 26.9 PG (ref 27–31)
MCH RBC QN AUTO: 27 PG (ref 27–31)
MCH RBC QN AUTO: 27.1 PG (ref 27–31)
MCH RBC QN AUTO: 27.4 PG (ref 27–31)
MCH RBC QN AUTO: 27.6 PG (ref 27–31)
MCH RBC QN AUTO: 28.2 PG (ref 27–31)
MCHC RBC AUTO-ENTMCNC: 28.7 G/DL (ref 32–36)
MCHC RBC AUTO-ENTMCNC: 29.9 G/DL (ref 32–36)
MCHC RBC AUTO-ENTMCNC: 30 G/DL (ref 32–36)
MCHC RBC AUTO-ENTMCNC: 30.1 G/DL (ref 32–36)
MCHC RBC AUTO-ENTMCNC: 30.3 G/DL (ref 32–36)
MCHC RBC AUTO-ENTMCNC: 30.4 G/DL (ref 32–36)
MCHC RBC AUTO-ENTMCNC: 30.5 G/DL (ref 32–36)
MCHC RBC AUTO-ENTMCNC: 30.6 G/DL (ref 32–36)
MCHC RBC AUTO-ENTMCNC: 30.6 G/DL (ref 32–36)
MCHC RBC AUTO-ENTMCNC: 30.7 G/DL (ref 32–36)
MCHC RBC AUTO-ENTMCNC: 30.8 G/DL (ref 32–36)
MCHC RBC AUTO-ENTMCNC: 30.8 G/DL (ref 32–36)
MCHC RBC AUTO-ENTMCNC: 30.9 G/DL (ref 32–36)
MCHC RBC AUTO-ENTMCNC: 30.9 G/DL (ref 32–36)
MCHC RBC AUTO-ENTMCNC: 31 G/DL (ref 32–36)
MCHC RBC AUTO-ENTMCNC: 31 G/DL (ref 32–36)
MCHC RBC AUTO-ENTMCNC: 31.1 G/DL (ref 32–36)
MCHC RBC AUTO-ENTMCNC: 31.2 G/DL (ref 32–36)
MCHC RBC AUTO-ENTMCNC: 31.2 G/DL (ref 32–36)
MCHC RBC AUTO-ENTMCNC: 31.6 G/DL (ref 32–36)
MCHC RBC AUTO-ENTMCNC: 31.6 G/DL (ref 32–36)
MCHC RBC AUTO-ENTMCNC: 31.7 G/DL (ref 32–36)
MCV RBC AUTO: 84 FL (ref 80–96)
MCV RBC AUTO: 84.7 FL (ref 80–96)
MCV RBC AUTO: 85.4 FL (ref 80–96)
MCV RBC AUTO: 85.6 FL (ref 80–96)
MCV RBC AUTO: 85.6 FL (ref 80–96)
MCV RBC AUTO: 85.8 FL (ref 80–96)
MCV RBC AUTO: 86 FL (ref 80–96)
MCV RBC AUTO: 86 FL (ref 80–96)
MCV RBC AUTO: 86.2 FL (ref 80–96)
MCV RBC AUTO: 86.6 FL (ref 80–96)
MCV RBC AUTO: 86.9 FL (ref 80–96)
MCV RBC AUTO: 87 FL (ref 80–96)
MCV RBC AUTO: 87.4 FL (ref 80–96)
MCV RBC AUTO: 87.4 FL (ref 80–96)
MCV RBC AUTO: 87.5 FL (ref 80–96)
MCV RBC AUTO: 87.8 FL (ref 80–96)
MCV RBC AUTO: 88 FL (ref 80–96)
MCV RBC AUTO: 88.1 FL (ref 80–96)
MCV RBC AUTO: 88.4 FL (ref 80–96)
MCV RBC AUTO: 89.3 FL (ref 80–96)
MCV RBC AUTO: 89.6 FL (ref 80–96)
MCV RBC AUTO: 98.3 FL (ref 80–96)
METHICILLIN RESISTANT STAPHYLOCOCCUS AUREUS: NEGATIVE
MONOCYTES # BLD AUTO: 0.2 K/UL (ref 0–0.8)
MONOCYTES # BLD AUTO: 0.3 K/UL (ref 0–0.8)
MONOCYTES # BLD AUTO: 0.31 K/UL (ref 0–0.8)
MONOCYTES # BLD AUTO: 0.35 K/UL (ref 0–0.8)
MONOCYTES # BLD AUTO: 0.37 K/UL (ref 0–0.8)
MONOCYTES # BLD AUTO: 0.39 K/UL (ref 0–0.8)
MONOCYTES # BLD AUTO: 0.46 K/UL (ref 0–0.8)
MONOCYTES # BLD AUTO: 0.49 K/UL (ref 0–0.8)
MONOCYTES # BLD AUTO: 0.52 K/UL (ref 0–0.8)
MONOCYTES # BLD AUTO: 0.57 K/UL (ref 0–0.8)
MONOCYTES # BLD AUTO: 0.6 K/UL (ref 0–0.8)
MONOCYTES # BLD AUTO: 0.6 K/UL (ref 0–0.8)
MONOCYTES # BLD AUTO: 0.61 K/UL (ref 0–0.8)
MONOCYTES # BLD AUTO: 0.62 K/UL (ref 0–0.8)
MONOCYTES # BLD AUTO: 0.64 K/UL (ref 0–0.8)
MONOCYTES # BLD AUTO: 0.66 K/UL (ref 0–0.8)
MONOCYTES # BLD AUTO: 0.73 K/UL (ref 0–0.8)
MONOCYTES # BLD AUTO: 0.77 K/UL (ref 0–0.8)
MONOCYTES # BLD AUTO: 0.78 K/UL (ref 0–0.8)
MONOCYTES # BLD AUTO: 0.83 K/UL (ref 0–0.8)
MONOCYTES NFR BLD AUTO: 1.5 % (ref 2–6)
MONOCYTES NFR BLD AUTO: 2.3 % (ref 2–6)
MONOCYTES NFR BLD AUTO: 2.8 % (ref 2–6)
MONOCYTES NFR BLD AUTO: 3.2 % (ref 2–6)
MONOCYTES NFR BLD AUTO: 3.4 % (ref 2–6)
MONOCYTES NFR BLD AUTO: 4 % (ref 2–6)
MONOCYTES NFR BLD AUTO: 4.1 % (ref 2–6)
MONOCYTES NFR BLD AUTO: 4.4 % (ref 2–6)
MONOCYTES NFR BLD AUTO: 4.7 % (ref 2–6)
MONOCYTES NFR BLD AUTO: 4.8 % (ref 2–6)
MONOCYTES NFR BLD AUTO: 4.9 % (ref 2–6)
MONOCYTES NFR BLD AUTO: 5 % (ref 2–6)
MONOCYTES NFR BLD AUTO: 5.2 % (ref 2–6)
MONOCYTES NFR BLD AUTO: 5.2 % (ref 2–6)
MONOCYTES NFR BLD AUTO: 5.4 % (ref 2–6)
MONOCYTES NFR BLD AUTO: 5.5 % (ref 2–6)
MONOCYTES NFR BLD AUTO: 5.7 % (ref 2–6)
MONOCYTES NFR BLD AUTO: 5.8 % (ref 2–6)
MONOCYTES NFR BLD AUTO: 6.1 % (ref 2–6)
MONOCYTES NFR BLD AUTO: 6.1 % (ref 2–6)
MONOCYTES NFR BLD AUTO: 6.7 % (ref 2–6)
MONOCYTES NFR BLD AUTO: 6.9 % (ref 2–6)
MONOCYTES NFR BLD MANUAL: 3 % (ref 2–6)
MONOCYTES NFR FLD MANUAL: 14 %
MPC BLD CALC-MCNC: 10.7 FL (ref 9.4–12.4)
MPC BLD CALC-MCNC: 11 FL (ref 9.4–12.4)
MPC BLD CALC-MCNC: 11.2 FL (ref 9.4–12.4)
MPC BLD CALC-MCNC: 11.3 FL (ref 9.4–12.4)
MPC BLD CALC-MCNC: 11.4 FL (ref 9.4–12.4)
MPC BLD CALC-MCNC: 11.8 FL (ref 9.4–12.4)
MPC BLD CALC-MCNC: 11.8 FL (ref 9.4–12.4)
MPC BLD CALC-MCNC: 11.9 FL (ref 9.4–12.4)
MPC BLD CALC-MCNC: 11.9 FL (ref 9.4–12.4)
MPC BLD CALC-MCNC: 12 FL (ref 9.4–12.4)
MPC BLD CALC-MCNC: 12.3 FL (ref 9.4–12.4)
MPC BLD CALC-MCNC: 12.3 FL (ref 9.4–12.4)
MPC BLD CALC-MCNC: 12.4 FL (ref 9.4–12.4)
MPC BLD CALC-MCNC: 12.4 FL (ref 9.4–12.4)
MPC BLD CALC-MCNC: 12.5 FL (ref 9.4–12.4)
MPC BLD CALC-MCNC: 12.6 FL (ref 9.4–12.4)
MPC BLD CALC-MCNC: 12.7 FL (ref 9.4–12.4)
MPC BLD CALC-MCNC: 12.9 FL (ref 9.4–12.4)
MPC BLD CALC-MCNC: 13 FL (ref 9.4–12.4)
MPC BLD CALC-MCNC: 13 FL (ref 9.4–12.4)
MPC BLD CALC-MCNC: 13.4 FL (ref 9.4–12.4)
MPC BLD CALC-MCNC: ABNORMAL G/DL
MV MEAN GRADIENT: 1 MMHG
MV PEAK A VEL: 0.86 M/S
MV PEAK E VEL: 1.03 M/S
MV PEAK GRADIENT: 2 MMHG
MV STENOSIS PRESSURE HALF TIME: 35.35 MS
MV VALVE AREA BY CONTINUITY EQUATION: 7.22 CM2
MV VALVE AREA P 1/2 METHOD: 6.22 CM2
NEUTROPHILS # BLD AUTO: 10.34 K/UL (ref 1.8–7.7)
NEUTROPHILS # BLD AUTO: 10.35 K/UL (ref 1.8–7.7)
NEUTROPHILS # BLD AUTO: 10.37 K/UL (ref 1.8–7.7)
NEUTROPHILS # BLD AUTO: 10.63 K/UL (ref 1.8–7.7)
NEUTROPHILS # BLD AUTO: 10.63 K/UL (ref 1.8–7.7)
NEUTROPHILS # BLD AUTO: 10.96 K/UL (ref 1.8–7.7)
NEUTROPHILS # BLD AUTO: 11.12 K/UL (ref 1.8–7.7)
NEUTROPHILS # BLD AUTO: 11.27 K/UL (ref 1.8–7.7)
NEUTROPHILS # BLD AUTO: 11.39 K/UL (ref 1.8–7.7)
NEUTROPHILS # BLD AUTO: 11.58 K/UL (ref 1.8–7.7)
NEUTROPHILS # BLD AUTO: 11.95 K/UL (ref 1.8–7.7)
NEUTROPHILS # BLD AUTO: 11.99 K/UL (ref 1.8–7.7)
NEUTROPHILS # BLD AUTO: 12.23 K/UL (ref 1.8–7.7)
NEUTROPHILS # BLD AUTO: 13.03 K/UL (ref 1.8–7.7)
NEUTROPHILS # BLD AUTO: 13.44 K/UL (ref 1.8–7.7)
NEUTROPHILS # BLD AUTO: 14.49 K/UL (ref 1.8–7.7)
NEUTROPHILS # BLD AUTO: 5.23 K/UL (ref 1.8–7.7)
NEUTROPHILS # BLD AUTO: 8.23 K/UL (ref 1.8–7.7)
NEUTROPHILS # BLD AUTO: 8.47 K/UL (ref 1.8–7.7)
NEUTROPHILS # BLD AUTO: 8.95 K/UL (ref 1.8–7.7)
NEUTROPHILS # BLD AUTO: 9.11 K/UL (ref 1.8–7.7)
NEUTROPHILS # BLD AUTO: 9.9 K/UL (ref 1.8–7.7)
NEUTROPHILS NFR BLD AUTO: 73.9 % (ref 53–65)
NEUTROPHILS NFR BLD AUTO: 87.9 % (ref 53–65)
NEUTROPHILS NFR BLD AUTO: 88.4 % (ref 53–65)
NEUTROPHILS NFR BLD AUTO: 88.7 % (ref 53–65)
NEUTROPHILS NFR BLD AUTO: 89.2 % (ref 53–65)
NEUTROPHILS NFR BLD AUTO: 89.2 % (ref 53–65)
NEUTROPHILS NFR BLD AUTO: 89.6 % (ref 53–65)
NEUTROPHILS NFR BLD AUTO: 89.6 % (ref 53–65)
NEUTROPHILS NFR BLD AUTO: 89.8 % (ref 53–65)
NEUTROPHILS NFR BLD AUTO: 90 % (ref 53–65)
NEUTROPHILS NFR BLD AUTO: 90 % (ref 53–65)
NEUTROPHILS NFR BLD AUTO: 90.1 % (ref 53–65)
NEUTROPHILS NFR BLD AUTO: 90.2 % (ref 53–65)
NEUTROPHILS NFR BLD AUTO: 91 % (ref 53–65)
NEUTROPHILS NFR BLD AUTO: 91.4 % (ref 53–65)
NEUTROPHILS NFR BLD AUTO: 91.5 % (ref 53–65)
NEUTROPHILS NFR BLD AUTO: 92.2 % (ref 53–65)
NEUTROPHILS NFR BLD AUTO: 93.4 % (ref 53–65)
NEUTROPHILS NFR BLD AUTO: 93.6 % (ref 53–65)
NEUTROPHILS NFR BLD AUTO: 94.9 % (ref 53–65)
NEUTROPHILS NFR BLD AUTO: 95.3 % (ref 53–65)
NEUTROPHILS NFR BLD AUTO: 95.8 % (ref 53–65)
NEUTS SEG NFR BLD MANUAL: 68 % (ref 50–62)
NRBC # BLD AUTO: 0 X10E3/UL
NRBC # BLD AUTO: 0.02 X10E3/UL
NRBC, AUTO (.00): 0 %
NRBC, AUTO (.00): 0.3 %
OHS CV RV/LV RATIO: 1.1 CM
OHS CV RV/LV RATIO: 1.11 CM
OHS LV EJECTION FRACTION SIMPSONS BIPLANE MOD: 15 %
OHS LV EJECTION FRACTION SIMPSONS BIPLANE MOD: 55 %
OHS QRS DURATION: 104 MS
OHS QRS DURATION: 92 MS
OHS QTC CALCULATION: 462 MS
OHS QTC CALCULATION: 505 MS
OVALOCYTES BLD QL SMEAR: ABNORMAL
PCO2 BLDA: 44 MMHG (ref 35–48)
PCO2 BLDA: 53 MMHG (ref 41–51)
PCO2 BLDA: 95 MMHG (ref 41–51)
PH SMN: 6.92 [PH] (ref 7.32–7.42)
PH SMN: 7.32 [PH] (ref 7.35–7.45)
PH SMN: 7.45 [PH] (ref 7.32–7.42)
PHOSPHATE SERPL-MCNC: 2.2 MG/DL (ref 2.5–4.5)
PHOSPHATE SERPL-MCNC: 2.2 MG/DL (ref 2.5–4.5)
PHOSPHATE SERPL-MCNC: 3.1 MG/DL (ref 2.5–4.5)
PHOSPHATE SERPL-MCNC: 3.4 MG/DL (ref 2.5–4.5)
PHOSPHATE SERPL-MCNC: 3.9 MG/DL (ref 2.5–4.5)
PISA MRMAX VEL: 3.47 M/S
PISA TR MAX VEL: 4.72 M/S
PLATELET # BLD AUTO: 100 K/UL (ref 150–400)
PLATELET # BLD AUTO: 116 K/UL (ref 150–400)
PLATELET # BLD AUTO: 120 K/UL (ref 150–400)
PLATELET # BLD AUTO: 122 K/UL (ref 150–400)
PLATELET # BLD AUTO: 128 K/UL (ref 150–400)
PLATELET # BLD AUTO: 131 K/UL (ref 150–400)
PLATELET # BLD AUTO: 134 K/UL (ref 150–400)
PLATELET # BLD AUTO: 135 K/UL (ref 150–400)
PLATELET # BLD AUTO: 145 K/UL (ref 150–400)
PLATELET # BLD AUTO: 145 K/UL (ref 150–400)
PLATELET # BLD AUTO: 152 K/UL (ref 150–400)
PLATELET # BLD AUTO: 156 K/UL (ref 150–400)
PLATELET # BLD AUTO: 157 K/UL (ref 150–400)
PLATELET # BLD AUTO: 157 K/UL (ref 150–400)
PLATELET # BLD AUTO: 170 K/UL (ref 150–400)
PLATELET # BLD AUTO: 171 K/UL (ref 150–400)
PLATELET # BLD AUTO: 197 K/UL (ref 150–400)
PLATELET # BLD AUTO: 72 K/UL (ref 150–400)
PLATELET # BLD AUTO: 76 K/UL (ref 150–400)
PLATELET # BLD AUTO: 77 K/UL (ref 150–400)
PLATELET # BLD AUTO: 79 K/UL (ref 150–400)
PLATELET # BLD AUTO: 82 K/UL (ref 150–400)
PLATELET MORPHOLOGY: ABNORMAL
PO2 BLDA: 25 MMHG (ref 25–40)
PO2 BLDA: 25 MMHG (ref 25–40)
PO2 BLDA: 305 MMHG (ref 83–108)
POC BASE EXCESS: -14.5 MMOL/L (ref -2–3)
POC BASE EXCESS: -3.4 MMOL/L (ref -2–3)
POC BASE EXCESS: 11.3 MMOL/L (ref -2–3)
POC CO2: 22.4 MMOL/L
POC CO2: 38.4 MMOL/L
POC IONIZED CALCIUM: 1.08 MMOL/L (ref 1.15–1.35)
POC IONIZED CALCIUM: 1.19 MMOL/L (ref 1.15–1.35)
POC OCCULT BLOOD STOOL: NEGATIVE
POC SATURATED O2: 100 % (ref 95–98)
POC SATURATED O2: 16 % (ref 40–70)
POC SATURATED O2: 49 % (ref 40–70)
POCT GLUCOSE: 111 MG/DL (ref 60–95)
POCT GLUCOSE: >500 MG/DL (ref 60–95)
POLYCHROMASIA BLD QL SMEAR: ABNORMAL
POTASSIUM BLD-SCNC: 3.1 MMOL/L (ref 3.4–4.5)
POTASSIUM BLD-SCNC: 7.5 MMOL/L (ref 3.4–4.5)
POTASSIUM SERPL-SCNC: 3.2 MMOL/L (ref 3.5–5.1)
POTASSIUM SERPL-SCNC: 3.2 MMOL/L (ref 3.5–5.1)
POTASSIUM SERPL-SCNC: 3.5 MMOL/L (ref 3.5–5.1)
POTASSIUM SERPL-SCNC: 3.5 MMOL/L (ref 3.5–5.1)
POTASSIUM SERPL-SCNC: 3.9 MMOL/L (ref 3.5–5.1)
POTASSIUM SERPL-SCNC: 3.9 MMOL/L (ref 3.5–5.1)
POTASSIUM SERPL-SCNC: 4.1 MMOL/L (ref 3.5–5.1)
POTASSIUM SERPL-SCNC: 4.2 MMOL/L (ref 3.5–5.1)
POTASSIUM SERPL-SCNC: 4.2 MMOL/L (ref 3.5–5.1)
POTASSIUM SERPL-SCNC: 4.3 MMOL/L (ref 3.5–5.1)
POTASSIUM SERPL-SCNC: 4.4 MMOL/L (ref 3.5–5.1)
POTASSIUM SERPL-SCNC: 4.5 MMOL/L (ref 3.5–5.1)
POTASSIUM SERPL-SCNC: 4.6 MMOL/L (ref 3.5–5.1)
POTASSIUM SERPL-SCNC: 4.6 MMOL/L (ref 3.5–5.1)
POTASSIUM SERPL-SCNC: 4.7 MMOL/L (ref 3.5–5.1)
POTASSIUM SERPL-SCNC: 4.7 MMOL/L (ref 3.5–5.1)
POTASSIUM SERPL-SCNC: 4.8 MMOL/L (ref 3.5–5.1)
POTASSIUM SERPL-SCNC: 5.5 MMOL/L (ref 3.5–5.1)
POTASSIUM SERPL-SCNC: 8 MMOL/L (ref 3.5–5.1)
PROT FLD-MCNC: 3.8 G/DL
PROT SERPL-MCNC: 4.5 G/DL (ref 6.4–8.2)
PROT SERPL-MCNC: 4.8 G/DL (ref 6.4–8.2)
PROT SERPL-MCNC: 5.7 G/DL (ref 6.4–8.2)
PROT SERPL-MCNC: 5.8 G/DL (ref 6.4–8.2)
PROT SERPL-MCNC: 6.1 G/DL (ref 6.4–8.2)
PROT SERPL-MCNC: 6.2 G/DL (ref 6.4–8.2)
PROT SERPL-MCNC: 6.2 G/DL (ref 6.4–8.2)
PROT SERPL-MCNC: 6.3 G/DL (ref 6.4–8.2)
PROT SERPL-MCNC: 6.5 G/DL (ref 6.4–8.2)
PROT SERPL-MCNC: 6.9 G/DL (ref 6.4–8.2)
PROTHROMBIN TIME: 15.7 SECONDS (ref 11.7–14.7)
PROTHROMBIN TIME: 16.2 SECONDS (ref 11.7–14.7)
PROTHROMBIN TIME: 17.2 SECONDS (ref 11.7–14.7)
PROTHROMBIN TIME: 18.3 SECONDS (ref 11.7–14.7)
PROTHROMBIN TIME: 29.9 SECONDS (ref 11.7–14.7)
PV PEAK GRADIENT: 8 MMHG
PV PEAK VELOCITY: 1.41 M/S
RA MAJOR: 3.92 CM
RA MAJOR: 4.36 CM
RA PRESSURE ESTIMATED: 15 MMHG
RBC # BLD AUTO: 1.74 M/UL (ref 4.6–6.2)
RBC # BLD AUTO: 2.25 M/UL (ref 4.6–6.2)
RBC # BLD AUTO: 2.61 M/UL (ref 4.6–6.2)
RBC # BLD AUTO: 2.72 M/UL (ref 4.6–6.2)
RBC # BLD AUTO: 2.77 M/UL (ref 4.6–6.2)
RBC # BLD AUTO: 2.78 M/UL (ref 4.6–6.2)
RBC # BLD AUTO: 2.78 M/UL (ref 4.6–6.2)
RBC # BLD AUTO: 2.89 M/UL (ref 4.6–6.2)
RBC # BLD AUTO: 2.91 M/UL (ref 4.6–6.2)
RBC # BLD AUTO: 2.93 M/UL (ref 4.6–6.2)
RBC # BLD AUTO: 2.98 M/UL (ref 4.6–6.2)
RBC # BLD AUTO: 3.07 M/UL (ref 4.6–6.2)
RBC # BLD AUTO: 3.08 M/UL (ref 4.6–6.2)
RBC # BLD AUTO: 3.1 M/UL (ref 4.6–6.2)
RBC # BLD AUTO: 3.12 M/UL (ref 4.6–6.2)
RBC # BLD AUTO: 3.22 M/UL (ref 4.6–6.2)
RBC # BLD AUTO: 3.28 M/UL (ref 4.6–6.2)
RBC # BLD AUTO: 3.29 M/UL (ref 4.6–6.2)
RBC # BLD AUTO: 3.35 M/UL (ref 4.6–6.2)
RBC # BLD AUTO: 3.48 M/UL (ref 4.6–6.2)
RBC # BLD AUTO: 3.69 M/UL (ref 4.6–6.2)
RBC # BLD AUTO: 3.7 M/UL (ref 4.6–6.2)
RBC # FLD MANUAL: 3000 /CUMM
RH BLD: NORMAL
RIGHT VENTRICULAR END-DIASTOLIC DIMENSION: 2.96 CM
RIGHT VENTRICULAR END-DIASTOLIC DIMENSION: 4.06 CM
RV TB RVSP: 20 MMHG
SODIUM BLD-SCNC: 123 MMOL/L (ref 136–145)
SODIUM BLD-SCNC: 134 MMOL/L (ref 136–145)
SODIUM SERPL-SCNC: 129 MMOL/L (ref 136–145)
SODIUM SERPL-SCNC: 130 MMOL/L (ref 136–145)
SODIUM SERPL-SCNC: 132 MMOL/L (ref 136–145)
SODIUM SERPL-SCNC: 132 MMOL/L (ref 136–145)
SODIUM SERPL-SCNC: 133 MMOL/L (ref 136–145)
SODIUM SERPL-SCNC: 134 MMOL/L (ref 136–145)
SODIUM SERPL-SCNC: 135 MMOL/L (ref 136–145)
SODIUM SERPL-SCNC: 136 MMOL/L (ref 136–145)
SODIUM SERPL-SCNC: 137 MMOL/L (ref 136–145)
SODIUM SERPL-SCNC: 138 MMOL/L (ref 136–145)
SODIUM SERPL-SCNC: 142 MMOL/L (ref 136–145)
SODIUM SERPL-SCNC: 142 MMOL/L (ref 136–145)
SPECIMEN OUTDATE: NORMAL
TARGETS BLD QL SMEAR: ABNORMAL
TDI LATERAL: 0.07 M/S
TDI LATERAL: 0.14 M/S
TDI SEPTAL: 0.06 M/S
TDI SEPTAL: 0.08 M/S
TDI: 0.07 M/S
TDI: 0.11 M/S
TIBC SERPL-MCNC: 105 ΜG/DL (ref 250–450)
TR MAX PG: 89 MMHG
TRICUSPID ANNULAR PLANE SYSTOLIC EXCURSION: 0.7 CM
TRICUSPID ANNULAR PLANE SYSTOLIC EXCURSION: 1.35 CM
TROPONIN I SERPL DL<=0.01 NG/ML-MCNC: 193.9 PG/ML
TROPONIN I SERPL DL<=0.01 NG/ML-MCNC: 298.8 PG/ML
TROPONIN I SERPL DL<=0.01 NG/ML-MCNC: 3648.9 PG/ML
TROPONIN I SERPL DL<=0.01 NG/ML-MCNC: 39.9 PG/ML
TROPONIN I SERPL DL<=0.01 NG/ML-MCNC: 738.1 PG/ML
TSH SERPL DL<=0.005 MIU/L-ACNC: 2.61 UIU/ML (ref 0.36–3.74)
TV REST PULMONARY ARTERY PRESSURE: 104 MMHG
UNIT NUMBER: NORMAL
WBC # BLD AUTO: 10.36 K/UL (ref 4.5–11)
WBC # BLD AUTO: 10.9 K/UL (ref 4.5–11)
WBC # BLD AUTO: 11.05 K/UL (ref 4.5–11)
WBC # BLD AUTO: 11.55 K/UL (ref 4.5–11)
WBC # BLD AUTO: 11.68 K/UL (ref 4.5–11)
WBC # BLD AUTO: 11.92 K/UL (ref 4.5–11)
WBC # BLD AUTO: 11.96 K/UL (ref 4.5–11)
WBC # BLD AUTO: 12.03 K/UL (ref 4.5–11)
WBC # BLD AUTO: 12.05 K/UL (ref 4.5–11)
WBC # BLD AUTO: 12.34 K/UL (ref 4.5–11)
WBC # BLD AUTO: 12.67 K/UL (ref 4.5–11)
WBC # BLD AUTO: 12.67 K/UL (ref 4.5–11)
WBC # BLD AUTO: 13.27 K/UL (ref 4.5–11)
WBC # BLD AUTO: 13.4 K/UL (ref 4.5–11)
WBC # BLD AUTO: 13.44 K/UL (ref 4.5–11)
WBC # BLD AUTO: 13.6 K/UL (ref 4.5–11)
WBC # BLD AUTO: 14.39 K/UL (ref 4.5–11)
WBC # BLD AUTO: 15.21 K/UL (ref 4.5–11)
WBC # BLD AUTO: 7.08 K/UL (ref 4.5–11)
WBC # BLD AUTO: 9.15 K/UL (ref 4.5–11)
WBC # BLD AUTO: 9.18 K/UL (ref 4.5–11)
WBC # BLD AUTO: 9.95 K/UL (ref 4.5–11)
WBC # FLD MANUAL: 83 /CUMM
Z-SCORE OF LEFT VENTRICULAR DIMENSION IN END DIASTOLE: -2.58
Z-SCORE OF LEFT VENTRICULAR DIMENSION IN END DIASTOLE: -5.65
Z-SCORE OF LEFT VENTRICULAR DIMENSION IN END SYSTOLE: -1.22
Z-SCORE OF LEFT VENTRICULAR DIMENSION IN END SYSTOLE: -1.98

## 2024-01-01 PROCEDURE — 36591 DRAW BLOOD OFF VENOUS DEVICE: CPT | Performed by: NURSE PRACTITIONER

## 2024-01-01 PROCEDURE — 84484 ASSAY OF TROPONIN QUANT: CPT | Performed by: STUDENT IN AN ORGANIZED HEALTH CARE EDUCATION/TRAINING PROGRAM

## 2024-01-01 PROCEDURE — 25000003 PHARM REV CODE 250: Performed by: STUDENT IN AN ORGANIZED HEALTH CARE EDUCATION/TRAINING PROGRAM

## 2024-01-01 PROCEDURE — 36415 COLL VENOUS BLD VENIPUNCTURE: CPT | Performed by: STUDENT IN AN ORGANIZED HEALTH CARE EDUCATION/TRAINING PROGRAM

## 2024-01-01 PROCEDURE — 97110 THERAPEUTIC EXERCISES: CPT | Mod: CQ

## 2024-01-01 PROCEDURE — 80048 BASIC METABOLIC PNL TOTAL CA: CPT | Performed by: INTERNAL MEDICINE

## 2024-01-01 PROCEDURE — C9113 INJ PANTOPRAZOLE SODIUM, VIA: HCPCS | Performed by: STUDENT IN AN ORGANIZED HEALTH CARE EDUCATION/TRAINING PROGRAM

## 2024-01-01 PROCEDURE — 83735 ASSAY OF MAGNESIUM: CPT | Performed by: NURSE PRACTITIONER

## 2024-01-01 PROCEDURE — 99900035 HC TECH TIME PER 15 MIN (STAT)

## 2024-01-01 PROCEDURE — 25000003 PHARM REV CODE 250: Performed by: INTERNAL MEDICINE

## 2024-01-01 PROCEDURE — 63600175 PHARM REV CODE 636 W HCPCS: Performed by: STUDENT IN AN ORGANIZED HEALTH CARE EDUCATION/TRAINING PROGRAM

## 2024-01-01 PROCEDURE — 11000001 HC ACUTE MED/SURG PRIVATE ROOM

## 2024-01-01 PROCEDURE — 83735 ASSAY OF MAGNESIUM: CPT | Performed by: EMERGENCY MEDICINE

## 2024-01-01 PROCEDURE — 99233 SBSQ HOSP IP/OBS HIGH 50: CPT | Mod: ,,, | Performed by: INTERNAL MEDICINE

## 2024-01-01 PROCEDURE — 36415 COLL VENOUS BLD VENIPUNCTURE: CPT | Performed by: INTERNAL MEDICINE

## 2024-01-01 PROCEDURE — 94761 N-INVAS EAR/PLS OXIMETRY MLT: CPT

## 2024-01-01 PROCEDURE — 25000003 PHARM REV CODE 250

## 2024-01-01 PROCEDURE — 25000003 PHARM REV CODE 250: Performed by: NURSE PRACTITIONER

## 2024-01-01 PROCEDURE — 82550 ASSAY OF CK (CPK): CPT | Performed by: NURSE PRACTITIONER

## 2024-01-01 PROCEDURE — 92610 EVALUATE SWALLOWING FUNCTION: CPT

## 2024-01-01 PROCEDURE — 63600175 PHARM REV CODE 636 W HCPCS: Mod: JZ,JG | Performed by: NURSE PRACTITIONER

## 2024-01-01 PROCEDURE — 87102 FUNGUS ISOLATION CULTURE: CPT | Performed by: STUDENT IN AN ORGANIZED HEALTH CARE EDUCATION/TRAINING PROGRAM

## 2024-01-01 PROCEDURE — P9016 RBC LEUKOCYTES REDUCED: HCPCS | Performed by: STUDENT IN AN ORGANIZED HEALTH CARE EDUCATION/TRAINING PROGRAM

## 2024-01-01 PROCEDURE — 99285 EMERGENCY DEPT VISIT HI MDM: CPT | Mod: 25

## 2024-01-01 PROCEDURE — 99233 SBSQ HOSP IP/OBS HIGH 50: CPT | Mod: ,,, | Performed by: STUDENT IN AN ORGANIZED HEALTH CARE EDUCATION/TRAINING PROGRAM

## 2024-01-01 PROCEDURE — 83605 ASSAY OF LACTIC ACID: CPT

## 2024-01-01 PROCEDURE — 82962 GLUCOSE BLOOD TEST: CPT

## 2024-01-01 PROCEDURE — 85018 HEMOGLOBIN: CPT | Performed by: STUDENT IN AN ORGANIZED HEALTH CARE EDUCATION/TRAINING PROGRAM

## 2024-01-01 PROCEDURE — 86850 RBC ANTIBODY SCREEN: CPT | Performed by: EMERGENCY MEDICINE

## 2024-01-01 PROCEDURE — A4217 STERILE WATER/SALINE, 500 ML: HCPCS | Performed by: STUDENT IN AN ORGANIZED HEALTH CARE EDUCATION/TRAINING PROGRAM

## 2024-01-01 PROCEDURE — 80048 BASIC METABOLIC PNL TOTAL CA: CPT | Performed by: STUDENT IN AN ORGANIZED HEALTH CARE EDUCATION/TRAINING PROGRAM

## 2024-01-01 PROCEDURE — 27000221 HC OXYGEN, UP TO 24 HOURS

## 2024-01-01 PROCEDURE — 80053 COMPREHEN METABOLIC PANEL: CPT | Performed by: EMERGENCY MEDICINE

## 2024-01-01 PROCEDURE — 90935 HEMODIALYSIS ONE EVALUATION: CPT

## 2024-01-01 PROCEDURE — 99233 SBSQ HOSP IP/OBS HIGH 50: CPT | Mod: 25,,, | Performed by: STUDENT IN AN ORGANIZED HEALTH CARE EDUCATION/TRAINING PROGRAM

## 2024-01-01 PROCEDURE — 85025 COMPLETE CBC W/AUTO DIFF WBC: CPT | Performed by: INTERNAL MEDICINE

## 2024-01-01 PROCEDURE — 63600175 PHARM REV CODE 636 W HCPCS

## 2024-01-01 PROCEDURE — 97110 THERAPEUTIC EXERCISES: CPT

## 2024-01-01 PROCEDURE — 63600175 PHARM REV CODE 636 W HCPCS: Performed by: INTERNAL MEDICINE

## 2024-01-01 PROCEDURE — 86900 BLOOD TYPING SEROLOGIC ABO: CPT | Performed by: EMERGENCY MEDICINE

## 2024-01-01 PROCEDURE — 87040 BLOOD CULTURE FOR BACTERIA: CPT | Performed by: STUDENT IN AN ORGANIZED HEALTH CARE EDUCATION/TRAINING PROGRAM

## 2024-01-01 PROCEDURE — 85025 COMPLETE CBC W/AUTO DIFF WBC: CPT | Performed by: STUDENT IN AN ORGANIZED HEALTH CARE EDUCATION/TRAINING PROGRAM

## 2024-01-01 PROCEDURE — 97530 THERAPEUTIC ACTIVITIES: CPT

## 2024-01-01 PROCEDURE — 02HV33Z INSERTION OF INFUSION DEVICE INTO SUPERIOR VENA CAVA, PERCUTANEOUS APPROACH: ICD-10-PCS | Performed by: STUDENT IN AN ORGANIZED HEALTH CARE EDUCATION/TRAINING PROGRAM

## 2024-01-01 PROCEDURE — 85610 PROTHROMBIN TIME: CPT | Performed by: EMERGENCY MEDICINE

## 2024-01-01 PROCEDURE — 5A1D70Z PERFORMANCE OF URINARY FILTRATION, INTERMITTENT, LESS THAN 6 HOURS PER DAY: ICD-10-PCS | Performed by: INTERNAL MEDICINE

## 2024-01-01 PROCEDURE — 82947 ASSAY GLUCOSE BLOOD QUANT: CPT

## 2024-01-01 PROCEDURE — 83605 ASSAY OF LACTIC ACID: CPT | Performed by: STUDENT IN AN ORGANIZED HEALTH CARE EDUCATION/TRAINING PROGRAM

## 2024-01-01 PROCEDURE — 32551 INSERTION OF CHEST TUBE: CPT | Mod: RT,,, | Performed by: STUDENT IN AN ORGANIZED HEALTH CARE EDUCATION/TRAINING PROGRAM

## 2024-01-01 PROCEDURE — 84100 ASSAY OF PHOSPHORUS: CPT | Performed by: STUDENT IN AN ORGANIZED HEALTH CARE EDUCATION/TRAINING PROGRAM

## 2024-01-01 PROCEDURE — 99499 UNLISTED E&M SERVICE: CPT | Mod: ,,, | Performed by: STUDENT IN AN ORGANIZED HEALTH CARE EDUCATION/TRAINING PROGRAM

## 2024-01-01 PROCEDURE — 85610 PROTHROMBIN TIME: CPT | Performed by: NURSE PRACTITIONER

## 2024-01-01 PROCEDURE — 20000000 HC ICU ROOM

## 2024-01-01 PROCEDURE — 32562 LYSE CHEST FIBRIN SUBQ DAY: CPT | Mod: ,,, | Performed by: STUDENT IN AN ORGANIZED HEALTH CARE EDUCATION/TRAINING PROGRAM

## 2024-01-01 PROCEDURE — 82330 ASSAY OF CALCIUM: CPT

## 2024-01-01 PROCEDURE — 84484 ASSAY OF TROPONIN QUANT: CPT | Performed by: EMERGENCY MEDICINE

## 2024-01-01 PROCEDURE — 99232 SBSQ HOSP IP/OBS MODERATE 35: CPT | Mod: ,,, | Performed by: STUDENT IN AN ORGANIZED HEALTH CARE EDUCATION/TRAINING PROGRAM

## 2024-01-01 PROCEDURE — P9016 RBC LEUKOCYTES REDUCED: HCPCS | Performed by: INTERNAL MEDICINE

## 2024-01-01 PROCEDURE — 83735 ASSAY OF MAGNESIUM: CPT | Performed by: STUDENT IN AN ORGANIZED HEALTH CARE EDUCATION/TRAINING PROGRAM

## 2024-01-01 PROCEDURE — 85025 COMPLETE CBC W/AUTO DIFF WBC: CPT | Performed by: NURSE PRACTITIONER

## 2024-01-01 PROCEDURE — 89051 BODY FLUID CELL COUNT: CPT | Performed by: STUDENT IN AN ORGANIZED HEALTH CARE EDUCATION/TRAINING PROGRAM

## 2024-01-01 PROCEDURE — 92950 HEART/LUNG RESUSCITATION CPR: CPT

## 2024-01-01 PROCEDURE — 88112 CYTOPATH CELL ENHANCE TECH: CPT | Mod: 26,,, | Performed by: PATHOLOGY

## 2024-01-01 PROCEDURE — 25000003 PHARM REV CODE 250: Performed by: FAMILY MEDICINE

## 2024-01-01 PROCEDURE — 25000003 PHARM REV CODE 250: Performed by: EMERGENCY MEDICINE

## 2024-01-01 PROCEDURE — 99291 CRITICAL CARE FIRST HOUR: CPT | Mod: 25,,, | Performed by: STUDENT IN AN ORGANIZED HEALTH CARE EDUCATION/TRAINING PROGRAM

## 2024-01-01 PROCEDURE — 97530 THERAPEUTIC ACTIVITIES: CPT | Mod: CQ

## 2024-01-01 PROCEDURE — 36415 COLL VENOUS BLD VENIPUNCTURE: CPT | Performed by: EMERGENCY MEDICINE

## 2024-01-01 PROCEDURE — 94010 BREATHING CAPACITY TEST: CPT

## 2024-01-01 PROCEDURE — 63600175 PHARM REV CODE 636 W HCPCS: Performed by: EMERGENCY MEDICINE

## 2024-01-01 PROCEDURE — 83605 ASSAY OF LACTIC ACID: CPT | Performed by: INTERNAL MEDICINE

## 2024-01-01 PROCEDURE — 86923 COMPATIBILITY TEST ELECTRIC: CPT | Mod: 91 | Performed by: STUDENT IN AN ORGANIZED HEALTH CARE EDUCATION/TRAINING PROGRAM

## 2024-01-01 PROCEDURE — 63600175 PHARM REV CODE 636 W HCPCS: Performed by: NURSE PRACTITIONER

## 2024-01-01 PROCEDURE — 93005 ELECTROCARDIOGRAM TRACING: CPT

## 2024-01-01 PROCEDURE — 94660 CPAP INITIATION&MGMT: CPT

## 2024-01-01 PROCEDURE — 84100 ASSAY OF PHOSPHORUS: CPT | Performed by: NURSE PRACTITIONER

## 2024-01-01 PROCEDURE — 99900026 HC AIRWAY MAINTENANCE (STAT)

## 2024-01-01 PROCEDURE — 0W993ZZ DRAINAGE OF RIGHT PLEURAL CAVITY, PERCUTANEOUS APPROACH: ICD-10-PCS | Performed by: STUDENT IN AN ORGANIZED HEALTH CARE EDUCATION/TRAINING PROGRAM

## 2024-01-01 PROCEDURE — 25000242 PHARM REV CODE 250 ALT 637 W/ HCPCS: Performed by: STUDENT IN AN ORGANIZED HEALTH CARE EDUCATION/TRAINING PROGRAM

## 2024-01-01 PROCEDURE — 96372 THER/PROPH/DIAG INJ SC/IM: CPT

## 2024-01-01 PROCEDURE — 36556 INSERT NON-TUNNEL CV CATH: CPT | Mod: ,,, | Performed by: STUDENT IN AN ORGANIZED HEALTH CARE EDUCATION/TRAINING PROGRAM

## 2024-01-01 PROCEDURE — 82947 ASSAY GLUCOSE BLOOD QUANT: CPT | Performed by: NURSE PRACTITIONER

## 2024-01-01 PROCEDURE — 99291 CRITICAL CARE FIRST HOUR: CPT | Mod: ,,, | Performed by: STUDENT IN AN ORGANIZED HEALTH CARE EDUCATION/TRAINING PROGRAM

## 2024-01-01 PROCEDURE — 63600175 PHARM REV CODE 636 W HCPCS: Mod: JZ,JG | Performed by: STUDENT IN AN ORGANIZED HEALTH CARE EDUCATION/TRAINING PROGRAM

## 2024-01-01 PROCEDURE — 99223 1ST HOSP IP/OBS HIGH 75: CPT | Mod: ,,, | Performed by: INTERNAL MEDICINE

## 2024-01-01 PROCEDURE — 85610 PROTHROMBIN TIME: CPT | Performed by: STUDENT IN AN ORGANIZED HEALTH CARE EDUCATION/TRAINING PROGRAM

## 2024-01-01 PROCEDURE — 94660 CPAP INITIATION&MGMT: CPT | Mod: XB

## 2024-01-01 PROCEDURE — 85014 HEMATOCRIT: CPT | Performed by: INTERNAL MEDICINE

## 2024-01-01 PROCEDURE — 36000706: Performed by: SURGERY

## 2024-01-01 PROCEDURE — 0W9930Z DRAINAGE OF RIGHT PLEURAL CAVITY WITH DRAINAGE DEVICE, PERCUTANEOUS APPROACH: ICD-10-PCS | Performed by: STUDENT IN AN ORGANIZED HEALTH CARE EDUCATION/TRAINING PROGRAM

## 2024-01-01 PROCEDURE — 99239 HOSP IP/OBS DSCHRG MGMT >30: CPT | Mod: ,,, | Performed by: FAMILY MEDICINE

## 2024-01-01 PROCEDURE — 80053 COMPREHEN METABOLIC PANEL: CPT | Performed by: NURSE PRACTITIONER

## 2024-01-01 PROCEDURE — 1111F DSCHRG MED/CURRENT MED MERGE: CPT | Mod: CPTII,,, | Performed by: FAMILY MEDICINE

## 2024-01-01 PROCEDURE — 85025 COMPLETE CBC W/AUTO DIFF WBC: CPT | Performed by: EMERGENCY MEDICINE

## 2024-01-01 PROCEDURE — 84155 ASSAY OF PROTEIN SERUM: CPT | Performed by: INTERNAL MEDICINE

## 2024-01-01 PROCEDURE — 84295 ASSAY OF SERUM SODIUM: CPT

## 2024-01-01 PROCEDURE — 99233 SBSQ HOSP IP/OBS HIGH 50: CPT | Mod: ,,, | Performed by: FAMILY MEDICINE

## 2024-01-01 PROCEDURE — 82803 BLOOD GASES ANY COMBINATION: CPT

## 2024-01-01 PROCEDURE — 36415 COLL VENOUS BLD VENIPUNCTURE: CPT | Performed by: NURSE PRACTITIONER

## 2024-01-01 PROCEDURE — 0BH17EZ INSERTION OF ENDOTRACHEAL AIRWAY INTO TRACHEA, VIA NATURAL OR ARTIFICIAL OPENING: ICD-10-PCS | Performed by: SURGERY

## 2024-01-01 PROCEDURE — 86900 BLOOD TYPING SEROLOGIC ABO: CPT | Performed by: STUDENT IN AN ORGANIZED HEALTH CARE EDUCATION/TRAINING PROGRAM

## 2024-01-01 PROCEDURE — 97535 SELF CARE MNGMENT TRAINING: CPT

## 2024-01-01 PROCEDURE — 37000008 HC ANESTHESIA 1ST 15 MINUTES: Performed by: SURGERY

## 2024-01-01 PROCEDURE — 84100 ASSAY OF PHOSPHORUS: CPT | Performed by: INTERNAL MEDICINE

## 2024-01-01 PROCEDURE — 99292 CRITICAL CARE ADDL 30 MIN: CPT | Mod: 25,,, | Performed by: STUDENT IN AN ORGANIZED HEALTH CARE EDUCATION/TRAINING PROGRAM

## 2024-01-01 PROCEDURE — 84157 ASSAY OF PROTEIN OTHER: CPT | Performed by: STUDENT IN AN ORGANIZED HEALTH CARE EDUCATION/TRAINING PROGRAM

## 2024-01-01 PROCEDURE — 85730 THROMBOPLASTIN TIME PARTIAL: CPT | Performed by: STUDENT IN AN ORGANIZED HEALTH CARE EDUCATION/TRAINING PROGRAM

## 2024-01-01 PROCEDURE — 99223 1ST HOSP IP/OBS HIGH 75: CPT | Mod: ,,, | Performed by: STUDENT IN AN ORGANIZED HEALTH CARE EDUCATION/TRAINING PROGRAM

## 2024-01-01 PROCEDURE — 83605 ASSAY OF LACTIC ACID: CPT | Performed by: NURSE PRACTITIONER

## 2024-01-01 PROCEDURE — 87641 MR-STAPH DNA AMP PROBE: CPT | Performed by: INTERNAL MEDICINE

## 2024-01-01 PROCEDURE — 93010 ELECTROCARDIOGRAM REPORT: CPT | Mod: ,,, | Performed by: STUDENT IN AN ORGANIZED HEALTH CARE EDUCATION/TRAINING PROGRAM

## 2024-01-01 PROCEDURE — 97110 THERAPEUTIC EXERCISES: CPT | Mod: CO

## 2024-01-01 PROCEDURE — 94002 VENT MGMT INPAT INIT DAY: CPT

## 2024-01-01 PROCEDURE — 99900031 HC PATIENT EDUCATION (STAT)

## 2024-01-01 PROCEDURE — 99233 SBSQ HOSP IP/OBS HIGH 50: CPT | Mod: GC,,, | Performed by: INTERNAL MEDICINE

## 2024-01-01 PROCEDURE — 83615 LACTATE (LD) (LDH) ENZYME: CPT | Performed by: INTERNAL MEDICINE

## 2024-01-01 PROCEDURE — 99232 SBSQ HOSP IP/OBS MODERATE 35: CPT | Mod: ,,, | Performed by: FAMILY MEDICINE

## 2024-01-01 PROCEDURE — 99232 SBSQ HOSP IP/OBS MODERATE 35: CPT | Mod: ,,, | Performed by: INTERNAL MEDICINE

## 2024-01-01 PROCEDURE — 32551 INSERTION OF CHEST TUBE: CPT | Mod: RT | Performed by: STUDENT IN AN ORGANIZED HEALTH CARE EDUCATION/TRAINING PROGRAM

## 2024-01-01 PROCEDURE — 99223 1ST HOSP IP/OBS HIGH 75: CPT | Mod: AI,,, | Performed by: STUDENT IN AN ORGANIZED HEALTH CARE EDUCATION/TRAINING PROGRAM

## 2024-01-01 PROCEDURE — 25500020 PHARM REV CODE 255: Performed by: STUDENT IN AN ORGANIZED HEALTH CARE EDUCATION/TRAINING PROGRAM

## 2024-01-01 PROCEDURE — 88305 TISSUE EXAM BY PATHOLOGIST: CPT | Mod: TC,MCY | Performed by: STUDENT IN AN ORGANIZED HEALTH CARE EDUCATION/TRAINING PROGRAM

## 2024-01-01 PROCEDURE — 36000707: Performed by: SURGERY

## 2024-01-01 PROCEDURE — 5A12012 PERFORMANCE OF CARDIAC OUTPUT, SINGLE, MANUAL: ICD-10-PCS | Performed by: SURGERY

## 2024-01-01 PROCEDURE — 97165 OT EVAL LOW COMPLEX 30 MIN: CPT

## 2024-01-01 PROCEDURE — 97535 SELF CARE MNGMENT TRAINING: CPT | Mod: CO

## 2024-01-01 PROCEDURE — 84443 ASSAY THYROID STIM HORMONE: CPT | Performed by: STUDENT IN AN ORGANIZED HEALTH CARE EDUCATION/TRAINING PROGRAM

## 2024-01-01 PROCEDURE — 97116 GAIT TRAINING THERAPY: CPT | Mod: CQ

## 2024-01-01 PROCEDURE — 32561 LYSE CHEST FIBRIN INIT DAY: CPT | Mod: ,,, | Performed by: STUDENT IN AN ORGANIZED HEALTH CARE EDUCATION/TRAINING PROGRAM

## 2024-01-01 PROCEDURE — 86850 RBC ANTIBODY SCREEN: CPT | Performed by: STUDENT IN AN ORGANIZED HEALTH CARE EDUCATION/TRAINING PROGRAM

## 2024-01-01 PROCEDURE — 80053 COMPREHEN METABOLIC PANEL: CPT | Performed by: INTERNAL MEDICINE

## 2024-01-01 PROCEDURE — 84484 ASSAY OF TROPONIN QUANT: CPT | Performed by: NURSE PRACTITIONER

## 2024-01-01 PROCEDURE — 84132 ASSAY OF SERUM POTASSIUM: CPT

## 2024-01-01 PROCEDURE — 5A1945Z RESPIRATORY VENTILATION, 24-96 CONSECUTIVE HOURS: ICD-10-PCS | Performed by: SURGERY

## 2024-01-01 PROCEDURE — 97162 PT EVAL MOD COMPLEX 30 MIN: CPT

## 2024-01-01 PROCEDURE — 82272 OCCULT BLD FECES 1-3 TESTS: CPT

## 2024-01-01 PROCEDURE — 83540 ASSAY OF IRON: CPT | Performed by: STUDENT IN AN ORGANIZED HEALTH CARE EDUCATION/TRAINING PROGRAM

## 2024-01-01 PROCEDURE — 86923 COMPATIBILITY TEST ELECTRIC: CPT | Performed by: INTERNAL MEDICINE

## 2024-01-01 PROCEDURE — 3E0L317 INTRODUCTION OF OTHER THROMBOLYTIC INTO PLEURAL CAVITY, PERCUTANEOUS APPROACH: ICD-10-PCS | Performed by: STUDENT IN AN ORGANIZED HEALTH CARE EDUCATION/TRAINING PROGRAM

## 2024-01-01 PROCEDURE — 37000009 HC ANESTHESIA EA ADD 15 MINS: Performed by: SURGERY

## 2024-01-01 PROCEDURE — 85014 HEMATOCRIT: CPT

## 2024-01-01 PROCEDURE — 88305 TISSUE EXAM BY PATHOLOGIST: CPT | Mod: 26,,, | Performed by: PATHOLOGY

## 2024-01-01 PROCEDURE — 80100014 HC HEMODIALYSIS 1:1

## 2024-01-01 PROCEDURE — 30233N1 TRANSFUSION OF NONAUTOLOGOUS RED BLOOD CELLS INTO PERIPHERAL VEIN, PERCUTANEOUS APPROACH: ICD-10-PCS | Performed by: STUDENT IN AN ORGANIZED HEALTH CARE EDUCATION/TRAINING PROGRAM

## 2024-01-01 PROCEDURE — 94003 VENT MGMT INPAT SUBQ DAY: CPT

## 2024-01-01 PROCEDURE — 99232 SBSQ HOSP IP/OBS MODERATE 35: CPT | Mod: GC,,, | Performed by: INTERNAL MEDICINE

## 2024-01-01 PROCEDURE — 82042 OTHER SOURCE ALBUMIN QUAN EA: CPT | Performed by: STUDENT IN AN ORGANIZED HEALTH CARE EDUCATION/TRAINING PROGRAM

## 2024-01-01 PROCEDURE — 87070 CULTURE OTHR SPECIMN AEROBIC: CPT | Performed by: STUDENT IN AN ORGANIZED HEALTH CARE EDUCATION/TRAINING PROGRAM

## 2024-01-01 PROCEDURE — 85730 THROMBOPLASTIN TIME PARTIAL: CPT | Performed by: EMERGENCY MEDICINE

## 2024-01-01 PROCEDURE — 25000242 PHARM REV CODE 250 ALT 637 W/ HCPCS: Performed by: NURSE PRACTITIONER

## 2024-01-01 PROCEDURE — A4217 STERILE WATER/SALINE, 500 ML: HCPCS | Performed by: NURSE PRACTITIONER

## 2024-01-01 PROCEDURE — 82728 ASSAY OF FERRITIN: CPT | Performed by: STUDENT IN AN ORGANIZED HEALTH CARE EDUCATION/TRAINING PROGRAM

## 2024-01-01 PROCEDURE — 99900017 HC EXTUBATION W/PARAMETERS (STAT)

## 2024-01-01 PROCEDURE — 93010 ELECTROCARDIOGRAM REPORT: CPT | Mod: ,,, | Performed by: INTERNAL MEDICINE

## 2024-01-01 PROCEDURE — 85018 HEMOGLOBIN: CPT | Performed by: INTERNAL MEDICINE

## 2024-01-01 PROCEDURE — 83605 ASSAY OF LACTIC ACID: CPT | Performed by: EMERGENCY MEDICINE

## 2024-01-01 PROCEDURE — 86923 COMPATIBILITY TEST ELECTRIC: CPT | Performed by: STUDENT IN AN ORGANIZED HEALTH CARE EDUCATION/TRAINING PROGRAM

## 2024-01-01 PROCEDURE — 27000190 HC CPAP FULL FACE MASK W/VALVE

## 2024-01-01 PROCEDURE — 63600175 PHARM REV CODE 636 W HCPCS: Mod: JZ,JG | Performed by: SURGERY

## 2024-01-01 PROCEDURE — 85730 THROMBOPLASTIN TIME PARTIAL: CPT | Performed by: NURSE PRACTITIONER

## 2024-01-01 PROCEDURE — 89050 BODY FLUID CELL COUNT: CPT | Performed by: STUDENT IN AN ORGANIZED HEALTH CARE EDUCATION/TRAINING PROGRAM

## 2024-01-01 PROCEDURE — 83036 HEMOGLOBIN GLYCOSYLATED A1C: CPT | Mod: 90 | Performed by: STUDENT IN AN ORGANIZED HEALTH CARE EDUCATION/TRAINING PROGRAM

## 2024-01-01 RX ORDER — LEVOFLOXACIN 500 MG/1
500 TABLET, FILM COATED ORAL EVERY OTHER DAY
Status: DISCONTINUED | OUTPATIENT
Start: 2024-01-01 | End: 2024-01-01

## 2024-01-01 RX ORDER — POTASSIUM CHLORIDE 20 MEQ/1
40 TABLET, EXTENDED RELEASE ORAL ONCE
Status: COMPLETED | OUTPATIENT
Start: 2024-01-01 | End: 2024-01-01

## 2024-01-01 RX ORDER — DEXMEDETOMIDINE HYDROCHLORIDE 4 UG/ML
0-1.4 INJECTION, SOLUTION INTRAVENOUS CONTINUOUS
Status: DISCONTINUED | OUTPATIENT
Start: 2024-01-01 | End: 2024-01-01

## 2024-01-01 RX ORDER — HEPARIN SODIUM 5000 [USP'U]/ML
5000 INJECTION, SOLUTION INTRAVENOUS; SUBCUTANEOUS EVERY 8 HOURS
Status: DISCONTINUED | OUTPATIENT
Start: 2024-01-01 | End: 2024-01-01

## 2024-01-01 RX ORDER — ACETAMINOPHEN 500 MG
1000 TABLET ORAL EVERY 8 HOURS PRN
Status: DISCONTINUED | OUTPATIENT
Start: 2024-01-01 | End: 2024-01-01

## 2024-01-01 RX ORDER — LEVOFLOXACIN 5 MG/ML
750 INJECTION, SOLUTION INTRAVENOUS
Status: DISCONTINUED | OUTPATIENT
Start: 2024-01-01 | End: 2024-01-01

## 2024-01-01 RX ORDER — LEVOFLOXACIN 5 MG/ML
500 INJECTION, SOLUTION INTRAVENOUS
Status: DISCONTINUED | OUTPATIENT
Start: 2024-01-01 | End: 2024-01-01

## 2024-01-01 RX ORDER — NIFEDIPINE 30 MG/1
30 TABLET, EXTENDED RELEASE ORAL DAILY
Status: DISCONTINUED | OUTPATIENT
Start: 2024-01-01 | End: 2024-01-01

## 2024-01-01 RX ORDER — NOREPINEPHRINE BITARTRATE/D5W 4MG/250ML
PLASTIC BAG, INJECTION (ML) INTRAVENOUS
Status: DISPENSED
Start: 2024-01-01 | End: 2024-01-01

## 2024-01-01 RX ORDER — CHLORHEXIDINE GLUCONATE ORAL RINSE 1.2 MG/ML
15 SOLUTION DENTAL 2 TIMES DAILY
Status: DISCONTINUED | OUTPATIENT
Start: 2024-01-01 | End: 2024-01-01

## 2024-01-01 RX ORDER — PANTOPRAZOLE SODIUM 40 MG/1
40 TABLET, DELAYED RELEASE ORAL DAILY
Status: DISCONTINUED | OUTPATIENT
Start: 2024-01-01 | End: 2024-01-01

## 2024-01-01 RX ORDER — HYDROCODONE BITARTRATE AND ACETAMINOPHEN 500; 5 MG/1; MG/1
TABLET ORAL
Status: DISCONTINUED | OUTPATIENT
Start: 2024-01-01 | End: 2024-01-01

## 2024-01-01 RX ORDER — BUPIVACAINE HYDROCHLORIDE 2.5 MG/ML
INJECTION, SOLUTION EPIDURAL; INFILTRATION; INTRACAUDAL
Status: DISCONTINUED | OUTPATIENT
Start: 2024-01-01 | End: 2024-01-01 | Stop reason: HOSPADM

## 2024-01-01 RX ORDER — HYDRALAZINE HYDROCHLORIDE 20 MG/ML
20 INJECTION INTRAMUSCULAR; INTRAVENOUS EVERY 6 HOURS PRN
Status: DISCONTINUED | OUTPATIENT
Start: 2024-01-01 | End: 2024-01-01

## 2024-01-01 RX ORDER — SODIUM CHLORIDE 9 MG/ML
INJECTION, SOLUTION INTRAVENOUS
Status: COMPLETED
Start: 2024-01-01 | End: 2024-01-01

## 2024-01-01 RX ORDER — METOPROLOL SUCCINATE 25 MG/1
25 TABLET, EXTENDED RELEASE ORAL DAILY
Status: DISCONTINUED | OUTPATIENT
Start: 2024-01-01 | End: 2024-01-01

## 2024-01-01 RX ORDER — HYDRALAZINE HYDROCHLORIDE 100 MG/1
100 TABLET, FILM COATED ORAL EVERY 8 HOURS
Status: DISCONTINUED | OUTPATIENT
Start: 2024-01-01 | End: 2024-01-01

## 2024-01-01 RX ORDER — HYDRALAZINE HYDROCHLORIDE 100 MG/1
100 TABLET, FILM COATED ORAL
Status: DISCONTINUED | OUTPATIENT
Start: 2024-01-01 | End: 2024-01-01

## 2024-01-01 RX ORDER — ZIPRASIDONE MESYLATE 20 MG/ML
20 INJECTION, POWDER, LYOPHILIZED, FOR SOLUTION INTRAMUSCULAR ONCE
Status: COMPLETED | OUTPATIENT
Start: 2024-01-01 | End: 2024-01-01

## 2024-01-01 RX ORDER — MIDODRINE HYDROCHLORIDE 2.5 MG/1
2.5 TABLET ORAL 2 TIMES DAILY WITH MEALS
Status: DISCONTINUED | OUTPATIENT
Start: 2024-01-01 | End: 2024-01-01

## 2024-01-01 RX ORDER — PROPOFOL 10 MG/ML
INJECTION, EMULSION INTRAVENOUS
Status: COMPLETED
Start: 2024-01-01 | End: 2024-01-01

## 2024-01-01 RX ORDER — MICONAZOLE NITRATE 2 %
POWDER (GRAM) TOPICAL 2 TIMES DAILY
Start: 2024-01-01

## 2024-01-01 RX ORDER — CALCIUM CHLORIDE INJECTION 100 MG/ML
INJECTION, SOLUTION INTRAVENOUS CODE/TRAUMA/SEDATION MEDICATION
Status: COMPLETED | OUTPATIENT
Start: 2024-01-01 | End: 2024-01-01

## 2024-01-01 RX ORDER — ISOSORBIDE MONONITRATE 30 MG/1
30 TABLET, EXTENDED RELEASE ORAL DAILY
Status: DISCONTINUED | OUTPATIENT
Start: 2024-01-01 | End: 2024-01-01

## 2024-01-01 RX ORDER — DILTIAZEM HYDROCHLORIDE 5 MG/ML
0.25 INJECTION INTRAVENOUS ONCE
Status: COMPLETED | OUTPATIENT
Start: 2024-01-01 | End: 2024-01-01

## 2024-01-01 RX ORDER — CYPROHEPTADINE HYDROCHLORIDE 4 MG/1
4 TABLET ORAL 2 TIMES DAILY
Start: 2024-01-01

## 2024-01-01 RX ORDER — ACETAMINOPHEN 500 MG
1000 TABLET ORAL EVERY 8 HOURS PRN
Status: DISCONTINUED | OUTPATIENT
Start: 2024-01-01 | End: 2024-01-01 | Stop reason: HOSPADM

## 2024-01-01 RX ORDER — POLYETHYLENE GLYCOL 3350 17 G/17G
17 POWDER, FOR SOLUTION ORAL 2 TIMES DAILY PRN
Status: DISCONTINUED | OUTPATIENT
Start: 2024-01-01 | End: 2024-01-01 | Stop reason: HOSPADM

## 2024-01-01 RX ORDER — IBUPROFEN 200 MG
24 TABLET ORAL
Status: DISCONTINUED | OUTPATIENT
Start: 2024-01-01 | End: 2024-01-01 | Stop reason: HOSPADM

## 2024-01-01 RX ORDER — SEVELAMER CARBONATE 800 MG/1
2400 TABLET, FILM COATED ORAL
Status: DISCONTINUED | OUTPATIENT
Start: 2024-01-01 | End: 2024-01-01 | Stop reason: HOSPADM

## 2024-01-01 RX ORDER — SODIUM CHLORIDE 0.9 % (FLUSH) 0.9 %
10 SYRINGE (ML) INJECTION EVERY 12 HOURS PRN
Status: DISCONTINUED | OUTPATIENT
Start: 2024-01-01 | End: 2024-01-01 | Stop reason: HOSPADM

## 2024-01-01 RX ORDER — NOREPINEPHRINE BITARTRATE/D5W 4MG/250ML
PLASTIC BAG, INJECTION (ML) INTRAVENOUS
Status: COMPLETED
Start: 2024-01-01 | End: 2024-01-01

## 2024-01-01 RX ORDER — MIDODRINE HYDROCHLORIDE 5 MG/1
5 TABLET ORAL 2 TIMES DAILY WITH MEALS
Status: COMPLETED | OUTPATIENT
Start: 2024-01-01 | End: 2024-01-01

## 2024-01-01 RX ORDER — PANTOPRAZOLE SODIUM 40 MG/10ML
40 INJECTION, POWDER, LYOPHILIZED, FOR SOLUTION INTRAVENOUS 2 TIMES DAILY
Status: DISCONTINUED | OUTPATIENT
Start: 2024-01-01 | End: 2024-01-01 | Stop reason: HOSPADM

## 2024-01-01 RX ORDER — CYPROHEPTADINE HYDROCHLORIDE 4 MG/1
4 TABLET ORAL 2 TIMES DAILY
Status: DISCONTINUED | OUTPATIENT
Start: 2024-01-01 | End: 2024-01-01 | Stop reason: HOSPADM

## 2024-01-01 RX ORDER — MICONAZOLE NITRATE 2 %
POWDER (GRAM) TOPICAL 2 TIMES DAILY
Status: DISCONTINUED | OUTPATIENT
Start: 2024-01-01 | End: 2024-01-01 | Stop reason: HOSPADM

## 2024-01-01 RX ORDER — MIDODRINE HYDROCHLORIDE 5 MG/1
5 TABLET ORAL
Status: COMPLETED | OUTPATIENT
Start: 2024-01-01 | End: 2024-01-01

## 2024-01-01 RX ORDER — EPINEPHRINE 0.1 MG/ML
INJECTION INTRAVENOUS CODE/TRAUMA/SEDATION MEDICATION
Status: COMPLETED | OUTPATIENT
Start: 2024-01-01 | End: 2024-01-01

## 2024-01-01 RX ORDER — AMLODIPINE BESYLATE 5 MG/1
5 TABLET ORAL DAILY
Status: ON HOLD | COMMUNITY
Start: 2024-01-01 | End: 2024-01-01 | Stop reason: HOSPADM

## 2024-01-01 RX ORDER — ATORVASTATIN CALCIUM 40 MG/1
40 TABLET, FILM COATED ORAL NIGHTLY
Status: DISCONTINUED | OUTPATIENT
Start: 2024-01-01 | End: 2024-01-01 | Stop reason: HOSPADM

## 2024-01-01 RX ORDER — SEVELAMER CARBONATE 800 MG/1
2400 TABLET, FILM COATED ORAL
Status: DISCONTINUED | OUTPATIENT
Start: 2024-01-01 | End: 2024-01-01

## 2024-01-01 RX ORDER — PROPOFOL 10 MG/ML
0-50 INJECTION, EMULSION INTRAVENOUS CONTINUOUS
Status: DISCONTINUED | OUTPATIENT
Start: 2024-01-01 | End: 2024-01-01

## 2024-01-01 RX ORDER — HYDROXYZINE HYDROCHLORIDE 25 MG/1
25 TABLET, FILM COATED ORAL 4 TIMES DAILY PRN
Status: DISCONTINUED | OUTPATIENT
Start: 2024-01-01 | End: 2024-01-01 | Stop reason: HOSPADM

## 2024-01-01 RX ORDER — MUPIROCIN 20 MG/G
OINTMENT TOPICAL 2 TIMES DAILY
Status: COMPLETED | OUTPATIENT
Start: 2024-01-01 | End: 2024-01-01

## 2024-01-01 RX ORDER — CALCIUM ACETATE 667 MG/1
1334 TABLET ORAL
Qty: 180 TABLET | Refills: 11 | Status: SHIPPED | OUTPATIENT
Start: 2024-01-01 | End: 2025-06-18

## 2024-01-01 RX ORDER — TIMOLOL MALEATE 5 MG/ML
1 SOLUTION OPHTHALMIC DAILY
Status: ON HOLD | COMMUNITY
End: 2024-01-01

## 2024-01-01 RX ORDER — FENTANYL CITRATE 50 UG/ML
25 INJECTION, SOLUTION INTRAMUSCULAR; INTRAVENOUS
Status: DISCONTINUED | OUTPATIENT
Start: 2024-01-01 | End: 2024-01-01

## 2024-01-01 RX ORDER — SODIUM CHLORIDE 9 MG/ML
INJECTION, SOLUTION INTRAVENOUS ONCE
Status: DISCONTINUED | OUTPATIENT
Start: 2024-01-01 | End: 2024-01-01

## 2024-01-01 RX ORDER — PANTOPRAZOLE SODIUM 40 MG/1
40 FOR SUSPENSION ORAL DAILY
Status: DISCONTINUED | OUTPATIENT
Start: 2024-01-01 | End: 2024-01-01

## 2024-01-01 RX ORDER — SODIUM BICARBONATE 1 MEQ/ML
SYRINGE (ML) INTRAVENOUS CODE/TRAUMA/SEDATION MEDICATION
Status: COMPLETED | OUTPATIENT
Start: 2024-01-01 | End: 2024-01-01

## 2024-01-01 RX ORDER — SODIUM CHLORIDE 9 MG/ML
INJECTION, SOLUTION INTRAVENOUS
Status: ACTIVE | OUTPATIENT
Start: 2024-01-01 | End: 2024-01-01

## 2024-01-01 RX ORDER — NOREPINEPHRINE BITARTRATE/D5W 4MG/250ML
0-3 PLASTIC BAG, INJECTION (ML) INTRAVENOUS CONTINUOUS
Status: DISCONTINUED | OUTPATIENT
Start: 2024-01-01 | End: 2024-01-01

## 2024-01-01 RX ORDER — SEVELAMER CARBONATE FOR ORAL SUSPENSION 800 MG/1
2.4 POWDER, FOR SUSPENSION ORAL
Status: DISCONTINUED | OUTPATIENT
Start: 2024-01-01 | End: 2024-01-01

## 2024-01-01 RX ORDER — IBUPROFEN 200 MG
16 TABLET ORAL
Status: DISCONTINUED | OUTPATIENT
Start: 2024-01-01 | End: 2024-01-01 | Stop reason: HOSPADM

## 2024-01-01 RX ORDER — NALOXONE HCL 0.4 MG/ML
0.02 VIAL (ML) INJECTION
Status: DISCONTINUED | OUTPATIENT
Start: 2024-01-01 | End: 2024-01-01 | Stop reason: HOSPADM

## 2024-01-01 RX ORDER — NEBIVOLOL 2.5 MG/1
2.5 TABLET ORAL DAILY
Status: ON HOLD | COMMUNITY
Start: 2024-01-01 | End: 2024-01-01 | Stop reason: HOSPADM

## 2024-01-01 RX ORDER — TIMOLOL MALEATE 5 MG/ML
1 SOLUTION/ DROPS OPHTHALMIC DAILY
COMMUNITY

## 2024-01-01 RX ORDER — HYDROCODONE BITARTRATE AND ACETAMINOPHEN 500; 5 MG/1; MG/1
TABLET ORAL
Status: DISCONTINUED | OUTPATIENT
Start: 2024-01-01 | End: 2024-01-01 | Stop reason: HOSPADM

## 2024-01-01 RX ORDER — SODIUM CHLORIDE 9 MG/ML
INJECTION, SOLUTION INTRAVENOUS CONTINUOUS
Status: DISCONTINUED | OUTPATIENT
Start: 2024-01-01 | End: 2024-01-01

## 2024-01-01 RX ORDER — GLUCAGON 1 MG
1 KIT INJECTION
Status: DISCONTINUED | OUTPATIENT
Start: 2024-01-01 | End: 2024-01-01 | Stop reason: HOSPADM

## 2024-01-01 RX ORDER — EPINEPHRINE 0.1 MG/ML
INJECTION INTRAVENOUS
Status: COMPLETED
Start: 2024-01-01 | End: 2024-01-01

## 2024-01-01 RX ORDER — SODIUM CHLORIDE 9 MG/ML
INJECTION, SOLUTION INTRAVENOUS
Status: DISCONTINUED | OUTPATIENT
Start: 2024-01-01 | End: 2024-01-01 | Stop reason: HOSPADM

## 2024-01-01 RX ORDER — MIDODRINE HYDROCHLORIDE 5 MG/1
5 TABLET ORAL 2 TIMES DAILY WITH MEALS
Status: DISCONTINUED | OUTPATIENT
Start: 2024-01-01 | End: 2024-01-01

## 2024-01-01 RX ORDER — ONDANSETRON 4 MG/1
8 TABLET, ORALLY DISINTEGRATING ORAL EVERY 8 HOURS PRN
Status: DISCONTINUED | OUTPATIENT
Start: 2024-01-01 | End: 2024-01-01

## 2024-01-01 RX ORDER — PANTOPRAZOLE SODIUM 40 MG/10ML
40 INJECTION, POWDER, LYOPHILIZED, FOR SOLUTION INTRAVENOUS 2 TIMES DAILY
Start: 2024-01-01 | End: 2025-06-18

## 2024-01-01 RX ORDER — TRAZODONE HYDROCHLORIDE 150 MG/1
150 TABLET ORAL NIGHTLY
Status: DISCONTINUED | OUTPATIENT
Start: 2024-01-01 | End: 2024-01-01

## 2024-01-01 RX ADMIN — SODIUM CHLORIDE 2000 ML: 9 INJECTION, SOLUTION INTRAVENOUS at 01:06

## 2024-01-01 RX ADMIN — ALTEPLASE 10 MG: 2.2 INJECTION, POWDER, LYOPHILIZED, FOR SOLUTION INTRAVENOUS at 04:05

## 2024-01-01 RX ADMIN — PIPERACILLIN AND TAZOBACTAM 4.5 G: 4; .5 INJECTION, POWDER, FOR SOLUTION INTRAVENOUS; PARENTERAL at 09:06

## 2024-01-01 RX ADMIN — HYDRALAZINE HYDROCHLORIDE 100 MG: 100 TABLET ORAL at 09:05

## 2024-01-01 RX ADMIN — MUPIROCIN: 20 OINTMENT TOPICAL at 09:05

## 2024-01-01 RX ADMIN — ISOSORBIDE MONONITRATE 30 MG: 30 TABLET, EXTENDED RELEASE ORAL at 08:05

## 2024-01-01 RX ADMIN — MICONAZOLE NITRATE ANTIFUNGAL POWDER: 2 POWDER TOPICAL at 08:06

## 2024-01-01 RX ADMIN — SEVELAMER CARBONATE 2400 MG: 800 TABLET, FILM COATED ORAL at 06:05

## 2024-01-01 RX ADMIN — HYDRALAZINE HYDROCHLORIDE 100 MG: 100 TABLET ORAL at 12:05

## 2024-01-01 RX ADMIN — MUPIROCIN: 20 OINTMENT TOPICAL at 08:05

## 2024-01-01 RX ADMIN — DORNASE ALFA 5 MG: 1 SOLUTION RESPIRATORY (INHALATION) at 09:05

## 2024-01-01 RX ADMIN — SEVELAMER CARBONATE 2.4 G: 800 POWDER, FOR SUSPENSION ORAL at 09:06

## 2024-01-01 RX ADMIN — ATORVASTATIN CALCIUM 40 MG: 40 TABLET, FILM COATED ORAL at 08:05

## 2024-01-01 RX ADMIN — SEVELAMER CARBONATE 2400 MG: 800 TABLET, FILM COATED ORAL at 09:05

## 2024-01-01 RX ADMIN — HYDRALAZINE HYDROCHLORIDE 100 MG: 100 TABLET ORAL at 05:05

## 2024-01-01 RX ADMIN — SEVELAMER CARBONATE 2.4 G: 800 POWDER, FOR SUSPENSION ORAL at 08:06

## 2024-01-01 RX ADMIN — HYDRALAZINE HYDROCHLORIDE 100 MG: 100 TABLET ORAL at 01:06

## 2024-01-01 RX ADMIN — COLLAGENASE SANTYL: 250 OINTMENT TOPICAL at 08:06

## 2024-01-01 RX ADMIN — MICONAZOLE NITRATE ANTIFUNGAL POWDER: 2 POWDER TOPICAL at 09:06

## 2024-01-01 RX ADMIN — MIDODRINE HYDROCHLORIDE 5 MG: 5 TABLET ORAL at 09:05

## 2024-01-01 RX ADMIN — PIPERACILLIN AND TAZOBACTAM 4.5 G: 4; .5 INJECTION, POWDER, FOR SOLUTION INTRAVENOUS; PARENTERAL at 08:06

## 2024-01-01 RX ADMIN — ATORVASTATIN CALCIUM 40 MG: 40 TABLET, FILM COATED ORAL at 09:05

## 2024-01-01 RX ADMIN — SODIUM CHLORIDE: 9 INJECTION, SOLUTION INTRAVENOUS at 08:06

## 2024-01-01 RX ADMIN — SODIUM CHLORIDE: 9 INJECTION, SOLUTION INTRAVENOUS at 12:06

## 2024-01-01 RX ADMIN — SEVELAMER CARBONATE 2.4 G: 800 POWDER, FOR SUSPENSION ORAL at 05:06

## 2024-01-01 RX ADMIN — NOREPINEPHRINE BITARTRATE 1 MCG/KG/MIN: 4 INJECTION, SOLUTION INTRAVENOUS at 11:06

## 2024-01-01 RX ADMIN — SEVELAMER CARBONATE 2400 MG: 800 TABLET, FILM COATED ORAL at 12:05

## 2024-01-01 RX ADMIN — PIPERACILLIN AND TAZOBACTAM 4.5 G: 4; .5 INJECTION, POWDER, FOR SOLUTION INTRAVENOUS; PARENTERAL at 05:05

## 2024-01-01 RX ADMIN — COLLAGENASE SANTYL: 250 OINTMENT TOPICAL at 09:06

## 2024-01-01 RX ADMIN — PANTOPRAZOLE SODIUM 40 MG: 40 INJECTION, POWDER, LYOPHILIZED, FOR SOLUTION INTRAVENOUS at 03:06

## 2024-01-01 RX ADMIN — PANTOPRAZOLE SODIUM 40 MG: 40 INJECTION, POWDER, LYOPHILIZED, FOR SOLUTION INTRAVENOUS at 08:06

## 2024-01-01 RX ADMIN — ATORVASTATIN CALCIUM 40 MG: 40 TABLET, FILM COATED ORAL at 09:06

## 2024-01-01 RX ADMIN — ATORVASTATIN CALCIUM 40 MG: 40 TABLET, FILM COATED ORAL at 08:06

## 2024-01-01 RX ADMIN — CALCIUM CHLORIDE 1 G: 100 INJECTION INTRAVENOUS; INTRAVENTRICULAR at 06:06

## 2024-01-01 RX ADMIN — PANTOPRAZOLE SODIUM 40 MG: 40 INJECTION, POWDER, LYOPHILIZED, FOR SOLUTION INTRAVENOUS at 10:06

## 2024-01-01 RX ADMIN — ISOSORBIDE MONONITRATE 30 MG: 30 TABLET, EXTENDED RELEASE ORAL at 09:05

## 2024-01-01 RX ADMIN — PANTOPRAZOLE SODIUM 40 MG: 40 INJECTION, POWDER, LYOPHILIZED, FOR SOLUTION INTRAVENOUS at 11:06

## 2024-01-01 RX ADMIN — PANTOPRAZOLE SODIUM 40 MG: 40 TABLET, DELAYED RELEASE ORAL at 08:05

## 2024-01-01 RX ADMIN — HEPARIN SODIUM 5000 UNITS: 5000 INJECTION, SOLUTION INTRAVENOUS; SUBCUTANEOUS at 04:06

## 2024-01-01 RX ADMIN — TRAZODONE HYDROCHLORIDE 150 MG: 150 TABLET ORAL at 08:05

## 2024-01-01 RX ADMIN — SEVELAMER CARBONATE 2400 MG: 800 TABLET, FILM COATED ORAL at 07:06

## 2024-01-01 RX ADMIN — PANTOPRAZOLE SODIUM 40 MG: 40 TABLET, DELAYED RELEASE ORAL at 09:06

## 2024-01-01 RX ADMIN — HYDRALAZINE HYDROCHLORIDE 100 MG: 100 TABLET ORAL at 05:06

## 2024-01-01 RX ADMIN — SODIUM CHLORIDE 1000 ML: 9 INJECTION, SOLUTION INTRAVENOUS at 01:05

## 2024-01-01 RX ADMIN — ATORVASTATIN CALCIUM 40 MG: 40 TABLET, FILM COATED ORAL at 10:05

## 2024-01-01 RX ADMIN — ALTEPLASE 10 MG: 2.2 INJECTION, POWDER, LYOPHILIZED, FOR SOLUTION INTRAVENOUS at 09:05

## 2024-01-01 RX ADMIN — ACETAMINOPHEN 1000 MG: 500 TABLET ORAL at 07:06

## 2024-01-01 RX ADMIN — HEPARIN SODIUM 5000 UNITS: 5000 INJECTION, SOLUTION INTRAVENOUS; SUBCUTANEOUS at 09:06

## 2024-01-01 RX ADMIN — PANTOPRAZOLE SODIUM 40 MG: 40 INJECTION, POWDER, LYOPHILIZED, FOR SOLUTION INTRAVENOUS at 09:06

## 2024-01-01 RX ADMIN — TRAZODONE HYDROCHLORIDE 150 MG: 150 TABLET ORAL at 09:05

## 2024-01-01 RX ADMIN — HYDRALAZINE HYDROCHLORIDE 100 MG: 100 TABLET ORAL at 06:06

## 2024-01-01 RX ADMIN — DEXMEDETOMIDINE HYDROCHLORIDE 0.2 MCG/KG/HR: 4 INJECTION, SOLUTION INTRAVENOUS at 03:06

## 2024-01-01 RX ADMIN — PANTOPRAZOLE SODIUM 40 MG: 40 GRANULE, DELAYED RELEASE ORAL at 12:06

## 2024-01-01 RX ADMIN — ALTEPLASE 10 MG: 2.2 INJECTION, POWDER, LYOPHILIZED, FOR SOLUTION INTRAVENOUS at 09:06

## 2024-01-01 RX ADMIN — SODIUM CHLORIDE 1000 ML: 9 INJECTION, SOLUTION INTRAVENOUS at 12:06

## 2024-01-01 RX ADMIN — MIDODRINE HYDROCHLORIDE 5 MG: 5 TABLET ORAL at 05:05

## 2024-01-01 RX ADMIN — SEVELAMER CARBONATE 2.4 G: 800 POWDER, FOR SUSPENSION ORAL at 07:06

## 2024-01-01 RX ADMIN — SEVELAMER CARBONATE 2400 MG: 800 TABLET, FILM COATED ORAL at 08:05

## 2024-01-01 RX ADMIN — TRAZODONE HYDROCHLORIDE 25 MG: 50 TABLET ORAL at 08:05

## 2024-01-01 RX ADMIN — CYPROHEPTADINE HYDROCHLORIDE 4 MG: 4 TABLET ORAL at 08:06

## 2024-01-01 RX ADMIN — NOREPINEPHRINE BITARTRATE 0.3 MCG/KG/MIN: 1 INJECTION, SOLUTION, CONCENTRATE INTRAVENOUS at 01:06

## 2024-01-01 RX ADMIN — HEPARIN SODIUM 5000 UNITS: 5000 INJECTION, SOLUTION INTRAVENOUS; SUBCUTANEOUS at 01:05

## 2024-01-01 RX ADMIN — HYDRALAZINE HYDROCHLORIDE 100 MG: 100 TABLET ORAL at 06:05

## 2024-01-01 RX ADMIN — COLLAGENASE SANTYL: 250 OINTMENT TOPICAL at 10:06

## 2024-01-01 RX ADMIN — HYDROCORTISONE SODIUM SUCCINATE 100 MG: 100 INJECTION, POWDER, FOR SOLUTION INTRAMUSCULAR; INTRAVENOUS at 05:06

## 2024-01-01 RX ADMIN — EPINEPHRINE 1 MG: 0.1 INJECTION, SOLUTION ENDOTRACHEAL; INTRACARDIAC; INTRAVENOUS at 06:06

## 2024-01-01 RX ADMIN — NIFEDIPINE 30 MG: 30 TABLET, FILM COATED, EXTENDED RELEASE ORAL at 08:05

## 2024-01-01 RX ADMIN — POTASSIUM CHLORIDE 40 MEQ: 1500 TABLET, EXTENDED RELEASE ORAL at 09:05

## 2024-01-01 RX ADMIN — SEVELAMER CARBONATE 2400 MG: 800 TABLET, FILM COATED ORAL at 05:06

## 2024-01-01 RX ADMIN — MICONAZOLE NITRATE ANTIFUNGAL POWDER: 2 POWDER TOPICAL at 05:06

## 2024-01-01 RX ADMIN — MICONAZOLE NITRATE ANTIFUNGAL POWDER: 2 POWDER TOPICAL at 10:06

## 2024-01-01 RX ADMIN — SEVELAMER CARBONATE 2.4 G: 800 POWDER, FOR SUSPENSION ORAL at 11:06

## 2024-01-01 RX ADMIN — METOPROLOL SUCCINATE 25 MG: 25 TABLET, EXTENDED RELEASE ORAL at 09:05

## 2024-01-01 RX ADMIN — SEVELAMER CARBONATE 2.4 G: 800 POWDER, FOR SUSPENSION ORAL at 01:06

## 2024-01-01 RX ADMIN — CYPROHEPTADINE HYDROCHLORIDE 4 MG: 4 TABLET ORAL at 10:06

## 2024-01-01 RX ADMIN — VASOPRESSIN 0.04 UNITS/MIN: 20 INJECTION, SOLUTION INTRAMUSCULAR; SUBCUTANEOUS at 05:06

## 2024-01-01 RX ADMIN — NOREPINEPHRINE BITARTRATE 0.3 MCG/KG/MIN: 4 INJECTION, SOLUTION INTRAVENOUS at 12:06

## 2024-01-01 RX ADMIN — SODIUM CHLORIDE: 9 INJECTION, SOLUTION INTRAVENOUS at 09:05

## 2024-01-01 RX ADMIN — SEVELAMER CARBONATE 2400 MG: 800 TABLET, FILM COATED ORAL at 04:06

## 2024-01-01 RX ADMIN — METOPROLOL SUCCINATE 25 MG: 25 TABLET, EXTENDED RELEASE ORAL at 08:05

## 2024-01-01 RX ADMIN — SEVELAMER CARBONATE 2400 MG: 800 TABLET, FILM COATED ORAL at 08:06

## 2024-01-01 RX ADMIN — HYDRALAZINE HYDROCHLORIDE 100 MG: 100 TABLET ORAL at 01:05

## 2024-01-01 RX ADMIN — VASOPRESSIN 0.04 UNITS/MIN: 20 INJECTION, SOLUTION INTRAMUSCULAR; SUBCUTANEOUS at 09:06

## 2024-01-01 RX ADMIN — PANTOPRAZOLE SODIUM 40 MG: 40 GRANULE, DELAYED RELEASE ORAL at 10:06

## 2024-01-01 RX ADMIN — SEVELAMER CARBONATE 2.4 G: 800 POWDER, FOR SUSPENSION ORAL at 12:06

## 2024-01-01 RX ADMIN — PIPERACILLIN AND TAZOBACTAM 4.5 G: 4; .5 INJECTION, POWDER, FOR SOLUTION INTRAVENOUS; PARENTERAL at 07:06

## 2024-01-01 RX ADMIN — LEVOFLOXACIN 500 MG: 5 INJECTION, SOLUTION INTRAVENOUS at 05:05

## 2024-01-01 RX ADMIN — ALTEPLASE 10 MG: 2.2 INJECTION, POWDER, LYOPHILIZED, FOR SOLUTION INTRAVENOUS at 07:06

## 2024-01-01 RX ADMIN — SODIUM BICARBONATE 50 MEQ: 84 INJECTION INTRAVENOUS at 06:06

## 2024-01-01 RX ADMIN — HYDROCORTISONE SODIUM SUCCINATE 100 MG: 100 INJECTION, POWDER, FOR SOLUTION INTRAMUSCULAR; INTRAVENOUS at 02:06

## 2024-01-01 RX ADMIN — PANTOPRAZOLE SODIUM 40 MG: 40 TABLET, DELAYED RELEASE ORAL at 09:05

## 2024-01-01 RX ADMIN — PIPERACILLIN SODIUM,TAZOBACTAM SODIUM 4.5 G: 4; .5 INJECTION, POWDER, FOR SOLUTION INTRAVENOUS at 01:05

## 2024-01-01 RX ADMIN — HYDROCORTISONE SODIUM SUCCINATE 100 MG: 100 INJECTION, POWDER, FOR SOLUTION INTRAMUSCULAR; INTRAVENOUS at 10:06

## 2024-01-01 RX ADMIN — HYDROCORTISONE SODIUM SUCCINATE 100 MG: 100 INJECTION, POWDER, FOR SOLUTION INTRAMUSCULAR; INTRAVENOUS at 09:06

## 2024-01-01 RX ADMIN — DORNASE ALFA 5 MG: 1 SOLUTION RESPIRATORY (INHALATION) at 05:06

## 2024-01-01 RX ADMIN — MIDODRINE HYDROCHLORIDE 5 MG: 5 TABLET ORAL at 10:06

## 2024-01-01 RX ADMIN — PANTOPRAZOLE SODIUM 40 MG: 40 GRANULE, DELAYED RELEASE ORAL at 09:06

## 2024-01-01 RX ADMIN — HYDRALAZINE HYDROCHLORIDE 100 MG: 100 TABLET ORAL at 10:06

## 2024-01-01 RX ADMIN — COLLAGENASE SANTYL: 250 OINTMENT TOPICAL at 05:06

## 2024-01-01 RX ADMIN — HYDROXYZINE HYDROCHLORIDE 25 MG: 25 TABLET ORAL at 12:06

## 2024-01-01 RX ADMIN — HEPARIN SODIUM 5000 UNITS: 5000 INJECTION, SOLUTION INTRAVENOUS; SUBCUTANEOUS at 07:05

## 2024-01-01 RX ADMIN — SODIUM CHLORIDE: 9 INJECTION, SOLUTION INTRAVENOUS at 05:05

## 2024-01-01 RX ADMIN — HEPARIN SODIUM 5000 UNITS: 5000 INJECTION, SOLUTION INTRAVENOUS; SUBCUTANEOUS at 01:06

## 2024-01-01 RX ADMIN — PIPERACILLIN SODIUM,TAZOBACTAM SODIUM 4.5 G: 4; .5 INJECTION, POWDER, FOR SOLUTION INTRAVENOUS at 09:05

## 2024-01-01 RX ADMIN — MUPIROCIN: 20 OINTMENT TOPICAL at 10:05

## 2024-01-01 RX ADMIN — SODIUM CHLORIDE: 9 INJECTION, SOLUTION INTRAVENOUS at 06:05

## 2024-01-01 RX ADMIN — LEVOFLOXACIN 750 MG: 750 INJECTION, SOLUTION INTRAVENOUS at 06:05

## 2024-01-01 RX ADMIN — ALTEPLASE 10 MG: 2.2 INJECTION, POWDER, LYOPHILIZED, FOR SOLUTION INTRAVENOUS at 03:06

## 2024-01-01 RX ADMIN — HEPARIN SODIUM 5000 UNITS: 5000 INJECTION, SOLUTION INTRAVENOUS; SUBCUTANEOUS at 02:06

## 2024-01-01 RX ADMIN — MIDODRINE HYDROCHLORIDE 2.5 MG: 2.5 TABLET ORAL at 09:06

## 2024-01-01 RX ADMIN — IOPAMIDOL 100 ML: 755 INJECTION, SOLUTION INTRAVENOUS at 08:06

## 2024-01-01 RX ADMIN — SEVELAMER CARBONATE 2400 MG: 800 TABLET, FILM COATED ORAL at 09:06

## 2024-01-01 RX ADMIN — HEPARIN SODIUM 5000 UNITS: 5000 INJECTION, SOLUTION INTRAVENOUS; SUBCUTANEOUS at 10:05

## 2024-01-01 RX ADMIN — HYDRALAZINE HYDROCHLORIDE 100 MG: 100 TABLET ORAL at 08:05

## 2024-01-01 RX ADMIN — HYDROCORTISONE SODIUM SUCCINATE 100 MG: 100 INJECTION, POWDER, FOR SOLUTION INTRAMUSCULAR; INTRAVENOUS at 04:06

## 2024-01-01 RX ADMIN — SODIUM CHLORIDE: 9 INJECTION, SOLUTION INTRAVENOUS at 09:06

## 2024-01-01 RX ADMIN — HYDROXYZINE HYDROCHLORIDE 25 MG: 25 TABLET ORAL at 06:06

## 2024-01-01 RX ADMIN — SEVELAMER CARBONATE 2400 MG: 800 TABLET, FILM COATED ORAL at 01:05

## 2024-01-01 RX ADMIN — DEXMEDETOMIDINE HYDROCHLORIDE 0.2 MCG/KG/HR: 4 INJECTION, SOLUTION INTRAVENOUS at 04:06

## 2024-01-01 RX ADMIN — HEPARIN SODIUM 5000 UNITS: 5000 INJECTION, SOLUTION INTRAVENOUS; SUBCUTANEOUS at 09:05

## 2024-01-01 RX ADMIN — CHLORHEXIDINE GLUCONATE 15 ML: 1.2 RINSE ORAL at 08:06

## 2024-01-01 RX ADMIN — DILTIAZEM HYDROCHLORIDE 16 MG: 5 INJECTION INTRAVENOUS at 04:05

## 2024-01-01 RX ADMIN — PROPOFOL 1000 MG: 10 INJECTION, EMULSION INTRAVENOUS at 12:06

## 2024-01-01 RX ADMIN — HYDRALAZINE HYDROCHLORIDE 100 MG: 100 TABLET ORAL at 04:06

## 2024-01-01 RX ADMIN — PROPOFOL 15 MCG/KG/MIN: 10 INJECTION, EMULSION INTRAVENOUS at 02:06

## 2024-01-01 RX ADMIN — SEVELAMER CARBONATE 2400 MG: 800 TABLET, FILM COATED ORAL at 05:05

## 2024-01-01 RX ADMIN — HEPARIN SODIUM 5000 UNITS: 5000 INJECTION, SOLUTION INTRAVENOUS; SUBCUTANEOUS at 05:06

## 2024-01-01 RX ADMIN — MIDODRINE HYDROCHLORIDE 2.5 MG: 2.5 TABLET ORAL at 07:06

## 2024-01-01 RX ADMIN — ATORVASTATIN CALCIUM 40 MG: 40 TABLET, FILM COATED ORAL at 10:06

## 2024-01-01 RX ADMIN — ALTEPLASE 10 MG: 2.2 INJECTION, POWDER, LYOPHILIZED, FOR SOLUTION INTRAVENOUS at 12:06

## 2024-01-01 RX ADMIN — VANCOMYCIN HYDROCHLORIDE 1250 MG: 500 INJECTION, POWDER, LYOPHILIZED, FOR SOLUTION INTRAVENOUS at 06:05

## 2024-01-01 RX ADMIN — DORNASE ALFA 5 MG: 1 SOLUTION RESPIRATORY (INHALATION) at 09:06

## 2024-01-01 RX ADMIN — SEVELAMER CARBONATE 2.4 G: 800 POWDER, FOR SUSPENSION ORAL at 06:06

## 2024-01-01 RX ADMIN — SODIUM CHLORIDE 10 ML: 9 INJECTION, SOLUTION INTRAVENOUS at 06:05

## 2024-01-01 RX ADMIN — NOREPINEPHRINE BITARTRATE 2 MCG/KG/MIN: 1 INJECTION, SOLUTION, CONCENTRATE INTRAVENOUS at 06:06

## 2024-01-01 RX ADMIN — SODIUM CHLORIDE: 9 INJECTION, SOLUTION INTRAVENOUS at 07:06

## 2024-01-01 RX ADMIN — PIPERACILLIN SODIUM,TAZOBACTAM SODIUM 4.5 G: 4; .5 INJECTION, POWDER, FOR SOLUTION INTRAVENOUS at 11:05

## 2024-01-01 RX ADMIN — PANTOPRAZOLE SODIUM 40 MG: 40 GRANULE, DELAYED RELEASE ORAL at 08:06

## 2024-01-01 RX ADMIN — PIPERACILLIN AND TAZOBACTAM 4.5 G: 4; .5 INJECTION, POWDER, FOR SOLUTION INTRAVENOUS; PARENTERAL at 06:06

## 2024-01-01 RX ADMIN — SEVELAMER CARBONATE 2.4 G: 800 POWDER, FOR SUSPENSION ORAL at 10:06

## 2024-01-01 RX ADMIN — SODIUM CHLORIDE: 9 INJECTION, SOLUTION INTRAVENOUS at 11:06

## 2024-01-01 RX ADMIN — HYDRALAZINE HYDROCHLORIDE 100 MG: 100 TABLET ORAL at 09:06

## 2024-01-01 RX ADMIN — SEVELAMER CARBONATE 2400 MG: 800 TABLET, FILM COATED ORAL at 01:06

## 2024-01-01 RX ADMIN — HEPARIN SODIUM 5000 UNITS: 5000 INJECTION, SOLUTION INTRAVENOUS; SUBCUTANEOUS at 06:06

## 2024-01-01 RX ADMIN — SEVELAMER CARBONATE 2400 MG: 800 TABLET, FILM COATED ORAL at 11:06

## 2024-01-01 RX ADMIN — SEVELAMER CARBONATE 2400 MG: 800 TABLET, FILM COATED ORAL at 06:06

## 2024-01-01 RX ADMIN — PIPERACILLIN SODIUM,TAZOBACTAM SODIUM 4.5 G: 4; .5 INJECTION, POWDER, FOR SOLUTION INTRAVENOUS at 03:05

## 2024-01-01 RX ADMIN — ZIPRASIDONE MESYLATE 20 MG: 20 INJECTION, POWDER, LYOPHILIZED, FOR SOLUTION INTRAMUSCULAR at 12:06

## 2024-01-01 RX ADMIN — ALTEPLASE 10 MG: 2.2 INJECTION, POWDER, LYOPHILIZED, FOR SOLUTION INTRAVENOUS at 05:06

## 2024-01-01 RX ADMIN — MIDODRINE HYDROCHLORIDE 5 MG: 5 TABLET ORAL at 06:05

## 2024-01-01 RX ADMIN — DORNASE ALFA 5 MG: 1 SOLUTION RESPIRATORY (INHALATION) at 04:05

## 2024-01-01 RX ADMIN — HYDRALAZINE HYDROCHLORIDE 20 MG: 20 INJECTION INTRAMUSCULAR; INTRAVENOUS at 11:06

## 2024-01-01 RX ADMIN — MIDODRINE HYDROCHLORIDE 2.5 MG: 2.5 TABLET ORAL at 08:06

## 2024-01-01 RX ADMIN — VANCOMYCIN HYDROCHLORIDE 1000 MG: 1 INJECTION, POWDER, LYOPHILIZED, FOR SOLUTION INTRAVENOUS at 03:05

## 2024-01-01 RX ADMIN — SODIUM CHLORIDE: 9 INJECTION, SOLUTION INTRAVENOUS at 01:06

## 2024-01-01 RX ADMIN — SEVELAMER CARBONATE 2400 MG: 800 TABLET, FILM COATED ORAL at 03:06

## 2024-01-01 RX ADMIN — ALTEPLASE 10 MG: 2.2 INJECTION, POWDER, LYOPHILIZED, FOR SOLUTION INTRAVENOUS at 10:05

## 2024-01-01 RX ADMIN — DORNASE ALFA 5 MG: 1 SOLUTION RESPIRATORY (INHALATION) at 10:05

## 2024-01-01 RX ADMIN — TRAZODONE HYDROCHLORIDE 25 MG: 50 TABLET ORAL at 01:05

## 2024-01-01 RX ADMIN — DORNASE ALFA 5 MG: 1 SOLUTION RESPIRATORY (INHALATION) at 03:06

## 2024-01-01 RX ADMIN — DORNASE ALFA 5 MG: 1 SOLUTION RESPIRATORY (INHALATION) at 07:06

## 2024-01-01 RX ADMIN — DORNASE ALFA 5 MG: 1 SOLUTION RESPIRATORY (INHALATION) at 12:06

## 2024-01-01 RX ADMIN — ZIPRASIDONE MESYLATE 20 MG: 20 INJECTION, POWDER, LYOPHILIZED, FOR SOLUTION INTRAMUSCULAR at 11:05

## 2024-01-01 RX ADMIN — SODIUM CHLORIDE 500 ML: 9 INJECTION, SOLUTION INTRAVENOUS at 03:05

## 2024-01-01 RX ADMIN — ACETAMINOPHEN 1000 MG: 500 TABLET ORAL at 12:05

## 2024-01-01 RX ADMIN — TRAZODONE HYDROCHLORIDE 150 MG: 150 TABLET ORAL at 10:05

## 2024-01-01 RX ADMIN — SEVELAMER CARBONATE 2400 MG: 800 TABLET, FILM COATED ORAL at 10:06

## 2024-01-01 RX ADMIN — HYDROCORTISONE SODIUM SUCCINATE 100 MG: 100 INJECTION, POWDER, FOR SOLUTION INTRAMUSCULAR; INTRAVENOUS at 06:06

## 2024-01-01 RX ADMIN — ACETAMINOPHEN 1000 MG: 500 TABLET ORAL at 10:06

## 2024-01-01 RX ADMIN — PIPERACILLIN SODIUM,TAZOBACTAM SODIUM 4.5 G: 4; .5 INJECTION, POWDER, FOR SOLUTION INTRAVENOUS at 05:05

## 2024-01-01 RX ADMIN — VASOPRESSIN 0.04 UNITS/MIN: 20 INJECTION, SOLUTION INTRAMUSCULAR; SUBCUTANEOUS at 02:06

## 2024-01-01 RX ADMIN — NIFEDIPINE 30 MG: 30 TABLET, FILM COATED, EXTENDED RELEASE ORAL at 06:05

## 2024-04-15 DIAGNOSIS — N43.3 HYDROCELE IN ADULT: ICD-10-CM

## 2024-04-15 DIAGNOSIS — N50.89 SCROTAL SWELLING: Primary | ICD-10-CM

## 2024-05-23 PROBLEM — I50.30 HEART FAILURE WITH PRESERVED EJECTION FRACTION: Status: ACTIVE | Noted: 2024-05-23

## 2024-05-23 PROBLEM — A41.9 SEPSIS: Status: ACTIVE | Noted: 2024-01-01

## 2024-05-23 PROBLEM — D64.9 ANEMIA: Status: ACTIVE | Noted: 2024-01-01

## 2024-05-23 PROBLEM — R53.81 DEBILITY: Status: ACTIVE | Noted: 2024-01-01

## 2024-05-23 PROBLEM — J90 PLEURAL EFFUSION: Status: ACTIVE | Noted: 2024-05-23

## 2024-05-23 PROBLEM — J90 PLEURAL EFFUSION: Chronic | Status: ACTIVE | Noted: 2024-01-01

## 2024-05-23 PROBLEM — E87.6 HYPOKALEMIA: Status: ACTIVE | Noted: 2024-01-01

## 2024-05-23 NOTE — Clinical Note
Yany Ferrara accompanied their father to the emergency department on 5/23/2024. They may return to work on 05/24/2024.      If you have any questions or concerns, please don't hesitate to call.      Wilmer GROSS

## 2024-05-23 NOTE — LETTER
May 31, 2024         [address] [place] [address]  [place] [address]  Phone: [phone]  Fax: [phone]       Date of Visit: 05/31/2024    To Whom It May Concern:    Please be advised that under state and federal laws as it relates to patient privacy and Health Insurance Accountability Act (HIPAA), we can not release our patient(s) name without authorization. Although, we can confirm that the individual listed below did accompany a person to our facility for healthcare services to be provided.    This document confirms that Yany Ferrara accompanied a patient to our facility on 05/31/2024.    Sincerely,     Nehal Seth RN

## 2024-05-23 NOTE — LETTER
May 26, 2024         [address] [place] [address]  [facility] [place] [address]  Phone: [phone]  Fax: [phone]       Date of Visit: 05/26/2024    To Whom It May Concern:    Please be advised that under state and federal laws as it relates to patient privacy and Health Insurance Accountability Act (HIPAA), we can not release our patient(s) name without authorization. Although, we can confirm that the individual listed below did accompany a person to our facility for healthcare services to be provided.    This document confirms that Yany Ferrara was with family at this facility on           5/23/24 - 5/24/24.    Sincerely,     Tamela Benoit RN

## 2024-05-23 NOTE — LETTER
June 7, 2024                 [facility]  [address] [place] [address]  Phone: [phone]  Fax: [phone] June 7, 2024     Patient: Joseph Mcdonald   YOB: 1935       To Whom It May Concern:    Joseph Mcodnald was admitted to the hospital on 5/23/2024 10:55 AM where he currently remains admitted . Please excuse his daughter, Winsome Perez, from work during his admission.   If you have any questions or concerns, please don't hesitate to call [facility] at [phone].      Sincerely,        Annmarie Katz, [facility]  Department of Hospital Medicine

## 2024-05-23 NOTE — LETTER
May 30, 2024         [address] [place] [address]  Phone: [phone]  Fax: [phone]       Patient: Joseph Mcdonald   YOB: 1935  Date of Visit: 05/30/2024    To Whom It May Concern:    Yany Ferrara was at [facility] for the care of her father Joseph Mcdonald  on 05/29/2024 -05/30/2024. The patient may return to work/school on 05/31/2024 with no restrictions. If you have any questions or concerns, or if I can be of further assistance, please do not hesitate to contact me.    Sincerely,    Steven Dias RN

## 2024-05-23 NOTE — LETTER
June 6, 2024         [address] [place] [address]  Phone: [phone]  Fax: [phone]       Patient: Joseph Mcdonald   Date of Visit: 06/06/2024    To Whom It May Concern:    Yany Ferrara was at [facility] with love one on 06/06/2024. The patient may return to work/school on 06/10/2024. If you have any questions or concerns, or if I can be of further assistance, please do not hesitate to contact me.    Sincerely,    Tomasz Kc RN

## 2024-05-23 NOTE — ED PROVIDER NOTES
Encounter Date: 5/23/2024    SCRIBE #1 NOTE: I, Sangeetha Franklin, am scribing for, and in the presence of,  Luciano Lock MD. I have scribed the entire note.       History     Chief Complaint   Patient presents with    Shortness of Breath     The pt is an 88 y/o male coming into the ED with complaints of SOB. The daughter of the pt states this has been ongoing for the last 2-3 weeks. She also states the pt has been very weak. She states he just came from dialysis and was told his blood count was low. She states he goes to dialysis Tuesday, Thursday, and Saturday. The daughter denies a Hx of a GI bleed but states he has not had an endoscopy or colonoscopy done. The pt denies dark stool, fever, or diarrhea. He does state he coughs a lot. The daughter states he sees Dr. Herrmann at [facility]. She also states he had a blood transfusion in August 2023. She states this is the only and last time he has needed a transfusion. There are no other complaints at this time.    The history is provided by the patient and a relative. No  was used.     Review of patient's allergies indicates:   Allergen Reactions    Azithromycin Other (See Comments)     Note: - Phreesia 01/11/2019     Past Medical History:   Diagnosis Date    Chronic kidney disease (CKD)     Diabetes mellitus     Hypertension     PVD (peripheral vascular disease)      Past Surgical History:   Procedure Laterality Date    LEFT HEART CATHETERIZATION N/A 8/3/2023    Procedure: Left heart cath;  Surgeon: Vazquez Aguilar MD;  Location: [facility] CATH LAB;  Service: Cardiology;  Laterality: N/A;    left toe amputation      PLACEMENT OF DIALYSIS ACCESS       No family history on file.  Social History     Tobacco Use    Smoking status: Former     Types: Cigarettes    Smokeless tobacco: Never   Substance Use Topics    Alcohol use: Not Currently    Drug use: Never     Review of Systems   Constitutional:  Negative for fever.   Respiratory:   Positive for cough and shortness of breath.    Gastrointestinal:  Negative for blood in stool and diarrhea.   Neurological:  Positive for weakness.   All other systems reviewed and are negative.      Physical Exam     Initial Vitals [05/23/24 1055]   BP Pulse Resp Temp SpO2   (!) 127/41 73 16 97.9 °F (36.6 °C) (!) 93 %      MAP       --         Physical Exam    Nursing note and vitals reviewed.  Constitutional: He appears well-developed. He is not diaphoretic. No distress.   emaciated   HENT:   Head: Normocephalic and atraumatic.   Nose: Nose normal.   Mouth/Throat: Oropharynx is clear and moist.   Eyes: Conjunctivae and EOM are normal. Pupils are equal, round, and reactive to light. No scleral icterus.   Neck: Neck supple. No JVD present.   Normal range of motion.  Cardiovascular:  Normal rate, regular rhythm and normal heart sounds.     Exam reveals no gallop and no friction rub.       No murmur heard.  Pulmonary/Chest: Breath sounds normal. No stridor. No respiratory distress. He has no wheezes. He exhibits no tenderness.   Abdominal: Abdomen is soft. Bowel sounds are normal. He exhibits no distension and no mass. There is no abdominal tenderness. There is no rebound and no guarding.   Musculoskeletal:         General: No tenderness or edema. Normal range of motion.      Cervical back: Normal range of motion and neck supple. Normal.      Thoracic back: Normal.      Lumbar back: Normal.     Lymphadenopathy:     He has no cervical adenopathy.   Neurological: He is alert and oriented to person, place, and time. He has normal strength. No cranial nerve deficit.   Skin: Skin is warm and dry. No rash noted. There is pallor.   Psychiatric: He has a normal mood and affect. Thought content normal.         ED Course   Procedures  Labs Reviewed   COMPREHENSIVE METABOLIC PANEL - Abnormal; Notable for the following components:       Result Value    Potassium 3.2 (*)     Glucose 134 (*)     BUN 19 (*)     Creatinine 4.01 (*)      BUN/Creatinine Ratio 5 (*)     Albumin 2.5 (*)     Globulin 4.4 (*)     ALT 9 (*)     eGFR 14 (*)     All other components within normal limits   PROTIME-INR - Abnormal; Notable for the following components:    PT 15.7 (*)     All other components within normal limits   CBC WITH DIFFERENTIAL - Abnormal; Notable for the following components:    WBC 13.44 (*)     RBC 3.48 (*)     Hemoglobin 9.6 (*)     Hematocrit 30.4 (*)     MCHC 31.6 (*)     RDW 17.0 (*)     Neutrophils % 91.0 (*)     Lymphocytes % 2.5 (*)     Eosinophils % 0.1 (*)     Immature Granulocytes % 1.1 (*)     Neutrophils, Abs 12.23 (*)     Lymphocytes, Absolute 0.33 (*)     Immature Granulocytes, Absolute 0.15 (*)     All other components within normal limits   POCT GLUCOSE MONITORING CONTINUOUS - Abnormal; Notable for the following components:    POC Glucose 153 (*)     All other components within normal limits   MAGNESIUM - Normal   TROPONIN I - Normal   APTT - Normal   POCT OCCULT BLOOD (STOOL) - Normal   CULTURE, BLOOD   CULTURE, BLOOD   GRAM STAIN   CULTURE, BODY FLUID   CBC W/ AUTO DIFFERENTIAL    Narrative:     The following orders were created for panel order CBC auto differential.  Procedure                               Abnormality         Status                     ---------                               -----------         ------                     CBC with Differential[[id number]]        Abnormal            Final result                 Please view results for these tests on the individual orders.   URINALYSIS, REFLEX TO URINE CULTURE   LDH, BODY FLUID   PROTEIN, BODY FLUID   METHICILLIN-RESISTANT S. AUREUS, PCR   TYPE & SCREEN   NON-GYN CYTOLOGY          Imaging Results              X-Ray Chest 1 View (Final result)  Result time 05/23/24 11:35:25      Final result by Gerald De La Cruz DO (05/23/24 11:35:25)                   Impression:      As above.    Point of Service: [facility]      Electronically signed by: Gerald  Dorothy  Date:    05/23/2024  Time:    11:35               Narrative:    EXAMINATION:  XR CHEST 1 VIEW    CLINICAL HISTORY:  Weakness    COMPARISON:  Chest x-ray August 1, 2023    TECHNIQUE:  Frontal view/views of the chest.    FINDINGS:  Heart partially obscured.  Significant right-sided pulmonary atelectasis/infiltrate predominantly within the right mid and lower lung with of moderate to large right pleural effusion.  Visualized osseous and surrounding soft tissue structures appear grossly unchanged.                                    X-Rays:   Independently Interpreted Readings:   Other Readings:  Xray Chest 1 View:  Heart partially obscured.  Significant right-sided pulmonary atelectasis/infiltrate predominantly within the right mid and lower lung with of moderate to large right pleural effusion.  Visualized osseous and surrounding soft tissue structures appear grossly unchanged.    Medications   atorvastatin tablet 40 mg (40 mg Oral Given 5/23/24 2240)   hydrALAZINE tablet 100 mg (has no administration in time range)   isosorbide mononitrate 24 hr tablet 30 mg (has no administration in time range)   metoprolol succinate (TOPROL-XL) 24 hr tablet 25 mg (has no administration in time range)   pantoprazole EC tablet 40 mg (has no administration in time range)   sevelamer carbonate tablet 2,400 mg (has no administration in time range)   sodium chloride 0.9% flush 10 mL (has no administration in time range)   naloxone 0.4 mg/mL injection 0.02 mg (has no administration in time range)   glucose chewable tablet 16 g (has no administration in time range)   glucose chewable tablet 24 g (has no administration in time range)   glucagon (human recombinant) injection 1 mg (has no administration in time range)   heparin (porcine) injection 5,000 Units (5,000 Units Subcutaneous Given 5/23/24 2240)   acetaminophen tablet 1,000 mg (has no administration in time range)   polyethylene glycol packet 17 g (has no administration in time  range)   ondansetron disintegrating tablet 8 mg (has no administration in time range)   dextrose 10% bolus 125 mL 125 mL (has no administration in time range)   dextrose 10% bolus 250 mL 250 mL (has no administration in time range)   piperacillin-tazobactam (ZOSYN) 4.5 g in dextrose 5 % in water (D5W) 100 mL IVPB (MB+) (0 g Intravenous Stopped 5/24/24 0339)   piperacillin-tazobactam (ZOSYN) 4.5 g in dextrose 5 % in water (D5W) 100 mL IVPB (MB+) (0 g Intravenous Stopped 5/23/24 1536)   vancomycin (VANCOCIN) 1,000 mg in dextrose 5 % (D5W) 250 mL IVPB (0 mg Intravenous Stopped 5/23/24 1723)   sodium chloride 0.9% bolus 500 mL 500 mL (0 mLs Intravenous Stopped 5/23/24 1604)     Medical Decision Making  MDM    Patient presents for emergent evaluation of acute shortness of breath generalized weakness that poses a threat to life and/or bodily function.    In the ED patient found to have acute pleural effusion.  Generalized weakness.  Hypotensive episode.    I ordered labs and personally reviewed them.  Labs significant for white count 13.44.  Creatinine 4.01 history of end-stage renal disease.    I ordered X-rays and personally reviewed them and reviewed the radiologist interpretation.  Xray significant for significant right-sided pulmonary atelectasis infiltrate  I ordered EKG and personally reviewed it.  EKG significant for no ST elevation.      Admission MDM  I discussed the patient presentation and findings with the consultant for Hospital Medicine (speciality).    Patient was managed in the ED with IV normal saline vancomycin Zosyn.  See ED course notes.    The response to treatment was improved.    Patient required emergent consultation to Hospital Medicine (admitting physician) for admission.     Amount and/or Complexity of Data Reviewed  Labs: ordered.  Radiology: ordered.    Risk  Decision regarding hospitalization.              Attending Attestation:           Physician Attestation for Scribe:  Physician  Attestation Statement for Scribe #1: I, Luciano Lock MD, reviewed documentation, as scribed by Sangeetha Franklin in my presence, and it is both accurate and complete.             ED Course as of 05/24/24 0628   Thu May 23, 2024   1144 Xray Chest 1 View:  Heart partially obscured.  Significant right-sided pulmonary atelectasis/infiltrate predominantly within the right mid and lower lung with of moderate to large right pleural effusion.  Visualized osseous and surrounding soft tissue structures appear grossly unchanged. [LP]   1417   Patient was have been normal blood pressure when he 1st came in and was not suspecting infectious etiology.  It was possible this patient was sepsis after his workup came back showing a white count elevated and pleural effusion.  Patient nontoxic appearing.  However his blood pressure did decrease in the ED.  Adding lactic acid after suspicion for possible sepsis.  Case discussed with hospitalist and intensivist.  Patient will be admitted to the hospital medicine service.  Lactic acid is pending but will not order early goal-directed fluid therapy considering patient was a dialysis patient and am concerned about causing fluid overload [PK]      ED Course User Index  [LP] Sangeetha Franklin  [PK] Luciano Lock MD                             Clinical Impression:  Final diagnoses:  [R53.1] General weakness  [J90] Pleural effusion (Primary)  [N18.6] ESRD (end stage renal disease)          ED Disposition Condition    Admit Stable                Luciano Lock MD  05/24/24 0628

## 2024-05-23 NOTE — LETTER
May 30, 2024         [address] [place] [id number]  Phone: [phone]  Fax: [phone]       Patient: Joseph Mcdonald   YOB: 1935  Date of Visit: 05/30/2024    To Whom It May Concern:    Yany Ferrara was at [facility] with her father Joseph Mcdonald on 05/30/2024. The patient may return to work/school on 05/31/2024 with restrictions. If you have any questions or concerns, or if I can be of further assistance, please do not hesitate to contact me.    Sincerely,    Steven Dias RN

## 2024-05-24 PROBLEM — J96.01 ACUTE HYPOXIC RESPIRATORY FAILURE: Status: ACTIVE | Noted: 2024-01-01

## 2024-05-24 PROBLEM — J96.01 ACUTE HYPOXIC RESPIRATORY FAILURE: Chronic | Status: ACTIVE | Noted: 2024-01-01

## 2024-05-24 NOTE — PROGRESS NOTES
Right thoracentesis (therapeutic)  History: right pleural effusion   Performed by A Loraine KRUSE  Consent obtained for thoracentesis.  A formal timeout was called all staff present agreed to patient and procedure.  The right posterior thorax was prepped with ChloraPrep and sterile field was established.  1% lidocaine was used as local anesthetic.  Under ultrasound guidance and utilizing trocar technique a 6 Sinhala centesis catheter was placed into the large pleural effusion on the right.  1500 mL zenaida colored pleural fluid was removed.  The catheter was then removed and the puncture site was cleaned and bandaged.  The patient tolerated the procedure well there were no immediate postprocedure complications.  The patient will be transferred to Diagnostic Radiology for post thoracentesis chest x-ray.

## 2024-05-24 NOTE — ASSESSMENT & PLAN NOTE
Patient's anemia is currently controlled. Has not received any PRBCs to date. Etiology likely d/t  renal disease  Current CBC reviewed-   Lab Results   Component Value Date    HGB 7.8 (L) 05/24/2024    HCT 25.9 (L) 05/24/2024     Monitor serial CBC and transfuse if patient becomes hemodynamically unstable, symptomatic or H/H drops below 7/21.

## 2024-05-24 NOTE — ASSESSMENT & PLAN NOTE
"Patient's FSGs are controlled on current medication regimen.  Last A1c reviewed- No results found for: "LABA1C", "HGBA1C"  Most recent fingerstick glucose reviewed-   Recent Labs   Lab 05/23/24  1428   POCTGLUCOSE 111*     Current correctional scale  Low  Maintain anti-hyperglycemic dose as follows-   Antihyperglycemics (From admission, onward)      None          Hold Oral hypoglycemics while patient is in the hospital. A1c pending  "

## 2024-05-24 NOTE — ASSESSMENT & PLAN NOTE
Creatine stable for now. BMP reviewed- noted Estimated Creatinine Clearance: 9 mL/min (A) (based on SCr of 5.12 mg/dL (H)). according to latest data. 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  Monitor UOP and serial BMP and adjust therapy as needed. Renally dose meds. Avoid nephrotoxic medications and procedures.  Dialysis per Nephrology

## 2024-05-24 NOTE — SUBJECTIVE & OBJECTIVE
Interval History:   No acute events overnight    Review of Systems   Constitutional: Negative.  Negative for chills and fever.   HENT: Negative.     Eyes:  Negative for pain and redness.   Respiratory:  Positive for shortness of breath. Negative for cough, chest tightness and wheezing.    Cardiovascular:  Negative for chest pain and palpitations.   Gastrointestinal:  Negative for abdominal pain, diarrhea, nausea and vomiting.   Endocrine: Negative for cold intolerance, heat intolerance and polyuria.   Genitourinary:  Negative for difficulty urinating, dysuria, frequency and hematuria.   Musculoskeletal:  Negative for arthralgias, back pain, myalgias, neck pain and neck stiffness.   Skin:  Negative for pallor and rash.   Allergic/Immunologic: Negative.    Neurological:  Negative for dizziness, syncope, weakness, numbness and headaches.   Hematological:  Negative for adenopathy.   Psychiatric/Behavioral:  Negative for agitation, confusion and hallucinations. The patient is not nervous/anxious.      Objective:     Vital Signs (Most Recent):  Temp: 97.7 °F (36.5 °C) (05/24/24 1559)  Pulse: 62 (05/24/24 1559)  Resp: 18 (05/24/24 1559)  BP: (!) 162/52 (05/24/24 1559)  SpO2: 99 % (05/24/24 1559) Vital Signs (24h Range):  Temp:  [97.4 °F (36.3 °C)-99.3 °F (37.4 °C)] 97.7 °F (36.5 °C)  Pulse:  [61-82] 62  Resp:  [10-26] 18  SpO2:  [85 %-100 %] 99 %  BP: (113-183)/(29-69) 162/52     Weight: 64.5 kg (142 lb 3.2 oz)  Body mass index is 22.96 kg/m².    Intake/Output Summary (Last 24 hours) at 5/24/2024 1613  Last data filed at 5/24/2024 0900  Gross per 24 hour   Intake 1751.58 ml   Output --   Net 1751.58 ml         Physical Exam  Vitals reviewed.   Constitutional:       Appearance: Normal appearance. He is ill-appearing.   HENT:      Head: Normocephalic and atraumatic.      Nose: Nose normal.   Eyes:      Extraocular Movements: Extraocular movements intact.      Conjunctiva/sclera: Conjunctivae normal.   Neck:      Trachea:  Trachea normal.   Cardiovascular:      Rate and Rhythm: Normal rate and regular rhythm.      Pulses: Normal pulses.      Heart sounds: Normal heart sounds.   Pulmonary:      Effort: Respiratory distress present.      Breath sounds: Normal air entry.      Comments:  Absent breath sounds right lower lung field  Abdominal:      General: Bowel sounds are normal.      Palpations: Abdomen is soft.   Musculoskeletal:      Cervical back: Neck supple.      Comments:  Moves all extremities.  Normal tone   Skin:     General: Skin is warm and dry.   Neurological:      General: No focal deficit present.      Mental Status: He is alert.      Cranial Nerves: Cranial nerves 2-12 are intact.      Comments: Grossly normal motor and sensory function without focal deficit appreciated.   Psychiatric:         Mood and Affect: Mood and affect normal.         Behavior: Behavior is cooperative.             Significant Labs: All pertinent labs within the past 24 hours have been reviewed.    Significant Imaging: I have reviewed all pertinent imaging results/findings within the past 24 hours.

## 2024-05-24 NOTE — ASSESSMENT & PLAN NOTE
"Patient's FSGs are controlled on current medication regimen.  Last A1c reviewed- No results found for: "LABA1C", "HGBA1C"  Most recent fingerstick glucose reviewed-   No results for input(s): "POCTGLUCOSE" in the last 24 hours.    Current correctional scale  Low  Maintain anti-hyperglycemic dose as follows-   Antihyperglycemics (From admission, onward)      None          Hold Oral hypoglycemics while patient is in the hospital. A1c pending  Glucose looks okay  "

## 2024-05-24 NOTE — SUBJECTIVE & OBJECTIVE
Past Medical History:   Diagnosis Date    Chronic kidney disease (CKD)     Diabetes mellitus     Hypertension     PVD (peripheral vascular disease)        Past Surgical History:   Procedure Laterality Date    LEFT HEART CATHETERIZATION N/A 8/3/2023    Procedure: Left heart cath;  Surgeon: Vazquez Aguilar MD;  Location: [facility] CATH LAB;  Service: Cardiology;  Laterality: N/A;    left toe amputation      PLACEMENT OF DIALYSIS ACCESS         Review of patient's allergies indicates:   Allergen Reactions    Azithromycin Other (See Comments)     Note: - Phreesia 01/11/2019       No current facility-administered medications on file prior to encounter.     Current Outpatient Medications on File Prior to Encounter   Medication Sig    amLODIPine (NORVASC) 5 MG tablet Take 5 mg by mouth once daily.    atorvastatin (LIPITOR) 40 MG tablet Take 1 tablet (40 mg total) by mouth every evening.    hydrALAZINE (APRESOLINE) 100 MG tablet Take 1 tablet by mouth 3 (three) times daily with meals.    nebivoloL (BYSTOLIC) 2.5 MG Tab Take 2.5 mg by mouth once daily.    methoxy peg-epoetin beta (MIRCERA INJ) 50 mcg.    timolol maleate 0.5% (TIMOPTIC) 0.5 % Drop Place 1 drop into the right eye once daily.     Family History    None       Tobacco Use    Smoking status: Former     Types: Cigarettes    Smokeless tobacco: Never   Substance and Sexual Activity    Alcohol use: Not Currently    Drug use: Never    Sexual activity: Not on file     Review of Systems   Constitutional: Negative.  Negative for chills and fever.   HENT: Negative.     Eyes:  Negative for pain and redness.   Respiratory:  Positive for shortness of breath. Negative for cough, chest tightness and wheezing.    Cardiovascular:  Negative for chest pain and palpitations.   Gastrointestinal:  Negative for abdominal pain, diarrhea, nausea and vomiting.   Endocrine: Negative for cold intolerance, heat intolerance and polyuria.   Genitourinary:  Negative for difficulty  urinating, dysuria, frequency and hematuria.   Musculoskeletal:  Negative for arthralgias, back pain, myalgias, neck pain and neck stiffness.   Skin:  Negative for pallor and rash.   Allergic/Immunologic: Negative.    Neurological:  Negative for dizziness, syncope, weakness, numbness and headaches.   Hematological:  Negative for adenopathy.   Psychiatric/Behavioral:  Negative for agitation, confusion and hallucinations. The patient is not nervous/anxious.      Objective:     Vital Signs (Most Recent):  Temp: 97.5 °F (36.4 °C) (05/24/24 0725)  Pulse: 64 (05/24/24 0725)  Resp: 17 (05/24/24 0725)  BP: (!) 183/47 (05/24/24 0725)  SpO2: 99 % (05/24/24 0725) Vital Signs (24h Range):  Temp:  [97.4 °F (36.3 °C)-99.3 °F (37.4 °C)] 97.5 °F (36.4 °C)  Pulse:  [63-87] 64  Resp:  [10-26] 17  SpO2:  [85 %-100 %] 99 %  BP: ()/(29-69) 183/47     Weight: 64.5 kg (142 lb 3.2 oz)  Body mass index is 22.96 kg/m².     Physical Exam  Vitals reviewed.   Constitutional:       Appearance: Normal appearance. He is ill-appearing.   HENT:      Head: Normocephalic and atraumatic.      Nose: Nose normal.   Eyes:      Extraocular Movements: Extraocular movements intact.      Conjunctiva/sclera: Conjunctivae normal.   Neck:      Trachea: Trachea normal.   Cardiovascular:      Rate and Rhythm: Normal rate and regular rhythm.      Pulses: Normal pulses.      Heart sounds: Normal heart sounds.   Pulmonary:      Effort: Respiratory distress present.      Breath sounds: Normal air entry.      Comments:  Absent breath sounds right lower lung field  Abdominal:      General: Bowel sounds are normal.      Palpations: Abdomen is soft.   Musculoskeletal:      Cervical back: Neck supple.      Comments:  Moves all extremities.  Normal tone   Skin:     General: Skin is warm and dry.   Neurological:      General: No focal deficit present.      Mental Status: He is alert.      Cranial Nerves: Cranial nerves 2-12 are intact.      Comments: Grossly normal  motor and sensory function without focal deficit appreciated.   Psychiatric:         Mood and Affect: Mood and affect normal.         Behavior: Behavior is cooperative.                Significant Labs: All pertinent labs within the past 24 hours have been reviewed.    Significant Imaging: I have reviewed all pertinent imaging results/findings within the past 24 hours.

## 2024-05-24 NOTE — ASSESSMENT & PLAN NOTE
Patient with Hypoxic Respiratory failure which is Acute.  he is not on home oxygen. Supplemental oxygen was provided and noted-      .   Signs/symptoms of respiratory failure include- tachypnea, increased work of breathing, and respiratory distress. Contributing diagnoses includes - Pleural effusion Labs and images were reviewed. Patient Has recent ABG, which has been reviewed. Will treat underlying causes and adjust management of respiratory failure as follows     Start on antibiotics   Plan for thoracentesis today.  Pleural fluid studies pending  Nephrology consulted for dialysis

## 2024-05-24 NOTE — PLAN OF CARE
Problem: Physical Therapy  Goal: Physical Therapy Goal  Description: Short Term Goals to be met by: 24    Patient will increase functional independence with mobility by performin. Supine to sit with Stand by assist  2. Sit to stand transfer with Stand by assist using Rolling walker  3. Bed to chair transfer with Stand by assist using Rolling walker  4. Gait  x 150 feet with Stand by assist using Rolling walker  5. Lower extremity exercise program x30 reps per handout, with assistance as needed    Long Term Goals to be met by: 24    Pt will regain full independent functional mobility with straight cane to return to home situation and prior activities of daily living.   Outcome: Progressing       PT eval completed. Please see eval note for details.

## 2024-05-24 NOTE — ASSESSMENT & PLAN NOTE
Patient with Hypoxic Respiratory failure which is Acute.  he is not on home oxygen. Supplemental oxygen was provided and noted-      .   Signs/symptoms of respiratory failure include- tachypnea, increased work of breathing, and respiratory distress. Contributing diagnoses includes - Pleural effusion Labs and images were reviewed. Patient Has recent ABG, which has been reviewed. Will treat underlying causes and adjust management of respiratory failure as follows     Start on antibiotics   Plan for thoracentesis today.  Pleural fluid studies pending  Nephrology consulted for dialysis   Breathing improved, 1.5 L removed yesterday.  Continue antibiotics.  We will tap again tomorrow.

## 2024-05-24 NOTE — PT/OT/SLP EVAL
Physical Therapy Evaluation     Patient Name: Joseph Mcdonald   MRN: [id number]  Recent Surgery: * No surgery found *      Recommendations:     Discharge Recommendations: Low Intensity Therapy   Discharge Equipment Recommendations: none   Barriers to discharge: Increased level of assist and Ongoing medical treatment    Assessment:     Joseph Mcdonald is a 89 y.o. male admitted with a medical diagnosis of Acute hypoxic respiratory failure. He presents with the following impairments/functional limitations: weakness, impaired endurance, impaired functional mobility, gait instability, decreased safety awareness. Pt reports that he drives himself to dialysis and everywhere else. Pt lives alone with assistance from daughter as needed.     Rehab Prognosis: Good; patient would benefit from acute PT services to address these deficits and reach maximum level of function.    Plan:     During this hospitalization, patient to be seen 5 x/week to address the above listed problems via gait training, therapeutic activities, therapeutic exercises    Plan of Care Expires: 06/24/24    Subjective     Chief Complaint: acute hypoxic respiratory failure  Patient Comments/Goals: agreeable  Pain/Comfort:  Pain Rating 1: 0/10    Social History:  Living Environment: Patient lives alone in a single story home with number of outside stair(s): 0  Prior Level of Function: Prior to admission, patient was modified independent and driving and retired  Equipment Used at Home: cane, straight, rollator, shower chair, CPAP (does not use CPAP)  DME owned (not currently used):  CPAP  Assistance Upon Discharge: family    Objective:     Communicated with RN prior to session. Patient found up in chair with peripheral IV upon PT entry to room.    General Precautions: Standard, fall   Orthopedic Precautions: N/A   Braces: N/A    Respiratory Status:  NC on 3L but not donned    Exams:  Cognition: Patient is oriented to Person, Place, Time, Situation  RLE ROM:  WFL  RLE Strength: WFL  LLE ROM: WFL  LLE Strength: WFL  Sensation:    -       Intact    Functional Mobility:  Gait belt applied - Yes  Bed Mobility  Seated in chair at start of session and returned to chair  Transfers  Sit to Stand: minimum assistance with rolling walker and with cues for hand placement and foot placement  Gait  Patient ambulated 50ft with rolling walker and contact guard assistance. Patient demonstrates occasional unsteady gait, decreased step length, flexed posture, and decreased kekio. . All lines remained intact throughout ambulation trail.  Balance  Sitting: supervision  Standing: contact guard assistance      Therapeutic Activities and Exercises:   Patient educated on role of acute care PT and PT POC, safety while in hospital including calling nurse for mobility, and call light usage  Patient educated about importance of OOB mobility and remaining up in chair most of the day.      AM-PAC 6 CLICK MOBILITY  Total Score:17    Patient left up in chair with all lines intact, call button in reach, and family present.    GOALS:   Multidisciplinary Problems       Physical Therapy Goals          Problem: Physical Therapy    Goal Priority Disciplines Outcome Goal Variances Interventions   Physical Therapy Goal     PT, PT/OT Progressing     Description: Short Term Goals to be met by: 24    Patient will increase functional independence with mobility by performin. Supine to sit with Stand by assist  2. Sit to stand transfer with Stand by assist using Rolling walker  3. Bed to chair transfer with Stand by assist using Rolling walker  4. Gait  x 150 feet with Stand by assist using Rolling walker  5. Lower extremity exercise program x30 reps per handout, with assistance as needed    Long Term Goals to be met by: 24    Pt will regain full independent functional mobility with straight cane to return to home situation and prior activities of daily living.                        History:      Past Medical History:   Diagnosis Date    Chronic kidney disease (CKD)     Diabetes mellitus     Hypertension     PVD (peripheral vascular disease)        Past Surgical History:   Procedure Laterality Date    LEFT HEART CATHETERIZATION N/A 8/3/2023    Procedure: Left heart cath;  Surgeon: Vazquez Aguilar MD;  Location: [facility] CATH LAB;  Service: Cardiology;  Laterality: N/A;    left toe amputation      PLACEMENT OF DIALYSIS ACCESS         Time Tracking:     PT Received On: 05/24/24  PT Start Time: 1038  PT Stop Time: 1054  PT Total Time (min): 16 min     Billable Minutes: Evaluation moderate complexity    5/24/2024

## 2024-05-24 NOTE — ASSESSMENT & PLAN NOTE
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 CHF is currently uncontrolled due to Pulmonary edema/pleural effusion on CXR. Latest ECHO performed and demonstrates- Results for orders placed during the hospital encounter of 08/01/23    Echo    Interpretation Summary    Left Ventricle: The left ventricle is normal in size. Increased wall thickness. There is concentric remodeling. Normal wall motion. There is normal systolic function with a visually estimated ejection fraction of 55 - 60%. Grade II diastolic dysfunction.    Left Atrium: Left atrium is mildly dilated. There is a calcified 1.5 x 2 cm full wall thickness mass noted extending from the myocardium into the pericardial space, unclear etiology, recommend cardiac MRI.    Right Ventricle: Normal right ventricular cavity size. Systolic function is normal.    Aortic Valve: The aortic valve is a trileaflet valve. There is physiologically normal aortic regurgitation.    Mitral Valve: There is no stenosis. The mean pressure gradient across the mitral valve is 3 mmHg at a heart rate of  bpm. There is mild regurgitation with a centrally directed jet.    Tricuspid Valve: There is mild to moderate regurgitation with a centrally directed jet.    Pulmonic Valve: There is no significant stenosis.    Pericardium: Small circumferential pericardial effusion present. No indication of cardiac tamponade.  . Continue Nitrate/Vasodilator and monitor clinical status closely. Monitor on telemetry. Patient is off CHF pathway.  Monitor strict Is&Os and daily weights.  Place on fluid restriction of 1.5 L. Cardiology has not been consulted. Continue to stress to patient importance of self efficacy and  on diet for CHF. Last BNP reviewed- and noted below No results for input(s): "BNP", "BNPTRIAGEBLO" in the last 168 hours.      Respiratory status better after thoracentesis  "

## 2024-05-24 NOTE — ASSESSMENT & PLAN NOTE
Patient's anemia is currently uncontrolled. Has not received any PRBCs to date. 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

## 2024-05-24 NOTE — PT/OT/SLP EVAL
Occupational Therapy   Evaluation    Name: Joseph Mcdonald  MRN: [id number]  Admitting Diagnosis: Acute hypoxic respiratory failure  Recent Surgery: * No surgery found *      Recommendations:     Discharge Recommendations: Low Intensity Therapy  Discharge Equipment Recommendations:   (to be determined)  Barriers to discharge:  None    Assessment:     Joseph Mcdonald is a 89 y.o. male with a medical diagnosis of Acute hypoxic respiratory failure.  He presents with no complaints. Pt agreed to OT evaluation.Performance deficits affecting function: weakness, impaired endurance, impaired self care skills, impaired functional mobility.      Rehab Prognosis: Good; patient would benefit from acute skilled OT services to address these deficits and reach maximum level of function.       Plan:     Patient to be seen 5 x/week to address the above listed problems via self-care/home management, therapeutic activities, therapeutic exercises  Plan of Care Expires: 06/28/24  Plan of Care Reviewed with: patient, daughter    Subjective     Chief Complaint: Acute Hypoxic Respiratory Therapy  Patient/Family Comments/goals: To return home    Occupational Profile:  Living Environment: Pt lives at home alone in 1 story home with no steps.  Previous level of function: (I) with self care prior.  Roles and Routines: I with daily activities  Equipment Used at Home: rollator, cane, straight (built in shower seat)  Assistance upon Discharge: Daughter    Pain/Comfort:  Pain Rating 1: 0/10  Pain Rating Post-Intervention 1: 0/10    Patients cultural, spiritual, Yazidi conflicts given the current situation: no    Objective:     Communicated with: RENATO Rashid prior to session.  Patient found up in chair with peripheral IV upon OT entry to room.    General Precautions: Standard, fall  Orthopedic Precautions: N/A  Braces: N/A  Respiratory Status: Room air    Occupational Performance:    Bed Mobility:        Functional  Mobility/Transfers:  Patient completed Sit <> Stand Transfer with minimum assistance  with  gait belt to stand to RW   Functional Mobility: CGA with RW    Activities of Daily Living:  Upper Body Dressing: minimum assistance donning gown as a robe    Cognitive/Visual Perceptual:  Cognitive/Psychosocial Skills:     -       Oriented to: Person, Place, and Situation   -       Follows Commands/attention:Follows one-step commands  -       Communication: clear/fluent  Visual/Perceptual:      -wears glasses, hearing wfl      Physical Exam:  Balance:    -       CGA with standing balance  Skin integrity: Visible skin intact  Edema:  None noted  Sensation:    -       Intact  Motor Planning:    -       wfl  Dominant hand:    -       Right  Upper Extremity Range of Motion:     -       Right Upper Extremity: WFL  -       Left Upper Extremity: WFL  Upper Extremity Strength:    -       Right Upper Extremity: WFL  -       Left Upper Extremity: WFL   Strength:    -       Right Upper Extremity: WFL  -       Left Upper Extremity: WFL    AMPAC 6 Click ADL:  AMPAC Total Score: 18    Treatment & Education:  OT evaluation completed. See eval for details. Pt presents with decline in self care status. Tx plan to focus on increasing (I) with self care and mobility.   Pt educated on OT role/POC.   Importance of OOB activity.  Importance of sitting up in the chair.  Safety during functional t/f and mobility with use of RW  Importance of assisting with self-care activities   All questions/concerns answered within OT scope of practice     Patient left up in chair with all lines intact, call button in reach, and daughter present    GOALS:   Multidisciplinary Problems       Occupational Therapy Goals          Problem: Occupational Therapy    Goal Priority Disciplines Outcome Interventions   Occupational Therapy Goal     OT, PT/OT Progressing    Description: STG:  Pt will perform grooming (I)  Pt will bathe with setup  Pt will perform UE  dressing with (I)  Pt will perform LE dressing with I/mod I  Pt will transfer bed/chair/bsc with Mod I  Pt will perform standing task x 2 min with Mod I   Pt will tolerate 15 minutes of tx without fatigue      LT.Restore to max I with self care and mobility.                          History:     Past Medical History:   Diagnosis Date    Chronic kidney disease (CKD)     Diabetes mellitus     Hypertension     PVD (peripheral vascular disease)          Past Surgical History:   Procedure Laterality Date    LEFT HEART CATHETERIZATION N/A 8/3/2023    Procedure: Left heart cath;  Surgeon: Vazquez Aguilar MD;  Location: [facility] CATH LAB;  Service: Cardiology;  Laterality: N/A;    left toe amputation      PLACEMENT OF DIALYSIS ACCESS         Time Tracking:     OT Date of Treatment: 24  OT Start Time: 1143  OT Stop Time: 1155  OT Total Time (min): 12 min    Billable Minutes:Evaluation 12    2024

## 2024-05-24 NOTE — PROGRESS NOTES
[facility]  Adult Nutrition  Progress Note         Reason for Assessment  Reason For Assessment: identified at risk by screening criteria [facility]  Nutrition Risk Screen: no indicators present    Assessment and Plan    Patient admitted 5/23 with a dx of pleural effusion, ESRD on HD, M, sepsis, anemia, and debility. Patient is ordered a 2 gm Na+ diet. No po intake documented on flowsheets since admission.     Patient is 64.5 kg with a BMI of 22.96 which is slightly below ideal for geriatric standards. Patient endorsed weight loss on MST screen but chart review reveals little weight hx available. No issues with appetite endorsed on MST screen. Will complete NFPE on return to facility. Malnutrition not suspected at this time but patient is at risk due to chronic illness and advanced age.     Recommend to change diet to Renal high protein high calorie and add Boost GC Max Protein BID. Encourage po intakes. Recommend to document all po intake on flowsheets. RD Following.         Learning Needs/Social Determinants of Health  Learning Assessment       05/23/2024 1848 [facility] (5/23/2024 - Present)   Created by Melanie Saleh RN - RN (Nurse) Status: Complete                 PRIMARY LEARNER     Primary Learner Name:  Joseph Mcdonald  - 05/23/2024 1848    Relationship:  Patient  - 05/23/2024 1848    Does the primary learner have any barriers to learning?:  No Barriers [facility] 05/23/2024 1848    What is the preferred language of the primary learner?:  English [facility] 05/23/2024 1848    Is an  required?:  No [facility] 05/23/2024 1848    How does the primary learner prefer to learn new concepts?:  Listening, Reading, Demonstration [facility] 05/23/2024 1848    How often do you need to have someone help you read instructions, pamphlets, or written material from your doctor or pharmacy?:  Never  - 05/23/2024 1848        CO-LEARNER #1     No question answered        CO-LEARNER #2     No  question answered        SPECIAL TOPICS     No question answered        ANSWERED BY:     No question answered        Comments         Edit History       Melanie Saleh, RN - RN (Nurse)   05/23/2024 [id number]                           Social Determinants of Health     Tobacco Use: Medium Risk (8/2/2023)    Patient History     Smoking Tobacco Use: Former     Smokeless Tobacco Use: Never     Passive Exposure: Not on file   Alcohol Use: Not At Risk (8/2/2023)    AUDIT-C     Frequency of Alcohol Consumption: Never     Average Number of Drinks: Patient does not drink     Frequency of Binge Drinking: Never   Financial Resource Strain: Low Risk  (8/2/2023)    Overall Financial Resource Strain (CARDIA)     Difficulty of Paying Living Expenses: Not hard at all   Food Insecurity: No Food Insecurity (8/2/2023)    Hunger Vital Sign     Worried About Running Out of Food in the Last Year: Never true     Ran Out of Food in the Last Year: Never true   Transportation Needs: No Transportation Needs (8/2/2023)    PRAPARE - Transportation     Lack of Transportation (Medical): No     Lack of Transportation (Non-Medical): No   Physical Activity: Sufficiently Active (8/2/2023)    Exercise Vital Sign     Days of Exercise per Week: 5 days     Minutes of Exercise per Session: 30 min   Stress: No Stress Concern Present (8/2/2023)    [employer] [place] [employer] - Occupational Stress Questionnaire     Feeling of Stress : Not at all   Housing Stability: Low Risk  (8/2/2023)    Housing Stability Vital Sign     Unable to Pay for Housing in the Last Year: No     Number of Places Lived in the Last Year: 1     Unstable Housing in the Last Year: No   Depression: Not on file   Utilities: Not on file   Health Literacy: Not on file   Social Isolation: Not on file            Malnutrition  Not suspected at this time but will complete NFPE on f/u    Nutrition Diagnosis  Increased Protein Needs related to Renal dysfunction as evidenced by ESRD on  "HD  Comments: will change diet to renal high protein    Recent Labs   Lab 05/23/24  1758 05/24/24  0330   GLU  --  120*   POCGLU 153*  --      Comments on Glucose: Hx Dm    Nutrition Prescription / Recommendations  Recommendation/Intervention: Recommend to change diet to renal high protein high calorie  Goals: po intake 75% during admission  Nutrition Goal Status: new  Communication of RD Recs: reviewed with physician  Current Diet Order: 2 gm Na  Nutrition Order Comments: Recommen to change diet to renal high protein high calorie  Chewing or Swallowing Difficulty?: No Chewing or swallowing difficulty  Recommended Diet: Renal (High Protein)  Recommended Oral Supplement: Boost Glucose Control MAX [160kcals, 30g protein, 6g Carbs] 2 times a day   Is Nutrition Support Recommended: [facility] Support: No  Is Nutrition Education Recommended: No    Monitor and Evaluation  % current Intake: New Diet order with no P.O. intake documented currently  % intake to meet estimated needs: 75 - 100 %  Food and Nutrient Intake: energy intake, food and beverage intake  Food and Nutrient Adminstration: diet order  Anthropometric Measurements: height/length, weight, weight change, body mass index  Biochemical Data, Medical Tests and Procedures: gastrointestinal profile, glucose/endocrine profile, inflammatory profile, lipid profile       Current Medical Diagnosis and Past Medical History  Diagnosis: renal disease  Past Medical History:   Diagnosis Date    Chronic kidney disease (CKD)     Diabetes mellitus     Hypertension     PVD (peripheral vascular disease)        Nutrition/Diet History   N/a    Lab/Procedures/Meds  Recent Labs   Lab 05/23/24  1130 05/24/24  0330    135*   K 3.2* 3.2*   BUN 19* 29*   CREATININE 4.01* 5.12*   CALCIUM 8.5 8.4*   ALBUMIN 2.5*  --    CL 98 100   ALT 9*  --    AST 27  --    PHOS  --  3.4   Na low/K low CKD  BUN/CR elevated CKD on HD  Last A1c: No results found for: "HGBA1C"  Lab Results " "  Component Value Date    RBC 2.89 (L) 05/24/2024    HGB 7.8 (L) 05/24/2024    HCT 25.9 (L) 05/24/2024    MCV 89.6 05/24/2024    MCH 27.0 05/24/2024    MCHC 30.1 (L) 05/24/2024    TIBC 105 (L) 05/23/2024     Pertinent Labs Reviewed: reviewed  Pertinent Medications Reviewed: reviewed  Scheduled Meds:   atorvastatin  40 mg Oral QHS    heparin (porcine)  5,000 Units Subcutaneous Q8H    hydrALAZINE  100 mg Oral TID WM    isosorbide mononitrate  30 mg Oral Daily    metoprolol succinate  25 mg Oral Daily    pantoprazole  40 mg Oral Daily    piperacillin-tazobactam (Zosyn) IV (PEDS and ADULTS) (extended infusion is not appropriate)  4.5 g Intravenous Q12H    sevelamer carbonate  2,400 mg Oral TID WM     Continuous Infusions:  PRN Meds:.  Current Facility-Administered Medications:     acetaminophen, 1,000 mg, Oral, Q8H PRN    dextrose 10%, 12.5 g, Intravenous, PRN    dextrose 10%, 25 g, Intravenous, PRN    glucagon (human recombinant), 1 mg, Intramuscular, PRN    glucose, 16 g, Oral, PRN    glucose, 24 g, Oral, PRN    naloxone, 0.02 mg, Intravenous, PRN    ondansetron, 8 mg, Oral, Q8H PRN    polyethylene glycol, 17 g, Oral, BID PRN    sodium chloride 0.9%, 10 mL, Intravenous, Q12H PRN    Anthropometrics  Temp: 97.5 °F (36.4 °C)  Height: 5' 5.98" (167.6 cm)  Height (inches): 65.98 in  Weight Method: Standard Scale  Weight: 64.5 kg (142 lb 3.2 oz)  Weight (lb): 142.2 lb  Ideal Body Weight (IBW), Male: 141.88 lb  BMI (Calculated): 23       Estimated/Assessed Needs      Temp: 97.5 °F (36.4 °C)Oral  Weight Used For Calorie Calculations: 64.5 kg (142 lb 3.2 oz)   Energy Need Method: Kcal/kg Energy Calorie Requirements (kcal): 1423-6963  Weight Used For Protein Calculations: 64.5 kg (142 lb 3.2 oz)  Protein Requirements: 71-84  Estimated Fluid Requirement Method: other (see comments) (500 ml + urine output)    RDA Method (mL): 1935       Nutrition by Nursing  Diet/Nutrition Received: (P) consistent carb/diabetic diet         "   Last Bowel Movement: (P) 05/23/24                Nutrition Follow-Up  RD Follow-up?: Yes      Nutrition Discharge Planning: too soon to determine; RD Following.            Available via Secure Chat

## 2024-05-24 NOTE — PT/OT/SLP EVAL
Occupational Therapy   Evaluation    Name: Joseph Mcdonald  MRN: [id number]  Admitting Diagnosis: Acute hypoxic respiratory failure  Recent Surgery: * No surgery found *      Recommendations:     Discharge Recommendations: Low Intensity Therapy  Discharge Equipment Recommendations:   (to be determined)  Barriers to discharge:  None    Assessment:     Joseph Mcdonald is a 89 y.o. male with a medical diagnosis of Acute hypoxic respiratory failure.  He presents with no complaints. Pt agreed to OT evaluation Performance deficits affecting function: weakness, impaired endurance, impaired self care skills, impaired functional mobility.      Rehab Prognosis: Good; patient would benefit from acute skilled OT services to address these deficits and reach maximum level of function.       Plan:     Patient to be seen 5 x/week to address the above listed problems via self-care/home management, therapeutic activities, therapeutic exercises  Plan of Care Expires: 06/28/24  Plan of Care Reviewed with: patient    Subjective     Chief Complaint: Elevated Troponin, SOB  Patient/Family Comments/goals: To return home with home therapy.    Occupational Profile:  Living Environment: Pt lives in a mobile home with a ramp  Previous level of function: I with self care prior, but has beed requiring assistance with LE dressing  Roles and Routines: I with daily activities prior with increased assistance recently  Equipment Used at Home: rollator, wheelchair (built in shower seat)  Assistance upon Discharge: Daughter    Pain/Comfort:  Pain Rating 1: 0/10  Pain Rating Post-Intervention 1: 0/10    Patients cultural, spiritual, Catholic conflicts given the current situation: no    Objective:     Communicated with: RENATO Leavitt prior to session.  Patient found up in chair with peripheral IV upon OT entry to room.    General Precautions: Standard, fall  Orthopedic Precautions: N/A  Braces: N/A  Respiratory Status: Room air    Occupational  Performance:    Bed Mobility:        Functional Mobility/Transfers:  Patient completed Sit <> Stand Transfer with contact guard assistance  with  gait belt to stand to RW   Functional Mobility: CGA with RW    Activities of Daily Living:  Upper Body Dressing: moderate assistance doncasey merrill as a robe    Cognitive/Visual Perceptual:  Cognitive/Psychosocial Skills:     -       Oriented to: Person, Place, and Situation   -       Follows Commands/attention:Follows one-step commands  -       Communication: clear/fluent  Visual/Perceptual:      -       Physical Exam:  Balance:    -       CGA with standing balance  Skin integrity: Bruising of (L) arm at Dialysis Catheter site  Edema:  Moderate (L) arm at Dialysis Catherer site  Sensation:    -       Intact  Motor Planning:    -       WFL  Dominant hand:    -       (R)  Upper Extremity Range of Motion:     -       Right Upper Extremity: WFL  -       Left Upper Extremity: WFL  Upper Extremity Strength:    -       Right Upper Extremity: WFL +3/5  -       Left Upper Extremity: WFL +3/5   Strength:    -       Right Upper Extremity: WFL  -       Left Upper Extremity: WFL    AMPAC 6 Click ADL:  AMPAC Total Score: 15    Treatment & Education:  OT evaluation completed, See eval for details. Pt presents with decline in status. Tx plan to focus on increasing (I) with self care and mobility.  Pt educated on OT role/POC.   Importance of OOB activity.  Importance of sitting up in the chair  Safety during functional t/f and mobility with use of RW  Importance of assisting with self-care activities   All questions/concerns answered within OT scope of practice     Patient left up in chair with all lines intact, call button in reach, and daughter present    GOALS:   Multidisciplinary Problems       Occupational Therapy Goals          Problem: Occupational Therapy    Goal Priority Disciplines Outcome Interventions   Occupational Therapy Goal     OT, PT/OT Progressing    Description:  STG:  Pt will perform grooming standing at sink   Pt will bathe with I/Mod I  Pt will perform UE dressing with (I)  Pt will perform LE dressing with Mod I  Pt will transfer bed/chair/bsc with Mod I  Pt will perform standing task x 2 min with Mod I   Pt will tolerate 15 minutes of tx without fatigue      LT.Restore to max I with self care and mobility.                          History:     Past Medical History:   Diagnosis Date    Chronic kidney disease (CKD)     Diabetes mellitus     Hypertension     PVD (peripheral vascular disease)          Past Surgical History:   Procedure Laterality Date    LEFT HEART CATHETERIZATION N/A 8/3/2023    Procedure: Left heart cath;  Surgeon: Vazquez Aguilar MD;  Location: [facility] CATH LAB;  Service: Cardiology;  Laterality: N/A;    left toe amputation      PLACEMENT OF DIALYSIS ACCESS         Time Tracking:     OT Date of Treatment: 24  OT Start Time: 924  OT Stop Time: 932  OT Total Time (min): 8 min    Billable Minutes:Evaluation 8    2024

## 2024-05-24 NOTE — ASSESSMENT & PLAN NOTE
This patient does have evidence of infective focus  My overall impression is sepsis.  Source: Respiratory  Antibiotics given-   Antibiotics (72h ago, onward)      Start     Stop Route Frequency Ordered    05/24/24 0000  piperacillin-tazobactam (ZOSYN) 4.5 g in dextrose 5 % in water (D5W) 100 mL IVPB (MB+)         -- IV Every 12 hours (non-standard times) 05/23/24 1747          Latest lactate reviewed-  Recent Labs   Lab 05/23/24  1428 05/23/24  1944   LACTATE  --  0.8   POCLAC 1.4*  --      Organ dysfunction indicated by Acute respiratory failure    Fluid challenge Not needed - patient is not hypotensive      Post- resuscitation assessment No - Post resuscitation assessment not needed       Will Not start Pressors- Levophed for MAP of 65  Source control achieved by:      Blood and urine cultures.  We will check pleural fluid for infection.    Continue antibiotics.

## 2024-05-24 NOTE — PLAN OF CARE
Problem: Adult Inpatient Plan of Care  Goal: Plan of Care Review  Outcome: Progressing  Goal: Patient-Specific Goal (Individualized)  Outcome: Progressing  Goal: Absence of Hospital-Acquired Illness or Injury  Outcome: Progressing  Goal: Optimal Comfort and Wellbeing  Outcome: Progressing  Goal: Readiness for Transition of Care  Outcome: Progressing     Problem: Diabetes Comorbidity  Goal: Blood Glucose Level Within Targeted Range  Outcome: Progressing     Problem: Sepsis/Septic Shock  Goal: Optimal Coping  Outcome: Progressing  Goal: Absence of Bleeding  Outcome: Progressing  Goal: Blood Glucose Level Within Targeted Range  Outcome: Progressing  Goal: Absence of Infection Signs and Symptoms  Outcome: Progressing  Goal: Optimal Nutrition Intake  Outcome: Progressing     Problem: Fall Injury Risk  Goal: Absence of Fall and Fall-Related Injury  Outcome: Progressing

## 2024-05-24 NOTE — PLAN OF CARE
Problem: Occupational Therapy  Goal: Occupational Therapy Goal  Description: STG:  Pt will perform grooming standing at sink   Pt will bathe with I/Mod I  Pt will perform UE dressing with (I)  Pt will perform LE dressing with Mod I  Pt will transfer bed/chair/bsc with Mod I  Pt will perform standing task x 2 min with Mod I   Pt will tolerate 15 minutes of tx without fatigue      LT.Restore to max I with self care and mobility.     Outcome: Progressing

## 2024-05-24 NOTE — ASSESSMENT & PLAN NOTE
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 CHF is currently uncontrolled due to Pulmonary edema/pleural effusion on CXR. Latest ECHO performed and demonstrates- Results for orders placed during the hospital encounter of 08/01/23    Echo    Interpretation Summary    Left Ventricle: The left ventricle is normal in size. Increased wall thickness. There is concentric remodeling. Normal wall motion. There is normal systolic function with a visually estimated ejection fraction of 55 - 60%. Grade II diastolic dysfunction.    Left Atrium: Left atrium is mildly dilated. There is a calcified 1.5 x 2 cm full wall thickness mass noted extending from the myocardium into the pericardial space, unclear etiology, recommend cardiac MRI.    Right Ventricle: Normal right ventricular cavity size. Systolic function is normal.    Aortic Valve: The aortic valve is a trileaflet valve. There is physiologically normal aortic regurgitation.    Mitral Valve: There is no stenosis. The mean pressure gradient across the mitral valve is 3 mmHg at a heart rate of  bpm. There is mild regurgitation with a centrally directed jet.    Tricuspid Valve: There is mild to moderate regurgitation with a centrally directed jet.    Pulmonic Valve: There is no significant stenosis.    Pericardium: Small circumferential pericardial effusion present. No indication of cardiac tamponade.  . Continue Nitrate/Vasodilator and monitor clinical status closely. Monitor on telemetry. Patient is off CHF pathway.  Monitor strict Is&Os and daily weights.  Place on fluid restriction of 1.5 L. Cardiology has not been consulted. Continue to stress to patient importance of self efficacy and  on diet for CHF. Last BNP reviewed- and noted below No results for input(s): "BNP", "BNPTRIAGEBLO" in the last 168 hours.       "

## 2024-05-24 NOTE — ASSESSMENT & PLAN NOTE
Chronic, controlled. Latest blood pressure and vitals reviewed-     Temp:  [97.4 °F (36.3 °C)-99.3 °F (37.4 °C)]   Pulse:  [61-76]   Resp:  [10-26]   BP: (113-183)/(29-69)   SpO2:  [85 %-100 %] .   Home meds for hypertension were reviewed and noted below.   Hypertension Medications               amLODIPine (NORVASC) 5 MG tablet Take 5 mg by mouth once daily.    hydrALAZINE (APRESOLINE) 100 MG tablet Take 1 tablet by mouth 3 (three) times daily with meals.    nebivoloL (BYSTOLIC) 2.5 MG Tab Take 2.5 mg by mouth once daily.            While in the hospital, will manage blood pressure as follows; Continue home antihypertensive regimen    Will utilize p.r.n. blood pressure medication only if patient's blood pressure greater than 180/110 and he develops symptoms such as worsening chest pain or shortness of breath.  Continue current regimen

## 2024-05-24 NOTE — ASSESSMENT & PLAN NOTE
Patient's anemia is currently controlled. Has not received any PRBCs to date. Etiology likely d/t  renal disease  Current CBC reviewed-   Lab Results   Component Value Date    HGB 7.8 (L) 05/24/2024    HCT 25.9 (L) 05/24/2024     Monitor serial CBC and transfuse if patient becomes hemodynamically unstable, symptomatic or H/H drops below 7/21.  No evidence of bleeding

## 2024-05-24 NOTE — ASSESSMENT & PLAN NOTE
Patient found to have large pleural effusion on imaging. I have personally reviewed and interpreted the following imaging: Xray. A thoracentesis was ordered. Most likely etiology includes Congestive Heart Failure, Pneumonia, and Renal Failure. Management to include Repeat Thoracentesis and Dialysis

## 2024-05-24 NOTE — ASSESSMENT & PLAN NOTE
Patient has hypokalemia which is Acute and currently uncontrolled. Most recent potassium levels reviewed-   Lab Results   Component Value Date    K 3.2 (L) 05/24/2024   . Will continue potassium replacement per protocol and recheck repeat levels after replacement completed.

## 2024-05-24 NOTE — H&P
[facility]  [facility] Medicine  History & Physical    Patient Name: Joseph Mcdonald  MRN: [id number]  Patient Class: IP- Inpatient  Admission Date: 5/23/2024  Attending Physician: Cristian Ac DO   Primary Care Provider: [facility], [facility]         Patient information was obtained from patient, past medical records, and ER records.     Subjective:     Principal Problem:Acute hypoxic respiratory failure    Chief Complaint:   Chief Complaint   Patient presents with    Shortness of Breath        HPI:   89-year-old  male With a past medical history of end-stage renal disease, hypertension, iron deficiency anemia, secondary hyperparathyroidism, type 2 diabetes, CAD, heart failure with preserved ejection fraction, hypokalemia inability presents to the ED with 1 week worsening shortness of breath.  This was particularly bad during his dialysis session this morning.  Due to this daughter brought him to the ED thereafter.  He denies any fever, chills, cough, wheezing, sputum production, palpitations.  He has been take medication as in his not missed any dialysis sessions.  He denies any nausea, vomiting, diarrhea, dysuria.  Imaging in the ED revealed near-complete he had a pacemaker patient of the right hemithorax review of systems otherwise negative.    Past Medical History:   Diagnosis Date    Chronic kidney disease (CKD)     Diabetes mellitus     Hypertension     PVD (peripheral vascular disease)        Past Surgical History:   Procedure Laterality Date    LEFT HEART CATHETERIZATION N/A 8/3/2023    Procedure: Left heart cath;  Surgeon: Vazquez Aguilar MD;  Location: [facility] CATH LAB;  Service: Cardiology;  Laterality: N/A;    left toe amputation      PLACEMENT OF DIALYSIS ACCESS         Review of patient's allergies indicates:   Allergen Reactions    Azithromycin Other (See Comments)     Note: - [employer] 01/11/2019       No current facility-administered medications  on file prior to encounter.     Current Outpatient Medications on File Prior to Encounter   Medication Sig    amLODIPine (NORVASC) 5 MG tablet Take 5 mg by mouth once daily.    atorvastatin (LIPITOR) 40 MG tablet Take 1 tablet (40 mg total) by mouth every evening.    hydrALAZINE (APRESOLINE) 100 MG tablet Take 1 tablet by mouth 3 (three) times daily with meals.    nebivoloL (BYSTOLIC) 2.5 MG Tab Take 2.5 mg by mouth once daily.    methoxy peg-epoetin beta (MIRCERA INJ) 50 mcg.    timolol maleate 0.5% (TIMOPTIC) 0.5 % Drop Place 1 drop into the right eye once daily.     Family History    None       Tobacco Use    Smoking status: Former     Types: Cigarettes    Smokeless tobacco: Never   Substance and Sexual Activity    Alcohol use: Not Currently    Drug use: Never    Sexual activity: Not on file     Review of Systems   Constitutional: Negative.  Negative for chills and fever.   HENT: Negative.     Eyes:  Negative for pain and redness.   Respiratory:  Positive for shortness of breath. Negative for cough, chest tightness and wheezing.    Cardiovascular:  Negative for chest pain and palpitations.   Gastrointestinal:  Negative for abdominal pain, diarrhea, nausea and vomiting.   Endocrine: Negative for cold intolerance, heat intolerance and polyuria.   Genitourinary:  Negative for difficulty urinating, dysuria, frequency and hematuria.   Musculoskeletal:  Negative for arthralgias, back pain, myalgias, neck pain and neck stiffness.   Skin:  Negative for pallor and rash.   Allergic/Immunologic: Negative.    Neurological:  Negative for dizziness, syncope, weakness, numbness and headaches.   Hematological:  Negative for adenopathy.   Psychiatric/Behavioral:  Negative for agitation, confusion and hallucinations. The patient is not nervous/anxious.      Objective:     Vital Signs (Most Recent):  Temp: 97.5 °F (36.4 °C) (05/24/24 0725)  Pulse: 64 (05/24/24 0725)  Resp: 17 (05/24/24 0725)  BP: (!) 183/47 (05/24/24 0725)  SpO2:  99 % (05/24/24 0725) Vital Signs (24h Range):  Temp:  [97.4 °F (36.3 °C)-99.3 °F (37.4 °C)] 97.5 °F (36.4 °C)  Pulse:  [63-87] 64  Resp:  [10-26] 17  SpO2:  [85 %-100 %] 99 %  BP: ()/(29-69) 183/47     Weight: 64.5 kg (142 lb 3.2 oz)  Body mass index is 22.96 kg/m².     Physical Exam  Vitals reviewed.   Constitutional:       Appearance: Normal appearance. He is ill-appearing.   HENT:      Head: Normocephalic and atraumatic.      Nose: Nose normal.   Eyes:      Extraocular Movements: Extraocular movements intact.      Conjunctiva/sclera: Conjunctivae normal.   Neck:      Trachea: Trachea normal.   Cardiovascular:      Rate and Rhythm: Normal rate and regular rhythm.      Pulses: Normal pulses.      Heart sounds: Normal heart sounds.   Pulmonary:      Effort: Respiratory distress present.      Breath sounds: Normal air entry.      Comments:  Absent breath sounds right lower lung field  Abdominal:      General: Bowel sounds are normal.      Palpations: Abdomen is soft.   Musculoskeletal:      Cervical back: Neck supple.      Comments:  Moves all extremities.  Normal tone   Skin:     General: Skin is warm and dry.   Neurological:      General: No focal deficit present.      Mental Status: He is alert.      Cranial Nerves: Cranial nerves 2-12 are intact.      Comments: Grossly normal motor and sensory function without focal deficit appreciated.   Psychiatric:         Mood and Affect: Mood and affect normal.         Behavior: Behavior is cooperative.                Significant Labs: All pertinent labs within the past 24 hours have been reviewed.    Significant Imaging: I have reviewed all pertinent imaging results/findings within the past 24 hours.  Assessment/Plan:     * Acute hypoxic respiratory failure  Patient with Hypoxic Respiratory failure which is Acute.  he is not on home oxygen. Supplemental oxygen was provided and noted-      .   Signs/symptoms of respiratory failure include- tachypnea, increased work of  breathing, and respiratory distress. Contributing diagnoses includes - Pleural effusion Labs and images were reviewed. Patient Has recent ABG, which has been reviewed. Will treat underlying causes and adjust management of respiratory failure as follows     Start on antibiotics   Plan for thoracentesis today.  Pleural fluid studies pending  Nephrology consulted for dialysis    Pleural effusion  Patient found to have large pleural effusion on imaging. I have personally reviewed and interpreted the following imaging: Xray. A thoracentesis was ordered. Most likely etiology includes Congestive Heart Failure, Pneumonia, and Renal Failure. Management to include Repeat Thoracentesis and Dialysis      Debility  Patient with Acute on chronic debility due to age-related physical debility. Latest AMPAC and GEMS scores have not been reviewed. Evaluation for etiology is complete. Plan includes PT/OT consulted.    Anemia  Patient's anemia is currently uncontrolled. Has not received any PRBCs to date. 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    Hypokalemia  Patient has hypokalemia which is Acute and currently uncontrolled. Most recent potassium levels reviewed-   Lab Results   Component Value Date    K 3.2 (L) 05/24/2024   . Will continue potassium replacement per protocol and recheck repeat levels after replacement completed.     Sepsis  This patient does have evidence of infective focus  My overall impression is sepsis.  Source: Respiratory  Antibiotics given-   Antibiotics (72h ago, onward)      Start     Stop Route Frequency Ordered    05/24/24 0000  piperacillin-tazobactam (ZOSYN) 4.5 g in dextrose 5 % in water (D5W) 100 mL IVPB (MB+)         -- IV Every 12 hours (non-standard times) 05/23/24  1747          Latest lactate reviewed-  Recent Labs   Lab 05/23/24  1428 05/23/24  1944   LACTATE  --  0.8   POCLAC 1.4*  --      Organ dysfunction indicated by Acute respiratory failure    Fluid challenge Not needed - patient is not hypotensive      Post- resuscitation assessment No - Post resuscitation assessment not needed       Will Not start Pressors- Levophed for MAP of 65  Source control achieved by:      Blood and urine cultures.  We will check pleural fluid for infection.    Continue antibiotics.    Heart failure with preserved ejection fraction  Patient is identified as having Diastolic (HFpEF) heart failure that is Acute on chronic. CHF is currently uncontrolled due to Pulmonary edema/pleural effusion on CXR. Latest ECHO performed and demonstrates- Results for orders placed during the hospital encounter of 08/01/23    Echo    Interpretation Summary    Left Ventricle: The left ventricle is normal in size. Increased wall thickness. There is concentric remodeling. Normal wall motion. There is normal systolic function with a visually estimated ejection fraction of 55 - 60%. Grade II diastolic dysfunction.    Left Atrium: Left atrium is mildly dilated. There is a calcified 1.5 x 2 cm full wall thickness mass noted extending from the myocardium into the pericardial space, unclear etiology, recommend cardiac MRI.    Right Ventricle: Normal right ventricular cavity size. Systolic function is normal.    Aortic Valve: The aortic valve is a trileaflet valve. There is physiologically normal aortic regurgitation.    Mitral Valve: There is no stenosis. The mean pressure gradient across the mitral valve is 3 mmHg at a heart rate of  bpm. There is mild regurgitation with a centrally directed jet.    Tricuspid Valve: There is mild to moderate regurgitation with a centrally directed jet.    Pulmonic Valve: There is no significant stenosis.    Pericardium: Small circumferential pericardial effusion present. No indication of  "cardiac tamponade.  . Continue Nitrate/Vasodilator and monitor clinical status closely. Monitor on telemetry. Patient is off CHF pathway.  Monitor strict Is&Os and daily weights.  Place on fluid restriction of 1.5 L. Cardiology has not been consulted. Continue to stress to patient importance of self efficacy and  on diet for CHF. Last BNP reviewed- and noted below No results for input(s): "BNP", "BNPTRIAGEBLO" in the last 168 hours.         CAD (coronary artery disease)  Patient with known CAD s/p  unclear , which is controlled Will continue Statin and monitor for S/Sx of angina/ACS. Continue to monitor on telemetry.     DM2 (diabetes mellitus, type 2)  Patient's FSGs are controlled on current medication regimen.  Last A1c reviewed- No results found for: "LABA1C", "HGBA1C"  Most recent fingerstick glucose reviewed-   Recent Labs   Lab 05/23/24  1428   POCTGLUCOSE 111*     Current correctional scale  Low  Maintain anti-hyperglycemic dose as follows-   Antihyperglycemics (From admission, onward)      None          Hold Oral hypoglycemics while patient is in the hospital. A1c pending    Secondary hyperparathyroidism of renal origin   Nephrology consult      Iron deficiency anemia, unspecified  Patient's anemia is currently controlled. Has not received any PRBCs to date. Etiology likely d/t  renal disease  Current CBC reviewed-   Lab Results   Component Value Date    HGB 7.8 (L) 05/24/2024    HCT 25.9 (L) 05/24/2024     Monitor serial CBC and transfuse if patient becomes hemodynamically unstable, symptomatic or H/H drops below 7/21.    Essential (primary) hypertension  Chronic, controlled. Latest blood pressure and vitals reviewed-     Temp:  [97.4 °F (36.3 °C)-99.3 °F (37.4 °C)]   Pulse:  [63-87]   Resp:  [10-26]   BP: ()/(29-69)   SpO2:  [85 %-100 %] .   Home meds for hypertension were reviewed and noted below.   Hypertension Medications               amLODIPine (NORVASC) 5 MG tablet Take 5 mg by mouth " once daily.    hydrALAZINE (APRESOLINE) 100 MG tablet Take 1 tablet by mouth 3 (three) times daily with meals.    nebivoloL (BYSTOLIC) 2.5 MG Tab Take 2.5 mg by mouth once daily.            While in the hospital, will manage blood pressure as follows; Continue home antihypertensive regimen    Will utilize p.r.n. blood pressure medication only if patient's blood pressure greater than 180/110 and he develops symptoms such as worsening chest pain or shortness of breath.    End stage renal disease  Creatine stable for now. BMP reviewed- noted Estimated Creatinine Clearance: 9 mL/min (A) (based on SCr of 5.12 mg/dL (H)). according to latest data. 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  Monitor UOP and serial BMP and adjust therapy as needed. Renally dose meds. Avoid nephrotoxic medications and procedures.      VTE Risk Mitigation (From admission, onward)           Ordered     heparin (porcine) injection 5,000 Units  Every 8 hours         05/23/24 1746     IP VTE HIGH RISK PATIENT  Once         05/23/24 1746     Place sequential compression device  Until discontinued         05/23/24 1746                               [facility]  Adult Nutrition  Progress Note         Reason for Assessment  Reason For Assessment: identified at risk by screening criteria [facility]  Nutrition Risk Screen: no indicators present    Assessment and Plan    Patient admitted 5/23 with a dx of pleural effusion, ESRD on HD, M, sepsis, anemia, and debility. Patient is ordered a 2 gm Na+ diet. No po intake documented on flowsheets since admission.     Patient is 64.5 kg with a BMI of 22.96 which is slightly below ideal for geriatric standards. Patient endorsed weight loss on MST screen but chart review reveals little weight hx available. No issues with appetite endorsed on MST screen. Will complete NFPE on return to facility. Malnutrition not suspected at this time but patient is at risk due to chronic illness and advanced  age.     Recommend to change diet to Renal high protein high calorie and add Boost GC Max Protein BID. Encourage po intakes. Recommend to document all po intake on flowsheets. RD Following.         Learning Needs/Social Determinants of Health  Learning Assessment       05/23/2024 1848 [facility] (5/23/2024 - Present)   Created by Melanie Saleh RN - RN (Nurse) Status: Complete                 PRIMARY LEARNER     Primary Learner Name:  Joseph Mcdonald  - 05/23/2024 1848    Relationship:  Patient RC - 05/23/2024 1848    Does the primary learner have any barriers to learning?:  No Barriers RC - 05/23/2024 1848    What is the preferred language of the primary learner?:  English  - 05/23/2024 1848    Is an  required?:  No  - 05/23/2024 1848    How does the primary learner prefer to learn new concepts?:  Listening, Reading, Demonstration  - 05/23/2024 1848    How often do you need to have someone help you read instructions, pamphlets, or written material from your doctor or pharmacy?:  Never  - 05/23/2024 1848        CO-LEARNER #1     No question answered        CO-LEARNER #2     No question answered        SPECIAL TOPICS     No question answered        ANSWERED BY:     No question answered        Comments         Edit History       Melanie Saleh, RN - RN (Nurse)   05/23/2024 1848                           Social Determinants of Health     Tobacco Use: Medium Risk (8/2/2023)    Patient History     Smoking Tobacco Use: Former     Smokeless Tobacco Use: Never     Passive Exposure: Not on file   Alcohol Use: Not At Risk (8/2/2023)    AUDIT-C     Frequency of Alcohol Consumption: Never     Average Number of Drinks: Patient does not drink     Frequency of Binge Drinking: Never   Financial Resource Strain: Low Risk  (8/2/2023)    Overall Financial Resource Strain (CARDIA)     Difficulty of Paying Living Expenses: Not hard at all   Food Insecurity: No Food Insecurity (8/2/2023)    Hunger  Vital Sign     Worried About Running Out of Food in the Last Year: Never true     Ran Out of Food in the Last Year: Never true   Transportation Needs: No Transportation Needs (8/2/2023)    PRAPARE - Transportation     Lack of Transportation (Medical): No     Lack of Transportation (Non-Medical): No   Physical Activity: Sufficiently Active (8/2/2023)    Exercise Vital Sign     Days of Exercise per Week: 5 days     Minutes of Exercise per Session: 30 min   Stress: No Stress Concern Present (8/2/2023)    [employer] [place] [employer] - Occupational Stress Questionnaire     Feeling of Stress : Not at all   Housing Stability: Low Risk  (8/2/2023)    Housing Stability Vital Sign     Unable to Pay for Housing in the Last Year: No     Number of Places Lived in the Last Year: 1     Unstable Housing in the Last Year: No   Depression: Not on file   Utilities: Not on file   Health Literacy: Not on file   Social Isolation: Not on file            Malnutrition  Not suspected at this time but will complete NFPE on f/u    Nutrition Diagnosis  Increased Protein Needs related to Renal dysfunction as evidenced by ESRD on HD  Comments: will change diet to renal high protein    Recent Labs   Lab 05/23/24  1758 05/24/24  0330   GLU  --  120*   POCGLU 153*  --      Comments on Glucose: Hx Dm    Nutrition Prescription / Recommendations  Recommendation/Intervention: Recommend to change diet to renal high protein high calorie  Goals: po intake 75% during admission  Nutrition Goal Status: new  Communication of SUHA Cobos: reviewed with physician  Current Diet Order: 2 gm Na  Nutrition Order Comments: Recommen to change diet to renal high protein high calorie  Chewing or Swallowing Difficulty?: No Chewing or swallowing difficulty  Recommended Diet: Renal (High Protein)  Recommended Oral Supplement: Boost Glucose Control MAX [160kcals, 30g protein, 6g Carbs] 2 times a day   Is Nutrition Support Recommended: [facility] [place] [facility]  "Support: No  Is Nutrition Education Recommended: No    Monitor and Evaluation  % current Intake: New Diet order with no P.O. intake documented currently  % intake to meet estimated needs: 75 - 100 %  Food and Nutrient Intake: energy intake, food and beverage intake  Food and Nutrient Adminstration: diet order  Anthropometric Measurements: height/length, weight, weight change, body mass index  Biochemical Data, Medical Tests and Procedures: gastrointestinal profile, glucose/endocrine profile, inflammatory profile, lipid profile       Current Medical Diagnosis and Past Medical History  Diagnosis: renal disease  Past Medical History:   Diagnosis Date    Chronic kidney disease (CKD)     Diabetes mellitus     Hypertension     PVD (peripheral vascular disease)        Nutrition/Diet History   N/a    Lab/Procedures/Meds  Recent Labs   Lab 05/23/24  1130 05/24/24  0330    135*   K 3.2* 3.2*   BUN 19* 29*   CREATININE 4.01* 5.12*   CALCIUM 8.5 8.4*   ALBUMIN 2.5*  --    CL 98 100   ALT 9*  --    AST 27  --    PHOS  --  3.4   Na low/K low CKD  BUN/CR elevated CKD on HD  Last A1c: No results found for: "HGBA1C"  Lab Results   Component Value Date    RBC 2.89 (L) 05/24/2024    HGB 7.8 (L) 05/24/2024    HCT 25.9 (L) 05/24/2024    MCV 89.6 05/24/2024    MCH 27.0 05/24/2024    MCHC 30.1 (L) 05/24/2024    TIBC 105 (L) 05/23/2024     Pertinent Labs Reviewed: reviewed  Pertinent Medications Reviewed: reviewed  Scheduled Meds:   atorvastatin  40 mg Oral QHS    heparin (porcine)  5,000 Units Subcutaneous Q8H    hydrALAZINE  100 mg Oral TID WM    isosorbide mononitrate  30 mg Oral Daily    metoprolol succinate  25 mg Oral Daily    pantoprazole  40 mg Oral Daily    piperacillin-tazobactam (Zosyn) IV (PEDS and ADULTS) (extended infusion is not appropriate)  4.5 g Intravenous Q12H    sevelamer carbonate  2,400 mg Oral TID WM     Continuous Infusions:  PRN Meds:.  Current Facility-Administered Medications:     acetaminophen, 1,000 mg, " "Oral, Q8H PRN    dextrose 10%, 12.5 g, Intravenous, PRN    dextrose 10%, 25 g, Intravenous, PRN    glucagon (human recombinant), 1 mg, Intramuscular, PRN    glucose, 16 g, Oral, PRN    glucose, 24 g, Oral, PRN    naloxone, 0.02 mg, Intravenous, PRN    ondansetron, 8 mg, Oral, Q8H PRN    polyethylene glycol, 17 g, Oral, BID PRN    sodium chloride 0.9%, 10 mL, Intravenous, Q12H PRN    Anthropometrics  Temp: 97.5 °F (36.4 °C)  Height: 5' 5.98" (167.6 cm)  Height (inches): 65.98 in  Weight Method: Standard Scale  Weight: 64.5 kg (142 lb 3.2 oz)  Weight (lb): 142.2 lb  Ideal Body Weight (IBW), Male: 141.88 lb  BMI (Calculated): 23       Estimated/Assessed Needs      Temp: 97.5 °F (36.4 °C)Oral  Weight Used For Calorie Calculations: 64.5 kg (142 lb 3.2 oz)   Energy Need Method: Kcal/kg Energy Calorie Requirements (kcal): 3080-6667  Weight Used For Protein Calculations: 64.5 kg (142 lb 3.2 oz)  Protein Requirements: 71-84  Estimated Fluid Requirement Method: other (see comments) (500 ml + urine output)    RDA Method (mL): 1935       Nutrition by Nursing  Diet/Nutrition Received: (P) consistent carb/diabetic diet           Last Bowel Movement: (P) 05/23/24                Nutrition Follow-Up  RD Follow-up?: Yes      Nutrition Discharge Planning: too soon to determine; RD Following.            Available via Secure Chat    Cristian Ac DO  Department of Hospital Medicine  [facility]          "

## 2024-05-24 NOTE — ASSESSMENT & PLAN NOTE
This patient does have evidence of infective focus  My overall impression is sepsis.  Source: Respiratory  Antibiotics given-   Antibiotics (72h ago, onward)      Start     Stop Route Frequency Ordered    05/24/24 1730  levoFLOXacin 750 mg/150 mL IVPB 750 mg         -- IV Every 48 hours (non-standard times) 05/24/24 1612          Latest lactate reviewed-  Recent Labs   Lab 05/23/24  1428 05/23/24  1944   LACTATE  --  0.8   POCLAC 1.4*  --        Organ dysfunction indicated by Acute respiratory failure    Fluid challenge Not needed - patient is not hypotensive      Post- resuscitation assessment No - Post resuscitation assessment not needed       Will Not start Pressors- Levophed for MAP of 65  Source control achieved by:      Blood and urine cultures.  We will check pleural fluid for infection.    Continue antibiotics.    5/24Continue antibiotics

## 2024-05-24 NOTE — ASSESSMENT & PLAN NOTE
Patient with known CAD s/p  unclear , which is controlled Will continue Statin and monitor for S/Sx of angina/ACS. Continue to monitor on telemetry.

## 2024-05-24 NOTE — ASSESSMENT & PLAN NOTE
Creatine stable for now. BMP reviewed- noted Estimated Creatinine Clearance: 9 mL/min (A) (based on SCr of 5.12 mg/dL (H)). according to latest data. 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  Monitor UOP and serial BMP and adjust therapy as needed. Renally dose meds. Avoid nephrotoxic medications and procedures.

## 2024-05-24 NOTE — PLAN OF CARE
[facility]  Initial Discharge Assessment       [facility]  Initial Discharge Assessment       Primary Care Provider: [facility], [facility]    Admission Diagnosis: Pleural effusion [J90]  General weakness [R53.1]  ESRD (end stage renal disease) [N18.6]  Chest pain [R07.9]    Admission Date: 5/23/2024  Expected Discharge Date:          Payor: HU[employer] MANAGED MEDICARE / Plan: HU[employer] MEDICARE PPO / Product Type: Medicare Advantage /     Extended Emergency Contact Information  Primary Emergency Contact: Yany Ferrara  Mobile Phone: [phone]  Relation: Daughter  Preferred language: English   needed? No  Secondary Emergency Contact: Kerry Perez  Mobile Phone: [phone]  Relation: Daughter   needed? No    Discharge Plan A: Home, Home Health  Discharge Plan B: Home, Home Health      [facility] [address] [place] [address] [address] [place], [place] [address]  Phone: [phone] Fax: [phone]    [facility] [address] [place], [place] [address]  Phone: [phone] Fax: [phone]    [facility] Mail Delivery - [facility] [address]  [address] [facility] [address]  Phone: [phone] Fax: [phone]      Initial Assessment (most recent)       Adult Discharge Assessment - 05/24/24 1016          Discharge Assessment    Assessment Type Discharge Planning Assessment     Source of Information family;patient     People in Home alone     Do you expect to return to your current living situation? Yes     Do you have help at home or someone to help you manage your care at home? Yes     Who are your caregiver(s) and their phone number(s)? Yany Ferrara - daughter - [phone]     Current cognitive status: Alert/Oriented     Walking or Climbing Stairs Difficulty yes     Walking or Climbing Stairs ambulation difficulty, requires equipment      Dressing/Bathing Difficulty no     Home Accessibility wheelchair accessible     Equipment Currently Used at Home cane, straight;rollator     Readmission within 30 days? No     Do you currently have service(s) that help you manage your care at home? No     Do you take prescription medications? Yes     Do you have prescription coverage? Yes     Coverage [employer]     Do you have any problems affording any of your prescribed medications? No     Is the patient taking medications as prescribed? yes     Who is going to help you get home at discharge? family     How do you get to doctors appointments? car, drives self     Are you on dialysis? Yes     Dialysis Name and Scheduled days [facility] - Tu, Th, Sa     Do you take coumadin? No     Discharge Plan A Home;Home Health     Discharge Plan B Home;Home Health     Discharge Plan discussed with: Patient;Adult children        Physical Activity    On average, how many days per week do you engage in moderate to strenuous exercise (like a brisk walk)? 5 days     On average, how many minutes do you engage in exercise at this level? 30 min        Housing Stability    In the last 12 months, was there a time when you were not able to pay the mortgage or rent on time? No     At any time in the past 12 months, were you homeless or living in a shelter (including now)? No        Transportation Needs    Has the lack of transportation kept you from medical appointments, meetings, work or from getting things needed for daily living? No        Food Insecurity    Within the past 12 months, you worried that your food would run out before you got the money to buy more. Never true     Within the past 12 months, the food you bought just didn't last and you didn't have money to get more. Never true        Stress    Do you feel stress - tense, restless, nervous, or anxious, or unable to sleep at night because your mind is troubled all the time - these days? Not at all        Social  Isolation    How often do you feel lonely or isolated from those around you?  Never        Alcohol Use    Q1: How often do you have a drink containing alcohol? Never     Q2: How many drinks containing alcohol do you have on a typical day when you are drinking? Patient does not drink     Q3: How often do you have six or more drinks on one occasion? Never        Utilities    In the past 12 months has the electric, gas, oil, or water company threatened to shut off services in your home? No        Health Literacy    How often do you need to have someone help you when you read instructions, pamphlets, or other written material from your doctor or pharmacy? Never                         CM spoke to pt and pt's daughter @ bedside. Pt is current with dialysis through [facility] [address] [place] - , Th & Sa. Pt is not current with HH; he uses a straight cane and rollator @ home. SDOH completed 05/24/24. CM rec'd consult for HH referral; Choice obtained and referral submitted to [facility]. Pt's daughter states they have started the process to obtain Medicaid waiver for pt, and pt has been through the interview portion. CM will continue to follow for additional d/c needs.

## 2024-05-24 NOTE — PROGRESS NOTES
[facility]  [facility]  Progress Note    Patient Name: Joseph Mcdonald  MRN: [id number]  Patient Class: IP- Inpatient   Admission Date: 5/23/2024  Length of Stay: 1 days  Attending Physician: Cristian Ac DO  Primary Care Provider: Clinic, [facility]        Subjective:     Principal Problem:Acute hypoxic respiratory failure        HPI:    89-year-old  male With a past medical history of end-stage renal disease, hypertension, iron deficiency anemia, secondary hyperparathyroidism, type 2 diabetes, CAD, heart failure with preserved ejection fraction, hypokalemia inability presents to the ED with 1 week worsening shortness of breath.  This was particularly bad during his dialysis session this morning.  Due to this daughter brought him to the ED thereafter.  He denies any fever, chills, cough, wheezing, sputum production, palpitations.  He has been take medication as in his not missed any dialysis sessions.  He denies any nausea, vomiting, diarrhea, dysuria.  Imaging in the ED revealed near-complete he had a pacemaker patient of the right hemithorax review of systems otherwise negative.    Overview/Hospital Course:   5/24 1.5 L removed from right pleural space today.  Still has large right-sided effusion.  We will attempt to get some more off tomorrow.    Interval History:   No acute events overnight    Review of Systems   Constitutional: Negative.  Negative for chills and fever.   HENT: Negative.     Eyes:  Negative for pain and redness.   Respiratory:  Positive for shortness of breath. Negative for cough, chest tightness and wheezing.    Cardiovascular:  Negative for chest pain and palpitations.   Gastrointestinal:  Negative for abdominal pain, diarrhea, nausea and vomiting.   Endocrine: Negative for cold intolerance, heat intolerance and polyuria.   Genitourinary:  Negative for difficulty urinating, dysuria, frequency and hematuria.   Musculoskeletal:   Negative for arthralgias, back pain, myalgias, neck pain and neck stiffness.   Skin:  Negative for pallor and rash.   Allergic/Immunologic: Negative.    Neurological:  Negative for dizziness, syncope, weakness, numbness and headaches.   Hematological:  Negative for adenopathy.   Psychiatric/Behavioral:  Negative for agitation, confusion and hallucinations. The patient is not nervous/anxious.      Objective:     Vital Signs (Most Recent):  Temp: 97.7 °F (36.5 °C) (05/24/24 1559)  Pulse: 62 (05/24/24 1559)  Resp: 18 (05/24/24 1559)  BP: (!) 162/52 (05/24/24 1559)  SpO2: 99 % (05/24/24 1559) Vital Signs (24h Range):  Temp:  [97.4 °F (36.3 °C)-99.3 °F (37.4 °C)] 97.7 °F (36.5 °C)  Pulse:  [61-82] 62  Resp:  [10-26] 18  SpO2:  [85 %-100 %] 99 %  BP: (113-183)/(29-69) 162/52     Weight: 64.5 kg (142 lb 3.2 oz)  Body mass index is 22.96 kg/m².    Intake/Output Summary (Last 24 hours) at 5/24/2024 1613  Last data filed at 5/24/2024 0900  Gross per 24 hour   Intake 1751.58 ml   Output --   Net 1751.58 ml         Physical Exam  Vitals reviewed.   Constitutional:       Appearance: Normal appearance. He is ill-appearing.   HENT:      Head: Normocephalic and atraumatic.      Nose: Nose normal.   Eyes:      Extraocular Movements: Extraocular movements intact.      Conjunctiva/sclera: Conjunctivae normal.   Neck:      Trachea: Trachea normal.   Cardiovascular:      Rate and Rhythm: Normal rate and regular rhythm.      Pulses: Normal pulses.      Heart sounds: Normal heart sounds.   Pulmonary:      Effort: Respiratory distress present.      Breath sounds: Normal air entry.      Comments:  Absent breath sounds right lower lung field  Abdominal:      General: Bowel sounds are normal.      Palpations: Abdomen is soft.   Musculoskeletal:      Cervical back: Neck supple.      Comments:  Moves all extremities.  Normal tone   Skin:     General: Skin is warm and dry.   Neurological:      General: No focal deficit present.      Mental  Status: He is alert.      Cranial Nerves: Cranial nerves 2-12 are intact.      Comments: Grossly normal motor and sensory function without focal deficit appreciated.   Psychiatric:         Mood and Affect: Mood and affect normal.         Behavior: Behavior is cooperative.             Significant Labs: All pertinent labs within the past 24 hours have been reviewed.    Significant Imaging: I have reviewed all pertinent imaging results/findings within the past 24 hours.    Assessment/Plan:      * Acute hypoxic respiratory failure  Patient with Hypoxic Respiratory failure which is Acute.  he is not on home oxygen. Supplemental oxygen was provided and noted-      .   Signs/symptoms of respiratory failure include- tachypnea, increased work of breathing, and respiratory distress. Contributing diagnoses includes - Pleural effusion Labs and images were reviewed. Patient Has recent ABG, which has been reviewed. Will treat underlying causes and adjust management of respiratory failure as follows     Start on antibiotics   Plan for thoracentesis today.  Pleural fluid studies pending  Nephrology consulted for dialysis   Breathing improved, 1.5 L removed yesterday.  Continue antibiotics.  We will tap again tomorrow.    Pleural effusion  Patient found to have large pleural effusion on imaging. I have personally reviewed and interpreted the following imaging: Xray. A thoracentesis was ordered. Most likely etiology includes Congestive Heart Failure, Pneumonia, and Renal Failure. Management to include Repeat Thoracentesis and Dialysis   1.5 L removed yesterday still significant volume on.  We will plan to tap again in the a.m.    Debility  Patient with Acute on chronic debility due to age-related physical debility. Latest AMPAC and GEMS scores have not been reviewed. Evaluation for etiology is complete. Plan includes PT/OT consulted.    Anemia  Patient's anemia is currently uncontrolled. Has not received any PRBCs to date. Etiology  likely d/t chronic disease due to ESRD  Current CBC reviewed-   Lab Results   Component Value Date    HGB 7.8 (L) 05/24/2024    HCT 25.9 (L) 05/24/2024     Monitor serial CBC and transfuse if patient becomes hemodynamically unstable, symptomatic or H/H drops below 7/21.    Hypokalemia  Patient has hypokalemia which is Acute and currently uncontrolled. Most recent potassium levels reviewed-   Lab Results   Component Value Date    K 3.2 (L) 05/24/2024   . Will continue potassium replacement per protocol and recheck repeat levels after replacement completed.     Sepsis  This patient does have evidence of infective focus  My overall impression is sepsis.  Source: Respiratory  Antibiotics given-   Antibiotics (72h ago, onward)      Start     Stop Route Frequency Ordered    05/24/24 1730  levoFLOXacin 750 mg/150 mL IVPB 750 mg         -- IV Every 48 hours (non-standard times) 05/24/24 1612          Latest lactate reviewed-  Recent Labs   Lab 05/23/24  1428 05/23/24  1944   LACTATE  --  0.8   POCLAC 1.4*  --        Organ dysfunction indicated by Acute respiratory failure    Fluid challenge Not needed - patient is not hypotensive      Post- resuscitation assessment No - Post resuscitation assessment not needed       Will Not start Pressors- Levophed for MAP of 65  Source control achieved by:      Blood and urine cultures.  We will check pleural fluid for infection.    Continue antibiotics.    5/24Continue antibiotics    Heart failure with preserved ejection fraction  Patient is identified as having Diastolic (HFpEF) heart failure that is Acute on chronic. CHF is currently uncontrolled due to Pulmonary edema/pleural effusion on CXR. Latest ECHO performed and demonstrates- Results for orders placed during the hospital encounter of 08/01/23    Echo    Interpretation Summary    Left Ventricle: The left ventricle is normal in size. Increased wall thickness. There is concentric remodeling. Normal wall motion. There is normal  "systolic function with a visually estimated ejection fraction of 55 - 60%. Grade II diastolic dysfunction.    Left Atrium: Left atrium is mildly dilated. There is a calcified 1.5 x 2 cm full wall thickness mass noted extending from the myocardium into the pericardial space, unclear etiology, recommend cardiac MRI.    Right Ventricle: Normal right ventricular cavity size. Systolic function is normal.    Aortic Valve: The aortic valve is a trileaflet valve. There is physiologically normal aortic regurgitation.    Mitral Valve: There is no stenosis. The mean pressure gradient across the mitral valve is 3 mmHg at a heart rate of  bpm. There is mild regurgitation with a centrally directed jet.    Tricuspid Valve: There is mild to moderate regurgitation with a centrally directed jet.    Pulmonic Valve: There is no significant stenosis.    Pericardium: Small circumferential pericardial effusion present. No indication of cardiac tamponade.  . Continue Nitrate/Vasodilator and monitor clinical status closely. Monitor on telemetry. Patient is off CHF pathway.  Monitor strict Is&Os and daily weights.  Place on fluid restriction of 1.5 L. Cardiology has not been consulted. Continue to stress to patient importance of self efficacy and  on diet for CHF. Last BNP reviewed- and noted below No results for input(s): "BNP", "BNPTRIAGEBLO" in the last 168 hours.      Respiratory status better after thoracentesis    CAD (coronary artery disease)  Patient with known CAD s/p  unclear , which is controlled Will continue Statin and monitor for S/Sx of angina/ACS. Continue to monitor on telemetry.     DM2 (diabetes mellitus, type 2)  Patient's FSGs are controlled on current medication regimen.  Last A1c reviewed- No results found for: "LABA1C", "HGBA1C"  Most recent fingerstick glucose reviewed-   No results for input(s): "POCTGLUCOSE" in the last 24 hours.    Current correctional scale  Low  Maintain anti-hyperglycemic dose as " follows-   Antihyperglycemics (From admission, onward)      None          Hold Oral hypoglycemics while patient is in the hospital. A1c pending  Glucose looks okay    Secondary hyperparathyroidism of renal origin   Nephrology consult      Iron deficiency anemia, unspecified  Patient's anemia is currently controlled. Has not received any PRBCs to date. Etiology likely d/t  renal disease  Current CBC reviewed-   Lab Results   Component Value Date    HGB 7.8 (L) 05/24/2024    HCT 25.9 (L) 05/24/2024     Monitor serial CBC and transfuse if patient becomes hemodynamically unstable, symptomatic or H/H drops below 7/21.  No evidence of bleeding    Essential (primary) hypertension  Chronic, controlled. Latest blood pressure and vitals reviewed-     Temp:  [97.4 °F (36.3 °C)-99.3 °F (37.4 °C)]   Pulse:  [61-76]   Resp:  [10-26]   BP: (113-183)/(29-69)   SpO2:  [85 %-100 %] .   Home meds for hypertension were reviewed and noted below.   Hypertension Medications               amLODIPine (NORVASC) 5 MG tablet Take 5 mg by mouth once daily.    hydrALAZINE (APRESOLINE) 100 MG tablet Take 1 tablet by mouth 3 (three) times daily with meals.    nebivoloL (BYSTOLIC) 2.5 MG Tab Take 2.5 mg by mouth once daily.            While in the hospital, will manage blood pressure as follows; Continue home antihypertensive regimen    Will utilize p.r.n. blood pressure medication only if patient's blood pressure greater than 180/110 and he develops symptoms such as worsening chest pain or shortness of breath.  Continue current regimen    End stage renal disease  Creatine stable for now. BMP reviewed- noted Estimated Creatinine Clearance: 9 mL/min (A) (based on SCr of 5.12 mg/dL (H)). according to latest data. 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  Monitor UOP and serial BMP and adjust therapy as needed. Renally dose meds. Avoid nephrotoxic medications and procedures.  Dialysis per Nephrology      VTE Risk Mitigation (From admission,  onward)           Ordered     heparin (porcine) injection 5,000 Units  Every 8 hours         05/23/24 1746     IP VTE HIGH RISK PATIENT  Once         05/23/24 1746     Place sequential compression device  Until discontinued         05/23/24 1746                    Discharge Planning   BRIAN:      Code Status: Full Code   Is the patient medically ready for discharge?:     Reason for patient still in hospital (select all that apply): Treatment  Discharge Plan A: Home, Home Health        Greater than 50 minutes spent on face to face patient interaction, discussion with family, ancillary staff, and/or other physicians as well as review of pertinent labs, images, and notes.       Cristian Ac DO  Department of Hospital Medicine   [facility]

## 2024-05-24 NOTE — ASSESSMENT & PLAN NOTE
Patient found to have large pleural effusion on imaging. I have personally reviewed and interpreted the following imaging: Xray. A thoracentesis was ordered. Most likely etiology includes Congestive Heart Failure, Pneumonia, and Renal Failure. Management to include Repeat Thoracentesis and Dialysis   1.5 L removed yesterday still significant volume on.  We will plan to tap again in the a.m.

## 2024-05-24 NOTE — HPI
89-year-old  male With a past medical history of end-stage renal disease, hypertension, iron deficiency anemia, secondary hyperparathyroidism, type 2 diabetes, CAD, heart failure with preserved ejection fraction, hypokalemia inability presents to the ED with 1 week worsening shortness of breath.  This was particularly bad during his dialysis session this morning.  Due to this daughter brought him to the ED thereafter.  He denies any fever, chills, cough, wheezing, sputum production, palpitations.  He has been take medication as in his not missed any dialysis sessions.  He denies any nausea, vomiting, diarrhea, dysuria.  Imaging in the ED revealed near-complete he had a pacemaker patient of the right hemithorax review of systems otherwise negative.

## 2024-05-24 NOTE — ASSESSMENT & PLAN NOTE
Chronic, controlled. Latest blood pressure and vitals reviewed-     Temp:  [97.4 °F (36.3 °C)-99.3 °F (37.4 °C)]   Pulse:  [63-87]   Resp:  [10-26]   BP: ()/(29-69)   SpO2:  [85 %-100 %] .   Home meds for hypertension were reviewed and noted below.   Hypertension Medications               amLODIPine (NORVASC) 5 MG tablet Take 5 mg by mouth once daily.    hydrALAZINE (APRESOLINE) 100 MG tablet Take 1 tablet by mouth 3 (three) times daily with meals.    nebivoloL (BYSTOLIC) 2.5 MG Tab Take 2.5 mg by mouth once daily.            While in the hospital, will manage blood pressure as follows; Continue home antihypertensive regimen    Will utilize p.r.n. blood pressure medication only if patient's blood pressure greater than 180/110 and he develops symptoms such as worsening chest pain or shortness of breath.

## 2024-05-24 NOTE — HOSPITAL COURSE
5/24 1.5 L removed from right pleural space today.  Still has large right-sided effusion.  We will attempt to get some more off tomorrow.  5/25 dialyzing today  5/26 bedside ultrasound today still has large pleural effusion, complex appearing.  Was going to attempt bedside ultrasound but given complex nature have consulted pulmonology  5/27 D/W Pulm, plan for chest tube placement.   5/28- Midodrine added low BP.   5/29- s/p chest tube placement per Dr. Rodney, fluid studies sent.   5/30- BP low again, midodrine resumed. Started on intra-pleural lytics per Pulm.  5/31- Intra-pleural lytics continued, CXR improved.   6/1- Failed swallow and is with NG now and tube feeds. S/p 6 doses of Intra-pleural lytics (total 6 doses).   6/2- Mental status better, resp status stable.  6/3- NG removed on accident while patient was working with PT/OT, SLP consulted for reevaluation. Pulm to follow, CXR with slightly increased right effusion.    6/4 surgery consulted today.  Plan for decortication on Thursday 6/6 6/5 plan for decortication tomorrow.  We will place NG tube due to patient not eating well  6/10  Mr. Mcdonald has been in the ICU for the last several days.  He had a P EA arrest during induction for his decortication.  He is now out of the ICU and doing well.  Still has right-sided chest tube.  Notified this morning that he pass large clots in about movement.  We will consult GI and start IV  protonic  6/11 hemoglobin okay.  Unable to do endoscopy due to high risk of sedating patient.  Has a sacral wound that may need debriding.  We will have surgery evaluate today.  Again we will be high risk for anesthesia induction.  6/12 no surgical intervention on sacral decubitus.  Wound care.  Currently stable for discharge and awaiting placement.  Chest tube has been removed.  6/13 medically ready for discharge.  Awaiting placement  6/14 attempting to get [facility].  Awaiting placement  6/15 dislodged dialysis catheter during  treatment yesterday and had significant blood loss.  Hemoglobin today 6.0.  We will transfuse 1 unit.  6/18 - summary-    Patient is an 89-year-old  male that was originally admitted with hypoxic respiratory failure.  He had a large right pleural effusion.  This was drained by Interventional Radiology.  Cytology was negative.  Fluid was bloody.  Effusion recurred and chest tube was placed.  Pulmonary was consulted.  Effusion was somewhat complex and loculated and decortication was planned.  However during induction for surgery patient coded but was successfully resuscitated.  Thankfully patient had no neurologic sequela.  Since this time he is improved.  Chest tube has been able to be removed.  Remainder of hospital stay complicated by some GI bleeding.  Patient was not a candidate for endoscopy due to his recent code.  However bleeding stopped spontaneously with proton pump inhibitors and holding anticoagulants.  Hemoglobin is stable.  The patient does have some decubitus wounds that have been evaluated by surgery and felt to not require debridement.  At this point patient has stable hemoglobin and stable respiratory status.  He is tolerating hemodialysis on Monday Wednesday and Friday without difficulty.  He is receiving ongoing care for his decubiti.  He is felt to have reached maximum hospital benefit and is ready for transfer to [facility].  His p.o. intake is poor in this may be contributing to his general debility.  He has been started on Periactin as an appetite stimulant.  Hopefully with improved p.o. intake and physical therapy he will improve.

## 2024-05-24 NOTE — PHARMACY MED REC
"Admission Medication History     The home medication history was taken by Cathie Sharma.    You may go to "Admission" then "Reconcile Home Medications" tabs to review and/or act upon these items.     The home medication list has been updated by the Pharmacy department.   Please read ALL comments highlighted in yellow.   Please address this information as you see fit.    Feel free to contact us if you have any questions or require assistance.    The following medications were added:  Amlodipine 5 mg  Nebivolol 2.5 mg    When reviewing the patients medications brought from home, he has Nebivolol 2.5 mg and 5 mg in his bag.  The most recent prescription is for 2.5 mg. I spoke to patient's daughter and she did not know if he may have been taking both. Patient was asleep and not able to answer.  Please provide counseling to patient at discharge.        The medications listed below were removed from the home medication list. Please reorder if appropriate:  Patient reports no longer taking the following medication(s):  Aspirin 81 mg  Isosorbide mononitrate 30 mg  Metoprolol 25 mg  Pantoprazole 40 mg  Sevelamer carbonate 800 mg    Medications listed below were obtained from: Patient/family, Medications brought from home, and [employer] software- [employer] Medications   Medication Sig    amLODIPine (NORVASC) 5 MG tablet Take 5 mg by mouth once daily.    atorvastatin (LIPITOR) 40 MG tablet Take 1 tablet (40 mg total) by mouth every evening.    hydrALAZINE (APRESOLINE) 100 MG tablet Take 1 tablet by mouth 3 (three) times daily with meals.    nebivoloL (BYSTOLIC) 2.5 MG Tab Take 2.5 mg by mouth once daily.    methoxy peg-epoetin beta (MIRCERA INJ) 50 mcg.    timolol maleate 0.5% (TIMOPTIC) 0.5 % Drop Place 1 drop into the right eye once daily.         Current Outpatient Medications on File Prior to Encounter   Medication Sig Dispense Refill Last Dose    amLODIPine (NORVASC) 5 MG tablet Take 5 mg by mouth once daily.   " 5/22/2024    atorvastatin (LIPITOR) 40 MG tablet Take 1 tablet (40 mg total) by mouth every evening. 30 tablet 0 5/22/2024    hydrALAZINE (APRESOLINE) 100 MG tablet Take 1 tablet by mouth 3 (three) times daily with meals.   5/22/2024    nebivoloL (BYSTOLIC) 2.5 MG Tab Take 2.5 mg by mouth once daily.   5/22/2024    methoxy peg-epoetin beta (MIRCERA INJ) 50 mcg.   Unknown    timolol maleate 0.5% (TIMOPTIC) 0.5 % Drop Place 1 drop into the right eye once daily.   Unknown    [DISCONTINUED] aspirin (ECOTRIN) 81 MG EC tablet Take 1 tablet (81 mg total) by mouth once daily. 30 tablet 0     [DISCONTINUED] isosorbide mononitrate (IMDUR) 30 MG 24 hr tablet Take 1 tablet (30 mg total) by mouth once daily. 30 tablet 0     [DISCONTINUED] metoprolol succinate (TOPROL-XL) 25 MG 24 hr tablet Take 1 tablet (25 mg total) by mouth once daily. 30 tablet 0     [DISCONTINUED] pantoprazole (PROTONIX) 40 MG tablet Take 1 tablet (40 mg total) by mouth once daily. 30 tablet 0     [DISCONTINUED] sevelamer carbonate (RENVELA) 800 mg Tab Take 3 tablets by mouth.       [DISCONTINUED] timolol maleate 0.5% (TIMOPTIC-XE) 0.5 % SolG 1 drop once daily.          Potential issues to be addressed PRIOR TO DISCHARGE  Patient requires education regarding drug therapies     Cathie Sharma  [phone] [phone]                 .

## 2024-05-24 NOTE — PLAN OF CARE
Recommend to change diet to Renal high protein high calorie and add Boost GC Max Protein BID. Encourage po intakes. Recommend to document all po intake on flowsheets. RD Following.

## 2024-05-24 NOTE — PLAN OF CARE
Problem: Occupational Therapy  Goal: Occupational Therapy Goal  Description: STG:  Pt will perform grooming (I)  Pt will bathe with setup  Pt will perform UE dressing with (I)  Pt will perform LE dressing with I/mod I  Pt will transfer bed/chair/bsc with Mod I  Pt will perform standing task x 2 min with Mod I   Pt will tolerate 15 minutes of tx without fatigue      LT.Restore to max I with self care and mobility.     2024 1205 by Brianna Johnson, OT  Outcome: Progressing

## 2024-05-24 NOTE — ASSESSMENT & PLAN NOTE
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 Latest AMPAC and GEMS scores have not been reviewed. Evaluation for etiology is complete. Plan includes PT/OT consulted.

## 2024-05-25 NOTE — ASSESSMENT & PLAN NOTE
This patient does have evidence of infective focus  My overall impression is sepsis.  Source: Respiratory  Antibiotics given-   Antibiotics (72h ago, onward)      Start     Stop Route Frequency Ordered    05/26/24 1800  levoFLOXacin 500 mg/100 mL IVPB 500 mg         -- IV Every 48 hours (non-standard times) 05/25/24 1201    05/25/24 2100  mupirocin 2 % ointment         05/30/24 2059 Nasl 2 times daily 05/25/24 1222          Latest lactate reviewed-  Recent Labs   Lab 05/23/24  1428 05/23/24  1944   LACTATE  --  0.8   POCLAC 1.4*  --        Organ dysfunction indicated by Acute respiratory failure    Fluid challenge Not needed - patient is not hypotensive      Post- resuscitation assessment No - Post resuscitation assessment not needed       Will Not start Pressors- Levophed for MAP of 65  Source control achieved by:      Blood and urine cultures.  We will check pleural fluid for infection.    Continue antibiotics.    5/24Continue antibiotics  5/25 continue antibiotics

## 2024-05-25 NOTE — ASSESSMENT & PLAN NOTE
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 CHF is currently uncontrolled due to Pulmonary edema/pleural effusion on CXR. Latest ECHO performed and demonstrates- Results for orders placed during the hospital encounter of 08/01/23    Echo    Interpretation Summary    Left Ventricle: The left ventricle is normal in size. Increased wall thickness. There is concentric remodeling. Normal wall motion. There is normal systolic function with a visually estimated ejection fraction of 55 - 60%. Grade II diastolic dysfunction.    Left Atrium: Left atrium is mildly dilated. There is a calcified 1.5 x 2 cm full wall thickness mass noted extending from the myocardium into the pericardial space, unclear etiology, recommend cardiac MRI.    Right Ventricle: Normal right ventricular cavity size. Systolic function is normal.    Aortic Valve: The aortic valve is a trileaflet valve. There is physiologically normal aortic regurgitation.    Mitral Valve: There is no stenosis. The mean pressure gradient across the mitral valve is 3 mmHg at a heart rate of  bpm. There is mild regurgitation with a centrally directed jet.    Tricuspid Valve: There is mild to moderate regurgitation with a centrally directed jet.    Pulmonic Valve: There is no significant stenosis.    Pericardium: Small circumferential pericardial effusion present. No indication of cardiac tamponade.  . Continue Nitrate/Vasodilator and monitor clinical status closely. Monitor on telemetry. Patient is off CHF pathway.  Monitor strict Is&Os and daily weights.  Place on fluid restriction of 1.5 L. Cardiology has not been consulted. Continue to stress to patient importance of self efficacy and  on diet for CHF. Last BNP reviewed- and noted below No results for input(s): "BNP", "BNPTRIAGEBLO" in the last 168 hours.      Respiratory status better after thoracentesis   appears Euvolemic aside from pleural effusion  "

## 2024-05-25 NOTE — ASSESSMENT & PLAN NOTE
Patient found to have large pleural effusion on imaging. I have personally reviewed and interpreted the following imaging: Xray. A thoracentesis was ordered. Most likely etiology includes Congestive Heart Failure, Pneumonia, and Renal Failure. Management to include Repeat Thoracentesis and Dialysis   1.5 L removed yesterday still significant volume on.  We will plan to tap again in the a.m.  5/25 take a look today to see if we can get some more fluid off of his right lung.  He did have dialysis today which likely improve his volume status

## 2024-05-25 NOTE — ASSESSMENT & PLAN NOTE
Patient with Hypoxic Respiratory failure which is Acute.  he is not on home oxygen. Supplemental oxygen was provided and noted- Oxygen Concentration (%):  [28] 28    .   Signs/symptoms of respiratory failure include- tachypnea, increased work of breathing, and respiratory distress. Contributing diagnoses includes - Pleural effusion Labs and images were reviewed. Patient Has recent ABG, which has been reviewed. Will treat underlying causes and adjust management of respiratory failure as follows     Start on antibiotics   Plan for thoracentesis today.  Pleural fluid studies pending  Nephrology consulted for dialysis   Breathing improved, 1.5 L removed yesterday.  Continue antibiotics.  We will tap again tomorrow.  Improved

## 2024-05-25 NOTE — CONSULTS
[facility]  Nephrology  Consult Note    Patient Name: Joseph Mcdonald  MRN: [id number]  Admission Date: 5/23/2024  Hospital Length of Stay: 2 days  Attending Provider: Cristian Ac DO   Primary Care Physician: Prakash, [facility]  Principal Problem:Acute hypoxic respiratory failure    Consults  Subjective:     HPI:  Mr. Mcdonald is known to us with his end-stage renal disease and he dialyzes Tuesday Thursday Saturday.  We received notification of his presents today from the dialysis nurse who was call from the floor about dialyzing him.  The patient presented with shortness of breath and was found to have a significant right pleural effusion.  1-1/2 L were removed by thoracentesis yesterday.    Past Medical History:   Diagnosis Date    Chronic kidney disease (CKD)     Diabetes mellitus     Hypertension     PVD (peripheral vascular disease)        Past Surgical History:   Procedure Laterality Date    LEFT HEART CATHETERIZATION N/A 8/3/2023    Procedure: Left heart cath;  Surgeon: Vazquez Aguilar MD;  Location: [facility] CATH LAB;  Service: Cardiology;  Laterality: N/A;    left toe amputation      PLACEMENT OF DIALYSIS ACCESS         Review of patient's allergies indicates:   Allergen Reactions    Azithromycin Other (See Comments)     Note: - [employer] 01/11/2019     Current Facility-Administered Medications   Medication Frequency    0.9%  NaCl infusion Once    acetaminophen tablet 1,000 mg Q8H PRN    atorvastatin tablet 40 mg QHS    dextrose 10% bolus 125 mL 125 mL PRN    dextrose 10% bolus 250 mL 250 mL PRN    glucagon (human recombinant) injection 1 mg PRN    glucose chewable tablet 16 g PRN    glucose chewable tablet 24 g PRN    hydrALAZINE tablet 100 mg TID WM    isosorbide mononitrate 24 hr tablet 30 mg Daily    [START ON 5/26/2024] levoFLOXacin 500 mg/100 mL IVPB 500 mg Q48H    metoprolol succinate (TOPROL-XL) 24 hr tablet 25 mg Daily    mupirocin 2 % ointment BID     naloxone 0.4 mg/mL injection 0.02 mg PRN    NIFEdipine 24 hr tablet 30 mg Daily    ondansetron disintegrating tablet 8 mg Q8H PRN    pantoprazole EC tablet 40 mg Daily    polyethylene glycol packet 17 g BID PRN    sevelamer carbonate tablet 2,400 mg TID WM    sodium chloride 0.9% flush 10 mL Q12H PRN    trazodone split tablet 25 mg QHS     Family History    None       Tobacco Use    Smoking status: Former     Types: Cigarettes    Smokeless tobacco: Never   Substance and Sexual Activity    Alcohol use: Not Currently    Drug use: Never    Sexual activity: Not on file     Review of Systems  Objective:     Vital Signs (Most Recent):  Temp: 97.4 °F (36.3 °C) (05/25/24 1150)  Pulse: (!) 55 (05/25/24 1230)  Resp: 16 (05/25/24 1230)  BP: (!) 113/35 (05/25/24 1230)  SpO2: (!) 93 % (05/25/24 0800) Vital Signs (24h Range):  Temp:  [97.4 °F (36.3 °C)-97.9 °F (36.6 °C)] 97.4 °F (36.3 °C)  Pulse:  [53-67] 55  Resp:  [16-20] 16  SpO2:  [93 %-100 %] 93 %  BP: (100-162)/(27-66) 113/35     Weight: 64.5 kg (142 lb 3.2 oz) (05/23/24 1507)  Body mass index is 22.96 kg/m².  Body surface area is 1.73 meters squared.    I/O last 3 completed shifts:  In: 1755.5 [Other:1500; IV Piggyback:255.5]  Out: -     Physical Exam on exam he is in no distress he is lying flat breathing comfortably.  He has no peripheral edema.  His chest is clear.    Significant Labs:  BMP:   Recent Labs   Lab 05/25/24  0403   GLU 91   *   K 4.1   CL 99   CO2 31   BUN 41*   CREATININE 6.19*   CALCIUM 8.2*   MG 2.1     CBC:   Recent Labs   Lab 05/25/24  0403   WBC 11.68*   RBC 2.72*   HGB 7.3*   HCT 23.4*      MCV 86.0   MCH 26.8*   MCHC 31.2*     All labs within the past 24 hours have been reviewed.    Significant Imaging:  Labs: Reviewed    Assessment/Plan:     Active Diagnoses:    Diagnosis Date Noted POA    PRINCIPAL PROBLEM:  Acute hypoxic respiratory failure [J96.01] 05/24/2024 Yes     Chronic    Pleural effusion [J90] 05/23/2024 Yes     Chronic     Heart failure with preserved ejection fraction [I50.30] 05/23/2024 Yes    Sepsis [A41.9] 05/23/2024 Yes    Hypokalemia [E87.6] 05/23/2024 Yes    Anemia [D64.9] 05/23/2024 Yes    Debility [R53.81] 05/23/2024 Yes    CAD (coronary artery disease) [I25.10] 08/04/2023 Yes    DM2 (diabetes mellitus, type 2) [E11.9] 11/18/2014 Yes    End stage renal disease [N18.6] 11/06/2014 Yes    Essential (primary) hypertension [I10] 11/06/2014 Yes    Iron deficiency anemia, unspecified [D50.9] 11/06/2014 Yes    Secondary hyperparathyroidism of renal origin [N25.81] 11/06/2014 Yes      Problems Resolved During this Admission:       He will be dialyzed today.  We will continue with dialysis on Tuesday Thursday Saturday schedule while here.  We appreciate the care he is receiving.  Please call our office for consults and feel free to call us directly on the weekend when the office is not open.    Thank you for your consult. I will follow-up with patient. Please contact us if you have any additional questions.    Ovi Alvarado MD  Nephrology  [facility]

## 2024-05-25 NOTE — ASSESSMENT & PLAN NOTE
Chronic, controlled. Latest blood pressure and vitals reviewed-     Temp:  [97.4 °F (36.3 °C)-97.9 °F (36.6 °C)]   Pulse:  [53-67]   Resp:  [16-20]   BP: ()/(25-66)   SpO2:  [93 %-100 %] .   Home meds for hypertension were reviewed and noted below.   Hypertension Medications               amLODIPine (NORVASC) 5 MG tablet Take 5 mg by mouth once daily.    hydrALAZINE (APRESOLINE) 100 MG tablet Take 1 tablet by mouth 3 (three) times daily with meals.    nebivoloL (BYSTOLIC) 2.5 MG Tab Take 2.5 mg by mouth once daily.            While in the hospital, will manage blood pressure as follows; Continue home antihypertensive regimen    Will utilize p.r.n. blood pressure medication only if patient's blood pressure greater than 180/110 and he develops symptoms such as worsening chest pain or shortness of breath.  Continue current regimen  Controlled

## 2024-05-25 NOTE — PT/OT/SLP PROGRESS
Occupational Therapy   Treatment    Name: Joseph Mcdonald  MRN: [id number]  Admitting Diagnosis:  Acute hypoxic respiratory failure       Recommendations:     Discharge Recommendations: Low Intensity Therapy  Discharge Equipment Recommendations:   (to be determined)  Barriers to discharge:  None    Assessment:     Joseph Mcdonald is a 89 y.o. male with a medical diagnosis of Acute hypoxic respiratory failure.  He presents with no complaints, but found attempting to get out of bed upon entry bed alarm sounding. Pt agreed to OT treatment. Performance deficits affecting function are weakness, impaired endurance, impaired functional mobility, impaired self care skills.     Rehab Prognosis:  Good; patient would benefit from acute skilled OT services to address these deficits and reach maximum level of function.       Plan:     Patient to be seen 5 x/week to address the above listed problems via self-care/home management, therapeutic activities, therapeutic exercises  Plan of Care Expires: 06/28/24  Plan of Care Reviewed with: patient    Subjective     Chief Complaint: Acute Hypoxic Respiratory Failure  Patient/Family Comments/goals: D/C home  Pain/Comfort:  Pain Rating 1: 0/10  Pain Rating Post-Intervention 1: 0/10    Objective:     Communicated with: RENATO Benoit prior to session.  Patient found  attempting to get out of bed  with peripheral IV upon OT entry to room.    General Precautions: Standard, fall    Orthopedic Precautions:N/A  Braces: N/A  Respiratory Status: Nasal cannula, flow 2 L/min     Occupational Performance:     Bed Mobility:    Patient completed Supine to Sit with contact guard assistance     Functional Mobility/Transfers:  Patient completed Sit <> Stand Transfer with contact guard assistance  with  gait belt to stand to RW   Patient completed Bed <> Chair Transfer using Step Transfer technique with contact guard assistance with rolling walker  Functional Mobility: CGA    Activities of Daily  Living:  Upper Body Dressing: minimum assistance keshia merrill as a robe      [facility] 6 Click ADL:      Treatment & Education:  Pt performed UE strengthening exercises.  2 lb hand wt 2 x sets of 15 reps  (B) shoulder flexion/extension and adduction/abduction  (B) elbow flexion/extension  (B) wrist flexion/extension  Red theraband x 2 sets of 15 reps shoulder adduction/abduction and (B) elbow extension.    Pt participated well with exercises. Rest breaks provided as needed. Pt educated to use call bell to call nurse for assistance with transfers and mobility.    Patient left up in chair with all lines intact, call button in reach, chair alarm on, and nurse notified    GOALS:   Multidisciplinary Problems       Occupational Therapy Goals          Problem: Occupational Therapy    Goal Priority Disciplines Outcome Interventions   Occupational Therapy Goal     OT, PT/OT Progressing    Description: STG:  Pt will perform grooming (I)  Pt will bathe with setup  Pt will perform UE dressing with (I)  Pt will perform LE dressing with I/mod I  Pt will transfer bed/chair/bsc with Mod I  Pt will perform standing task x 2 min with Mod I   Pt will tolerate 15 minutes of tx without fatigue      LT.Restore to max I with self care and mobility.                          Time Tracking:     OT Date of Treatment: 24  OT Start Time: 1022  OT Stop Time: 1054  OT Total Time (min): 32 min    Billable Minutes:Therapeutic Activity 10  Therapeutic Exercise 20    OT/QING: OT          2024

## 2024-05-25 NOTE — ASSESSMENT & PLAN NOTE
Creatine stable for now. BMP reviewed- noted Estimated Creatinine Clearance: 7.4 mL/min (A) (based on SCr of 6.19 mg/dL (H)). according to latest data. 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  Monitor UOP and serial BMP and adjust therapy as needed. Renally dose meds. Avoid nephrotoxic medications and procedures.  Dialysis per Nephrology  Volume status looks okay 5/25

## 2024-05-25 NOTE — PT/OT/SLP PROGRESS
Physical Therapy      Patient Name:  Joseph Mcdonald   MRN:  [id number]    Patient not seen today secondary to Dialysis. Will follow-up Monday.

## 2024-05-25 NOTE — DIALYSIS ROUNDING
Mr. Mcdonald is seen during his hemodialysis.  He is lying flat and tolerating dialysis well.  His blood pressure is 110 systolic.  We will remove what fluid we can.  Hypotension will limit that somewhat.

## 2024-05-25 NOTE — ASSESSMENT & PLAN NOTE
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 Latest AMPAC and GEMS scores have not been reviewed. Evaluation for etiology is complete. Plan includes PT/OT consulted.  5/25 PTOT

## 2024-05-25 NOTE — ASSESSMENT & PLAN NOTE
Patient has hypokalemia which is Acute and currently uncontrolled. Most recent potassium levels reviewed-   Lab Results   Component Value Date    K 4.1 05/25/2024   . Will continue potassium replacement per protocol and recheck repeat levels after replacement completed.

## 2024-05-25 NOTE — PROGRESS NOTES
[facility]  [facility] Medicine  Progress Note    Patient Name: Joseph Mcdonald  MRN: [id number]  Patient Class: IP- Inpatient   Admission Date: 5/23/2024  Length of Stay: 2 days  Attending Physician: Cristian Ac DO  Primary Care Provider: Clinic, [facility]        Subjective:     Principal Problem:Acute hypoxic respiratory failure        HPI:    89-year-old  male With a past medical history of end-stage renal disease, hypertension, iron deficiency anemia, secondary hyperparathyroidism, type 2 diabetes, CAD, heart failure with preserved ejection fraction, hypokalemia inability presents to the ED with 1 week worsening shortness of breath.  This was particularly bad during his dialysis session this morning.  Due to this daughter brought him to the ED thereafter.  He denies any fever, chills, cough, wheezing, sputum production, palpitations.  He has been take medication as in his not missed any dialysis sessions.  He denies any nausea, vomiting, diarrhea, dysuria.  Imaging in the ED revealed near-complete he had a pacemaker patient of the right hemithorax review of systems otherwise negative.    Overview/Hospital Course:   5/24 1.5 L removed from right pleural space today.  Still has large right-sided effusion.  We will attempt to get some more off tomorrow.  5/25 dialyzing today    Interval History:   No acute events overnight    Review of Systems   Constitutional: Negative.  Negative for chills and fever.   HENT: Negative.     Eyes:  Negative for pain and redness.   Respiratory:  Positive for shortness of breath. Negative for cough, chest tightness and wheezing.    Cardiovascular:  Negative for chest pain and palpitations.   Gastrointestinal:  Negative for abdominal pain, diarrhea, nausea and vomiting.   Endocrine: Negative for cold intolerance, heat intolerance and polyuria.   Genitourinary:  Negative for difficulty urinating, dysuria, frequency and hematuria.    Musculoskeletal:  Negative for arthralgias, back pain, myalgias, neck pain and neck stiffness.   Skin:  Negative for pallor and rash.   Allergic/Immunologic: Negative.    Neurological:  Negative for dizziness, syncope, weakness, numbness and headaches.   Hematological:  Negative for adenopathy.   Psychiatric/Behavioral:  Negative for agitation, confusion and hallucinations. The patient is not nervous/anxious.      Objective:     Vital Signs (Most Recent):  Temp: 97.4 °F (36.3 °C) (05/25/24 1150)  Pulse: 62 (05/25/24 1430)  Resp: 16 (05/25/24 1430)  BP: (!) 133/35 (05/25/24 1430)  SpO2: (!) 93 % (05/25/24 0800) Vital Signs (24h Range):  Temp:  [97.4 °F (36.3 °C)-97.9 °F (36.6 °C)] 97.4 °F (36.3 °C)  Pulse:  [53-67] 62  Resp:  [16-20] 16  SpO2:  [93 %-100 %] 93 %  BP: ()/(25-66) 133/35     Weight: 64.5 kg (142 lb 3.2 oz)  Body mass index is 22.96 kg/m².    Intake/Output Summary (Last 24 hours) at 5/25/2024 1446  Last data filed at 5/24/2024 2046  Gross per 24 hour   Intake 255.52 ml   Output --   Net 255.52 ml         Physical Exam  Vitals reviewed.   Constitutional:       Appearance: Normal appearance. He is ill-appearing.   HENT:      Head: Normocephalic and atraumatic.      Nose: Nose normal.   Eyes:      Extraocular Movements: Extraocular movements intact.      Conjunctiva/sclera: Conjunctivae normal.   Neck:      Trachea: Trachea normal.   Cardiovascular:      Rate and Rhythm: Normal rate and regular rhythm.      Pulses: Normal pulses.      Heart sounds: Normal heart sounds.   Pulmonary:      Effort: Respiratory distress present.      Breath sounds: Normal air entry.      Comments:  Absent breath sounds right lower lung field  Abdominal:      General: Bowel sounds are normal.      Palpations: Abdomen is soft.   Musculoskeletal:      Cervical back: Neck supple.      Comments:  Moves all extremities.  Normal tone   Skin:     General: Skin is warm and dry.   Neurological:      General: No focal deficit  present.      Mental Status: He is alert.      Cranial Nerves: Cranial nerves 2-12 are intact.      Comments: Grossly normal motor and sensory function without focal deficit appreciated.   Psychiatric:         Mood and Affect: Mood and affect normal.         Behavior: Behavior is cooperative.             Significant Labs: All pertinent labs within the past 24 hours have been reviewed.    Significant Imaging: I have reviewed all pertinent imaging results/findings within the past 24 hours.    Assessment/Plan:      * Acute hypoxic respiratory failure  Patient with Hypoxic Respiratory failure which is Acute.  he is not on home oxygen. Supplemental oxygen was provided and noted- Oxygen Concentration (%):  [28] 28    .   Signs/symptoms of respiratory failure include- tachypnea, increased work of breathing, and respiratory distress. Contributing diagnoses includes - Pleural effusion Labs and images were reviewed. Patient Has recent ABG, which has been reviewed. Will treat underlying causes and adjust management of respiratory failure as follows     Start on antibiotics   Plan for thoracentesis today.  Pleural fluid studies pending  Nephrology consulted for dialysis   Breathing improved, 1.5 L removed yesterday.  Continue antibiotics.  We will tap again tomorrow.  Improved    Pleural effusion  Patient found to have large pleural effusion on imaging. I have personally reviewed and interpreted the following imaging: Xray. A thoracentesis was ordered. Most likely etiology includes Congestive Heart Failure, Pneumonia, and Renal Failure. Management to include Repeat Thoracentesis and Dialysis   1.5 L removed yesterday still significant volume on.  We will plan to tap again in the a.m.  5/25 take a look today to see if we can get some more fluid off of his right lung.  He did have dialysis today which likely improve his volume status    Debility  Patient with Acute on chronic debility due to age-related physical debility. Latest  AMPAC and GEMS scores have not been reviewed. Evaluation for etiology is complete. Plan includes PT/OT consulted.  5/25 PTOT    Anemia  Patient's anemia is currently uncontrolled. Has not received any PRBCs to date. Etiology likely d/t chronic disease due to ESRD  Current CBC reviewed-   Lab Results   Component Value Date    HGB 7.3 (L) 05/25/2024    HCT 23.4 (L) 05/25/2024     Monitor serial CBC and transfuse if patient becomes hemodynamically unstable, symptomatic or H/H drops below 7/21.    Hypokalemia  Patient has hypokalemia which is Acute and currently uncontrolled. Most recent potassium levels reviewed-   Lab Results   Component Value Date    K 4.1 05/25/2024   . Will continue potassium replacement per protocol and recheck repeat levels after replacement completed.     Sepsis  This patient does have evidence of infective focus  My overall impression is sepsis.  Source: Respiratory  Antibiotics given-   Antibiotics (72h ago, onward)      Start     Stop Route Frequency Ordered    05/26/24 1800  levoFLOXacin 500 mg/100 mL IVPB 500 mg         -- IV Every 48 hours (non-standard times) 05/25/24 1201    05/25/24 2100  mupirocin 2 % ointment         05/30/24 2059 Nasl 2 times daily 05/25/24 1222          Latest lactate reviewed-  Recent Labs   Lab 05/23/24  1428 05/23/24  1944   LACTATE  --  0.8   POCLAC 1.4*  --        Organ dysfunction indicated by Acute respiratory failure    Fluid challenge Not needed - patient is not hypotensive      Post- resuscitation assessment No - Post resuscitation assessment not needed       Will Not start Pressors- Levophed for MAP of 65  Source control achieved by:      Blood and urine cultures.  We will check pleural fluid for infection.    Continue antibiotics.    5/24Continue antibiotics  5/25 continue antibiotics    Heart failure with preserved ejection fraction  Patient is identified as having Diastolic (HFpEF) heart failure that is Acute on chronic. CHF is currently uncontrolled  "due to Pulmonary edema/pleural effusion on CXR. Latest ECHO performed and demonstrates- Results for orders placed during the hospital encounter of 08/01/23    Echo    Interpretation Summary    Left Ventricle: The left ventricle is normal in size. Increased wall thickness. There is concentric remodeling. Normal wall motion. There is normal systolic function with a visually estimated ejection fraction of 55 - 60%. Grade II diastolic dysfunction.    Left Atrium: Left atrium is mildly dilated. There is a calcified 1.5 x 2 cm full wall thickness mass noted extending from the myocardium into the pericardial space, unclear etiology, recommend cardiac MRI.    Right Ventricle: Normal right ventricular cavity size. Systolic function is normal.    Aortic Valve: The aortic valve is a trileaflet valve. There is physiologically normal aortic regurgitation.    Mitral Valve: There is no stenosis. The mean pressure gradient across the mitral valve is 3 mmHg at a heart rate of  bpm. There is mild regurgitation with a centrally directed jet.    Tricuspid Valve: There is mild to moderate regurgitation with a centrally directed jet.    Pulmonic Valve: There is no significant stenosis.    Pericardium: Small circumferential pericardial effusion present. No indication of cardiac tamponade.  . Continue Nitrate/Vasodilator and monitor clinical status closely. Monitor on telemetry. Patient is off CHF pathway.  Monitor strict Is&Os and daily weights.  Place on fluid restriction of 1.5 L. Cardiology has not been consulted. Continue to stress to patient importance of self efficacy and  on diet for CHF. Last BNP reviewed- and noted below No results for input(s): "BNP", "BNPTRIAGEBLO" in the last 168 hours.      Respiratory status better after thoracentesis   appears Euvolemic aside from pleural effusion    CAD (coronary artery disease)  Patient with known CAD s/p  unclear , which is controlled Will continue Statin and monitor for S/Sx of " "angina/ACS. Continue to monitor on telemetry.   5/25 no chest pain    DM2 (diabetes mellitus, type 2)  Patient's FSGs are controlled on current medication regimen.  Last A1c reviewed- No results found for: "LABA1C", "HGBA1C"  Most recent fingerstick glucose reviewed-   No results for input(s): "POCTGLUCOSE" in the last 24 hours.    Current correctional scale  Low  Maintain anti-hyperglycemic dose as follows-   Antihyperglycemics (From admission, onward)      None          Hold Oral hypoglycemics while patient is in the hospital. A1c pending  Glucose looks okay  Continue to follow up glucose    Secondary hyperparathyroidism of renal origin   Nephrology consult      Iron deficiency anemia, unspecified  Patient's anemia is currently controlled. Has not received any PRBCs to date. Etiology likely d/t  renal disease  Current CBC reviewed-   Lab Results   Component Value Date    HGB 7.3 (L) 05/25/2024    HCT 23.4 (L) 05/25/2024     Monitor serial CBC and transfuse if patient becomes hemodynamically unstable, symptomatic or H/H drops below 7/21.  No evidence of bleeding  Following hemoglobin    Essential (primary) hypertension  Chronic, controlled. Latest blood pressure and vitals reviewed-     Temp:  [97.4 °F (36.3 °C)-97.9 °F (36.6 °C)]   Pulse:  [53-67]   Resp:  [16-20]   BP: ()/(25-66)   SpO2:  [93 %-100 %] .   Home meds for hypertension were reviewed and noted below.   Hypertension Medications               amLODIPine (NORVASC) 5 MG tablet Take 5 mg by mouth once daily.    hydrALAZINE (APRESOLINE) 100 MG tablet Take 1 tablet by mouth 3 (three) times daily with meals.    nebivoloL (BYSTOLIC) 2.5 MG Tab Take 2.5 mg by mouth once daily.            While in the hospital, will manage blood pressure as follows; Continue home antihypertensive regimen    Will utilize p.r.n. blood pressure medication only if patient's blood pressure greater than 180/110 and he develops symptoms such as worsening chest pain or shortness " of breath.  Continue current regimen  Controlled    End stage renal disease  Creatine stable for now. BMP reviewed- noted Estimated Creatinine Clearance: 7.4 mL/min (A) (based on SCr of 6.19 mg/dL (H)). according to latest data. 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  Monitor UOP and serial BMP and adjust therapy as needed. Renally dose meds. Avoid nephrotoxic medications and procedures.  Dialysis per Nephrology  Volume status looks okay 5/25      VTE Risk Mitigation (From admission, onward)           Ordered     IP VTE HIGH RISK PATIENT  Once         05/23/24 1746     Place sequential compression device  Until discontinued         05/23/24 1746                    Discharge Planning   BRIAN:      Code Status: Full Code   Is the patient medically ready for discharge?:     Reason for patient still in hospital (select all that apply): Treatment  Discharge Plan A: Home, Home Health        Greater than 50 minutes spent on face to face patient interaction, discussion with family, ancillary staff, and/or other physicians as well as review of pertinent labs, images, and notes.             Cristian Ac DO  Department of Hospital Medicine   [facility]

## 2024-05-25 NOTE — SUBJECTIVE & OBJECTIVE
Interval History:   No acute events overnight    Review of Systems   Constitutional: Negative.  Negative for chills and fever.   HENT: Negative.     Eyes:  Negative for pain and redness.   Respiratory:  Positive for shortness of breath. Negative for cough, chest tightness and wheezing.    Cardiovascular:  Negative for chest pain and palpitations.   Gastrointestinal:  Negative for abdominal pain, diarrhea, nausea and vomiting.   Endocrine: Negative for cold intolerance, heat intolerance and polyuria.   Genitourinary:  Negative for difficulty urinating, dysuria, frequency and hematuria.   Musculoskeletal:  Negative for arthralgias, back pain, myalgias, neck pain and neck stiffness.   Skin:  Negative for pallor and rash.   Allergic/Immunologic: Negative.    Neurological:  Negative for dizziness, syncope, weakness, numbness and headaches.   Hematological:  Negative for adenopathy.   Psychiatric/Behavioral:  Negative for agitation, confusion and hallucinations. The patient is not nervous/anxious.      Objective:     Vital Signs (Most Recent):  Temp: 97.4 °F (36.3 °C) (05/25/24 1150)  Pulse: 62 (05/25/24 1430)  Resp: 16 (05/25/24 1430)  BP: (!) 133/35 (05/25/24 1430)  SpO2: (!) 93 % (05/25/24 0800) Vital Signs (24h Range):  Temp:  [97.4 °F (36.3 °C)-97.9 °F (36.6 °C)] 97.4 °F (36.3 °C)  Pulse:  [53-67] 62  Resp:  [16-20] 16  SpO2:  [93 %-100 %] 93 %  BP: ()/(25-66) 133/35     Weight: 64.5 kg (142 lb 3.2 oz)  Body mass index is 22.96 kg/m².    Intake/Output Summary (Last 24 hours) at 5/25/2024 1446  Last data filed at 5/24/2024 2046  Gross per 24 hour   Intake 255.52 ml   Output --   Net 255.52 ml         Physical Exam  Vitals reviewed.   Constitutional:       Appearance: Normal appearance. He is ill-appearing.   HENT:      Head: Normocephalic and atraumatic.      Nose: Nose normal.   Eyes:      Extraocular Movements: Extraocular movements intact.      Conjunctiva/sclera: Conjunctivae normal.   Neck:      Trachea:  Trachea normal.   Cardiovascular:      Rate and Rhythm: Normal rate and regular rhythm.      Pulses: Normal pulses.      Heart sounds: Normal heart sounds.   Pulmonary:      Effort: Respiratory distress present.      Breath sounds: Normal air entry.      Comments:  Absent breath sounds right lower lung field  Abdominal:      General: Bowel sounds are normal.      Palpations: Abdomen is soft.   Musculoskeletal:      Cervical back: Neck supple.      Comments:  Moves all extremities.  Normal tone   Skin:     General: Skin is warm and dry.   Neurological:      General: No focal deficit present.      Mental Status: He is alert.      Cranial Nerves: Cranial nerves 2-12 are intact.      Comments: Grossly normal motor and sensory function without focal deficit appreciated.   Psychiatric:         Mood and Affect: Mood and affect normal.         Behavior: Behavior is cooperative.             Significant Labs: All pertinent labs within the past 24 hours have been reviewed.    Significant Imaging: I have reviewed all pertinent imaging results/findings within the past 24 hours.

## 2024-05-25 NOTE — ASSESSMENT & PLAN NOTE
Patient with known CAD s/p  unclear , which is controlled Will continue Statin and monitor for S/Sx of angina/ACS. Continue to monitor on telemetry.   5/25 no chest pain

## 2024-05-25 NOTE — PROGRESS NOTES
Pharmacist Renal Dose Adjustment Note    Joseph Mcdonald is a 89 y.o. male being treated with the medication levofloxacin    Patient Data:    Vital Signs (Most Recent):  Temp: 97.5 °F (36.4 °C) (05/25/24 0428)  Pulse: 67 (05/25/24 0800)  Resp: 17 (05/25/24 0800)  BP: (!) 114/51 (05/25/24 0800)  SpO2: (!) 93 % (05/25/24 0800) Vital Signs (72h Range):  Temp:  [97.4 °F (36.3 °C)-99.3 °F (37.4 °C)]   Pulse:  [57-87]   Resp:  [10-26]   BP: ()/(29-69)   SpO2:  [85 %-100 %]      Recent Labs   Lab 05/23/24  1130 05/24/24  0330 05/25/24  0403   CREATININE 4.01* 5.12* 6.19*     Serum creatinine: 6.19 mg/dL (H) 05/25/24 0403  Estimated creatinine clearance: 7.4 mL/min (A)    Medication:levofloxacin dose: 750 mg frequency q48h will be changed to medication:levofloxacin dose:500 mg frequency:q48h    Pharmacist's Name: Clarissa Max  Pharmacist's Extension: [phone]

## 2024-05-25 NOTE — ASSESSMENT & PLAN NOTE
Patient's anemia is currently uncontrolled. Has not received any PRBCs to date. Etiology likely d/t chronic disease due to ESRD  Current CBC reviewed-   Lab Results   Component Value Date    HGB 7.3 (L) 05/25/2024    HCT 23.4 (L) 05/25/2024     Monitor serial CBC and transfuse if patient becomes hemodynamically unstable, symptomatic or H/H drops below 7/21.

## 2024-05-25 NOTE — ASSESSMENT & PLAN NOTE
Patient's anemia is currently controlled. Has not received any PRBCs to date. Etiology likely d/t  renal disease  Current CBC reviewed-   Lab Results   Component Value Date    HGB 7.3 (L) 05/25/2024    HCT 23.4 (L) 05/25/2024     Monitor serial CBC and transfuse if patient becomes hemodynamically unstable, symptomatic or H/H drops below 7/21.  No evidence of bleeding  Following hemoglobin

## 2024-05-25 NOTE — ASSESSMENT & PLAN NOTE
"Patient's FSGs are controlled on current medication regimen.  Last A1c reviewed- No results found for: "LABA1C", "HGBA1C"  Most recent fingerstick glucose reviewed-   No results for input(s): "POCTGLUCOSE" in the last 24 hours.    Current correctional scale  Low  Maintain anti-hyperglycemic dose as follows-   Antihyperglycemics (From admission, onward)      None          Hold Oral hypoglycemics while patient is in the hospital. A1c pending  Glucose looks okay  Continue to follow up glucose  "

## 2024-05-25 NOTE — PLAN OF CARE
Problem: Adult Inpatient Plan of Care  Goal: Plan of Care Review  Outcome: Progressing  Goal: Patient-Specific Goal (Individualized)  Outcome: Progressing  Goal: Absence of Hospital-Acquired Illness or Injury  Outcome: Progressing  Goal: Optimal Comfort and Wellbeing  Outcome: Progressing  Goal: Readiness for Transition of Care  Outcome: Progressing     Problem: Diabetes Comorbidity  Goal: Blood Glucose Level Within Targeted Range  Outcome: Progressing     Problem: Sepsis/Septic Shock  Goal: Optimal Coping  Outcome: Progressing  Goal: Absence of Bleeding  Outcome: Progressing  Goal: Blood Glucose Level Within Targeted Range  Outcome: Progressing  Goal: Absence of Infection Signs and Symptoms  Outcome: Progressing  Goal: Optimal Nutrition Intake  Outcome: Progressing     Problem: Fall Injury Risk  Goal: Absence of Fall and Fall-Related Injury  Outcome: Progressing     Problem: Wound  Goal: Optimal Coping  Outcome: Progressing  Goal: Optimal Functional Ability  Outcome: Progressing  Goal: Absence of Infection Signs and Symptoms  Outcome: Progressing  Goal: Improved Oral Intake  Outcome: Progressing  Goal: Optimal Pain Control and Function  Outcome: Progressing  Goal: Skin Health and Integrity  Outcome: Progressing  Goal: Optimal Wound Healing  Outcome: Progressing     Problem: Gas Exchange Impaired  Goal: Optimal Gas Exchange  Outcome: Progressing     Problem: Hemodialysis  Goal: Safe, Effective Therapy Delivery  Outcome: Progressing  Goal: Effective Tissue Perfusion  Outcome: Progressing  Goal: Absence of Infection Signs and Symptoms  Outcome: Progressing

## 2024-05-26 NOTE — ASSESSMENT & PLAN NOTE
Chronic, controlled. Latest blood pressure and vitals reviewed-     Temp:  [97.4 °F (36.3 °C)-98 °F (36.7 °C)]   Pulse:  []   Resp:  [16-19]   BP: ()/(32-61)   SpO2:  [90 %-100 %] .   Home meds for hypertension were reviewed and noted below.   Hypertension Medications               amLODIPine (NORVASC) 5 MG tablet Take 5 mg by mouth once daily.    hydrALAZINE (APRESOLINE) 100 MG tablet Take 1 tablet by mouth 3 (three) times daily with meals.    nebivoloL (BYSTOLIC) 2.5 MG Tab Take 2.5 mg by mouth once daily.            While in the hospital, will manage blood pressure as follows; Continue home antihypertensive regimen    Will utilize p.r.n. blood pressure medication only if patient's blood pressure greater than 180/110 and he develops symptoms such as worsening chest pain or shortness of breath.  Continue current regimen  Controlled

## 2024-05-26 NOTE — ASSESSMENT & PLAN NOTE
Creatine stable for now. BMP reviewed- noted Estimated Creatinine Clearance: 11.1 mL/min (A) (based on SCr of 4.16 mg/dL (H)). according to latest data. 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  Monitor UOP and serial BMP and adjust therapy as needed. Renally dose meds. Avoid nephrotoxic medications and procedures.  Dialysis per Nephrology   5/26 continue dialysis

## 2024-05-26 NOTE — ASSESSMENT & PLAN NOTE
Patient has hypokalemia which is Acute and currently uncontrolled. Most recent potassium levels reviewed-   Lab Results   Component Value Date    K 3.9 05/26/2024   . Will continue potassium replacement per protocol and recheck repeat levels after replacement completed.

## 2024-05-26 NOTE — ASSESSMENT & PLAN NOTE
Patient's anemia is currently controlled. Has not received any PRBCs to date. Etiology likely d/t  renal disease  Current CBC reviewed-   Lab Results   Component Value Date    HGB 7.4 (L) 05/26/2024    HCT 24.4 (L) 05/26/2024     Monitor serial CBC and transfuse if patient becomes hemodynamically unstable, symptomatic or H/H drops below 7/21.  No evidence of bleeding  Following hemoglobin  Following hemoglobin.  No evidence of bleeding

## 2024-05-26 NOTE — ASSESSMENT & PLAN NOTE
This patient does have evidence of infective focus  My overall impression is sepsis.  Source: Respiratory  Antibiotics given-   Antibiotics (72h ago, onward)      Start     Stop Route Frequency Ordered    05/26/24 1800  levoFLOXacin 500 mg/100 mL IVPB 500 mg         -- IV Every 48 hours (non-standard times) 05/25/24 1201    05/25/24 2100  mupirocin 2 % ointment         05/30/24 2059 Nasl 2 times daily 05/25/24 1222          Latest lactate reviewed-  Recent Labs   Lab 05/23/24  1428 05/23/24  1944   LACTATE  --  0.8   POCLAC 1.4*  --        Organ dysfunction indicated by Acute respiratory failure    Fluid challenge Not needed - patient is not hypotensive      Post- resuscitation assessment No - Post resuscitation assessment not needed       Will Not start Pressors- Levophed for MAP of 65  Source control achieved by:      Blood and urine cultures.  We will check pleural fluid for infection.    Continue antibiotics.    5/24Continue antibiotics  5/25 continue antibiotics  5/26 antibiotic

## 2024-05-26 NOTE — ASSESSMENT & PLAN NOTE
Patient with Hypoxic Respiratory failure which is Acute.  he is not on home oxygen. Supplemental oxygen was provided and noted-      .   Signs/symptoms of respiratory failure include- tachypnea, increased work of breathing, and respiratory distress. Contributing diagnoses includes - Pleural effusion Labs and images were reviewed. Patient Has recent ABG, which has been reviewed. Will treat underlying causes and adjust management of respiratory failure as follows     Start on antibiotics   Plan for thoracentesis today.  Pleural fluid studies pending  Nephrology consulted for dialysis   Breathing improved, 1.5 L removed yesterday.  Continue antibiotics.  We will tap again tomorrow.  Improved   5/26 Tachypneic again today

## 2024-05-26 NOTE — SUBJECTIVE & OBJECTIVE
Interval History:   No acute events overnight    Review of Systems   Constitutional: Negative.  Negative for chills and fever.   HENT: Negative.     Eyes:  Negative for pain and redness.   Respiratory:  Positive for shortness of breath. Negative for cough, chest tightness and wheezing.    Cardiovascular:  Negative for chest pain and palpitations.   Gastrointestinal:  Negative for abdominal pain, diarrhea, nausea and vomiting.   Endocrine: Negative for cold intolerance, heat intolerance and polyuria.   Genitourinary:  Negative for difficulty urinating, dysuria, frequency and hematuria.   Musculoskeletal:  Negative for arthralgias, back pain, myalgias, neck pain and neck stiffness.   Skin:  Negative for pallor and rash.   Allergic/Immunologic: Negative.    Neurological:  Negative for dizziness, syncope, weakness, numbness and headaches.   Hematological:  Negative for adenopathy.   Psychiatric/Behavioral:  Negative for agitation, confusion and hallucinations. The patient is not nervous/anxious.      Objective:     Vital Signs (Most Recent):  Temp: 97.4 °F (36.3 °C) (05/26/24 1212)  Pulse: 64 (05/26/24 1212)  Resp: 18 (05/26/24 1212)  BP: (!) 137/47 (05/26/24 1212)  SpO2: (!) 92 % (05/26/24 1212) Vital Signs (24h Range):  Temp:  [97.4 °F (36.3 °C)-98 °F (36.7 °C)] 97.4 °F (36.3 °C)  Pulse:  [] 64  Resp:  [16-19] 18  SpO2:  [90 %-100 %] 92 %  BP: ()/(32-61) 137/47     Weight: 64.5 kg (142 lb 3.2 oz)  Body mass index is 22.96 kg/m².    Intake/Output Summary (Last 24 hours) at 5/26/2024 1407  Last data filed at 5/25/2024 1457  Gross per 24 hour   Intake --   Output 1000 ml   Net -1000 ml         Physical Exam  Vitals reviewed.   Constitutional:       Appearance: Normal appearance. He is ill-appearing.   HENT:      Head: Normocephalic and atraumatic.      Nose: Nose normal.   Eyes:      Extraocular Movements: Extraocular movements intact.      Conjunctiva/sclera: Conjunctivae normal.   Neck:      Trachea:  Trachea normal.   Cardiovascular:      Rate and Rhythm: Normal rate and regular rhythm.      Pulses: Normal pulses.      Heart sounds: Normal heart sounds.   Pulmonary:      Effort: Respiratory distress present.      Breath sounds: Normal air entry.      Comments:  Absent breath sounds right lower lung field  Abdominal:      General: Bowel sounds are normal.      Palpations: Abdomen is soft.   Musculoskeletal:      Cervical back: Neck supple.      Comments:  Moves all extremities.  Normal tone   Skin:     General: Skin is warm and dry.   Neurological:      General: No focal deficit present.      Mental Status: He is alert.      Cranial Nerves: Cranial nerves 2-12 are intact.      Comments: Grossly normal motor and sensory function without focal deficit appreciated.   Psychiatric:         Mood and Affect: Mood and affect normal.         Behavior: Behavior is cooperative.             Significant Labs: All pertinent labs within the past 24 hours have been reviewed.    Significant Imaging: I have reviewed all pertinent imaging results/findings within the past 24 hours.

## 2024-05-26 NOTE — PROGRESS NOTES
[facility]  [facility] Medicine  Progress Note    Patient Name: Joseph Mcdonald  MRN: [id number]  Patient Class: IP- Inpatient   Admission Date: 5/23/2024  Length of Stay: 3 days  Attending Physician: Cristian Ac DO  Primary Care Provider: Clinic, [facility]        Subjective:     Principal Problem:Acute hypoxic respiratory failure        HPI:    89-year-old  male With a past medical history of end-stage renal disease, hypertension, iron deficiency anemia, secondary hyperparathyroidism, type 2 diabetes, CAD, heart failure with preserved ejection fraction, hypokalemia inability presents to the ED with 1 week worsening shortness of breath.  This was particularly bad during his dialysis session this morning.  Due to this daughter brought him to the ED thereafter.  He denies any fever, chills, cough, wheezing, sputum production, palpitations.  He has been take medication as in his not missed any dialysis sessions.  He denies any nausea, vomiting, diarrhea, dysuria.  Imaging in the ED revealed near-complete he had a pacemaker patient of the right hemithorax review of systems otherwise negative.    Overview/Hospital Course:   5/24 1.5 L removed from right pleural space today.  Still has large right-sided effusion.  We will attempt to get some more off tomorrow.  5/25 dialyzing today  5/26 bedside ultrasound today still has large pleural effusion, complex appearing.  Was going to attempt bedside ultrasound but given complex nature have consulted pulmonology    Interval History:   No acute events overnight    Review of Systems   Constitutional: Negative.  Negative for chills and fever.   HENT: Negative.     Eyes:  Negative for pain and redness.   Respiratory:  Positive for shortness of breath. Negative for cough, chest tightness and wheezing.    Cardiovascular:  Negative for chest pain and palpitations.   Gastrointestinal:  Negative for abdominal pain, diarrhea, nausea and  vomiting.   Endocrine: Negative for cold intolerance, heat intolerance and polyuria.   Genitourinary:  Negative for difficulty urinating, dysuria, frequency and hematuria.   Musculoskeletal:  Negative for arthralgias, back pain, myalgias, neck pain and neck stiffness.   Skin:  Negative for pallor and rash.   Allergic/Immunologic: Negative.    Neurological:  Negative for dizziness, syncope, weakness, numbness and headaches.   Hematological:  Negative for adenopathy.   Psychiatric/Behavioral:  Negative for agitation, confusion and hallucinations. The patient is not nervous/anxious.      Objective:     Vital Signs (Most Recent):  Temp: 97.4 °F (36.3 °C) (05/26/24 1212)  Pulse: 64 (05/26/24 1212)  Resp: 18 (05/26/24 1212)  BP: (!) 137/47 (05/26/24 1212)  SpO2: (!) 92 % (05/26/24 1212) Vital Signs (24h Range):  Temp:  [97.4 °F (36.3 °C)-98 °F (36.7 °C)] 97.4 °F (36.3 °C)  Pulse:  [] 64  Resp:  [16-19] 18  SpO2:  [90 %-100 %] 92 %  BP: ()/(32-61) 137/47     Weight: 64.5 kg (142 lb 3.2 oz)  Body mass index is 22.96 kg/m².    Intake/Output Summary (Last 24 hours) at 5/26/2024 1407  Last data filed at 5/25/2024 1457  Gross per 24 hour   Intake --   Output 1000 ml   Net -1000 ml         Physical Exam  Vitals reviewed.   Constitutional:       Appearance: Normal appearance. He is ill-appearing.   HENT:      Head: Normocephalic and atraumatic.      Nose: Nose normal.   Eyes:      Extraocular Movements: Extraocular movements intact.      Conjunctiva/sclera: Conjunctivae normal.   Neck:      Trachea: Trachea normal.   Cardiovascular:      Rate and Rhythm: Normal rate and regular rhythm.      Pulses: Normal pulses.      Heart sounds: Normal heart sounds.   Pulmonary:      Effort: Respiratory distress present.      Breath sounds: Normal air entry.      Comments:  Absent breath sounds right lower lung field  Abdominal:      General: Bowel sounds are normal.      Palpations: Abdomen is soft.   Musculoskeletal:       Cervical back: Neck supple.      Comments:  Moves all extremities.  Normal tone   Skin:     General: Skin is warm and dry.   Neurological:      General: No focal deficit present.      Mental Status: He is alert.      Cranial Nerves: Cranial nerves 2-12 are intact.      Comments: Grossly normal motor and sensory function without focal deficit appreciated.   Psychiatric:         Mood and Affect: Mood and affect normal.         Behavior: Behavior is cooperative.             Significant Labs: All pertinent labs within the past 24 hours have been reviewed.    Significant Imaging: I have reviewed all pertinent imaging results/findings within the past 24 hours.    Assessment/Plan:      * Acute hypoxic respiratory failure  Patient with Hypoxic Respiratory failure which is Acute.  he is not on home oxygen. Supplemental oxygen was provided and noted-      .   Signs/symptoms of respiratory failure include- tachypnea, increased work of breathing, and respiratory distress. Contributing diagnoses includes - Pleural effusion Labs and images were reviewed. Patient Has recent ABG, which has been reviewed. Will treat underlying causes and adjust management of respiratory failure as follows     Start on antibiotics   Plan for thoracentesis today.  Pleural fluid studies pending  Nephrology consulted for dialysis   Breathing improved, 1.5 L removed yesterday.  Continue antibiotics.  We will tap again tomorrow.  Improved   5/26 Tachypneic again today    Pleural effusion  Patient found to have large pleural effusion on imaging. I have personally reviewed and interpreted the following imaging: Xray. A thoracentesis was ordered. Most likely etiology includes Congestive Heart Failure, Pneumonia, and Renal Failure. Management to include Repeat Thoracentesis and Dialysis   1.5 L removed yesterday still significant volume on.  We will plan to tap again in the a.m.  5/25 take a look today to see if we can get some more fluid off of his right  lung.  He did have dialysis today which likely improve his volume status  5/26 complex appearing on ultrasound.  Consult pulmonology    Debility  Patient with Acute on chronic debility due to age-related physical debility. Latest AMPAC and GEMS scores have not been reviewed. Evaluation for etiology is complete. Plan includes PT/OT consulted.  5/25 PTOT    Anemia  Patient's anemia is currently uncontrolled. Has not received any PRBCs to date. Etiology likely d/t chronic disease due to ESRD  Current CBC reviewed-   Lab Results   Component Value Date    HGB 7.4 (L) 05/26/2024    HCT 24.4 (L) 05/26/2024 5/26No evidence of bleeding  Monitor serial CBC and transfuse if patient becomes hemodynamically unstable, symptomatic or H/H drops below 7/21.    Hypokalemia  Patient has hypokalemia which is Acute and currently uncontrolled. Most recent potassium levels reviewed-   Lab Results   Component Value Date    K 3.9 05/26/2024   . Will continue potassium replacement per protocol and recheck repeat levels after replacement completed.     Sepsis  This patient does have evidence of infective focus  My overall impression is sepsis.  Source: Respiratory  Antibiotics given-   Antibiotics (72h ago, onward)      Start     Stop Route Frequency Ordered    05/26/24 1800  levoFLOXacin 500 mg/100 mL IVPB 500 mg         -- IV Every 48 hours (non-standard times) 05/25/24 1201    05/25/24 2100  mupirocin 2 % ointment         05/30/24 2059 Nasl 2 times daily 05/25/24 1222          Latest lactate reviewed-  Recent Labs   Lab 05/23/24  1428 05/23/24  1944   LACTATE  --  0.8   POCLAC 1.4*  --        Organ dysfunction indicated by Acute respiratory failure    Fluid challenge Not needed - patient is not hypotensive      Post- resuscitation assessment No - Post resuscitation assessment not needed       Will Not start Pressors- Levophed for MAP of 65  Source control achieved by:      Blood and urine cultures.  We will check pleural fluid for  infection.    Continue antibiotics.    5/24Continue antibiotics  5/25 continue antibiotics  5/26 antibiotic    Heart failure with preserved ejection fraction  Patient is identified as having Diastolic (HFpEF) heart failure that is Acute on chronic. CHF is currently uncontrolled due to Pulmonary edema/pleural effusion on CXR. Latest ECHO performed and demonstrates- Results for orders placed during the hospital encounter of 08/01/23    Echo    Interpretation Summary    Left Ventricle: The left ventricle is normal in size. Increased wall thickness. There is concentric remodeling. Normal wall motion. There is normal systolic function with a visually estimated ejection fraction of 55 - 60%. Grade II diastolic dysfunction.    Left Atrium: Left atrium is mildly dilated. There is a calcified 1.5 x 2 cm full wall thickness mass noted extending from the myocardium into the pericardial space, unclear etiology, recommend cardiac MRI.    Right Ventricle: Normal right ventricular cavity size. Systolic function is normal.    Aortic Valve: The aortic valve is a trileaflet valve. There is physiologically normal aortic regurgitation.    Mitral Valve: There is no stenosis. The mean pressure gradient across the mitral valve is 3 mmHg at a heart rate of  bpm. There is mild regurgitation with a centrally directed jet.    Tricuspid Valve: There is mild to moderate regurgitation with a centrally directed jet.    Pulmonic Valve: There is no significant stenosis.    Pericardium: Small circumferential pericardial effusion present. No indication of cardiac tamponade.  . Continue Nitrate/Vasodilator and monitor clinical status closely. Monitor on telemetry. Patient is off CHF pathway.  Monitor strict Is&Os and daily weights.  Place on fluid restriction of 1.5 L. Cardiology has not been consulted. Continue to stress to patient importance of self efficacy and  on diet for CHF. Last BNP reviewed- and noted below No results for input(s):  ""BNP", "BNPTRIAGEBLO" in the last 168 hours.      Respiratory status better after thoracentesis   Tachypneic again today.  Does not make urine.    CAD (coronary artery disease)  Patient with known CAD s/p  unclear , which is controlled Will continue Statin and monitor for S/Sx of angina/ACS. Continue to monitor on telemetry.   5/25 no chest pain   5/26 no chest pain    DM2 (diabetes mellitus, type 2)  Patient's FSGs are controlled on current medication regimen.  Last A1c reviewed- No results found for: "LABA1C", "HGBA1C"  Most recent fingerstick glucose reviewed-   No results for input(s): "POCTGLUCOSE" in the last 24 hours.    Current correctional scale  Low  Maintain anti-hyperglycemic dose as follows-   Antihyperglycemics (From admission, onward)      None          Hold Oral hypoglycemics while patient is in the hospital. A1c pending  Glucose looks okay  Continue to follow up glucose    Secondary hyperparathyroidism of renal origin   Nephrology consult      Essential (primary) hypertension  Chronic, controlled. Latest blood pressure and vitals reviewed-     Temp:  [97.4 °F (36.3 °C)-98 °F (36.7 °C)]   Pulse:  []   Resp:  [16-19]   BP: ()/(32-61)   SpO2:  [90 %-100 %] .   Home meds for hypertension were reviewed and noted below.   Hypertension Medications               amLODIPine (NORVASC) 5 MG tablet Take 5 mg by mouth once daily.    hydrALAZINE (APRESOLINE) 100 MG tablet Take 1 tablet by mouth 3 (three) times daily with meals.    nebivoloL (BYSTOLIC) 2.5 MG Tab Take 2.5 mg by mouth once daily.            While in the hospital, will manage blood pressure as follows; Continue home antihypertensive regimen    Will utilize p.r.n. blood pressure medication only if patient's blood pressure greater than 180/110 and he develops symptoms such as worsening chest pain or shortness of breath.  Continue current regimen  Controlled    End stage renal disease  Creatine stable for now. BMP reviewed- noted Estimated " Creatinine Clearance: 11.1 mL/min (A) (based on SCr of 4.16 mg/dL (H)). according to latest data. 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  Monitor UOP and serial BMP and adjust therapy as needed. Renally dose meds. Avoid nephrotoxic medications and procedures.  Dialysis per Nephrology   5/26 continue dialysis      VTE Risk Mitigation (From admission, onward)           Ordered     IP VTE HIGH RISK PATIENT  Once         05/23/24 1746     Place sequential compression device  Until discontinued         05/23/24 1746                    Discharge Planning   BRIAN:      Code Status: Full Code   Is the patient medically ready for discharge?:     Reason for patient still in hospital (select all that apply): Treatment  Discharge Plan A: Home, Home Health          Greater than 50 minutes spent on face to face patient interaction, discussion with family, ancillary staff, and/or other physicians as well as review of pertinent labs, images, and notes.           Cristian Ac DO  Department of Hospital Medicine   [facility]

## 2024-05-26 NOTE — PLAN OF CARE
Problem: Adult Inpatient Plan of Care  Goal: Plan of Care Review  Outcome: Progressing  Goal: Patient-Specific Goal (Individualized)  Outcome: Progressing  Goal: Absence of Hospital-Acquired Illness or Injury  Outcome: Progressing  Goal: Optimal Comfort and Wellbeing  Outcome: Progressing  Goal: Readiness for Transition of Care  Outcome: Progressing     Problem: Diabetes Comorbidity  Goal: Blood Glucose Level Within Targeted Range  Outcome: Progressing     Problem: Sepsis/Septic Shock  Goal: Optimal Coping  Outcome: Progressing  Goal: Absence of Bleeding  Outcome: Progressing  Goal: Blood Glucose Level Within Targeted Range  Outcome: Progressing  Goal: Absence of Infection Signs and Symptoms  Outcome: Progressing  Goal: Optimal Nutrition Intake  Outcome: Progressing     Problem: Fall Injury Risk  Goal: Absence of Fall and Fall-Related Injury  Outcome: Progressing     Problem: Wound  Goal: Optimal Coping  Outcome: Progressing  Goal: Optimal Functional Ability  Outcome: Progressing  Goal: Absence of Infection Signs and Symptoms  Outcome: Progressing  Goal: Improved Oral Intake  Outcome: Progressing  Goal: Optimal Pain Control and Function  Outcome: Progressing  Goal: Skin Health and Integrity  Outcome: Progressing  Goal: Optimal Wound Healing  Outcome: Progressing     Problem: Gas Exchange Impaired  Goal: Optimal Gas Exchange  Outcome: Progressing     Problem: Hemodialysis  Goal: Safe, Effective Therapy Delivery  Outcome: Progressing  Goal: Effective Tissue Perfusion  Outcome: Progressing  Goal: Absence of Infection Signs and Symptoms  Outcome: Progressing     Problem: Skin Injury Risk Increased  Goal: Skin Health and Integrity  Outcome: Progressing

## 2024-05-26 NOTE — ASSESSMENT & PLAN NOTE
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 CHF is currently uncontrolled due to Pulmonary edema/pleural effusion on CXR. Latest ECHO performed and demonstrates- Results for orders placed during the hospital encounter of 08/01/23    Echo    Interpretation Summary    Left Ventricle: The left ventricle is normal in size. Increased wall thickness. There is concentric remodeling. Normal wall motion. There is normal systolic function with a visually estimated ejection fraction of 55 - 60%. Grade II diastolic dysfunction.    Left Atrium: Left atrium is mildly dilated. There is a calcified 1.5 x 2 cm full wall thickness mass noted extending from the myocardium into the pericardial space, unclear etiology, recommend cardiac MRI.    Right Ventricle: Normal right ventricular cavity size. Systolic function is normal.    Aortic Valve: The aortic valve is a trileaflet valve. There is physiologically normal aortic regurgitation.    Mitral Valve: There is no stenosis. The mean pressure gradient across the mitral valve is 3 mmHg at a heart rate of  bpm. There is mild regurgitation with a centrally directed jet.    Tricuspid Valve: There is mild to moderate regurgitation with a centrally directed jet.    Pulmonic Valve: There is no significant stenosis.    Pericardium: Small circumferential pericardial effusion present. No indication of cardiac tamponade.  . Continue Nitrate/Vasodilator and monitor clinical status closely. Monitor on telemetry. Patient is off CHF pathway.  Monitor strict Is&Os and daily weights.  Place on fluid restriction of 1.5 L. Cardiology has not been consulted. Continue to stress to patient importance of self efficacy and  on diet for CHF. Last BNP reviewed- and noted below No results for input(s): "BNP", "BNPTRIAGEBLO" in the last 168 hours.      Respiratory status better after thoracentesis   Tachypneic again today.  Does not make urine.  "

## 2024-05-26 NOTE — PROGRESS NOTES
[facility]  Nephrology  Progress Note    Patient Name: Joseph Mcdonald  MRN: [id number]  Admission Date: 5/23/2024  Hospital Length of Stay: 3 days  Attending Provider: Cristian Ac DO   Primary Care Physician: [facility], [facility]  Principal Problem:Acute hypoxic respiratory failure    Consults  Subjective:     Interval History:  Mr. Mcdonald is seen in follow-up of his end-stage renal disease.  He is doing well today and is not short of breath.  Breath sounds are decreased over the right base.    Review of patient's allergies indicates:   Allergen Reactions    Azithromycin Other (See Comments)     Note: - Phreesia 01/11/2019     Current Facility-Administered Medications   Medication Frequency    0.9%  NaCl infusion Once    acetaminophen tablet 1,000 mg Q8H PRN    atorvastatin tablet 40 mg QHS    dextrose 10% bolus 125 mL 125 mL PRN    dextrose 10% bolus 250 mL 250 mL PRN    glucagon (human recombinant) injection 1 mg PRN    glucose chewable tablet 16 g PRN    glucose chewable tablet 24 g PRN    hydrALAZINE tablet 100 mg TID WM    isosorbide mononitrate 24 hr tablet 30 mg Daily    levoFLOXacin 500 mg/100 mL IVPB 500 mg Q48H    metoprolol succinate (TOPROL-XL) 24 hr tablet 25 mg Daily    mupirocin 2 % ointment BID    naloxone 0.4 mg/mL injection 0.02 mg PRN    NIFEdipine 24 hr tablet 30 mg Daily    ondansetron disintegrating tablet 8 mg Q8H PRN    pantoprazole EC tablet 40 mg Daily    polyethylene glycol packet 17 g BID PRN    sevelamer carbonate tablet 2,400 mg TID WM    sodium chloride 0.9% flush 10 mL Q12H PRN    trazodone split tablet 25 mg QHS       Objective:     Vital Signs (Most Recent):  Temp: 98 °F (36.7 °C) (05/26/24 0805)  Pulse: 72 (05/26/24 0805)  Resp: 18 (05/26/24 0805)  BP: (!) 100/43 (05/26/24 0805)  SpO2: (!) 93 % (05/26/24 0805) Vital Signs (24h Range):  Temp:  [97.4 °F (36.3 °C)-98 °F (36.7 °C)] 98 °F (36.7 °C)  Pulse:  [] 72  Resp:  [16-19] 18  SpO2:  [90  %-100 %] 93 %  BP: ()/(25-61) 100/43     Weight: 64.5 kg (142 lb 3.2 oz) (05/23/24 1507)  Body mass index is 22.96 kg/m².  Body surface area is 1.73 meters squared.    I/O last 3 completed shifts:  In: 255.5 [IV Piggyback:255.5]  Out: 1000 [Other:1000]    Physical Exam he has in no distress.  Breath sounds are decreased over the right lung base.    Significant Labs:sureBMP:   Recent Labs   Lab 05/26/24  0256   GLU 86      K 3.9      CO2 29   BUN 22*   CREATININE 4.16*   CALCIUM 7.8*   MG 2.1     CBC:   Recent Labs   Lab 05/26/24  0256   WBC 9.95   RBC 2.78*   HGB 7.4*   HCT 24.4*      MCV 87.8   MCH 26.6*   MCHC 30.3*     All labs within the past 24 hours have been reviewed.    Significant Imaging:  Labs: Reviewed    Assessment/Plan:     Active Diagnoses:    Diagnosis Date Noted POA    PRINCIPAL PROBLEM:  Acute hypoxic respiratory failure [J96.01] 05/24/2024 Yes     Chronic    Pleural effusion [J90] 05/23/2024 Yes     Chronic    Heart failure with preserved ejection fraction [I50.30] 05/23/2024 Yes    Sepsis [A41.9] 05/23/2024 Yes    Hypokalemia [E87.6] 05/23/2024 Yes    Anemia [D64.9] 05/23/2024 Yes    Debility [R53.81] 05/23/2024 Yes    CAD (coronary artery disease) [I25.10] 08/04/2023 Yes    DM2 (diabetes mellitus, type 2) [E11.9] 11/18/2014 Yes    End stage renal disease [N18.6] 11/06/2014 Yes    Essential (primary) hypertension [I10] 11/06/2014 Yes    Iron deficiency anemia, unspecified [D50.9] 11/06/2014 Yes    Secondary hyperparathyroidism of renal origin [N25.81] 11/06/2014 Yes      Problems Resolved During this Admission:       Lab is reviewed and his hematocrit is 24% with a potassium of 3.9.  He did well with dialysis yesterday but hypotension limited any fluid removal.  His pleural effusion developed while he was attending regular dialysis.  If there is significant effusion remaining, he may require an additional thoracentesis.    Thank you for your consult. I will follow-up with  patient. Please contact us if you have any additional questions.    Ovi Alvarado MD  Nephrology  [facility]

## 2024-05-26 NOTE — ASSESSMENT & PLAN NOTE
Patient with known CAD s/p  unclear , which is controlled Will continue Statin and monitor for S/Sx of angina/ACS. Continue to monitor on telemetry.   5/25 no chest pain   5/26 no chest pain

## 2024-05-26 NOTE — ASSESSMENT & PLAN NOTE
Patient found to have large pleural effusion on imaging. I have personally reviewed and interpreted the following imaging: Xray. A thoracentesis was ordered. Most likely etiology includes Congestive Heart Failure, Pneumonia, and Renal Failure. Management to include Repeat Thoracentesis and Dialysis   1.5 L removed yesterday still significant volume on.  We will plan to tap again in the a.m.  5/25 take a look today to see if we can get some more fluid off of his right lung.  He did have dialysis today which likely improve his volume status  5/26 complex appearing on ultrasound.  Consult pulmonology

## 2024-05-26 NOTE — PLAN OF CARE
Problem: Adult Inpatient Plan of Care  Goal: Absence of Hospital-Acquired Illness or Injury  Outcome: Progressing  Intervention: Identify and Manage Fall Risk  Flowsheets (Taken 5/26/2024 0214)  Safety Promotion/Fall Prevention:   bed alarm set   high risk medications identified   lighting adjusted   nonskid shoes/socks when out of bed   medications reviewed   room near unit station   side rails raised x 3   instructed to call staff for mobility  Intervention: Prevent Skin Injury  Flowsheets (Taken 5/26/2024 0214)  Body Position: position changed independently  Skin Protection: incontinence pads utilized  Device Skin Pressure Protection:   absorbent pad utilized/changed   adhesive use limited   positioning supports utilized   pressure points protected  Goal: Optimal Comfort and Wellbeing  Outcome: Progressing  Intervention: Monitor Pain and Promote Comfort  Flowsheets (Taken 5/26/2024 0214)  Pain Management Interventions:   care clustered   medication offered   position adjusted   quiet environment facilitated

## 2024-05-26 NOTE — ASSESSMENT & PLAN NOTE
Patient's anemia is currently uncontrolled. Has not received any PRBCs to date. Etiology likely d/t chronic disease due to ESRD  Current CBC reviewed-   Lab Results   Component Value Date    HGB 7.4 (L) 05/26/2024    HCT 24.4 (L) 05/26/2024 5/26No evidence of bleeding  Monitor serial CBC and transfuse if patient becomes hemodynamically unstable, symptomatic or H/H drops below 7/21.

## 2024-05-27 NOTE — PT/OT/SLP PROGRESS
Occupational Therapy   Treatment    Name: Joseph Mcdonald  MRN: [id number]  Admitting Diagnosis:  Acute hypoxic respiratory failure       Recommendations:     Discharge Recommendations: Low Intensity Therapy  Discharge Equipment Recommendations:   (to be determined)  Barriers to discharge:       Assessment:     Joseph Mcdonald is a 89 y.o. male with a medical diagnosis of Acute hypoxic respiratory failure.  He presents with weakness and decline in ADLs. Performance deficits affecting function are weakness, impaired endurance, impaired functional mobility, impaired self care skills.     Rehab Prognosis:  Good; patient would benefit from acute skilled OT services to address these deficits and reach maximum level of function.       Plan:     Patient to be seen 5 x/week to address the above listed problems via self-care/home management, therapeutic activities, therapeutic exercises  Plan of Care Expires: 06/28/24  Plan of Care Reviewed with: patient    Subjective     Chief Complaint: acute hypoxic respiratory failure  Patient/Family Comments/goals: pt agreeable to OT tx  Pain/Comfort:  Pain Rating 1: 0/10    Objective:     Communicated with: RENATO Soriano prior to session.  Patient found up in chair with peripheral IV, telemetry, oxygen upon OT entry to room.    General Precautions: Standard, fall    Orthopedic Precautions:N/A  Braces: N/A  Respiratory Status: Nasal cannula, flow 2 L/min     Occupational Performance:     Bed Mobility:    Not performed     Functional Mobility/Transfers:  Not performed    Activities of Daily Living:  Not performed      [facility] 6 Click ADL:      Treatment & Education:  Pt performed 2 sets x 15 reps each bicep curls 1#db, chest press 1#db, IR/ER 1#db, and shoulder press 1#db in order to improve BUE strength and endurance to perform ADL and ADL t/f with less assist.     Patient left up in chair with all lines intact and call button in reach    GOALS:   Multidisciplinary Problems       Occupational  Therapy Goals          Problem: Occupational Therapy    Goal Priority Disciplines Outcome Interventions   Occupational Therapy Goal     OT, PT/OT Progressing    Description: STG:  Pt will perform grooming (I)  Pt will bathe with setup  Pt will perform UE dressing with (I)  Pt will perform LE dressing with I/mod I  Pt will transfer bed/chair/bsc with Mod I  Pt will perform standing task x 2 min with Mod I   Pt will tolerate 15 minutes of tx without fatigue      LT.Restore to max I with self care and mobility.                          Time Tracking:     OT Date of Treatment: 24  OT Start Time:   OT Stop Time: 1533  OT Total Time (min): 23 min    Billable Minutes:Therapeutic Exercise 23 minutes    OT/QING: OT          2024

## 2024-05-27 NOTE — PROGRESS NOTES
[facility]  Nephrology  Progress Note    Patient Name: Joseph Mcdonald  MRN: [id number]  Admission Date: 5/23/2024  Hospital Length of Stay: 4 days  Attending Provider: Cristian Ac DO   Primary Care Physician: [facility], [facility]  Principal Problem:Acute hypoxic respiratory failure    Consults  Subjective:     Interval History:  Mr. Mcdonald is seen in follow-up of his end-stage renal disease.  He is doing well today and sitting up in a chair breathing without any difficulty.  Chest with rhonchi bilaterally but minimal cough.    Overall he does look better.    Review of patient's allergies indicates:   Allergen Reactions    Azithromycin Other (See Comments)     Note: - [employer]rees[employer] 01/11/2019     Current Facility-Administered Medications   Medication Frequency    0.9%  NaCl infusion Once    acetaminophen tablet 1,000 mg Q8H PRN    atorvastatin tablet 40 mg QHS    dextrose 10% bolus 125 mL 125 mL PRN    dextrose 10% bolus 250 mL 250 mL PRN    glucagon (human recombinant) injection 1 mg PRN    glucose chewable tablet 16 g PRN    glucose chewable tablet 24 g PRN    hydrALAZINE tablet 100 mg TID WM    isosorbide mononitrate 24 hr tablet 30 mg Daily    [START ON 5/28/2024] levoFLOXacin tablet 500 mg Every other day    metoprolol succinate (TOPROL-XL) 24 hr tablet 25 mg Daily    mupirocin 2 % ointment BID    naloxone 0.4 mg/mL injection 0.02 mg PRN    ondansetron disintegrating tablet 8 mg Q8H PRN    pantoprazole EC tablet 40 mg Daily    polyethylene glycol packet 17 g BID PRN    sevelamer carbonate tablet 2,400 mg TID WM    sodium chloride 0.9% flush 10 mL Q12H PRN    traZODone tablet 150 mg QHS       Objective:     Vital Signs (Most Recent):  Temp: 97.9 °F (36.6 °C) (05/27/24 0805)  Pulse: (!) 56 (05/27/24 0805)  Resp: 18 (05/27/24 0805)  BP: 128/61 (05/27/24 0805)  SpO2: 95 % (05/27/24 0805) Vital Signs (24h Range):  Temp:  [97.4 °F (36.3 °C)-98.6 °F (37 °C)] 97.9 °F (36.6 °C)  Pulse:   [56-68] 56  Resp:  [18] 18  SpO2:  [89 %-95 %] 95 %  BP: (118-149)/(27-61) 128/61     Weight: 64.5 kg (142 lb 3.2 oz) (05/23/24 1507)  Body mass index is 22.96 kg/m².  Body surface area is 1.73 meters squared.    I/O last 3 completed shifts:  In: 91 [IV Piggyback:91]  Out: -     Physical Exam bilateral rhonchi.  The patient is not short of breath.    Significant Labs    Significant Imaging:  Labs: Reviewed    Assessment/Plan:     Active Diagnoses:    Diagnosis Date Noted POA    PRINCIPAL PROBLEM:  Acute hypoxic respiratory failure [J96.01] 05/24/2024 Yes     Chronic    Pleural effusion [J90] 05/23/2024 Yes     Chronic    Heart failure with preserved ejection fraction [I50.30] 05/23/2024 Yes    Sepsis [A41.9] 05/23/2024 Yes    Hypokalemia [E87.6] 05/23/2024 Yes    Anemia [D64.9] 05/23/2024 Yes    Debility [R53.81] 05/23/2024 Yes    CAD (coronary artery disease) [I25.10] 08/04/2023 Yes    DM2 (diabetes mellitus, type 2) [E11.9] 11/18/2014 Yes    End stage renal disease [N18.6] 11/06/2014 Yes    Essential (primary) hypertension [I10] 11/06/2014 Yes    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  No changes for now.    Thank you for your consult. I will follow-up with patient. Please contact us if you have any additional questions.    Ovi Alvarado MD  Nephrology  [facility]

## 2024-05-27 NOTE — ASSESSMENT & PLAN NOTE
Creatine stable for now. BMP reviewed- noted Estimated Creatinine Clearance: 11.1 mL/min (A) (based on SCr of 4.16 mg/dL (H)). according to latest data. 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  Monitor UOP and serial BMP and adjust therapy as needed. Renally dose meds. Avoid nephrotoxic medications and procedures.  Dialysis per Nephrology   5/26 continue dialysis  5/27 volume overloaded

## 2024-05-27 NOTE — ASSESSMENT & PLAN NOTE
Patient with Hypoxic Respiratory failure which is Acute.  he is not on home oxygen. Supplemental oxygen was provided and noted-      .   Signs/symptoms of respiratory failure include- tachypnea, increased work of breathing, and respiratory distress. Contributing diagnoses includes - Pleural effusion Labs and images were reviewed. Patient Has recent ABG, which has been reviewed. Will treat underlying causes and adjust management of respiratory failure as follows     Start on antibiotics   Plan for thoracentesis today.  Pleural fluid studies pending  Nephrology consulted for dialysis   Breathing improved, 1.5 L removed yesterday.  Continue antibiotics.  We will tap again tomorrow.  Improved   5/26 Tachypneic again today  5/27 plan for CT, breathing stable

## 2024-05-27 NOTE — ASSESSMENT & PLAN NOTE
Patient found to have large pleural effusion on imaging. I have personally reviewed and interpreted the following imaging: Xray. A thoracentesis was ordered. Most likely etiology includes Congestive Heart Failure, Pneumonia, and Renal Failure. Management to include Repeat Thoracentesis and Dialysis   1.5 L removed yesterday still significant volume on.  We will plan to tap again in the a.m.  5/25 take a look today to see if we can get some more fluid off of his right lung.  He did have dialysis today which likely improve his volume status  5/26 complex appearing on ultrasound.  Consult pulmonology  5/27 discussed case with Dr. Washington today, plan for CT

## 2024-05-27 NOTE — ASSESSMENT & PLAN NOTE
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 Latest AMPAC and GEMS scores have not been reviewed. Evaluation for etiology is complete. Plan includes PT/OT consulted.  5/25 PTOT  5/27 ptot

## 2024-05-27 NOTE — ASSESSMENT & PLAN NOTE
"This patient does have evidence of infective focus  My overall impression is sepsis.  Source: Respiratory  Antibiotics given-   Antibiotics (72h ago, onward)      Start     Stop Route Frequency Ordered    05/28/24 1800  levoFLOXacin tablet 500 mg         -- Oral Every other day 05/27/24 0943    05/25/24 2100  mupirocin 2 % ointment         05/30/24 2059 Nasl 2 times daily 05/25/24 1222          Latest lactate reviewed-  No results for input(s): "LACTATE", "POCLAC" in the last 72 hours.    Organ dysfunction indicated by Acute respiratory failure    Fluid challenge Not needed - patient is not hypotensive      Post- resuscitation assessment No - Post resuscitation assessment not needed       Will Not start Pressors- Levophed for MAP of 65  Source control achieved by:      Blood and urine cultures.  We will check pleural fluid for infection.    Continue antibiotics.    5/24Continue antibiotics  5/25 continue antibiotics  5/26 antibiotic  5/27 continue abx  "

## 2024-05-27 NOTE — ASSESSMENT & PLAN NOTE
Patient with known CAD s/p  unclear , which is controlled Will continue Statin and monitor for S/Sx of angina/ACS. Continue to monitor on telemetry.   5/25 no chest pain   5/26 no chest pain  No cp

## 2024-05-27 NOTE — PLAN OF CARE
Problem: Adult Inpatient Plan of Care  Goal: Absence of Hospital-Acquired Illness or Injury  Outcome: Progressing     Problem: Adult Inpatient Plan of Care  Goal: Optimal Comfort and Wellbeing  Outcome: Progressing     Problem: Diabetes Comorbidity  Goal: Blood Glucose Level Within Targeted Range  Outcome: Progressing     Problem: Sepsis/Septic Shock  Goal: Optimal Coping  Outcome: Progressing  Goal: Absence of Bleeding  Outcome: Progressing  Goal: Blood Glucose Level Within Targeted Range  Outcome: Progressing  Goal: Absence of Infection Signs and Symptoms  Outcome: Progressing  Goal: Optimal Nutrition Intake  Outcome: Progressing     Problem: Fall Injury Risk  Goal: Absence of Fall and Fall-Related Injury  Outcome: Progressing     Problem: Wound  Goal: Optimal Coping  Outcome: Progressing  Goal: Optimal Functional Ability  Outcome: Progressing  Goal: Absence of Infection Signs and Symptoms  Outcome: Progressing  Goal: Improved Oral Intake  Outcome: Progressing  Goal: Optimal Pain Control and Function  Outcome: Progressing  Goal: Skin Health and Integrity  Outcome: Progressing  Goal: Optimal Wound Healing  Outcome: Progressing     Problem: Hemodialysis  Goal: Safe, Effective Therapy Delivery  Outcome: Progressing  Goal: Effective Tissue Perfusion  Outcome: Progressing  Goal: Absence of Infection Signs and Symptoms  Outcome: Progressing     Problem: Skin Injury Risk Increased  Goal: Skin Health and Integrity  Outcome: Progressing

## 2024-05-27 NOTE — PROGRESS NOTES
Pharmacist Intervention IV to PO Note    Joseph Mcdonald is a 89 y.o. male being treated with IV medication levofloxacin    Patient Data:    Vital Signs (Most Recent):  Temp: 97.9 °F (36.6 °C) (05/27/24 0805)  Pulse: (!) 56 (05/27/24 0805)  Resp: 18 (05/27/24 0805)  BP: 128/61 (05/27/24 0805)  SpO2: 95 % (05/27/24 0805) Vital Signs (72h Range):  Temp:  [97.4 °F (36.3 °C)-98.6 °F (37 °C)]   Pulse:  []   Resp:  [16-20]   BP: ()/(25-66)   SpO2:  [89 %-100 %]      CBC:  Recent Labs   Lab 05/24/24  0330 05/25/24  0403 05/26/24  0256   WBC 12.03* 11.68* 9.95   RBC 2.89* 2.72* 2.78*   HGB 7.8* 7.3* 7.4*   HCT 25.9* 23.4* 24.4*    171 157   MCV 89.6 86.0 87.8   MCH 27.0 26.8* 26.6*   MCHC 30.1* 31.2* 30.3*     CMP:     Recent Labs   Lab 05/23/24  1130 05/24/24  0330 05/25/24  0403 05/26/24  0256   * 120* 91 86   CALCIUM 8.5 8.4* 8.2* 7.8*   ALBUMIN 2.5*  --   --   --    PROT 6.9  --   --   --     135* 134* 137   K 3.2* 3.2* 4.1 3.9   CO2 31 28 31 29   CL 98 100 99 102   BUN 19* 29* 41* 22*   CREATININE 4.01* 5.12* 6.19* 4.16*   ALKPHOS 72  --   --   --    ALT 9*  --   --   --    AST 27  --   --   --    BILITOT 0.7  --   --   --        Dietary Orders:  Diet Orders            Diet Renal On Dialysis High Protein/High Calorie; Fluid - 1500mL: Renal on Dialysis starting at 05/24 0804    Dietary nutrition supplements By Mouth; Boost Glucose Control Max 30G Protein Nutritional Drink - Vanilla; All Meals starting at 05/24 0748            Based on the following criteria, this patient qualifies for intravenous to oral conversion:  [x] The patients gastrointestinal tract is functioning (tolerating medications via oral or enteral route for 24 hours and tolerating food or enteral feeds for 24 hours).  [x] The patient is hemodynamically stable for 24 hours (heart rate <100 beats per minute, systolic blood pressure >99 mm Hg, and respiratory rate <20 breaths per minute).  [x] The patient shows clinical  improvement (afebrile for at least 24 hours and white blood cell count downtrending or normalized). Additionally, the patient must be non-neutropenic (absolute neutrophil count >500 cells/mm3).  [x] For antimicrobials, the patient has received IV therapy for at least 24 hours.    IV medication levofloxacin will be changed to oral medication levofloxacin    Pharmacist's Name: Clarissa Max  Pharmacist's Extension: [phone]

## 2024-05-27 NOTE — ASSESSMENT & PLAN NOTE
Chronic, controlled. Latest blood pressure and vitals reviewed-     Temp:  [97.7 °F (36.5 °C)-98.6 °F (37 °C)]   Pulse:  [55-68]   Resp:  [16-18]   BP: (114-149)/(27-63)   SpO2:  [89 %-96 %] .   Home meds for hypertension were reviewed and noted below.   Hypertension Medications               amLODIPine (NORVASC) 5 MG tablet Take 5 mg by mouth once daily.    hydrALAZINE (APRESOLINE) 100 MG tablet Take 1 tablet by mouth 3 (three) times daily with meals.    nebivoloL (BYSTOLIC) 2.5 MG Tab Take 2.5 mg by mouth once daily.            While in the hospital, will manage blood pressure as follows; Continue home antihypertensive regimen    Will utilize p.r.n. blood pressure medication only if patient's blood pressure greater than 180/110 and he develops symptoms such as worsening chest pain or shortness of breath.  Continue current regimen  Controlled  Bp ok

## 2024-05-27 NOTE — PROGRESS NOTES
[facility]  [facility] Medicine  Progress Note    Patient Name: Joseph Mcdonald  MRN: [id number]  Patient Class: IP- Inpatient   Admission Date: 5/23/2024  Length of Stay: 4 days  Attending Physician: Cristian Ac DO  Primary Care Provider: Clinic, [facility]        Subjective:     Principal Problem:Acute hypoxic respiratory failure        HPI:    89-year-old  male With a past medical history of end-stage renal disease, hypertension, iron deficiency anemia, secondary hyperparathyroidism, type 2 diabetes, CAD, heart failure with preserved ejection fraction, hypokalemia inability presents to the ED with 1 week worsening shortness of breath.  This was particularly bad during his dialysis session this morning.  Due to this daughter brought him to the ED thereafter.  He denies any fever, chills, cough, wheezing, sputum production, palpitations.  He has been take medication as in his not missed any dialysis sessions.  He denies any nausea, vomiting, diarrhea, dysuria.  Imaging in the ED revealed near-complete he had a pacemaker patient of the right hemithorax review of systems otherwise negative.    Overview/Hospital Course:   5/24 1.5 L removed from right pleural space today.  Still has large right-sided effusion.  We will attempt to get some more off tomorrow.  5/25 dialyzing today  5/26 bedside ultrasound today still has large pleural effusion, complex appearing.  Was going to attempt bedside ultrasound but given complex nature have consulted pulmonology  5/27 Dr. Washington plans for ct    Interval History:   No acute events overnight    Review of Systems   Constitutional: Negative.  Negative for chills and fever.   HENT: Negative.     Eyes:  Negative for pain and redness.   Respiratory:  Positive for shortness of breath. Negative for cough, chest tightness and wheezing.    Cardiovascular:  Negative for chest pain and palpitations.   Gastrointestinal:  Negative for abdominal  pain, diarrhea, nausea and vomiting.   Endocrine: Negative for cold intolerance, heat intolerance and polyuria.   Genitourinary:  Negative for difficulty urinating, dysuria, frequency and hematuria.   Musculoskeletal:  Negative for arthralgias, back pain, myalgias, neck pain and neck stiffness.   Skin:  Negative for pallor and rash.   Allergic/Immunologic: Negative.    Neurological:  Negative for dizziness, syncope, weakness, numbness and headaches.   Hematological:  Negative for adenopathy.   Psychiatric/Behavioral:  Negative for agitation, confusion and hallucinations. The patient is not nervous/anxious.      Objective:     Vital Signs (Most Recent):  Temp: 97.7 °F (36.5 °C) (05/27/24 1200)  Pulse: (!) 55 (05/27/24 1200)  Resp: 16 (05/27/24 1200)  BP: 114/63 (05/27/24 1220)  SpO2: 96 % (05/27/24 1200) Vital Signs (24h Range):  Temp:  [97.7 °F (36.5 °C)-98.6 °F (37 °C)] 97.7 °F (36.5 °C)  Pulse:  [55-68] 55  Resp:  [16-18] 16  SpO2:  [89 %-96 %] 96 %  BP: (114-149)/(27-63) 114/63     Weight: 64.5 kg (142 lb 3.2 oz)  Body mass index is 22.96 kg/m².    Intake/Output Summary (Last 24 hours) at 5/27/2024 1404  Last data filed at 5/27/2024 1200  Gross per 24 hour   Intake 328.04 ml   Output --   Net 328.04 ml         Physical Exam  Vitals reviewed.   Constitutional:       Appearance: Normal appearance. He is ill-appearing.   HENT:      Head: Normocephalic and atraumatic.      Nose: Nose normal.   Eyes:      Extraocular Movements: Extraocular movements intact.      Conjunctiva/sclera: Conjunctivae normal.   Neck:      Trachea: Trachea normal.   Cardiovascular:      Rate and Rhythm: Normal rate and regular rhythm.      Pulses: Normal pulses.      Heart sounds: Normal heart sounds.   Pulmonary:      Effort: Respiratory distress present.      Breath sounds: Normal air entry.      Comments:  Absent breath sounds right lower lung field  Abdominal:      General: Bowel sounds are normal.      Palpations: Abdomen is soft.    Musculoskeletal:      Cervical back: Neck supple.      Comments:  Moves all extremities.  Normal tone   Skin:     General: Skin is warm and dry.   Neurological:      General: No focal deficit present.      Mental Status: He is alert.      Cranial Nerves: Cranial nerves 2-12 are intact.      Comments: Grossly normal motor and sensory function without focal deficit appreciated.   Psychiatric:         Mood and Affect: Mood and affect normal.         Behavior: Behavior is cooperative.             Significant Labs: All pertinent labs within the past 24 hours have been reviewed.    Significant Imaging: I have reviewed all pertinent imaging results/findings within the past 24 hours.    Assessment/Plan:      * Acute hypoxic respiratory failure  Patient with Hypoxic Respiratory failure which is Acute.  he is not on home oxygen. Supplemental oxygen was provided and noted-      .   Signs/symptoms of respiratory failure include- tachypnea, increased work of breathing, and respiratory distress. Contributing diagnoses includes - Pleural effusion Labs and images were reviewed. Patient Has recent ABG, which has been reviewed. Will treat underlying causes and adjust management of respiratory failure as follows     Start on antibiotics   Plan for thoracentesis today.  Pleural fluid studies pending  Nephrology consulted for dialysis   Breathing improved, 1.5 L removed yesterday.  Continue antibiotics.  We will tap again tomorrow.  Improved   5/26 Tachypneic again today  5/27 plan for CT, breathing stable    Pleural effusion  Patient found to have large pleural effusion on imaging. I have personally reviewed and interpreted the following imaging: Xray. A thoracentesis was ordered. Most likely etiology includes Congestive Heart Failure, Pneumonia, and Renal Failure. Management to include Repeat Thoracentesis and Dialysis   1.5 L removed yesterday still significant volume on.  We will plan to tap again in the a.m.  5/25 take a look  "today to see if we can get some more fluid off of his right lung.  He did have dialysis today which likely improve his volume status  5/26 complex appearing on ultrasound.  Consult pulmonology  5/27 discussed case with Dr. Washington today, plan for CT    Debility  Patient with Acute on chronic debility due to age-related physical debility. Latest AMPAC and GEMS scores have not been reviewed. Evaluation for etiology is complete. Plan includes PT/OT consulted.  5/25 PTOT  5/27 ptot    Anemia  Patient's anemia is currently uncontrolled. Has not received any PRBCs to date. Etiology likely d/t chronic disease due to ESRD  Current CBC reviewed-   Lab Results   Component Value Date    HGB 7.4 (L) 05/26/2024    HCT 24.4 (L) 05/26/2024 5/26No evidence of bleeding  Monitor serial CBC and transfuse if patient becomes hemodynamically unstable, symptomatic or H/H drops below 7/21.  No bleeding    Hypokalemia  Patient has hypokalemia which is Acute and currently uncontrolled. Most recent potassium levels reviewed-   Lab Results   Component Value Date    K 3.9 05/26/2024   . Will continue potassium replacement per protocol and recheck repeat levels after replacement completed.     Sepsis  This patient does have evidence of infective focus  My overall impression is sepsis.  Source: Respiratory  Antibiotics given-   Antibiotics (72h ago, onward)      Start     Stop Route Frequency Ordered    05/28/24 1800  levoFLOXacin tablet 500 mg         -- Oral Every other day 05/27/24 0943    05/25/24 2100  mupirocin 2 % ointment         05/30/24 2059 Nasl 2 times daily 05/25/24 1222          Latest lactate reviewed-  No results for input(s): "LACTATE", "POCLAC" in the last 72 hours.    Organ dysfunction indicated by Acute respiratory failure    Fluid challenge Not needed - patient is not hypotensive      Post- resuscitation assessment No - Post resuscitation assessment not needed       Will Not start Pressors- Levophed for MAP of 65  Source " control achieved by:      Blood and urine cultures.  We will check pleural fluid for infection.    Continue antibiotics.    5/24Continue antibiotics  5/25 continue antibiotics  5/26 antibiotic  5/27 continue abx    Heart failure with preserved ejection fraction  Patient is identified as having Diastolic (HFpEF) heart failure that is Acute on chronic. CHF is currently uncontrolled due to Pulmonary edema/pleural effusion on CXR. Latest ECHO performed and demonstrates- Results for orders placed during the hospital encounter of 08/01/23    Echo    Interpretation Summary    Left Ventricle: The left ventricle is normal in size. Increased wall thickness. There is concentric remodeling. Normal wall motion. There is normal systolic function with a visually estimated ejection fraction of 55 - 60%. Grade II diastolic dysfunction.    Left Atrium: Left atrium is mildly dilated. There is a calcified 1.5 x 2 cm full wall thickness mass noted extending from the myocardium into the pericardial space, unclear etiology, recommend cardiac MRI.    Right Ventricle: Normal right ventricular cavity size. Systolic function is normal.    Aortic Valve: The aortic valve is a trileaflet valve. There is physiologically normal aortic regurgitation.    Mitral Valve: There is no stenosis. The mean pressure gradient across the mitral valve is 3 mmHg at a heart rate of  bpm. There is mild regurgitation with a centrally directed jet.    Tricuspid Valve: There is mild to moderate regurgitation with a centrally directed jet.    Pulmonic Valve: There is no significant stenosis.    Pericardium: Small circumferential pericardial effusion present. No indication of cardiac tamponade.  . Continue Nitrate/Vasodilator and monitor clinical status closely. Monitor on telemetry. Patient is off CHF pathway.  Monitor strict Is&Os and daily weights.  Place on fluid restriction of 1.5 L. Cardiology has not been consulted. Continue to stress to patient importance of  "self efficacy and  on diet for CHF. Last BNP reviewed- and noted below No results for input(s): "BNP", "BNPTRIAGEBLO" in the last 168 hours.      Respiratory status better after thoracentesis   Tachypneic again today.  Does not make urine.  5/27-volume up, hypotension limits dialysis fluid removal    CAD (coronary artery disease)  Patient with known CAD s/p  unclear , which is controlled Will continue Statin and monitor for S/Sx of angina/ACS. Continue to monitor on telemetry.   5/25 no chest pain   5/26 no chest pain  No cp    DM2 (diabetes mellitus, type 2)  Patient's FSGs are controlled on current medication regimen.  Last A1c reviewed- No results found for: "LABA1C", "HGBA1C"  Most recent fingerstick glucose reviewed-   No results for input(s): "POCTGLUCOSE" in the last 24 hours.    Current correctional scale  Low  Maintain anti-hyperglycemic dose as follows-   Antihyperglycemics (From admission, onward)      None          Hold Oral hypoglycemics while patient is in the hospital. A1c pending  Glucose looks okay  Continue to follow up glucose    Secondary hyperparathyroidism of renal origin   Nephrology consult      Essential (primary) hypertension  Chronic, controlled. Latest blood pressure and vitals reviewed-     Temp:  [97.7 °F (36.5 °C)-98.6 °F (37 °C)]   Pulse:  [55-68]   Resp:  [16-18]   BP: (114-149)/(27-63)   SpO2:  [89 %-96 %] .   Home meds for hypertension were reviewed and noted below.   Hypertension Medications               amLODIPine (NORVASC) 5 MG tablet Take 5 mg by mouth once daily.    hydrALAZINE (APRESOLINE) 100 MG tablet Take 1 tablet by mouth 3 (three) times daily with meals.    nebivoloL (BYSTOLIC) 2.5 MG Tab Take 2.5 mg by mouth once daily.            While in the hospital, will manage blood pressure as follows; Continue home antihypertensive regimen    Will utilize p.r.n. blood pressure medication only if patient's blood pressure greater than 180/110 and he develops symptoms such " as worsening chest pain or shortness of breath.  Continue current regimen  Controlled  Bp ok    End stage renal disease  Creatine stable for now. BMP reviewed- noted Estimated Creatinine Clearance: 11.1 mL/min (A) (based on SCr of 4.16 mg/dL (H)). according to latest data. 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  Monitor UOP and serial BMP and adjust therapy as needed. Renally dose meds. Avoid nephrotoxic medications and procedures.  Dialysis per Nephrology   5/26 continue dialysis  5/27 volume overloaded      VTE Risk Mitigation (From admission, onward)           Ordered     IP VTE HIGH RISK PATIENT  Once         05/23/24 1746     Place sequential compression device  Until discontinued         05/23/24 1746                    Discharge Planning   BRIAN:      Code Status: Full Code   Is the patient medically ready for discharge?:     Reason for patient still in hospital (select all that apply): Treatment  Discharge Plan A: Home, Home Health          Greater than 50 minutes spent on face to face patient interaction, discussion with family, ancillary staff, and/or other physicians as well as review of pertinent labs, images, and notes.           Cristian Ac DO  Department of Hospital Medicine   [facility]

## 2024-05-27 NOTE — ASSESSMENT & PLAN NOTE
Patient's anemia is currently uncontrolled. Has not received any PRBCs to date. Etiology likely d/t chronic disease due to ESRD  Current CBC reviewed-   Lab Results   Component Value Date    HGB 7.4 (L) 05/26/2024    HCT 24.4 (L) 05/26/2024 5/26No evidence of bleeding  Monitor serial CBC and transfuse if patient becomes hemodynamically unstable, symptomatic or H/H drops below 7/21.  No bleeding

## 2024-05-27 NOTE — PT/OT/SLP PROGRESS
Physical Therapy Treatment    Patient Name:  Joseph Mcdonald   MRN:  [id number]    Recommendations:     Discharge Recommendations: Low Intensity Therapy  Discharge Equipment Recommendations: other (see comments) (to be determined)  Barriers to discharge:  ongoing medical treatment    Assessment:     Joseph Mcdonald is a 89 y.o. male admitted with a medical diagnosis of Acute hypoxic respiratory failure.  He presents with the following impairments/functional limitations: weakness, impaired endurance, impaired self care skills, impaired functional mobility, gait instability.    Pt unable to duplicate levels and gait distance performed on eval. Pt required increased time to process and execute commands    PT POC discussed with Hattie Betancur DPT     Rehab Prognosis: Fair; patient would benefit from acute skilled PT services to address these deficits and reach maximum level of function.    Recent Surgery: * No surgery found *      Plan:     During this hospitalization, patient to be seen 5 x/week to address the identified rehab impairments via gait training, therapeutic activities, therapeutic exercises and progress toward the following goals:    Plan of Care Expires:  06/24/24    Subjective     Chief Complaint: acute hypoxic resp failure  Patient/Family Comments/goals: pt agreeable  Pain/Comfort:         Objective:     Communicated with Christie Leavitt RN prior to session.  Patient found HOB elevated with bed alarm, oxygen, peripheral IV, telemetry upon PT entry to room.     General Precautions: Standard, fall  Orthopedic Precautions: N/A  Braces: N/A  Respiratory Status: Nasal cannula, flow 2 L/min     Functional Mobility:  Bed Mobility:     Supine to Sit: minimum assistance and moderate assistance  Transfers:     Sit to Stand:  minimum assistance and moderate assistance with rolling walker and verbal cues for hands placement, feet sliding requiring blockage  Gait: 10' with RW, O2 @ 2L/min and minimum assist; very slow  keiko, short step length, axial flexion, leaning on right elbow      AM-PAC 6 CLICK MOBILITY  Turning over in bed (including adjusting bedclothes, sheets and blankets)?: 3  Sitting down on and standing up from a chair with arms (e.g., wheelchair, bedside commode, etc.): 2  Moving from lying on back to sitting on the side of the bed?: 2  Moving to and from a bed to a chair (including a wheelchair)?: 3  Need to walk in hospital room?: 3  Climbing 3-5 steps with a railing?: 1  Basic Mobility Total Score: 14       Treatment & Education:  Pt performed 2 x 15 reps (B) LE exercises: ap, quad set, glut set, straight leg raise, hip ab/adduction, long arc quad, heel slide with active assist and near constant verbal and tactile cueing to remain on task    Standby assist to contact guard assist sitting EOB    Patient left up in chair with all lines intact, call button in reach, and daughter present..    GOALS:   Multidisciplinary Problems       Physical Therapy Goals          Problem: Physical Therapy    Goal Priority Disciplines Outcome Goal Variances Interventions   Physical Therapy Goal     PT, PT/OT Progressing     Description: Short Term Goals to be met by: 24    Patient will increase functional independence with mobility by performin. Supine to sit with Stand by assist  2. Sit to stand transfer with Stand by assist using Rolling walker  3. Bed to chair transfer with Stand by assist using Rolling walker  4. Gait  x 150 feet with Stand by assist using Rolling walker  5. Lower extremity exercise program x30 reps per handout, with assistance as needed    Long Term Goals to be met by: 24    Pt will regain full independent functional mobility with straight cane to return to home situation and prior activities of daily living.                        Time Tracking:     PT Received On: 24  PT Start Time: 939     PT Stop Time: 1005  PT Total Time (min): 26 min     Billable Minutes: Gait Training 8 and  Therapeutic Exercise 15    Treatment Type: Treatment  PT/PTA: PTA     Number of PTA visits since last PT visit: 1 05/27/2024

## 2024-05-27 NOTE — SUBJECTIVE & OBJECTIVE
Interval History:   No acute events overnight    Review of Systems   Constitutional: Negative.  Negative for chills and fever.   HENT: Negative.     Eyes:  Negative for pain and redness.   Respiratory:  Positive for shortness of breath. Negative for cough, chest tightness and wheezing.    Cardiovascular:  Negative for chest pain and palpitations.   Gastrointestinal:  Negative for abdominal pain, diarrhea, nausea and vomiting.   Endocrine: Negative for cold intolerance, heat intolerance and polyuria.   Genitourinary:  Negative for difficulty urinating, dysuria, frequency and hematuria.   Musculoskeletal:  Negative for arthralgias, back pain, myalgias, neck pain and neck stiffness.   Skin:  Negative for pallor and rash.   Allergic/Immunologic: Negative.    Neurological:  Negative for dizziness, syncope, weakness, numbness and headaches.   Hematological:  Negative for adenopathy.   Psychiatric/Behavioral:  Negative for agitation, confusion and hallucinations. The patient is not nervous/anxious.      Objective:     Vital Signs (Most Recent):  Temp: 97.7 °F (36.5 °C) (05/27/24 1200)  Pulse: (!) 55 (05/27/24 1200)  Resp: 16 (05/27/24 1200)  BP: 114/63 (05/27/24 1220)  SpO2: 96 % (05/27/24 1200) Vital Signs (24h Range):  Temp:  [97.7 °F (36.5 °C)-98.6 °F (37 °C)] 97.7 °F (36.5 °C)  Pulse:  [55-68] 55  Resp:  [16-18] 16  SpO2:  [89 %-96 %] 96 %  BP: (114-149)/(27-63) 114/63     Weight: 64.5 kg (142 lb 3.2 oz)  Body mass index is 22.96 kg/m².    Intake/Output Summary (Last 24 hours) at 5/27/2024 1404  Last data filed at 5/27/2024 1200  Gross per 24 hour   Intake 328.04 ml   Output --   Net 328.04 ml         Physical Exam  Vitals reviewed.   Constitutional:       Appearance: Normal appearance. He is ill-appearing.   HENT:      Head: Normocephalic and atraumatic.      Nose: Nose normal.   Eyes:      Extraocular Movements: Extraocular movements intact.      Conjunctiva/sclera: Conjunctivae normal.   Neck:      Trachea: Trachea  normal.   Cardiovascular:      Rate and Rhythm: Normal rate and regular rhythm.      Pulses: Normal pulses.      Heart sounds: Normal heart sounds.   Pulmonary:      Effort: Respiratory distress present.      Breath sounds: Normal air entry.      Comments:  Absent breath sounds right lower lung field  Abdominal:      General: Bowel sounds are normal.      Palpations: Abdomen is soft.   Musculoskeletal:      Cervical back: Neck supple.      Comments:  Moves all extremities.  Normal tone   Skin:     General: Skin is warm and dry.   Neurological:      General: No focal deficit present.      Mental Status: He is alert.      Cranial Nerves: Cranial nerves 2-12 are intact.      Comments: Grossly normal motor and sensory function without focal deficit appreciated.   Psychiatric:         Mood and Affect: Mood and affect normal.         Behavior: Behavior is cooperative.             Significant Labs: All pertinent labs within the past 24 hours have been reviewed.    Significant Imaging: I have reviewed all pertinent imaging results/findings within the past 24 hours.

## 2024-05-27 NOTE — ASSESSMENT & PLAN NOTE
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 CHF is currently uncontrolled due to Pulmonary edema/pleural effusion on CXR. Latest ECHO performed and demonstrates- Results for orders placed during the hospital encounter of 08/01/23    Echo    Interpretation Summary    Left Ventricle: The left ventricle is normal in size. Increased wall thickness. There is concentric remodeling. Normal wall motion. There is normal systolic function with a visually estimated ejection fraction of 55 - 60%. Grade II diastolic dysfunction.    Left Atrium: Left atrium is mildly dilated. There is a calcified 1.5 x 2 cm full wall thickness mass noted extending from the myocardium into the pericardial space, unclear etiology, recommend cardiac MRI.    Right Ventricle: Normal right ventricular cavity size. Systolic function is normal.    Aortic Valve: The aortic valve is a trileaflet valve. There is physiologically normal aortic regurgitation.    Mitral Valve: There is no stenosis. The mean pressure gradient across the mitral valve is 3 mmHg at a heart rate of  bpm. There is mild regurgitation with a centrally directed jet.    Tricuspid Valve: There is mild to moderate regurgitation with a centrally directed jet.    Pulmonic Valve: There is no significant stenosis.    Pericardium: Small circumferential pericardial effusion present. No indication of cardiac tamponade.  . Continue Nitrate/Vasodilator and monitor clinical status closely. Monitor on telemetry. Patient is off CHF pathway.  Monitor strict Is&Os and daily weights.  Place on fluid restriction of 1.5 L. Cardiology has not been consulted. Continue to stress to patient importance of self efficacy and  on diet for CHF. Last BNP reviewed- and noted below No results for input(s): "BNP", "BNPTRIAGEBLO" in the last 168 hours.      Respiratory status better after thoracentesis   Tachypneic again today.  Does not make urine.  5/27-volume up, " hypotension limits dialysis fluid removal

## 2024-05-28 ENCOUNTER — ANESTHESIA (OUTPATIENT)
Dept: SURGERY | Facility: HOSPITAL | Age: 89
End: 2024-05-28
Payer: MEDICARE

## 2024-05-28 ENCOUNTER — ANESTHESIA EVENT (OUTPATIENT)
Dept: SURGERY | Facility: HOSPITAL | Age: 89
End: 2024-05-28
Payer: MEDICARE

## 2024-05-28 DIAGNOSIS — J90 PLEURAL EFFUSION: Primary | Chronic | ICD-10-CM

## 2024-05-28 PROBLEM — E87.6 HYPOKALEMIA: Status: RESOLVED | Noted: 2024-05-23 | Resolved: 2024-05-28

## 2024-05-28 PROBLEM — N18.9 ANEMIA OF RENAL DISEASE: Status: ACTIVE | Noted: 2024-01-01

## 2024-05-28 PROBLEM — I50.33 ACUTE ON CHRONIC HEART FAILURE WITH PRESERVED EJECTION FRACTION: Status: ACTIVE | Noted: 2024-01-01

## 2024-05-28 PROBLEM — D63.1 ANEMIA OF RENAL DISEASE: Status: ACTIVE | Noted: 2024-05-23

## 2024-05-28 NOTE — PROGRESS NOTES
[facility] Hospital Medicine  Progress Note    Patient Name: Joseph Mcdonald  MRN: [id number]  Patient Class: IP- Inpatient   Admission Date: 5/23/2024  Length of Stay: 5 days  Attending Physician: Jerome Salas MD  Primary Care Provider: Clinic, [facility]        Subjective:     Principal Problem:Acute hypoxic respiratory failure        HPI:    89-year-old  male With a past medical history of end-stage renal disease, hypertension, iron deficiency anemia, secondary hyperparathyroidism, type 2 diabetes, CAD, heart failure with preserved ejection fraction, hypokalemia inability presents to the ED with 1 week worsening shortness of breath.  This was particularly bad during his dialysis session this morning.  Due to this daughter brought him to the ED thereafter.  He denies any fever, chills, cough, wheezing, sputum production, palpitations.  He has been take medication as in his not missed any dialysis sessions.  He denies any nausea, vomiting, diarrhea, dysuria.  Imaging in the ED revealed near-complete he had a pacemaker patient of the right hemithorax review of systems otherwise negative.    Overview/Hospital Course:   5/24 1.5 L removed from right pleural space today.  Still has large right-sided effusion.  We will attempt to get some more off tomorrow.  5/25 dialyzing today  5/26 bedside ultrasound today still has large pleural effusion, complex appearing.  Was going to attempt bedside ultrasound but given complex nature have consulted pulmonology  5/27 D/W Pulm, plan for chest tube placement.   5/28- Awaiting chest tube placement with Dr. Washington.     Interval History: Patient seen and examined at the bedside, reports feeling weak, wants to sit in a chair. Reports no change in shortness of breath.     Review of Systems   Respiratory:  Positive for shortness of breath.      Objective:     Vital Signs (Most Recent):  Temp: 98.3 °F (36.8 °C) (05/28/24 0759)  Pulse:  71 (05/28/24 0759)  Resp: 16 (05/28/24 0759)  BP: 136/63 (05/28/24 0759)  SpO2: 97 % (05/28/24 0759) Vital Signs (24h Range):  Temp:  [97.1 °F (36.2 °C)-98.3 °F (36.8 °C)] 98.3 °F (36.8 °C)  Pulse:  [53-71] 71  Resp:  [16-18] 16  SpO2:  [96 %-99 %] 97 %  BP: (111-136)/(53-70) 136/63     Weight: 64.5 kg (142 lb 3.2 oz)  Body mass index is 22.96 kg/m².    Intake/Output Summary (Last 24 hours) at 5/28/2024 0943  Last data filed at 5/27/2024 1200  Gross per 24 hour   Intake 237 ml   Output --   Net 237 ml         Physical Exam  Vitals and nursing note reviewed.   Constitutional:       Appearance: He is ill-appearing.   HENT:      Head: Normocephalic and atraumatic.      Nose: Nose normal.      Mouth/Throat:      Mouth: Mucous membranes are moist.   Eyes:      Extraocular Movements: Extraocular movements intact.   Cardiovascular:      Rate and Rhythm: Normal rate and regular rhythm.      Pulses: Normal pulses.      Heart sounds: Normal heart sounds.   Pulmonary:      Effort: Pulmonary effort is normal.      Breath sounds: Examination of the right-middle field reveals decreased breath sounds. Examination of the right-lower field reveals decreased breath sounds. Decreased breath sounds present.   Abdominal:      General: Bowel sounds are normal.      Palpations: Abdomen is soft.   Musculoskeletal:         General: Normal range of motion.      Cervical back: Normal range of motion.   Skin:     General: Skin is warm.   Neurological:      General: No focal deficit present.      Mental Status: He is alert and oriented to person, place, and time. Mental status is at baseline.   Psychiatric:         Mood and Affect: Mood normal.         Behavior: Behavior normal.             Significant Labs: All pertinent labs within the past 24 hours have been reviewed.    Significant Imaging: I have reviewed all pertinent imaging results/findings within the past 24 hours.    Assessment/Plan:      * Acute hypoxic respiratory failure  Sec to  pleural effusion.  Require 2L NC since admission.   Imaging consistent with large right sided pleural effusion.   S/p thoracentesis on 5/23 with 1,5L fluid removed, no pleural fluid studies sent.   Remained symptomatic so CT chest obtained on 5/24 and consistent with large effusion.  Pulmonology consulted; plan for chest tube placement today.   Volume removal with HD, continue levofloxacin.   Monitor oxygen status.   Home O2 eval prior to discharge.     Pleural effusion  Patient found to have large pleural effusion on imaging. I have personally reviewed and interpreted the following imaging: Xray. A thoracentesis was ordered. Most likely etiology includes Congestive Heart Failure, Pneumonia, and Renal Failure. Management to include Repeat Thoracentesis and Dialysis  S/p thoracentesis on 5/23- 1.5L fluid removed.  Plan for chest tube placement today given persistent effusion and possible loculations.   Broaden antibiotic to vancomycin and Zosyn given loculations and concern for parapneumonic/complicated effusion.     Acute on chronic heart failure with preserved ejection fraction  Patient is identified as having Diastolic (HFpEF) heart failure that is Acute on chronic. CHF is currently uncontrolled due to Pulmonary edema/pleural effusion on CXR. Latest ECHO performed and demonstrates- Results for orders placed during the hospital encounter of 08/01/23    Echo    Interpretation Summary    Left Ventricle: The left ventricle is normal in size. Increased wall thickness. There is concentric remodeling. Normal wall motion. There is normal systolic function with a visually estimated ejection fraction of 55 - 60%. Grade II diastolic dysfunction.    Left Atrium: Left atrium is mildly dilated. There is a calcified 1.5 x 2 cm full wall thickness mass noted extending from the myocardium into the pericardial space, unclear etiology, recommend cardiac MRI.    Right Ventricle: Normal right ventricular cavity size. Systolic function  "is normal.    Aortic Valve: The aortic valve is a trileaflet valve. There is physiologically normal aortic regurgitation.    Mitral Valve: There is no stenosis. The mean pressure gradient across the mitral valve is 3 mmHg at a heart rate of  bpm. There is mild regurgitation with a centrally directed jet.    Tricuspid Valve: There is mild to moderate regurgitation with a centrally directed jet.    Pulmonic Valve: There is no significant stenosis.    Pericardium: Small circumferential pericardial effusion present. No indication of cardiac tamponade.  . Continue Nitrate/Vasodilator and monitor clinical status closely. Monitor on telemetry. Patient is off CHF pathway.  Monitor strict Is&Os and daily weights.  Place on fluid restriction of 1.5 L. Cardiology has not been consulted. Continue to stress to patient importance of self efficacy and  on diet for CHF. Last BNP reviewed- and noted below No results for input(s): "BNP", "BNPTRIAGEBLO" in the last 168 hours.   Volume removal per HD.     Debility  Patient with Acute on chronic debility due to age-related physical debility. Latest AMPAC and GEMS scores have not been reviewed. Evaluation for etiology is complete.   Plan includes PT/OT consulted.  Home health on discharge.       Anemia of renal disease  Patient's anemia is currently uncontrolled. Has not received any PRBCs to date. Etiology likely d/t chronic disease due to ESRD  Current CBC reviewed-   Lab Results   Component Value Date    HGB 7.4 (L) 05/26/2024    HCT 24.4 (L) 05/26/2024     Monitor serial CBC and transfuse if patient becomes hemodynamically unstable, symptomatic or H/H drops below 7/21.      Sepsis  This patient does have evidence of infective focus  My overall impression is sepsis.  Source: Respiratory  Antibiotics given-   Antibiotics (72h ago, onward)      Start     Stop Route Frequency Ordered    05/28/24 1800  levoFLOXacin tablet 500 mg         -- Oral Every other day 05/27/24 0943    " 05/25/24 2100  mupirocin 2 % ointment         05/30/24 2059 Nasl 2 times daily 05/25/24 1222          Blood cultures NGTD.  Follow pleural fluid cultures.   Source control achieved by: antibiotics.           CAD (coronary artery disease)  Patient with known CAD s/p  unclear , which is controlled Will continue Statin and monitor for S/Sx of angina/ACS. Continue to monitor on telemetry.       DM2 (diabetes mellitus, type 2)  HBA1c 4.5  No need for SSI or POC glucose check.     Secondary hyperparathyroidism of renal origin  Resume home Renvela.       Essential (primary) hypertension  Chronic, controlled. Latest blood pressure and vitals reviewed-     Temp:  [97.1 °F (36.2 °C)-98.3 °F (36.8 °C)]   Pulse:  [53-71]   Resp:  [16-18]   BP: (111-136)/(53-70)   SpO2:  [96 %-99 %] .   Home meds for hypertension were reviewed and noted below.   Hypertension Medications               amLODIPine (NORVASC) 5 MG tablet Take 5 mg by mouth once daily.    hydrALAZINE (APRESOLINE) 100 MG tablet Take 1 tablet by mouth 3 (three) times daily with meals.    nebivoloL (BYSTOLIC) 2.5 MG Tab Take 2.5 mg by mouth once daily.            While in the hospital, will manage blood pressure as follows; Continue home antihypertensive regimen    Will utilize p.r.n. blood pressure medication only if patient's blood pressure greater than 180/110 and he develops symptoms such as worsening chest pain or shortness of breath.      End stage renal disease  Creatine stable for now. BMP reviewed- noted Estimated Creatinine Clearance: 11.1 mL/min (A) (based on SCr of 4.16 mg/dL (H)). according to latest data. 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    Nephrology consulted to resume HD sessions.       VTE Risk Mitigation (From admission, onward)           Ordered     IP VTE HIGH RISK PATIENT  Once         05/23/24 1746     Place sequential compression device  Until discontinued         05/23/24 1746                    Discharge Planning   BRAIN: 5/30/2024      Code Status: Full Code   Is the patient medically ready for discharge?:     Reason for patient still in hospital (select all that apply): Patient trending condition and Consult recommendations  Discharge Plan A: Home, Home Health              MADHU PASCUAL MD  Department of Hospital Medicine   [facility]

## 2024-05-28 NOTE — ASSESSMENT & PLAN NOTE
Patient's anemia is currently uncontrolled. Has not received any PRBCs to date. Etiology likely d/t chronic disease due to ESRD  Current CBC reviewed-   Lab Results   Component Value Date    HGB 7.4 (L) 05/26/2024    HCT 24.4 (L) 05/26/2024     Monitor serial CBC and transfuse if patient becomes hemodynamically unstable, symptomatic or H/H drops below 7/21.

## 2024-05-28 NOTE — PLAN OF CARE
05/28/24 @ 1339 - Per morning huddle - pt planned for chest tube placement. Pt may be medically ready for d/c 05/30/24. CM will continue to follow.

## 2024-05-28 NOTE — PHYSICIAN QUERY
"Please clarify if the Pneumonia diagnosis identified in the query has been:      Provider, please hit the "Enter" button after selecting your response.       Ruled in        "

## 2024-05-28 NOTE — ASSESSMENT & PLAN NOTE
Patient found to have large pleural effusion on imaging. I have personally reviewed and interpreted the following imaging: Xray. A thoracentesis was ordered. Most likely etiology includes Congestive Heart Failure, Pneumonia, and Renal Failure. Management to include Repeat Thoracentesis and Dialysis  S/p thoracentesis on 5/23- 1.5L fluid removed.  Plan for chest tube placement today given persistent effusion and possible loculations.

## 2024-05-28 NOTE — PROGRESS NOTES
" Pharmacokinetic Initial Assessment: IV Vancomycin    Assessment/Plan:    Initiate intravenous vancomycin as 1250 mg IV x 1 today  Desired empiric serum trough concentration is 15 to 20 mcg/mL  Draw vancomycin random level on 6/1 at 0400.  Pharmacy will continue to follow and monitor vancomycin.      Please contact pharmacy at extension [phone] with any questions regarding this assessment.     Patient brief summary:  Joseph Mcdonald is a 89 y.o. male initiated on antimicrobial therapy with IV Vancomycin for treatment of suspected  pneumonia    Drug Allergies:   Review of patient's allergies indicates:   Allergen Reactions    Azithromycin Other (See Comments)     Note: - [employer]rees[employer] 01/11/2019       Actual Body Weight:   64.4 kg    Renal Function:   Estimated Creatinine Clearance: 10.7 mL/min (A) (based on SCr of 4.16 mg/dL (H)).,     Dialysis Method (if applicable):  intermittent HD    CBC (last 72 hours):  Recent Labs   Lab Result Units 05/26/24  0256   WBC K/uL 9.95   Hemoglobin g/dL 7.4*   Hematocrit % 24.4*   Platelet Count K/uL 157   Lymphocytes % % 2.0*   Monocytes % % 6.1*   Eosinophils % % 0.6*   Basophils % % 0.3   Diff Type  Auto       Metabolic Panel (last 72 hours):  Recent Labs   Lab Result Units 05/26/24  0256   Sodium mmol/L 137   Potassium mmol/L 3.9   Chloride mmol/L 102   CO2 mmol/L 29   Glucose mg/dL 86   BUN mg/dL 22*   Creatinine mg/dL 4.16*   Magnesium mg/dL 2.1   Phosphorus mg/dL 2.2*       Drug levels (last 3 results):  No results for input(s): "VANCOMYCINRA", "VANCORANDOM", "VANCOMYCINPE", "VANCOPEAK", "VANCOMYCINTR", "VANCOTROUGH" in the last 72 hours.    Microbiologic Results:  Microbiology Results (last 7 days)       Procedure Component Value Units Date/Time    Blood culture x two cultures. Draw prior to antibiotics. [[id number]] Collected: 05/23/24 1425    Order Status: Completed Specimen: Blood Updated: 05/28/24 0705     Culture, Blood No Growth At 72 Hours    Blood culture x two cultures. " Draw prior to antibiotics. [[id number]] Collected: 05/23/24 1416    Order Status: Completed Specimen: Blood Updated: 05/28/24 0705     Culture, Blood No Growth At 72 Hours    Gram Stain [[id number]]     Order Status: Sent Specimen: Body Fluid     Culture, Body Fluid [[id number]]     Order Status: Sent Specimen: Body Fluid

## 2024-05-28 NOTE — PT/OT/SLP PROGRESS
Physical Therapy Treatment    Patient Name:  Joseph Mcdonald   MRN:  [id number]    Recommendations:     Discharge Recommendations: Low Intensity Therapy  Discharge Equipment Recommendations: other (see comments) (to be determined)  Barriers to discharge:  ongoing medical treatment    Assessment:     Joseph Mcdonald is a 89 y.o. male admitted with a medical diagnosis of Acute hypoxic respiratory failure.  He presents with the following impairments/functional limitations: weakness, impaired endurance, impaired self care skills, impaired functional mobility, gait instability, impaired balance.    Pt required increased assist with all functional mobility and unable to duplicate gait distance of previous treatment session.  Pt demo poor standing quality with feet sliding despite having non-skid socks and feet being manually blocked to prevent same. Up to recliner chair deferred due to pending procedure    Rehab Prognosis: Fair; patient would benefit from acute skilled PT services to address these deficits and reach maximum level of function.    Recent Surgery: * No surgery found *      Plan:     During this hospitalization, patient to be seen 5 x/week to address the identified rehab impairments via gait training, therapeutic activities, therapeutic exercises and progress toward the following goals:    Plan of Care Expires:  06/24/24    Subjective     Chief Complaint: acute hypoxic resp failure  Patient/Family Comments/goals: pt agreeable  Pain/Comfort:         Objective:     Communicated with Steven Dias RN prior to session.  Patient found HOB elevated with oxygen, telemetry, peripheral IV upon PT entry to room.     General Precautions: Standard, fall  Orthopedic Precautions: N/A  Braces: N/A  Respiratory Status: Nasal cannula, flow 2 L/min     Functional Mobility:  Bed Mobility:     Scooting up to bed: moderate assistance and of 2 persons  Supine to Sit: minimum assistance and increased time and assist with trunk and B LE  management  Sit to Supine: minimum assistance, of 2 persons, and assist with trunk and  B LE management  Transfers:     Sit to Stand:  moderate assistance and of 2 persons with rolling walker and x 4 trials      AM-PAC 6 CLICK MOBILITY  Turning over in bed (including adjusting bedclothes, sheets and blankets)?: 3  Sitting down on and standing up from a chair with arms (e.g., wheelchair, bedside commode, etc.): 2  Moving from lying on back to sitting on the side of the bed?: 2  Moving to and from a bed to a chair (including a wheelchair)?: 3  Need to walk in hospital room?: 3  Climbing 3-5 steps with a railing?: 1  Basic Mobility Total Score: 14       Treatment & Education:  Pt performed 2 x 15 reps (B) LE exercises: ap, quad set, glut set, straight leg raise, hip ab/adduction, long arc quad, heel slide with assist and rest as needed    Contact guard to minimum assist sitting EOB x 15 minutes; mild R side lean    Patient left HOB elevated with all lines intact and call button in reach..    GOALS:   Multidisciplinary Problems       Physical Therapy Goals          Problem: Physical Therapy    Goal Priority Disciplines Outcome Goal Variances Interventions   Physical Therapy Goal     PT, PT/OT Progressing     Description: Short Term Goals to be met by: 24    Patient will increase functional independence with mobility by performin. Supine to sit with Stand by assist  2. Sit to stand transfer with Stand by assist using Rolling walker  3. Bed to chair transfer with Stand by assist using Rolling walker  4. Gait  x 150 feet with Stand by assist using Rolling walker  5. Lower extremity exercise program x30 reps per handout, with assistance as needed    Long Term Goals to be met by: 24    Pt will regain full independent functional mobility with straight cane to return to home situation and prior activities of daily living.                        Time Tracking:     PT Received On: 24  PT Start Time: 851      PT Stop Time: 0931  PT Total Time (min): 40 min     Billable Minutes: Therapeutic Activity 25 and Therapeutic Exercise 15    Treatment Type: Treatment  PT/PTA: PTA     Number of PTA visits since last PT visit: 2     05/28/2024

## 2024-05-28 NOTE — PHYSICIAN QUERY
"Please clarify the sepsis diagnosis.      Provider, please hit the "Enter" button after selecting your response.    Sepsis due to unknown organism      "

## 2024-05-28 NOTE — ASSESSMENT & PLAN NOTE
Creatine stable for now. BMP reviewed- noted Estimated Creatinine Clearance: 11.1 mL/min (A) (based on SCr of 4.16 mg/dL (H)). according to latest data. 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    Nephrology consulted to resume HD sessions.

## 2024-05-28 NOTE — PROGRESS NOTES
[facility]  Nephrology  Progress Note    Patient Name: Joseph Mcdonald  MRN: [id number]  Admission Date: 5/23/2024  Hospital Length of Stay: 5 days  Attending Provider: Jerome Salas MD   Primary Care Physician: Prakash, [facility]  Principal Problem:Acute hypoxic respiratory failure    Consults  Subjective:     Interval History: F/U ESRD. Doing well today w/o dyspnea. Chest tube placement planned    Review of patient's allergies indicates:   Allergen Reactions    Azithromycin Other (See Comments)     Note: - [employer]rees[employer] 01/11/2019     Current Facility-Administered Medications   Medication Frequency    0.9%  NaCl infusion Once    acetaminophen tablet 1,000 mg Q8H PRN    atorvastatin tablet 40 mg QHS    dextrose 10% bolus 125 mL 125 mL PRN    dextrose 10% bolus 250 mL 250 mL PRN    glucagon (human recombinant) injection 1 mg PRN    glucose chewable tablet 16 g PRN    glucose chewable tablet 24 g PRN    hydrALAZINE tablet 100 mg TID WM    isosorbide mononitrate 24 hr tablet 30 mg Daily    levoFLOXacin tablet 500 mg Every other day    metoprolol succinate (TOPROL-XL) 24 hr tablet 25 mg Daily    mupirocin 2 % ointment BID    naloxone 0.4 mg/mL injection 0.02 mg PRN    ondansetron disintegrating tablet 8 mg Q8H PRN    pantoprazole EC tablet 40 mg Daily    polyethylene glycol packet 17 g BID PRN    sevelamer carbonate tablet 2,400 mg TID WM    sodium chloride 0.9% flush 10 mL Q12H PRN    traZODone tablet 150 mg QHS       Objective:     Vital Signs (Most Recent):  Temp: 98.3 °F (36.8 °C) (05/28/24 0759)  Pulse: 71 (05/28/24 0759)  Resp: 16 (05/28/24 0759)  BP: 136/63 (05/28/24 0759)  SpO2: 97 % (05/28/24 0759) Vital Signs (24h Range):  Temp:  [97.1 °F (36.2 °C)-98.3 °F (36.8 °C)] 98.3 °F (36.8 °C)  Pulse:  [53-71] 71  Resp:  [16-18] 16  SpO2:  [96 %-99 %] 97 %  BP: (111-136)/(53-70) 136/63     Weight: 64.5 kg (142 lb 3.2 oz) (05/28/24 0200)  Body mass index is 22.96 kg/m².  Body surface  area is 1.73 meters squared.    I/O last 3 completed shifts:  In: 237 [P.O.:237]  Out: -     Physical Exam No edema. Chest clear anteriorly.    Significant Labs:sureBMP:   Recent Labs   Lab 05/26/24  0256   GLU 86      K 3.9      CO2 29   BUN 22*   CREATININE 4.16*   CALCIUM 7.8*   MG 2.1     All labs within the past 24 hours have been reviewed.    Significant Imaging:  Labs: Reviewed    Assessment/Plan:     Active Diagnoses:    Diagnosis Date Noted POA    PRINCIPAL PROBLEM:  Acute hypoxic respiratory failure [J96.01] 05/24/2024 Yes     Chronic    Pleural effusion [J90] 05/23/2024 Yes     Chronic    Acute on chronic heart failure with preserved ejection fraction [I50.33] 05/23/2024 Yes    Sepsis [A41.9] 05/23/2024 Yes    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.9, D63.1] 05/23/2024 Yes    Debility [R53.81] 05/23/2024 Yes    CAD (coronary artery disease) [I25.10] 08/04/2023 Yes    DM2 (diabetes mellitus, type 2) [E11.9] 11/18/2014 Yes    End stage renal disease [N18.6] 11/06/2014 Yes    Essential (primary) hypertension [I10] 11/06/2014 Yes    Secondary hyperparathyroidism of renal origin [N25.81] 11/06/2014 Yes      Problems Resolved During this Admission:    Diagnosis Date Noted Date Resolved POA    Hypokalemia [E87.6] 05/23/2024 05/28/2024 Yes    Iron deficiency anemia, unspecified [D50.9] 11/06/2014 05/26/2024 Yes       Dialysis later today or tomorrow depending on timing of chest tube placement.    Thank you for your consult. I will follow-up with patient. Please contact us if you have any additional questions.    Ovi Alvarado MD  Nephrology  [facility]

## 2024-05-28 NOTE — SUBJECTIVE & OBJECTIVE
Interval History: Patient seen and examined at the bedside, reports feeling weak, wants to sit in a chair. Reports no change in shortness of breath.     Review of Systems   Respiratory:  Positive for shortness of breath.      Objective:     Vital Signs (Most Recent):  Temp: 98.3 °F (36.8 °C) (05/28/24 0759)  Pulse: 71 (05/28/24 0759)  Resp: 16 (05/28/24 0759)  BP: 136/63 (05/28/24 0759)  SpO2: 97 % (05/28/24 0759) Vital Signs (24h Range):  Temp:  [97.1 °F (36.2 °C)-98.3 °F (36.8 °C)] 98.3 °F (36.8 °C)  Pulse:  [53-71] 71  Resp:  [16-18] 16  SpO2:  [96 %-99 %] 97 %  BP: (111-136)/(53-70) 136/63     Weight: 64.5 kg (142 lb 3.2 oz)  Body mass index is 22.96 kg/m².    Intake/Output Summary (Last 24 hours) at 5/28/2024 0943  Last data filed at 5/27/2024 1200  Gross per 24 hour   Intake 237 ml   Output --   Net 237 ml         Physical Exam  Vitals and nursing note reviewed.   Constitutional:       Appearance: He is ill-appearing.   HENT:      Head: Normocephalic and atraumatic.      Nose: Nose normal.      Mouth/Throat:      Mouth: Mucous membranes are moist.   Eyes:      Extraocular Movements: Extraocular movements intact.   Cardiovascular:      Rate and Rhythm: Normal rate and regular rhythm.      Pulses: Normal pulses.      Heart sounds: Normal heart sounds.   Pulmonary:      Effort: Pulmonary effort is normal.      Breath sounds: Examination of the right-middle field reveals decreased breath sounds. Examination of the right-lower field reveals decreased breath sounds. Decreased breath sounds present.   Abdominal:      General: Bowel sounds are normal.      Palpations: Abdomen is soft.   Musculoskeletal:         General: Normal range of motion.      Cervical back: Normal range of motion.   Skin:     General: Skin is warm.   Neurological:      General: No focal deficit present.      Mental Status: He is alert and oriented to person, place, and time. Mental status is at baseline.   Psychiatric:         Mood and Affect:  Mood normal.         Behavior: Behavior normal.             Significant Labs: All pertinent labs within the past 24 hours have been reviewed.    Significant Imaging: I have reviewed all pertinent imaging results/findings within the past 24 hours.

## 2024-05-28 NOTE — ASSESSMENT & PLAN NOTE
Sec to pleural effusion.  Require 2L NC since admission.   Imaging consistent with large right sided pleural effusion.   S/p thoracentesis on 5/23 with 1,5L fluid removed, no pleural fluid studies sent.   Remained symptomatic so CT chest obtained on 5/24 and consistent with large effusion.  Pulmonology consulted; plan for chest tube placement today.   Volume removal with HD, continue levofloxacin.   Monitor oxygen status.   Home O2 eval prior to discharge.

## 2024-05-28 NOTE — ASSESSMENT & PLAN NOTE
Chronic, controlled. Latest blood pressure and vitals reviewed-     Temp:  [97.1 °F (36.2 °C)-98.3 °F (36.8 °C)]   Pulse:  [53-71]   Resp:  [16-18]   BP: (111-136)/(53-70)   SpO2:  [96 %-99 %] .   Home meds for hypertension were reviewed and noted below.   Hypertension Medications               amLODIPine (NORVASC) 5 MG tablet Take 5 mg by mouth once daily.    hydrALAZINE (APRESOLINE) 100 MG tablet Take 1 tablet by mouth 3 (three) times daily with meals.    nebivoloL (BYSTOLIC) 2.5 MG Tab Take 2.5 mg by mouth once daily.            While in the hospital, will manage blood pressure as follows; Continue home antihypertensive regimen    Will utilize p.r.n. blood pressure medication only if patient's blood pressure greater than 180/110 and he develops symptoms such as worsening chest pain or shortness of breath.

## 2024-05-28 NOTE — ASSESSMENT & PLAN NOTE
----- Message from DAVE Cain sent at 8/14/2018  9:43 AM CDT -----  Kelly,  Please let patient know results of CT.  He follows with a pulmonologist- I would recommend he have his pulmonologist review imaging. He had a recent chest CT about a month prior that was reviewed and stable.    Thanks,  Aubrie    ----- Message -----  From: Kelly Herbert RN  Sent: 8/8/2018   2:13 PM  To: Greg Gallardo MD    Note from 7/10/18. Upcoming appt on 8/29/18.    ASSESSMENT:  · Nephrolithiasis  · Solitary left kidney status [facility] from severe hydronephrosis and abdominal aortic aneurysm   · Right sided varicocele  · Lung nodules  · History of tobacco use     We spent some time discussing the PSA elevation and controversies about PSA screening. We discussed the prevalence of prostate cancer in his age group and potential causes for false positives. I recommended repeating the PSA test.     He has had some weight loss, but notes that he is trying to lose weight.     We also discussed his varicocele. I planed that it is not normal to have these on the right-hand side. Fortunately it reduces when laying supine, however I recommended retroperitoneal imaging. 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 He does have a retroperitoneal process in his abdominal aortic aneurysm, and I could envision a situation where there is enough retroperitoneal inflammation to compromised drainage of the right gonadal vein. I would simply like to rule out a mass compromising drainage of the right gonadal vessel and have elected to proceed with non-infused CT scan.     PLAN  Repeat PSA-we will get that done today  CT to rule out retroperitoneal mass     He will follow-up after the aforementioned testing.    ----- Message -----  From: Tony, Rad Results In  Sent: 8/8/2018   1:54 PM  To: ALON Silva Urol Result Pool           Patient with known CAD s/p  unclear , which is controlled Will continue Statin and monitor for S/Sx of angina/ACS. Continue to monitor on telemetry.

## 2024-05-28 NOTE — ASSESSMENT & PLAN NOTE
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 CHF is currently uncontrolled due to Pulmonary edema/pleural effusion on CXR. Latest ECHO performed and demonstrates- Results for orders placed during the hospital encounter of 08/01/23    Echo    Interpretation Summary    Left Ventricle: The left ventricle is normal in size. Increased wall thickness. There is concentric remodeling. Normal wall motion. There is normal systolic function with a visually estimated ejection fraction of 55 - 60%. Grade II diastolic dysfunction.    Left Atrium: Left atrium is mildly dilated. There is a calcified 1.5 x 2 cm full wall thickness mass noted extending from the myocardium into the pericardial space, unclear etiology, recommend cardiac MRI.    Right Ventricle: Normal right ventricular cavity size. Systolic function is normal.    Aortic Valve: The aortic valve is a trileaflet valve. There is physiologically normal aortic regurgitation.    Mitral Valve: There is no stenosis. The mean pressure gradient across the mitral valve is 3 mmHg at a heart rate of  bpm. There is mild regurgitation with a centrally directed jet.    Tricuspid Valve: There is mild to moderate regurgitation with a centrally directed jet.    Pulmonic Valve: There is no significant stenosis.    Pericardium: Small circumferential pericardial effusion present. No indication of cardiac tamponade.  . Continue Nitrate/Vasodilator and monitor clinical status closely. Monitor on telemetry. Patient is off CHF pathway.  Monitor strict Is&Os and daily weights.  Place on fluid restriction of 1.5 L. Cardiology has not been consulted. Continue to stress to patient importance of self efficacy and  on diet for CHF. Last BNP reviewed- and noted below No results for input(s): "BNP", "BNPTRIAGEBLO" in the last 168 hours.   Volume removal per HD.   "

## 2024-05-28 NOTE — PT/OT/SLP PROGRESS
Occupational Therapy   Treatment    Name: Joseph Mcdonald  MRN: [id number]  Admitting Diagnosis:  Acute hypoxic respiratory failure       Recommendations:     Discharge Recommendations: Low Intensity Therapy  Discharge Equipment Recommendations:   (to be determined)  Barriers to discharge:       Assessment:     Joseph Mcdonald is a 89 y.o. male with a medical diagnosis of Acute hypoxic respiratory failure.  He presents with weakness and decline in ADLs. Performance deficits affecting function are weakness, impaired endurance, impaired functional mobility, impaired self care skills.     Rehab Prognosis:  Good; patient would benefit from acute skilled OT services to address these deficits and reach maximum level of function.       Plan:     Patient to be seen 5 x/week to address the above listed problems via self-care/home management, therapeutic activities, therapeutic exercises  Plan of Care Expires: 06/28/24  Plan of Care Reviewed with: patient    Subjective     Chief Complaint: acute hypoxic respiratory failure  Patient/Family Comments/goals: pt agreeable to OT tx  Pain/Comfort:  Pain Rating 1: 4/10  Location - Side 1: Bilateral  Location 1: heel  Pain Addressed 1: Nurse notified, Reposition    Objective:     Communicated with: RENATO Harris prior to session.  Patient found HOB elevated with peripheral IV, telemetry, oxygen upon OT entry to room.    General Precautions: Standard, fall    Orthopedic Precautions:N/A  Braces: N/A  Respiratory Status: Nasal cannula, flow 3 L/min     Occupational Performance:     Bed Mobility:    Not performed     Functional Mobility/Transfers:  Not performed    Activities of Daily Living:  Not performed      [facility] 6 Click ADL:      Treatment & Education:  Pt performed x 15 reps each bicep curls 1#db, chest press 1#db, IR/ER 1#db, shoulder press 1#db, and aubree press red tband in order to improve BUE strength and endurance to perform ADL and ADL t/f with less assist.     Patient left HOB  elevated with all lines intact, call button in reach, and RENATO Harris notified    GOALS:   Multidisciplinary Problems       Occupational Therapy Goals          Problem: Occupational Therapy    Goal Priority Disciplines Outcome Interventions   Occupational Therapy Goal     OT, PT/OT Progressing    Description: STG:  Pt will perform grooming (I)  Pt will bathe with setup  Pt will perform UE dressing with (I)  Pt will perform LE dressing with I/mod I  Pt will transfer bed/chair/bsc with Mod I  Pt will perform standing task x 2 min with Mod I   Pt will tolerate 15 minutes of tx without fatigue      LT.Restore to max I with self care and mobility.                          Time Tracking:     OT Date of Treatment: 24  OT Start Time: 1051  OT Stop Time: 1114  OT Total Time (min): 23 min    Billable Minutes:Therapeutic Exercise 23 minutes    OT/QING: OT          2024

## 2024-05-28 NOTE — ASSESSMENT & PLAN NOTE
Patient found to have large pleural effusion on imaging. I have personally reviewed and interpreted the following imaging: Xray. A thoracentesis was ordered. Most likely etiology includes Congestive Heart Failure, Pneumonia, and Renal Failure. Management to include Repeat Thoracentesis and Dialysis  S/p thoracentesis on 5/23- 1.5L fluid removed.  S/p chest tube placement (5/29), cell count, LDH/protein, cultures and cytology sent.   Continue IV Zosyn pending culture data, discontinued vancomycin given negative MRSA PCR.

## 2024-05-28 NOTE — ASSESSMENT & PLAN NOTE
This patient does have evidence of infective focus  My overall impression is sepsis.  Source: Respiratory  Antibiotics given-   Antibiotics (72h ago, onward)      Start     Stop Route Frequency Ordered    05/28/24 1800  levoFLOXacin tablet 500 mg         -- Oral Every other day 05/27/24 0943    05/25/24 2100  mupirocin 2 % ointment         05/30/24 2059 Nasl 2 times daily 05/25/24 1222          Blood cultures NGTD.  Follow pleural fluid cultures.   Source control achieved by: antibiotics.

## 2024-05-28 NOTE — ASSESSMENT & PLAN NOTE
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 Latest AMPAC and GEMS scores have not been reviewed. Evaluation for etiology is complete.   Plan includes PT/OT consulted.  Home health on discharge.

## 2024-05-29 PROBLEM — I95.9 HYPOTENSION: Status: ACTIVE | Noted: 2024-01-01

## 2024-05-29 NOTE — PROGRESS NOTES
[facility]  Nephrology  Progress Note    Patient Name: Joseph Mcdonald  MRN: [id number]  Admission Date: 5/23/2024  Hospital Length of Stay: 6 days  Attending Provider: Jerome Salas MD   Primary Care Physician: Prakash, [facility]  Principal Problem:Acute hypoxic respiratory failure    Consults  Subjective:     Interval History: F/U ESRD. Mr. Mcdonald is s/p chest tube placement. Thin yellow fluid is draining from the tube. He's alert and stable at present . BP has been low and we'll stop his metoprolol.    Review of patient's allergies indicates:   Allergen Reactions    Azithromycin Other (See Comments)     Note: - [employer]rees[employer] 01/11/2019     Current Facility-Administered Medications   Medication Frequency    acetaminophen tablet 1,000 mg Q8H PRN    atorvastatin tablet 40 mg QHS    dextrose 10% bolus 125 mL 125 mL PRN    dextrose 10% bolus 250 mL 250 mL PRN    glucagon (human recombinant) injection 1 mg PRN    glucose chewable tablet 16 g PRN    glucose chewable tablet 24 g PRN    isosorbide mononitrate 24 hr tablet 30 mg Daily    mupirocin 2 % ointment BID    naloxone 0.4 mg/mL injection 0.02 mg PRN    ondansetron disintegrating tablet 8 mg Q8H PRN    pantoprazole EC tablet 40 mg Daily    piperacillin-tazobactam (ZOSYN) 4.5 g in dextrose 5 % in water (D5W) 100 mL IVPB (MB+) Q12H    polyethylene glycol packet 17 g BID PRN    sevelamer carbonate tablet 2,400 mg TID WM    sodium chloride 0.9% 250 mL flush bag PRN    sodium chloride 0.9% flush 10 mL Q12H PRN    traZODone tablet 150 mg QHS    vancomycin - pharmacy to dose pharmacy to manage frequency       Objective:     Vital Signs (Most Recent):  Temp: 97.1 °F (36.2 °C) (05/29/24 1139)  Pulse: 70 (05/29/24 1143)  Resp: (!) 0 (05/29/24 0857)  BP: 108/72 (05/29/24 1143)  SpO2: 100 % (05/29/24 1143) Vital Signs (24h Range):  Temp:  [97.1 °F (36.2 °C)-98.4 °F (36.9 °C)] 97.1 °F (36.2 °C)  Pulse:  [63-98] 70  Resp:  [0-25] 0  SpO2:  [97  %-100 %] 100 %  BP: ()/(25-96) 108/72     Weight: 64.4 kg (141 lb 15.6 oz) (05/29/24 0725)  Body mass index is 23.63 kg/m².  Body surface area is 1.72 meters squared.    I/O last 3 completed shifts:  In: 850.5 [P.O.:480; IV Piggyback:370.5]  Out: 3000 [Other:3000]    Physical Exam BP 90 systolic. Awake but minimally conversant.    Significant Labs:sureBMP:   Recent Labs   Lab 05/26/24  0256 05/29/24  0320   GLU 86 85    133*   K 3.9 4.2    97*   CO2 29 32   BUN 22* 30*   CREATININE 4.16* 4.51*   CALCIUM 7.8* 8.0*   MG 2.1  --      CBC:   Recent Labs   Lab 05/29/24  0320   WBC 9.18   RBC 2.93*   HGB 7.9*   HCT 25.6*      MCV 87.4   MCH 27.0   MCHC 30.9*     All labs within the past 24 hours have been reviewed.    Significant Imaging:  Labs: Reviewed    Assessment/Plan:     Active Diagnoses:    Diagnosis Date Noted POA    PRINCIPAL PROBLEM:  Acute hypoxic respiratory failure [J96.01] 05/24/2024 Yes     Chronic    Hypotension [I95.9] 05/29/2024 Unknown    Pleural effusion [J90] 05/23/2024 Yes     Chronic    Acute on chronic heart failure with preserved ejection fraction [I50.33] 05/23/2024 Yes    Sepsis [A41.9] 05/23/2024 Yes    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.9, D63.1] 05/23/2024 Yes    Debility [R53.81] 05/23/2024 Yes    CAD (coronary artery disease) [I25.10] 08/04/2023 Yes    DM2 (diabetes mellitus, type 2) [E11.9] 11/18/2014 Yes    End stage renal disease [N18.6] 11/06/2014 Yes    Essential (primary) hypertension [I10] 11/06/2014 Yes    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 Stop Metoprolol.    Thank you for your consult. I will follow-up with patient. Please contact us if you have any  additional questions.    Ovi Alvarado MD  Nephrology  [facility]

## 2024-05-29 NOTE — PLAN OF CARE
Problem: Fall Injury Risk  Goal: Absence of Fall and Fall-Related Injury  Outcome: Progressing     Problem: Gas Exchange Impaired  Goal: Optimal Gas Exchange  Outcome: Progressing

## 2024-05-29 NOTE — PROGRESS NOTES
Pharmacist Renal Dose Adjustment Note    Joseph Mcdonald is a 89 y.o. male being treated with the medication zosyn     Patient Data:    Vital Signs (Most Recent):  Temp: 98.4 °F (36.9 °C) (05/29/24 0725)  Pulse: 78 (05/29/24 0725)  Resp: 20 (05/29/24 0725)  BP: (!) 110/36 (05/29/24 0725)  SpO2: 100 % (05/29/24 0725) Vital Signs (72h Range):  Temp:  [97.1 °F (36.2 °C)-98.6 °F (37 °C)]   Pulse:  [53-89]   Resp:  [16-22]   BP: (100-149)/(25-70)   SpO2:  [89 %-100 %]      Recent Labs   Lab 05/25/24  0403 05/26/24  0256 05/29/24  0320   CREATININE 6.19* 4.16* 4.51*     Serum creatinine: 4.51 mg/dL (H) 05/29/24 0320  Estimated creatinine clearance: 9.8 mL/min (A)    Medication:zosyn dose: 4.5 frequency every 8 will be changed to medication:zosyn dose:4.5  frequency:every 12     Pharmacist's Name: Yari Pappas  Pharmacist's Extension: [phone]

## 2024-05-29 NOTE — SUBJECTIVE & OBJECTIVE
Past Medical History:   Diagnosis Date    Chronic kidney disease (CKD)     Diabetes mellitus     Hypertension     PVD (peripheral vascular disease)        Past Surgical History:   Procedure Laterality Date    LEFT HEART CATHETERIZATION N/A 8/3/2023    Procedure: Left heart cath;  Surgeon: Vazquez Aguilar MD;  Location: [facility] CATH LAB;  Service: Cardiology;  Laterality: N/A;    left toe amputation      PLACEMENT OF DIALYSIS ACCESS         Review of patient's allergies indicates:   Allergen Reactions    Azithromycin Other (See Comments)     Note: - [employer]rees[employer] 01/11/2019       Family History    None       Tobacco Use    Smoking status: Former     Types: Cigarettes    Smokeless tobacco: Never   Substance and Sexual Activity    Alcohol use: Not Currently    Drug use: Never    Sexual activity: Not on file         Review of Systems  Objective:     Vital Signs (Most Recent):  Temp: 97.2 °F (36.2 °C) (05/29/24 0029)  Pulse: 83 (05/29/24 0029)  Resp: 18 (05/29/24 0029)  BP: (!) 116/37 (05/29/24 0029)  SpO2: 100 % (05/29/24 0029) Vital Signs (24h Range):  Temp:  [97.2 °F (36.2 °C)-98.3 °F (36.8 °C)] 97.2 °F (36.2 °C)  Pulse:  [71-89] 83  Resp:  [16-22] 18  SpO2:  [97 %-100 %] 100 %  BP: (116-147)/(25-68) 116/37     Weight: 64.4 kg (142 lb)  Body mass index is 23.63 kg/m².      Intake/Output Summary (Last 24 hours) at 5/29/2024 0355  Last data filed at 5/29/2024 0257  Gross per 24 hour   Intake 827.23 ml   Output 3000 ml   Net -2172.77 ml        Physical Exam  Vitals and nursing note reviewed.   Constitutional:       Appearance: He is ill-appearing.   HENT:      Head: Normocephalic and atraumatic.      Nose: Nose normal.      Mouth/Throat:      Mouth: Mucous membranes are moist.   Eyes:      Extraocular Movements: Extraocular movements intact.   Cardiovascular:      Rate and Rhythm: Normal rate and regular rhythm.      Pulses: Normal pulses.      Heart sounds: Normal heart sounds.   Pulmonary:      Effort: Pulmonary  "effort is normal.      Breath sounds: Examination of the right-middle field reveals decreased breath sounds. Examination of the right-lower field reveals decreased breath sounds. Decreased breath sounds present.   Abdominal:      General: Bowel sounds are normal.      Palpations: Abdomen is soft.   Musculoskeletal:         General: Normal range of motion.      Cervical back: Normal range of motion.   Skin:     General: Skin is warm.   Neurological:      General: No focal deficit present.      Mental Status: He is alert and oriented to person, place, and time. Mental status is at baseline.   Psychiatric:         Mood and Affect: Mood normal.         Behavior: Behavior normal.          Vents:  Oxygen Concentration (%): 28 (05/27/24 1900)    Lines/Drains/Airways       Peripheral Intravenous Line  Duration                  Hemodialysis AV Fistula Right upper arm -- days         Peripheral IV - Single Lumen 05/26/24 2331 22 G Anterior;Left Upper Arm 2 days                    Significant Labs:    CBC/Anemia Profile:  No results for input(s): "WBC", "HGB", "HCT", "PLT", "MCV", "RDW", "IRON", "FERRITIN", "RETIC", "FOLATE", "MOBGRTFF18", "OCCULTBLOOD" in the last 48 hours.     Chemistries:  No results for input(s): "NA", "K", "CL", "CO2", "BUN", "CREATININE", "CALCIUM", "ALBUMIN", "PROT", "BILITOT", "ALKPHOS", "ALT", "AST", "GLUCOSE", "MG", "PHOS" in the last 48 hours.    All pertinent labs within the past 24 hours have been reviewed.  As documented in HPI    Significant Imaging:   I have reviewed all pertinent imaging results/findings within the past 24 hours. As documented in HPI  "

## 2024-05-29 NOTE — PT/OT/SLP PROGRESS
Occupational Therapy   Treatment    Name: Joseph Mcdonald  MRN: [id number]  Admitting Diagnosis:  Acute hypoxic respiratory failure       Recommendations:     Discharge Recommendations: Low Intensity Therapy  Discharge Equipment Recommendations:   (to be determined)  Barriers to discharge:       Assessment:     Joseph Mcdonald is a 89 y.o. male with a medical diagnosis of Acute hypoxic respiratory failure.  He presents with weakness and decline in ADLs. Performance deficits affecting function are weakness, impaired endurance, impaired functional mobility, impaired self care skills. Pt gone for chest tube placement this AM. Pt lethargic this PM with MaxA to perform bed mobility and maintain sitting balance at EOB.     Rehab Prognosis:  Good; patient would benefit from acute skilled OT services to address these deficits and reach maximum level of function.       Plan:     Patient to be seen 5 x/week to address the above listed problems via self-care/home management, therapeutic activities, therapeutic exercises  Plan of Care Expires: 06/28/24  Plan of Care Reviewed with: patient    Subjective     Chief Complaint: acute hypoxic respiratory failure  Patient/Family Comments/goals: pt agreeable to OT tx  Pain/Comfort:  Pain Rating 1: 0/10    Objective:     Communicated with: RENATO Harris prior to session.  Patient found supine with chest tube, peripheral IV upon OT entry to room.    General Precautions: Standard, fall    Orthopedic Precautions:N/A  Braces: N/A  Respiratory Status: Nasal cannula, flow 3 L/min     Occupational Performance:     Bed Mobility:    Patient completed Supine to Sit with maximal assistance  Patient completed Sit to Supine with maximal assistance     Functional Mobility/Transfers:  Not performed    Activities of Daily Living:  Not performed      [facility] 6 Click ADL:      Treatment & Education:  Pt performed EOB sitting with ModA- MaxA to maintain sitting balance. Pt attempted dynamic sitting with reaching  with MaxA to perform weight shift and MaxA to perform reaching with UE.     Patient left HOB elevated with all lines intact, call button in reach, and daughter present    GOALS:   Multidisciplinary Problems       Occupational Therapy Goals          Problem: Occupational Therapy    Goal Priority Disciplines Outcome Interventions   Occupational Therapy Goal     OT, PT/OT Progressing    Description: STG:  Pt will perform grooming (I)  Pt will bathe with setup  Pt will perform UE dressing with (I)  Pt will perform LE dressing with I/mod I  Pt will transfer bed/chair/bsc with Mod I  Pt will perform standing task x 2 min with Mod I   Pt will tolerate 15 minutes of tx without fatigue      LT.Restore to max I with self care and mobility.                          Time Tracking:     OT Date of Treatment: 24  OT Start Time: 1406  OT Stop Time: 1421  OT Total Time (min): 15 min    Billable Minutes:Therapeutic Activity 15 minutes    OT/QING: OT          2024

## 2024-05-29 NOTE — PLAN OF CARE
05/29/24 @ 1435 - Per morning huddle, pt had chest tube placed. Plan is to d/c Thursday or Friday; if pt is medically ready for d/c. CM will continue to follow.

## 2024-05-29 NOTE — ASSESSMENT & PLAN NOTE
Chronic, controlled. Latest blood pressure and vitals reviewed-     Temp:  [97.1 °F (36.2 °C)-98.4 °F (36.9 °C)]   Pulse:  [63-98]   Resp:  [0-25]   BP: ()/(25-96)   SpO2:  [97 %-100 %] .   Home meds for hypertension were reviewed and noted below.   Hypertension Medications               amLODIPine (NORVASC) 5 MG tablet Take 5 mg by mouth once daily.    hydrALAZINE (APRESOLINE) 100 MG tablet Take 1 tablet by mouth 3 (three) times daily with meals.    nebivoloL (BYSTOLIC) 2.5 MG Tab Take 2.5 mg by mouth once daily.            While in the hospital, will manage blood pressure as follows; adjust regimen given low BP (left metoprolol and Imdur off given underlying CAD/angina symptoms).     Will utilize p.r.n. blood pressure medication only if patient's blood pressure greater than 180/110 and he develops symptoms such as worsening chest pain or shortness of breath.

## 2024-05-29 NOTE — PROGRESS NOTES
[facility]  Adult Nutrition  Progress Note         Reason for Assessment  Reason For Assessment: RD follow-up [facility]  Nutrition Risk Screen: no indicators present    Assessment and Plan    5/29/2024: RD follow up. Patient has had a 9% non-significant weight loss since August 2023. Physical exam reveals no significant fat or muscle depletion and patient endorses a good appetite at home. Per ASPEN guidelines, patient does not meet criteria for malnutrition at this time, however he is at risk for malnutrition given chronic illness and advanced age. Recommend continue current POC as tolerated. Encourage good PO intakes. RD following.    5/24/2024: Patient admitted 5/23 with a dx of pleural effusion, ESRD on HD, M, sepsis, anemia, and debility. Patient is ordered a 2 gm Na+ diet. No po intake documented on flowsheets since admission.     Patient is 64.5 kg with a BMI of 22.96 which is slightly below ideal for geriatric standards. Patient endorsed weight loss on MST screen but chart review reveals little weight hx available. No issues with appetite endorsed on MST screen. Will complete NFPE on return to facility. Malnutrition not suspected at this time but patient is at risk due to chronic illness and advanced age.     Recommend to change diet to Renal high protein high calorie and add Boost GC Max Protein BID. Encourage po intakes. Recommend to document all po intake on flowsheets. RD Following.         Learning Needs/Social Determinants of Health  Learning Assessment       05/23/2024 1848 [facility] (5/23/2024 - Present)   Created by Melanie Saleh RN - RN (Nurse) Status: Complete                 PRIMARY LEARNER     Primary Learner Name:  Joseph Mcdonald  - 05/23/2024 1848    Relationship:  Patient RC - 05/23/2024 1848    Does the primary learner have any barriers to learning?:  No Barriers RC - 05/23/2024 1848    What is the preferred language of the primary learner?:  English   - 05/23/2024 1848    Is an  required?:  No  - 05/23/2024 1848    How does the primary learner prefer to learn new concepts?:  Listening, Reading, Demonstration  - 05/23/2024 1848    How often do you need to have someone help you read instructions, pamphlets, or written material from your doctor or pharmacy?:  Never  - 05/23/2024 1848        CO-LEARNER #1     No question answered        CO-LEARNER #2     No question answered        SPECIAL TOPICS     No question answered        ANSWERED BY:     No question answered        Comments         Edit History       Melanie Saleh RN - RN (Nurse)   05/23/2024 1848                           Social Determinants of Health     Tobacco Use: Medium Risk (8/2/2023)    Patient History     Smoking Tobacco Use: Former     Smokeless Tobacco Use: Never     Passive Exposure: Not on file   Alcohol Use: Not At Risk (5/24/2024)    AUDIT-C     Frequency of Alcohol Consumption: Never     Average Number of Drinks: Patient does not drink     Frequency of Binge Drinking: Never   Financial Resource Strain: Low Risk  (8/2/2023)    Overall Financial Resource Strain (CARDIA)     Difficulty of Paying Living Expenses: Not hard at all   Food Insecurity: No Food Insecurity (5/24/2024)    Hunger Vital Sign     Worried About Running Out of Food in the Last Year: Never true     Ran Out of Food in the Last Year: Never true   Transportation Needs: No Transportation Needs (5/24/2024)    TRANSPORTATION NEEDS     Transportation : No   Physical Activity: Sufficiently Active (5/24/2024)    Exercise Vital Sign     Days of Exercise per Week: 5 days     Minutes of Exercise per Session: 30 min   Stress: No Stress Concern Present (5/24/2024)    [employer] [place] [employer] - Occupational Stress Questionnaire     Feeling of Stress : Not at all   Housing Stability: Low Risk  (5/24/2024)    Housing Stability Vital Sign     Unable to Pay for Housing in the Last Year: No     Homeless in the  Last Year: No   Depression: Not on file   Utilities: Not At Risk (5/24/2024)    [facility] Utilities     Threatened with loss of utilities: No   Health Literacy: Adequate Health Literacy (5/24/2024)     Health Literacy     Frequency of need for help with medical instructions: Never   Social Isolation: Socially Integrated (5/24/2024)    Social Isolation     Social Isolation: 1            Malnutrition  Not suspected at this time but will complete NFPE on f/u    Nutrition Diagnosis  Increased Protein Needs related to Renal dysfunction as evidenced by ESRD on HD  Comments: will change diet to renal high protein    Recent Labs   Lab 05/29/24  0320 05/29/24  0719   GLU 85  --    POCGLU  --  101     Comments on Glucose: Hx Dm    Nutrition Prescription / Recommendations  Recommendation/Intervention: Recommend continue current diet as tolerated. Encourage good PO intakes.  Goals: po intake 75% during admission  Nutrition Goal Status: progressing towards goal  Communication of RD Recs: reviewed with physician  Current Diet Order: Renal high protein  Nutrition Order Comments: Recommen to change diet to renal high protein high calorie  Chewing or Swallowing Difficulty?: No Chewing or swallowing difficulty  Recommended Diet: Renal (High Protein)  Recommended Oral Supplement: Boost Glucose Control MAX [160kcals, 30g protein, 6g Carbs] 2 times a day   Is Nutrition Support Recommended: [facility] Support: No  Is Nutrition Education Recommended: No    Monitor and Evaluation  % current Intake: New Diet order with no P.O. intake documented currently  % intake to meet estimated needs: 75 - 100 %  Food and Nutrient Intake: energy intake, food and beverage intake  Food and Nutrient Adminstration: diet order  Anthropometric Measurements: height/length, weight, weight change, body mass index  Biochemical Data, Medical Tests and Procedures: gastrointestinal profile, glucose/endocrine profile, inflammatory profile, lipid profile    "    Current Medical Diagnosis and Past Medical History  Diagnosis: renal disease  Past Medical History:   Diagnosis Date    Chronic kidney disease (CKD)     Diabetes mellitus     Hypertension     PVD (peripheral vascular disease)        Nutrition/Diet History  Spiritual, Cultural Beliefs, Shinto Practices, Values that Affect Care: noN/a    Lab/Procedures/Meds  Recent Labs   Lab 05/29/24  0320   *   K 4.2   BUN 30*   CREATININE 4.51*   CALCIUM 8.0*   CL 97*   Na low/K low CKD  BUN/CR elevated CKD on HD  Last A1c: No results found for: "HGBA1C"  Lab Results   Component Value Date    RBC 2.93 (L) 05/29/2024    HGB 7.9 (L) 05/29/2024    HCT 25.6 (L) 05/29/2024    MCV 87.4 05/29/2024    MCH 27.0 05/29/2024    MCHC 30.9 (L) 05/29/2024    TIBC 105 (L) 05/23/2024     Pertinent Labs Reviewed: reviewed  Pertinent Medications Reviewed: reviewed  Scheduled Meds:   atorvastatin  40 mg Oral QHS    isosorbide mononitrate  30 mg Oral Daily    metoprolol succinate  25 mg Oral Daily    mupirocin   Nasal BID    pantoprazole  40 mg Oral Daily    piperacillin-tazobactam (Zosyn) IV (PEDS and ADULTS) (extended infusion is not appropriate)  4.5 g Intravenous Q12H    sevelamer carbonate  2,400 mg Oral TID WM    traZODone  150 mg Oral QHS     Continuous Infusions:  PRN Meds:.  Current Facility-Administered Medications:     acetaminophen, 1,000 mg, Oral, Q8H PRN    dextrose 10%, 12.5 g, Intravenous, PRN    dextrose 10%, 25 g, Intravenous, PRN    glucagon (human recombinant), 1 mg, Intramuscular, PRN    glucose, 16 g, Oral, PRN    glucose, 24 g, Oral, PRN    naloxone, 0.02 mg, Intravenous, PRN    ondansetron, 8 mg, Oral, Q8H PRN    polyethylene glycol, 17 g, Oral, BID PRN    sodium chloride 0.9% 250 mL flush bag, , Intravenous, PRN    sodium chloride 0.9%, 10 mL, Intravenous, Q12H PRN    vancomycin - pharmacy to dose, , Intravenous, pharmacy to manage frequency    Anthropometrics  Temp: 98.4 °F (36.9 °C)  Height Method: " "Stated  Height: 5' 5" (165.1 cm)  Height (inches): 65 in  Weight Method: Bed Scale  Weight: 64.4 kg (141 lb 15.6 oz)  Weight (lb): 141.98 lb  Ideal Body Weight (IBW), Male: 136 lb  % Ideal Body Weight, Male (lb): 104.4 %  BMI (Calculated): 23.6       Estimated/Assessed Needs      Temp: 98.4 °F (36.9 °C)Oral  Weight Used For Calorie Calculations: 64.5 kg (142 lb 3.2 oz)   Energy Need Method: Kcal/kg Energy Calorie Requirements (kcal): 8689-6110  Weight Used For Protein Calculations: 64.5 kg (142 lb 3.2 oz)  Protein Requirements: 71-84  Estimated Fluid Requirement Method: other (see comments) (500 ml + urine output)    RDA Method (mL): 1935       Nutrition by Nursing  Diet/Nutrition Received: consistent carb/diabetic diet  Intake (%): 25%  Diet/Feeding Assistance: assisted with feeding  Diet/Feeding Tolerance: good  Last Bowel Movement: 05/29/24                Nutrition Follow-Up  RD Follow-up?: Yes      Nutrition Discharge Planning: too soon to determine; RD Following.            Available via Secure Chat  "

## 2024-05-29 NOTE — ASSESSMENT & PLAN NOTE
This patient does have evidence of infective focus  My overall impression is sepsis.  Source: Respiratory  Antibiotics given-   Antibiotics (72h ago, onward)    Start     Stop Route Frequency Ordered    05/29/24 1800  piperacillin-tazobactam (ZOSYN) 4.5 g in dextrose 5 % in water (D5W) 100 mL IVPB (MB+)         -- IV Every 12 hours (non-standard times) 05/29/24 0749    05/28/24 1516  vancomycin - pharmacy to dose         -- IV pharmacy to manage frequency 05/28/24 1416    05/25/24 2100  mupirocin 2 % ointment         05/30/24 2059 Nasl 2 times daily 05/25/24 1222        Blood cultures NGTD.  Follow pleural fluid cultures.   Source control achieved by: antibiotics.

## 2024-05-29 NOTE — PT/OT/SLP PROGRESS
Physical Therapy Treatment    Patient Name:  Joseph Mcdonald   MRN:  [id number]    Recommendations:     Discharge Recommendations: Moderate Intensity Therapy  Discharge Equipment Recommendations: to be determined by next level of care  Barriers to discharge: Inaccessible home and Decreased caregiver support    Assessment:     Joseph Mcdonald is a 89 y.o. male admitted with a medical diagnosis of Acute hypoxic respiratory failure.  He presents with the following impairments/functional limitations: weakness, impaired functional mobility, impaired self care skills, gait instability, impaired balance, impaired cognition, decreased safety awareness Pt had chest tube placed this morning, very drowsy today. Falling asleep at edge of bed. Unable to maintain sitting balance without assistance. Will continue to address mobility as able.    Rehab Prognosis: Fair; patient would benefit from acute skilled PT services to address these deficits and reach maximum level of function.    Recent Surgery: * No surgery found *      Plan:     During this hospitalization, patient to be seen 5 x/week to address the identified rehab impairments via gait training, therapeutic activities, therapeutic exercises and progress toward the following goals:    Plan of Care Expires:  06/24/24    Subjective     Chief Complaint: respiratory failure  Patient/Family Comments/goals: Pt is agreeable to PT. Very drowsy  Pain/Comfort:  Pain Rating 1: 0/10  Pain Rating Post-Intervention 1: 0/10      Objective:     Communicated with IRENE Dias RN prior to session.  Patient found HOB elevated with chest tube, peripheral IV upon PT entry to room.     General Precautions: Standard, fall  Orthopedic Precautions: N/A  Braces: N/A  Respiratory Status: Nasal cannula, flow 3 L/min     Functional Mobility:  Bed Mobility:     Rolling Left:  maximal assistance  Rolling Right: maximal assistance  Scooting: maximal assistance  Supine to Sit: maximal assistance  Sit to Supine:  maximal assistance  Balance: poor, minimal assistance to moderate assistance for sitting balance      AM-PAC 6 CLICK MOBILITY  Turning over in bed (including adjusting bedclothes, sheets and blankets)?: 3  Sitting down on and standing up from a chair with arms (e.g., wheelchair, bedside commode, etc.): 2  Moving from lying on back to sitting on the side of the bed?: 2  Moving to and from a bed to a chair (including a wheelchair)?: 2  Need to walk in hospital room?: 2  Climbing 3-5 steps with a railing?: 1  Basic Mobility Total Score: 12       Treatment & Education:  AAROM BLE ankle pumps, hip/knee flexion,hip abduction  Assisted to edge of bed with maximum assistance, required moderate assistance to maximum assistance to maintain sitting balance and constant stimulation to remain awake  Edge of bed x 10 min    Patient left HOB elevated with all lines intact and call button in reach..    GOALS:   Multidisciplinary Problems       Physical Therapy Goals          Problem: Physical Therapy    Goal Priority Disciplines Outcome Goal Variances Interventions   Physical Therapy Goal     PT, PT/OT Progressing     Description: Short Term Goals to be met by: 24    Patient will increase functional independence with mobility by performin. Supine to sit with Stand by assist  2. Sit to stand transfer with Stand by assist using Rolling walker  3. Bed to chair transfer with Stand by assist using Rolling walker  4. Gait  x 150 feet with Stand by assist using Rolling walker  5. Lower extremity exercise program x30 reps per handout, with assistance as needed    Long Term Goals to be met by: 24    Pt will regain full independent functional mobility with straight cane to return to home situation and prior activities of daily living.                        Time Tracking:     PT Received On: 24  PT Start Time: 1357     PT Stop Time: 1419  PT Total Time (min): 22 min     Billable Minutes: Therapeutic Activity  20    Treatment Type: Treatment  PT/PTA: PT     Number of PTA visits since last PT visit: 0     05/29/2024

## 2024-05-29 NOTE — ASSESSMENT & PLAN NOTE
Creatine stable for now. BMP reviewed- noted Estimated Creatinine Clearance: 9.8 mL/min (A) (based on SCr of 4.51 mg/dL (H)). according to latest data. 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    Nephrology consulted to resume HD sessions.

## 2024-05-29 NOTE — CONSULTS
[facility]  Pulmonology  Consult Note    Patient Name: Joseph Mcdonald  MRN: [id number]  Admission Date: 5/23/2024  Hospital Length of Stay: 6 days  Code Status: Full Code  Attending Physician: Jerome Salas MD  Primary Care Provider: [facility]   Principal Problem: Acute hypoxic respiratory failure    Inpatient consult to Pulmonology  Consult performed by: Benjamin Rodney MD  Consult ordered by: Cristian Ac DO        Subjective:     HPI:   Mr. Mcdonald is an 89-year-old gentleman with past medical history significant for end-stage renal disease dependent on dialysis on Tuesday/Thursday/Saturday, history of iron-deficiency anemia, secondary hyperparathyroidism, diabetes, heart failure with preserved ejection fraction who had presented to the hospital in the setting of shortness of breath found to have right-sided pleural effusion with Interventional Pulmonary now being consulted after initial thoracentesis was performed by Radiology.      I reviewed the patient's imaging personally.  He would initially come in with a large right-sided pleural effusion on his chest x-ray on 5/23/24.  He then underwent ultrasound-guided thoracentesis by Radiology with removal of 1.5 L of fluid.  I reviewed his repeat imaging on 5/24/24 , his chest x-ray showed residual right-sided pleural effusion.  I reviewed his CT chest which I interpreted personally.  This showed evidence of right-sided loculated pleural effusion.  There was also some air present in the pleural space status post thoracentesis.    I also reviewed his results from 5/26/24 which showed platelets of 157, hemoglobin of 7.4.  When I saw the patient at bedside, he was comfortable, in no acute distress, alert and oriented.    Past Medical History:   Diagnosis Date    Chronic kidney disease (CKD)     Diabetes mellitus     Hypertension     PVD (peripheral vascular disease)        Past Surgical History:   Procedure Laterality  Date    LEFT HEART CATHETERIZATION N/A 8/3/2023    Procedure: Left heart cath;  Surgeon: Vazquez Aguilar MD;  [facility];  Service: Cardiology;  Laterality: N/A;    left toe amputation      PLACEMENT OF DIALYSIS ACCESS         Review of patient's allergies indicates:   Allergen Reactions    Azithromycin Other (See Comments)     Note: - [employer] 01/11/2019       Family History    None       Tobacco Use    Smoking status: Former     Types: Cigarettes    Smokeless tobacco: Never   Substance and Sexual Activity    Alcohol use: Not Currently    Drug use: Never    Sexual activity: Not on file         Review of Systems  Objective:     Vital Signs (Most Recent):  Temp: 97.2 °F (36.2 °C) (05/29/24 0029)  Pulse: 83 (05/29/24 0029)  Resp: 18 (05/29/24 0029)  BP: (!) 116/37 (05/29/24 0029)  SpO2: 100 % (05/29/24 0029) Vital Signs (24h Range):  Temp:  [97.2 °F (36.2 °C)-98.3 °F (36.8 °C)] 97.2 °F (36.2 °C)  Pulse:  [71-89] 83  Resp:  [16-22] 18  SpO2:  [97 %-100 %] 100 %  BP: (116-147)/(25-68) 116/37     Weight: 64.4 kg (142 lb)  Body mass index is 23.63 kg/m².      Intake/Output Summary (Last 24 hours) at 5/29/2024 0355  Last data filed at 5/29/2024 0257  Gross per 24 hour   Intake 827.23 ml   Output 3000 ml   Net -2172.77 ml        Physical Exam  Vitals and nursing note reviewed.   Constitutional:       Appearance: He is ill-appearing.   HENT:      Head: Normocephalic and atraumatic.      Nose: Nose normal.      Mouth/Throat:      Mouth: Mucous membranes are moist.   Eyes:      Extraocular Movements: Extraocular movements intact.   Cardiovascular:      Rate and Rhythm: Normal rate and regular rhythm.      Pulses: Normal pulses.      Heart sounds: Normal heart sounds.   Pulmonary:      Effort: Pulmonary effort is normal.      Breath sounds: Examination of the right-middle field reveals decreased breath sounds. Examination of the right-lower field reveals decreased breath sounds. Decreased breath sounds  "present.   Abdominal:      General: Bowel sounds are normal.      Palpations: Abdomen is soft.   Musculoskeletal:         General: Normal range of motion.      Cervical back: Normal range of motion.   Skin:     General: Skin is warm.   Neurological:      General: No focal deficit present.      Mental Status: He is alert and oriented to person, place, and time. Mental status is at baseline.   Psychiatric:         Mood and Affect: Mood normal.         Behavior: Behavior normal.          Vents:  Oxygen Concentration (%): 28 (05/27/24 1900)    Lines/Drains/Airways       Peripheral Intravenous Line  Duration                  Hemodialysis AV Fistula Right upper arm -- days         Peripheral IV - Single Lumen 05/26/24 2331 22 G Anterior;Left Upper Arm 2 days                    Significant Labs:    CBC/Anemia Profile:  No results for input(s): "WBC", "HGB", "HCT", "PLT", "MCV", "RDW", "IRON", "FERRITIN", "RETIC", "FOLATE", "[id number]", "OCCULTBLOOD" in the last 48 hours.     Chemistries:  No results for input(s): "NA", "K", "CL", "CO2", "BUN", "CREATININE", "CALCIUM", "ALBUMIN", "PROT", "BILITOT", "ALKPHOS", "ALT", "AST", "GLUCOSE", "MG", "PHOS" in the last 48 hours.    All pertinent labs within the past 24 hours have been reviewed.  As documented in HPI    Significant Imaging:   I have reviewed all pertinent imaging results/findings within the past 24 hours. As documented in HPI  Assessment/Plan:     Pulmonary  Pleural effusion   The patient has what appears to be a complex parapneumonic right-sided pleural effusion, with heavy loculations present.  Cytology from this pleural fluid was not collected during the initial thoracentesis.  Pleural fluid was additionally not sent for Gram stain or culture either.  Pulmonary service has now been consulted to help assist with residual right-sided pleural effusion, to make a diagnosis and offer some treatment.      I brought the patient down this evening to the procedure suite " with plans to perform right-sided pigtail catheter placement.  The patient's diastolic was very low at this time and therefore the procedure was deferred.  The procedure will be done tomorrow a.m..      Due to the nature of the pleural fluid, this will be high-risk procedure and guidewire we will be inserted and monitored using fluoroscopy to ensure is above the diaphragm.  In the case that we are unable to advance the guidewire due to significant loculations, we will perform a thoracentesis in order to collect pleural fluid so that it can be sent for culture analysis.  If we are able to successfully place small bore pigtail catheter (14 Persian pigtail), then we will attempt administration of tPA and Dnase twice a day to help break up the loculations (at least for 3 days).      Would recommend broadening antibiotics to vancomycin and Zosyn.  The patient is on dialysis at this time and therefore the nephrotoxic nature of vancomycin plus Zosyn would be less concerning.  Agree with initial intravenous antibiotics for treatment of complicated parapneumonic effusion secondary to suspected pneumonia.        Cardiac/Vascular  Hypotension   Diastolic low with preserved mean arterial blood pressure during procedure.  Recommend starting midodrine and considering 1 unit PRBC plus/minus running patient even on dialysis today.    Renal/  End stage renal disease    Patient received dialysis Tuesday Thursday Saturday.  Would recommend running patient even for his dialysis today (given hypotension).    Oncology  Acute blood loss anemia    Repeat CBC prior to chest tube placement tomorrow.          Recommendations conveyed to Dr. Jerome Salas In-person        Thank you for your consult. I will follow-up with patient. Please contact us if you have any additional questions.     Benjamin Rodney MD  Pulmonology  [facility]

## 2024-05-29 NOTE — ASSESSMENT & PLAN NOTE
Patient received dialysis Tuesday Thursday Saturday.  Would recommend running patient even for his dialysis today (given hypotension).

## 2024-05-29 NOTE — ASSESSMENT & PLAN NOTE
The patient has what appears to be a complex parapneumonic right-sided pleural effusion, with heavy loculations present.  Cytology from this pleural fluid was not collected during the initial thoracentesis.  Pleural fluid was additionally not sent for Gram stain or culture either.  Pulmonary service has now been consulted to help assist with residual right-sided pleural effusion, to make a diagnosis and offer some treatment.      I brought the patient down this evening to the procedure suite with plans to perform right-sided pigtail catheter placement.  The patient's diastolic was very low at this time and therefore the procedure was deferred.  The procedure will be done tomorrow a.m..      Due to the nature of the pleural fluid, this will be high-risk procedure and guidewire we will be inserted and monitored using fluoroscopy to ensure is above the diaphragm.  In the case that we are unable to advance the guidewire due to significant loculations, we will perform a thoracentesis in order to collect pleural fluid so that it can be sent for culture analysis.  If we are able to successfully place small bore pigtail catheter (14 [place]ese pigtail), then we will attempt administration of tPA and Dnase twice a day to help break up the loculations (at least for 3 days).      Would recommend broadening antibiotics to vancomycin and Zosyn.  The patient is on dialysis at this time and therefore the nephrotoxic nature of vancomycin plus Zosyn would be less concerning.  Agree with initial intravenous antibiotics for treatment of complicated parapneumonic effusion secondary to suspected pneumonia.

## 2024-05-29 NOTE — ASSESSMENT & PLAN NOTE
Patient's anemia is currently uncontrolled. Has not received any PRBCs to date. Etiology likely d/t chronic disease due to ESRD  Current CBC reviewed-   Lab Results   Component Value Date    HGB 7.9 (L) 05/29/2024    HCT 25.6 (L) 05/29/2024     Monitor serial CBC and transfuse if patient becomes hemodynamically unstable, symptomatic or H/H drops below 7/21.  May consider transfusion if Hgb drops any further.

## 2024-05-29 NOTE — HPI
Mr. Mcdonald is an 89-year-old gentleman with past medical history significant for end-stage renal disease dependent on dialysis on Tuesday/Thursday/Saturday, history of iron-deficiency anemia, secondary hyperparathyroidism, diabetes, heart failure with preserved ejection fraction who had presented to the hospital in the setting of shortness of breath found to have right-sided pleural effusion with Interventional Pulmonary now being consulted after initial thoracentesis was performed by Radiology.      I reviewed the patient's imaging personally.  He would initially come in with a large right-sided pleural effusion on his chest x-ray on 5/23/24.  He then underwent ultrasound-guided thoracentesis by Radiology with removal of 1.5 L of fluid.  I reviewed his repeat imaging on 5/24/24 , his chest x-ray showed residual right-sided pleural effusion.  I reviewed his CT chest which I interpreted personally.  This showed evidence of right-sided loculated pleural effusion.  There was also some air present in the pleural space status post thoracentesis.    I also reviewed his results from 5/26/24 which showed platelets of 157, hemoglobin of 7.4.  When I saw the patient at bedside, he was comfortable, in no acute distress, alert and oriented.

## 2024-05-29 NOTE — OR NURSING
0900 5/29/2024 Called [facility] [address] to call report. Unit secretary stated no nurses available to receive report will need to give report on floor.

## 2024-05-29 NOTE — ASSESSMENT & PLAN NOTE
Sec to pleural effusion.  Require 2L NC since admission.   Imaging consistent with large right sided pleural effusion.   S/p thoracentesis on 5/23 with 1,5L fluid removed, no pleural fluid studies sent.   Remained symptomatic so CT chest obtained on 5/24 and consistent with large effusion.  Pulmonology consulted; s/p chest tube placement (5/29).  Volume removal with HD, continue broad spectrum antibiotics.   Monitor oxygen status.   Home O2 eval prior to discharge.

## 2024-05-29 NOTE — PROGRESS NOTES
[facility]  [facility] Medicine  Progress Note    Patient Name: Joseph Mcdonald  MRN: [id number]  Patient Class: IP- Inpatient   Admission Date: 5/23/2024  Length of Stay: 6 days  Attending Physician: Jerome Salas MD  Primary Care Provider: Clinic, [facility]        Subjective:     Principal Problem:Acute hypoxic respiratory failure        HPI:    89-year-old  male With a past medical history of end-stage renal disease, hypertension, iron deficiency anemia, secondary hyperparathyroidism, type 2 diabetes, CAD, heart failure with preserved ejection fraction, hypokalemia inability presents to the ED with 1 week worsening shortness of breath.  This was particularly bad during his dialysis session this morning.  Due to this daughter brought him to the ED thereafter.  He denies any fever, chills, cough, wheezing, sputum production, palpitations.  He has been take medication as in his not missed any dialysis sessions.  He denies any nausea, vomiting, diarrhea, dysuria.  Imaging in the ED revealed near-complete he had a pacemaker patient of the right hemithorax review of systems otherwise negative.    Overview/Hospital Course:   5/24 1.5 L removed from right pleural space today.  Still has large right-sided effusion.  We will attempt to get some more off tomorrow.  5/25 dialyzing today  5/26 bedside ultrasound today still has large pleural effusion, complex appearing.  Was going to attempt bedside ultrasound but given complex nature have consulted pulmonology  5/27 D/W Pulm, plan for chest tube placement.   5/28- Midodrine added low BP.   5/29- s/p chest tube placement per Dr. Rodney, fluid studies sent.     Interval History: Patient seen and examined at the bedside, underwent chest tube placement this morning, doing ok.     Review of Systems   Respiratory:  Positive for shortness of breath.      Objective:     Vital Signs (Most Recent):  Temp: 97.1 °F (36.2 °C) (05/29/24  1139)  Pulse: 70 (05/29/24 1143)  Resp: (!) 0 (05/29/24 0857)  BP: 108/72 (05/29/24 1143)  SpO2: 100 % (05/29/24 1143) Vital Signs (24h Range):  Temp:  [97.1 °F (36.2 °C)-98.4 °F (36.9 °C)] 97.1 °F (36.2 °C)  Pulse:  [63-98] 70  Resp:  [0-25] 0  SpO2:  [97 %-100 %] 100 %  BP: ()/(25-96) 108/72     Weight: 64.4 kg (141 lb 15.6 oz)  Body mass index is 23.63 kg/m².    Intake/Output Summary (Last 24 hours) at 5/29/2024 1331  Last data filed at 5/29/2024 0936  Gross per 24 hour   Intake 742.24 ml   Output 3000 ml   Net -2257.76 ml         Physical Exam  Vitals and nursing note reviewed.   Constitutional:       Appearance: He is ill-appearing.   HENT:      Head: Normocephalic and atraumatic.      Nose: Nose normal.      Mouth/Throat:      Mouth: Mucous membranes are moist.   Eyes:      Extraocular Movements: Extraocular movements intact.   Cardiovascular:      Rate and Rhythm: Normal rate and regular rhythm.      Pulses: Normal pulses.      Heart sounds: Normal heart sounds.   Pulmonary:      Effort: Pulmonary effort is normal.      Breath sounds: Examination of the right-middle field reveals decreased breath sounds. Examination of the right-lower field reveals decreased breath sounds. Decreased breath sounds present.   Abdominal:      General: Bowel sounds are normal.      Palpations: Abdomen is soft.   Musculoskeletal:         General: Normal range of motion.      Cervical back: Normal range of motion.   Skin:     General: Skin is warm.   Neurological:      General: No focal deficit present.      Mental Status: He is alert and oriented to person, place, and time. Mental status is at baseline.   Psychiatric:         Mood and Affect: Mood normal.         Behavior: Behavior normal.             Significant Labs: All pertinent labs within the past 24 hours have been reviewed.    Significant Imaging: I have reviewed all pertinent imaging results/findings within the past 24 hours.    Assessment/Plan:      * Acute hypoxic  respiratory failure  Sec to pleural effusion.  Require 2L NC since admission.   Imaging consistent with large right sided pleural effusion.   S/p thoracentesis on 5/23 with 1,5L fluid removed, no pleural fluid studies sent.   Remained symptomatic so CT chest obtained on 5/24 and consistent with large effusion.  Pulmonology consulted; s/p chest tube placement (5/29).  Volume removal with HD, continue broad spectrum antibiotics.   Monitor oxygen status.   Home O2 eval prior to discharge.     Pleural effusion  Patient found to have large pleural effusion on imaging. I have personally reviewed and interpreted the following imaging: Xray. A thoracentesis was ordered. Most likely etiology includes Congestive Heart Failure, Pneumonia, and Renal Failure. Management to include Repeat Thoracentesis and Dialysis  S/p thoracentesis on 5/23- 1.5L fluid removed.  S/p chest tube placement (5/29), cell count, LDH/protein, cultures and cytology sent.   Continue IV Zosyn pending culture data, discontinued vancomycin given negative MRSA PCR.     Acute on chronic heart failure with preserved ejection fraction  Patient is identified as having Diastolic (HFpEF) heart failure that is Acute on chronic. CHF is currently uncontrolled due to Pulmonary edema/pleural effusion on CXR. Latest ECHO performed and demonstrates- Results for orders placed during the hospital encounter of 08/01/23    Echo    Interpretation Summary    Left Ventricle: The left ventricle is normal in size. Increased wall thickness. There is concentric remodeling. Normal wall motion. There is normal systolic function with a visually estimated ejection fraction of 55 - 60%. Grade II diastolic dysfunction.    Left Atrium: Left atrium is mildly dilated. There is a calcified 1.5 x 2 cm full wall thickness mass noted extending from the myocardium into the pericardial space, unclear etiology, recommend cardiac MRI.    Right Ventricle: Normal right ventricular cavity size.  "Systolic function is normal.    Aortic Valve: The aortic valve is a trileaflet valve. There is physiologically normal aortic regurgitation.    Mitral Valve: There is no stenosis. The mean pressure gradient across the mitral valve is 3 mmHg at a heart rate of  bpm. There is mild regurgitation with a centrally directed jet.    Tricuspid Valve: There is mild to moderate regurgitation with a centrally directed jet.    Pulmonic Valve: There is no significant stenosis.    Pericardium: Small circumferential pericardial effusion present. No indication of cardiac tamponade.  . Continue Nitrate/Vasodilator and monitor clinical status closely. Monitor on telemetry. Patient is off CHF pathway.  Monitor strict Is&Os and daily weights.  Place on fluid restriction of 1.5 L. Cardiology has not been consulted. Continue to stress to patient importance of self efficacy and  on diet for CHF. Last BNP reviewed- and noted below No results for input(s): "BNP", "BNPTRIAGEBLO" in the last 168 hours.   Volume removal per HD.     Hypotension  Added midodrine 5mg BID.       Debility  Patient with Acute on chronic debility due to age-related physical debility. Latest AMPAC and GEMS scores have not been reviewed. Evaluation for etiology is complete.   Plan includes PT/OT consulted.  Home health on discharge.       Anemia of renal disease  Patient's anemia is currently uncontrolled. Has not received any PRBCs to date. Etiology likely d/t chronic disease due to ESRD  Current CBC reviewed-   Lab Results   Component Value Date    HGB 7.9 (L) 05/29/2024    HCT 25.6 (L) 05/29/2024     Monitor serial CBC and transfuse if patient becomes hemodynamically unstable, symptomatic or H/H drops below 7/21.  May consider transfusion if Hgb drops any further.       Sepsis  This patient does have evidence of infective focus  My overall impression is sepsis.  Source: Respiratory  Antibiotics given-   Antibiotics (72h ago, onward)      Start     Stop Route " Frequency Ordered    05/29/24 1800  piperacillin-tazobactam (ZOSYN) 4.5 g in dextrose 5 % in water (D5W) 100 mL IVPB (MB+)         -- IV Every 12 hours (non-standard times) 05/29/24 0749    05/28/24 1516  vancomycin - pharmacy to dose         -- IV pharmacy to manage frequency 05/28/24 1416    05/25/24 2100  mupirocin 2 % ointment         05/30/24 2059 Nasl 2 times daily 05/25/24 1222          Blood cultures NGTD.  Follow pleural fluid cultures.   Source control achieved by: antibiotics.           CAD (coronary artery disease)  Patient with known CAD s/p  unclear , which is controlled Will continue Statin and monitor for S/Sx of angina/ACS. Continue to monitor on telemetry.       DM2 (diabetes mellitus, type 2)  HBA1c 4.5  No need for SSI or POC glucose check.     Secondary hyperparathyroidism of renal origin  Resume home Renvela.       Essential (primary) hypertension  Chronic, controlled. Latest blood pressure and vitals reviewed-     Temp:  [97.1 °F (36.2 °C)-98.4 °F (36.9 °C)]   Pulse:  [63-98]   Resp:  [0-25]   BP: ()/(25-96)   SpO2:  [97 %-100 %] .   Home meds for hypertension were reviewed and noted below.   Hypertension Medications               amLODIPine (NORVASC) 5 MG tablet Take 5 mg by mouth once daily.    hydrALAZINE (APRESOLINE) 100 MG tablet Take 1 tablet by mouth 3 (three) times daily with meals.    nebivoloL (BYSTOLIC) 2.5 MG Tab Take 2.5 mg by mouth once daily.            While in the hospital, will manage blood pressure as follows; adjust regimen given low BP (left metoprolol and Imdur off given underlying CAD/angina symptoms).     Will utilize p.r.n. blood pressure medication only if patient's blood pressure greater than 180/110 and he develops symptoms such as worsening chest pain or shortness of breath.      End stage renal disease  Creatine stable for now. BMP reviewed- noted Estimated Creatinine Clearance: 9.8 mL/min (A) (based on SCr of 4.51 mg/dL (H)). according to latest data. Based  on current GFR, CKD stage is end stage.    Nephrology consulted to resume HD sessions.       VTE Risk Mitigation (From admission, onward)           Ordered     IP VTE HIGH RISK PATIENT  Once         05/23/24 1746     Place sequential compression device  Until discontinued         05/23/24 1746                    Discharge Planning   BRIAN: 5/31/2024     Code Status: Full Code   Is the patient medically ready for discharge?:     Reason for patient still in hospital (select all that apply): Patient trending condition and Consult recommendations  Discharge Plan A: Home, Home Health                  MADHU PASCUAL MD  Department of Hospital Medicine   [facility]

## 2024-05-29 NOTE — NURSING TRANSFER
0925 5/29/2024 bedside report given to RENATO Ren on [address] [place], vital signs reviewed, no sedation used, local lidocaine used.

## 2024-05-29 NOTE — ASSESSMENT & PLAN NOTE
Diastolic low with preserved mean arterial blood pressure during procedure.  Recommend starting midodrine and considering 1 unit PRBC plus/minus running patient even on dialysis today.

## 2024-05-29 NOTE — ASSESSMENT & PLAN NOTE
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 CHF is currently uncontrolled due to Pulmonary edema/pleural effusion on CXR. Latest ECHO performed and demonstrates- Results for orders placed during the hospital encounter of 08/01/23    Echo    Interpretation Summary    Left Ventricle: The left ventricle is normal in size. Increased wall thickness. There is concentric remodeling. Normal wall motion. There is normal systolic function with a visually estimated ejection fraction of 55 - 60%. Grade II diastolic dysfunction.    Left Atrium: Left atrium is mildly dilated. There is a calcified 1.5 x 2 cm full wall thickness mass noted extending from the myocardium into the pericardial space, unclear etiology, recommend cardiac MRI.    Right Ventricle: Normal right ventricular cavity size. Systolic function is normal.    Aortic Valve: The aortic valve is a trileaflet valve. There is physiologically normal aortic regurgitation.    Mitral Valve: There is no stenosis. The mean pressure gradient across the mitral valve is 3 mmHg at a heart rate of  bpm. There is mild regurgitation with a centrally directed jet.    Tricuspid Valve: There is mild to moderate regurgitation with a centrally directed jet.    Pulmonic Valve: There is no significant stenosis.    Pericardium: Small circumferential pericardial effusion present. No indication of cardiac tamponade.  . Continue Nitrate/Vasodilator and monitor clinical status closely. Monitor on telemetry. Patient is off CHF pathway.  Monitor strict Is&Os and daily weights.  Place on fluid restriction of 1.5 L. Cardiology has not been consulted. Continue to stress to patient importance of self efficacy and  on diet for CHF. Last BNP reviewed- and noted below No results for input(s): "BNP", "BNPTRIAGEBLO" in the last 168 hours.   Volume removal per HD.   "

## 2024-05-29 NOTE — SUBJECTIVE & OBJECTIVE
Interval History: Patient seen and examined at the bedside, underwent chest tube placement this morning, doing ok.     Review of Systems   Respiratory:  Positive for shortness of breath.      Objective:     Vital Signs (Most Recent):  Temp: 97.1 °F (36.2 °C) (05/29/24 1139)  Pulse: 70 (05/29/24 1143)  Resp: (!) 0 (05/29/24 0857)  BP: 108/72 (05/29/24 1143)  SpO2: 100 % (05/29/24 1143) Vital Signs (24h Range):  Temp:  [97.1 °F (36.2 °C)-98.4 °F (36.9 °C)] 97.1 °F (36.2 °C)  Pulse:  [63-98] 70  Resp:  [0-25] 0  SpO2:  [97 %-100 %] 100 %  BP: ()/(25-96) 108/72     Weight: 64.4 kg (141 lb 15.6 oz)  Body mass index is 23.63 kg/m².    Intake/Output Summary (Last 24 hours) at 5/29/2024 1331  Last data filed at 5/29/2024 0936  Gross per 24 hour   Intake 742.24 ml   Output 3000 ml   Net -2257.76 ml         Physical Exam  Vitals and nursing note reviewed.   Constitutional:       Appearance: He is ill-appearing.   HENT:      Head: Normocephalic and atraumatic.      Nose: Nose normal.      Mouth/Throat:      Mouth: Mucous membranes are moist.   Eyes:      Extraocular Movements: Extraocular movements intact.   Cardiovascular:      Rate and Rhythm: Normal rate and regular rhythm.      Pulses: Normal pulses.      Heart sounds: Normal heart sounds.   Pulmonary:      Effort: Pulmonary effort is normal.      Breath sounds: Examination of the right-middle field reveals decreased breath sounds. Examination of the right-lower field reveals decreased breath sounds. Decreased breath sounds present.   Abdominal:      General: Bowel sounds are normal.      Palpations: Abdomen is soft.   Musculoskeletal:         General: Normal range of motion.      Cervical back: Normal range of motion.   Skin:     General: Skin is warm.   Neurological:      General: No focal deficit present.      Mental Status: He is alert and oriented to person, place, and time. Mental status is at baseline.   Psychiatric:         Mood and Affect: Mood normal.          Behavior: Behavior normal.             Significant Labs: All pertinent labs within the past 24 hours have been reviewed.    Significant Imaging: I have reviewed all pertinent imaging results/findings within the past 24 hours.

## 2024-05-30 PROBLEM — J18.9 PARAPNEUMONIC EFFUSION: Status: ACTIVE | Noted: 2024-05-23

## 2024-05-30 PROBLEM — I95.3 HEMODIALYSIS-ASSOCIATED HYPOTENSION: Status: ACTIVE | Noted: 2024-05-29

## 2024-05-30 PROBLEM — J91.8 PARAPNEUMONIC EFFUSION: Status: ACTIVE | Noted: 2024-01-01

## 2024-05-30 NOTE — PROGRESS NOTES
[facility]  Pulmonology  Progress Note    Patient Name: Joseph Mcdonald  MRN: [id number]  Admission Date: 5/23/2024  Hospital Length of Stay: 7 days  Code Status: Full Code  Attending Provider: Jerome Salas MD  Primary Care Provider: [facility]   Principal Problem: Acute hypoxic respiratory failure    Subjective:     Interval History:   Refer to hospital course      Objective:     Vital Signs (Most Recent):  Temp: 97 °F (36.1 °C) (05/30/24 0404)  Pulse: (!) 58 (05/30/24 0404)  Resp: 17 (05/29/24 1856)  BP: (!) 130/40 (05/30/24 0404)  SpO2: 99 % (05/30/24 0404) Vital Signs (24h Range):  Temp:  [96.7 °F (35.9 °C)-98.4 °F (36.9 °C)] 97 °F (36.1 °C)  Pulse:  [58-98] 58  Resp:  [0-25] 17  SpO2:  [97 %-100 %] 99 %  BP: ()/(25-96) 130/40     Weight: 64.4 kg (141 lb 15.6 oz)  Body mass index is 23.63 kg/m².      Intake/Output Summary (Last 24 hours) at 5/30/2024 0447  Last data filed at 5/29/2024 1800  Gross per 24 hour   Intake 155.01 ml   Output 365 ml   Net -209.99 ml        Physical Exam  Vitals and nursing note reviewed.   Constitutional:       Appearance: He is ill-appearing.   HENT:      Head: Normocephalic and atraumatic.      Nose: Nose normal.      Mouth/Throat:      Mouth: Mucous membranes are moist.   Eyes:      Extraocular Movements: Extraocular movements intact.   Cardiovascular:      Rate and Rhythm: Normal rate and regular rhythm.      Pulses: Normal pulses.      Heart sounds: Normal heart sounds.   Pulmonary:      Effort: Pulmonary effort is normal.      Breath sounds: Examination of the right-middle field reveals decreased breath sounds. Examination of the right-lower field reveals decreased breath sounds. Decreased breath sounds present.   Abdominal:      General: Bowel sounds are normal.      Palpations: Abdomen is soft.   Musculoskeletal:         General: Normal range of motion.      Cervical back: Normal range of motion.   Skin:     General: Skin is  warm.   Neurological:      General: No focal deficit present.      Mental Status: He is alert and oriented to person, place, and time. Mental status is at baseline.   Psychiatric:         Mood and Affect: Mood normal.         Behavior: Behavior normal.           Review of Systems    Vents:  Oxygen Concentration (%): 28 (05/27/24 1900)    Lines/Drains/Airways       Drain  Duration                  Chest Tube 05/29/24 0835 Tube - 1 Right <1 day              Peripheral Intravenous Line  Duration                  Hemodialysis AV Fistula Right upper arm -- days         Peripheral IV - Single Lumen 05/29/24 1738 22 G Anterior;Left Forearm <1 day                    Significant Labs:    CBC/Anemia Profile:  Recent Labs   Lab 05/29/24  0320 05/30/24  0331   WBC 9.18 9.15   HGB 7.9* 8.8*   HCT 25.6* 28.7*    128*   MCV 87.4 87.5   RDW 18.2* 18.1*        Chemistries:  Recent Labs   Lab 05/29/24 0320 05/30/24  0331   * 134*   K 4.2 4.3   CL 97* 98   CO2 32 32   BUN 30* 41*   CREATININE 4.51* 5.64*   CALCIUM 8.0* 8.3*   PROT 6.5  --        All pertinent labs within the past 24 hours have been reviewed. Sodium noted at   133.  Hemoglobin noted at 7.9.  Platelets reassuring at   152.    Significant Imaging:  I have reviewed all pertinent imaging results/findings within the past 24 hours.  Post chest tube imaging reviewed by me personally which showed chest tube in right pleural space with improvement of pleural effusion with consistent opacities on the right side consistent with loculated effusion versus compressive atelectasis.  No evidence of pneumothorax per my evaluation.  Assessment/Plan:     Pulmonary  Pleural effusion   The patient has what appears to be a complex parapneumonic right-sided pleural effusion, with heavy loculations present.  Cytology from this pleural fluid was not collected during the initial thoracentesis.  Pleural fluid was additionally not sent for Gram stain or culture either.  Pulmonary  service has now been consulted to help assist with residual right-sided pleural effusion, to make a diagnosis and offer some treatment.          Due to the nature of the pleural fluid, this   Was a high-risk procedure and guidewire  was inserted and monitored using fluoroscopy to ensure is above the diaphragm.   Postprocedure chest x-ray and real-time ultrasound showed chest tube in right position (ultrasound used to confirmed guidewire above the diaphragm in a loculation).      When I saw the patient status post procedure a few hours later in his room, he had just attempted ambulation with physical therapy.  His chest tube displayed no evidence of air leak on water seal.  There was a good amount of tidal movement in the ball bearing in the chest tube atrium.      I still suspect that there may be a component of trapped lung.  There was air in the pleural space status thoracentesis (as seen on the CT chest performed before the chest tube but after the ultrasound-guided thoracentesis).  This may indicate that the patient has a thickened pleural rind and may ultimately need decortication.      We will recommend getting a CT chest early tomorrow morning 5/30/24 to ensure that the chest tube is in the correct area before considering administration of tPA/DNase which would then be considered for total of 3 days, b.i.d. dosing.  If still no evidence of improvement in loculations then a conversation must be had with General surgery regarding possible decortication of the pleural space of the patient is able to tolerate this.      Please obtain daily a.m. chest x-rays while the chest tube is in place.  Please check chest tube 2-3 times per nursing shift to ensure there are no kinks (I provided bedside education to the daytime nurse).    Would recommend broadening antibiotics to vancomycin and Zosyn.  The patient is on dialysis at this time and therefore the nephrotoxic nature of vancomycin plus Zosyn would be less  concerning.  Agree with initial intravenous antibiotics for treatment of complicated parapneumonic effusion secondary to suspected pneumonia.  Follow cultures and guide therapy based on cultures.        Cardiac/Vascular  Hypotension   Diastolic low with preserved mean arterial blood pressure during procedure.    Agree with starting midodrine and maintain a mean arterial pressure above 65.    Renal/  End stage renal disease    Patient received dialysis Tuesday Thursday Saturday.  Would recommend running patient even for his dialysis today (given hypotension).                 Benjamin Rodney MD  Pulmonology  [facility]

## 2024-05-30 NOTE — PLAN OF CARE
Problem: Gas Exchange Impaired  Goal: Optimal Gas Exchange  Outcome: Progressing     Problem: Skin Injury Risk Increased  Goal: Skin Health and Integrity  Outcome: Progressing

## 2024-05-30 NOTE — ASSESSMENT & PLAN NOTE
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 CHF is currently uncontrolled due to Pulmonary edema/pleural effusion on CXR. Latest ECHO performed and demonstrates- Results for orders placed during the hospital encounter of 08/01/23    Echo    Interpretation Summary    Left Ventricle: The left ventricle is normal in size. Increased wall thickness. There is concentric remodeling. Normal wall motion. There is normal systolic function with a visually estimated ejection fraction of 55 - 60%. Grade II diastolic dysfunction.    Left Atrium: Left atrium is mildly dilated. There is a calcified 1.5 x 2 cm full wall thickness mass noted extending from the myocardium into the pericardial space, unclear etiology, recommend cardiac MRI.    Right Ventricle: Normal right ventricular cavity size. Systolic function is normal.    Aortic Valve: The aortic valve is a trileaflet valve. There is physiologically normal aortic regurgitation.    Mitral Valve: There is no stenosis. The mean pressure gradient across the mitral valve is 3 mmHg at a heart rate of  bpm. There is mild regurgitation with a centrally directed jet.    Tricuspid Valve: There is mild to moderate regurgitation with a centrally directed jet.    Pulmonic Valve: There is no significant stenosis.    Pericardium: Small circumferential pericardial effusion present. No indication of cardiac tamponade.  . Continue Nitrate/Vasodilator and monitor clinical status closely. Monitor on telemetry. Patient is off CHF pathway.  Monitor strict Is&Os and daily weights.  Place on fluid restriction of 1.5 L. Cardiology has not been consulted. Continue to stress to patient importance of self efficacy and  on diet for CHF. Last BNP reviewed- and noted below No results for input(s): "BNP", "BNPTRIAGEBLO" in the last 168 hours.   Volume removal per HD.   " General

## 2024-05-30 NOTE — SUBJECTIVE & OBJECTIVE
Interval History: Patient seen and examined at the bedside, appears tired and weak. Denies chest pain or shortness of breath.     Review of Systems   Respiratory:  Positive for shortness of breath.      Objective:     Vital Signs (Most Recent):  Temp: 96.6 °F (35.9 °C) (05/30/24 0735)  Pulse: (!) 51 (05/30/24 1300)  Resp: 17 (05/30/24 1300)  BP: (!) 94/28 (05/30/24 1300)  SpO2: 99 % (05/30/24 1202) Vital Signs (24h Range):  Temp:  [96.6 °F (35.9 °C)-97.5 °F (36.4 °C)] 96.6 °F (35.9 °C)  Pulse:  [51-93] 51  Resp:  [16-17] 17  SpO2:  [98 %-100 %] 99 %  BP: ()/(21-67) 94/28     Weight: 64.4 kg (141 lb 15.6 oz)  Body mass index is 23.63 kg/m².    Intake/Output Summary (Last 24 hours) at 5/30/2024 1313  Last data filed at 5/30/2024 1200  Gross per 24 hour   Intake 321.08 ml   Output 1330 ml   Net -1008.92 ml         Physical Exam  Vitals and nursing note reviewed.   Constitutional:       Appearance: He is ill-appearing.   HENT:      Head: Normocephalic and atraumatic.      Nose: Nose normal.      Mouth/Throat:      Mouth: Mucous membranes are moist.   Eyes:      Extraocular Movements: Extraocular movements intact.   Cardiovascular:      Rate and Rhythm: Normal rate and regular rhythm.      Pulses: Normal pulses.      Heart sounds: Normal heart sounds.   Pulmonary:      Effort: Pulmonary effort is normal.      Breath sounds: Examination of the right-middle field reveals decreased breath sounds. Examination of the right-lower field reveals decreased breath sounds. Decreased breath sounds present.      Comments: Chest tube in place.   Abdominal:      General: Bowel sounds are normal.      Palpations: Abdomen is soft.   Musculoskeletal:         General: Normal range of motion.      Cervical back: Normal range of motion.   Skin:     General: Skin is warm.   Neurological:      General: No focal deficit present.      Mental Status: He is alert and oriented to person, place, and time. Mental status is at baseline.    Psychiatric:         Mood and Affect: Mood normal.         Behavior: Behavior normal.             Significant Labs: All pertinent labs within the past 24 hours have been reviewed.    Significant Imaging: I have reviewed all pertinent imaging results/findings within the past 24 hours.

## 2024-05-30 NOTE — SUBJECTIVE & OBJECTIVE
Interval History:  refer to hospital course      Objective:     Vital Signs (Most Recent):  Temp: 96.6 °F (35.9 °C) (05/30/24 0735)  Pulse: (!) 53 (05/30/24 0735)  Resp: 16 (05/30/24 0735)  BP: (!) 187/67 (05/30/24 0735)  SpO2: 100 % (05/30/24 0741) Vital Signs (24h Range):  Temp:  [96.6 °F (35.9 °C)-97.5 °F (36.4 °C)] 96.6 °F (35.9 °C)  Pulse:  [53-93] 53  Resp:  [16-17] 16  SpO2:  [98 %-100 %] 100 %  BP: ()/(32-72) 187/67     Weight: 64.4 kg (141 lb 15.6 oz)  Body mass index is 23.63 kg/m².      Intake/Output Summary (Last 24 hours) at 5/30/2024 1052  Last data filed at 5/30/2024 1033  Gross per 24 hour   Intake 321.08 ml   Output 680 ml   Net -358.92 ml        Physical Exam      Review of Systems    Vents:  Oxygen Concentration (%): 28 (05/30/24 0741)    Lines/Drains/Airways       Drain  Duration                  Chest Tube 05/29/24 0835 Tube - 1 Right 1 day              Peripheral Intravenous Line  Duration                  Hemodialysis AV Fistula Right upper arm -- days         Peripheral IV - Single Lumen 05/29/24 1738 22 G Anterior;Left Forearm <1 day                    Significant Labs:    CBC/Anemia Profile:  Recent Labs   Lab 05/29/24 0320 05/30/24  0331   WBC 9.18 9.15   HGB 7.9* 8.8*   HCT 25.6* 28.7*    128*   MCV 87.4 87.5   RDW 18.2* 18.1*        Chemistries:  Recent Labs   Lab 05/29/24  0320 05/30/24  0331   * 134*   K 4.2 4.3   CL 97* 98   CO2 32 32   BUN 30* 41*   CREATININE 4.51* 5.64*   CALCIUM 8.0* 8.3*   PROT 6.5  --        All pertinent labs within the past 24 hours have been reviewed. Sodium at   134 from 133yesterday.  Hemoglobin improved to 8.8 today.  BUN at 41.  Potassium with a normal limits at 4.3.    Significant Imaging:  I have reviewed all pertinent imaging results/findings within the past 24 hours. As documented above in hospital course, imaging reviewed by me personally.

## 2024-05-30 NOTE — HOSPITAL COURSE
6/10/24- third day of tPA Dnase completed today (second round  ), patient with no acute events overnight.  Tolerated dialysis well while I was there today.  6/12/24-patient removed chest tube late last night/early this morning.  Repeat chest x-ray x2 this morning with no evidence of expanding pneumothorax.  Stable hydropneumothorax with space filling up with fluid as expected.  Patient otherwise hemodynamically stable with no evidence of subcutaneous emphysema.

## 2024-05-30 NOTE — PROGRESS NOTES
[facility]  [facility] Medicine  Progress Note    Patient Name: Joseph Mcdonald  MRN: [id number]  Patient Class: IP- Inpatient   Admission Date: 5/23/2024  Length of Stay: 7 days  Attending Physician: Jerome Salas MD  Primary Care Provider: Clinic, [facility]        Subjective:     Principal Problem:Acute hypoxic respiratory failure        HPI:    89-year-old  male With a past medical history of end-stage renal disease, hypertension, iron deficiency anemia, secondary hyperparathyroidism, type 2 diabetes, CAD, heart failure with preserved ejection fraction, hypokalemia inability presents to the ED with 1 week worsening shortness of breath.  This was particularly bad during his dialysis session this morning.  Due to this daughter brought him to the ED thereafter.  He denies any fever, chills, cough, wheezing, sputum production, palpitations.  He has been take medication as in his not missed any dialysis sessions.  He denies any nausea, vomiting, diarrhea, dysuria.  Imaging in the ED revealed near-complete he had a pacemaker patient of the right hemithorax review of systems otherwise negative.    Overview/Hospital Course:   5/24 1.5 L removed from right pleural space today.  Still has large right-sided effusion.  We will attempt to get some more off tomorrow.  5/25 dialyzing today  5/26 bedside ultrasound today still has large pleural effusion, complex appearing.  Was going to attempt bedside ultrasound but given complex nature have consulted pulmonology  5/27 D/W Pulm, plan for chest tube placement.   5/28- Midodrine added low BP.   5/29- s/p chest tube placement per Dr. Rodney, fluid studies sent.   5/30- BP low again, midodrine resumed. Started on intra-pleural lytics per Pulm.    Interval History: Patient seen and examined at the bedside, appears tired and weak. Denies chest pain or shortness of breath.     Review of Systems   Respiratory:  Positive for shortness  of breath.      Objective:     Vital Signs (Most Recent):  Temp: 96.6 °F (35.9 °C) (05/30/24 0735)  Pulse: (!) 51 (05/30/24 1300)  Resp: 17 (05/30/24 1300)  BP: (!) 94/28 (05/30/24 1300)  SpO2: 99 % (05/30/24 1202) Vital Signs (24h Range):  Temp:  [96.6 °F (35.9 °C)-97.5 °F (36.4 °C)] 96.6 °F (35.9 °C)  Pulse:  [51-93] 51  Resp:  [16-17] 17  SpO2:  [98 %-100 %] 99 %  BP: ()/(21-67) 94/28     Weight: 64.4 kg (141 lb 15.6 oz)  Body mass index is 23.63 kg/m².    Intake/Output Summary (Last 24 hours) at 5/30/2024 1313  Last data filed at 5/30/2024 1200  Gross per 24 hour   Intake 321.08 ml   Output 1330 ml   Net -1008.92 ml         Physical Exam  Vitals and nursing note reviewed.   Constitutional:       Appearance: He is ill-appearing.   HENT:      Head: Normocephalic and atraumatic.      Nose: Nose normal.      Mouth/Throat:      Mouth: Mucous membranes are moist.   Eyes:      Extraocular Movements: Extraocular movements intact.   Cardiovascular:      Rate and Rhythm: Normal rate and regular rhythm.      Pulses: Normal pulses.      Heart sounds: Normal heart sounds.   Pulmonary:      Effort: Pulmonary effort is normal.      Breath sounds: Examination of the right-middle field reveals decreased breath sounds. Examination of the right-lower field reveals decreased breath sounds. Decreased breath sounds present.      Comments: Chest tube in place.   Abdominal:      General: Bowel sounds are normal.      Palpations: Abdomen is soft.   Musculoskeletal:         General: Normal range of motion.      Cervical back: Normal range of motion.   Skin:     General: Skin is warm.   Neurological:      General: No focal deficit present.      Mental Status: He is alert and oriented to person, place, and time. Mental status is at baseline.   Psychiatric:         Mood and Affect: Mood normal.         Behavior: Behavior normal.             Significant Labs: All pertinent labs within the past 24 hours have been  reviewed.    Significant Imaging: I have reviewed all pertinent imaging results/findings within the past 24 hours.    Assessment/Plan:      * Acute hypoxic respiratory failure  Sec to pleural effusion.  Require 2L NC since admission.   Imaging consistent with large right sided pleural effusion.   S/p thoracentesis on 5/23 with 1,5L fluid removed, no pleural fluid studies sent.   Remained symptomatic so CT chest obtained on 5/24 and consistent with large effusion.  Pulmonology consulted; s/p chest tube placement (5/29), BID intra-pleural lytics started on 5/30.  Volume removal with HD, continue broad spectrum antibiotics.   Monitor oxygen status.   Home O2 eval prior to discharge.     Complicated parapneumonic pleural effusion  Patient found to have large pleural effusion on imaging. I have personally reviewed and interpreted the following imaging: Xray. A thoracentesis was ordered. Most likely etiology includes Congestive Heart Failure, Pneumonia, and Renal Failure. Management to include Repeat Thoracentesis and Dialysis  S/p thoracentesis on 5/23- 1.5L fluid removed.  S/p chest tube placement (5/29), cytology negative for malignancy, fluid appears exudate in nature, cultures NGTD.   Intra-pleural lytics BID started per Pulm (5/30).   Continue IV Zosyn, discontinued vancomycin given negative MRSA PCR.     Sepsis  This patient does have evidence of infective focus  My overall impression is sepsis.  Source: Respiratory  Antibiotics given-   Antibiotics (72h ago, onward)      Start     Stop Route Frequency Ordered    05/29/24 1800  piperacillin-tazobactam (ZOSYN) 4.5 g in dextrose 5 % in water (D5W) 100 mL IVPB (MB+)         -- IV Every 12 hours (non-standard times) 05/29/24 0749    05/28/24 1516  vancomycin - pharmacy to dose         -- IV pharmacy to manage frequency 05/28/24 1416          Blood cultures NGTD.  Follow pleural fluid cultures.   Source control achieved by: antibiotics.           Acute on chronic heart  "failure with preserved ejection fraction  Patient is identified as having Diastolic (HFpEF) heart failure that is Acute on chronic. CHF is currently uncontrolled due to Pulmonary edema/pleural effusion on CXR. Latest ECHO performed and demonstrates- Results for orders placed during the hospital encounter of 08/01/23    Echo    Interpretation Summary    Left Ventricle: The left ventricle is normal in size. Increased wall thickness. There is concentric remodeling. Normal wall motion. There is normal systolic function with a visually estimated ejection fraction of 55 - 60%. Grade II diastolic dysfunction.    Left Atrium: Left atrium is mildly dilated. There is a calcified 1.5 x 2 cm full wall thickness mass noted extending from the myocardium into the pericardial space, unclear etiology, recommend cardiac MRI.    Right Ventricle: Normal right ventricular cavity size. Systolic function is normal.    Aortic Valve: The aortic valve is a trileaflet valve. There is physiologically normal aortic regurgitation.    Mitral Valve: There is no stenosis. The mean pressure gradient across the mitral valve is 3 mmHg at a heart rate of  bpm. There is mild regurgitation with a centrally directed jet.    Tricuspid Valve: There is mild to moderate regurgitation with a centrally directed jet.    Pulmonic Valve: There is no significant stenosis.    Pericardium: Small circumferential pericardial effusion present. No indication of cardiac tamponade.  . Continue Nitrate/Vasodilator and monitor clinical status closely. Monitor on telemetry. Patient is off CHF pathway.  Monitor strict Is&Os and daily weights.  Place on fluid restriction of 1.5 L. Cardiology has not been consulted. Continue to stress to patient importance of self efficacy and  on diet for CHF. Last BNP reviewed- and noted below No results for input(s): "BNP", "BNPTRIAGEBLO" in the last 168 hours.   Volume removal per HD.     Hemodialysis-associated hypotension  Added " midodrine 5mg BID.       Debility  Patient with Acute on chronic debility due to age-related physical debility. Latest AMPAC and GEMS scores have not been reviewed. Evaluation for etiology is complete.   Plan includes PT/OT consulted.  Home health on discharge.       Anemia of renal disease  Patient's anemia is currently uncontrolled. Has not received any PRBCs to date. 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  May consider transfusion if Hgb drops any further.       CAD (coronary artery disease)  Patient with known CAD s/p  unclear , which is controlled Will continue Statin and monitor for S/Sx of angina/ACS. Continue to monitor on telemetry.       DM2 (diabetes mellitus, type 2)  HBA1c 4.5  No need for SSI or POC glucose check.     Secondary hyperparathyroidism of renal origin  Resume home Renvela.       Essential (primary) hypertension  Chronic, controlled. Latest blood pressure and vitals reviewed-     Temp:  [96.6 °F (35.9 °C)-97.5 °F (36.4 °C)]   Pulse:  [51-93]   Resp:  [16-17]   BP: ()/(21-67)   SpO2:  [98 %-100 %] .   Home meds for hypertension were reviewed and noted below.   Hypertension Medications               amLODIPine (NORVASC) 5 MG tablet Take 5 mg by mouth once daily.    hydrALAZINE (APRESOLINE) 100 MG tablet Take 1 tablet by mouth 3 (three) times daily with meals.    nebivoloL (BYSTOLIC) 2.5 MG Tab Take 2.5 mg by mouth once daily.            While in the hospital, will manage blood pressure as follows; hold meds given hypotension.     Will utilize p.r.n. blood pressure medication only if patient's blood pressure greater than 180/110 and he develops symptoms such as worsening chest pain or shortness of breath.      End stage renal disease  Creatine stable  for now. BMP reviewed- noted Estimated Creatinine Clearance: 7.9 mL/min (A) (based on SCr of 5.64 mg/dL (H)). according to latest data. 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    Nephrology consulted to resume HD sessions.       VTE Risk Mitigation (From admission, onward)           Ordered     IP VTE HIGH RISK PATIENT  Once         05/23/24 1746     Place sequential compression device  Until discontinued         05/23/24 1746                    Discharge Planning   BRIAN: 6/3/2024     Code Status: Full Code   Is the patient medically ready for discharge?:     Reason for patient still in hospital (select all that apply): Patient trending condition and Consult recommendations  Discharge Plan A: Home, Home Health                  MADHU PASCUAL MD  Department of Hospital Medicine   [facility]

## 2024-05-30 NOTE — PT/OT/SLP PROGRESS
Occupational Therapy   Treatment    Name: Joseph Mcdonald  MRN: [id number]  Admitting Diagnosis:  Acute hypoxic respiratory failure       Recommendations:     Discharge Recommendations: Moderate Intensity Therapy  Discharge Equipment Recommendations:   (to be determined)  Barriers to discharge:       Assessment:     Joseph Mcdonald is a 89 y.o. male with a medical diagnosis of Acute hypoxic respiratory failure.  He presents with weakness and decline in ADLs. Performance deficits affecting function are weakness, impaired endurance, impaired functional mobility, impaired self care skills. Pt remains lethargic with consistent verbal and tactile cueing needed to perform UE strengthening.    Rehab Prognosis:  Good; patient would benefit from acute skilled OT services to address these deficits and reach maximum level of function.       Plan:     Patient to be seen 5 x/week to address the above listed problems via self-care/home management, therapeutic activities, therapeutic exercises  Plan of Care Expires: 06/28/24  Plan of Care Reviewed with: patient    Subjective     Chief Complaint: acute hypoxic respiratory failure  Patient/Family Comments/goals: pt agreeable to OT tx  Pain/Comfort:  Pain Rating 1: 4/10  Location - Side 1: Right  Location 1: chest    Objective:     Communicated with: RENATO Harris prior to session.  Patient found HOB elevated with chest tube, peripheral IV, telemetry, oxygen upon OT entry to room.    General Precautions: Standard, fall    Orthopedic Precautions:N/A  Braces: N/A  Respiratory Status: Nasal cannula, flow 2 L/min     Occupational Performance:     Bed Mobility:    Not performed     Functional Mobility/Transfers:  Not performed    Activities of Daily Living:  Not performed      [facility] 6 Click ADL:      Treatment & Education:  Pt performed x 20 reps each AAROM shoulder flexion, chest press, IR/ER, and elbow flexion/extension in order to improve BUE strength and endurance to perform ADL and ADL t/f  with less assist.     Patient left HOB elevated with all lines intact and call button in reach    GOALS:   Multidisciplinary Problems       Occupational Therapy Goals          Problem: Occupational Therapy    Goal Priority Disciplines Outcome Interventions   Occupational Therapy Goal     OT, PT/OT Progressing    Description: STG:  Pt will perform grooming (I)  Pt will bathe with setup  Pt will perform UE dressing with (I)  Pt will perform LE dressing with I/mod I  Pt will transfer bed/chair/bsc with Mod I  Pt will perform standing task x 2 min with Mod I   Pt will tolerate 15 minutes of tx without fatigue      LT.Restore to max I with self care and mobility.                          Time Tracking:     OT Date of Treatment: 24  OT Start Time: 1059  OT Stop Time: 1109  OT Total Time (min): 10 min    Billable Minutes:Therapeutic Exercise 10 minutes    OT/QING: OT          2024

## 2024-05-30 NOTE — ASSESSMENT & PLAN NOTE
Patient's anemia is currently uncontrolled. Has not received any PRBCs to date. 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  May consider transfusion if Hgb drops any further.

## 2024-05-30 NOTE — PROGRESS NOTES
[facility]  Pulmonology  Progress Note    Patient Name: Joseph Mcdonald  MRN: [id number]  Admission Date: 5/23/2024  Hospital Length of Stay: 7 days  Code Status: Full Code  Attending Provider: Jerome Salas MD  Primary Care Provider: [facility]   Principal Problem: Acute hypoxic respiratory failure    Subjective:     Interval History:  refer to hospital course      Objective:     Vital Signs (Most Recent):  Temp: 96.6 °F (35.9 °C) (05/30/24 0735)  Pulse: (!) 53 (05/30/24 0735)  Resp: 16 (05/30/24 0735)  BP: (!) 187/67 (05/30/24 0735)  SpO2: 100 % (05/30/24 0741) Vital Signs (24h Range):  Temp:  [96.6 °F (35.9 °C)-97.5 °F (36.4 °C)] 96.6 °F (35.9 °C)  Pulse:  [53-93] 53  Resp:  [16-17] 16  SpO2:  [98 %-100 %] 100 %  BP: ()/(32-72) 187/67     Weight: 64.4 kg (141 lb 15.6 oz)  Body mass index is 23.63 kg/m².      Intake/Output Summary (Last 24 hours) at 5/30/2024 1052  Last data filed at 5/30/2024 1033  Gross per 24 hour   Intake 321.08 ml   Output 680 ml   Net -358.92 ml        Physical Exam      Physical Exam:  GENERAL: NAD , occasionally somnolent but arousable and alert/oriented when interacting with him verbally  HEENT: normocephalic, non-icteric conjunctivae, moist oral mucosa  RESPIRATORY:   Chest tube in place without evidence of bleeding or erythema.  Decreased breath sounds right hemithorax.  CARDIOVASCULAR: Regular rate and rhythm, no murmurs rubs or gallops.  SKIN: no rash, jaundice, ecchymosis or ulcers  MUSCULOSKELETAL: No clubbing or cyanosis; no pedal edema.  NEUROLOGIC: AO ×3, no gross deficits  PSYCH: Normal mood and affect    Review of Systems    Vents:  Oxygen Concentration (%): 28 (05/30/24 0741)    Lines/Drains/Airways       Drain  Duration                  Chest Tube 05/29/24 0835 Tube - 1 Right 1 day              Peripheral Intravenous Line  Duration                  Hemodialysis AV Fistula Right upper arm -- days         Peripheral IV -  Single Lumen 05/29/24 1738 22 G Anterior;Left Forearm <1 day                    Significant Labs:    CBC/Anemia Profile:  Recent Labs   Lab 05/29/24  0320 05/30/24  0331   WBC 9.18 9.15   HGB 7.9* 8.8*   HCT 25.6* 28.7*    128*   MCV 87.4 87.5   RDW 18.2* 18.1*        Chemistries:  Recent Labs   Lab 05/29/24 0320 05/30/24  0331   * 134*   K 4.2 4.3   CL 97* 98   CO2 32 32   BUN 30* 41*   CREATININE 4.51* 5.64*   CALCIUM 8.0* 8.3*   PROT 6.5  --        All pertinent labs within the past 24 hours have been reviewed. Sodium at   134 from 133yesterday.  Hemoglobin improved to 8.8 today.  BUN at 41.  Potassium with a normal limits at 4.3.    Significant Imaging:  I have reviewed all pertinent imaging results/findings within the past 24 hours. As documented above in hospital course, imaging reviewed by me personally.  Assessment/Plan:     Pulmonary  Complicated parapneumonic pleural effusion   The patient has what appears to be a complex parapneumonic right-sided pleural effusion, with heavy loculations present.  Cytology from this pleural fluid was not collected during the initial thoracentesis.  Pleural fluid was additionally not sent for Gram stain or culture either.  Pulmonary service has now been consulted to help assist with residual right-sided pleural effusion, to make a diagnosis and offer some treatment.          Due to the nature of the pleural fluid, this   Was a high-risk procedure and guidewire  was inserted and monitored using fluoroscopy to ensure is above the diaphragm.   Postprocedure chest x-ray and real-time ultrasound showed chest tube in right position (ultrasound used to confirmed guidewire above the diaphragm in a loculation).     This morning, chest tube displayed no evidence of air leak on water seal.  There was a good amount of tidal movement in the ball bearing in the chest tube atrium.      I still suspect that there may be a component of trapped lung.  There was air in the  pleural space status thoracentesis (as seen on the CT chest performed before the chest tube but after the ultrasound-guided thoracentesis).  This may indicate that the patient has a thickened pleural rind and may ultimately need decortication. CT chest  reviewed by me personally today shows presence of right-sided pleural effusion, associated atelectasis and presence of right pigtail catheter in the right pleural space with loculated hydropneumothorax and complex effusion. I confirmed this with the radiologist (Dr. Atkins ) and then instilled tPA and Dnase in the pleural space at 10:00 a.m..        We will continue instillation of tPA/Dnase for a total of 3 days, b.i.d. dosing.  If still no evidence of improvement in loculations then a conversation must be had with General surgery regarding possible decortication of the pleural space of the patient is able to tolerate this.      Please obtain daily a.m. chest x-rays while the chest tube is in place.  Please check chest tube 2-3 times per nursing shift to ensure there are no kinks (I provided bedside education to the daytime nurse). Can keep chest tube to suctioned at -20 cc on the chest tube atrium.     Agree with initial intravenous antibiotics for treatment of complicated parapneumonic effusion secondary to suspected pneumonia.  Follow cultures and guide therapy based on cultures.        Cardiac/Vascular  Hypotension   Diastolic low with preserved mean arterial blood pressure during procedure.    Agree with starting midodrine and maintain a mean arterial pressure above 65.                 Benjamin Rodney MD  Pulmonology  [facility]

## 2024-05-30 NOTE — ASSESSMENT & PLAN NOTE
The patient has what appears to be a complex parapneumonic right-sided pleural effusion, with heavy loculations present.  Cytology from this pleural fluid was not collected during the initial thoracentesis.  Pleural fluid was additionally not sent for Gram stain or culture either.  Pulmonary service has now been consulted to help assist with residual right-sided pleural effusion, to make a diagnosis and offer some treatment.          Due to the nature of the pleural fluid, this   Was a high-risk procedure and guidewire  was inserted and monitored using fluoroscopy to ensure is above the diaphragm.   Postprocedure chest x-ray and real-time ultrasound showed chest tube in right position (ultrasound used to confirmed guidewire above the diaphragm in a loculation).      When I saw the patient status post procedure a few hours later in his room, he had just attempted ambulation with physical therapy.  His chest tube displayed no evidence of air leak on water seal.  There was a good amount of tidal movement in the ball bearing in the chest tube atrium.      I still suspect that there may be a component of trapped lung.  There was air in the pleural space status thoracentesis (as seen on the CT chest performed before the chest tube but after the ultrasound-guided thoracentesis).  This may indicate that the patient has a thickened pleural rind and may ultimately need decortication.      We will recommend getting a CT chest early tomorrow morning 5/30/24 to ensure that the chest tube is in the correct area before considering administration of tPA/DNase which would then be considered for total of 3 days, b.i.d. dosing.  If still no evidence of improvement in loculations then a conversation must be had with General surgery regarding possible decortication of the pleural space of the patient is able to tolerate this.      Please obtain daily a.m. chest x-rays while the chest tube is in place.  Please check chest tube 2-3 times  per nursing shift to ensure there are no kinks (I provided bedside education to the daytime nurse).    Would recommend broadening antibiotics to vancomycin and Zosyn.  The patient is on dialysis at this time and therefore the nephrotoxic nature of vancomycin plus Zosyn would be less concerning.  Agree with initial intravenous antibiotics for treatment of complicated parapneumonic effusion secondary to suspected pneumonia.  Follow cultures and guide therapy based on cultures.

## 2024-05-30 NOTE — ASSESSMENT & PLAN NOTE
The patient has what appears to be a complex parapneumonic right-sided pleural effusion, with heavy loculations present.  Cytology from this pleural fluid was not collected during the initial thoracentesis.  Pleural fluid was additionally not sent for Gram stain or culture either.  Pulmonary service has now been consulted to help assist with residual right-sided pleural effusion, to make a diagnosis and offer some treatment.          Due to the nature of the pleural fluid, this   Was a high-risk procedure and guidewire  was inserted and monitored using fluoroscopy to ensure is above the diaphragm.   Postprocedure chest x-ray and real-time ultrasound showed chest tube in right position (ultrasound used to confirmed guidewire above the diaphragm in a loculation).     This morning, chest tube displayed no evidence of air leak on water seal.  There was a good amount of tidal movement in the ball bearing in the chest tube atrium.      I still suspect that there may be a component of trapped lung.  There was air in the pleural space status thoracentesis (as seen on the CT chest performed before the chest tube but after the ultrasound-guided thoracentesis).  This may indicate that the patient has a thickened pleural rind and may ultimately need decortication. CT chest  reviewed by me personally today shows presence of right-sided pleural effusion, associated atelectasis and presence of right pigtail catheter in the right pleural space with loculated hydropneumothorax and complex effusion. I confirmed this with the radiologist (Dr. Atkins ) and then instilled tPA and Dnase in the pleural space at 10:00 a.m..        We will continue instillation of tPA/Dnase for a total of 3 days, b.i.d. dosing.  If still no evidence of improvement in loculations then a conversation must be had with General surgery regarding possible decortication of the pleural space of the patient is able to tolerate this.      Please obtain daily a.m.  chest x-rays while the chest tube is in place.  Please check chest tube 2-3 times per nursing shift to ensure there are no kinks (I provided bedside education to the daytime nurse). Can keep chest tube to suctioned at -20 cc on the chest tube atrium.     Agree with initial intravenous antibiotics for treatment of complicated parapneumonic effusion secondary to suspected pneumonia.  Follow cultures and guide therapy based on cultures.

## 2024-05-30 NOTE — ASSESSMENT & PLAN NOTE
Sec to pleural effusion.  Require 2L NC since admission.   Imaging consistent with large right sided pleural effusion.   S/p thoracentesis on 5/23 with 1,5L fluid removed, no pleural fluid studies sent.   Remained symptomatic so CT chest obtained on 5/24 and consistent with large effusion.  Pulmonology consulted; s/p chest tube placement (5/29), BID intra-pleural lytics started on 5/30.  Volume removal with HD, continue broad spectrum antibiotics.   Monitor oxygen status.   Home O2 eval prior to discharge.

## 2024-05-30 NOTE — ASSESSMENT & PLAN NOTE
This patient does have evidence of infective focus  My overall impression is sepsis.  Source: Respiratory  Antibiotics given-   Antibiotics (72h ago, onward)    Start     Stop Route Frequency Ordered    05/29/24 1800  piperacillin-tazobactam (ZOSYN) 4.5 g in dextrose 5 % in water (D5W) 100 mL IVPB (MB+)         -- IV Every 12 hours (non-standard times) 05/29/24 0749    05/28/24 1516  vancomycin - pharmacy to dose         -- IV pharmacy to manage frequency 05/28/24 1416        Blood cultures NGTD.  Follow pleural fluid cultures.   Source control achieved by: antibiotics.

## 2024-05-30 NOTE — PROCEDURES
5/30/2024      Indication:  Complicated parapneumonic right-sided pleural effusion  Time clamped:1010  Time unclamped:1110    Intrapleural fibrinolytic (tPA + DNase) dose number:  1    At bedside, 10 mg of alteplase (diluted in 0.9% sodium chloride for a total volume of 30 mL) in addition to dornase pati (5 mg diluted in sterile water 30 mL) was administered into the pleural space via the patient's chest tube.    The patient tolerated the procedure well, status post instillation of the intrapleural fibrinolytic six the chest tube was clamped for 1 hour.  During this time the patient had no additional dyspnea or pain.      At the 1 hour marked, the chest tube  we will be unclamped and  total amount of fluid output will be documented.    Recommendations:  Continue with b.I.d. dosing of tPA/Dnase.  Daily chest x-ray.  Keep chest tube to -20 cc suction.  Unclamp chest tube immediately if evidence of respiratory distress  Or significant hypotension/tachycardia.    Benjamin Rodney MD  Interventional Pulmonary and Critical Care  [facility]

## 2024-05-30 NOTE — ASSESSMENT & PLAN NOTE
Patient found to have large pleural effusion on imaging. I have personally reviewed and interpreted the following imaging: Xray. A thoracentesis was ordered. Most likely etiology includes Congestive Heart Failure, Pneumonia, and Renal Failure. Management to include Repeat Thoracentesis and Dialysis  S/p thoracentesis on 5/23- 1.5L fluid removed.  S/p chest tube placement (5/29), cytology negative for malignancy, fluid appears exudate in nature, cultures NGTD.   Intra-pleural lytics BID started per Pulm (5/30).   Continue IV Zosyn, discontinued vancomycin given negative MRSA PCR.

## 2024-05-30 NOTE — SUBJECTIVE & OBJECTIVE
Interval History:   Refer to hospital course      Objective:     Vital Signs (Most Recent):  Temp: 97 °F (36.1 °C) (05/30/24 0404)  Pulse: (!) 58 (05/30/24 0404)  Resp: 17 (05/29/24 1856)  BP: (!) 130/40 (05/30/24 0404)  SpO2: 99 % (05/30/24 0404) Vital Signs (24h Range):  Temp:  [96.7 °F (35.9 °C)-98.4 °F (36.9 °C)] 97 °F (36.1 °C)  Pulse:  [58-98] 58  Resp:  [0-25] 17  SpO2:  [97 %-100 %] 99 %  BP: ()/(25-96) 130/40     Weight: 64.4 kg (141 lb 15.6 oz)  Body mass index is 23.63 kg/m².      Intake/Output Summary (Last 24 hours) at 5/30/2024 0447  Last data filed at 5/29/2024 1800  Gross per 24 hour   Intake 155.01 ml   Output 365 ml   Net -209.99 ml        Physical Exam  Vitals and nursing note reviewed.   Constitutional:       Appearance: He is ill-appearing.   HENT:      Head: Normocephalic and atraumatic.      Nose: Nose normal.      Mouth/Throat:      Mouth: Mucous membranes are moist.   Eyes:      Extraocular Movements: Extraocular movements intact.   Cardiovascular:      Rate and Rhythm: Normal rate and regular rhythm.      Pulses: Normal pulses.      Heart sounds: Normal heart sounds.   Pulmonary:      Effort: Pulmonary effort is normal.      Breath sounds: Examination of the right-middle field reveals decreased breath sounds. Examination of the right-lower field reveals decreased breath sounds. Decreased breath sounds present.   Abdominal:      General: Bowel sounds are normal.      Palpations: Abdomen is soft.   Musculoskeletal:         General: Normal range of motion.      Cervical back: Normal range of motion.   Skin:     General: Skin is warm.   Neurological:      General: No focal deficit present.      Mental Status: He is alert and oriented to person, place, and time. Mental status is at baseline.   Psychiatric:         Mood and Affect: Mood normal.         Behavior: Behavior normal.           Review of Systems    Vents:  Oxygen Concentration (%): 28 (05/27/24 1900)    Lines/Drains/Airways        Drain  Duration                  Chest Tube 05/29/24 0835 Tube - 1 Right <1 day              Peripheral Intravenous Line  Duration                  Hemodialysis AV Fistula Right upper arm -- days         Peripheral IV - Single Lumen 05/29/24 1738 22 G Anterior;Left Forearm <1 day                    Significant Labs:    CBC/Anemia Profile:  Recent Labs   Lab 05/29/24 0320 05/30/24  0331   WBC 9.18 9.15   HGB 7.9* 8.8*   HCT 25.6* 28.7*    128*   MCV 87.4 87.5   RDW 18.2* 18.1*        Chemistries:  Recent Labs   Lab 05/29/24 0320 05/30/24  0331   * 134*   K 4.2 4.3   CL 97* 98   CO2 32 32   BUN 30* 41*   CREATININE 4.51* 5.64*   CALCIUM 8.0* 8.3*   PROT 6.5  --        All pertinent labs within the past 24 hours have been reviewed. Sodium noted at   133.  Hemoglobin noted at 7.9.  Platelets reassuring at   152.    Significant Imaging:  I have reviewed all pertinent imaging results/findings within the past 24 hours.  Post chest tube imaging reviewed by me personally which showed chest tube in right pleural space with improvement of pleural effusion with consistent opacities on the right side consistent with loculated effusion versus compressive atelectasis.  No evidence of pneumothorax per my evaluation.

## 2024-05-30 NOTE — PLAN OF CARE
05/30/24 @ 6610 - Cm spoke to pt's daughter's over phone. Verbal choice obtained for #1 [facility] and #2 [facility]. Winsome @ [facility] SB forwarding to Luke Rosales to review d/t pt being on dialysis schedule. CM will continue to follow.      05/30/24 @ 5245 - Per morning huddle - chest tube will continue to be left in place and Pulmonology will f/u tomorrow. Per MD, pt will not be medically ready for d/c until next week (Monday?). Pt may require SB on d/c; however MD will advise further upon further pt evaluation. CM will discuss CMS options and Choice form with pt/family. CM will continue to follow.

## 2024-05-30 NOTE — ASSESSMENT & PLAN NOTE
Chronic, controlled. Latest blood pressure and vitals reviewed-     Temp:  [96.6 °F (35.9 °C)-97.5 °F (36.4 °C)]   Pulse:  [51-93]   Resp:  [16-17]   BP: ()/(21-67)   SpO2:  [98 %-100 %] .   Home meds for hypertension were reviewed and noted below.   Hypertension Medications               amLODIPine (NORVASC) 5 MG tablet Take 5 mg by mouth once daily.    hydrALAZINE (APRESOLINE) 100 MG tablet Take 1 tablet by mouth 3 (three) times daily with meals.    nebivoloL (BYSTOLIC) 2.5 MG Tab Take 2.5 mg by mouth once daily.            While in the hospital, will manage blood pressure as follows; hold meds given hypotension.     Will utilize p.r.n. blood pressure medication only if patient's blood pressure greater than 180/110 and he develops symptoms such as worsening chest pain or shortness of breath.

## 2024-05-30 NOTE — ASSESSMENT & PLAN NOTE
Diastolic low with preserved mean arterial blood pressure during procedure.    Agree with starting midodrine and maintain a mean arterial pressure above 65.

## 2024-05-30 NOTE — ASSESSMENT & PLAN NOTE
Creatine stable for now. BMP reviewed- noted Estimated Creatinine Clearance: 7.9 mL/min (A) (based on SCr of 5.64 mg/dL (H)). according to latest data. 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    Nephrology consulted to resume HD sessions.

## 2024-05-30 NOTE — PT/OT/SLP PROGRESS
Physical Therapy Treatment    Patient Name:  Joseph Mcdoanld   MRN:  [id number]    Recommendations:     Discharge Recommendations: Moderate Intensity Therapy  Discharge Equipment Recommendations: to be determined by next level of care  Barriers to discharge:  ongoing medical treatment    Assessment:     Joseph Mcdonald is a 89 y.o. male admitted with a medical diagnosis of Acute hypoxic respiratory failure.  He presents with the following impairments/functional limitations: weakness, impaired endurance, impaired self care skills, impaired functional mobility, impaired balance, impaired skin, edema.    Pt able to assist with activities much better than anticipated, however, cont to display poor sit to stand with feet sliding and unable to progress to gait this AM.    Rehab Prognosis: Fair; patient would benefit from acute skilled PT services to address these deficits and reach maximum level of function.    Recent Surgery: * No surgery found *      Plan:     During this hospitalization, patient to be seen 5 x/week to address the identified rehab impairments via gait training, therapeutic activities, therapeutic exercises and progress toward the following goals:    Plan of Care Expires:  06/24/24    Subjective     Chief Complaint: acute hypoxic resp failure  Patient/Family Comments/goals: pt agreeable  Pain/Comfort:         Objective:     Communicated with Steven Dias prior to session.  Patient found HOB elevated with telemetry, peripheral IV, chest tube, oxygen upon PT entry to room.     General Precautions: Standard, fall  Orthopedic Precautions: N/A  Braces: N/A  Respiratory Status: Nasal cannula, flow 2 L/min     Functional Mobility:  Bed Mobility:     Rolling Left:  moderate assistance and total assist for hygiene  Rolling Right: moderate assistance and total assist for hygiene  Supine to Sit: moderate assistance and assist with trunk and B LE management  Sit to Supine: moderate assistance, of 1-2 persons, and and  assist with trunk and B LE management  Transfers:     Sit to Stand:  moderate assistance and of 2 persons with rolling walker and x 5 trials      AM-PAC 6 CLICK MOBILITY  Turning over in bed (including adjusting bedclothes, sheets and blankets)?: 3  Sitting down on and standing up from a chair with arms (e.g., wheelchair, bedside commode, etc.): 2  Moving from lying on back to sitting on the side of the bed?: 2  Moving to and from a bed to a chair (including a wheelchair)?: 2 (not tested this date)  Need to walk in hospital room?: 1 (not tested this date)  Climbing 3-5 steps with a railing?: 1  Basic Mobility Total Score: 11       Treatment & Education:  Pt performed 2 x 15 reps (B) LE exercises: ap, quad set, glut set, straight leg raise, hip ab/adduction, heel slide with active assist and verbal and tactile stimuli to remain on task    Minimum assist to contact guard assist sitting EOB x 15 minutes focusing on achieving/maintaining balance/midline positioning; slumped posture    Patient left HOB elevated with all lines intact, call button in reach, and Dr Rodney  present..    GOALS:   Multidisciplinary Problems       Physical Therapy Goals          Problem: Physical Therapy    Goal Priority Disciplines Outcome Goal Variances Interventions   Physical Therapy Goal     PT, PT/OT Progressing     Description: Short Term Goals to be met by: 24    Patient will increase functional independence with mobility by performin. Supine to sit with Stand by assist  2. Sit to stand transfer with Stand by assist using Rolling walker  3. Bed to chair transfer with Stand by assist using Rolling walker  4. Gait  x 150 feet with Stand by assist using Rolling walker  5. Lower extremity exercise program x30 reps per handout, with assistance as needed    Long Term Goals to be met by: 24    Pt will regain full independent functional mobility with straight cane to return to home situation and prior activities of daily living.                         Time Tracking:     PT Received On: 05/30/24  PT Start Time: 0939     PT Stop Time: 1019  PT Total Time (min): 40 min     Billable Minutes: Therapeutic Activity 25 and Therapeutic Exercise 12    Treatment Type: Treatment  PT/PTA: PTA     Number of PTA visits since last PT visit: 1 05/30/2024

## 2024-05-30 NOTE — DIALYSIS ROUNDING
Mr. Mcdonald is seen during his dialysis. He's maintaining his bp better today (off bp lowering meds) though he looks very weak overall. Chest tube drainage continuing s/p instillation of TPA.  We'll continue with TTS dialysis.

## 2024-05-31 PROBLEM — J90 LOCULATED PLEURAL EFFUSION: Status: ACTIVE | Noted: 2024-01-01

## 2024-05-31 PROBLEM — R13.12 OROPHARYNGEAL DYSPHAGIA: Status: ACTIVE | Noted: 2024-05-31

## 2024-05-31 NOTE — ASSESSMENT & PLAN NOTE
This patient does have evidence of infective focus  My overall impression is sepsis.  Source: Respiratory  Antibiotics given-   Antibiotics (72h ago, onward)    Start     Stop Route Frequency Ordered    05/29/24 1800  piperacillin-tazobactam (ZOSYN) 4.5 g in dextrose 5 % in water (D5W) 100 mL IVPB (MB+)         -- IV Every 12 hours (non-standard times) 05/29/24 0749        Blood cultures NGTD.  Follow pleural fluid cultures.   Source control achieved by: antibiotics.

## 2024-05-31 NOTE — SUBJECTIVE & OBJECTIVE
Interval History: Patient seen and examined at the bedside, appears tired and weak. Denies chest pain or shortness of breath.     Review of Systems   Respiratory:  Positive for shortness of breath.      Objective:     Vital Signs (Most Recent):  Temp: 97.4 °F (36.3 °C) (05/31/24 1157)  Pulse: 61 (05/31/24 1157)  Resp: 16 (05/31/24 1157)  BP: (!) 168/51 (05/31/24 1157)  SpO2: 99 % (05/31/24 1300) Vital Signs (24h Range):  Temp:  [96.4 °F (35.8 °C)-97.7 °F (36.5 °C)] 97.4 °F (36.3 °C)  Pulse:  [56-70] 61  Resp:  [14-18] 16  SpO2:  [94 %-100 %] 99 %  BP: (100-168)/(24-84) 168/51     Weight: 64.4 kg (141 lb 15.6 oz)  Body mass index is 23.63 kg/m².    Intake/Output Summary (Last 24 hours) at 5/31/2024 1354  Last data filed at 5/31/2024 1337  Gross per 24 hour   Intake 236.79 ml   Output 1670 ml   Net -1433.21 ml         Physical Exam  Vitals and nursing note reviewed.   Constitutional:       Appearance: He is ill-appearing.   HENT:      Head: Normocephalic and atraumatic.      Nose: Nose normal.      Mouth/Throat:      Mouth: Mucous membranes are moist.   Eyes:      Extraocular Movements: Extraocular movements intact.   Cardiovascular:      Rate and Rhythm: Normal rate and regular rhythm.      Pulses: Normal pulses.      Heart sounds: Normal heart sounds.   Pulmonary:      Effort: Pulmonary effort is normal.      Breath sounds: Examination of the right-middle field reveals decreased breath sounds. Examination of the right-lower field reveals decreased breath sounds. Decreased breath sounds present.      Comments: Chest tube in place.   Abdominal:      General: Bowel sounds are normal.      Palpations: Abdomen is soft.   Musculoskeletal:         General: Normal range of motion.      Cervical back: Normal range of motion.   Skin:     General: Skin is warm.   Neurological:      General: No focal deficit present.      Mental Status: He is alert and oriented to person, place, and time. Mental status is at baseline.    Psychiatric:         Mood and Affect: Mood normal.         Behavior: Behavior normal.             Significant Labs: All pertinent labs within the past 24 hours have been reviewed.    Significant Imaging: I have reviewed all pertinent imaging results/findings within the past 24 hours.

## 2024-05-31 NOTE — PROCEDURES
"Joseph Mcdonald is a 89 y.o. male patient.    Temp: 97.8 °F (36.6 °C) (05/31/24 1551)  Pulse: 63 (05/31/24 1551)  Resp: 16 (05/31/24 1551)  BP: (!) 147/54 (05/31/24 1551)  SpO2: 98 % (05/31/24 1700)  Weight: 64.4 kg (141 lb 15.6 oz) (05/29/24 0725)  Height: 5' 5" (165.1 cm) (05/29/24 0725)       Procedures    5/31/2024  INTRAPLEURAL FIBRINOLYTIC THERAPY INSTILLATION, SUBSEQUENT    Indication:  Loculated parapneumonic right-sided pleural effusion  Time clamped:1640  Time unclamped:tentatively 1740     Intrapleural fibrinolytic (tPA + DNase) dose number:  4     At bedside, 10 mg of alteplase (diluted in 0.9% sodium chloride for a total volume of 30 mL) in addition to dornase pati (5 mg diluted in sterile water 30 mL) was administered into the pleural space via the patient's chest tube in a sterile fashion. Allowed to dwell for 1 hour with clamping at three way stop on pigtail.   The patient tolerated the procedure well.    PLAN:  - approx 500 cc out with am dose, serosanginous  - unclamp in 1 hour, timer set by nursing  - document Is/Os  - maintain to -20cc suction following unclamping  - cont BID dosing  - daily CXR  - STAT physician notification for respiratory distress, dislodgement or hemodynamic instability  "

## 2024-05-31 NOTE — PROCEDURES
"Joseph Mcdonald is a 89 y.o. male patient.    Temp: 97.4 °F (36.3 °C) (05/31/24 0803)  Pulse: 61 (05/31/24 0803)  Resp: 16 (05/31/24 0803)  BP: 127/76 (05/31/24 0803)  SpO2: 100 % (05/31/24 0803)  Weight: 64.4 kg (141 lb 15.6 oz) (05/29/24 0725)  Height: 5' 5" (165.1 cm) (05/29/24 0725)       Procedures    5/31/2024  INTRAPLEURAL FIBRINOLYTIC THERAPY INSTILLATION, SUBSEQUENT    Indication:  Loculated parapneumonic right-sided pleural effusion  Time clamped:0900  Time unclamped:1000     Intrapleural fibrinolytic (tPA + DNase) dose number:  3     At bedside, 10 mg of alteplase (diluted in 0.9% sodium chloride for a total volume of 30 mL) in addition to dornase pati (5 mg diluted in sterile water 30 mL) was administered into the pleural space via the patient's chest tube in a sterile fashion. Allowed to dwell for 1 hour with clamping at three way stop on pigtail.   The patient tolerated the procedure well.    PLAN:  - unclamp in 1 hour, timer set by nursing  - document Is/Os  - maintain to -20cc suction following unclamping  - cont BID dosing  - daily CXR; reviewed with interval improvement in R lung aeration  - STAT physician notification for respiratory distress, dislodgement or hemodynamic instability  "

## 2024-05-31 NOTE — ASSESSMENT & PLAN NOTE
Patient found to have large pleural effusion on imaging. I have personally reviewed and interpreted the following imaging: Xray. A thoracentesis was ordered. Most likely etiology includes Congestive Heart Failure, Pneumonia, and Renal Failure. Management to include Repeat Thoracentesis and Dialysis  S/p thoracentesis on 5/23- 1.5L fluid removed.  S/p chest tube placement (5/29), cytology negative for malignancy, fluid appears exudate in nature, cultures NGTD.   Intra-pleural lytics BID started per Pulm (5/30).   CXR improving.   Continue IV Zosyn, discontinued vancomycin given negative MRSA PCR.

## 2024-05-31 NOTE — PROGRESS NOTES
[facility]  Nephrology  Progress Note    Patient Name: Joseph Mcdonald  MRN: [id number]  Admission Date: 5/23/2024  Hospital Length of Stay: 8 days  Attending Provider: Jerome Salas MD   Primary Care Physician: Prakash, [facility]  Principal Problem:Acute hypoxic respiratory failure    Consults  Subjective:     Interval History: Mr. Mcdonald is seen in f/u of his ESRD. He continues with right chest tube drainage of his effusion and TPA instillation to break up loculations. He is weak overall and eating poorly.    Review of patient's allergies indicates:   Allergen Reactions    Azithromycin Other (See Comments)     Note: - [employer] 01/11/2019     Current Facility-Administered Medications   Medication Frequency    acetaminophen tablet 1,000 mg Q8H PRN    alteplase (CATHFlo ACtivase) 10 mg in sodium chloride 0.9% 30 mL infusion (aka TPA) for CHEST TUBE instillation Once    And    dornase pati (PULMOZYME) 5 mg in sterile water 30 mL infusion Once    atorvastatin tablet 40 mg QHS    dextrose 10% bolus 125 mL 125 mL PRN    dextrose 10% bolus 250 mL 250 mL PRN    glucagon (human recombinant) injection 1 mg PRN    glucose chewable tablet 16 g PRN    glucose chewable tablet 24 g PRN    midodrine tablet 5 mg BID WM    naloxone 0.4 mg/mL injection 0.02 mg PRN    ondansetron disintegrating tablet 8 mg Q8H PRN    pantoprazole EC tablet 40 mg Daily    piperacillin-tazobactam (ZOSYN) 4.5 g in dextrose 5 % in water (D5W) 100 mL IVPB (MB+) Q12H    polyethylene glycol packet 17 g BID PRN    sevelamer carbonate tablet 2,400 mg TID WM    sodium chloride 0.9% 250 mL flush bag PRN    sodium chloride 0.9% flush 10 mL Q12H PRN    traZODone tablet 150 mg QHS    vancomycin - pharmacy to dose pharmacy to manage frequency       Objective:     Vital Signs (Most Recent):  Temp: 97.4 °F (36.3 °C) (05/31/24 0803)  Pulse: 61 (05/31/24 0803)  Resp: 16 (05/31/24 0803)  BP: 127/76 (05/31/24 0803)  SpO2: 100 %  (05/31/24 0803) Vital Signs (24h Range):  Temp:  [96.4 °F (35.8 °C)-97.7 °F (36.5 °C)] 97.4 °F (36.3 °C)  Pulse:  [51-77] 61  Resp:  [14-18] 16  SpO2:  [94 %-100 %] 100 %  BP: ()/(21-84) 127/76     Weight: 64.4 kg (141 lb 15.6 oz) (05/29/24 0725)  Body mass index is 23.63 kg/m².  Body surface area is 1.72 meters squared.    I/O last 3 completed shifts:  In: 327.3 [IV Piggyback:327.3]  Out: 2635 [Other:500; Chest Tube:2135]    Physical Exam Not interacting during my visit. Chest clear. Thin yellow pleural fluid draining from chest tube.    Significant Labs:sureBMP:   Recent Labs   Lab 05/26/24  0256 05/29/24  0320 05/31/24  0330   GLU 86   < > 86      < > 134*   K 3.9   < > 4.4      < > 101   CO2 29   < > 27   BUN 22*   < > 26*   CREATININE 4.16*   < > 3.95*   CALCIUM 7.8*   < > 8.0*   MG 2.1  --   --     < > = values in this interval not displayed.     CBC:   Recent Labs   Lab 05/31/24  0330   WBC 11.96*   RBC 3.08*   HGB 8.3*   HCT 27.1*   *   MCV 88.0   MCH 26.9*   MCHC 30.6*     All labs within the past 24 hours have been reviewed.    Significant Imaging:  Labs: Reviewed    Assessment/Plan:     Active Diagnoses:    Diagnosis Date Noted POA    PRINCIPAL PROBLEM:  Acute hypoxic respiratory failure [J96.01] 05/24/2024 Yes     Chronic    Hemodialysis-associated hypotension [I95.3] 05/29/2024 Yes    Parapneumonic effusion [J18.9, J91.8] 05/23/2024 Yes    Acute on chronic heart failure with preserved ejection fraction [I50.33] 05/23/2024 Yes    Sepsis [A41.9] 05/23/2024 Yes    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.9, D63.1] 05/23/2024 Yes    Debility [R53.81] 05/23/2024 Yes    CAD (coronary artery disease) [I25.10] 08/04/2023 Yes    DM2 (diabetes mellitus, type 2) [E11.9] 11/18/2014 Yes    End stage renal disease [N18.6] 11/06/2014 Yes    Essential (primary) hypertension [I10] 11/06/2014 Yes    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 He's very weak. [place] is poor.    Thank you for your consult. I will follow-up with patient. Please contact us if you have any additional questions.    Ovi Alvarado MD  Nephrology  [facility]

## 2024-05-31 NOTE — ASSESSMENT & PLAN NOTE
Creatine stable for now. BMP reviewed- noted Estimated Creatinine Clearance: 11.2 mL/min (A) (based on SCr of 3.95 mg/dL (H)). according to latest data. 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    Nephrology consulted to resume HD sessions.

## 2024-05-31 NOTE — PT/OT/SLP PROGRESS
Occupational Therapy   Treatment    Name: Joseph Mcdonald  MRN: [id number]  Admitting Diagnosis:  Acute hypoxic respiratory failure       Recommendations:     Discharge Recommendations: Moderate Intensity Therapy  Discharge Equipment Recommendations:   (to be determined)  Barriers to discharge:       Assessment:     Joseph Mcdonald is a 89 y.o. male with a medical diagnosis of Acute hypoxic respiratory failure.  He presents with weakness and decline in ADLs. Performance deficits affecting function are weakness, impaired endurance, impaired functional mobility, impaired self care skills.     Rehab Prognosis:  Good; patient would benefit from acute skilled OT services to address these deficits and reach maximum level of function.       Plan:     Patient to be seen 5 x/week to address the above listed problems via self-care/home management, therapeutic activities, therapeutic exercises  Plan of Care Expires: 06/28/24  Plan of Care Reviewed with: patient, daughter    Subjective     Chief Complaint: acute hypoxic respiratory failure  Patient/Family Comments/goals: pt agreeable to OT tx  Pain/Comfort:  Pain Rating 1: 0/10    Objective:     Communicated with: RENATO Calvert prior to session.  Patient found HOB elevated with chest tube, peripheral IV, telemetry, oxygen upon OT entry to room.    General Precautions: Standard, fall    Orthopedic Precautions:N/A  Braces: N/A  Respiratory Status: Nasal cannula, flow 2.5 L/min     Occupational Performance:     Bed Mobility:    Not performed     Functional Mobility/Transfers:  Not performed    Activities of Daily Living:  Not performed      [facility] 6 Click ADL:      Treatment & Education:  Pt performed x 20 reps each AAROM shoulder flexion, chest press, IR/ER, and elbow flexion/extension in order to improve BUE strength and endurance to perform ADL and ADL t/f with less assist.     Patient left HOB elevated with all lines intact, call button in reach, RENATO Calvert notified, and daughter  present    GOALS:   Multidisciplinary Problems       Occupational Therapy Goals          Problem: Occupational Therapy    Goal Priority Disciplines Outcome Interventions   Occupational Therapy Goal     OT, PT/OT Progressing    Description: STG:  Pt will perform grooming (I)  Pt will bathe with setup  Pt will perform UE dressing with (I)  Pt will perform LE dressing with I/mod I  Pt will transfer bed/chair/bsc with Mod I  Pt will perform standing task x 2 min with Mod I   Pt will tolerate 15 minutes of tx without fatigue      LT.Restore to max I with self care and mobility.                          Time Tracking:     OT Date of Treatment: 24  OT Start Time: 1050  OT Stop Time: 1100  OT Total Time (min): 10 min    Billable Minutes:Therapeutic Exercise 10 minutes    OT/QING: OT          2024

## 2024-05-31 NOTE — PROGRESS NOTES
[facility] Hospital Medicine  Progress Note    Patient Name: Joseph Mcdonald  MRN: [id number]  Patient Class: IP- Inpatient   Admission Date: 5/23/2024  Length of Stay: 8 days  Attending Physician: Jerome Salas MD  Primary Care Provider: Clinic, [facility]        Subjective:     Principal Problem:Acute hypoxic respiratory failure        HPI:    89-year-old  male With a past medical history of end-stage renal disease, hypertension, iron deficiency anemia, secondary hyperparathyroidism, type 2 diabetes, CAD, heart failure with preserved ejection fraction, hypokalemia inability presents to the ED with 1 week worsening shortness of breath.  This was particularly bad during his dialysis session this morning.  Due to this daughter brought him to the ED thereafter.  He denies any fever, chills, cough, wheezing, sputum production, palpitations.  He has been take medication as in his not missed any dialysis sessions.  He denies any nausea, vomiting, diarrhea, dysuria.  Imaging in the ED revealed near-complete he had a pacemaker patient of the right hemithorax review of systems otherwise negative.    Overview/Hospital Course:   5/24 1.5 L removed from right pleural space today.  Still has large right-sided effusion.  We will attempt to get some more off tomorrow.  5/25 dialyzing today  5/26 bedside ultrasound today still has large pleural effusion, complex appearing.  Was going to attempt bedside ultrasound but given complex nature have consulted pulmonology  5/27 D/W Pulm, plan for chest tube placement.   5/28- Midodrine added low BP.   5/29- s/p chest tube placement per Dr. Rodney, fluid studies sent.   5/30- BP low again, midodrine resumed. Started on intra-pleural lytics per Pulm.  5/31- Intra-pleural lytics continued, CXR improved.     Interval History: Patient seen and examined at the bedside, appears tired and weak. Denies chest pain or shortness of breath.      Review of Systems   Respiratory:  Positive for shortness of breath.      Objective:     Vital Signs (Most Recent):  Temp: 97.4 °F (36.3 °C) (05/31/24 1157)  Pulse: 61 (05/31/24 1157)  Resp: 16 (05/31/24 1157)  BP: (!) 168/51 (05/31/24 1157)  SpO2: 99 % (05/31/24 1300) Vital Signs (24h Range):  Temp:  [96.4 °F (35.8 °C)-97.7 °F (36.5 °C)] 97.4 °F (36.3 °C)  Pulse:  [56-70] 61  Resp:  [14-18] 16  SpO2:  [94 %-100 %] 99 %  BP: (100-168)/(24-84) 168/51     Weight: 64.4 kg (141 lb 15.6 oz)  Body mass index is 23.63 kg/m².    Intake/Output Summary (Last 24 hours) at 5/31/2024 1354  Last data filed at 5/31/2024 1337  Gross per 24 hour   Intake 236.79 ml   Output 1670 ml   Net -1433.21 ml         Physical Exam  Vitals and nursing note reviewed.   Constitutional:       Appearance: He is ill-appearing.   HENT:      Head: Normocephalic and atraumatic.      Nose: Nose normal.      Mouth/Throat:      Mouth: Mucous membranes are moist.   Eyes:      Extraocular Movements: Extraocular movements intact.   Cardiovascular:      Rate and Rhythm: Normal rate and regular rhythm.      Pulses: Normal pulses.      Heart sounds: Normal heart sounds.   Pulmonary:      Effort: Pulmonary effort is normal.      Breath sounds: Examination of the right-middle field reveals decreased breath sounds. Examination of the right-lower field reveals decreased breath sounds. Decreased breath sounds present.      Comments: Chest tube in place.   Abdominal:      General: Bowel sounds are normal.      Palpations: Abdomen is soft.   Musculoskeletal:         General: Normal range of motion.      Cervical back: Normal range of motion.   Skin:     General: Skin is warm.   Neurological:      General: No focal deficit present.      Mental Status: He is alert and oriented to person, place, and time. Mental status is at baseline.   Psychiatric:         Mood and Affect: Mood normal.         Behavior: Behavior normal.             Significant Labs: All pertinent  labs within the past 24 hours have been reviewed.    Significant Imaging: I have reviewed all pertinent imaging results/findings within the past 24 hours.    Assessment/Plan:      * Acute hypoxic respiratory failure  Sec to pleural effusion.  Require 2L NC since admission.   Imaging consistent with large right sided pleural effusion.   S/p thoracentesis on 5/23 with 1,5L fluid removed, no pleural fluid studies sent.   Remained symptomatic so CT chest obtained on 5/24 and consistent with large effusion.  Pulmonology consulted; s/p chest tube placement (5/29), BID intra-pleural lytics started on 5/30.  Volume removal with HD, continue broad spectrum antibiotics.   Monitor oxygen status.   Home O2 eval prior to discharge.     Parapneumonic effusion  Patient found to have large pleural effusion on imaging. I have personally reviewed and interpreted the following imaging: Xray. A thoracentesis was ordered. Most likely etiology includes Congestive Heart Failure, Pneumonia, and Renal Failure. Management to include Repeat Thoracentesis and Dialysis  S/p thoracentesis on 5/23- 1.5L fluid removed.  S/p chest tube placement (5/29), cytology negative for malignancy, fluid appears exudate in nature, cultures NGTD.   Intra-pleural lytics BID started per Pulm (5/30).   CXR improving.   Continue IV Zosyn, discontinued vancomycin given negative MRSA PCR.     Sepsis  This patient does have evidence of infective focus  My overall impression is sepsis.  Source: Respiratory  Antibiotics given-   Antibiotics (72h ago, onward)      Start     Stop Route Frequency Ordered    05/29/24 1800  piperacillin-tazobactam (ZOSYN) 4.5 g in dextrose 5 % in water (D5W) 100 mL IVPB (MB+)         -- IV Every 12 hours (non-standard times) 05/29/24 0749          Blood cultures NGTD.  Follow pleural fluid cultures.   Source control achieved by: antibiotics.           Acute on chronic heart failure with preserved ejection fraction  Patient is identified as  "having Diastolic (HFpEF) heart failure that is Acute on chronic. CHF is currently uncontrolled due to Pulmonary edema/pleural effusion on CXR. Latest ECHO performed and demonstrates- Results for orders placed during the hospital encounter of 08/01/23    Echo    Interpretation Summary    Left Ventricle: The left ventricle is normal in size. Increased wall thickness. There is concentric remodeling. Normal wall motion. There is normal systolic function with a visually estimated ejection fraction of 55 - 60%. Grade II diastolic dysfunction.    Left Atrium: Left atrium is mildly dilated. There is a calcified 1.5 x 2 cm full wall thickness mass noted extending from the myocardium into the pericardial space, unclear etiology, recommend cardiac MRI.    Right Ventricle: Normal right ventricular cavity size. Systolic function is normal.    Aortic Valve: The aortic valve is a trileaflet valve. There is physiologically normal aortic regurgitation.    Mitral Valve: There is no stenosis. The mean pressure gradient across the mitral valve is 3 mmHg at a heart rate of  bpm. There is mild regurgitation with a centrally directed jet.    Tricuspid Valve: There is mild to moderate regurgitation with a centrally directed jet.    Pulmonic Valve: There is no significant stenosis.    Pericardium: Small circumferential pericardial effusion present. No indication of cardiac tamponade.  . Continue Nitrate/Vasodilator and monitor clinical status closely. Monitor on telemetry. Patient is off CHF pathway.  Monitor strict Is&Os and daily weights.  Place on fluid restriction of 1.5 L. Cardiology has not been consulted. Continue to stress to patient importance of self efficacy and  on diet for CHF. Last BNP reviewed- and noted below No results for input(s): "BNP", "BNPTRIAGEBLO" in the last 168 hours.   Volume removal per HD.     Oropharyngeal dysphagia  SLP consulted.       Loculated pleural effusion  As above.       Hemodialysis-associated " hypotension  Added midodrine 5mg BID.       Debility  Patient with Acute on chronic debility due to age-related physical debility. Latest AMPAC and GEMS scores have not been reviewed. Evaluation for etiology is complete.   Plan includes PT/OT consulted.  Home health on discharge.       Anemia of renal disease  Patient's anemia is currently uncontrolled. Has not received any PRBCs to date. Etiology likely d/t chronic disease due to ESRD  Current CBC reviewed-   Lab Results   Component Value Date    HGB 8.3 (L) 05/31/2024    HCT 27.1 (L) 05/31/2024     Monitor serial CBC and transfuse if patient becomes hemodynamically unstable, symptomatic or H/H drops below 7/21.  May consider transfusion if Hgb drops any further.       CAD (coronary artery disease)  Patient with known CAD s/p  unclear , which is controlled Will continue Statin and monitor for S/Sx of angina/ACS. Continue to monitor on telemetry.       DM2 (diabetes mellitus, type 2)  HBA1c 4.5  No need for SSI or POC glucose check.     Secondary hyperparathyroidism of renal origin  Resume home Renvela.       Essential (primary) hypertension  Chronic, controlled. Latest blood pressure and vitals reviewed-     Temp:  [96.4 °F (35.8 °C)-97.7 °F (36.5 °C)]   Pulse:  [56-70]   Resp:  [14-18]   BP: (100-168)/(24-84)   SpO2:  [94 %-100 %] .   Home meds for hypertension were reviewed and noted below.   Hypertension Medications               amLODIPine (NORVASC) 5 MG tablet Take 5 mg by mouth once daily.    hydrALAZINE (APRESOLINE) 100 MG tablet Take 1 tablet by mouth 3 (three) times daily with meals.    nebivoloL (BYSTOLIC) 2.5 MG Tab Take 2.5 mg by mouth once daily.            While in the hospital, will manage blood pressure as follows; hold meds given hypotension.     Will utilize p.r.n. blood pressure medication only if patient's blood pressure greater than 180/110 and he develops symptoms such as worsening chest pain or shortness of breath.      End stage renal  disease  Creatine stable for now. BMP reviewed- noted Estimated Creatinine Clearance: 11.2 mL/min (A) (based on SCr of 3.95 mg/dL (H)). according to latest data. 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    Nephrology consulted to resume HD sessions.       VTE Risk Mitigation (From admission, onward)           Ordered     IP VTE HIGH RISK PATIENT  Once         05/23/24 1746     Place sequential compression device  Until discontinued         05/23/24 1746                    Discharge Planning   BRIAN: 6/3/2024     Code Status: Full Code   Is the patient medically ready for discharge?:     Reason for patient still in hospital (select all that apply): Patient trending condition and Consult recommendations  Discharge Plan A: Home, Home Health                  MADHU PASCUAL MD  Department of Hospital Medicine   [facility]

## 2024-05-31 NOTE — CONSULTS
Consult received and appreciated. Consult to assess patient for malnutrition. Patient was assessed on 5/29 and did not meet criteria for malnutrition per ASPEN guidelines. Please see RD note on that date.

## 2024-05-31 NOTE — PT/OT/SLP PROGRESS
Physical Therapy Treatment    Patient Name:  Joseph Mcdonald   MRN:  [id number]    Recommendations:     Discharge Recommendations: Moderate Intensity Therapy  Discharge Equipment Recommendations: to be determined by next level of care  Barriers to discharge:  ongoing medical treatment    Assessment:     Joseph Mcdonald is a 89 y.o. male admitted with a medical diagnosis of Acute hypoxic respiratory failure.  He presents with the following impairments/functional limitations: weakness, impaired endurance, impaired self care skills, impaired functional mobility, impaired balance, impaired skin, edema.    Pt very lethargic this PM, offering little assist with exercise and required near constant verbal and tactile cueing to remain awake and remain on task    Rehab Prognosis: Fair; patient would benefit from acute skilled PT services to address these deficits and reach maximum level of function.    Recent Surgery: * No surgery found *      Plan:     During this hospitalization, patient to be seen 5 x/week to address the identified rehab impairments via gait training, therapeutic activities, therapeutic exercises and progress toward the following goals:    Plan of Care Expires:  06/24/24    Subjective     Chief Complaint: acute hypoxic resp failure  Patient/Family Comments/goals: pt very lethargic this PM  Pain/Comfort:         Objective:     Communicated with Nehal Seth RN prior to session.  Patient found HOB elevated with telemetry, peripheral IV, chest tube, oxygen upon PT entry to room.     General Precautions: Standard, fall  Orthopedic Precautions: N/A  Braces: N/A  Respiratory Status: Nasal cannula, flow 2 L/min     Functional Mobility:  NT      AM-PAC 6 CLICK MOBILITY          Treatment & Education:  Pt performed 20 reps (B) LE exercises: ap,  straight leg raise, hip ab/adduction, heel slide with active assist to passive ROM and near constant verbal and tactile stimuli to remain awake and on task    Patient left  HOB elevated with all lines intact, call button in reach, and daughter and visitor present..    GOALS:   Multidisciplinary Problems       Physical Therapy Goals          Problem: Physical Therapy    Goal Priority Disciplines Outcome Goal Variances Interventions   Physical Therapy Goal     PT, PT/OT Progressing     Description: Short Term Goals to be met by: 24    Patient will increase functional independence with mobility by performin. Supine to sit with Stand by assist  2. Sit to stand transfer with Stand by assist using Rolling walker  3. Bed to chair transfer with Stand by assist using Rolling walker  4. Gait  x 150 feet with Stand by assist using Rolling walker  5. Lower extremity exercise program x30 reps per handout, with assistance as needed    Long Term Goals to be met by: 24    Pt will regain full independent functional mobility with straight cane to return to home situation and prior activities of daily living.                        Time Tracking:     PT Received On: 24  PT Start Time: 1416     PT Stop Time: 1427  PT Total Time (min): 11 min     Billable Minutes: Therapeutic Exercise 11    Treatment Type: Treatment  PT/PTA: PTA     Number of PTA visits since last PT visit: 2     2024

## 2024-05-31 NOTE — CHAPLAIN
tried to visit the patient but patient was not available. Spiritual Care remains available as patient and family need.    cat Lloyd   Spiritual Care [phone]     05/31/24 1423   Clinical Encounter Type   Visit Type Initial Visit   Visit Category General Rounding   Visited With Patient not available

## 2024-05-31 NOTE — ASSESSMENT & PLAN NOTE
Patient's anemia is currently uncontrolled. Has not received any PRBCs to date. Etiology likely d/t chronic disease due to ESRD  Current CBC reviewed-   Lab Results   Component Value Date    HGB 8.3 (L) 05/31/2024    HCT 27.1 (L) 05/31/2024     Monitor serial CBC and transfuse if patient becomes hemodynamically unstable, symptomatic or H/H drops below 7/21.  May consider transfusion if Hgb drops any further.

## 2024-05-31 NOTE — ASSESSMENT & PLAN NOTE
Chronic, controlled. Latest blood pressure and vitals reviewed-     Temp:  [96.4 °F (35.8 °C)-97.7 °F (36.5 °C)]   Pulse:  [56-70]   Resp:  [14-18]   BP: (100-168)/(24-84)   SpO2:  [94 %-100 %] .   Home meds for hypertension were reviewed and noted below.   Hypertension Medications               amLODIPine (NORVASC) 5 MG tablet Take 5 mg by mouth once daily.    hydrALAZINE (APRESOLINE) 100 MG tablet Take 1 tablet by mouth 3 (three) times daily with meals.    nebivoloL (BYSTOLIC) 2.5 MG Tab Take 2.5 mg by mouth once daily.            While in the hospital, will manage blood pressure as follows; hold meds given hypotension.     Will utilize p.r.n. blood pressure medication only if patient's blood pressure greater than 180/110 and he develops symptoms such as worsening chest pain or shortness of breath.

## 2024-05-31 NOTE — NURSING
Dr Washington at bedside- see note. 500mls out from 0810 to 1640. Marked on canister at 800mls.   1740 chest tube unclamped and hooked back up to suction.

## 2024-05-31 NOTE — PLAN OF CARE
Problem: Adult Inpatient Plan of Care  Goal: Plan of Care Review  Outcome: Progressing  Goal: Patient-Specific Goal (Individualized)  Outcome: Progressing  Goal: Absence of Hospital-Acquired Illness or Injury  Outcome: Progressing  Goal: Optimal Comfort and Wellbeing  Outcome: Progressing  Goal: Readiness for Transition of Care  Outcome: Progressing     Problem: Diabetes Comorbidity  Goal: Blood Glucose Level Within Targeted Range  Outcome: Progressing     Problem: Sepsis/Septic Shock  Goal: Optimal Coping  Outcome: Progressing  Goal: Absence of Bleeding  Outcome: Progressing  Goal: Blood Glucose Level Within Targeted Range  Outcome: Progressing  Goal: Absence of Infection Signs and Symptoms  Outcome: Progressing  Goal: Optimal Nutrition Intake  Outcome: Progressing     Problem: Fall Injury Risk  Goal: Absence of Fall and Fall-Related Injury  Outcome: Progressing     Problem: Wound  Goal: Optimal Coping  Outcome: Progressing  Goal: Optimal Functional Ability  Outcome: Progressing  Goal: Absence of Infection Signs and Symptoms  Outcome: Progressing  Goal: Improved Oral Intake  Outcome: Progressing  Goal: Optimal Pain Control and Function  Outcome: Progressing  Goal: Skin Health and Integrity  Outcome: Progressing  Goal: Optimal Wound Healing  Outcome: Progressing     Problem: Gas Exchange Impaired  Goal: Optimal Gas Exchange  Outcome: Progressing     Problem: Hemodialysis  Goal: Safe, Effective Therapy Delivery  Outcome: Progressing  Goal: Effective Tissue Perfusion  Outcome: Progressing  Goal: Absence of Infection Signs and Symptoms  Outcome: Progressing     Problem: Skin Injury Risk Increased  Goal: Skin Health and Integrity  Outcome: Progressing     Problem: Pneumonia  Goal: Fluid Balance  Outcome: Progressing  Goal: Resolution of Infection Signs and Symptoms  Outcome: Progressing  Goal: Effective Oxygenation and Ventilation  Outcome: Progressing

## 2024-05-31 NOTE — PROCEDURES
Joseph Mcdonald is a 89 y.o. male patient.       Procedures    5/30/2024  INTRAPLEURAL FIBRINOLYTIC THERAPY INSTILLATION, SUBSEQUENT      Indication:  Complicated parapneumonic right-sided pleural effusion  Time clamped:1630  Time unclamped:1730     Intrapleural fibrinolytic (tPA + DNase) dose number:  2     At bedside, 10 mg of alteplase (diluted in 0.9% sodium chloride for a total volume of 30 mL) in addition to dornase pati (5 mg diluted in sterile water 30 mL) was administered into the pleural space via the patient's chest tube. Allowed to dwell for 1 hour with clamping at three way stop on pigtail.   The patient tolerated the procedure well.    PLAN:  - unclamp in 1 hour, timer set by nursing  - document Is/Os  - maintain to -20cc suction  - cont BID dosing  - daily CXR   - STAT physician notification for respiratory distress, dislodgement or hemodynamic instability

## 2024-05-31 NOTE — PLAN OF CARE
"05/31/24 @ 1244 - [employer] auth pending --- CM rec'd message from Rula @ [facility] re: insurance auth from [employer]. "Bernard called and said according to notes, he may have a surgery and they will not approve rehab until that has been ruled out." CM notified MD. Upon further review of documentation, the noted referenced was located - progress note from Pulmonology 05/30/24 @ 1055 - "We will continue instillation of tPA/Dnase for a total of 3 days, b.i.d. dosing.  If still no evidence of improvement in loculations then a conversation must be had with General surgery regarding possible decortication of the pleural space of the patient is able to tolerate this."  MD was updated. Pt will continue admission in facility through the weekend d/t not medically ready for d/c. Per MD, during morning huddle, we will review on Monday. CM will continue to follow.   "

## 2024-06-01 NOTE — PROCEDURES
"Joseph Mcdonald is a 89 y.o. male patient.    Temp: 97.6 °F (36.4 °C) (06/01/24 1230)  Pulse: 68 (06/01/24 1540)  Resp: 18 (06/01/24 1540)  BP: (!) 137/58 (06/01/24 1540)  SpO2: 99 % (06/01/24 1202)  Weight: 64.4 kg (141 lb 15.6 oz) (05/29/24 0725)  Height: 5' 5" (165.1 cm) (05/29/24 0725)       Procedures    6/1/2024  INTRAPLEURAL FIBRINOLYTIC THERAPY INSTILLATION, SUBSEQUENT    Indication:  Loculated parapneumonic right-sided pleural effusion  Time clamped: 1705  Time unclamped:tentatively 1805     Intrapleural fibrinolytic (tPA + DNase) dose number:  6     At bedside, 10 mg of alteplase (diluted in 0.9% sodium chloride for a total volume of 30 mL) in addition to dornase pati (5 mg diluted in sterile water 30 mL) was administered into the pleural space via the patient's chest tube in a sterile fashion. Allowed to dwell for 1 hour with clamping at three way stop on pigtail.   The patient tolerated the procedure well.    PLAN:  - final intrapleural fibrinolytic therapy  - unclamp in 1 hour, timer set by nursing  - document Is/Os  - maintain to -20cc suction following unclamping  - cont BID dosing to complete 6 doses  - daily CXR  - STAT physician notification for respiratory distress, dislodgement or hemodynamic instability  "

## 2024-06-01 NOTE — ASSESSMENT & PLAN NOTE
Patient's anemia is currently uncontrolled. Has not received any PRBCs to date. Etiology likely d/t chronic disease due to ESRD  Current CBC reviewed-   Lab Results   Component Value Date    HGB 8.2 (L) 06/01/2024    HCT 26.6 (L) 06/01/2024     Monitor serial CBC and transfuse if patient becomes hemodynamically unstable, symptomatic or H/H drops below 7/21.

## 2024-06-01 NOTE — SUBJECTIVE & OBJECTIVE
Interval History: Patient seen and examined at the bedside, now has NG tube as he failed his swallow, continues to be very frail and weak.     Review of Systems   Respiratory:  Positive for shortness of breath.      Objective:     Vital Signs (Most Recent):  Temp: 97.5 °F (36.4 °C) (06/01/24 0801)  Pulse: 65 (06/01/24 0801)  Resp: 16 (06/01/24 0801)  BP: (!) 137/43 (06/01/24 0801)  SpO2: 100 % (06/01/24 0801) Vital Signs (24h Range):  Temp:  [97.4 °F (36.3 °C)-99.8 °F (37.7 °C)] 97.5 °F (36.4 °C)  Pulse:  [61-74] 65  Resp:  [16-20] 16  SpO2:  [96 %-100 %] 100 %  BP: (137-170)/(43-73) 137/43     Weight: 64.4 kg (141 lb 15.6 oz)  Body mass index is 23.63 kg/m².    Intake/Output Summary (Last 24 hours) at 6/1/2024 1018  Last data filed at 6/1/2024 0608  Gross per 24 hour   Intake 110.55 ml   Output 990 ml   Net -879.45 ml         Physical Exam  Vitals and nursing note reviewed.   Constitutional:       Appearance: He is ill-appearing.   HENT:      Head: Normocephalic and atraumatic.      Nose: Nose normal.      Mouth/Throat:      Mouth: Mucous membranes are moist.   Eyes:      Extraocular Movements: Extraocular movements intact.   Cardiovascular:      Rate and Rhythm: Normal rate and regular rhythm.      Pulses: Normal pulses.      Heart sounds: Normal heart sounds.   Pulmonary:      Effort: Pulmonary effort is normal.      Breath sounds: Examination of the right-middle field reveals decreased breath sounds. Examination of the right-lower field reveals decreased breath sounds. Decreased breath sounds present.      Comments: Chest tube in place.   Abdominal:      General: Bowel sounds are normal.      Palpations: Abdomen is soft.      Comments: NG in place.    Musculoskeletal:         General: Normal range of motion.      Cervical back: Normal range of motion.   Skin:     General: Skin is warm.   Neurological:      General: No focal deficit present.      Mental Status: He is alert and oriented to person, place, and time.  Mental status is at baseline.   Psychiatric:         Mood and Affect: Mood normal.         Behavior: Behavior normal.             Significant Labs: All pertinent labs within the past 24 hours have been reviewed.    Significant Imaging: I have reviewed all pertinent imaging results/findings within the past 24 hours.

## 2024-06-01 NOTE — CONSULTS
[facility]  Adult Nutrition  Consult Note         Reason for Assessment  Reason For Assessment: consult   Nutrition Risk Screen: no indicators present    Assessment and Plan  Consult received and appreciated. Consult to start tube feedings.     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 Recommend start Novasource Renal with a goal rate of 35mL/hour with 40mL/hour free water flush. Keep HOB at 30-45 degrees to reduce risk of aspiration. Start rate at 20mL  and advance by 10mL q8h until goal rate is reached. Monitor for tolerance: n/v/d/c. Hold feeding and notify RD if symptoms of intolerance develop.     Last BM 5/29 per flowsheet.     Medications/labs reviewed. RD following.          Learning Needs/Social Determinants of Health  Learning Assessment       05/23/2024 1848 [facility] (5/23/2024 - Present)   Created by Melanie Saleh RN - RN (Nurse) Status: Complete                 PRIMARY LEARNER     Primary Learner Name:  Joseph Mcdonald  - 05/23/2024 1848    Relationship:  Patient  - 05/23/2024 1848    Does the primary learner have any barriers to learning?:  No Barriers  - 05/23/2024 1848    What is the preferred language of the primary learner?:  English [facility] 05/23/2024 1848    Is an  required?:  No  - 05/23/2024 1848    How does the primary learner prefer to learn new concepts?:  Listening, Reading, Demonstration  - 05/23/2024 1848    How often do you need to have someone help you read instructions, pamphlets, or written material from your doctor or pharmacy?:  Never  - 05/23/2024 1848        CO-LEARNER #1     No question answered        CO-LEARNER #2     No question answered        SPECIAL TOPICS     No question answered        ANSWERED BY:     No question answered        Comments         Edit History       Melanie Saleh, RN - RN (Nurse)   05/23/2024 1848                           Social Determinants of Health     Tobacco Use:  Medium Risk (8/2/2023)    Patient History     Smoking Tobacco Use: Former     Smokeless Tobacco Use: Never     Passive Exposure: Not on file   Alcohol Use: Not At Risk (5/24/2024)    AUDIT-C     Frequency of Alcohol Consumption: Never     Average Number of Drinks: Patient does not drink     Frequency of Binge Drinking: Never   Financial Resource Strain: Low Risk  (8/2/2023)    Overall Financial Resource Strain (CARDIA)     Difficulty of Paying Living Expenses: Not hard at all   Food Insecurity: No Food Insecurity (5/24/2024)    Hunger Vital Sign     Worried About Running Out of Food in the Last Year: Never true     Ran Out of Food in the Last Year: Never true   Transportation Needs: No Transportation Needs (5/24/2024)    TRANSPORTATION NEEDS     Transportation : No   Physical Activity: Sufficiently Active (5/24/2024)    Exercise Vital Sign     Days of Exercise per Week: 5 days     Minutes of Exercise per Session: 30 min   Stress: No Stress Concern Present (5/24/2024)    [employer] [place] [employer] - Occupational Stress Questionnaire     Feeling of Stress : Not at all   Housing Stability: Low Risk  (5/24/2024)    Housing Stability Vital Sign     Unable to Pay for Housing in the Last Year: No     Homeless in the Last Year: No   Depression: Not on file   Utilities: Not At Risk (5/24/2024)    [facility] Utilities     Threatened with loss of utilities: No   Health Literacy: Adequate Health Literacy (5/24/2024)     Health Literacy     Frequency of need for help with medical instructions: Never   Social Isolation: Socially Integrated (5/24/2024)    Social Isolation     Social Isolation: 1            Malnutrition  Is Patient Malnourished: No    Nutrition Diagnosis  Inadequate energy intake related to Appetite loss and Inadequate Caloric intake as evidenced by poor PO intakes  Comments: initiate enteral feeding    Recent Labs   Lab 06/01/24  0320   GLU 75         Nutrition Prescription /  Recommendations  Recommendation/Intervention: Recommend start Novasource Renal with a goal rate of 35mL/hour with 40mL/hour free water flush. Keep HOB at 30-45 degrees to reduce risk of aspiration. Start rate at 20mL/hour and advance by 10mL q8h until goal rate is reached. Monitor for tolerance: n/v/d/c. Hold feeding and notify RD if symptoms of intolerance develop.  Goals: Tube feeding tolerance at goal, weight maintenance during admission  Nutrition Goal Status: new  Communication of RD Recs: reviewed with physician  Current Diet Order: NPO  Nutrition Order Comments: Recommen to change diet to renal high protein high calorie  Chewing or Swallowing Difficulty?: No Chewing or swallowing difficulty  Recommended Diet: Enteral Nutrition  Recommended Oral Supplement: No Oral Supplements  Is Nutrition Support Recommended:   Needs Calculated  Energy Need Method: Kcal/kg Energy Calorie Requirements (kcal): 7788-4975  Protein Requirements: 71-84  Enteral Nutrition   Enteral Nutrition Formula Provides:  1680 kcals Propofol Rate: No  76 g Protein  154 g Carbohydrates  84 g Fat Propofol Rate: No  602 ml Fluid without Flush    960 ml Fluid by flush   1562 ml Total Fluid  Enteral Nutrition Recommended Order:  Tube feeding via NG/ Florence Sump  Tube feeding formula: Novasource Renal NG/ Florence Sump at 35mL/hour  Free Water Flush: 40 ml hourly  Modular Supplements:No Modular Supplements needed  Enteral Nutrition meets needs?: yes  Enteral Nutrition Status: New Order  Is Nutrition Education Recommended: No    Monitor and Evaluation  % current Intake: NPO  % intake to meet estimated needs: Enteral Nutrition   Food and Nutrient Intake: enteral nutrition intake  Food and Nutrient Adminstration: enteral and parenteral nutrition administration  Anthropometric Measurements: weight, weight change  Biochemical Data, Medical Tests and Procedures: electrolyte and renal panel, gastrointestinal profile, glucose/endocrine profile, inflammatory  "profile, lipid profile       Current Medical Diagnosis and Past Medical History  Diagnosis: renal disease  Past Medical History:   Diagnosis Date    Chronic kidney disease (CKD)     Diabetes mellitus     Hypertension     PVD (peripheral vascular disease)        Nutrition/Diet History  Spiritual, Cultural Beliefs, Gnosticist Practices, Values that Affect Care: no    Lab/Procedures/Meds  Recent Labs   Lab 06/01/24  0320   *   K 4.8   BUN 42*   CREATININE 5.21*   CALCIUM 8.1*      Note: Na+ low, recommend consider replete to WNL as appropriate. BUN/Cr elevated. PMH ESRD. Ca++ low    Last A1c: No results found for: "HGBA1C"  Lab Results   Component Value Date    RBC 3.10 (L) 06/01/2024    HGB 8.2 (L) 06/01/2024    HCT 26.6 (L) 06/01/2024    MCV 85.8 06/01/2024    MCH 26.5 (L) 06/01/2024    MCHC 30.8 (L) 06/01/2024    TIBC 105 (L) 05/23/2024   Note: H&H low    Pertinent Labs Reviewed: reviewed  Pertinent Medications Reviewed: reviewed  Scheduled Meds:   alteplase (CATHFlo ACtivase) 10 mg in sodium chloride 0.9% 30 mL infusion (aka TPA) for CHEST TUBE instillation  10 mg Chest Tube Once    And    dornase pati (PULMOZYME) 5 mg in sterile water 30 mL infusion  5 mg Chest Tube Once    atorvastatin  40 mg Oral QHS    midodrine  2.5 mg Oral BID WM    pantoprazole  40 mg Oral Daily    piperacillin-tazobactam (Zosyn) IV (PEDS and ADULTS) (extended infusion is not appropriate)  4.5 g Intravenous Q12H    sevelamer carbonate  2,400 mg Oral TID WM     Continuous Infusions:  PRN Meds:.  Current Facility-Administered Medications:     acetaminophen, 1,000 mg, Oral, Q8H PRN    dextrose 10%, 12.5 g, Intravenous, PRN    dextrose 10%, 25 g, Intravenous, PRN    glucagon (human recombinant), 1 mg, Intramuscular, PRN    glucose, 16 g, Oral, PRN    glucose, 24 g, Oral, PRN    naloxone, 0.02 mg, Intravenous, PRN    ondansetron, 8 mg, Oral, Q8H PRN    polyethylene glycol, 17 g, Oral, BID PRN    sodium chloride 0.9% 250 mL flush bag, , " "Intravenous, PRN    sodium chloride 0.9%, 10 mL, Intravenous, Q12H PRN    Anthropometrics  Temp: 97.5 °F (36.4 °C)  Height Method: Stated  Height: 5' 5" (165.1 cm)  Height (inches): 65 in  Weight Method: Bed Scale  Weight: 64.4 kg (141 lb 15.6 oz)  Weight (lb): 141.98 lb  Ideal Body Weight (IBW), Male: 136 lb  % Ideal Body Weight, Male (lb): 104.4 %  BMI (Calculated): 23.6       Estimated/Assessed Needs  RMR ([place]-St. Jeor Equation): 1240.88     Temp: 97.5 °F (36.4 °C)Oral  Weight Used For Calorie Calculations: 64.5 kg (142 lb 3.2 oz)   Energy Need Method: Kcal/kg Energy Calorie Requirements (kcal): 9986-4176  Weight Used For Protein Calculations: 64.5 kg (142 lb 3.2 oz)  Protein Requirements: 71-84  Estimated Fluid Requirement Method: other (see comments) (500 ml + urine output)    RDA Method (mL): 1935       Nutrition by Nursing  Diet/Nutrition Received: consistent carb/diabetic diet  Intake (%): 25%  Diet/Feeding Assistance: assisted with feeding  Diet/Feeding Tolerance: good  Last Bowel Movement: 05/29/24                Nutrition Follow-Up  RD Follow-up?: Yes      Nutrition Discharge Planning: too soon to determine; RD Following.          Monica Pappas MS, RD, LD  Available via Secure Chat  "

## 2024-06-01 NOTE — ASSESSMENT & PLAN NOTE
SLP consulted, failed swallow eval.  Now NPO, NG inserted for feed and meds.       How Severe Is Your Skin Lesion?: mild Has Your Skin Lesion Been Treated?: not been treated Is This A New Presentation, Or A Follow-Up?: Skin Lesion

## 2024-06-01 NOTE — ASSESSMENT & PLAN NOTE
Sec to pleural effusion.  Require 2L NC since admission.   Imaging consistent with large right sided pleural effusion.   S/p thoracentesis on 5/23 with 1,5L fluid removed, no pleural fluid studies sent.   Remained symptomatic so CT chest obtained on 5/24 and consistent with large effusion.  Pulmonology consulted; s/p chest tube placement (5/29), BID intra-pleural lytics started on 5/30 (plan for total 6 doses).  Volume removal with HD, continue broad spectrum antibiotics.   Monitor oxygen status.   Home O2 eval prior to discharge.

## 2024-06-01 NOTE — PLAN OF CARE
Problem: Adult Inpatient Plan of Care  Goal: Plan of Care Review  Outcome: Not Progressing  Goal: Patient-Specific Goal (Individualized)  Outcome: Not Progressing  Goal: Absence of Hospital-Acquired Illness or Injury  Outcome: Not Progressing  Goal: Optimal Comfort and Wellbeing  Outcome: Not Progressing  Goal: Readiness for Transition of Care  Outcome: Not Progressing     Problem: Diabetes Comorbidity  Goal: Blood Glucose Level Within Targeted Range  Outcome: Not Progressing     Problem: Sepsis/Septic Shock  Goal: Optimal Coping  Outcome: Not Progressing  Goal: Absence of Bleeding  Outcome: Not Progressing  Goal: Blood Glucose Level Within Targeted Range  Outcome: Not Progressing  Goal: Absence of Infection Signs and Symptoms  Outcome: Not Progressing  Goal: Optimal Nutrition Intake  Outcome: Not Progressing     Problem: Fall Injury Risk  Goal: Absence of Fall and Fall-Related Injury  Outcome: Not Progressing     Problem: Wound  Goal: Optimal Coping  Outcome: Not Progressing  Goal: Optimal Functional Ability  Outcome: Not Progressing  Goal: Absence of Infection Signs and Symptoms  Outcome: Not Progressing  Goal: Improved Oral Intake  Outcome: Not Progressing  Goal: Optimal Pain Control and Function  Outcome: Not Progressing  Goal: Skin Health and Integrity  Outcome: Not Progressing  Goal: Optimal Wound Healing  Outcome: Not Progressing     Problem: Gas Exchange Impaired  Goal: Optimal Gas Exchange  Outcome: Not Progressing     Problem: Hemodialysis  Goal: Safe, Effective Therapy Delivery  Outcome: Not Progressing  Goal: Effective Tissue Perfusion  Outcome: Not Progressing  Goal: Absence of Infection Signs and Symptoms  Outcome: Not Progressing     Problem: Skin Injury Risk Increased  Goal: Skin Health and Integrity  Outcome: Not Progressing     Problem: Pneumonia  Goal: Fluid Balance  Outcome: Not Progressing  Goal: Resolution of Infection Signs and Symptoms  Outcome: Not Progressing  Goal: Effective Oxygenation  and Ventilation  Outcome: Not Progressing     Problem: Restraint, Nonviolent  Goal: Absence of Harm or Injury  Outcome: Not Progressing

## 2024-06-01 NOTE — PROGRESS NOTES
Patient opens his eyes and looked at my direction at 1 point during evaluation but other than this had no in her activity and was nonverbal    Physical exam general the patient is chronically ill-appearing, heart is regular rate and rhythm, he has no pitting edema, lungs are clear to auscultation anteriorly, abdomen is soft with positive bowel sounds     Assessment/plan 1.  ESRD-continue HD support  2.  Hyponatremia-patient's sodium was 133, continue to monitor   Three.  Anemia-patient's hematocrit is 27%  4. Hyperlipidemia-patient is on Lipitor   5. Secondary hyperparathyroidism-patient is on Renvela  6.  Parapneumonic loculations associated with the right-sided pleural effusion-patient is getting intrapleural fibrinolytic therapy  7.  Failure to thrive-patient is very emaciated looking

## 2024-06-01 NOTE — ASSESSMENT & PLAN NOTE
Creatine stable for now. BMP reviewed- noted Estimated Creatinine Clearance: 8.5 mL/min (A) (based on SCr of 5.21 mg/dL (H)). according to latest data. 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    Nephrology consulted to resume HD sessions.

## 2024-06-01 NOTE — PROCEDURES
"Joseph Mcdonald is a 89 y.o. male patient.    Temp: 97.5 °F (36.4 °C) (06/01/24 0801)  Pulse: 65 (06/01/24 0801)  Resp: 16 (06/01/24 0801)  BP: (!) 137/43 (06/01/24 0801)  SpO2: 100 % (06/01/24 0801)  Weight: 64.4 kg (141 lb 15.6 oz) (05/29/24 0725)  Height: 5' 5" (165.1 cm) (05/29/24 0725)       Procedures    6/1/2024  INTRAPLEURAL FIBRINOLYTIC THERAPY INSTILLATION, SUBSEQUENT    Indication:  Loculated parapneumonic right-sided pleural effusion  Time clamped:0920  Time unclamped:tentatively 1020     Intrapleural fibrinolytic (tPA + DNase) dose number:  5     At bedside, 10 mg of alteplase (diluted in 0.9% sodium chloride for a total volume of 30 mL) in addition to dornase pati (5 mg diluted in sterile water 30 mL) was administered into the pleural space via the patient's chest tube in a sterile fashion. Allowed to dwell for 1 hour with clamping at three way stop on pigtail.   The patient tolerated the procedure well.    PLAN:  - improving CXR, output 990/24hrs  - unclamp in 1 hour, timer set by nursing  - document Is/Os  - maintain to -20cc suction following unclamping  - cont BID dosing to complete 6 doses  - daily CXR  - STAT physician notification for respiratory distress, dislodgement or hemodynamic instability  "

## 2024-06-01 NOTE — ASSESSMENT & PLAN NOTE
Chronic, controlled. Latest blood pressure and vitals reviewed-     Temp:  [97.4 °F (36.3 °C)-99.8 °F (37.7 °C)]   Pulse:  [61-74]   Resp:  [16-20]   BP: (137-170)/(43-73)   SpO2:  [96 %-100 %] .   Home meds for hypertension were reviewed and noted below.   Hypertension Medications               amLODIPine (NORVASC) 5 MG tablet Take 5 mg by mouth once daily.    hydrALAZINE (APRESOLINE) 100 MG tablet Take 1 tablet by mouth 3 (three) times daily with meals.    nebivoloL (BYSTOLIC) 2.5 MG Tab Take 2.5 mg by mouth once daily.            While in the hospital, will manage blood pressure as follows; hold meds given hypotension.     Will utilize p.r.n. blood pressure medication only if patient's blood pressure greater than 180/110 and he develops symptoms such as worsening chest pain or shortness of breath.

## 2024-06-01 NOTE — PROGRESS NOTES
[facility] Medicine  Progress Note    Patient Name: Joseph Mcdonald  MRN: [id number]  Patient Class: IP- Inpatient   Admission Date: 5/23/2024  Length of Stay: 9 days  Attending Physician: Jerome Salas MD  Primary Care Provider: Clinic, [facility]        Subjective:     Principal Problem:Acute hypoxic respiratory failure        HPI:    89-year-old  male With a past medical history of end-stage renal disease, hypertension, iron deficiency anemia, secondary hyperparathyroidism, type 2 diabetes, CAD, heart failure with preserved ejection fraction, hypokalemia inability presents to the ED with 1 week worsening shortness of breath.  This was particularly bad during his dialysis session this morning.  Due to this daughter brought him to the ED thereafter.  He denies any fever, chills, cough, wheezing, sputum production, palpitations.  He has been take medication as in his not missed any dialysis sessions.  He denies any nausea, vomiting, diarrhea, dysuria.  Imaging in the ED revealed near-complete he had a pacemaker patient of the right hemithorax review of systems otherwise negative.    Overview/Hospital Course:   5/24 1.5 L removed from right pleural space today.  Still has large right-sided effusion.  We will attempt to get some more off tomorrow.  5/25 dialyzing today  5/26 bedside ultrasound today still has large pleural effusion, complex appearing.  Was going to attempt bedside ultrasound but given complex nature have consulted pulmonology  5/27 D/W Pulm, plan for chest tube placement.   5/28- Midodrine added low BP.   5/29- s/p chest tube placement per Dr. Rodney, fluid studies sent.   5/30- BP low again, midodrine resumed. Started on intra-pleural lytics per Pulm.  5/31- Intra-pleural lytics continued, CXR improved.   6/1- Failed swallow and is with NG now and tube feeds. Intra-pleural lytics (total 6 doses) resume.     Interval History: Patient seen  and examined at the bedside, now has NG tube as he failed his swallow, continues to be very frail and weak.     Review of Systems   Respiratory:  Positive for shortness of breath.      Objective:     Vital Signs (Most Recent):  Temp: 97.5 °F (36.4 °C) (06/01/24 0801)  Pulse: 65 (06/01/24 0801)  Resp: 16 (06/01/24 0801)  BP: (!) 137/43 (06/01/24 0801)  SpO2: 100 % (06/01/24 0801) Vital Signs (24h Range):  Temp:  [97.4 °F (36.3 °C)-99.8 °F (37.7 °C)] 97.5 °F (36.4 °C)  Pulse:  [61-74] 65  Resp:  [16-20] 16  SpO2:  [96 %-100 %] 100 %  BP: (137-170)/(43-73) 137/43     Weight: 64.4 kg (141 lb 15.6 oz)  Body mass index is 23.63 kg/m².    Intake/Output Summary (Last 24 hours) at 6/1/2024 1018  Last data filed at 6/1/2024 0608  Gross per 24 hour   Intake 110.55 ml   Output 990 ml   Net -879.45 ml         Physical Exam  Vitals and nursing note reviewed.   Constitutional:       Appearance: He is ill-appearing.   HENT:      Head: Normocephalic and atraumatic.      Nose: Nose normal.      Mouth/Throat:      Mouth: Mucous membranes are moist.   Eyes:      Extraocular Movements: Extraocular movements intact.   Cardiovascular:      Rate and Rhythm: Normal rate and regular rhythm.      Pulses: Normal pulses.      Heart sounds: Normal heart sounds.   Pulmonary:      Effort: Pulmonary effort is normal.      Breath sounds: Examination of the right-middle field reveals decreased breath sounds. Examination of the right-lower field reveals decreased breath sounds. Decreased breath sounds present.      Comments: Chest tube in place.   Abdominal:      General: Bowel sounds are normal.      Palpations: Abdomen is soft.      Comments: NG in place.    Musculoskeletal:         General: Normal range of motion.      Cervical back: Normal range of motion.   Skin:     General: Skin is warm.   Neurological:      General: No focal deficit present.      Mental Status: He is alert and oriented to person, place, and time. Mental status is at baseline.    Psychiatric:         Mood and Affect: Mood normal.         Behavior: Behavior normal.             Significant Labs: All pertinent labs within the past 24 hours have been reviewed.    Significant Imaging: I have reviewed all pertinent imaging results/findings within the past 24 hours.    Assessment/Plan:      * Acute hypoxic respiratory failure  Sec to pleural effusion.  Require 2L NC since admission.   Imaging consistent with large right sided pleural effusion.   S/p thoracentesis on 5/23 with 1,5L fluid removed, no pleural fluid studies sent.   Remained symptomatic so CT chest obtained on 5/24 and consistent with large effusion.  Pulmonology consulted; s/p chest tube placement (5/29), BID intra-pleural lytics started on 5/30 (plan for total 6 doses).  Volume removal with HD, continue broad spectrum antibiotics.   Monitor oxygen status.   Home O2 eval prior to discharge.     Parapneumonic effusion  Patient found to have large pleural effusion on imaging. I have personally reviewed and interpreted the following imaging: Xray. A thoracentesis was ordered. Most likely etiology includes Congestive Heart Failure, Pneumonia, and Renal Failure. Management to include Repeat Thoracentesis and Dialysis  S/p thoracentesis on 5/23- 1.5L fluid removed.  S/p chest tube placement (5/29), cytology negative for malignancy, fluid appears exudate in nature, cultures NGTD.   Intra-pleural lytics BID started per Pulm (5/30).   CXR improving.   Continue IV Zosyn, discontinued vancomycin given negative MRSA PCR.     Sepsis  This patient does have evidence of infective focus  My overall impression is sepsis.  Source: Respiratory  Antibiotics given-   Antibiotics (72h ago, onward)      Start     Stop Route Frequency Ordered    05/29/24 1800  piperacillin-tazobactam (ZOSYN) 4.5 g in dextrose 5 % in water (D5W) 100 mL IVPB (MB+)         -- IV Every 12 hours (non-standard times) 05/29/24 0749          Blood cultures NGTD.  Follow pleural  fluid cultures.   Source control achieved by: antibiotics.           Oropharyngeal dysphagia  SLP consulted, failed swallow eval.  Now NPO, NG inserted for feed and meds.        Acute on chronic heart failure with preserved ejection fraction  Patient is identified as having Diastolic (HFpEF) heart failure that is Acute on chronic. CHF is currently uncontrolled due to Pulmonary edema/pleural effusion on CXR. Latest ECHO performed and demonstrates- Results for orders placed during the hospital encounter of 08/01/23    Echo    Interpretation Summary    Left Ventricle: The left ventricle is normal in size. Increased wall thickness. There is concentric remodeling. Normal wall motion. There is normal systolic function with a visually estimated ejection fraction of 55 - 60%. Grade II diastolic dysfunction.    Left Atrium: Left atrium is mildly dilated. There is a calcified 1.5 x 2 cm full wall thickness mass noted extending from the myocardium into the pericardial space, unclear etiology, recommend cardiac MRI.    Right Ventricle: Normal right ventricular cavity size. Systolic function is normal.    Aortic Valve: The aortic valve is a trileaflet valve. There is physiologically normal aortic regurgitation.    Mitral Valve: There is no stenosis. The mean pressure gradient across the mitral valve is 3 mmHg at a heart rate of  bpm. There is mild regurgitation with a centrally directed jet.    Tricuspid Valve: There is mild to moderate regurgitation with a centrally directed jet.    Pulmonic Valve: There is no significant stenosis.    Pericardium: Small circumferential pericardial effusion present. No indication of cardiac tamponade.  . Continue Nitrate/Vasodilator and monitor clinical status closely. Monitor on telemetry. Patient is off CHF pathway.  Monitor strict Is&Os and daily weights.  Place on fluid restriction of 1.5 L. Cardiology has not been consulted. Continue to stress to patient importance of self efficacy and  " on diet for CHF. Last BNP reviewed- and noted below No results for input(s): "BNP", "BNPTRIAGEBLO" in the last 168 hours.   Volume removal per HD.     Loculated pleural effusion  As above.       Hemodialysis-associated hypotension  Added midodrine 5mg BID, adjusted to 2.5 BID given more stable BP.       Debility  Patient with Acute on chronic debility due to age-related physical debility. Latest AMPAC and GEMS scores have not been reviewed. Evaluation for etiology is complete.   Plan includes PT/OT consulted.  Home health on discharge.       Anemia of renal disease  Patient's anemia is currently uncontrolled. Has not received any PRBCs to date. Etiology likely d/t chronic disease due to ESRD  Current CBC reviewed-   Lab Results   Component Value Date    HGB 8.2 (L) 06/01/2024    HCT 26.6 (L) 06/01/2024     Monitor serial CBC and transfuse if patient becomes hemodynamically unstable, symptomatic or H/H drops below 7/21.        CAD (coronary artery disease)  Patient with known CAD s/p  unclear , which is controlled Will continue Statin and monitor for S/Sx of angina/ACS. Continue to monitor on telemetry.       DM2 (diabetes mellitus, type 2)  HBA1c 4.5  No need for SSI or POC glucose check.     Secondary hyperparathyroidism of renal origin  Resume home Renvela.       Essential (primary) hypertension  Chronic, controlled. Latest blood pressure and vitals reviewed-     Temp:  [97.4 °F (36.3 °C)-99.8 °F (37.7 °C)]   Pulse:  [61-74]   Resp:  [16-20]   BP: (137-170)/(43-73)   SpO2:  [96 %-100 %] .   Home meds for hypertension were reviewed and noted below.   Hypertension Medications               amLODIPine (NORVASC) 5 MG tablet Take 5 mg by mouth once daily.    hydrALAZINE (APRESOLINE) 100 MG tablet Take 1 tablet by mouth 3 (three) times daily with meals.    nebivoloL (BYSTOLIC) 2.5 MG Tab Take 2.5 mg by mouth once daily.            While in the hospital, will manage blood pressure as follows; hold meds given " hypotension.     Will utilize p.r.n. blood pressure medication only if patient's blood pressure greater than 180/110 and he develops symptoms such as worsening chest pain or shortness of breath.      End stage renal disease  Creatine stable for now. BMP reviewed- noted Estimated Creatinine Clearance: 8.5 mL/min (A) (based on SCr of 5.21 mg/dL (H)). according to latest data. 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    Nephrology consulted to resume HD sessions.       VTE Risk Mitigation (From admission, onward)           Ordered     IP VTE HIGH RISK PATIENT  Once         05/23/24 1746     Place sequential compression device  Until discontinued         05/23/24 1746                    Discharge Planning   BRIAN: 6/4/2024     Code Status: Full Code   Is the patient medically ready for discharge?:     Reason for patient still in hospital (select all that apply): Patient trending condition and Consult recommendations  Discharge Plan A: Home, Home Health                  MADHU PASCUAL MD  Department of Hospital Medicine   [facility]

## 2024-06-02 PROBLEM — G93.41 ACUTE METABOLIC ENCEPHALOPATHY: Status: ACTIVE | Noted: 2024-01-01

## 2024-06-02 NOTE — PROGRESS NOTES
Patient did not awaken her have any interaction this day during interview    Physical exam general the patient is chronically ill-appearing, heart is regular rate and rhythm, he has no pitting edema, lungs are clear to auscultation anteriorly, abdomen is soft with positive bowel sounds     Assessment/plan 1.  ESRD-continue HD support  2.  Hyponatremia-patient's sodium was 132, stable  Three.  Anemia-patient's hematocrit is 28 %  4. Hyperlipidemia-patient is on Lipitor   5. Secondary hyperparathyroidism-patient is on Renvela  6.  Parapneumonic loculations associated with the right-sided pleural effusion-patient is getting intrapleural fibrinolytic therapy  7.  Failure to thrive-patient is very emaciated

## 2024-06-02 NOTE — SUBJECTIVE & OBJECTIVE
Interval History: Patient seen and examined at the bedside, now has NG tube as he failed his swallow, continues to be very frail and weak.     Review of Systems   Respiratory:  Positive for shortness of breath.      Objective:     Vital Signs (Most Recent):  Temp: 97.8 °F (36.6 °C) (06/02/24 1151)  Pulse: 63 (06/02/24 1151)  Resp: 16 (06/02/24 1151)  BP: (!) 148/63 (06/02/24 1151)  SpO2: 100 % (06/02/24 1151) Vital Signs (24h Range):  Temp:  [97.5 °F (36.4 °C)-98.3 °F (36.8 °C)] 97.8 °F (36.6 °C)  Pulse:  [63-78] 63  Resp:  [16-19] 16  SpO2:  [95 %-100 %] 100 %  BP: ()/(24-67) 148/63     Weight: 64.4 kg (141 lb 15.6 oz)  Body mass index is 23.63 kg/m².    Intake/Output Summary (Last 24 hours) at 6/2/2024 1334  Last data filed at 6/2/2024 0745  Gross per 24 hour   Intake 483 ml   Output 2450 ml   Net -1967 ml         Physical Exam  Vitals and nursing note reviewed.   Constitutional:       Appearance: He is ill-appearing.   HENT:      Head: Normocephalic and atraumatic.      Nose: Nose normal.      Mouth/Throat:      Mouth: Mucous membranes are moist.   Eyes:      Extraocular Movements: Extraocular movements intact.   Cardiovascular:      Rate and Rhythm: Normal rate and regular rhythm.      Pulses: Normal pulses.      Heart sounds: Normal heart sounds.   Pulmonary:      Effort: Pulmonary effort is normal.      Breath sounds: Examination of the right-middle field reveals decreased breath sounds. Examination of the right-lower field reveals decreased breath sounds. Decreased breath sounds present.      Comments: Chest tube in place.   Abdominal:      General: Bowel sounds are normal.      Palpations: Abdomen is soft.      Comments: NG in place.    Musculoskeletal:         General: Normal range of motion.      Cervical back: Normal range of motion.   Skin:     General: Skin is warm.   Neurological:      General: No focal deficit present.      Mental Status: He is alert and oriented to person, place, and time.  Mental status is at baseline.   Psychiatric:         Mood and Affect: Mood normal.         Behavior: Behavior normal.             Significant Labs: All pertinent labs within the past 24 hours have been reviewed.    Significant Imaging: I have reviewed all pertinent imaging results/findings within the past 24 hours.

## 2024-06-02 NOTE — ASSESSMENT & PLAN NOTE
Chronic, controlled. Latest blood pressure and vitals reviewed-     Temp:  [97.5 °F (36.4 °C)-98.3 °F (36.8 °C)]   Pulse:  [63-78]   Resp:  [16-19]   BP: ()/(24-67)   SpO2:  [95 %-100 %] .   Home meds for hypertension were reviewed and noted below.   Hypertension Medications               amLODIPine (NORVASC) 5 MG tablet Take 5 mg by mouth once daily.    hydrALAZINE (APRESOLINE) 100 MG tablet Take 1 tablet by mouth 3 (three) times daily with meals.    nebivoloL (BYSTOLIC) 2.5 MG Tab Take 2.5 mg by mouth once daily.            While in the hospital, will manage blood pressure as follows; hold meds given hypotension.     Will utilize p.r.n. blood pressure medication only if patient's blood pressure greater than 180/110 and he develops symptoms such as worsening chest pain or shortness of breath.

## 2024-06-02 NOTE — NURSING
1944 Gastric tube XR not yet resulted. Called down to Xray and spoke with Emily to see why results are taking so long. Waiting to hear back. STEVE. Care ongoing.

## 2024-06-02 NOTE — PLAN OF CARE
Problem: Adult Inpatient Plan of Care  Goal: Absence of Hospital-Acquired Illness or Injury  Outcome: Progressing     Problem: Adult Inpatient Plan of Care  Goal: Optimal Comfort and Wellbeing  Outcome: Progressing     Problem: Sepsis/Septic Shock  Goal: Optimal Coping  Outcome: Progressing  Goal: Absence of Bleeding  Outcome: Progressing  Goal: Blood Glucose Level Within Targeted Range  Outcome: Progressing  Goal: Absence of Infection Signs and Symptoms  Outcome: Progressing  Goal: Optimal Nutrition Intake  Outcome: Progressing     Problem: Fall Injury Risk  Goal: Absence of Fall and Fall-Related Injury  Outcome: Progressing     Problem: Wound  Goal: Optimal Coping  Outcome: Progressing  Goal: Optimal Functional Ability  Outcome: Progressing  Goal: Absence of Infection Signs and Symptoms  Outcome: Progressing  Goal: Improved Oral Intake  Outcome: Progressing  Goal: Optimal Pain Control and Function  Outcome: Progressing  Goal: Skin Health and Integrity  Outcome: Progressing  Goal: Optimal Wound Healing  Outcome: Progressing     Problem: Gas Exchange Impaired  Goal: Optimal Gas Exchange  Outcome: Progressing     Problem: Hemodialysis  Goal: Safe, Effective Therapy Delivery  Outcome: Progressing  Goal: Effective Tissue Perfusion  Outcome: Progressing  Goal: Absence of Infection Signs and Symptoms  Outcome: Progressing     Problem: Skin Injury Risk Increased  Goal: Skin Health and Integrity  Outcome: Progressing     Problem: Pneumonia  Goal: Fluid Balance  Outcome: Progressing  Goal: Resolution of Infection Signs and Symptoms  Outcome: Progressing  Goal: Effective Oxygenation and Ventilation  Outcome: Progressing

## 2024-06-02 NOTE — PROGRESS NOTES
[facility] Hospital Medicine  Progress Note    Patient Name: Joseph Mcdonald  MRN: [id number]  Patient Class: IP- Inpatient   Admission Date: 5/23/2024  Length of Stay: 10 days  Attending Physician: Jerome Salas MD  Primary Care Provider: Clinic, [facility]        Subjective:     Principal Problem:Acute hypoxic respiratory failure        HPI:    89-year-old  male With a past medical history of end-stage renal disease, hypertension, iron deficiency anemia, secondary hyperparathyroidism, type 2 diabetes, CAD, heart failure with preserved ejection fraction, hypokalemia inability presents to the ED with 1 week worsening shortness of breath.  This was particularly bad during his dialysis session this morning.  Due to this daughter brought him to the ED thereafter.  He denies any fever, chills, cough, wheezing, sputum production, palpitations.  He has been take medication as in his not missed any dialysis sessions.  He denies any nausea, vomiting, diarrhea, dysuria.  Imaging in the ED revealed near-complete he had a pacemaker patient of the right hemithorax review of systems otherwise negative.    Overview/Hospital Course:   5/24 1.5 L removed from right pleural space today.  Still has large right-sided effusion.  We will attempt to get some more off tomorrow.  5/25 dialyzing today  5/26 bedside ultrasound today still has large pleural effusion, complex appearing.  Was going to attempt bedside ultrasound but given complex nature have consulted pulmonology  5/27 D/W Pulm, plan for chest tube placement.   5/28- Midodrine added low BP.   5/29- s/p chest tube placement per Dr. Rodney, fluid studies sent.   5/30- BP low again, midodrine resumed. Started on intra-pleural lytics per Pulm.  5/31- Intra-pleural lytics continued, CXR improved.   6/1- Failed swallow and is with NG now and tube feeds. S/p 6 doses of Intra-pleural lytics (total 6 doses).   6/2- Mental status  better, resp status stable.    Interval History: Patient seen and examined at the bedside, now has NG tube as he failed his swallow, continues to be very frail and weak.     Review of Systems   Respiratory:  Positive for shortness of breath.      Objective:     Vital Signs (Most Recent):  Temp: 97.8 °F (36.6 °C) (06/02/24 1151)  Pulse: 63 (06/02/24 1151)  Resp: 16 (06/02/24 1151)  BP: (!) 148/63 (06/02/24 1151)  SpO2: 100 % (06/02/24 1151) Vital Signs (24h Range):  Temp:  [97.5 °F (36.4 °C)-98.3 °F (36.8 °C)] 97.8 °F (36.6 °C)  Pulse:  [63-78] 63  Resp:  [16-19] 16  SpO2:  [95 %-100 %] 100 %  BP: ()/(24-67) 148/63     Weight: 64.4 kg (141 lb 15.6 oz)  Body mass index is 23.63 kg/m².    Intake/Output Summary (Last 24 hours) at 6/2/2024 1334  Last data filed at 6/2/2024 0745  Gross per 24 hour   Intake 483 ml   Output 2450 ml   Net -1967 ml         Physical Exam  Vitals and nursing note reviewed.   Constitutional:       Appearance: He is ill-appearing.   HENT:      Head: Normocephalic and atraumatic.      Nose: Nose normal.      Mouth/Throat:      Mouth: Mucous membranes are moist.   Eyes:      Extraocular Movements: Extraocular movements intact.   Cardiovascular:      Rate and Rhythm: Normal rate and regular rhythm.      Pulses: Normal pulses.      Heart sounds: Normal heart sounds.   Pulmonary:      Effort: Pulmonary effort is normal.      Breath sounds: Examination of the right-middle field reveals decreased breath sounds. Examination of the right-lower field reveals decreased breath sounds. Decreased breath sounds present.      Comments: Chest tube in place.   Abdominal:      General: Bowel sounds are normal.      Palpations: Abdomen is soft.      Comments: NG in place.    Musculoskeletal:         General: Normal range of motion.      Cervical back: Normal range of motion.   Skin:     General: Skin is warm.   Neurological:      General: No focal deficit present.      Mental Status: He is alert and oriented  to person, place, and time. Mental status is at baseline.   Psychiatric:         Mood and Affect: Mood normal.         Behavior: Behavior normal.             Significant Labs: All pertinent labs within the past 24 hours have been reviewed.    Significant Imaging: I have reviewed all pertinent imaging results/findings within the past 24 hours.    Assessment/Plan:      * Acute hypoxic respiratory failure  Sec to pleural effusion.  Require 2L NC since admission.   Imaging consistent with large right sided pleural effusion.   S/p thoracentesis on 5/23 with 1,5L fluid removed, no pleural fluid studies sent.   Remained symptomatic so CT chest obtained on 5/24 and consistent with large effusion.  Pulmonology consulted; s/p chest tube placement (5/29), s/p intra-pleural lytics started on 5/30 (total 6 doses).  Volume removal with HD, continue broad spectrum antibiotics.   Monitor oxygen status.   Home O2 eval prior to discharge.     Parapneumonic effusion  Patient found to have large pleural effusion on imaging. I have personally reviewed and interpreted the following imaging: Xray. A thoracentesis was ordered. Most likely etiology includes Congestive Heart Failure, Pneumonia, and Renal Failure. Management to include Repeat Thoracentesis and Dialysis  S/p thoracentesis on 5/23- 1.5L fluid removed.  S/p chest tube placement (5/29), cytology negative for malignancy, fluid appears exudate in nature, cultures NGTD.   S/p 6 doses of Intra-pleural lytics BID started per Pulm (5/30-6/2).   CXR mostly stable. .   Continue IV Zosyn, discontinued vancomycin given negative MRSA PCR.   Pulm follows.     Sepsis  This patient does have evidence of infective focus  My overall impression is sepsis.  Source: Respiratory  Antibiotics given-   Antibiotics (72h ago, onward)      Start     Stop Route Frequency Ordered    05/29/24 1800  piperacillin-tazobactam (ZOSYN) 4.5 g in dextrose 5 % in water (D5W) 100 mL IVPB (MB+)         -- IV Every 12  hours (non-standard times) 05/29/24 0749          Blood cultures NGTD.  Follow pleural fluid cultures.   Source control achieved by: antibiotics.           Oropharyngeal dysphagia  SLP consulted, failed swallow eval.  Now NPO, NG inserted for feed and meds.    SLP to follow.       Acute on chronic heart failure with preserved ejection fraction  Patient is identified as having Diastolic (HFpEF) heart failure that is Acute on chronic. CHF is currently uncontrolled due to Pulmonary edema/pleural effusion on CXR. Latest ECHO performed and demonstrates- Results for orders placed during the hospital encounter of 08/01/23    Echo    Interpretation Summary    Left Ventricle: The left ventricle is normal in size. Increased wall thickness. There is concentric remodeling. Normal wall motion. There is normal systolic function with a visually estimated ejection fraction of 55 - 60%. Grade II diastolic dysfunction.    Left Atrium: Left atrium is mildly dilated. There is a calcified 1.5 x 2 cm full wall thickness mass noted extending from the myocardium into the pericardial space, unclear etiology, recommend cardiac MRI.    Right Ventricle: Normal right ventricular cavity size. Systolic function is normal.    Aortic Valve: The aortic valve is a trileaflet valve. There is physiologically normal aortic regurgitation.    Mitral Valve: There is no stenosis. The mean pressure gradient across the mitral valve is 3 mmHg at a heart rate of  bpm. There is mild regurgitation with a centrally directed jet.    Tricuspid Valve: There is mild to moderate regurgitation with a centrally directed jet.    Pulmonic Valve: There is no significant stenosis.    Pericardium: Small circumferential pericardial effusion present. No indication of cardiac tamponade.  . Continue Nitrate/Vasodilator and monitor clinical status closely. Monitor on telemetry. Patient is off CHF pathway.  Monitor strict Is&Os and daily weights.  Place on fluid restriction of 1.5  "L. Cardiology has not been consulted. Continue to stress to patient importance of self efficacy and  on diet for CHF. Last BNP reviewed- and noted below No results for input(s): "BNP", "BNPTRIAGEBLO" in the last 168 hours.   Volume removal per HD.     Acute metabolic encephalopathy  Discontinued trazodone.  Management as above.  Requiring restraints to avoid treatment interruption.   Improving.       Loculated pleural effusion  As above.       Hemodialysis-associated hypotension  Added midodrine 5mg BID, adjusted to 2.5 BID given more stable BP.       Debility  Patient with Acute on chronic debility due to age-related physical debility. Latest AMPAC and GEMS scores have not been reviewed. Evaluation for etiology is complete.   Plan includes PT/OT consulted.  Home health on discharge.         Anemia of renal disease  Patient's anemia is currently uncontrolled. Has not received any PRBCs to date. Etiology likely d/t chronic disease due to ESRD  Current CBC reviewed-   Lab Results   Component Value Date    HGB 8.5 (L) 06/02/2024    HCT 28.0 (L) 06/02/2024     Monitor serial CBC and transfuse if patient becomes hemodynamically unstable, symptomatic or H/H drops below 7/21.        CAD (coronary artery disease)  Patient with known CAD s/p  unclear , which is controlled Will continue Statin and monitor for S/Sx of angina/ACS. Continue to monitor on telemetry.       DM2 (diabetes mellitus, type 2)  HBA1c 4.5  No need for SSI or POC glucose check.     Secondary hyperparathyroidism of renal origin  Resume home Renvela.       Essential (primary) hypertension  Chronic, controlled. Latest blood pressure and vitals reviewed-     Temp:  [97.5 °F (36.4 °C)-98.3 °F (36.8 °C)]   Pulse:  [63-78]   Resp:  [16-19]   BP: ()/(24-67)   SpO2:  [95 %-100 %] .   Home meds for hypertension were reviewed and noted below.   Hypertension Medications               amLODIPine (NORVASC) 5 MG tablet Take 5 mg by mouth once daily.    " hydrALAZINE (APRESOLINE) 100 MG tablet Take 1 tablet by mouth 3 (three) times daily with meals.    nebivoloL (BYSTOLIC) 2.5 MG Tab Take 2.5 mg by mouth once daily.            While in the hospital, will manage blood pressure as follows; hold meds given hypotension.     Will utilize p.r.n. blood pressure medication only if patient's blood pressure greater than 180/110 and he develops symptoms such as worsening chest pain or shortness of breath.      End stage renal disease  Creatine stable for now. BMP reviewed- noted Estimated Creatinine Clearance: 10.3 mL/min (A) (based on SCr of 4.33 mg/dL (H)). according to latest data. 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    Nephrology consulted to resume HD sessions.       VTE Risk Mitigation (From admission, onward)           Ordered     IP VTE HIGH RISK PATIENT  Once         05/23/24 1746     Place sequential compression device  Until discontinued         05/23/24 1746                    Discharge Planning   BRIAN: 6/4/2024     Code Status: Full Code   Is the patient medically ready for discharge?:     Reason for patient still in hospital (select all that apply): Patient trending condition and Consult recommendations  Discharge Plan A: Home, Home Health                  MADHU PASCUAL MD  Department of Hospital Medicine   [facility]

## 2024-06-02 NOTE — ASSESSMENT & PLAN NOTE
Patient's anemia is currently uncontrolled. Has not received any PRBCs to date. Etiology likely d/t chronic disease due to ESRD  Current CBC reviewed-   Lab Results   Component Value Date    HGB 8.5 (L) 06/02/2024    HCT 28.0 (L) 06/02/2024     Monitor serial CBC and transfuse if patient becomes hemodynamically unstable, symptomatic or H/H drops below 7/21.

## 2024-06-02 NOTE — ASSESSMENT & PLAN NOTE
Sec to pleural effusion.  Require 2L NC since admission.   Imaging consistent with large right sided pleural effusion.   S/p thoracentesis on 5/23 with 1,5L fluid removed, no pleural fluid studies sent.   Remained symptomatic so CT chest obtained on 5/24 and consistent with large effusion.  Pulmonology consulted; s/p chest tube placement (5/29), s/p intra-pleural lytics started on 5/30 (total 6 doses).  Volume removal with HD, continue broad spectrum antibiotics.   Monitor oxygen status.   Home O2 eval prior to discharge.

## 2024-06-02 NOTE — ASSESSMENT & PLAN NOTE
Creatine stable for now. BMP reviewed- noted Estimated Creatinine Clearance: 10.3 mL/min (A) (based on SCr of 4.33 mg/dL (H)). according to latest data. 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    Nephrology consulted to resume HD sessions.

## 2024-06-02 NOTE — ASSESSMENT & PLAN NOTE
Discontinued trazodone.  Management as above.  Requiring restraints to avoid treatment interruption.   Improving.

## 2024-06-02 NOTE — PLAN OF CARE
Problem: Adult Inpatient Plan of Care  Goal: Plan of Care Review  Outcome: Progressing  Goal: Patient-Specific Goal (Individualized)  Outcome: Progressing  Goal: Absence of Hospital-Acquired Illness or Injury  Outcome: Progressing  Goal: Optimal Comfort and Wellbeing  Outcome: Progressing  Goal: Readiness for Transition of Care  Outcome: Progressing     Problem: Diabetes Comorbidity  Goal: Blood Glucose Level Within Targeted Range  Outcome: Progressing     Problem: Sepsis/Septic Shock  Goal: Optimal Coping  Outcome: Progressing  Goal: Absence of Bleeding  Outcome: Progressing  Goal: Blood Glucose Level Within Targeted Range  Outcome: Progressing  Goal: Absence of Infection Signs and Symptoms  Outcome: Progressing  Goal: Optimal Nutrition Intake  Outcome: Progressing     Problem: Fall Injury Risk  Goal: Absence of Fall and Fall-Related Injury  Outcome: Progressing     Problem: Wound  Goal: Optimal Coping  Outcome: Progressing  Goal: Optimal Functional Ability  Outcome: Progressing  Goal: Absence of Infection Signs and Symptoms  Outcome: Progressing  Goal: Improved Oral Intake  Outcome: Progressing  Goal: Optimal Pain Control and Function  Outcome: Progressing  Goal: Skin Health and Integrity  Outcome: Progressing  Goal: Optimal Wound Healing  Outcome: Progressing     Problem: Gas Exchange Impaired  Goal: Optimal Gas Exchange  Outcome: Progressing     Problem: Hemodialysis  Goal: Safe, Effective Therapy Delivery  Outcome: Progressing  Goal: Effective Tissue Perfusion  Outcome: Progressing  Goal: Absence of Infection Signs and Symptoms  Outcome: Progressing     Problem: Skin Injury Risk Increased  Goal: Skin Health and Integrity  Outcome: Progressing     Problem: Pneumonia  Goal: Fluid Balance  Outcome: Progressing  Goal: Resolution of Infection Signs and Symptoms  Outcome: Progressing  Goal: Effective Oxygenation and Ventilation  Outcome: Progressing     Problem: Restraint, Nonviolent  Goal: Absence of Harm or  Injury  Outcome: Progressing

## 2024-06-02 NOTE — ASSESSMENT & PLAN NOTE
Patient found to have large pleural effusion on imaging. I have personally reviewed and interpreted the following imaging: Xray. A thoracentesis was ordered. Most likely etiology includes Congestive Heart Failure, Pneumonia, and Renal Failure. Management to include Repeat Thoracentesis and Dialysis  S/p thoracentesis on 5/23- 1.5L fluid removed.  S/p chest tube placement (5/29), cytology negative for malignancy, fluid appears exudate in nature, cultures NGTD.   S/p 6 doses of Intra-pleural lytics BID started per Pulm (5/30-6/2).   CXR mostly stable. .   Continue IV Zosyn, discontinued vancomycin given negative MRSA PCR.   Pulm follows.

## 2024-06-03 NOTE — ASSESSMENT & PLAN NOTE
Patient's anemia is currently uncontrolled. Has not received any PRBCs to date. Etiology likely d/t chronic disease due to ESRD  Current CBC reviewed-   Lab Results   Component Value Date    HGB 8.2 (L) 06/03/2024    HCT 26.9 (L) 06/03/2024     Monitor serial CBC and transfuse if patient becomes hemodynamically unstable, symptomatic or H/H drops below 7/21.

## 2024-06-03 NOTE — PLAN OF CARE
06/03/24 @ 1508 - [employer] requested peer to peer. D/C plans placed on hold until Pulmonary completes their recommendation. Rula @ [facility] advised. May attempt placement @ [facility]. CM will continue to follow for d/c needs.     06/03/24 @ 1257 - Per morning huddle - Pt's chest tube still in place; requires speech eval. Pt's Cxr still concerning, per MD. Pt is not medically ready for d/c at this time. Approval for [facility] is still pending, as well. CM will continue to follow.

## 2024-06-03 NOTE — PT/OT/SLP PROGRESS
Physical Therapy Treatment    Patient Name:  Joseph Mcdonald   MRN:  [id number]    Recommendations:     Discharge Recommendations: Moderate Intensity Therapy  Discharge Equipment Recommendations: to be determined by next level of care  Barriers to discharge:  ongoing medical treatment    Assessment:     Joseph Mcdonald is a 89 y.o. male admitted with a medical diagnosis of Acute hypoxic respiratory failure.  He presents with the following impairments/functional limitations: weakness, impaired endurance, impaired self care skills, impaired functional mobility, impaired balance, edema, impaired skin, impaired cognition.    Pt with poor activity tolerance and required increased assist with bed mobs and to maintain balance sitting EOB.  Pt now in restraints and NG tube is pulled out upon entering room with RN informed of same    Rehab Prognosis: Fair and Poor; patient would benefit from acute skilled PT services to address these deficits and reach maximum level of function.    Recent Surgery: * No surgery found *      Plan:     During this hospitalization, patient to be seen 5 x/week to address the identified rehab impairments via gait training, therapeutic activities, therapeutic exercises and progress toward the following goals:    Plan of Care Expires:  06/24/24    Subjective     Chief Complaint: acute hypoxic resp failure  Patient/Family Comments/goals: pt lethargic this AM  Pain/Comfort:         Objective:     Communicated with Christie Leavitt RN prior to session.  Patient found HOB elevated with restraints, chest tube, telemetry, oxygen, peripheral IV upon PT entry to room.     General Precautions: Standard, fall  Orthopedic Precautions: N/A  Braces: N/A  Respiratory Status: Nasal cannula, flow 2 L/min     Functional Mobility:  Bed Mobility:     Scooting up in bed: maximal assistance and of 2 persons  Supine to Sit: maximal assistance of 2 persons to dependence of 2 persons and assist with trunk and B LE  management  Sit to Supine: dependence and of 2 persons       AM-PAC 6 CLICK MOBILITY  Turning over in bed (including adjusting bedclothes, sheets and blankets)?: 2  Sitting down on and standing up from a chair with arms (e.g., wheelchair, bedside commode, etc.): 1  Moving from lying on back to sitting on the side of the bed?: 2  Moving to and from a bed to a chair (including a wheelchair)?: 1  Need to walk in hospital room?: 1  Climbing 3-5 steps with a railing?: 1  Basic Mobility Total Score: 8       Treatment & Education:  Moderate assist to maximum assist sitting EOB x 18 minutes focusing on achieving/maintaining balance/midline positioning; slumped posture    Patient left HOB elevated with all lines intact, call button in reach, and bed alarm on..    GOALS:   Multidisciplinary Problems       Physical Therapy Goals          Problem: Physical Therapy    Goal Priority Disciplines Outcome Goal Variances Interventions   Physical Therapy Goal     PT, PT/OT Progressing     Description: Short Term Goals to be met by: 24    Patient will increase functional independence with mobility by performin. Supine to sit with Stand by assist  2. Sit to stand transfer with Stand by assist using Rolling walker  3. Bed to chair transfer with Stand by assist using Rolling walker  4. Gait  x 150 feet with Stand by assist using Rolling walker  5. Lower extremity exercise program x30 reps per handout, with assistance as needed    Long Term Goals to be met by: 24    Pt will regain full independent functional mobility with straight cane to return to home situation and prior activities of daily living.                        Time Tracking:     PT Received On: 24  PT Start Time: 1058     PT Stop Time: 1122  PT Total Time (min): 24 min     Billable Minutes: Therapeutic Activity 24    Treatment Type: Treatment  PT/PTA: PTA     Number of PTA visits since last PT visit: 3     2024

## 2024-06-03 NOTE — NURSING
2130 Chest tube drain system full. 290ml of serous fluid output since shift change.   2145 Drainage system changed at this time with ICU nurse assistance. See flowsheet for further details. 63ml of serous output after drainage box changed.

## 2024-06-03 NOTE — PT/OT/SLP PROGRESS
Occupational Therapy   Treatment    Name: Joseph Mcdonald  MRN: [id number]  Admitting Diagnosis:  Acute hypoxic respiratory failure       Recommendations:     Discharge Recommendations: Moderate Intensity Therapy  Discharge Equipment Recommendations:   (to be determined)  Barriers to discharge:       Assessment:     Joseph Mcdonald is a 89 y.o. male with a medical diagnosis of Acute hypoxic respiratory failure.  He presents with weakness and decline in ADLs. Performance deficits affecting function are weakness, impaired endurance, impaired functional mobility, impaired self care skills. Pt able to remain more alert to participate in OT tx than compared to previous tx session.     Rehab Prognosis:  Good; patient would benefit from acute skilled OT services to address these deficits and reach maximum level of function.       Plan:     Patient to be seen 5 x/week to address the above listed problems via self-care/home management, therapeutic activities, therapeutic exercises  Plan of Care Expires: 06/28/24  Plan of Care Reviewed with: patient    Subjective     Chief Complaint: acute hypoxic respiratory failure  Patient/Family Comments/goals: pt agreeable to OT tx  Pain/Comfort:  Pain Rating 1: 0/10    Objective:     Communicated with: RENATO Soriano prior to session.  Patient found HOB elevated with oxygen, NG tube, peripheral IV, telemetry, restraints, chest tube upon OT entry to room.    General Precautions: Standard, fall    Orthopedic Precautions:N/A  Braces: N/A  Respiratory Status: Nasal cannula, flow 3 L/min     Occupational Performance:     Bed Mobility:    Patient completed Scooting/Bridging with maximal assistance     Functional Mobility/Transfers:  Not performed    Activities of Daily Living:  Grooming: maximal assistance to perform oral care from bed      AMPA 6 Click ADL:      Treatment & Education:  Pt performed x 20 reps each AAROM shoulder flexion, ER/IR, chest press, and elbow flexion/extension in order to  improve BUE strength and endurance to perform ADL and ADL t/f with less assist.     Patient left HOB elevated with all lines intact, call button in reach, restraints reapplied at end of session, and RENATO Soriano notified    GOALS:   Multidisciplinary Problems       Occupational Therapy Goals          Problem: Occupational Therapy    Goal Priority Disciplines Outcome Interventions   Occupational Therapy Goal     OT, PT/OT Progressing    Description: STG:  Pt will perform grooming (I)  Pt will bathe with setup  Pt will perform UE dressing with (I)  Pt will perform LE dressing with I/mod I  Pt will transfer bed/chair/bsc with Mod I  Pt will perform standing task x 2 min with Mod I   Pt will tolerate 15 minutes of tx without fatigue      LT.Restore to max I with self care and mobility.                          Time Tracking:     OT Date of Treatment: 24  OT Start Time: 912  OT Stop Time: 936  OT Total Time (min): 24 min    Billable Minutes:Self Care/Home Management 8 minutes  Therapeutic Exercise 15 minutes    OT/QING: OT          6/3/2024

## 2024-06-03 NOTE — PROGRESS NOTES
[facility]  Nephrology  Progress Note    Patient Name: Joseph Mcdonald  MRN: [id number]  Admission Date: 5/23/2024  Hospital Length of Stay: 11 days  Attending Provider: Jerome Salas MD   Primary Care Physician: Prakash, [facility]  Principal Problem:Acute hypoxic respiratory failure    Consults  Subjective:     Interval History: F/U ESRD, right pleural effusion, failure to thrive    Review of patient's allergies indicates:   Allergen Reactions    Azithromycin Other (See Comments)     Note: - Phreesia 01/11/2019     Current Facility-Administered Medications   Medication Frequency    0.9%  NaCl infusion PRN    acetaminophen tablet 1,000 mg Q8H PRN    atorvastatin tablet 40 mg QHS    dextrose 10% bolus 125 mL 125 mL PRN    dextrose 10% bolus 250 mL 250 mL PRN    glucagon (human recombinant) injection 1 mg PRN    glucose chewable tablet 16 g PRN    glucose chewable tablet 24 g PRN    midodrine tablet 2.5 mg BID WM    naloxone 0.4 mg/mL injection 0.02 mg PRN    ondansetron disintegrating tablet 8 mg Q8H PRN    pantoprazole suspension 40 mg Daily    piperacillin-tazobactam (ZOSYN) 4.5 g in dextrose 5 % in water (D5W) 100 mL IVPB (MB+) Q12H    polyethylene glycol packet 17 g BID PRN    sevelamer carbonate pwpk 2.4 g TID WM    sodium chloride 0.9% 250 mL flush bag PRN    sodium chloride 0.9% flush 10 mL Q12H PRN       Objective:     Vital Signs (Most Recent):  Temp: 96.8 °F (36 °C) (06/03/24 1241)  Pulse: 70 (06/03/24 1241)  Resp: 16 (06/03/24 1241)  BP: (!) 191/49 (06/03/24 1241)  SpO2: 100 % (06/03/24 1241) Vital Signs (24h Range):  Temp:  [96.6 °F (35.9 °C)-98.3 °F (36.8 °C)] 96.8 °F (36 °C)  Pulse:  [60-96] 70  Resp:  [16-18] 16  SpO2:  [96 %-100 %] 100 %  BP: (108-191)/(33-67) 191/49     Weight: 64.4 kg (141 lb 15.6 oz) (05/29/24 0725)  Body mass index is 23.63 kg/m².  Body surface area is 1.72 meters squared.    I/O last 3 completed shifts:  In: 1624 [NG/GT:1624]  Out: 883 [Chest  Tube:883]    Physical Exam Very thin, alert and does give weak short answers to questions. Chest tube in place on right.    Significant Labs:sureBMP:   Recent Labs   Lab 06/03/24 0424   *   *   K 4.6   CL 99   CO2 26   BUN 54*   CREATININE 5.24*   CALCIUM 7.8*     CBC:   Recent Labs   Lab 06/03/24 0424   WBC 11.55*   RBC 3.12*   HGB 8.2*   HCT 26.9*   *   MCV 86.2   MCH 26.3*   MCHC 30.5*     All labs within the past 24 hours have been reviewed.    Significant Imaging:  Labs: Reviewed    Assessment/Plan:     Active Diagnoses:    Diagnosis Date Noted POA    PRINCIPAL PROBLEM:  Acute hypoxic respiratory failure [J96.01] 05/24/2024 Yes     Chronic    Acute metabolic encephalopathy [G93.41] 06/02/2024 No    Loculated pleural effusion [J90] 05/31/2024 Yes    Oropharyngeal dysphagia [R13.12] 05/31/2024 No    Hemodialysis-associated hypotension [I95.3] 05/29/2024 Yes    Parapneumonic effusion [J18.9, J91.8] 05/23/2024 Yes    Acute on chronic heart failure with preserved ejection fraction [I50.33] 05/23/2024 Yes    Sepsis [A41.9] 05/23/2024 Yes    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.9, D63.1] 05/23/2024 Yes    Debility [R53.81] 05/23/2024 Yes    CAD (coronary artery disease) [I25.10] 08/04/2023 Yes    DM2 (diabetes mellitus, type 2) [E11.9] 11/18/2014 Yes    End stage renal disease [N18.6] 11/06/2014 Yes    Essential (primary) hypertension [I10] 11/06/2014 Yes    Secondary hyperparathyroidism of renal origin [N25.81] 11/06/2014 Yes      Problems Resolved During this Admission:    Diagnosis Date Noted Date Resolved POA    Hypokalemia [E87.6] 05/23/2024 05/28/2024 Yes    Iron deficiency anemia, unspecified [D50.9] 11/06/2014 05/26/2024 Yes       We'll plan dialysis for tomorrow. BP falls with attempts to remove fluid on dialysis. Will give 5mg Midodrine before each dialysis     Thank you for your consult. I will follow-up with patient. Please contact us if you have any additional questions.    Ovi Alvarado,  MD  Nephrology  [facility]

## 2024-06-03 NOTE — ASSESSMENT & PLAN NOTE
Creatine stable for now. BMP reviewed- noted Estimated Creatinine Clearance: 8.5 mL/min (A) (based on SCr of 5.24 mg/dL (H)). according to latest data. 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    Nephrology consulted to resume HD sessions.

## 2024-06-03 NOTE — SUBJECTIVE & OBJECTIVE
Interval History: Patient seen and examined at the bedside, NG removed on accident while patient was working with PT/OT. More alert now.     Review of Systems   HENT:  Positive for voice change.    Respiratory:  Positive for shortness of breath.      Objective:     Vital Signs (Most Recent):  Temp: 96.6 °F (35.9 °C) (06/03/24 0846)  Pulse: 65 (06/03/24 0846)  Resp: 16 (06/03/24 0846)  BP: (!) 168/67 (06/03/24 0846)  SpO2: 100 % (06/03/24 0846) Vital Signs (24h Range):  Temp:  [96.6 °F (35.9 °C)-98.3 °F (36.8 °C)] 96.6 °F (35.9 °C)  Pulse:  [60-96] 65  Resp:  [16-18] 16  SpO2:  [96 %-100 %] 100 %  BP: (108-168)/(33-67) 168/67     Weight: 64.4 kg (141 lb 15.6 oz)  Body mass index is 23.63 kg/m².    Intake/Output Summary (Last 24 hours) at 6/3/2024 1104  Last data filed at 6/3/2024 0608  Gross per 24 hour   Intake 1141 ml   Output 633 ml   Net 508 ml         Physical Exam  Vitals and nursing note reviewed.   Constitutional:       Appearance: He is ill-appearing.   HENT:      Head: Normocephalic and atraumatic.      Nose: Nose normal.      Mouth/Throat:      Mouth: Mucous membranes are moist.   Eyes:      Extraocular Movements: Extraocular movements intact.   Cardiovascular:      Rate and Rhythm: Normal rate and regular rhythm.      Pulses: Normal pulses.      Heart sounds: Normal heart sounds.   Pulmonary:      Effort: Pulmonary effort is normal.      Breath sounds: Examination of the right-middle field reveals decreased breath sounds. Examination of the right-lower field reveals decreased breath sounds. Decreased breath sounds present.      Comments: Chest tube in place.   Abdominal:      General: Bowel sounds are normal.      Palpations: Abdomen is soft.      Comments: NG in place.    Musculoskeletal:         General: Normal range of motion.      Cervical back: Normal range of motion.   Skin:     General: Skin is warm.   Neurological:      General: No focal deficit present.      Mental Status: He is alert. Mental  status is at baseline. He is disoriented.   Psychiatric:         Mood and Affect: Mood normal.         Behavior: Behavior normal.             Significant Labs: All pertinent labs within the past 24 hours have been reviewed.    Significant Imaging: I have reviewed all pertinent imaging results/findings within the past 24 hours.

## 2024-06-03 NOTE — ASSESSMENT & PLAN NOTE
SLP consulted, failed swallow eval.  Now NPO, NG inserted for feed and meds.    NG removed per patient on accident on 6/3.  SLP to follow today as patient is more alert now.

## 2024-06-03 NOTE — PT/OT/SLP EVAL
Speech Language Pathology Evaluation  Bedside Swallow    Patient Name:  Joseph Mcdonald   MRN:  [id number]  Admitting Diagnosis: Acute hypoxic respiratory failure    Recommendations:                 General Recommendations:  Follow-up not indicated  Diet recommendations:  Puree, Other (Comment) (Pt still pretty lethargic during BSE, needed cues to remain awake. Advance diet as tolerated as pt becomes more alert.), Thin   Aspiration Precautions: 1 bite/sip at a time, Alternating bites/sips, Assistance with meals, Feed only when awake/alert, HOB to 90 degrees, Meds crushed in puree, Remain upright 30 minutes post meal, Small bites/sips, and Standard aspiration precautions   General Precautions: Standard, aspiration  Communication strategies:  none and patient pretty lethargic during swallow eval and kept his eyes closed a lot    Assessment:     Joseph Mcdonald is a 89 y.o. male with an SLP diagnosis of Dysphagia.  He presents with being somewhat more alert during BEDSIDE SWALLOW EVALUATION today. Patient passed swallow eval with pureed consistency and thin liquids as tolerated.    History:     Past Medical History:   Diagnosis Date    Chronic kidney disease (CKD)     Diabetes mellitus     Hypertension     PVD (peripheral vascular disease)        Past Surgical History:   Procedure Laterality Date    LEFT HEART CATHETERIZATION N/A 8/3/2023    Procedure: Left heart cath;  Surgeon: Vazquez Aguilar MD;  Location: [facility] CATH LAB;  Service: Cardiology;  Laterality: N/A;    left toe amputation      PLACEMENT OF DIALYSIS ACCESS         Social History: Unsure of patient's living arrangements.    Prior Intubation HX:  see chart    Modified Barium Swallow: n/a    Chest X-Rays: see chart    Prior diet: Patient had NG tube but pulled it out.    Occupation/hobbies/homemaking: not stated    Subjective     Patient lying in bed asleep. Patient hard to keep awake, needed cues to open his eyes each time. Patient did answer most  yes/no questions. Patient agreeable to BEDSIDE SWALLOW EVALUATION.  Patient goals: not stated     Pain/Comfort:  Pain Rating 1: 0/10    Respiratory Status: Nasal cannula, flow 2 L/min    Objective:     Oral Musculature Evaluation  Oral Musculature: general weakness  Dentition: upper dentures, other (see comments) (Pt had top dentures in, could not tell about bottom.)  Secretion Management: adequate  Mucosal Quality: adequate  Lingual Strength and Mobility: impaired strength  Oral Musculature Comments: Patient presents with generalized weakness and inability to consistently follow commands.    Bedside Swallow Eval:   Consistencies Assessed:  Thin liquids Patient was given small trials of water via a spoon and a straw. Patient needed cues to keep his eyes open, still pretty lethargic. No overt s/s of aspiration noted.   Puree Patient was given small bites of apple sauce via a spoon. Again, patient needed cues to keep his eyes open. However, no overt s/s of aspiration noted. No other consistencies tested secondary to patient still being pretty lethargic.      Oral Phase:   WFL    Pharyngeal Phase:   no overt clinical signs/symptoms of aspiration  no overt clinical signs/symptoms of pharyngeal dysphagia    Compensatory Strategies  None    Treatment: Rec a pureed consistency diet with thin liquids as tolerated. May advance diet as tolerated. Patient will need assistance with po intake until he is more alert.     Goals:   Multidisciplinary Problems       SLP Goals       Not on file                    Plan:     Patient to be seen:      Plan of Care expires:     Plan of Care reviewed with:      SLP Follow-Up:  No       Discharge recommendations:      Barriers to Discharge:  Decreased Care Giver Support    Time Tracking:     SLP Treatment Date:      Speech Start Time:  1107  Speech Stop Time:  1130     Speech Total Time (min):  23 min    Billable Minutes: Eval Swallow and Oral Function 23 06/03/2024

## 2024-06-03 NOTE — PROGRESS NOTES
[facility] Hospital Medicine  Progress Note    Patient Name: Joseph Mcdonald  MRN: [id number]  Patient Class: IP- Inpatient   Admission Date: 5/23/2024  Length of Stay: 11 days  Attending Physician: Jerome Salas MD  Primary Care Provider: Clinic, [facility]        Subjective:     Principal Problem:Acute hypoxic respiratory failure        HPI:    89-year-old  male With a past medical history of end-stage renal disease, hypertension, iron deficiency anemia, secondary hyperparathyroidism, type 2 diabetes, CAD, heart failure with preserved ejection fraction, hypokalemia inability presents to the ED with 1 week worsening shortness of breath.  This was particularly bad during his dialysis session this morning.  Due to this daughter brought him to the ED thereafter.  He denies any fever, chills, cough, wheezing, sputum production, palpitations.  He has been take medication as in his not missed any dialysis sessions.  He denies any nausea, vomiting, diarrhea, dysuria.  Imaging in the ED revealed near-complete he had a pacemaker patient of the right hemithorax review of systems otherwise negative.    Overview/Hospital Course:   5/24 1.5 L removed from right pleural space today.  Still has large right-sided effusion.  We will attempt to get some more off tomorrow.  5/25 dialyzing today  5/26 bedside ultrasound today still has large pleural effusion, complex appearing.  Was going to attempt bedside ultrasound but given complex nature have consulted pulmonology  5/27 D/W Pulm, plan for chest tube placement.   5/28- Midodrine added low BP.   5/29- s/p chest tube placement per Dr. Rodney, fluid studies sent.   5/30- BP low again, midodrine resumed. Started on intra-pleural lytics per Pulm.  5/31- Intra-pleural lytics continued, CXR improved.   6/1- Failed swallow and is with NG now and tube feeds. S/p 6 doses of Intra-pleural lytics (total 6 doses).   6/2- Mental status  better, resp status stable.  6/3- NG removed on accident while patient was working with PT/OT, SLP consulted for reevaluation. Pulm to follow, CXR with slightly increased right effusion.     Interval History: Patient seen and examined at the bedside, NG removed on accident while patient was working with PT/OT. More alert now.     Review of Systems   HENT:  Positive for voice change.    Respiratory:  Positive for shortness of breath.      Objective:     Vital Signs (Most Recent):  Temp: 96.6 °F (35.9 °C) (06/03/24 0846)  Pulse: 65 (06/03/24 0846)  Resp: 16 (06/03/24 0846)  BP: (!) 168/67 (06/03/24 0846)  SpO2: 100 % (06/03/24 0846) Vital Signs (24h Range):  Temp:  [96.6 °F (35.9 °C)-98.3 °F (36.8 °C)] 96.6 °F (35.9 °C)  Pulse:  [60-96] 65  Resp:  [16-18] 16  SpO2:  [96 %-100 %] 100 %  BP: (108-168)/(33-67) 168/67     Weight: 64.4 kg (141 lb 15.6 oz)  Body mass index is 23.63 kg/m².    Intake/Output Summary (Last 24 hours) at 6/3/2024 1104  Last data filed at 6/3/2024 0608  Gross per 24 hour   Intake 1141 ml   Output 633 ml   Net 508 ml         Physical Exam  Vitals and nursing note reviewed.   Constitutional:       Appearance: He is ill-appearing.   HENT:      Head: Normocephalic and atraumatic.      Nose: Nose normal.      Mouth/Throat:      Mouth: Mucous membranes are moist.   Eyes:      Extraocular Movements: Extraocular movements intact.   Cardiovascular:      Rate and Rhythm: Normal rate and regular rhythm.      Pulses: Normal pulses.      Heart sounds: Normal heart sounds.   Pulmonary:      Effort: Pulmonary effort is normal.      Breath sounds: Examination of the right-middle field reveals decreased breath sounds. Examination of the right-lower field reveals decreased breath sounds. Decreased breath sounds present.      Comments: Chest tube in place.   Abdominal:      General: Bowel sounds are normal.      Palpations: Abdomen is soft.      Comments: NG in place.    Musculoskeletal:         General: Normal  range of motion.      Cervical back: Normal range of motion.   Skin:     General: Skin is warm.   Neurological:      General: No focal deficit present.      Mental Status: He is alert. Mental status is at baseline. He is disoriented.   Psychiatric:         Mood and Affect: Mood normal.         Behavior: Behavior normal.             Significant Labs: All pertinent labs within the past 24 hours have been reviewed.    Significant Imaging: I have reviewed all pertinent imaging results/findings within the past 24 hours.    Assessment/Plan:      * Acute hypoxic respiratory failure  Sec to pleural effusion.  Require 2L NC since admission.   Imaging consistent with large right sided pleural effusion.   S/p thoracentesis on 5/23 with 1,5L fluid removed, no pleural fluid studies sent.   Remained symptomatic so CT chest obtained on 5/24 and consistent with large effusion.  Pulmonology consulted; s/p chest tube placement (5/29), s/p intra-pleural lytics started on 5/30 (total 6 doses).  Volume removal with HD, continue broad spectrum antibiotics as below.   Monitor oxygen status.   Home O2 eval prior to discharge.     Parapneumonic effusion  Patient found to have large pleural effusion on imaging. I have personally reviewed and interpreted the following imaging: Xray. A thoracentesis was ordered. Most likely etiology includes Congestive Heart Failure, Pneumonia, and Renal Failure. Management to include Repeat Thoracentesis and Dialysis  S/p thoracentesis on 5/23- 1.5L fluid removed.  S/p chest tube placement (5/29), cytology negative for malignancy, fluid appears exudate in nature, cultures NGTD.   S/p 6 doses of Intra-pleural lytics BID started per Pulm (5/30-6/2).   CXR mostly stable, possibly slight increase in fluid noted on CXR from 6/3.   Continue IV Zosyn (timing and transition per Pulm), discontinued vancomycin given negative MRSA PCR.   Pulm follows.     Sepsis  This patient does have evidence of infective focus  My  overall impression is sepsis.  Source: Respiratory  Antibiotics given-   Antibiotics (72h ago, onward)      Start     Stop Route Frequency Ordered    05/29/24 1800  piperacillin-tazobactam (ZOSYN) 4.5 g in dextrose 5 % in water (D5W) 100 mL IVPB (MB+)         -- IV Every 12 hours (non-standard times) 05/29/24 0749          Blood cultures NGTD.  Follow pleural fluid cultures.   Source control achieved by: antibiotics.           Oropharyngeal dysphagia  SLP consulted, failed swallow eval.  Now NPO, NG inserted for feed and meds.    NG removed per patient on accident on 6/3.  SLP to follow today as patient is more alert now.       Acute on chronic heart failure with preserved ejection fraction  Patient is identified as having Diastolic (HFpEF) heart failure that is Acute on chronic. CHF is currently uncontrolled due to Pulmonary edema/pleural effusion on CXR. Latest ECHO performed and demonstrates- Results for orders placed during the hospital encounter of 08/01/23    Echo    Interpretation Summary    Left Ventricle: The left ventricle is normal in size. Increased wall thickness. There is concentric remodeling. Normal wall motion. There is normal systolic function with a visually estimated ejection fraction of 55 - 60%. Grade II diastolic dysfunction.    Left Atrium: Left atrium is mildly dilated. There is a calcified 1.5 x 2 cm full wall thickness mass noted extending from the myocardium into the pericardial space, unclear etiology, recommend cardiac MRI.    Right Ventricle: Normal right ventricular cavity size. Systolic function is normal.    Aortic Valve: The aortic valve is a trileaflet valve. There is physiologically normal aortic regurgitation.    Mitral Valve: There is no stenosis. The mean pressure gradient across the mitral valve is 3 mmHg at a heart rate of  bpm. There is mild regurgitation with a centrally directed jet.    Tricuspid Valve: There is mild to moderate regurgitation with a centrally directed  "jet.    Pulmonic Valve: There is no significant stenosis.    Pericardium: Small circumferential pericardial effusion present. No indication of cardiac tamponade.  . Continue Nitrate/Vasodilator and monitor clinical status closely. Monitor on telemetry. Patient is off CHF pathway.  Monitor strict Is&Os and daily weights.  Place on fluid restriction of 1.5 L. Cardiology has not been consulted. Continue to stress to patient importance of self efficacy and  on diet for CHF. Last BNP reviewed- and noted below No results for input(s): "BNP", "BNPTRIAGEBLO" in the last 168 hours.   Volume removal per HD.     Acute metabolic encephalopathy  Discontinued trazodone.  Management as above.  Requiring restraints to avoid treatment interruption.   Improving.       Loculated pleural effusion  As above.       Hemodialysis-associated hypotension  Added midodrine 5mg BID, adjusted to 2.5 BID given more stable BP.   May consider weaning if BP gets on the higher side.       Debility  Patient with Acute on chronic debility due to age-related physical debility. Latest AMPAC and GEMS scores have not been reviewed. Evaluation for etiology is complete.   Plan includes PT/OT consulted.  Home health on discharge.         Anemia of renal disease  Patient's anemia is currently uncontrolled. Has not received any PRBCs to date. Etiology likely d/t chronic disease due to ESRD  Current CBC reviewed-   Lab Results   Component Value Date    HGB 8.2 (L) 06/03/2024    HCT 26.9 (L) 06/03/2024     Monitor serial CBC and transfuse if patient becomes hemodynamically unstable, symptomatic or H/H drops below 7/21.        CAD (coronary artery disease)  Patient with known CAD s/p  unclear , which is controlled Will continue Statin and monitor for S/Sx of angina/ACS. Continue to monitor on telemetry.       DM2 (diabetes mellitus, type 2)  HBA1c 4.5  No need for SSI or POC glucose check.     Secondary hyperparathyroidism of renal origin  Resume home " [place].       Essential (primary) hypertension  Chronic, controlled. Latest blood pressure and vitals reviewed-     Temp:  [96.6 °F (35.9 °C)-98.3 °F (36.8 °C)]   Pulse:  [60-96]   Resp:  [16-18]   BP: (108-168)/(33-67)   SpO2:  [96 %-100 %] .   Home meds for hypertension were reviewed and noted below.   Hypertension Medications               amLODIPine (NORVASC) 5 MG tablet Take 5 mg by mouth once daily.    hydrALAZINE (APRESOLINE) 100 MG tablet Take 1 tablet by mouth 3 (three) times daily with meals.    nebivoloL (BYSTOLIC) 2.5 MG Tab Take 2.5 mg by mouth once daily.            While in the hospital, will manage blood pressure as follows; hold meds given hypotension. BP has been fluctuating, will likely add back beta blocker if BP gets on the higher side.     Will utilize p.r.n. blood pressure medication only if patient's blood pressure greater than 180/110 and he develops symptoms such as worsening chest pain or shortness of breath.      End stage renal disease  Creatine stable for now. BMP reviewed- noted Estimated Creatinine Clearance: 8.5 mL/min (A) (based on SCr of 5.24 mg/dL (H)). according to latest data. 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    Nephrology consulted to resume HD sessions.       VTE Risk Mitigation (From admission, onward)           Ordered     IP VTE HIGH RISK PATIENT  Once         05/23/24 1746     Place sequential compression device  Until discontinued         05/23/24 1746                    Discharge Planning   BRIAN: 6/6/2024     Code Status: Full Code   Is the patient medically ready for discharge?:     Reason for patient still in hospital (select all that apply): Patient trending condition and Consult recommendations  Discharge Plan A: Home, Home Health                  MADHU PASCUAL MD  Department of Hospital Medicine   [facility]

## 2024-06-03 NOTE — NURSING
2255 NG tube residual rechecked. Pulled back 200ml. Notified Dr. Hennessy. Plan to hold feeds and reassess residual at approximately 0100. NADN. Care ongoing.   0125 Residual reassessed. 150ml obtained and returned to patient. Feeding restarted. See flowsheet for more documentation.

## 2024-06-03 NOTE — ASSESSMENT & PLAN NOTE
Added midodrine 5mg BID, adjusted to 2.5 BID given more stable BP.   May consider weaning if BP gets on the higher side.

## 2024-06-03 NOTE — PROGRESS NOTES
[facility]  Adult Nutrition  Follow-up Note         Reason for Assessment  Reason For Assessment: RD follow-up   Nutrition Risk Screen: no indicators present    Assessment and Plan  6/3/2024: RD follow up. NG was dislodged by accident yesterday. Repeat SLP evaluation today. Recommended puree with thin liquids. Recommend continue puree diet as tolerated. Encourage good PO intakes. Also recommend addition of Boost Plus with meals to improve kcal/protein intakes to better meet estimated nutritional needs. RD following.     6/1/2024: Consult received and appreciated. Consult to start tube feedings.     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 Recommend start Novasource Renal with a goal rate of 35mL/hour with 40mL/hour free water flush. Keep HOB at 30-45 degrees to reduce risk of aspiration. Start rate at 20mL  and advance by 10mL q8h until goal rate is reached. Monitor for tolerance: n/v/d/c. Hold feeding and notify RD if symptoms of intolerance develop.     Last BM 5/29 per flowsheet.     Medications/labs reviewed. RD following.          Learning Needs/Social Determinants of Health  Learning Assessment       05/23/2024 1848 [facility] (5/23/2024 - Present)   Created by Melanie Saleh RN - RN (Nurse) Status: Complete                 PRIMARY LEARNER     Primary Learner Name:  Joseph Mcdonald  - 05/23/2024 1848    Relationship:  Patient  - 05/23/2024 1848    Does the primary learner have any barriers to learning?:  No Barriers [facility] 05/23/2024 1848    What is the preferred language of the primary learner?:  English [facility] 05/23/2024 1848    Is an  required?:  No [facility] 05/23/2024 1848    How does the primary learner prefer to learn new concepts?:  Listening, Reading, Demonstration [facility] 05/23/2024 1848    How often do you need to have someone help you read instructions, pamphlets, or written material from your doctor or pharmacy?:  Never [facility]  05/23/2024 1848        CO-LEARNER #1     No question answered        CO-LEARNER #2     No question answered        SPECIAL TOPICS     No question answered        ANSWERED BY:     No question answered        Comments         Edit History       Case, Melanie, RN - RN (Nurse)   05/23/2024 1848                           Social Determinants of Health     Tobacco Use: Medium Risk (8/2/2023)    Patient History     Smoking Tobacco Use: Former     Smokeless Tobacco Use: Never     Passive Exposure: Not on file   Alcohol Use: Not At Risk (5/24/2024)    AUDIT-C     Frequency of Alcohol Consumption: Never     Average Number of Drinks: Patient does not drink     Frequency of Binge Drinking: Never   Financial Resource Strain: Low Risk  (8/2/2023)    Overall Financial Resource Strain (CARDIA)     Difficulty of Paying Living Expenses: Not hard at all   Food Insecurity: No Food Insecurity (5/24/2024)    Hunger Vital Sign     Worried About Running Out of Food in the Last Year: Never true     Ran Out of Food in the Last Year: Never true   Transportation Needs: No Transportation Needs (5/24/2024)    TRANSPORTATION NEEDS     Transportation : No   Physical Activity: Sufficiently Active (5/24/2024)    Exercise Vital Sign     Days of Exercise per Week: 5 days     Minutes of Exercise per Session: 30 min   Stress: No Stress Concern Present (5/24/2024)    [employer] [place] [employer] - Occupational Stress Questionnaire     Feeling of Stress : Not at all   Housing Stability: Low Risk  (5/24/2024)    Housing Stability Vital Sign     Unable to Pay for Housing in the Last Year: No     Homeless in the Last Year: No   Depression: Not on file   Utilities: Not At Risk (5/24/2024)    [facility] Utilities     Threatened with loss of utilities: No   Health Literacy: Adequate Health Literacy (5/24/2024)     Health Literacy     Frequency of need for help with medical instructions: Never   Social Isolation: Socially Integrated (5/24/2024)    Social  Isolation     Social Isolation: 1            Malnutrition  Is Patient Malnourished: No    Nutrition Diagnosis  Inadequate energy intake related to Appetite loss and Inadequate Caloric intake as evidenced by poor PO intakes  Comments: initiate enteral feeding    Recent Labs   Lab 06/03/24  0424   *         Nutrition Prescription / Recommendations  Recommendation/Intervention: Recommend advnace diet per SLP recommendations.  Goals: weight maintenance during admission, intake 50-75% of meals during admission  Nutrition Goal Status: new  Communication of RD Recs: reviewed with physician  Current Diet Order: NPO  Nutrition Order Comments: Recommen to change diet to renal high protein high calorie  Chewing or Swallowing Difficulty?: No Chewing or swallowing difficulty  Recommended Diet: Puree  Recommended Oral Supplement: Boost Plus [360kcals, 14g Protein, 45g Carbs] with meals  Is Nutrition Support Recommended: No    Monitor and Evaluation  % current Intake: NPO  % intake to meet estimated needs: P.O. + Supplements  Food and Nutrient Intake: food and beverage intake  Food and Nutrient Adminstration: diet order  Anthropometric Measurements: weight, weight change  Biochemical Data, Medical Tests and Procedures: electrolyte and renal panel, gastrointestinal profile, glucose/endocrine profile, inflammatory profile, lipid profile       Current Medical Diagnosis and Past Medical History  Diagnosis: renal disease  Past Medical History:   Diagnosis Date    Chronic kidney disease (CKD)     Diabetes mellitus     Hypertension     PVD (peripheral vascular disease)        Nutrition/Diet History  Spiritual, Cultural Beliefs, Denominational Practices, Values that Affect Care: no    Lab/Procedures/Meds  Recent Labs   Lab 06/03/24  0424   *   K 4.6   BUN 54*   CREATININE 5.24*   CALCIUM 7.8*   CL 99   Note: Na+ low, recommend consider replete to WNL as appropriate. BUN/Cr elevated. PMH ESRD. Ca++ low    Last A1c: No results found  "for: "HGBA1C"  Lab Results   Component Value Date    RBC 3.12 (L) 06/03/2024    HGB 8.2 (L) 06/03/2024    HCT 26.9 (L) 06/03/2024    MCV 86.2 06/03/2024    MCH 26.3 (L) 06/03/2024    MCHC 30.5 (L) 06/03/2024    TIBC 105 (L) 05/23/2024   Note: H&H low    Pertinent Labs Reviewed: reviewed  Pertinent Medications Reviewed: reviewed  Scheduled Meds:   atorvastatin  40 mg Oral QHS    midodrine  2.5 mg Oral BID WM    pantoprazole  40 mg Per NG tube Daily    piperacillin-tazobactam (Zosyn) IV (PEDS and ADULTS) (extended infusion is not appropriate)  4.5 g Intravenous Q12H    sevelamer carbonate  2.4 g Per NG tube TID WM     Continuous Infusions:  PRN Meds:.  Current Facility-Administered Medications:     sodium chloride 0.9%, , Intravenous, PRN    acetaminophen, 1,000 mg, Oral, Q8H PRN    dextrose 10%, 12.5 g, Intravenous, PRN    dextrose 10%, 25 g, Intravenous, PRN    glucagon (human recombinant), 1 mg, Intramuscular, PRN    glucose, 16 g, Oral, PRN    glucose, 24 g, Oral, PRN    naloxone, 0.02 mg, Intravenous, PRN    ondansetron, 8 mg, Oral, Q8H PRN    polyethylene glycol, 17 g, Oral, BID PRN    sodium chloride 0.9% 250 mL flush bag, , Intravenous, PRN    sodium chloride 0.9%, 10 mL, Intravenous, Q12H PRN    Anthropometrics  Temp: 96.6 °F (35.9 °C)  Height Method: Stated  Height: 5' 5" (165.1 cm)  Height (inches): 65 in  Weight Method: Bed Scale  Weight: 64.4 kg (141 lb 15.6 oz)  Weight (lb): 141.98 lb  Ideal Body Weight (IBW), Male: 136 lb  % Ideal Body Weight, Male (lb): 104.4 %  BMI (Calculated): 23.6       Estimated/Assessed Needs  RMR (Roosevelt-St. Jeor Equation): 1240.88     Temp: 96.6 °F (35.9 °C)Axillary  Weight Used For Calorie Calculations: 64.5 kg (142 lb 3.2 oz)   Energy Need Method: Kcal/kg Energy Calorie Requirements (kcal): 6283-2257  Weight Used For Protein Calculations: 64.5 kg (142 lb 3.2 oz)  Protein Requirements: 71-84  Estimated Fluid Requirement Method: other (see comments) (500 ml + urine output)  "   RDA Method (mL): 1935       Nutrition by Nursing  Diet/Nutrition Received: tube feeding  Intake (%): 25%  Diet/Feeding Assistance: assisted with feeding  Diet/Feeding Tolerance: good  Last Bowel Movement: 05/30/24  [REMOVED]      NG/OG Tube 05/31/24 2100 nasogastric Left nostril-Feeding Type: continuous, by pump  [REMOVED]      NG/OG Tube 05/31/24 2100 nasogastric Left nostril-Current Rate (mL/hr): 35 mL/hr  [REMOVED]      NG/OG Tube 05/31/24 2100 nasogastric Left nostril-Goal Rate (mL/hr): 35 mL/hr  [REMOVED]      NG/OG Tube 05/31/24 2100 nasogastric Left nostril-Formula Name: novasource renal    Nutrition Follow-Up  RD Follow-up?: Yes      Nutrition Discharge Planning: too soon to determine; RD Following.          Monica Pappas MS, RD, LD  Available via Secure Chat

## 2024-06-03 NOTE — ASSESSMENT & PLAN NOTE
Chronic, controlled. Latest blood pressure and vitals reviewed-     Temp:  [96.6 °F (35.9 °C)-98.3 °F (36.8 °C)]   Pulse:  [60-96]   Resp:  [16-18]   BP: (108-168)/(33-67)   SpO2:  [96 %-100 %] .   Home meds for hypertension were reviewed and noted below.   Hypertension Medications               amLODIPine (NORVASC) 5 MG tablet Take 5 mg by mouth once daily.    hydrALAZINE (APRESOLINE) 100 MG tablet Take 1 tablet by mouth 3 (three) times daily with meals.    nebivoloL (BYSTOLIC) 2.5 MG Tab Take 2.5 mg by mouth once daily.            While in the hospital, will manage blood pressure as follows; hold meds given hypotension. BP has been fluctuating, will likely add back beta blocker if BP gets on the higher side.     Will utilize p.r.n. blood pressure medication only if patient's blood pressure greater than 180/110 and he develops symptoms such as worsening chest pain or shortness of breath.

## 2024-06-03 NOTE — PLAN OF CARE
Problem: Adult Inpatient Plan of Care  Goal: Absence of Hospital-Acquired Illness or Injury  Outcome: Progressing     Problem: Adult Inpatient Plan of Care  Goal: Optimal Comfort and Wellbeing  Outcome: Progressing     Problem: Sepsis/Septic Shock  Goal: Optimal Coping  Outcome: Progressing  Goal: Absence of Bleeding  Outcome: Progressing  Goal: Blood Glucose Level Within Targeted Range  Outcome: Progressing  Goal: Absence of Infection Signs and Symptoms  Outcome: Progressing  Goal: Optimal Nutrition Intake  Outcome: Progressing     Problem: Fall Injury Risk  Goal: Absence of Fall and Fall-Related Injury  Outcome: Progressing     Problem: Wound  Goal: Optimal Coping  Outcome: Progressing  Goal: Optimal Functional Ability  Outcome: Progressing  Goal: Absence of Infection Signs and Symptoms  Outcome: Progressing  Goal: Improved Oral Intake  Outcome: Progressing  Goal: Optimal Pain Control and Function  Outcome: Progressing  Goal: Skin Health and Integrity  Outcome: Progressing  Goal: Optimal Wound Healing  Outcome: Progressing     Problem: Gas Exchange Impaired  Goal: Optimal Gas Exchange  Outcome: Progressing     Problem: Hemodialysis  Goal: Safe, Effective Therapy Delivery  Outcome: Progressing  Goal: Effective Tissue Perfusion  Outcome: Progressing  Goal: Absence of Infection Signs and Symptoms  Outcome: Progressing     Problem: Skin Injury Risk Increased  Goal: Skin Health and Integrity  Outcome: Progressing

## 2024-06-03 NOTE — ASSESSMENT & PLAN NOTE
Patient found to have large pleural effusion on imaging. I have personally reviewed and interpreted the following imaging: Xray. A thoracentesis was ordered. Most likely etiology includes Congestive Heart Failure, Pneumonia, and Renal Failure. Management to include Repeat Thoracentesis and Dialysis  S/p thoracentesis on 5/23- 1.5L fluid removed.  S/p chest tube placement (5/29), cytology negative for malignancy, fluid appears exudate in nature, cultures NGTD.   S/p 6 doses of Intra-pleural lytics BID started per Pulm (5/30-6/2).   CXR mostly stable, possibly slight increase in fluid noted on CXR from 6/3.   Continue IV Zosyn (timing and transition per Pulm), discontinued vancomycin given negative MRSA PCR.   Pulm follows.

## 2024-06-03 NOTE — ASSESSMENT & PLAN NOTE
Sec to pleural effusion.  Require 2L NC since admission.   Imaging consistent with large right sided pleural effusion.   S/p thoracentesis on 5/23 with 1,5L fluid removed, no pleural fluid studies sent.   Remained symptomatic so CT chest obtained on 5/24 and consistent with large effusion.  Pulmonology consulted; s/p chest tube placement (5/29), s/p intra-pleural lytics started on 5/30 (total 6 doses).  Volume removal with HD, continue broad spectrum antibiotics as below.   Monitor oxygen status.   Home O2 eval prior to discharge.

## 2024-06-04 NOTE — ASSESSMENT & PLAN NOTE
This patient does have evidence of infective focus  My overall impression is sepsis.  Source: Respiratory  Antibiotics given-   Antibiotics (72h ago, onward)      Start     Stop Route Frequency Ordered    05/29/24 1800  piperacillin-tazobactam (ZOSYN) 4.5 g in dextrose 5 % in water (D5W) 100 mL IVPB (MB+)         -- IV Every 12 hours (non-standard times) 05/29/24 0749          Blood cultures NGTD.  Follow pleural fluid cultures.   Source control achieved by: antibiotics.      6/4 improved

## 2024-06-04 NOTE — ASSESSMENT & PLAN NOTE
Patient with known CAD s/p  unclear , which is controlled Will continue Statin and monitor for S/Sx of angina/ACS. Continue to monitor on telemetry.    No chest pain

## 2024-06-04 NOTE — CONSULTS
[facility]  General Surgery  Consult Note    Patient Name: Joseph Mcdonald  MRN: [id number]  Code Status: Full Code  Admission Date: 5/23/2024  Hospital Length of Stay: 12 days  Attending Physician: Cristian Ac DO  Primary Care Provider: [facility]    Patient information was obtained from past medical records.     Inpatient consult to General Surgery  Consult performed by: Darin Mcmullen FNP  Consult ordered by: Benjamin Rodney MD        Subjective:     Principal Problem: Acute hypoxic respiratory failure    History of Present Illness: History of of end-stage renal disease on dialysis, hypertension, iron deficiency anemia, secondary hyperparathyroidism, type 2 diabetes, CAD, and heart failure with preserved ejection fraction.  Admitted on 05/23/2024 after presenting with dyspnea following dialysis.  Initial chest x-ray showed a large right-sided pleural effusion. Underwent ultrasound-guided thoracentesis by Radiology with removal of 1.5 L of fluid.  Following thoracentesis, the patient appeared to have a residual right-sided pleural effusion with loculations. Pulmonology has subsequently performed installation of intrapleural fibrinolytics several times without improvement.  Repeat CT chest with contrast today shows moderate right size hydropneumothorax, similar to previous.  Pleural enhancement consistent with exudative effusion.  Atelectasis/consolidation.    General surgery consult for possible lung decortication.  The patient provides minimal history and no family was in the room at time of exam.  The patient has been fed breakfast already today, will plan for decortication on 06/06/2024.           No current facility-administered medications on file prior to encounter.     Current Outpatient Medications on File Prior to Encounter   Medication Sig    amLODIPine (NORVASC) 5 MG tablet Take 5 mg by mouth once daily.    atorvastatin (LIPITOR) 40 MG tablet Take 1 tablet  (40 mg total) by mouth every evening.    hydrALAZINE (APRESOLINE) 100 MG tablet Take 1 tablet by mouth 3 (three) times daily with meals.    nebivoloL (BYSTOLIC) 2.5 MG Tab Take 2.5 mg by mouth once daily.    methoxy peg-epoetin beta (MIRCERA INJ) 50 mcg.    timolol maleate 0.5% (TIMOPTIC) 0.5 % Drop Place 1 drop into the right eye once daily.       Review of patient's allergies indicates:   Allergen Reactions    Azithromycin Other (See Comments)     Note: - [employer] 01/11/2019       Past Medical History:   Diagnosis Date    Chronic kidney disease (CKD)     Diabetes mellitus     Hypertension     PVD (peripheral vascular disease)      Past Surgical History:   Procedure Laterality Date    LEFT HEART CATHETERIZATION N/A 8/3/2023    Procedure: Left heart cath;  Surgeon: Vazquez Aguilar MD;  Location: [facility] CATH LAB;  Service: Cardiology;  Laterality: N/A;    left toe amputation      PLACEMENT OF DIALYSIS ACCESS       Family History    None       Tobacco Use    Smoking status: Former     Types: Cigarettes    Smokeless tobacco: Never   Substance and Sexual Activity    Alcohol use: Not Currently    Drug use: Never    Sexual activity: Not on file     Review of Systems   Unable to perform ROS: Other (Poor historian versus dementia)     Objective:     Vital Signs (Most Recent):  Temp: 97.5 °F (36.4 °C) (06/04/24 0407)  Pulse: 66 (06/04/24 0548)  Resp: 18 (06/04/24 0548)  BP: 133/71 (06/04/24 0407)  SpO2: 99 % (06/04/24 0548) Vital Signs (24h Range):  Temp:  [96.8 °F (36 °C)-97.8 °F (36.6 °C)] 97.5 °F (36.4 °C)  Pulse:  [66-72] 66  Resp:  [16-19] 18  SpO2:  [96 %-100 %] 99 %  BP: ()/(30-71) 133/71     Weight: 64.4 kg (141 lb 15.6 oz)  Body mass index is 23.63 kg/m².     Physical Exam  Vitals reviewed.   Cardiovascular:      Rate and Rhythm: Normal rate.   Pulmonary:      Effort: Pulmonary effort is normal. No respiratory distress.      Comments: Right pigtail chest tube with serosanguineous drainage  Skin:      General: Skin is warm and dry.   Neurological:      Mental Status: He is alert. Mental status is at baseline.            I have reviewed all pertinent lab results within the past 24 hours.    Significant Diagnostics:  I have reviewed all pertinent imaging results/findings within the past 24 hours.    Assessment/Plan:     Loculated pleural effusion  6/4:  Plan for lung decortication on 06/06/2024.  Risks and benefits discussed with the patient's daughter to include infection, bleeding, persistent air leak from lung postop which is sometimes prolonged, and the possible need for ventilation postoperatively      VTE Risk Mitigation (From admission, onward)           Ordered     IP VTE HIGH RISK PATIENT  Once         05/23/24 1746     Place sequential compression device  Until discontinued         05/23/24 1746                    Thank you for your consult. I will follow-up with patient. Please contact us if you have any additional questions.    Darin Mcmullen, USHA  General Surgery  [facility]

## 2024-06-04 NOTE — ASSESSMENT & PLAN NOTE
Chronic, controlled. Latest blood pressure and vitals reviewed-     Temp:  [97.4 °F (36.3 °C)-97.8 °F (36.6 °C)]   Pulse:  [63-94]   Resp:  [18-19]   BP: ()/(30-74)   SpO2:  [92 %-100 %] .   Home meds for hypertension were reviewed and noted below.   Hypertension Medications               amLODIPine (NORVASC) 5 MG tablet Take 5 mg by mouth once daily.    hydrALAZINE (APRESOLINE) 100 MG tablet Take 1 tablet by mouth 3 (three) times daily with meals.    nebivoloL (BYSTOLIC) 2.5 MG Tab Take 2.5 mg by mouth once daily.            While in the hospital, will manage blood pressure as follows; hold meds given hypotension. BP has been fluctuating, will likely add back beta blocker if BP gets on the higher side.     Will utilize p.r.n. blood pressure medication only if patient's blood pressure greater than 180/110 and he develops symptoms such as worsening chest pain or shortness of breath.   BP reasonable

## 2024-06-04 NOTE — ASSESSMENT & PLAN NOTE
Added midodrine 5mg BID, adjusted to 2.5 BID given more stable BP.   May consider weaning if BP gets on the higher side.     6/4 continue midodrine

## 2024-06-04 NOTE — ASSESSMENT & PLAN NOTE
Sec to pleural effusion.  Require 2L NC since admission.   Imaging consistent with large right sided pleural effusion.   S/p thoracentesis on 5/23 with 1,5L fluid removed, no pleural fluid studies sent.   Remained symptomatic so CT chest obtained on 5/24 and consistent with large effusion.  Pulmonology consulted; s/p chest tube placement (5/29), s/p intra-pleural lytics started on 5/30 (total 6 doses).  Volume removal with HD, continue broad spectrum antibiotics as below.   Monitor oxygen status.   Home O2 eval prior to discharge.      6/4Improved.  Plan for decortication of the right lung on Thursday

## 2024-06-04 NOTE — NURSING
1705- Restraints removed. Pt awake, calm/cooperative.     1321- Passed swallow eval.  Pureed diet with thin liquids ordered as recommended by SLP. Tolerated well; ate approx. 25% of lunch    1050- NGT pulled out by pt.  Left out per doctor's order d/t SLP being consulted

## 2024-06-04 NOTE — HPI
History of of end-stage renal disease on dialysis, hypertension, iron deficiency anemia, secondary hyperparathyroidism, type 2 diabetes, CAD, and heart failure with preserved ejection fraction.  Admitted on 05/23/2024 after presenting with dyspnea following dialysis.  Initial chest x-ray showed a large right-sided pleural effusion. Underwent ultrasound-guided thoracentesis by Radiology with removal of 1.5 L of fluid.  Following thoracentesis, the patient appeared to have a residual right-sided pleural effusion with loculations. Pulmonology has subsequently performed installation of intrapleural fibrinolytics several times without improvement.  Repeat CT chest with contrast today shows moderate right size hydropneumothorax, similar to previous.  Pleural enhancement consistent with exudative effusion.  Atelectasis/consolidation.    General surgery consult for possible lung decortication.  The patient provides minimal history and no family was in the room at time of exam.  The patient has been fed breakfast already today, will plan for decortication on 06/06/2024.      06/11/2024: Consult for sacral wound. Pt is bed bound and incontinent of stool. 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 Has granulation tissue at borders, but mostly covered in eschar that is tightly adhered to wound bed. No drainage or malodor. The patient experienced cardiopulmonary arrest during case setup for an attempted VATS. Clearly, high risk. In this case, risks outweigh benefits, as he would likely not survive sedation/intubation. Recommend duoderm, offloading, and ensuring he remains clean and dry.

## 2024-06-04 NOTE — ASSESSMENT & PLAN NOTE
Patient found to have large pleural effusion on imaging. I have personally reviewed and interpreted the following imaging: Xray. A thoracentesis was ordered. Most likely etiology includes Congestive Heart Failure, Pneumonia, and Renal Failure. Management to include Repeat Thoracentesis and Dialysis  S/p thoracentesis on 5/23- 1.5L fluid removed.  S/p chest tube placement (5/29), cytology negative for malignancy, fluid appears exudate in nature, cultures NGTD.   S/p 6 doses of Intra-pleural lytics BID started per Pulm (5/30-6/2).   CXR mostly stable, possibly slight increase in fluid noted on CXR from 6/3.   Continue IV Zosyn (timing and transition per Pulm), discontinued vancomycin given negative MRSA PCR.   Pulm follows.    6/4 plan for decortication on Thursday

## 2024-06-04 NOTE — SUBJECTIVE & OBJECTIVE
Interval History:  no acute events overnight    Review of Systems   HENT:  Positive for voice change.    Respiratory:  Positive for shortness of breath.      Objective:     Vital Signs (Most Recent):  Temp: 97.4 °F (36.3 °C) (06/04/24 1150)  Pulse: 94 (06/04/24 1635)  Resp: 18 (06/04/24 1635)  BP: (!) 156/74 (06/04/24 1635)  SpO2: (!) 92 % (06/04/24 1150) Vital Signs (24h Range):  Temp:  [97.4 °F (36.3 °C)-97.8 °F (36.6 °C)] 97.4 °F (36.3 °C)  Pulse:  [63-94] 94  Resp:  [18-19] 18  SpO2:  [92 %-100 %] 92 %  BP: ()/(30-74) 156/74     Weight: 64.4 kg (141 lb 15.6 oz)  Body mass index is 23.63 kg/m².    Intake/Output Summary (Last 24 hours) at 6/4/2024 1709  Last data filed at 6/4/2024 1635  Gross per 24 hour   Intake 1318.61 ml   Output 3240 ml   Net -1921.39 ml         Physical Exam  Vitals and nursing note reviewed.   Constitutional:       Appearance: He is ill-appearing.   HENT:      Head: Normocephalic and atraumatic.      Nose: Nose normal.      Mouth/Throat:      Mouth: Mucous membranes are moist.   Eyes:      Extraocular Movements: Extraocular movements intact.   Cardiovascular:      Rate and Rhythm: Normal rate and regular rhythm.      Pulses: Normal pulses.      Heart sounds: Normal heart sounds.   Pulmonary:      Effort: Pulmonary effort is normal.      Breath sounds: Examination of the right-middle field reveals decreased breath sounds. Examination of the right-lower field reveals decreased breath sounds. Decreased breath sounds present.      Comments: Chest tube in place.   Abdominal:      General: Bowel sounds are normal.      Palpations: Abdomen is soft.      Comments: NG in place.    Musculoskeletal:         General: Normal range of motion.      Cervical back: Normal range of motion.   Skin:     General: Skin is warm.   Neurological:      General: No focal deficit present.      Mental Status: He is alert. Mental status is at baseline. He is disoriented.   Psychiatric:         Mood and Affect: Mood  normal.         Behavior: Behavior normal.             Significant Labs: All pertinent labs within the past 24 hours have been reviewed.    Significant Imaging: I have reviewed all pertinent imaging results/findings within the past 24 hours.

## 2024-06-04 NOTE — PT/OT/SLP PROGRESS
"Physical Therapy Treatment    Patient Name:  Joseph Mcdonald   MRN:  [id number]    Recommendations:     Discharge Recommendations: Moderate Intensity Therapy  Discharge Equipment Recommendations: to be determined by next level of care  Barriers to discharge:  ongoing medical treatment    Assessment:     Joseph Mcdonald is a 89 y.o. male admitted with a medical diagnosis of Acute hypoxic respiratory failure.  He presents with the following impairments/functional limitations: weakness, impaired endurance, impaired self care skills, impaired functional mobility, impaired balance, edema, impaired skin, impaired cognition.    Pt cont to require increased assist with all functional mobility    Rehab Prognosis: Fair; patient would benefit from acute skilled PT services to address these deficits and reach maximum level of function.    Recent Surgery: Procedure(s) (LRB):  DECORTICATION, LUNG (Right)      Plan:     During this hospitalization, patient to be seen 5 x/week to address the identified rehab impairments via gait training, therapeutic activities, therapeutic exercises and progress toward the following goals:    Plan of Care Expires:  06/24/24    Subjective     Chief Complaint: acute resp failure  Patient/Family Comments/goals: "I want some water"  Pain/Comfort:         Objective:     Communicated with Lissy Alvarado RN prior to session.  Patient found HOB elevated with restraints, chest tube, telemetry, oxygen, peripheral IV upon PT entry to room.     General Precautions: Standard, fall  Orthopedic Precautions: N/A  Braces: N/A  Respiratory Status: Nasal cannula, flow 2 L/min     Functional Mobility:  Bed Mobility:     Rolling Left:  maximal assistance  Rolling Right: maximal assistance  Supine to Sit: maximal assistance and of 2 persons  Sit to Supine: maximal assistance and of 2 persons      AM-PAC 6 CLICK MOBILITY  Turning over in bed (including adjusting bedclothes, sheets and blankets)?: 2  Sitting down on and " standing up from a chair with arms (e.g., wheelchair, bedside commode, etc.): 1  Moving from lying on back to sitting on the side of the bed?: 2  Moving to and from a bed to a chair (including a wheelchair)?: 1  Need to walk in hospital room?: 1  Climbing 3-5 steps with a railing?: 1  Basic Mobility Total Score: 8       Treatment & Education:  B LE ROM/exercise - ankle pump, straight leg raise, hip ab/adduction, heel slide, limited ROM    Minimum assist to contact guard assist sitting EOB x 12 minutes focusing on achieving/maintaining balance/midline positioning ; slumped posture    Patient left HOB elevated with all lines intact, call button in reach, and bed alarm on..    GOALS:   Multidisciplinary Problems       Physical Therapy Goals          Problem: Physical Therapy    Goal Priority Disciplines Outcome Goal Variances Interventions   Physical Therapy Goal     PT, PT/OT Progressing     Description: Short Term Goals to be met by: 24    Patient will increase functional independence with mobility by performin. Supine to sit with Stand by assist  2. Sit to stand transfer with Stand by assist using Rolling walker  3. Bed to chair transfer with Stand by assist using Rolling walker  4. Gait  x 150 feet with Stand by assist using Rolling walker  5. Lower extremity exercise program x30 reps per handout, with assistance as needed    Long Term Goals to be met by: 24    Pt will regain full independent functional mobility with straight cane to return to home situation and prior activities of daily living.                        Time Tracking:     PT Received On: 24  PT Start Time: 929     PT Stop Time: 1003  PT Total Time (min): 34 min     Billable Minutes: Therapeutic Activity 28    Treatment Type: Treatment  PT/PTA: PTA     Number of PTA visits since last PT visit: 2024

## 2024-06-04 NOTE — PROGRESS NOTES
[facility]  Pulmonology  Progress Note    Patient Name: Joseph Mcdonald  MRN: [id number]  Admission Date: 5/23/2024  Hospital Length of Stay: 12 days  Code Status: Full Code  Attending Provider: Cristian Ac DO  Primary Care Provider: [facility]   Principal Problem: Acute hypoxic respiratory failure    Subjective:     Interval History:  Refer to HPI      Objective:     Vital Signs (Most Recent):  Temp: 97.5 °F (36.4 °C) (06/04/24 0407)  Pulse: 66 (06/04/24 0548)  Resp: 18 (06/04/24 0548)  BP: 133/71 (06/04/24 0407)  SpO2: 99 % (06/04/24 0548) Vital Signs (24h Range):  Temp:  [96.6 °F (35.9 °C)-97.8 °F (36.6 °C)] 97.5 °F (36.4 °C)  Pulse:  [65-72] 66  Resp:  [16-19] 18  SpO2:  [96 %-100 %] 99 %  BP: ()/(30-71) 133/71     Weight: 64.4 kg (141 lb 15.6 oz)  Body mass index is 23.63 kg/m².      Intake/Output Summary (Last 24 hours) at 6/4/2024 0737  Last data filed at 6/4/2024 0733  Gross per 24 hour   Intake 871.42 ml   Output 740 ml   Net 131.42 ml        Physical Exam  Vitals and nursing note reviewed.   Constitutional:       Appearance: He is ill-appearing.   HENT:      Head: Normocephalic and atraumatic.      Nose: Nose normal.      Mouth/Throat:      Mouth: Mucous membranes are moist.   Eyes:      Extraocular Movements: Extraocular movements intact.   Cardiovascular:      Rate and Rhythm: Normal rate and regular rhythm.      Pulses: Normal pulses.      Heart sounds: Normal heart sounds.   Pulmonary:      Effort: Pulmonary effort is normal.      Breath sounds: Examination of the right-middle field reveals decreased breath sounds. Examination of the right-lower field reveals decreased breath sounds. Decreased breath sounds present.      Comments: Chest tube in place right hemithorax without evidence of bleeding, oozing or subcutaneous emphysema around site of insertion  Abdominal:      General: Bowel sounds are normal.      Palpations: Abdomen is soft.    Musculoskeletal:         General: Normal range of motion.      Cervical back: Normal range of motion.   Skin:     General: Skin is warm.   Neurological:      General: No focal deficit present.      Mental Status: He is alert and oriented to person, place, and time. Mental status is at baseline.   Psychiatric:         Mood and Affect: Mood normal.         Behavior: Behavior normal.           Review of Systems    Vents:  Oxygen Concentration (%): 24 (06/04/24 0548)    Lines/Drains/Airways       Drain  Duration                  Chest Tube 05/29/24 0835 Tube - 1 Right 5 days              Peripheral Intravenous Line  Duration                  Hemodialysis AV Fistula Right upper arm -- days         Peripheral IV - Single Lumen 05/29/24 1738 22 G Anterior;Left Forearm 5 days                    Significant Labs:    CBC/Anemia Profile:  Recent Labs   Lab 06/03/24 0424 06/04/24 0424   WBC 11.55* 12.67*   HGB 8.2* 7.9*   HCT 26.9* 25.5*   * 100*   MCV 86.2 85.6   RDW 18.2* 17.6*        Chemistries:  Recent Labs   Lab 06/03/24 0424 06/04/24 0424   * 129*   K 4.6 4.6   CL 99 96*   CO2 26 25   BUN 54* 68*   CREATININE 5.24* 6.16*   CALCIUM 7.8* 7.8*       All pertinent labs within the past 24 hours have been reviewed.  Hyponatremic with sodium at 1:30 a.m., potassium well-controlled at 4.6.  Hemoglobin stable at 8.2.    Significant Imaging:  I have reviewed all pertinent imaging results/findings within the past 24 hours.  I interpreted the chest x-ray myself which showed modest improvement in opacification with persistent hydropneumothorax consistent with trapped lung physiology.  Assessment/Plan:     Pulmonary  Parapneumonic effusion   The patient has what appears to be a complex parapneumonic right-sided pleural effusion, with heavy loculations present.  Cytology from this pleural fluid was not collected during the initial thoracentesis.  Pleural fluid was additionally not sent for Gram stain or culture  either.  Pulmonary service has now been consulted to help assist with residual right-sided pleural effusion, to make a diagnosis and offer some treatment.          Due to the nature of the pleural fluid, this   Was a high-risk procedure and guidewire  was inserted and monitored using fluoroscopy to ensure is above the diaphragm.   Postprocedure chest x-ray and real-time ultrasound showed chest tube in right position (ultrasound used to confirmed guidewire above the diaphragm in a loculation).     14 [place] Garth pneumothorax chest tube displayed no evidence of air leak on water seal.  There was a good amount of tidal movement in the ball bearing in the chest tube atrium.      In his still suspicion that there may be a component of trapped lung.  There was air in the pleural space status thoracentesis (as seen on the CT chest performed before the chest tube but after the ultrasound-guided thoracentesis).  This may indicate that the patient has a thickened pleural rind and may ultimately need decortication.  This is now been confirmed as the patient is status post complete course of tPA and DNase in the pleural space without significant improvement in opacifications.    At this time, patient will be candidate for consideration of decortication by General surgery.  We will order another CT chest today to further investigate the pleural space.  If continued thickening/hydropneumothorax then we will continue with general surgery evaluation for decortication.  I spoke with Dr. Kan about this.  I will place a consult.    Likely etiology parapneumonic complex pleural effusion.    Cardiac/Vascular  Hemodialysis-associated hypotension   Diastolic low with preserved mean arterial blood pressure during procedure chest tube.    Agree with starting midodrine and maintain a mean arterial pressure above 65.    Renal/  End stage renal disease    Patient received dialysis Tuesday Thursday Saturday.                   Benjamin Rodney,  MD  Pulmonology  [facility]

## 2024-06-04 NOTE — ASSESSMENT & PLAN NOTE
The patient has what appears to be a complex parapneumonic right-sided pleural effusion, with heavy loculations present.  Cytology from this pleural fluid was not collected during the initial thoracentesis.  Pleural fluid was additionally not sent for Gram stain or culture either.  Pulmonary service has now been consulted to help assist with residual right-sided pleural effusion, to make a diagnosis and offer some treatment.          Due to the nature of the pleural fluid, this   Was a high-risk procedure and guidewire  was inserted and monitored using fluoroscopy to ensure is above the diaphragm.   Postprocedure chest x-ray and real-time ultrasound showed chest tube in right position (ultrasound used to confirmed guidewire above the diaphragm in a loculation).     14 [place] Garth pneumothorax chest tube displayed no evidence of air leak on water seal.  There was a good amount of tidal movement in the ball bearing in the chest tube atrium.      In his still suspicion that there may be a component of trapped lung.  There was air in the pleural space status thoracentesis (as seen on the CT chest performed before the chest tube but after the ultrasound-guided thoracentesis).  This may indicate that the patient has a thickened pleural rind and may ultimately need decortication.  This is now been confirmed as the patient is status post complete course of tPA and DNase in the pleural space without significant improvement in opacifications.    At this time, patient will be candidate for consideration of decortication by General surgery.  We will order another CT chest today to further investigate the pleural space.  If continued thickening/hydropneumothorax then we will continue with general surgery evaluation for decortication.  I spoke with Dr. Kan about this.  I will place a consult.    Likely etiology parapneumonic complex pleural effusion.

## 2024-06-04 NOTE — H&P (VIEW-ONLY)
[facility]  General Surgery  Consult Note    Patient Name: Joseph Mcdonald  MRN: [id number]  Code Status: Full Code  Admission Date: 5/23/2024  Hospital Length of Stay: 12 days  Attending Physician: Cristian Ac DO  Primary Care Provider: [facility]    Patient information was obtained from past medical records.     Inpatient consult to General Surgery  Consult performed by: Darin Mcmullen FNP  Consult ordered by: Benjamin Rodney MD        Subjective:     Principal Problem: Acute hypoxic respiratory failure    History of Present Illness: History of of end-stage renal disease on dialysis, hypertension, iron deficiency anemia, secondary hyperparathyroidism, type 2 diabetes, CAD, and heart failure with preserved ejection fraction.  Admitted on 05/23/2024 after presenting with dyspnea following dialysis.  Initial chest x-ray showed a large right-sided pleural effusion. Underwent ultrasound-guided thoracentesis by Radiology with removal of 1.5 L of fluid.  Following thoracentesis, the patient appeared to have a residual right-sided pleural effusion with loculations. Pulmonology has subsequently performed installation of intrapleural fibrinolytics several times without improvement.  Repeat CT chest with contrast today shows moderate right size hydropneumothorax, similar to previous.  Pleural enhancement consistent with exudative effusion.  Atelectasis/consolidation.    General surgery consult for possible lung decortication.  The patient provides minimal history and no family was in the room at time of exam.  The patient has been fed breakfast already today, will plan for decortication on 06/06/2024.           No current facility-administered medications on file prior to encounter.     Current Outpatient Medications on File Prior to Encounter   Medication Sig    amLODIPine (NORVASC) 5 MG tablet Take 5 mg by mouth once daily.    atorvastatin (LIPITOR) 40 MG tablet Take 1 tablet  (40 mg total) by mouth every evening.    hydrALAZINE (APRESOLINE) 100 MG tablet Take 1 tablet by mouth 3 (three) times daily with meals.    nebivoloL (BYSTOLIC) 2.5 MG Tab Take 2.5 mg by mouth once daily.    methoxy peg-epoetin beta (MIRCERA INJ) 50 mcg.    timolol maleate 0.5% (TIMOPTIC) 0.5 % Drop Place 1 drop into the right eye once daily.       Review of patient's allergies indicates:   Allergen Reactions    Azithromycin Other (See Comments)     Note: - [employer] 01/11/2019       Past Medical History:   Diagnosis Date    Chronic kidney disease (CKD)     Diabetes mellitus     Hypertension     PVD (peripheral vascular disease)      Past Surgical History:   Procedure Laterality Date    LEFT HEART CATHETERIZATION N/A 8/3/2023    Procedure: Left heart cath;  Surgeon: Vazquez Aguilar MD;  Location: [facility] CATH LAB;  Service: Cardiology;  Laterality: N/A;    left toe amputation      PLACEMENT OF DIALYSIS ACCESS       Family History    None       Tobacco Use    Smoking status: Former     Types: Cigarettes    Smokeless tobacco: Never   Substance and Sexual Activity    Alcohol use: Not Currently    Drug use: Never    Sexual activity: Not on file     Review of Systems   Unable to perform ROS: Other (Poor historian versus dementia)     Objective:     Vital Signs (Most Recent):  Temp: 97.5 °F (36.4 °C) (06/04/24 0407)  Pulse: 66 (06/04/24 0548)  Resp: 18 (06/04/24 0548)  BP: 133/71 (06/04/24 0407)  SpO2: 99 % (06/04/24 0548) Vital Signs (24h Range):  Temp:  [96.8 °F (36 °C)-97.8 °F (36.6 °C)] 97.5 °F (36.4 °C)  Pulse:  [66-72] 66  Resp:  [16-19] 18  SpO2:  [96 %-100 %] 99 %  BP: ()/(30-71) 133/71     Weight: 64.4 kg (141 lb 15.6 oz)  Body mass index is 23.63 kg/m².     Physical Exam  Vitals reviewed.   Cardiovascular:      Rate and Rhythm: Normal rate.   Pulmonary:      Effort: Pulmonary effort is normal. No respiratory distress.      Comments: Right pigtail chest tube with serosanguineous drainage  Skin:      General: Skin is warm and dry.   Neurological:      Mental Status: He is alert. Mental status is at baseline.            I have reviewed all pertinent lab results within the past 24 hours.    Significant Diagnostics:  I have reviewed all pertinent imaging results/findings within the past 24 hours.    Assessment/Plan:     Loculated pleural effusion  6/4:  Plan for lung decortication on 06/06/2024      VTE Risk Mitigation (From admission, onward)           Ordered     IP VTE HIGH RISK PATIENT  Once         05/23/24 1746     Place sequential compression device  Until discontinued         05/23/24 1746                    Thank you for your consult. I will follow-up with patient. Please contact us if you have any additional questions.    Darin Mcmullen, USHA  General Surgery  [facility]

## 2024-06-04 NOTE — ASSESSMENT & PLAN NOTE
Patient received dialysis Tuesday Thursday Saturday.      On the CT chest, the following findings were noted.  There are indeterminate isodense left renal lesions with representative extending posteriorly from the left kidney measuring up to 2.6 cm. Recommend renal ultrasound. Other/detailed findings as above.   I will forward these findings over to the patient's nephrologist Dr. Ovi Alvarado.  And is hospital medicine physicians to see if further workup is needed.

## 2024-06-04 NOTE — ASSESSMENT & PLAN NOTE
Patient's anemia is currently uncontrolled. Has not received any PRBCs to date. Etiology likely d/t chronic disease due to ESRD  Current CBC reviewed-   Lab Results   Component Value Date    HGB 7.9 (L) 06/04/2024    HCT 25.5 (L) 06/04/2024     Monitor serial CBC and transfuse if patient becomes hemodynamically unstable, symptomatic or H/H drops below 7/21.   No evidence of bleeding

## 2024-06-04 NOTE — ASSESSMENT & PLAN NOTE
The patient has what appears to be a complex parapneumonic right-sided pleural effusion, with heavy loculations present.  Cytology from this pleural fluid was not collected during the initial thoracentesis.  Pleural fluid was additionally not sent for Gram stain or culture either.  Pulmonary service has now been consulted to help assist with residual right-sided pleural effusion, to make a diagnosis and offer some treatment.      Due to the nature of the pleural fluid, this   Was a high-risk procedure and guidewire  was inserted and monitored using fluoroscopy to ensure is above the diaphragm.   Postprocedure chest x-ray and real-time ultrasound showed chest tube in right position (ultrasound used to confirmed guidewire above the diaphragm in a loculation).     14 [place] Garth pneumothorax chest tube displayed no evidence of air leak on water seal.  There was a good amount of tidal movement in the ball bearing in the chest tube atrium.      In his still suspicion that there may be a component of trapped lung.  There was air in the pleural space status thoracentesis (as seen on the CT chest performed before the chest tube but after the ultrasound-guided thoracentesis).  This may indicate that the patient has a thickened pleural rind and may ultimately need decortication.  This is now been confirmed as the patient is status post complete course of tPA and DNase in the pleural space without significant improvement in opacifications.    I personally reviewed his CT scan which was done today which showed evidence of thickened pleural rind, hydropneumothorax and continue fluid in loculated pleural space.  Some improved aeration in the right upper lobe.    With these findings, he is now a candidate for surgical decortication.  Surgery consultation has been place and I spoke in person with Dr. Kan.      Likely etiology parapneumonic complex pleural effusion.

## 2024-06-04 NOTE — SUBJECTIVE & OBJECTIVE
Interval History:  Refer to hospital course    Objective:     Vital Signs (Most Recent):  Temp: 97.4 °F (36.3 °C) (06/04/24 1150)  Pulse: 89 (06/04/24 1600)  Resp: 18 (06/04/24 1600)  BP: (!) 125/36 (06/04/24 1600)  SpO2: (!) 92 % (06/04/24 1150) Vital Signs (24h Range):  Temp:  [97.4 °F (36.3 °C)-97.8 °F (36.6 °C)] 97.4 °F (36.3 °C)  Pulse:  [63-92] 89  Resp:  [18-19] 18  SpO2:  [92 %-100 %] 92 %  BP: ()/(30-71) 125/36     Weight: 64.4 kg (141 lb 15.6 oz)  Body mass index is 23.63 kg/m².      Intake/Output Summary (Last 24 hours) at 6/4/2024 1640  Last data filed at 6/4/2024 1322  Gross per 24 hour   Intake 1318.61 ml   Output 740 ml   Net 578.61 ml        Physical Exam      Review of Systems    Vents:  Oxygen Concentration (%): 24 (06/04/24 0548)    Lines/Drains/Airways       Drain  Duration                  Chest Tube 05/29/24 0835 Tube - 1 Right 6 days              Peripheral Intravenous Line  Duration                  Hemodialysis AV Fistula Right upper arm -- days         Peripheral IV - Single Lumen 05/29/24 1738 22 G Anterior;Left Forearm 5 days                    Significant Labs:    CBC/Anemia Profile:  Recent Labs   Lab 06/03/24 0424 06/04/24 0424   WBC 11.55* 12.67*   HGB 8.2* 7.9*   HCT 26.9* 25.5*   * 100*   MCV 86.2 85.6   RDW 18.2* 17.6*        Chemistries:  Recent Labs   Lab 06/03/24 0424 06/04/24 0424   * 129*   K 4.6 4.6   CL 99 96*   CO2 26 25   BUN 54* 68*   CREATININE 5.24* 6.16*   CALCIUM 7.8* 7.8*       All pertinent labs within the past 24 hours have been reviewed.  Hemoglobin low at 7.9.  Calcium low at 7.8, sodium low at 129 with potassium normal 4.6.    Significant Imaging:  I have reviewed all pertinent imaging results/findings within the past 24 hours.  Chest x-ray reviewed from yesterday with some improvement of aeration.

## 2024-06-04 NOTE — SUBJECTIVE & OBJECTIVE
Interval History:  Refer to [facility]      Objective:     Vital Signs (Most Recent):  Temp: 97.5 °F (36.4 °C) (06/04/24 0407)  Pulse: 66 (06/04/24 0548)  Resp: 18 (06/04/24 0548)  BP: 133/71 (06/04/24 0407)  SpO2: 99 % (06/04/24 0548) Vital Signs (24h Range):  Temp:  [96.6 °F (35.9 °C)-97.8 °F (36.6 °C)] 97.5 °F (36.4 °C)  Pulse:  [65-72] 66  Resp:  [16-19] 18  SpO2:  [96 %-100 %] 99 %  BP: ()/(30-71) 133/71     Weight: 64.4 kg (141 lb 15.6 oz)  Body mass index is 23.63 kg/m².      Intake/Output Summary (Last 24 hours) at 6/4/2024 0737  Last data filed at 6/4/2024 0733  Gross per 24 hour   Intake 871.42 ml   Output 740 ml   Net 131.42 ml        Physical Exam  Vitals and nursing note reviewed.   Constitutional:       Appearance: He is ill-appearing.   HENT:      Head: Normocephalic and atraumatic.      Nose: Nose normal.      Mouth/Throat:      Mouth: Mucous membranes are moist.   Eyes:      Extraocular Movements: Extraocular movements intact.   Cardiovascular:      Rate and Rhythm: Normal rate and regular rhythm.      Pulses: Normal pulses.      Heart sounds: Normal heart sounds.   Pulmonary:      Effort: Pulmonary effort is normal.      Breath sounds: Examination of the right-middle field reveals decreased breath sounds. Examination of the right-lower field reveals decreased breath sounds. Decreased breath sounds present.      Comments: Chest tube in place right hemithorax without evidence of bleeding, oozing or subcutaneous emphysema around site of insertion  Abdominal:      General: Bowel sounds are normal.      Palpations: Abdomen is soft.   Musculoskeletal:         General: Normal range of motion.      Cervical back: Normal range of motion.   Skin:     General: Skin is warm.   Neurological:      General: No focal deficit present.      Mental Status: He is alert and oriented to person, place, and time. Mental status is at baseline.   Psychiatric:         Mood and Affect: Mood normal.         Behavior: Behavior  normal.           Review of Systems    Vents:  Oxygen Concentration (%): 24 (06/04/24 0548)    Lines/Drains/Airways       Drain  Duration                  Chest Tube 05/29/24 0835 Tube - 1 Right 5 days              Peripheral Intravenous Line  Duration                  Hemodialysis AV Fistula Right upper arm -- days         Peripheral IV - Single Lumen 05/29/24 1738 22 G Anterior;Left Forearm 5 days                    Significant Labs:    CBC/Anemia Profile:  Recent Labs   Lab 06/03/24 0424 06/04/24 0424   WBC 11.55* 12.67*   HGB 8.2* 7.9*   HCT 26.9* 25.5*   * 100*   MCV 86.2 85.6   RDW 18.2* 17.6*        Chemistries:  Recent Labs   Lab 06/03/24 0424 06/04/24 0424   * 129*   K 4.6 4.6   CL 99 96*   CO2 26 25   BUN 54* 68*   CREATININE 5.24* 6.16*   CALCIUM 7.8* 7.8*       All pertinent labs within the past 24 hours have been reviewed.  Hyponatremic with sodium at 1:30 a.m., potassium well-controlled at 4.6.  Hemoglobin stable at 8.2.    Significant Imaging:  I have reviewed all pertinent imaging results/findings within the past 24 hours.  I interpreted the chest x-ray myself which showed modest improvement in opacification with persistent hydropneumothorax consistent with trapped lung physiology.

## 2024-06-04 NOTE — ASSESSMENT & PLAN NOTE
Nutrition consulted. Most recent weight and BMI monitored-     Measurements:  Wt Readings from Last 1 Encounters:   05/29/24 64.4 kg (141 lb 15.6 oz)   Body mass index is 23.63 kg/m².    Patient has been screened and assessed by RD.    Malnutrition Type:  Context:    Level:      Malnutrition Characteristic Summary:       Interventions/Recommendations (treatment strategy):  Recommend advnace diet per SLP recommendations.   We will place NG tube if necessary.

## 2024-06-04 NOTE — PLAN OF CARE
Problem: Adult Inpatient Plan of Care  Goal: Plan of Care Review  Outcome: Progressing  Flowsheets (Taken 6/4/2024 3150)  Plan of Care Reviewed With: patient  Goal: Patient-Specific Goal (Individualized)  Outcome: Progressing  Goal: Absence of Hospital-Acquired Illness or Injury  Outcome: Progressing

## 2024-06-04 NOTE — PT/OT/SLP PROGRESS
Occupational Therapy   Treatment    Name: Joseph Mcdonald  MRN: [id number]  Admitting Diagnosis:  Acute hypoxic respiratory failure       Recommendations:     Discharge Recommendations: Moderate Intensity Therapy  Discharge Equipment Recommendations:   (to be determined)  Barriers to discharge:       Assessment:     Joseph Mcdonald is a 89 y.o. male with a medical diagnosis of Acute hypoxic respiratory failure.  He presents with weakness and decline in ADLs. Performance deficits affecting function are weakness, impaired endurance, impaired functional mobility, impaired self care skills.     Rehab Prognosis:  Good; patient would benefit from acute skilled OT services to address these deficits and reach maximum level of function.       Plan:     Patient to be seen 5 x/week to address the above listed problems via self-care/home management, therapeutic activities, therapeutic exercises  Plan of Care Expires: 06/28/24  Plan of Care Reviewed with: patient    Subjective     Chief Complaint: acute hypoxic respiratory failure  Patient/Family Comments/goals: pt agreeable to OT tx  Pain/Comfort:  Pain Rating 1: 0/10    Objective:     Communicated with: RENATO Slade prior to session.  Patient found HOB elevated with peripheral IV, chest tube, telemetry upon OT entry to room.    General Precautions: Standard, fall    Orthopedic Precautions:N/A  Braces: N/A  Respiratory Status: Room air     Occupational Performance:     Bed Mobility:    Not performed     Functional Mobility/Transfers:  Not performed    Activities of Daily Living:  Not performed      [facility] 6 Click ADL:      Treatment & Education:  Pt performed x 20 reps each AAROM shoulder flexion, ER/IR, chest press, and elbow flexion/extension in order to improve BUE strength and endurance to perform ADL and ADL t/f with less assist.     Patient left HOB elevated with all lines intact, call button in reach, and RENATO Slade notified    GOALS:   Multidisciplinary Problems        Occupational Therapy Goals          Problem: Occupational Therapy    Goal Priority Disciplines Outcome Interventions   Occupational Therapy Goal     OT, PT/OT Progressing    Description: STG:  Pt will perform grooming (I)  Pt will bathe with setup  Pt will perform UE dressing with (I)  Pt will perform LE dressing with I/mod I  Pt will transfer bed/chair/bsc with Mod I  Pt will perform standing task x 2 min with Mod I   Pt will tolerate 15 minutes of tx without fatigue      LT.Restore to max I with self care and mobility.                          Time Tracking:     OT Date of Treatment: 24  OT Start Time: 856  OT Stop Time: 911  OT Total Time (min): 15 min    Billable Minutes:Therapeutic Exercise 15 minutes    OT/QING: OT          2024

## 2024-06-04 NOTE — SUBJECTIVE & OBJECTIVE
No current facility-administered medications on file prior to encounter.     Current Outpatient Medications on File Prior to Encounter   Medication Sig    amLODIPine (NORVASC) 5 MG tablet Take 5 mg by mouth once daily.    atorvastatin (LIPITOR) 40 MG tablet Take 1 tablet (40 mg total) by mouth every evening.    hydrALAZINE (APRESOLINE) 100 MG tablet Take 1 tablet by mouth 3 (three) times daily with meals.    nebivoloL (BYSTOLIC) 2.5 MG Tab Take 2.5 mg by mouth once daily.    methoxy peg-epoetin beta (MIRCERA INJ) 50 mcg.    timolol maleate 0.5% (TIMOPTIC) 0.5 % Drop Place 1 drop into the right eye once daily.       Review of patient's allergies indicates:   Allergen Reactions    Azithromycin Other (See Comments)     Note: - [employer]rees[employer] 01/11/2019       Past Medical History:   Diagnosis Date    Chronic kidney disease (CKD)     Diabetes mellitus     Hypertension     PVD (peripheral vascular disease)      Past Surgical History:   Procedure Laterality Date    LEFT HEART CATHETERIZATION N/A 8/3/2023    Procedure: Left heart cath;  Surgeon: Vazquez Aguilar MD;  Location: [facility] CATH LAB;  Service: Cardiology;  Laterality: N/A;    left toe amputation      PLACEMENT OF DIALYSIS ACCESS       Family History    None       Tobacco Use    Smoking status: Former     Types: Cigarettes    Smokeless tobacco: Never   Substance and Sexual Activity    Alcohol use: Not Currently    Drug use: Never    Sexual activity: Not on file     Review of Systems   Unable to perform ROS: Other (Poor historian versus dementia)     Objective:     Vital Signs (Most Recent):  Temp: 97.5 °F (36.4 °C) (06/04/24 0407)  Pulse: 66 (06/04/24 0548)  Resp: 18 (06/04/24 0548)  BP: 133/71 (06/04/24 0407)  SpO2: 99 % (06/04/24 0548) Vital Signs (24h Range):  Temp:  [96.8 °F (36 °C)-97.8 °F (36.6 °C)] 97.5 °F (36.4 °C)  Pulse:  [66-72] 66  Resp:  [16-19] 18  SpO2:  [96 %-100 %] 99 %  BP: ()/(30-71) 133/71     Weight: 64.4 kg (141 lb 15.6 oz)  Body mass  index is 23.63 kg/m².     Physical Exam  Vitals reviewed.   Cardiovascular:      Rate and Rhythm: Normal rate.   Pulmonary:      Effort: Pulmonary effort is normal. No respiratory distress.      Comments: Right pigtail chest tube with serosanguineous drainage  Skin:     General: Skin is warm and dry.   Neurological:      Mental Status: He is alert. Mental status is at baseline.            I have reviewed all pertinent lab results within the past 24 hours.    Significant Diagnostics:  I have reviewed all pertinent imaging results/findings within the past 24 hours.

## 2024-06-04 NOTE — PLAN OF CARE
Problem: Gas Exchange Impaired  Goal: Optimal Gas Exchange  Outcome: Progressing     Problem: Skin Injury Risk Increased  Goal: Skin Health and Integrity  Outcome: Progressing     Problem: Pneumonia  Goal: Effective Oxygenation and Ventilation  Outcome: Progressing

## 2024-06-04 NOTE — ASSESSMENT & PLAN NOTE
SLP consulted, failed swallow eval.  Now NPO, NG inserted for feed and meds.    NG removed per patient on accident on 6/3.  SLP to follow today as patient is more alert now.      Has reportedly become more weak and eaten less over the duration of his hospitalization.  We will place NG tube if necessary.

## 2024-06-04 NOTE — ASSESSMENT & PLAN NOTE
Discontinued trazodone.  Management as above.  Requiring restraints to avoid treatment interruption.   Improving.    6/4  More awake today

## 2024-06-04 NOTE — PROGRESS NOTES
[facility]  Nephrology  Progress Note    Patient Name: Joseph Mcdonald  MRN: [id number]  Admission Date: 5/23/2024  Hospital Length of Stay: 12 days  Attending Provider: Cristian Ac DO   Primary Care Physician: [facility], [facility]  Principal Problem:Acute hypoxic respiratory failure    Consults  Subjective:     Interval History:  Mr. Mcdonald is seen in follow-up of his end-stage renal disease.  He is stable this morning and is easily awakened but keeps his eyes closed.  He denies any discomfort.    Review of patient's allergies indicates:   Allergen Reactions    Azithromycin Other (See Comments)     Note: - [employer]rees[employer] 01/11/2019     Current Facility-Administered Medications   Medication Frequency    0.9%  NaCl infusion PRN    acetaminophen tablet 1,000 mg Q8H PRN    atorvastatin tablet 40 mg QHS    dextrose 10% bolus 125 mL 125 mL PRN    dextrose 10% bolus 250 mL 250 mL PRN    glucagon (human recombinant) injection 1 mg PRN    glucose chewable tablet 16 g PRN    glucose chewable tablet 24 g PRN    midodrine tablet 5 mg Once in dialysis    naloxone 0.4 mg/mL injection 0.02 mg PRN    ondansetron disintegrating tablet 8 mg Q8H PRN    pantoprazole suspension 40 mg Daily    piperacillin-tazobactam (ZOSYN) 4.5 g in dextrose 5 % in water (D5W) 100 mL IVPB (MB+) Q12H    polyethylene glycol packet 17 g BID PRN    sevelamer carbonate pwpk 2.4 g TID WM    sodium chloride 0.9% 250 mL flush bag PRN    sodium chloride 0.9% flush 10 mL Q12H PRN       Objective:     Vital Signs (Most Recent):  Temp: 97.5 °F (36.4 °C) (06/04/24 0407)  Pulse: 66 (06/04/24 0548)  Resp: 18 (06/04/24 0548)  BP: 133/71 (06/04/24 0407)  SpO2: 99 % (06/04/24 0548) Vital Signs (24h Range):  Temp:  [96.6 °F (35.9 °C)-97.8 °F (36.6 °C)] 97.5 °F (36.4 °C)  Pulse:  [65-72] 66  Resp:  [16-19] 18  SpO2:  [96 %-100 %] 99 %  BP: ()/(30-71) 133/71     Weight: 64.4 kg (141 lb 15.6 oz) (05/29/24 0725)  Body mass index is 23.63  kg/m².  Body surface area is 1.72 meters squared.    I/O last 3 completed shifts:  In: 1338.1 [NG/GT:493; IV Piggyback:845.1]  Out: 1293 [Chest Tube:1293]    Physical Exam his chest is clear.  His heart is without rub or gallop.  He has no significant edema.  He appears weak and his weight loss of late is apparent.    Significant Labs:sureBMP:   Recent Labs   Lab 06/04/24 0424   GLU 77   *   K 4.6   CL 96*   CO2 25   BUN 68*   CREATININE 6.16*   CALCIUM 7.8*     CBC:   Recent Labs   Lab 06/04/24 0424   WBC 12.67*   RBC 2.98*   HGB 7.9*   HCT 25.5*   *   MCV 85.6   MCH 26.5*   MCHC 31.0*     All labs within the past 24 hours have been reviewed.    Significant Imaging:  Labs: Reviewed    Assessment/Plan:     Active Diagnoses:    Diagnosis Date Noted POA    PRINCIPAL PROBLEM:  Acute hypoxic respiratory failure [J96.01] 05/24/2024 Yes     Chronic    Acute metabolic encephalopathy [G93.41] 06/02/2024 No    Loculated pleural effusion [J90] 05/31/2024 Yes    Oropharyngeal dysphagia [R13.12] 05/31/2024 No    Hemodialysis-associated hypotension [I95.3] 05/29/2024 Yes    Parapneumonic effusion [J18.9, J91.8] 05/23/2024 Yes    Acute on chronic heart failure with preserved ejection fraction [I50.33] 05/23/2024 Yes    Sepsis [A41.9] 05/23/2024 Yes    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.9, D63.1] 05/23/2024 Yes    Debility [R53.81] 05/23/2024 Yes    CAD (coronary artery disease) [I25.10] 08/04/2023 Yes    DM2 (diabetes mellitus, type 2) [E11.9] 11/18/2014 Yes    End stage renal disease [N18.6] 11/06/2014 Yes    Essential (primary) hypertension [I10] 11/06/2014 Yes    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  We will continue his dialysis  support.    Thank you for your consult. I will follow-up with patient. Please contact us if you have any additional questions.    Ovi Alvarado MD  Nephrology  [facility]

## 2024-06-04 NOTE — ASSESSMENT & PLAN NOTE
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 CHF is currently uncontrolled due to Pulmonary edema/pleural effusion on CXR. Latest ECHO performed and demonstrates- Results for orders placed during the hospital encounter of 08/01/23    Echo    Interpretation Summary    Left Ventricle: The left ventricle is normal in size. Increased wall thickness. There is concentric remodeling. Normal wall motion. There is normal systolic function with a visually estimated ejection fraction of 55 - 60%. Grade II diastolic dysfunction.    Left Atrium: Left atrium is mildly dilated. There is a calcified 1.5 x 2 cm full wall thickness mass noted extending from the myocardium into the pericardial space, unclear etiology, recommend cardiac MRI.    Right Ventricle: Normal right ventricular cavity size. Systolic function is normal.    Aortic Valve: The aortic valve is a trileaflet valve. There is physiologically normal aortic regurgitation.    Mitral Valve: There is no stenosis. The mean pressure gradient across the mitral valve is 3 mmHg at a heart rate of  bpm. There is mild regurgitation with a centrally directed jet.    Tricuspid Valve: There is mild to moderate regurgitation with a centrally directed jet.    Pulmonic Valve: There is no significant stenosis.    Pericardium: Small circumferential pericardial effusion present. No indication of cardiac tamponade.  . Continue Nitrate/Vasodilator and monitor clinical status closely. Monitor on telemetry. Patient is off CHF pathway.  Monitor strict Is&Os and daily weights.  Place on fluid restriction of 1.5 L. Cardiology has not been consulted. Continue to stress to patient importance of self efficacy and  on diet for CHF. Last BNP reviewed- and noted below No results for input(s): "BNP", "BNPTRIAGEBLO" in the last 168 hours.   Volume removal per HD.     6/4 Makes no urine.  Continue dialysis  "

## 2024-06-04 NOTE — ASSESSMENT & PLAN NOTE
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 Latest AMPAC and GEMS scores have not been reviewed. Evaluation for etiology is complete.   Plan includes PT/OT consulted.  Home health on discharge.      PTOT

## 2024-06-04 NOTE — ASSESSMENT & PLAN NOTE
Diastolic low with preserved mean arterial blood pressure during procedure chest tube.    Agree with starting midodrine and maintain a mean arterial pressure above 65.

## 2024-06-04 NOTE — PROGRESS NOTES
[facility]  Pulmonology  Progress Note    Patient Name: Joseph Mcdonald  MRN: [id number]  Admission Date: 5/23/2024  Hospital Length of Stay: 12 days  Code Status: Full Code  Attending Provider: Cristian Ac DO  Primary Care Provider: [facility]   Principal Problem: Acute hypoxic respiratory failure    Subjective:     Interval History:  Refer to hospital course    Objective:     Vital Signs (Most Recent):  Temp: 97.4 °F (36.3 °C) (06/04/24 1150)  Pulse: 89 (06/04/24 1600)  Resp: 18 (06/04/24 1600)  BP: (!) 125/36 (06/04/24 1600)  SpO2: (!) 92 % (06/04/24 1150) Vital Signs (24h Range):  Temp:  [97.4 °F (36.3 °C)-97.8 °F (36.6 °C)] 97.4 °F (36.3 °C)  Pulse:  [63-92] 89  Resp:  [18-19] 18  SpO2:  [92 %-100 %] 92 %  BP: ()/(30-71) 125/36     Weight: 64.4 kg (141 lb 15.6 oz)  Body mass index is 23.63 kg/m².      Intake/Output Summary (Last 24 hours) at 6/4/2024 1640  Last data filed at 6/4/2024 1322  Gross per 24 hour   Intake 1318.61 ml   Output 740 ml   Net 578.61 ml        Physical Exam      Review of Systems    Vents:  Oxygen Concentration (%): 24 (06/04/24 0548)    Lines/Drains/Airways       Drain  Duration                  Chest Tube 05/29/24 0835 Tube - 1 Right 6 days              Peripheral Intravenous Line  Duration                  Hemodialysis AV Fistula Right upper arm -- days         Peripheral IV - Single Lumen 05/29/24 1738 22 G Anterior;Left Forearm 5 days                    Significant Labs:    CBC/Anemia Profile:  Recent Labs   Lab 06/03/24 0424 06/04/24 0424   WBC 11.55* 12.67*   HGB 8.2* 7.9*   HCT 26.9* 25.5*   * 100*   MCV 86.2 85.6   RDW 18.2* 17.6*        Chemistries:  Recent Labs   Lab 06/03/24  0424 06/04/24  0424   * 129*   K 4.6 4.6   CL 99 96*   CO2 26 25   BUN 54* 68*   CREATININE 5.24* 6.16*   CALCIUM 7.8* 7.8*       All pertinent labs within the past 24 hours have been reviewed.  Hemoglobin low at 7.9.  Calcium low at 7.8,  sodium low at 129 with potassium normal 4.6.    Significant Imaging:  I have reviewed all pertinent imaging results/findings within the past 24 hours.  Chest x-ray reviewed from yesterday with some improvement of aeration.  Assessment/Plan:     Neuro  Acute metabolic encephalopathy  Stable at this time    Pulmonary  Parapneumonic effusion   The patient has what appears to be a complex parapneumonic right-sided pleural effusion, with heavy loculations present.  Cytology from this pleural fluid was not collected during the initial thoracentesis.  Pleural fluid was additionally not sent for Gram stain or culture either.  Pulmonary service has now been consulted to help assist with residual right-sided pleural effusion, to make a diagnosis and offer some treatment.      Due to the nature of the pleural fluid, this   Was a high-risk procedure and guidewire  was inserted and monitored using fluoroscopy to ensure is above the diaphragm.   Postprocedure chest x-ray and real-time ultrasound showed chest tube in right position (ultrasound used to confirmed guidewire above the diaphragm in a loculation).     [address] pneumothorax chest tube displayed no evidence of air leak on water seal.  There was a good amount of tidal movement in the ball bearing in the chest tube atrium.      In his still suspicion that there may be a component of trapped lung.  There was air in the pleural space status thoracentesis (as seen on the CT chest performed before the chest tube but after the ultrasound-guided thoracentesis).  This may indicate that the patient has a thickened pleural rind and may ultimately need decortication.  This is now been confirmed as the patient is status post complete course of tPA and DNase in the pleural space without significant improvement in opacifications.    I personally reviewed his CT scan which was done today which showed evidence of thickened pleural rind, hydropneumothorax and continue fluid in  loculated pleural space.  Some improved aeration in the right upper lobe.    With these findings, he is now a candidate for surgical decortication.  Surgery consultation has been place and I spoke in person with Dr. Kan.      Likely etiology parapneumonic complex pleural effusion.    Cardiac/Vascular  Hemodialysis-associated hypotension   Diastolic low with preserved mean arterial blood pressure during procedure chest tube.    Agree with starting midodrine and maintain a mean arterial pressure above 65.    Renal/  End stage renal disease    Patient received dialysis Tuesday Thursday Saturday.      On the CT chest, the following findings were noted.  There are indeterminate isodense left renal lesions with representative extending posteriorly from the left kidney measuring up to 2.6 cm. Recommend renal ultrasound. Other/detailed findings as above.   I will forward these findings over to the patient's nephrologist Dr. Ovi Alvarado.  And is hospital medicine physicians to see if further workup is needed.                 Benjamin Rodney MD  Pulmonology  [facility]

## 2024-06-04 NOTE — ASSESSMENT & PLAN NOTE
Creatine stable for now. BMP reviewed- noted Estimated Creatinine Clearance: 7.2 mL/min (A) (based on SCr of 6.16 mg/dL (H)). according to latest data. 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    Nephrology consulted to resume HD sessions.   Continue dialysis

## 2024-06-05 PROBLEM — D69.6 THROMBOCYTOPENIA: Status: ACTIVE | Noted: 2024-01-01

## 2024-06-05 NOTE — ASSESSMENT & PLAN NOTE
As above.    Decortication Thursday    Patient found to have large pleural effusion on imaging. I have personally reviewed and interpreted the following imaging: Xray. A thoracentesis was ordered. Most likely etiology includes Pneumonia. Management to include Repeat Thoracentesis, Pleural Catheter, and Dialysis

## 2024-06-05 NOTE — ASSESSMENT & PLAN NOTE
Nutrition consulted. Most recent weight and BMI monitored-     Measurements:  Wt Readings from Last 1 Encounters:   05/29/24 64.4 kg (141 lb 15.6 oz)   Body mass index is 23.63 kg/m².    Patient has been screened and assessed by RD.    Malnutrition Type:  Context:    Level:      Malnutrition Characteristic Summary:       Interventions/Recommendations (treatment strategy):  Recommend advnace diet per SLP recommendations.   We will place NG tube if necessary.  6/5 NG tube today

## 2024-06-05 NOTE — ASSESSMENT & PLAN NOTE
Patient was found to have thrombocytopenia, the likely etiology is secondary to sepsis/infection, will monitor the platelets Daily. Will transfuse if platelet count is <10k. Hold DVT prophylaxis if platelets are <50k. The patient's platelet results have been reviewed and are listed below.  Recent Labs   Lab 06/05/24  0425   *

## 2024-06-05 NOTE — SUBJECTIVE & OBJECTIVE
Interval History:  no acute events overnight    Review of Systems   HENT:  Positive for voice change.    Respiratory:  Positive for shortness of breath.      Objective:     Vital Signs (Most Recent):  Temp: 98.7 °F (37.1 °C) (06/05/24 0435)  Pulse: 77 (06/05/24 0535)  Resp: 18 (06/05/24 0535)  BP: (!) 181/50 (06/05/24 0435)  SpO2: (!) 92 % (06/05/24 0535) Vital Signs (24h Range):  Temp:  [97.4 °F (36.3 °C)-99.2 °F (37.3 °C)] 98.7 °F (37.1 °C)  Pulse:  [63-94] 77  Resp:  [16-18] 18  SpO2:  [90 %-96 %] 92 %  BP: (117-181)/(36-74) 181/50     Weight: 64.4 kg (141 lb 15.6 oz)  Body mass index is 23.63 kg/m².    Intake/Output Summary (Last 24 hours) at 6/5/2024 0821  Last data filed at 6/5/2024 0716  Gross per 24 hour   Intake 904.48 ml   Output 2525 ml   Net -1620.52 ml         Physical Exam  Vitals and nursing note reviewed.   Constitutional:       Appearance: He is ill-appearing.   HENT:      Head: Normocephalic and atraumatic.      Nose: Nose normal.      Mouth/Throat:      Mouth: Mucous membranes are moist.   Eyes:      Extraocular Movements: Extraocular movements intact.   Cardiovascular:      Rate and Rhythm: Normal rate and regular rhythm.      Pulses: Normal pulses.      Heart sounds: Normal heart sounds.   Pulmonary:      Effort: Pulmonary effort is normal.      Breath sounds: Examination of the right-middle field reveals decreased breath sounds. Examination of the right-lower field reveals decreased breath sounds. Decreased breath sounds present.      Comments: Chest tube in place.   Abdominal:      General: Bowel sounds are normal.      Palpations: Abdomen is soft.      Comments: NG in place.    Musculoskeletal:         General: Normal range of motion.      Cervical back: Normal range of motion.   Skin:     General: Skin is warm.   Neurological:      General: No focal deficit present.      Mental Status: He is alert. Mental status is at baseline. He is disoriented.   Psychiatric:         Mood and Affect: Mood  normal.         Behavior: Behavior normal.             Significant Labs: All pertinent labs within the past 24 hours have been reviewed.    Significant Imaging: I have reviewed all pertinent imaging results/findings within the past 24 hours.

## 2024-06-05 NOTE — ASSESSMENT & PLAN NOTE
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 CHF is currently uncontrolled due to Pulmonary edema/pleural effusion on CXR. Latest ECHO performed and demonstrates- Results for orders placed during the hospital encounter of 08/01/23    Echo    Interpretation Summary    Left Ventricle: The left ventricle is normal in size. Increased wall thickness. There is concentric remodeling. Normal wall motion. There is normal systolic function with a visually estimated ejection fraction of 55 - 60%. Grade II diastolic dysfunction.    Left Atrium: Left atrium is mildly dilated. There is a calcified 1.5 x 2 cm full wall thickness mass noted extending from the myocardium into the pericardial space, unclear etiology, recommend cardiac MRI.    Right Ventricle: Normal right ventricular cavity size. Systolic function is normal.    Aortic Valve: The aortic valve is a trileaflet valve. There is physiologically normal aortic regurgitation.    Mitral Valve: There is no stenosis. The mean pressure gradient across the mitral valve is 3 mmHg at a heart rate of  bpm. There is mild regurgitation with a centrally directed jet.    Tricuspid Valve: There is mild to moderate regurgitation with a centrally directed jet.    Pulmonic Valve: There is no significant stenosis.    Pericardium: Small circumferential pericardial effusion present. No indication of cardiac tamponade.  . Continue Nitrate/Vasodilator and monitor clinical status closely. Monitor on telemetry. Patient is off CHF pathway.  Monitor strict Is&Os and daily weights.  Place on fluid restriction of 1.5 L. Cardiology has not been consulted. Continue to stress to patient importance of self efficacy and  on diet for CHF. Last BNP reviewed- and noted below No results for input(s): "BNP", "BNPTRIAGEBLO" in the last 168 hours.   Volume removal per HD.     6/4 Makes no urine.  Continue dialysis  6/5 continue dialysis  "

## 2024-06-05 NOTE — ASSESSMENT & PLAN NOTE
Sec to pleural effusion.  Require 2L NC since admission.   Imaging consistent with large right sided pleural effusion.   S/p thoracentesis on 5/23 with 1,5L fluid removed, no pleural fluid studies sent.   Remained symptomatic so CT chest obtained on 5/24 and consistent with large effusion.  Pulmonology consulted; s/p chest tube placement (5/29), s/p intra-pleural lytics started on 5/30 (total 6 doses).  Volume removal with HD, continue broad spectrum antibiotics as below.   Monitor oxygen status.   Home O2 eval prior to discharge.      6/4Improved.  Plan for decortication of the right lung on Thursday 6/5 plan for OR tomorrow

## 2024-06-05 NOTE — PROGRESS NOTES
[facility]  Nephrology  Progress Note    Patient Name: Joseph Mcdonald  MRN: [id number]  Admission Date: 5/23/2024  Hospital Length of Stay: 13 days  Attending Provider: Cristian Ac DO   Primary Care Physician: [facility], [facility]  Principal Problem:Acute hypoxic respiratory failure    Consults  Subjective:     Interval History: F/U ESRD, loculated pleural effusion.    Review of patient's allergies indicates:   Allergen Reactions    Azithromycin Other (See Comments)     Note: - [employer] 01/11/2019     Current Facility-Administered Medications   Medication Frequency    0.9%  NaCl infusion PRN    acetaminophen tablet 1,000 mg Q8H PRN    atorvastatin tablet 40 mg QHS    dextrose 10% bolus 125 mL 125 mL PRN    dextrose 10% bolus 250 mL 250 mL PRN    glucagon (human recombinant) injection 1 mg PRN    glucose chewable tablet 16 g PRN    glucose chewable tablet 24 g PRN    hydrALAZINE injection 20 mg Q6H PRN    naloxone 0.4 mg/mL injection 0.02 mg PRN    ondansetron disintegrating tablet 8 mg Q8H PRN    pantoprazole suspension 40 mg Daily    piperacillin-tazobactam (ZOSYN) 4.5 g in dextrose 5 % in water (D5W) 100 mL IVPB (MB+) Q12H    polyethylene glycol packet 17 g BID PRN    sevelamer carbonate pwpk 2.4 g TID WM    sodium chloride 0.9% 250 mL flush bag PRN    sodium chloride 0.9% flush 10 mL Q12H PRN       Objective:     Vital Signs (Most Recent):  Temp: 97.4 °F (36.3 °C) (06/05/24 1549)  Pulse: 78 (06/05/24 1549)  Resp: 18 (06/05/24 1549)  BP: (!) 131/44 (06/05/24 1549)  SpO2: 100 % (06/05/24 1549) Vital Signs (24h Range):  Temp:  [97.4 °F (36.3 °C)-99.2 °F (37.3 °C)] 97.4 °F (36.3 °C)  Pulse:  [73-94] 78  Resp:  [16-18] 18  SpO2:  [90 %-100 %] 100 %  BP: (119-201)/(44-74) 131/44     Weight: 64.4 kg (141 lb 15.6 oz) (05/29/24 0725)  Body mass index is 23.63 kg/m².  Body surface area is 1.72 meters squared.    I/O last 3 completed shifts:  In: 1318.6 [P.O.:240; IV  Piggyback:1078.6]  Out: 2760 [Other:2500; Chest Tube:260]    Physical Exam Weak, minimally interactive.     Significant Labs:sureBMP:   Recent Labs   Lab 06/05/24  0425   GLU 76      K 4.4      CO2 26   BUN 35*   CREATININE 4.24*   CALCIUM 8.1*     CBC:   Recent Labs   Lab 06/05/24  0425   WBC 13.40*   RBC 3.07*   HGB 8.1*   HCT 26.0*   *   MCV 84.7   MCH 26.4*   MCHC 31.2*       Significant Imaging:  Labs: Reviewed  X-Ray: Reviewed    Assessment/Plan:     Active Diagnoses:    Diagnosis Date Noted POA    PRINCIPAL PROBLEM:  Acute hypoxic respiratory failure [J96.01] 05/24/2024 Yes     Chronic    Thrombocytopenia [D69.6] 06/05/2024 Yes    Acute metabolic encephalopathy [G93.41] 06/02/2024 No    Pleural effusion [J90] 05/31/2024 Yes    Oropharyngeal dysphagia [R13.12] 05/31/2024 No    Hemodialysis-associated hypotension [I95.3] 05/29/2024 Yes    Parapneumonic effusion [J18.9, J91.8] 05/23/2024 Yes    Acute on chronic heart failure with preserved ejection fraction [I50.33] 05/23/2024 Yes    Sepsis [A41.9] 05/23/2024 Yes    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.9, D63.1] 05/23/2024 Yes    Debility [R53.81] 05/23/2024 Yes    CAD (coronary artery disease) [I25.10] 08/04/2023 Yes    Malnutrition of moderate degree [E44.0] 11/20/2014 Yes    DM2 (diabetes mellitus, type 2) [E11.9] 11/18/2014 Yes    End stage renal disease [N18.6] 11/06/2014 Yes    Essential (primary) hypertension [I10] 11/06/2014 Yes    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 Plan to continue hemodialysis per schedule.    Thank you for your consult. I will follow-up with patient. Please contact us if you have any additional questions.    Ovi Alvarado MD  Nephrology  [facility]

## 2024-06-05 NOTE — ASSESSMENT & PLAN NOTE
SLP consulted, failed swallow eval.  Now NPO, NG inserted for feed and meds.    NG removed per patient on accident on 6/3.  SLP to follow today as patient is more alert now.      Has reportedly become more weak and eaten less over the duration of his hospitalization.  We will place NG tube if necessary.  6/5 NG tube today

## 2024-06-05 NOTE — ASSESSMENT & PLAN NOTE
Discontinued trazodone.  Management as above.  Requiring restraints to avoid treatment interruption.   Improving.    6/4  More awake today  6/5 improving

## 2024-06-05 NOTE — ASSESSMENT & PLAN NOTE
Chronic, controlled. Latest blood pressure and vitals reviewed-     Temp:  [97.4 °F (36.3 °C)-99.2 °F (37.3 °C)]   Pulse:  [63-94]   Resp:  [16-18]   BP: (117-181)/(36-74)   SpO2:  [90 %-96 %] .   Home meds for hypertension were reviewed and noted below.   Hypertension Medications               amLODIPine (NORVASC) 5 MG tablet Take 5 mg by mouth once daily.    hydrALAZINE (APRESOLINE) 100 MG tablet Take 1 tablet by mouth 3 (three) times daily with meals.    nebivoloL (BYSTOLIC) 2.5 MG Tab Take 2.5 mg by mouth once daily.            While in the hospital, will manage blood pressure as follows; hold meds given hypotension. BP has been fluctuating, will likely add back beta blocker if BP gets on the higher side.     Will utilize p.r.n. blood pressure medication only if patient's blood pressure greater than 180/110 and he develops symptoms such as worsening chest pain or shortness of breath.   BP reasonable  6/5 BP okay

## 2024-06-05 NOTE — ASSESSMENT & PLAN NOTE
Patient found to have large pleural effusion on imaging. I have personally reviewed and interpreted the following imaging: Xray. A thoracentesis was ordered. Most likely etiology includes Congestive Heart Failure, Pneumonia, and Renal Failure. Management to include Repeat Thoracentesis and Dialysis  S/p thoracentesis on 5/23- 1.5L fluid removed.  S/p chest tube placement (5/29), cytology negative for malignancy, fluid appears exudate in nature, cultures NGTD.   S/p 6 doses of Intra-pleural lytics BID started per Pulm (5/30-6/2).   CXR mostly stable, possibly slight increase in fluid noted on CXR from 6/3.   Continue IV Zosyn (timing and transition per Pulm), discontinued vancomycin given negative MRSA PCR.   Pulm follows.    6/4 plan for decortication on Thursday 6/5 plan for decortication tomorrow

## 2024-06-05 NOTE — ASSESSMENT & PLAN NOTE
This patient does have evidence of infective focus  My overall impression is sepsis.  Source: Respiratory  Antibiotics given-   Antibiotics (72h ago, onward)      Start     Stop Route Frequency Ordered    05/29/24 1800  piperacillin-tazobactam (ZOSYN) 4.5 g in dextrose 5 % in water (D5W) 100 mL IVPB (MB+)         -- IV Every 12 hours (non-standard times) 05/29/24 0749          Blood cultures NGTD.  Follow pleural fluid cultures.   Source control achieved by: antibiotics.      6/4 improved   6/5 continue antibiotic   Composite Graft Text: The defect edges were debeveled with a #15 scalpel blade.  Given the location of the defect, shape of the defect, the proximity to free margins and the fact the defect was full thickness a composite graft was deemed most appropriate.  The defect was outline and then transferred to the donor site.  A full thickness graft was then excised from the donor site. The graft was then placed in the primary defect, oriented appropriately and then sutured into place.  The secondary defect was then repaired using a primary closure.

## 2024-06-05 NOTE — NURSING
Patient pulled NGT out approximately at about 20 cm. Attempted to reinsert but was meeting resistance at about 45 cm.

## 2024-06-05 NOTE — PLAN OF CARE
06/05/24 @ 8178 - CM spoke to daughters (Kerry Perez and Yany Ferrara - [phone]) re: long term placement. They will discuss among other siblings and asked that CM reach back out to them Friday morning. CM will continue to follow.     06/05/24 @ 7791 - Per morning huddle - Pt planned for lung decortication in AM. Probable need for long term placement. CM will follow up with family re: choice for long term. Pt is not medically appropriate for d/c. CM will continue to follow.

## 2024-06-05 NOTE — ASSESSMENT & PLAN NOTE
Patient's anemia is currently uncontrolled. Has not received any PRBCs to date. Etiology likely d/t chronic disease due to ESRD  Current CBC reviewed-   Lab Results   Component Value Date    HGB 8.1 (L) 06/05/2024    HCT 26.0 (L) 06/05/2024     Monitor serial CBC and transfuse if patient becomes hemodynamically unstable, symptomatic or H/H drops below 7/21.   No evidence of bleeding   6/5 no evidence of bleeding

## 2024-06-05 NOTE — ASSESSMENT & PLAN NOTE
Patient with known CAD s/p  unclear , which is controlled Will continue Statin and monitor for S/Sx of angina/ACS. Continue to monitor on telemetry.    No chest pain  6/5 no chest pain

## 2024-06-05 NOTE — SUBJECTIVE & OBJECTIVE
Interval History:   No change in condition.  Room air sat 96%.  Respirations even and nonlabored.  Plan for right lung decortication by Dr. Kan tomorrow.  Patient unable to give verbal consent.  Spoke with his daughter, Yany.  Reports she will be here this afternoon for consent.    Medications:  Continuous Infusions:  Scheduled Meds:   atorvastatin  40 mg Oral QHS    pantoprazole  40 mg Per NG tube Daily    piperacillin-tazobactam (Zosyn) IV (PEDS and ADULTS) (extended infusion is not appropriate)  4.5 g Intravenous Q12H    sevelamer carbonate  2.4 g Per NG tube TID WM     PRN Meds:  Current Facility-Administered Medications:     sodium chloride 0.9%, , Intravenous, PRN    acetaminophen, 1,000 mg, Oral, Q8H PRN    dextrose 10%, 12.5 g, Intravenous, PRN    dextrose 10%, 25 g, Intravenous, PRN    glucagon (human recombinant), 1 mg, Intramuscular, PRN    glucose, 16 g, Oral, PRN    glucose, 24 g, Oral, PRN    naloxone, 0.02 mg, Intravenous, PRN    ondansetron, 8 mg, Oral, Q8H PRN    polyethylene glycol, 17 g, Oral, BID PRN    sodium chloride 0.9% 250 mL flush bag, , Intravenous, PRN    sodium chloride 0.9%, 10 mL, Intravenous, Q12H PRN     Review of patient's allergies indicates:   Allergen Reactions    Azithromycin Other (See Comments)     Note: - [employer] 01/11/2019     Objective:     Vital Signs (Most Recent):  Temp: 98.4 °F (36.9 °C) (06/05/24 0824)  Pulse: 78 (06/05/24 0824)  Resp: 18 (06/05/24 0824)  BP: (!) 166/59 (06/05/24 0824)  SpO2: 96 % (06/05/24 0945) Vital Signs (24h Range):  Temp:  [97.4 °F (36.3 °C)-99.2 °F (37.3 °C)] 98.4 °F (36.9 °C)  Pulse:  [63-94] 78  Resp:  [16-18] 18  SpO2:  [90 %-96 %] 96 %  BP: (117-181)/(36-74) 166/59     Weight: 64.4 kg (141 lb 15.6 oz)  Body mass index is 23.63 kg/m².    Intake/Output - Last 3 Shifts         06/03 0700 06/04 0659 06/04 0700 06/05 0659 06/05 0700 06/06 0659    P.O.  240     Other  0     NG/GT       IV Piggyback 845.1 233.5 457.3    Total Intake(mL/kg)  845.1 (13.1) 473.5 (7.4) 457.3 (7.1)    Other  2500     Stool  0     Chest Tube 740  25    Total Output 740 2500 25    Net +105.1 -2026.5 +432.3           Stool Occurrence  1 x              Physical Exam  Vitals reviewed.   Cardiovascular:      Rate and Rhythm: Normal rate.   Pulmonary:      Effort: Pulmonary effort is normal. No respiratory distress.      Comments: Right chest tube with serosanguineous drainage  Skin:     General: Skin is warm and dry.   Neurological:      Mental Status: He is alert. Mental status is at baseline. He is disoriented.          Significant Labs:  I have reviewed all pertinent lab results within the past 24 hours.    Significant Diagnostics:  I have reviewed all pertinent imaging results/findings within the past 24 hours.

## 2024-06-05 NOTE — PT/OT/SLP PROGRESS
Physical Therapy Treatment    Patient Name:  Joseph Mcdonald   MRN:  [id number]    Recommendations:     Discharge Recommendations: Moderate Intensity Therapy  Discharge Equipment Recommendations: to be determined by next level of care  Barriers to discharge:  ongoing medical care    Assessment:     Joseph Mcdonald is a 89 y.o. male admitted with a medical diagnosis of Acute hypoxic respiratory failure.  He presents with the following impairments/functional limitations: weakness, impaired endurance, impaired self care skills, impaired functional mobility, impaired balance, edema, impaired skin, impaired cognition. Pt tolerated EOB sitting for approx 15 mins at minimum requiring Mod A improving to CGA/SBA. Pt also instructed in sit<>stand transfers with max x 2 manual assist. Pt unable to ambulate or take steps towards HOB.    Rehab Prognosis: Fair; patient would benefit from acute skilled PT services to address these deficits and reach maximum level of function.    Recent Surgery: Procedure(s) (LRB):  DECORTICATION, LUNG (Right)      Plan:     During this hospitalization, patient to be seen 5 x/week to address the identified rehab impairments via gait training, therapeutic activities, therapeutic exercises and progress toward the following goals:    Plan of Care Expires:  06/24/24    Subjective     Chief Complaint: acute hypoxic respiratory failure  Patient/Family Comments/goals: agreeable to PT  Pain/Comfort:  Pain Rating 1: 4/10  Location - Side 1: Right  Location 1: leg  Pain Addressed 1: Reposition, Distraction      Objective:     Communicated with FEROZ Saleh RN prior to session.  Patient found HOB elevated with chest tube, NG tube, peripheral IV, telemetry, restraints upon PT entry to room.     General Precautions: Standard, fall  Orthopedic Precautions: N/A  Braces: N/A  Respiratory Status: Room air     Functional Mobility:  Bed Mobility:     Rolling Left:  maximal assistance  Rolling Right: maximal  assistance  Scooting: maximal assistance and of 2 persons sitting EOB and scooting to HOB  Supine to Sit: maximal assistance  Sit to Supine: moderate assistance and maximal assistance  Transfers:     Sit to Stand:  x 2 trials with maximal assistance and of 2 persons with manual assistance   Balance: EOB sitting x ~15mins with assist ranging from Mod A to SBA with heavy verbal and tactile cues for posture, UE placement      AM-PAC 6 CLICK MOBILITY  Turning over in bed (including adjusting bedclothes, sheets and blankets)?: 2  Sitting down on and standing up from a chair with arms (e.g., wheelchair, bedside commode, etc.): 2  Moving from lying on back to sitting on the side of the bed?: 2  Moving to and from a bed to a chair (including a wheelchair)?: 1  Need to walk in hospital room?: 1  Climbing 3-5 steps with a railing?: 1  Basic Mobility Total Score: 9       Treatment & Education:  Mobility as stated above.     Patient left HOB elevated with all lines intact, call button in reach, RN notified, and daughter, OTR and rehab tech present..    GOALS:   Multidisciplinary Problems       Physical Therapy Goals          Problem: Physical Therapy    Goal Priority Disciplines Outcome Goal Variances Interventions   Physical Therapy Goal     PT, PT/OT Progressing     Description: Short Term Goals to be met by: 24    Patient will increase functional independence with mobility by performin. Supine to sit with Stand by assist  2. Sit to stand transfer with Stand by assist using Rolling walker  3. Bed to chair transfer with Stand by assist using Rolling walker  4. Gait  x 150 feet with Stand by assist using Rolling walker  5. Lower extremity exercise program x30 reps per handout, with assistance as needed    Long Term Goals to be met by: 24    Pt will regain full independent functional mobility with straight cane to return to home situation and prior activities of daily living.                        Time Tracking:      PT Received On: 06/05/24  PT Start Time: 1424     PT Stop Time: 1449  PT Total Time (min): 25 min     Billable Minutes: Therapeutic Activity 25    Treatment Type: Treatment  PT/PTA: PT     Number of PTA visits since last PT visit: 0     06/05/2024

## 2024-06-05 NOTE — PROGRESS NOTES
[facility] Hospital Medicine  Progress Note    Patient Name: Joseph Mcdonald  MRN: [id number]  Patient Class: IP- Inpatient   Admission Date: 5/23/2024  Length of Stay: 13 days  Attending Physician: Cristian Ac DO  Primary Care Provider: Clinic, [facility]        Subjective:     Principal Problem:Acute hypoxic respiratory failure        HPI:    89-year-old  male With a past medical history of end-stage renal disease, hypertension, iron deficiency anemia, secondary hyperparathyroidism, type 2 diabetes, CAD, heart failure with preserved ejection fraction, hypokalemia inability presents to the ED with 1 week worsening shortness of breath.  This was particularly bad during his dialysis session this morning.  Due to this daughter brought him to the ED thereafter.  He denies any fever, chills, cough, wheezing, sputum production, palpitations.  He has been take medication as in his not missed any dialysis sessions.  He denies any nausea, vomiting, diarrhea, dysuria.  Imaging in the ED revealed near-complete he had a pacemaker patient of the right hemithorax review of systems otherwise negative.    Overview/Hospital Course:   5/24 1.5 L removed from right pleural space today.  Still has large right-sided effusion.  We will attempt to get some more off tomorrow.  5/25 dialyzing today  5/26 bedside ultrasound today still has large pleural effusion, complex appearing.  Was going to attempt bedside ultrasound but given complex nature have consulted pulmonology  5/27 D/W Pulm, plan for chest tube placement.   5/28- Midodrine added low BP.   5/29- s/p chest tube placement per Dr. Rodney, fluid studies sent.   5/30- BP low again, midodrine resumed. Started on intra-pleural lytics per Pulm.  5/31- Intra-pleural lytics continued, CXR improved.   6/1- Failed swallow and is with NG now and tube feeds. S/p 6 doses of Intra-pleural lytics (total 6 doses).   6/2- Mental status better,  resp status stable.  6/3- NG removed on accident while patient was working with PT/OT, SLP consulted for reevaluation. Pulm to follow, CXR with slightly increased right effusion.    6/4 surgery consulted today.  Plan for decortication on Thursday 6/6 6/5 plan for decortication tomorrow.  We will place NG tube due to patient not eating well    Interval History:  no acute events overnight    Review of Systems   HENT:  Positive for voice change.    Respiratory:  Positive for shortness of breath.      Objective:     Vital Signs (Most Recent):  Temp: 98.7 °F (37.1 °C) (06/05/24 0435)  Pulse: 77 (06/05/24 0535)  Resp: 18 (06/05/24 0535)  BP: (!) 181/50 (06/05/24 0435)  SpO2: (!) 92 % (06/05/24 0535) Vital Signs (24h Range):  Temp:  [97.4 °F (36.3 °C)-99.2 °F (37.3 °C)] 98.7 °F (37.1 °C)  Pulse:  [63-94] 77  Resp:  [16-18] 18  SpO2:  [90 %-96 %] 92 %  BP: (117-181)/(36-74) 181/50     Weight: 64.4 kg (141 lb 15.6 oz)  Body mass index is 23.63 kg/m².    Intake/Output Summary (Last 24 hours) at 6/5/2024 0821  Last data filed at 6/5/2024 0716  Gross per 24 hour   Intake 904.48 ml   Output 2525 ml   Net -1620.52 ml         Physical Exam  Vitals and nursing note reviewed.   Constitutional:       Appearance: He is ill-appearing.   HENT:      Head: Normocephalic and atraumatic.      Nose: Nose normal.      Mouth/Throat:      Mouth: Mucous membranes are moist.   Eyes:      Extraocular Movements: Extraocular movements intact.   Cardiovascular:      Rate and Rhythm: Normal rate and regular rhythm.      Pulses: Normal pulses.      Heart sounds: Normal heart sounds.   Pulmonary:      Effort: Pulmonary effort is normal.      Breath sounds: Examination of the right-middle field reveals decreased breath sounds. Examination of the right-lower field reveals decreased breath sounds. Decreased breath sounds present.      Comments: Chest tube in place.   Abdominal:      General: Bowel sounds are normal.      Palpations: Abdomen is soft.       Comments: NG in place.    Musculoskeletal:         General: Normal range of motion.      Cervical back: Normal range of motion.   Skin:     General: Skin is warm.   Neurological:      General: No focal deficit present.      Mental Status: He is alert. Mental status is at baseline. He is disoriented.   Psychiatric:         Mood and Affect: Mood normal.         Behavior: Behavior normal.             Significant Labs: All pertinent labs within the past 24 hours have been reviewed.    Significant Imaging: I have reviewed all pertinent imaging results/findings within the past 24 hours.    Assessment/Plan:      * Acute hypoxic respiratory failure  Sec to pleural effusion.  Require 2L NC since admission.   Imaging consistent with large right sided pleural effusion.   S/p thoracentesis on 5/23 with 1,5L fluid removed, no pleural fluid studies sent.   Remained symptomatic so CT chest obtained on 5/24 and consistent with large effusion.  Pulmonology consulted; s/p chest tube placement (5/29), s/p intra-pleural lytics started on 5/30 (total 6 doses).  Volume removal with HD, continue broad spectrum antibiotics as below.   Monitor oxygen status.   Home O2 eval prior to discharge.      6/4Improved.  Plan for decortication of the right lung on Thursday 6/5 plan for OR tomorrow    Parapneumonic effusion  Patient found to have large pleural effusion on imaging. I have personally reviewed and interpreted the following imaging: Xray. A thoracentesis was ordered. Most likely etiology includes Congestive Heart Failure, Pneumonia, and Renal Failure. Management to include Repeat Thoracentesis and Dialysis  S/p thoracentesis on 5/23- 1.5L fluid removed.  S/p chest tube placement (5/29), cytology negative for malignancy, fluid appears exudate in nature, cultures NGTD.   S/p 6 doses of Intra-pleural lytics BID started per Pulm (5/30-6/2).   CXR mostly stable, possibly slight increase in fluid noted on CXR from 6/3.   Continue IV Zosyn  (timing and transition per Pulm), discontinued vancomycin given negative MRSA PCR.   Pulm follows.    6/4 plan for decortication on Thursday 6/5 plan for decortication tomorrow    Thrombocytopenia  Patient was found to have thrombocytopenia, the likely etiology is secondary to sepsis/infection, will monitor the platelets Daily. Will transfuse if platelet count is <10k. Hold DVT prophylaxis if platelets are <50k. The patient's platelet results have been reviewed and are listed below.  Recent Labs   Lab 06/05/24  0425   *         Acute metabolic encephalopathy  Discontinued trazodone.  Management as above.  Requiring restraints to avoid treatment interruption.   Improving.    6/4  More awake today  6/5 improving    Oropharyngeal dysphagia  SLP consulted, failed swallow eval.  Now NPO, NG inserted for feed and meds.    NG removed per patient on accident on 6/3.  SLP to follow today as patient is more alert now.      Has reportedly become more weak and eaten less over the duration of his hospitalization.  We will place NG tube if necessary.  6/5 NG tube today    Pleural effusion  As above.    Decortication Thursday    Patient found to have large pleural effusion on imaging. I have personally reviewed and interpreted the following imaging: Xray. A thoracentesis was ordered. Most likely etiology includes Pneumonia. Management to include Repeat Thoracentesis, Pleural Catheter, and Dialysis     Hemodialysis-associated hypotension  Added midodrine 5mg BID, adjusted to 2.5 BID given more stable BP.   May consider weaning if BP gets on the higher side.     6/4 continue midodrine    Debility  Patient with Acute on chronic debility due to age-related physical debility. Latest AMPAC and GEMS scores have not been reviewed. Evaluation for etiology is complete.   Plan includes PT/OT consulted.  Home health on discharge.      PTOT    Anemia of renal disease  Patient's anemia is currently uncontrolled. Has not received any  PRBCs to date. Etiology likely d/t chronic disease due to ESRD  Current CBC reviewed-   Lab Results   Component Value Date    HGB 8.1 (L) 06/05/2024    HCT 26.0 (L) 06/05/2024     Monitor serial CBC and transfuse if patient becomes hemodynamically unstable, symptomatic or H/H drops below 7/21.   No evidence of bleeding   6/5 no evidence of bleeding    Sepsis  This patient does have evidence of infective focus  My overall impression is sepsis.  Source: Respiratory  Antibiotics given-   Antibiotics (72h ago, onward)      Start     Stop Route Frequency Ordered    05/29/24 1800  piperacillin-tazobactam (ZOSYN) 4.5 g in dextrose 5 % in water (D5W) 100 mL IVPB (MB+)         -- IV Every 12 hours (non-standard times) 05/29/24 0749          Blood cultures NGTD.  Follow pleural fluid cultures.   Source control achieved by: antibiotics.      6/4 improved   6/5 continue antibiotic    Acute on chronic heart failure with preserved ejection fraction  Patient is identified as having Diastolic (HFpEF) heart failure that is Acute on chronic. CHF is currently uncontrolled due to Pulmonary edema/pleural effusion on CXR. Latest ECHO performed and demonstrates- Results for orders placed during the hospital encounter of 08/01/23    Echo    Interpretation Summary    Left Ventricle: The left ventricle is normal in size. Increased wall thickness. There is concentric remodeling. Normal wall motion. There is normal systolic function with a visually estimated ejection fraction of 55 - 60%. Grade II diastolic dysfunction.    Left Atrium: Left atrium is mildly dilated. There is a calcified 1.5 x 2 cm full wall thickness mass noted extending from the myocardium into the pericardial space, unclear etiology, recommend cardiac MRI.    Right Ventricle: Normal right ventricular cavity size. Systolic function is normal.    Aortic Valve: The aortic valve is a trileaflet valve. There is physiologically normal aortic regurgitation.    Mitral Valve: There  "is no stenosis. The mean pressure gradient across the mitral valve is 3 mmHg at a heart rate of  bpm. There is mild regurgitation with a centrally directed jet.    Tricuspid Valve: There is mild to moderate regurgitation with a centrally directed jet.    Pulmonic Valve: There is no significant stenosis.    Pericardium: Small circumferential pericardial effusion present. No indication of cardiac tamponade.  . Continue Nitrate/Vasodilator and monitor clinical status closely. Monitor on telemetry. Patient is off CHF pathway.  Monitor strict Is&Os and daily weights.  Place on fluid restriction of 1.5 L. Cardiology has not been consulted. Continue to stress to patient importance of self efficacy and  on diet for CHF. Last BNP reviewed- and noted below No results for input(s): "BNP", "BNPTRIAGEBLO" in the last 168 hours.   Volume removal per HD.     6/4 Makes no urine.  Continue dialysis  6/5 continue dialysis    CAD (coronary artery disease)  Patient with known CAD s/p  unclear , which is controlled Will continue Statin and monitor for S/Sx of angina/ACS. Continue to monitor on telemetry.    No chest pain  6/5 no chest pain    Malnutrition of moderate degree  Nutrition consulted. Most recent weight and BMI monitored-     Measurements:  Wt Readings from Last 1 Encounters:   05/29/24 64.4 kg (141 lb 15.6 oz)   Body mass index is 23.63 kg/m².    Patient has been screened and assessed by RD.    Malnutrition Type:  Context:    Level:      Malnutrition Characteristic Summary:       Interventions/Recommendations (treatment strategy):  Recommend advnace diet per SLP recommendations.   We will place NG tube if necessary.  6/5 NG tube today    DM2 (diabetes mellitus, type 2)  HBA1c 4.5  No need for SSI or POC glucose check.     Secondary hyperparathyroidism of renal origin  Resume home Renvela.    Nephrology following  Appreciate nephrology    Essential (primary) hypertension  Chronic, controlled. Latest blood pressure and " vitals reviewed-     Temp:  [97.4 °F (36.3 °C)-99.2 °F (37.3 °C)]   Pulse:  [63-94]   Resp:  [16-18]   BP: (117-181)/(36-74)   SpO2:  [90 %-96 %] .   Home meds for hypertension were reviewed and noted below.   Hypertension Medications               amLODIPine (NORVASC) 5 MG tablet Take 5 mg by mouth once daily.    hydrALAZINE (APRESOLINE) 100 MG tablet Take 1 tablet by mouth 3 (three) times daily with meals.    nebivoloL (BYSTOLIC) 2.5 MG Tab Take 2.5 mg by mouth once daily.            While in the hospital, will manage blood pressure as follows; hold meds given hypotension. BP has been fluctuating, will likely add back beta blocker if BP gets on the higher side.     Will utilize p.r.n. blood pressure medication only if patient's blood pressure greater than 180/110 and he develops symptoms such as worsening chest pain or shortness of breath.   BP reasonable  6/5 BP okay    End stage renal disease  Creatine stable for now. BMP reviewed- noted Estimated Creatinine Clearance: 10.5 mL/min (A) (based on SCr of 4.24 mg/dL (H)). according to latest data. 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    Nephrology consulted to resume HD sessions.   Continue dialysis   6/5Electrolytes okay      VTE Risk Mitigation (From admission, onward)           Ordered     IP VTE HIGH RISK PATIENT  Once         05/23/24 1746     Place sequential compression device  Until discontinued         05/23/24 1746                    Discharge Planning   BRIAN: 6/6/2024     Code Status: Full Code   Is the patient medically ready for discharge?:     Reason for patient still in hospital (select all that apply): Treatment  Discharge Plan A: Home, Home Health         Labs, consultant notes reviewed.  CBC in metabolic panel daily          Cristian Ac DO  Department of Hospital Medicine   [facility]

## 2024-06-05 NOTE — ASSESSMENT & PLAN NOTE
Creatine stable for now. BMP reviewed- noted Estimated Creatinine Clearance: 10.5 mL/min (A) (based on SCr of 4.24 mg/dL (H)). according to latest data. 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    Nephrology consulted to resume HD sessions.   Continue dialysis   6/5Electrolytes okay

## 2024-06-05 NOTE — NURSING
NG tube reinserted x2 attempt to left nare. Tube inserted to 60cm and secured to nasal center with tape. Patient tolerated well, no concerns noted at present time.

## 2024-06-05 NOTE — PROGRESS NOTES
[facility]  General Surgery  Progress Note    Subjective:     History of Present Illness:  History of of end-stage renal disease on dialysis, hypertension, iron deficiency anemia, secondary hyperparathyroidism, type 2 diabetes, CAD, and heart failure with preserved ejection fraction.  Admitted on 05/23/2024 after presenting with dyspnea following dialysis.  Initial chest x-ray showed a large right-sided pleural effusion. Underwent ultrasound-guided thoracentesis by Radiology with removal of 1.5 L of fluid.  Following thoracentesis, the patient appeared to have a residual right-sided pleural effusion with loculations. Pulmonology has subsequently performed installation of intrapleural fibrinolytics several times without improvement.  Repeat CT chest with contrast today shows moderate right size hydropneumothorax, similar to previous.  Pleural enhancement consistent with exudative effusion.  Atelectasis/consolidation.    General surgery consult for possible lung decortication.  The patient provides minimal history and no family was in the room at time of exam.  The patient has been fed breakfast already today, will plan for decortication on 06/06/2024.           Post-Op Info:  Procedure(s) (LRB):  DECORTICATION, LUNG (Right)         Interval History:   No change in condition.  Room air sat 96%.  Respirations even and nonlabored.  Plan for right lung decortication by Dr. Kan tomorrow.  Patient unable to give verbal consent.  Spoke with his daughter, Yany.  Reports she will be here this afternoon for consent.    Medications:  Continuous Infusions:  Scheduled Meds:   atorvastatin  40 mg Oral QHS    pantoprazole  40 mg Per NG tube Daily    piperacillin-tazobactam (Zosyn) IV (PEDS and ADULTS) (extended infusion is not appropriate)  4.5 g Intravenous Q12H    sevelamer carbonate  2.4 g Per NG tube TID WM     PRN Meds:  Current Facility-Administered Medications:     sodium chloride 0.9%, ,  Intravenous, PRN    acetaminophen, 1,000 mg, Oral, Q8H PRN    dextrose 10%, 12.5 g, Intravenous, PRN    dextrose 10%, 25 g, Intravenous, PRN    glucagon (human recombinant), 1 mg, Intramuscular, PRN    glucose, 16 g, Oral, PRN    glucose, 24 g, Oral, PRN    naloxone, 0.02 mg, Intravenous, PRN    ondansetron, 8 mg, Oral, Q8H PRN    polyethylene glycol, 17 g, Oral, BID PRN    sodium chloride 0.9% 250 mL flush bag, , Intravenous, PRN    sodium chloride 0.9%, 10 mL, Intravenous, Q12H PRN     Review of patient's allergies indicates:   Allergen Reactions    Azithromycin Other (See Comments)     Note: - Mi 01/11/2019     Objective:     Vital Signs (Most Recent):  Temp: 98.4 °F (36.9 °C) (06/05/24 0824)  Pulse: 78 (06/05/24 0824)  Resp: 18 (06/05/24 0824)  BP: (!) 166/59 (06/05/24 0824)  SpO2: 96 % (06/05/24 0945) Vital Signs (24h Range):  Temp:  [97.4 °F (36.3 °C)-99.2 °F (37.3 °C)] 98.4 °F (36.9 °C)  Pulse:  [63-94] 78  Resp:  [16-18] 18  SpO2:  [90 %-96 %] 96 %  BP: (117-181)/(36-74) 166/59     Weight: 64.4 kg (141 lb 15.6 oz)  Body mass index is 23.63 kg/m².    Intake/Output - Last 3 Shifts         06/03 0700  06/04 0659 06/04 0700  06/05 0659 06/05 0700  06/06 0659    P.O.  240     Other  0     NG/GT       IV Piggyback 845.1 233.5 457.3    Total Intake(mL/kg) 845.1 (13.1) 473.5 (7.4) 457.3 (7.1)    Other  2500     Stool  0     Chest Tube 740  25    Total Output 740 2500 25    Net +105.1 -2026.5 +432.3           Stool Occurrence  1 x              Physical Exam  Vitals reviewed.   Cardiovascular:      Rate and Rhythm: Normal rate.   Pulmonary:      Effort: Pulmonary effort is normal. No respiratory distress.      Comments: Right chest tube with serosanguineous drainage  Skin:     General: Skin is warm and dry.   Neurological:      Mental Status: He is alert. Mental status is at baseline. He is disoriented.          Significant Labs:  I have reviewed all pertinent lab results within the past 24 hours.    Significant  Diagnostics:  I have reviewed all pertinent imaging results/findings within the past 24 hours.  Assessment/Plan:     Pleural effusion  6/4:  Plan for lung decortication on 06/06/2024        USHA Miranda  General Surgery  [facility]

## 2024-06-05 NOTE — PT/OT/SLP PROGRESS
Occupational Therapy   Treatment    Name: Joseph Mcdonald  MRN: [id number]  Admitting Diagnosis:  Acute hypoxic respiratory failure       Recommendations:     Discharge Recommendations: Moderate Intensity Therapy  Discharge Equipment Recommendations:   (to be determined)  Barriers to discharge:       Assessment:     Joseph Mcdonald is a 89 y.o. male with a medical diagnosis of Acute hypoxic respiratory failure.  He presents with weakness and decline in ADLs. Performance deficits affecting function are weakness, impaired endurance, impaired functional mobility, impaired self care skills.     Rehab Prognosis:  Good; patient would benefit from acute skilled OT services to address these deficits and reach maximum level of function.       Plan:     Patient to be seen 5 x/week to address the above listed problems via self-care/home management, therapeutic activities, therapeutic exercises  Plan of Care Expires: 06/28/24  Plan of Care Reviewed with: patient    Subjective     Chief Complaint: acute hypoxic respiratory failure  Patient/Family Comments/goals: pt agreeable to OT tx  Pain/Comfort:  Pain Rating 1: 4/10  Location 1: leg    Objective:     Communicated with: RENATO Garnett prior to session.  Patient found supine with chest tube, NG tube, peripheral IV, telemetry, restraints upon OT entry to room.    General Precautions: Standard, fall    Orthopedic Precautions:N/A  Braces: N/A  Respiratory Status: Room air     Occupational Performance:     Bed Mobility:    Patient completed Rolling/Turning to Left with  maximal assistance  Patient completed Rolling/Turning to Right with maximal assistance  Patient completed Supine to Sit with maximal assistance  Patient completed Sit to Supine with maximal assistance     Functional Mobility/Transfers:  Patient completed Sit <> Stand Transfer with maximal assistance  with  hand-held assist   Functional Mobility: unable    Activities of Daily Living:  Toileting: maximal assistance to  perform toilet hygiene      [facility] 6 Click ADL:      Treatment & Education:  Pt performed sit to stand t/f x 2 attempts with MaxA x 2 with hand held assist. Pt performed EOB sitting x 15 minutes with ModA to SBA to maintain sitting balance. Pt fatigued quickly and requiring increased assist to perform all mobility and ADL tasks.     Patient left HOB elevated with all lines intact, call button in reach, restraints reapplied at end of session, Mariola RN notified, and daughter present    GOALS:   Multidisciplinary Problems       Occupational Therapy Goals          Problem: Occupational Therapy    Goal Priority Disciplines Outcome Interventions   Occupational Therapy Goal     OT, PT/OT Progressing    Description: STG:  Pt will perform grooming (I)  Pt will bathe with setup  Pt will perform UE dressing with (I)  Pt will perform LE dressing with I/mod I  Pt will transfer bed/chair/bsc with Mod I  Pt will perform standing task x 2 min with Mod I   Pt will tolerate 15 minutes of tx without fatigue      LT.Restore to max I with self care and mobility.                          Time Tracking:     OT Date of Treatment: 24  OT Start Time: 1423  OT Stop Time: 1452  OT Total Time (min): 29 min    Billable Minutes:Therapeutic Activity 25 minutes    OT/QING: OT          2024

## 2024-06-05 NOTE — NURSING
NG tube inserted into Left nare. Patient resisted and fought against nurse, took 2 nurses to assist in placing NG to keep patient still for placement. NG tube was secured to nasal center. Patient then started hitting at nurses and trying to bite. Patient attempted to spit on nurses at bedside multiple times and pull out NG tube. MD was notified by Nurse Navarro about incident and asked about restraints for patient so he does not pull out tube. MD stated to apply restraints, soft wrist restraints were placed on patient and radiology was notified NG tube was placed and ready to be checked for placement.

## 2024-06-06 PROBLEM — I48.91 ATRIAL FIBRILLATION WITH RVR: Status: ACTIVE | Noted: 2024-06-06

## 2024-06-06 PROBLEM — I50.21 ACUTE SYSTOLIC CHF (CONGESTIVE HEART FAILURE): Status: ACTIVE | Noted: 2024-06-06

## 2024-06-06 PROBLEM — I46.9 CARDIAC ARREST: Status: ACTIVE | Noted: 2024-01-01

## 2024-06-06 PROCEDURE — D9220A PRA ANESTHESIA: Mod: ANES,,, | Performed by: ANESTHESIOLOGY

## 2024-06-06 PROCEDURE — D9220A PRA ANESTHESIA: Mod: CRNA,,, | Performed by: NURSE ANESTHETIST, CERTIFIED REGISTERED

## 2024-06-06 PROCEDURE — 63600175 PHARM REV CODE 636 W HCPCS

## 2024-06-06 PROCEDURE — 25000003 PHARM REV CODE 250: Performed by: NURSE ANESTHETIST, CERTIFIED REGISTERED

## 2024-06-06 PROCEDURE — 36620 INSERTION CATHETER ARTERY: CPT | Mod: 59,,, | Performed by: ANESTHESIOLOGY

## 2024-06-06 PROCEDURE — 25000003 PHARM REV CODE 250: Performed by: INTERNAL MEDICINE

## 2024-06-06 PROCEDURE — 63600175 PHARM REV CODE 636 W HCPCS: Performed by: NURSE ANESTHETIST, CERTIFIED REGISTERED

## 2024-06-06 RX ORDER — FENTANYL CITRATE 50 UG/ML
INJECTION, SOLUTION INTRAMUSCULAR; INTRAVENOUS
Status: DISCONTINUED | OUTPATIENT
Start: 2024-06-06 | End: 2024-06-06

## 2024-06-06 RX ORDER — ROCURONIUM BROMIDE 10 MG/ML
INJECTION, SOLUTION INTRAVENOUS
Status: DISCONTINUED | OUTPATIENT
Start: 2024-06-06 | End: 2024-06-06

## 2024-06-06 RX ORDER — PROPOFOL 10 MG/ML
VIAL (ML) INTRAVENOUS
Status: DISCONTINUED | OUTPATIENT
Start: 2024-06-06 | End: 2024-06-06

## 2024-06-06 RX ORDER — LIDOCAINE HYDROCHLORIDE 20 MG/ML
INJECTION INTRAVENOUS
Status: DISCONTINUED | OUTPATIENT
Start: 2024-06-06 | End: 2024-06-06

## 2024-06-06 RX ORDER — EPINEPHRINE 1 MG/ML
INJECTION, SOLUTION, CONCENTRATE INTRAVENOUS
Status: DISCONTINUED | OUTPATIENT
Start: 2024-06-06 | End: 2024-06-06

## 2024-06-06 RX ORDER — ONDANSETRON 2 MG/ML
INJECTION INTRAMUSCULAR; INTRAVENOUS
Status: DISCONTINUED | OUTPATIENT
Start: 2024-06-06 | End: 2024-06-06

## 2024-06-06 RX ADMIN — FENTANYL CITRATE 100 MCG: 50 INJECTION INTRAMUSCULAR; INTRAVENOUS at 10:06

## 2024-06-06 RX ADMIN — CALCIUM CHLORIDE 1 G: 100 INJECTION, SOLUTION INTRAVENOUS at 11:06

## 2024-06-06 RX ADMIN — ROCURONIUM BROMIDE 40 MG: 10 INJECTION, SOLUTION INTRAVENOUS at 10:06

## 2024-06-06 RX ADMIN — EPINEPHRINE 1 MG: 1 INJECTION, SOLUTION, CONCENTRATE INTRAVENOUS at 11:06

## 2024-06-06 RX ADMIN — LIDOCAINE HYDROCHLORIDE 100 MG: 20 INJECTION, SOLUTION INTRAVENOUS at 10:06

## 2024-06-06 RX ADMIN — SODIUM CHLORIDE: 9 INJECTION, SOLUTION INTRAVENOUS at 10:06

## 2024-06-06 RX ADMIN — PROPOFOL 100 MG: 10 INJECTION, EMULSION INTRAVENOUS at 10:06

## 2024-06-06 NOTE — PT/OT/SLP PROGRESS
Occupational Therapy      Patient Name:  Joseph Mcdonald   MRN:  [id number]    Patient not seen today secondary to Off the floor for procedure/surgery (pt gone to surgery for lung decortication). Will follow-up 6/7/24.    6/6/2024

## 2024-06-06 NOTE — ANESTHESIA PROCEDURE NOTES
Intubation    Date/Time: 6/6/2024 10:52 AM    Performed by: Evelio Singh CRNA  Authorized by: Evelio Singh CRNA    Intubation:     Induction:  Intravenous    Intubated:  Postinduction    Mask Ventilation:  Easy mask    Attempts:  1    Attempted By:  CRNA    Blade:  Glidescope 3    Laryngeal View Grade: Grade I - full view of cords      Difficult Airway Encountered?: No      Complications:  None    Airway Device:  Double lumen tube left    Airway Device Size:  37F    Style/Cuff Inflation:  Cuffed (inflated to minimal occlusive pressure)    Inflation Amount (mL):  8    Tube secured:  25    Secured at:  The lips    Placement Verified By:  Capnometry    Complicating Factors:  None    Findings Post-Intubation:  BS equal bilateral  Notes:      Placement confirmed with bronchoscopy

## 2024-06-06 NOTE — ANESTHESIA POSTPROCEDURE EVALUATION
Anesthesia Post Evaluation    Patient: Joseph Mcdonald    Procedure(s) Performed: Procedure(s) (LRB):  DECORTICATION, LUNG (Right)    Final Anesthesia Type: general      Patient location during evaluation: ICU  Patient participation: No - Unable to Participate, Intubation  Level of consciousness: sedated  Post-procedure vital signs: reviewed and not stable (s/p PEA code with ROSC, patient to ICU for further post resuscitation care)  Pain management: adequate  Airway patency: patent  DANE mitigation strategies: Multimodal analgesia  PONV status at discharge: No PONV  Anesthetic complications: yes  Perioperative Events: cardiac arrest        Cardiovascular status: tachycardic and hemodynamically unstable  Respiratory status: ventilator and intubated  Hydration status: euvolemic  Follow-up needed               Vitals Value Taken Time   BP 97/47 06/06/24 1301   Temp  06/06/24 1429   Pulse 144 06/06/24 1428   Resp 16 06/06/24 1428   SpO2 100 % 06/06/24 1428   Vitals shown include unfiled device data.      No case tracking events are documented in the log.      Pain/Abbey Score: No data recorded

## 2024-06-06 NOTE — SIGNIFICANT EVENT
Patient taken to OR.  After A-line and ETT palcement, patient had PEA.  Epi given and CPR performed for 2-3 minutes.  ROSC obtained.  Transported to ICU.  Dr. Jackson and family notified. No surgery performed.

## 2024-06-06 NOTE — PLAN OF CARE
Problem: Infection  Goal: Absence of Infection Signs and Symptoms  Outcome: Progressing  Intervention: Prevent or Manage Infection  Flowsheets (Taken 6/6/2024 9997)  Fever Reduction/Comfort Measures:   lightweight bedding   lightweight clothing  Infection Management: aseptic technique maintained  Isolation Precautions: precautions maintained

## 2024-06-06 NOTE — PT/OT/SLP PROGRESS
Physical Therapy Treatment    Patient Name:  Joseph Mcdonald   MRN:  [id number]    Recommendations:     Discharge Recommendations: Moderate Intensity Therapy  Discharge Equipment Recommendations: to be determined by next level of care  Barriers to discharge:  ongoing medical treatment    Assessment:     Joseph Mcdonald is a 89 y.o. male admitted with a medical diagnosis of Acute hypoxic respiratory failure.  He presents with the following impairments/functional limitations: weakness, impaired endurance, impaired self care skills, impaired functional mobility, edema, impaired cognition.    Pt more alert this AM and better able to assist with bed mobs and sit to stand as compared to last seen by this PTA    Rehab Prognosis: Fair; patient would benefit from acute skilled PT services to address these deficits and reach maximum level of function.    Recent Surgery: Procedure(s) (LRB):  DECORTICATION, LUNG (Right) Day of Surgery    Plan:     During this hospitalization, patient to be seen 5 x/week to address the identified rehab impairments via gait training, therapeutic activities, therapeutic exercises and progress toward the following goals:    Plan of Care Expires:  06/24/24    Subjective     Chief Complaint: acute hypoxic resp failure  Patient/Family Comments/goals: pt agreeable  Pain/Comfort:         Objective:     Communicated with RENATO Navarro prior to session.  Patient found HOB elevated with restraints, NG tube, peripheral IV, telemetry, chest tube upon PT entry to room.     General Precautions: Standard, fall  Orthopedic Precautions: N/A  Braces: N/A  Respiratory Status: Room air     Functional Mobility:  Bed Mobility:     Supine to Sit: minimum assistance and assist with trunk and  BLE management  Sit to Supine: minimum assistance, of 2 persons, and assist with trunk and B LE management  Transfers:     Sit to Stand:  moderate assistance and of 2 persons with rolling walker and x 2 trials      AM-PAC 6 CLICK  MOBILITY  Turning over in bed (including adjusting bedclothes, sheets and blankets)?: 2  Sitting down on and standing up from a chair with arms (e.g., wheelchair, bedside commode, etc.): 2  Moving from lying on back to sitting on the side of the bed?: 2  Moving to and from a bed to a chair (including a wheelchair)?: 1  Need to walk in hospital room?: 1  Climbing 3-5 steps with a railing?: 1  Basic Mobility Total Score: 9       Treatment & Education:  Pt performed 2 x 10-15 reps (B) LE exercises: ap, quad set, glut set, straight leg raise, hip ab/adduction, long arc quad, heel slide with active assist and near constant verbal and tactile stimuli to assist with and remain on task     Minimum assist to contact guard assist sitting EOB x 15 minutes; slumped posture    Patient left HOB elevated with all lines intact, call button in reach, and daughter present..    GOALS:   Multidisciplinary Problems       Physical Therapy Goals          Problem: Physical Therapy    Goal Priority Disciplines Outcome Goal Variances Interventions   Physical Therapy Goal     PT, PT/OT Progressing     Description: Short Term Goals to be met by: 24    Patient will increase functional independence with mobility by performin. Supine to sit with Stand by assist  2. Sit to stand transfer with Stand by assist using Rolling walker  3. Bed to chair transfer with Stand by assist using Rolling walker  4. Gait  x 150 feet with Stand by assist using Rolling walker  5. Lower extremity exercise program x30 reps per handout, with assistance as needed    Long Term Goals to be met by: 24    Pt will regain full independent functional mobility with straight cane to return to home situation and prior activities of daily living.                        Time Tracking:     PT Received On: 24  PT Start Time: 900     PT Stop Time: 930  PT Total Time (min): 30 min     Billable Minutes: Therapeutic Activity 18 and Therapeutic Exercise  12    Treatment Type: Treatment  PT/PTA: PTA     Number of PTA visits since last PT visit: 1 06/06/2024

## 2024-06-06 NOTE — TRANSFER OF CARE
"Anesthesia Transfer of Care Note    Patient: Joseph Mcdonald    Procedure(s) Performed: Procedure(s) (LRB):  DECORTICATION, LUNG (Right)    Patient location: ICU    Anesthesia Type: general    Transport from OR: Transported from OR on room air with adequate spontaneous ventilation. Transported from OR intubated on 100% O2 by AMBU with assisted ventilation. Upon arrival to PACU/ICU, patient attached to ventilator and auscultated to confirm bilateral breath sounds and adequate TV. Continuous ECG monitoring in transport. Continuous SpO2 monitoring in transport. Continuos invasive BP monitoring in transport    Post pain: adequate analgesia    Post assessment: no apparent anesthetic complications    Post vital signs: stable    Level of consciousness: awake and alert    Nausea/Vomiting: no nausea/vomiting    Complications: none    Transfer of care protocol was followed      Last vitals: Visit Vitals  BP (!) 97/47 (BP Location: Left arm, Patient Position: Lying)   Pulse (!) 134   Temp 36.1 °C (97 °F) (Oral)   Resp 10   Ht 5' 5" (1.651 m)   Wt 64.4 kg (141 lb 15.6 oz)   SpO2 100%   BMI 23.63 kg/m²     "

## 2024-06-06 NOTE — PROGRESS NOTES
[facility] ICU  Nephrology  Progress Note    Patient Name: Joseph Mcdonald  MRN: [id number]  Admission Date: 5/23/2024  Hospital Length of Stay: 14 days  Attending Provider: Ramakrishna Kan MD   Primary Care Physician: Prakash, [facility]  Principal Problem:Acute hypoxic respiratory failure    Consults  Subjective:     Interval History:  Mr. Mcdonald is seen in the ICU.  He coded briefly following induction of anesthesia.  He now has a blood pressure of 130 systolic with an a line on Levophed.  He is ventilated and his heart rate is 170.        Review of patient's allergies indicates:   Allergen Reactions    Azithromycin Other (See Comments)     Note: - [employer] 01/11/2019     Current Facility-Administered Medications   Medication Frequency    0.9%  NaCl infusion PRN    acetaminophen tablet 1,000 mg Q8H PRN    atorvastatin tablet 40 mg QHS    chlorhexidine 0.12 % solution 15 mL BID    dextrose 10% bolus 125 mL 125 mL PRN    dextrose 10% bolus 250 mL 250 mL PRN    fentaNYL injection 25 mcg Q1H PRN    glucagon (human recombinant) injection 1 mg PRN    glucose chewable tablet 16 g PRN    glucose chewable tablet 24 g PRN    naloxone 0.4 mg/mL injection 0.02 mg PRN    NORepinephrine 32 mg in dextrose 5 % (D5W) 250 mL infusion Continuous    NORepinephrine 4 mg in dextrose 5% 250 mL infusion (premix) Continuous    NORepinephrine bitartrate-D5W 4 mg/250 mL (16 mcg/mL) infusion Soln     ondansetron disintegrating tablet 8 mg Q8H PRN    pantoprazole suspension 40 mg Daily    piperacillin-tazobactam (ZOSYN) 4.5 g in dextrose 5 % in water (D5W) 100 mL IVPB (MB+) Q12H    polyethylene glycol packet 17 g BID PRN    propofol (DIPRIVAN) 10 mg/mL infusion Continuous    sevelamer carbonate pwpk 2.4 g TID WM    sodium chloride 0.9% 250 mL flush bag PRN    sodium chloride 0.9% flush 10 mL Q12H PRN    vasopressin (PITRESSIN) 0.2 Units/mL in dextrose 5 % (D5W) 100 mL infusion Continuous       Objective:     Vital  Signs (Most Recent):  Temp: 97 °F (36.1 °C) (06/06/24 1201)  Pulse: (!) 134 (06/06/24 1201)  Resp: 10 (06/06/24 1201)  BP: (!) 97/47 (06/06/24 1201)  SpO2: 100 % (06/06/24 1201) Vital Signs (24h Range):  Temp:  [96.8 °F (36 °C)-97.6 °F (36.4 °C)] 97 °F (36.1 °C)  Pulse:  [] 134  Resp:  [10-20] 10  SpO2:  [96 %-100 %] 100 %  BP: ()/(44-64) 97/47     Weight: 64.4 kg (141 lb 15.6 oz) (05/29/24 0725)  Body mass index is 23.63 kg/m².  Body surface area is 1.72 meters squared.    I/O last 3 completed shifts:  In: 924 [IV Piggyback:924]  Out: 25 [Chest Tube:25]    Physical Exam blood pressure is 130 systolic.  Heart rate 170.  Chest is clear.  No edema.  Patient is sedate on Diprivan and unresponsive.    Significant Labs:sureBMP:   Recent Labs   Lab 06/06/24  1212   GLU 89  89  112*     142   K 3.5  3.5   *  114*   CO2 20*  20*   BUN 44*  44*   CREATININE 4.41*  4.41*   CALCIUM 8.5  8.5   MG 2.0     CBC:   Recent Labs   Lab 06/06/24  0418   WBC 13.27*   RBC 3.35*   HGB 8.7*   HCT 29.0*      MCV 86.6   MCH 26.0*   MCHC 30.0*     All labs within the past 24 hours have been reviewed.    Significant Imaging:  Labs: Reviewed    Assessment/Plan:     Active Diagnoses:    Diagnosis Date Noted POA    PRINCIPAL PROBLEM:  Acute hypoxic respiratory failure [J96.01] 05/24/2024 Yes     Chronic    Thrombocytopenia [D69.6] 06/05/2024 Yes    Acute metabolic encephalopathy [G93.41] 06/02/2024 No    Pleural effusion [J90] 05/31/2024 Yes    Oropharyngeal dysphagia [R13.12] 05/31/2024 No    Hemodialysis-associated hypotension [I95.3] 05/29/2024 Yes    Parapneumonic effusion [J18.9, J91.8] 05/23/2024 Yes    Acute on chronic heart failure with preserved ejection fraction [I50.33] 05/23/2024 Yes    Sepsis [A41.9] 05/23/2024 Yes    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.9, D63.1] 05/23/2024 Yes    Debility [R53.81] 05/23/2024 Yes    CAD (coronary artery disease) [I25.10] 08/04/2023 Yes    Malnutrition of moderate  degree [E44.0] 11/20/2014 Yes    DM2 (diabetes mellitus, type 2) [E11.9] 11/18/2014 Yes    End stage renal disease [N18.6] 11/06/2014 Yes    Essential (primary) hypertension [I10] 11/06/2014 Yes    Secondary hyperparathyroidism of renal origin [N25.81] 11/06/2014 Yes      Problems Resolved During this Admission:    Diagnosis Date Noted Date Resolved POA    Hypokalemia [E87.6] 05/23/2024 05/28/2024 Yes    Iron deficiency anemia, unspecified [D50.9] 11/06/2014 05/26/2024 Yes       He is unstable for any attempted dialysis today.  We will re-evaluate tomorrow.  His potassium today is 3.5.  His overall outlook is poor.    Thank you for your consult. I will follow-up with patient. Please contact us if you have any additional questions.    Ovi Alvarado MD  Nephrology  [facility] ICU

## 2024-06-06 NOTE — ANESTHESIA PROCEDURE NOTES
Arterial    Diagnosis: pleural effusion, ESRD    Patient location during procedure: done in OR  Timeout: 6/6/2024 10:52 AM  Procedure end time: 6/6/2024 10:58 AM    Staffing  Authorizing Provider: Miguel Thacker DO  Performing Provider: Miguel Thacker DO    Staffing  Performed by: Miguel Thacker DO  Authorized by: Miguel Thacker DO    Anesthesiologist was present at the time of the procedure.    Preanesthetic Checklist  Completed: patient identified, IV checked, site marked, risks and benefits discussed, surgical consent, monitors and equipment checked, pre-op evaluation, timeout performed and anesthesia consent givenArterial  Skin Prep: chlorhexidine gluconate  Local Infiltration: none  Orientation: left  Location: radial    Catheter Size: 22 G  Catheter placement by Anatomical landmarks. Heme positive aspiration all ports. Insertion Attempts: 1  Assessment  Dressing: secured with tape and tegaderm  Patient: Tolerated well

## 2024-06-06 NOTE — ANESTHESIA PREPROCEDURE EVALUATION
06/06/2024  Joseph Mcdonald is a 89 y.o., male.      Pre-op Assessment    I have reviewed the Patient Summary Reports.     I have reviewed the Nursing Notes. I have reviewed the NPO Status.   I have reviewed the Medications.     Review of Systems  Anesthesia Hx:  No problems with previous Anesthesia             Denies Family Hx of Anesthesia complications.    Denies Personal Hx of Anesthesia complications.                    Social:  Former Smoker, No Alcohol Use Advacned age      Cardiovascular:  Exercise tolerance: poor   Hypertension   CAD    Dysrhythmias atrial fibrillation Angina CHF    hyperlipidemia   ECG has been reviewed. Patient with diastolic hypotension over this admission requiring midodrine                         Pulmonary:         Complex right sided parapneumonic effusion leading to hypoxemia and respiratory failure, s/p thoracentesis, chest tube placement, and multiple attempts at intrapleural lytics without success - continual recurrence of the loculated effusion    Surgical consultation for decortication               Renal/:  Chronic Renal Disease, ESRD, Dialysis   Last HD days ago, unable to dialyze d/t hypotension    Current labs stable             Neurological:           Metabolic encephalopathy that has improved since admission                            Endocrine:  Diabetes   Secondary hyperparathyroidism            Physical Exam  General: Well nourished and Alert    Airway:  Mallampati: II   Mouth Opening: Normal  TM Distance: Normal  Tongue: Normal  Neck ROM: Normal ROM    Chest/Lungs:  Clear to auscultation, Normal Respiratory Rate    Heart:  Rate: Normal  Rhythm: Regular Rhythm        Anesthesia Plan  Type of Anesthesia, risks & benefits discussed:    Anesthesia Type: Gen ETT  Intra-op Monitoring Plan: Standard ASA Monitors and Art Line  Post Op Pain Control Plan: multimodal  analgesia  Induction:  IV  Airway Plan: Direct, Video and Fiberoptic, Post-Induction  Informed Consent: Informed consent signed with the Patient and all parties understand the risks and agree with anesthesia plan.  All questions answered. Patient consented to blood products? Yes  ASA Score: 4  Day of Surgery Review of History & Physical: H&P Update referred to the surgeon/provider.I have interviewed and examined the patient. I have reviewed the patient's H&P dated: There are no significant changes.   Anesthesia Plan Notes: ASA 4, GETA with HEMA, will use glidescope and bronchoscopy, 2 PIVs with arterial line, likely ICU requirement post-op    Ready For Surgery From Anesthesia Perspective.     .

## 2024-06-07 NOTE — HPI
Mr. Mcdonald is an 89-year-old gentleman with past medical history significant for end-stage renal disease dependent on dialysis on Tuesday/Thursday/Saturday, history of iron-deficiency anemia, secondary hyperparathyroidism, diabetes, heart failure with preserved ejection fraction who had presented to the hospital in the setting of shortness of breath found to have right-sided pleural effusion with Interventional Pulmonary being consulted after initial thoracentesis was performed by Radiology,  now status post chest tube insertion and instillation of tPA and DNase x3 days.  No improvement with continuous loculated hydropneumothorax in the setting of thickened pleural rind.  Next step  in the algorithm was decortication with General surgery.  Patient has suffered pea arrest  on the operating table prior to procedure starting (in the operating room) brought to the ICU after 3 minutes of chest compressions and ACLS for  pulseless electrical activity.      When I brought the patient to the ICU, initially there was no movement of response, he was markedly hypotensive.  An emergency right internal jugular central line was placed and patient was initiated on both Levophed and vasopressin.  Subsequently, the patient has had episodes of tachycardia.  An echocardiogram done showed a severely depressed ejection fraction.  At this time, the patient began moving all 4 extremities and was grimacing, at which time propofol was started.  Prior to propofol being started, the patient was not following commands but was moving all 4 extremities.  Pupils were pinpoint at that time (not reactive).    There was good tidal movement in the ball bearing of the atrium in the chest tube with no evidence of continuous air leak.  Chest x-ray showed no evidence of pneumothorax with adequate placement of line and right-sided chest tube.  Family updated in detail by myself, as well as general surgeon Dr. Kan.

## 2024-06-07 NOTE — PLAN OF CARE
Per IDT meeting pt remains on vent, will have CXR now and possible extubation today. Pt is following commands. HD this day. Remains on IV ABX. Following DC needs as arise.

## 2024-06-07 NOTE — ASSESSMENT & PLAN NOTE
Newly depressed ejection fraction on recent echocardiogram status post compressions and cardiac arrest.  This might be in the setting of stress-induced cardiomyopathy and has had an improvement in his ejection fraction and therefore stunned myocardium is highest on the differential.    Latest echocardiogram from 6/7/24 as below.  Echocardiogram from yesterday with submitted fast ejection fraction 15%, now recovered.        Left Ventricle: The left ventricle is normal in size. Increased wall thickness. There is concentric remodeling. There is normal systolic function. Biplane (2D) method of discs ejection fraction is 55%. Grade II diastolic dysfunction.    Right Ventricle: Mild right ventricular enlargement. Systolic function is mildly reduced.    Left Atrium: Left atrium is mildly dilated.    Right Atrium: Right atrium is mildly dilated.    Aortic Valve: The aortic valve is a trileaflet valve. Mildly calcified cusps. There is mild stenosis. Aortic valve area by VTI is 1.59 cm². Aortic valve peak velocity is 1.48 m/s. Mean gradient is 4 mmHg. The dimensionless index is 0.60.    Mitral Valve: There is bileaflet sclerosis. Mildly thickened leaflets. There is mild regurgitation.    Tricuspid Valve: There is severe regurgitation with an eccentrically directed jet.    Pulmonary Artery: Pulmonary artery pressure could not be accurately determined due to severe TR.    IVC/SVC: Elevated venous pressure at 15 mmHg.    Pericardium: There is a trivial effusion. No indication of cardiac tamponade. Left pleural effusion.

## 2024-06-07 NOTE — ASSESSMENT & PLAN NOTE
Likely secondary to demand, no evidence of ST elevations.  ST depressions likely in the setting of compressions, trending troponin.  Cardiology consulted for newly depressed EF.

## 2024-06-07 NOTE — PT/OT/SLP PROGRESS
Occupational Therapy      Patient Name:  Joseph Mcdonald   MRN:  [id number]    Patient not seen today secondary to Nursing care, Dialysis (OT attempted tx this AM, pt being extubated. OT attempted tx this PM, pt undergoing dialysis). Will follow-up 6/10/24.    6/7/2024

## 2024-06-07 NOTE — HOSPITAL COURSE
6/7/24-overnight, the patient continued on Precedex Hemoglobin dropped to 7.4, potassium up to 5.5, troponin at 300.  Off norepinephrine this a.m. and continues on vasopressin.  We will extubate today to CPAP  6/8/24-patient with excellent mental status this morning, following commands, interacting completely.  Hemoglobin down to 6.9.  Troponin was elevated to 3600, in the setting of post cardiac arrest.  EF has normalized in terms of his systolic function.  6/9/24-overnight no acute events, hemoglobin stable at 7.9.  Mildly hyponatremic, potassium within normal limits.  Received third dose of tPA/DNase this morning.  Some mild improvement in opacification on this morning's x-ray.

## 2024-06-07 NOTE — PROGRESS NOTES
Cardiopulmonary arrest during surgery set up yesterday.  Witnessed with ROSC after 2-3 minutes of CPR.    Today the patient is in ICU, intubated.  Awake.  Following commands.

## 2024-06-07 NOTE — PROGRESS NOTES
[facility]  Critical Care Medicine  Progress Note    Patient Name: Joseph Mcdonald  MRN: [id number]  Admission Date: 5/23/2024  Hospital Length of Stay: 15 days  Code Status: Full Code  Attending Provider: Benjamin Rodney MD  [facility]   Principal Problem: Acute hypoxic respiratory failure    Subjective:     HPI:  Mr. Mcdonald is an 89-year-old gentleman with past medical history significant for end-stage renal disease dependent on dialysis on Tuesday/Thursday/Saturday, history of iron-deficiency anemia, secondary hyperparathyroidism, diabetes, heart failure with preserved ejection fraction who had presented to the hospital in the setting of shortness of breath found to have right-sided pleural effusion with Interventional Pulmonary being consulted after initial thoracentesis was performed by Radiology,  now status post chest tube insertion and instillation of tPA and DNase x3 days.  No improvement with continuous loculated hydropneumothorax in the setting of thickened pleural rind.  Next step  in the algorithm was decortication with General surgery.  Patient has suffered pea arrest  on the operating table prior to procedure starting (in the operating room) brought to the ICU after 3 minutes of chest compressions and ACLS for  pulseless electrical activity.      When I brought the patient to the ICU, initially there was no movement of response, he was markedly hypotensive.  An emergency right internal jugular central line was placed and patient was initiated on both Levophed and vasopressin.  Subsequently, the patient has had episodes of tachycardia.  An echocardiogram done showed a severely depressed ejection fraction.  At this time, the patient began moving all 4 extremities and was grimacing, at which time propofol was started.  Prior to propofol being started, the patient was not following commands but was moving all 4 extremities.  Pupils were  pinpoint at that time (not reactive).    There was good tidal movement in the ball bearing of the atrium in the chest tube with no evidence of continuous air leak.  Chest x-ray showed no evidence of pneumothorax with adequate placement of line and right-sided chest tube.  Family updated in detail by myself, as well as general surgeon Dr. Kan.        Hospital/ICU Course:  6/7/24-overnight, the patient continued on Precedex Hemoglobin dropped to 7.4, potassium up to 5.5, troponin at 300.  Off norepinephrine this a.m. and continues on vasopressin.  We will extubate today to CPAP    Interval History/Significant Events:  Refer to hospital course    Review of Systems  Objective:     Vital Signs (Most Recent):  Temp: 98 °F (36.7 °C) (06/07/24 1515)  Pulse: 75 (06/07/24 1700)  Resp: 16 (06/07/24 1700)  BP: (!) 102/49 (06/07/24 1600)  SpO2: 100 % (06/07/24 1700) Vital Signs (24h Range):  Temp:  [96.4 °F (35.8 °C)-98.7 °F (37.1 °C)] 98 °F (36.7 °C)  Pulse:  [] 75  Resp:  [8-23] 16  SpO2:  [98 %-100 %] 100 %  BP: ()/() 102/49   Weight: 64.4 kg (141 lb 15.6 oz)  Body mass index is 23.63 kg/m².      Intake/Output Summary (Last 24 hours) at 6/7/2024 1759  Last data filed at 6/7/2024 1716  Gross per 24 hour   Intake 1082.63 ml   Output 770 ml   Net 312.63 ml          Physical Exam  Constitutional:       General: He is in acute distress.      Appearance: He is ill-appearing. He is not diaphoretic.   HENT:      Head: Normocephalic and atraumatic.      Nose: No congestion or rhinorrhea.   Cardiovascular:      Rate and Rhythm: Tachycardia present. Rhythm irregular.      Pulses: Normal pulses.      Heart sounds: Normal heart sounds.   Pulmonary:      Effort: Respiratory distress present.      Breath sounds: No stridor. Rhonchi present. No wheezing or rales.   Chest:      Chest wall: No tenderness.   Abdominal:      General: Abdomen is flat.   Musculoskeletal:      Cervical back: Normal range of motion. No rigidity.       Right lower leg: No edema.      Left lower leg: No edema.   Skin:     General: Skin is dry.      Findings: No erythema.   Neurological:      General: No focal deficit present.      Comments: Intubated, but not sedated and patient following commands.            Vents:  Vent Mode: CPAP (06/07/24 0950)  Ventilator Initiated: Yes (06/06/24 1155)  Set Rate: 16 BPM (06/07/24 0713)  Vt Set: 400 mL (06/07/24 0713)  Pressure Support: 5 cmH20 (06/07/24 0950)  PEEP/CPAP: 5 cmH20 (06/07/24 0950)  Oxygen Concentration (%): 40 (06/07/24 1516)  Peak Airway Pressure: 27 cmH20 (06/07/24 0713)  Total Ve: 7 L/m (06/07/24 0713)  F/VT Ratio<105 (RSBI): (!) 45.33 (06/07/24 0055)  Lines/Drains/Airways       Central Venous Catheter Line  Duration             Percutaneous Central Line - Triple Lumen  06/06/24 1215 Internal Jugular Right 1 day              Drain  Duration                  Chest Tube 05/29/24 0835 Tube - 1 Right 9 days         NG/OG Tube 06/06/24 0325 Left nostril 1 day              Arterial Line  Duration             Arterial Line 06/06/24 1053 Left Radial 1 day              Peripheral Intravenous Line  Duration                  Hemodialysis AV Fistula Right upper arm -- days         Peripheral IV - Single Lumen 05/29/24 1738 22 G Anterior;Left Forearm 9 days                  Significant Labs:    CBC/Anemia Profile:  Recent Labs   Lab 06/06/24  0418 06/07/24  0401   WBC 13.27* 13.60*   HGB 8.7* 7.4*   HCT 29.0* 23.9*    145*   MCV 86.6 86.0   RDW 18.0* 18.0*        Chemistries:  Recent Labs   Lab 06/06/24  0418 06/06/24  1212 06/07/24  0401   * 142  142 132*   K 4.7 3.5  3.5 5.5*    114*  114* 100   CO2 31 20*  20* 23   BUN 51* 44*  44* 63*   CREATININE 5.52* 4.41*  4.41* 6.11*   CALCIUM 8.5 8.5  8.5 9.0   ALBUMIN  --  1.2* 1.6*   PROT  --  4.8* 6.3*   BILITOT  --  0.4 0.6   ALKPHOS  --  66 93   ALT  --  11* 17   AST  --  33 60*   MG  --  2.0  --    PHOS  --  3.9  --        All pertinent labs  within the past 24 hours have been reviewed.    Significant Imaging:  I have reviewed all pertinent imaging results/findings within the past 24 hours.    ABG  Recent Labs   Lab 06/06/24  1329   PH 7.32*   PO2 305*   PCO2 44   HCO3 22.7     Assessment/Plan:     Pulmonary  * Acute hypoxic respiratory failure  Patient with Hypoxic Respiratory failure which is Acute.  he is not on home oxygen. Supplemental oxygen was provided and noted- Vent Mode: CPAP  Oxygen Concentration (%):  [40] 40  Resp Rate Total:  [15 br/min-20 br/min] 20 br/min  Vt Set:  [400 mL] 400 mL  PEEP/CPAP:  [5 cmH20] 5 cmH20  Pressure Support:  [5 cmH20-10 cmH20] 5 cmH20      Intubated for airway protection and in the setting of recent cardiac arrest.      Plan for extubation today given good efforts and successful spontaneous breathing trial.    Parapneumonic effusion   Status post insertion of way pneumothorax chest tube with loculated pneumothorax suggestive of thickened pleural rind/loculated effusion.  No gross empyema but this may be a complicated parapneumonic effusion.  Continued on Zosyn at this time.  Unstable for decortication (PEA cardiac arrest as above prior to decortication procedure).  We will continue to monitor with chest tube in place, we will continue on water-seal.  May attempt a second round of tPA/DNase tomorrow 6/8/24 that he has no longer a surgical candidate due to poor overall surgical candidacy.    Cardiac/Vascular  Atrial fibrillation with RVR   Atrial fibrillation with RVR status post his cardiac arrest now significantly improved, off Levophed.  Rate controlled today without addition of chemical rate control agents.      Acute systolic CHF (congestive heart failure)   Newly depressed ejection fraction on recent echocardiogram status post compressions and cardiac arrest.  This might be in the setting of stress-induced cardiomyopathy and has had an improvement in his ejection fraction and therefore stunned myocardium is  highest on the differential.    Latest echocardiogram from 6/7/24 as below.  Echocardiogram from yesterday with submitted fast ejection fraction 15%, now recovered.        Left Ventricle: The left ventricle is normal in size. Increased wall thickness. There is concentric remodeling. There is normal systolic function. Biplane (2D) method of discs ejection fraction is 55%. Grade II diastolic dysfunction.    Right Ventricle: Mild right ventricular enlargement. Systolic function is mildly reduced.    Left Atrium: Left atrium is mildly dilated.    Right Atrium: Right atrium is mildly dilated.    Aortic Valve: The aortic valve is a trileaflet valve. Mildly calcified cusps. There is mild stenosis. Aortic valve area by VTI is 1.59 cm². Aortic valve peak velocity is 1.48 m/s. Mean gradient is 4 mmHg. The dimensionless index is 0.60.    Mitral Valve: There is bileaflet sclerosis. Mildly thickened leaflets. There is mild regurgitation.    Tricuspid Valve: There is severe regurgitation with an eccentrically directed jet.    Pulmonary Artery: Pulmonary artery pressure could not be accurately determined due to severe TR.    IVC/SVC: Elevated venous pressure at 15 mmHg.    Pericardium: There is a trivial effusion. No indication of cardiac tamponade. Left pleural effusion.    Cardiac arrest  Sequence of events as documented below.  Patient markedly improved today.      PEA cardiac arrest in the operating room, status post induction prior to the surgery even being started.  No evidence of tension pneumothorax.  Now moving all 4 extremities but no meaningful commands, profoundly hypotensive at this time.  Did not initiate targeted temperature management given patient was moving all 4 extremities.  We will continue to assess neurological examination.  We will move propofol over to Precedex for sedation as this will help with his bradycardia as well as hemodynamic status in the setting of newly depressed EF.    Elevated troponin     Likely secondary to demand, no evidence of ST elevations.  ST depressions likely in the setting of compressions, trending troponin.  Cardiology consulted for newly depressed EF.    Renal/  End stage renal disease  Dependent on dialysis.  We will request to run net even as patient does not appear to be volume overloaded on exam and dialysis mainly for ensuring potassium within normal limits.  If evidence of hypotension during dialysis, please administer additional fluid boluses of 500 cc.    Dependence on renal dialysis    Continue with dialysis as per schedule       Critical Care Daily Checklist:    A: Awake: RASS Goal/Actual Goal:    Actual:     B: Spontaneous Breathing Trial Performed? Spon. Breathing Trial Initiated?: Initiated (place pt on CPAP 10/5 40%) (06/07/24 0897)   C: SAT & SBT Coordinated?  Yes                      D: Delirium: CAM-ICU     E: Early Mobility Performed? No   F: Feeding Goal: Goals: tube feeding tolerance at goal, weight maintenance during admission  Status: Nutrition Goal Status: new   Current Diet Order   Procedures    Diet NPO Except for: Medication, Sips with Medication     Order Specific Question:   Except for     Answer:   Medication     Order Specific Question:   Except for     Answer:   Sips with Medication      AS: Analgesia/Sedation Off   T: Thromboembolic Prophylaxis Heparin   H: HOB > 300 Yes   U: Stress Ulcer Prophylaxis (if needed) Protonix   G: Glucose Control Monitoring   B: Bowel Function Stool Occurrence: 1   I: Indwelling Catheter (Lines & Fonseca) Necessity Right IJ line, arterial line-we will plan removal if no evidence of continued use of vasopressin.   D: De-escalation of Antimicrobials/Pharmacotherapies Plan to discontinue Zosyn after 2-3 days additional tPA/DNase    Plan for the day/ETD As documented    Code Status:  Family/Goals of Care: Full Code  It family updated in detail bedside     Critical Care Time:  45 minutes  Critical secondary to Patient has a condition  that poses threat to life and bodily function:  Status post cardiac arrest, intubated for mechanical airway protection      Critical care was time spent personally by me on the following activities: development of treatment plan with patient or surrogate and bedside caregivers, discussions with consultants, evaluation of patient's response to treatment, examination of patient, ordering and performing treatments and interventions, ordering and review of laboratory studies, ordering and review of radiographic studies, pulse oximetry, re-evaluation of patient's condition. This critical care time did not overlap with that of any other provider or involve time for any procedures.     Benjamin Rodney MD  Critical Care Medicine  [facility]

## 2024-06-07 NOTE — H&P
[facility]  Critical Care Medicine  History & Physical    Patient Name: Joseph Mcdonald  MRN: [id number]  Admission Date: 5/23/2024  Hospital Length of Stay: 14 days  Code Status: Full Code  Attending Physician: Benjamin Rodney MD   Primary Care Provider: [facility]   Principal Problem: Acute hypoxic respiratory failure    Subjective:     HPI:  Mr. Mcdonald is an 89-year-old gentleman with past medical history significant for end-stage renal disease dependent on dialysis on Tuesday/Thursday/Saturday, history of iron-deficiency anemia, secondary hyperparathyroidism, diabetes, heart failure with preserved ejection fraction who had presented to the hospital in the setting of shortness of breath found to have right-sided pleural effusion with Interventional Pulmonary being consulted after initial thoracentesis was performed by Radiology,  now status post chest tube insertion and instillation of tPA and DNase x3 days.  No improvement with continuous loculated hydropneumothorax in the setting of thickened pleural rind.  Next step  in the algorithm was decortication with General surgery.  Patient has suffered pea arrest  on the operating table prior to procedure starting (in the operating room) brought to the ICU after 3 minutes of chest compressions and ACLS for  pulseless electrical activity.      When I brought the patient to the ICU, initially there was no movement of response, he was markedly hypotensive.  An emergency right internal jugular central line was placed and patient was initiated on both Levophed and vasopressin.  Subsequently, the patient has had episodes of tachycardia.  An echocardiogram done showed a severely depressed ejection fraction.  At this time, the patient began moving all 4 extremities and was grimacing, at which time propofol was started.  Prior to propofol being started, the patient was not following commands but was moving all 4 extremities.  Pupils  were pinpoint at that time (not reactive).    There was good tidal movement in the ball bearing of the atrium in the chest tube with no evidence of continuous air leak.  Chest x-ray showed no evidence of pneumothorax with adequate placement of line and right-sided chest tube.  Family updated in detail by myself, as well as general surgeon Dr. Kan.        Hospital/ICU Course:  No notes on file     Past Medical History:   Diagnosis Date    Chronic kidney disease (CKD)     Diabetes mellitus     Hypertension     PVD (peripheral vascular disease)        Past Surgical History:   Procedure Laterality Date    LEFT HEART CATHETERIZATION N/A 8/3/2023    Procedure: Left heart cath;  Surgeon: Vazquez Aguilar MD;  Location: [facility] CATH LAB;  Service: Cardiology;  Laterality: N/A;    left toe amputation      PLACEMENT OF DIALYSIS ACCESS         Review of patient's allergies indicates:   Allergen Reactions    Azithromycin Other (See Comments)     Note: - [employer]rees[employer] 01/11/2019       Family History    None       Tobacco Use    Smoking status: Former     Types: Cigarettes    Smokeless tobacco: Never   Substance and Sexual Activity    Alcohol use: Not Currently    Drug use: Never    Sexual activity: Not on file      Review of Systems  Objective:     Vital Signs (Most Recent):  Temp: 97.6 °F (36.4 °C) (06/06/24 1501)  Pulse: (!) 133 (06/06/24 1815)  Resp: 16 (06/06/24 1815)  BP: (!) 111/31 (06/06/24 1800)  SpO2: 100 % (06/06/24 1815) Vital Signs (24h Range):  Temp:  [96.8 °F (36 °C)-97.6 °F (36.4 °C)] 97.6 °F (36.4 °C)  Pulse:  [] 133  Resp:  [10-21] 16  SpO2:  [86 %-100 %] 100 %  BP: ()/(11-98) 111/31   Weight: 64.4 kg (141 lb 15.6 oz)  Body mass index is 23.63 kg/m².      Intake/Output Summary (Last 24 hours) at 6/6/2024 2037  Last data filed at 6/6/2024 1949  Gross per 24 hour   Intake 3048.85 ml   Output 10 ml   Net 3038.85 ml          Physical Exam  Constitutional:       General: He is in acute distress.       Appearance: He is ill-appearing. He is not diaphoretic.   HENT:      Head: Normocephalic and atraumatic.      Nose: No congestion or rhinorrhea.   Cardiovascular:      Rate and Rhythm: Tachycardia present. Rhythm irregular.      Pulses: Normal pulses.      Heart sounds: Normal heart sounds.   Pulmonary:      Effort: Respiratory distress present.      Breath sounds: No stridor. Rhonchi present. No wheezing or rales.   Chest:      Chest wall: No tenderness.   Abdominal:      General: Abdomen is flat.   Musculoskeletal:      Cervical back: Normal range of motion. No rigidity.      Right lower leg: No edema.      Left lower leg: No edema.   Skin:     General: Skin is dry.      Findings: No erythema.   Neurological:      Comments: Intubated, sedated (moving all 4 extremities prior to sedation), pinpoint pupils minimally reactive             Vents:  Vent Mode: A/C (06/06/24 1858)  Ventilator Initiated: Yes (06/06/24 1155)  Set Rate: 16 BPM (06/06/24 1858)  Vt Set: 400 mL (06/06/24 1858)  PEEP/CPAP: 5 cmH20 (06/06/24 1858)  Oxygen Concentration (%): 40 (06/06/24 1858)  Peak Airway Pressure: 26 cmH20 (06/06/24 1858)  Total Ve: 5.6 L/m (06/06/24 1858)  Lines/Drains/Airways       Central Venous Catheter Line  Duration             Percutaneous Central Line - Triple Lumen  06/06/24 1215 Internal Jugular Right <1 day              Drain  Duration                  Chest Tube 05/29/24 0835 Tube - 1 Right 8 days         NG/OG Tube 06/06/24 0325 Left nostril <1 day              Airway  Duration                  Airway - Non-Surgical 06/06/24 1052 <1 day              Arterial Line  Duration             Arterial Line 06/06/24 1053 Left Radial <1 day              Peripheral Intravenous Line  Duration                  Hemodialysis AV Fistula Right upper arm -- days         Peripheral IV - Single Lumen 05/29/24 1738 22 G Anterior;Left Forearm 8 days                  Significant Labs:    CBC/Anemia Profile:  Recent Labs   Lab  06/05/24 0425 06/06/24 0418   WBC 13.40* 13.27*   HGB 8.1* 8.7*   HCT 26.0* 29.0*   * 170   MCV 84.7 86.6   RDW 17.8* 18.0*        Chemistries:  Recent Labs   Lab 06/05/24 0425 06/06/24 0418 06/06/24  1212    134* 142  142   K 4.4 4.7 3.5  3.5    100 114*  114*   CO2 26 31 20*  20*   BUN 35* 51* 44*  44*   CREATININE 4.24* 5.52* 4.41*  4.41*   CALCIUM 8.1* 8.5 8.5  8.5   ALBUMIN  --   --  1.2*   PROT  --   --  4.8*   BILITOT  --   --  0.4   ALKPHOS  --   --  66   ALT  --   --  11*   AST  --   --  33   MG  --   --  2.0   PHOS  --   --  3.9       All pertinent labs within the past 24 hours have been reviewed.    Significant Imaging: I have reviewed all pertinent imaging results/findings within the past 24 hours.  Assessment/Plan:     Pulmonary  * Acute hypoxic respiratory failure  Patient with Hypoxic Respiratory failure which is Acute.  he is not on home oxygen. Supplemental oxygen was provided and noted- Vent Mode: A/C  Oxygen Concentration (%):  [] 40  Resp Rate Total:  [16 br/min] 16 br/min  Vt Set:  [400 mL] 400 mL  PEEP/CPAP:  [5 cmH20] 5 cmH20  Mean Airway Pressure:  [7 cmH20-8 cmH20] 8 cmH20      Intubated for airway protection and in the setting of recent cardiac arrest.  Recent ABG reassuring, continue vent settings as above.    Parapneumonic effusion   Status post insertion of way pneumothorax chest tube with loculated pneumothorax suggestive of thickened pleural rind/loculated effusion.  No gross empyema but this may be a complicated parapneumonic effusion.  Continued on Zosyn at this time.  Unstable for decortication (PEA cardiac arrest as above prior to decortication procedure).  We will continue to monitor with chest tube in place, we will continue on water-seal.  May attempt a second round of tPA/DNase now that he has no longer a surgical candidate due to poor overall surgical candidacy.    Cardiac/Vascular  Atrial fibrillation with RVR   Atrial fibrillation with RVR  status post his PA arrest.  Avoiding rate controlled due to severely depressed ejection fraction.  Also avoiding synchronized cardioversion for the same reason.  Initiated vasopressin and with his Levophed requirements decreasing, his tachycardia has improved.    Acute systolic CHF (congestive heart failure)   Newly depressed ejection fraction on recent echocardiogram status post compressions and cardiac arrest.  This might be in the setting of stress-induced cardiomyopathy and he may potentially recover his ejection fraction  Pending his overall clinical status.  This may also be a stunned myocardium in the setting of chest compressions.    We will avoid beta blockers which may further  depressive ejection fraction.  Consulted Cardiology for new onset heart failure.  We will also avoid dobutamine at this time and he as he is already very arrhythmogenic/tachycardic.    Echo    Interpretation Summary    Left Ventricle: The left ventricle is smaller than normal. Increased wall thickness. There is concentric remodeling. Severe global hypokinesis present. There is severely reduced systolic function. Biplane (2D) method of discs ejection fraction is 15%.    Right Ventricle: Normal right ventricular cavity size. Systolic function is severely reduced.    Left Atrium: Left atrium is severely dilated.    Right Atrium: Right atrium is mildly dilated.    Aortic Valve: The aortic valve is a trileaflet valve. Mildly calcified cusps. Mildly restricted motion. There is mild stenosis. Aortic valve area by VTI is 1.64 cm². Aortic valve peak velocity is 2.03 m/s. Mean gradient is 10 mmHg. The dimensionless index is 0.58.    Mitral Valve: There is mild bileaflet sclerosis. Mildly thickened leaflets. There is no stenosis. The mean pressure gradient across the mitral valve is 1 mmHg at a heart rate of  bpm. There is mild regurgitation with an eccentric jet.    Tricuspid Valve: There is severe regurgitation.    Pulmonary Artery:  Pulmonary artery pressure could not be accurately determined.    IVC/SVC: Patient is ventilated, cannot use IVC diameter to estimate right atrial pressure.    Pericardium: There is a trivial effusion. No indication of cardiac tamponade. Left pleural effusion.    Cardiac arrest   PEA cardiac arrest in the operating room, status post induction prior to the surgery even being started.  No evidence of tension pneumothorax.  Now moving all 4 extremities but no meaningful commands, profoundly hypotensive at this time.  Did not initiate targeted temperature management given patient was moving all 4 extremities.  We will continue to assess neurological examination.  We will move propofol over to Precedex for sedation as this will help with his bradycardia as well as hemodynamic status in the setting of newly depressed EF.    Elevated troponin    Likely secondary to demand, no evidence of ST elevations.  ST depressions likely in the setting of compressions, trending troponin.  Cardiology consulted for newly depressed EF.    Renal/  Dependence on renal dialysis    Continue with dialysis as per schedule       Cardiogenic shock versus undifferentiated shock-likely in the setting of depressed ejection fraction from chest compressions.  Continuing on Levophed and vasopressin at this time and avoiding ionotropic support such as dobutamine due to increased arrhythmogenic   Nature and expectation that the patient will have improvement in ejection fraction. Continue on Levophed and vasopressin with plans to titrate Levophed down to approximately 0.2 microgram/kg per minute before weaning vasopressin off.   Critical Care Daily Checklist:    A: Awake: RASS Goal/Actual Goal:    Actual:     B: Spontaneous Breathing Trial Performed?     C: SAT & SBT Coordinated?    Not indicated at this time                      D: Delirium: CAM-ICU     E: Early Mobility Performed? No   F: Feeding Goal: Goals: weight maintenance during admission, intake  50-75% of meals during admission  Status: Nutrition Goal Status: new   Current Diet Order   Procedures    Diet NPO Except for: Medication, Sips with Medication     Order Specific Question:   Except for     Answer:   Medication     Order Specific Question:   Except for     Answer:   Sips with Medication      AS: Analgesia/Sedation  Precedex   T: Thromboembolic Prophylaxis  Holding off at this time to ensure no bleeding status post cardiac resuscitation   H: HOB > 300 Yes   U: Stress Ulcer Prophylaxis (if needed)  Protonix   G: Glucose Control  monitoring   B: Bowel Function Stool Occurrence: 1   I: Indwelling Catheter (Lines & Fonseca) Necessity  Fonseca inserted   D: De-escalation of Antimicrobials/Pharmacotherapies  Not indicated    Plan for the day/ETD  As documented    Code Status:  Family/Goals of Care: Full Code   Patient's family updated in detail at bedside and in  waiting room, all questions answered (son and daughter)     Critical Care Time: 85 minutes  Critical secondary to Patient has a condition that poses threat to life and bodily function:  status post cardiac arrest, intubated for airway protection, pulseless electrical activity cardiac arrest    Critical care was time spent personally by me on the following activities: development of treatment plan with patient or surrogate and bedside caregivers, discussions with consultants, evaluation of patient's response to treatment, examination of patient, ordering and performing treatments and interventions, ordering and review of laboratory studies, ordering and review of radiographic studies, pulse oximetry, re-evaluation of patient's condition. This critical care time did not overlap with that of any other provider or involve time for any procedures.     Benjamin Rodney MD  Critical Care Medicine  [facility]

## 2024-06-07 NOTE — ASSESSMENT & PLAN NOTE
Atrial fibrillation with RVR status post his PA arrest.  Avoiding rate controlled due to severely depressed ejection fraction.  Also avoiding synchronized cardioversion for the same reason.  Initiated vasopressin and with his Levophed requirements decreasing, his tachycardia has improved.

## 2024-06-07 NOTE — ASSESSMENT & PLAN NOTE
Sequence of events as documented below.  Patient markedly improved today.      PEA cardiac arrest in the operating room, status post induction prior to the surgery even being started.  No evidence of tension pneumothorax.  Now moving all 4 extremities but no meaningful commands, profoundly hypotensive at this time.  Did not initiate targeted temperature management given patient was moving all 4 extremities.  We will continue to assess neurological examination.  We will move propofol over to Precedex for sedation as this will help with his bradycardia as well as hemodynamic status in the setting of newly depressed EF.

## 2024-06-07 NOTE — ASSESSMENT & PLAN NOTE
6/7/24  -troponin 193>298>3,648  -likely elevated due to CPR  - [facility] 08/2023 showed Severe two-vessel coronary artery disease involving the ostial RCA and distal left circumflex.  - plan for outpatient ischemic evaluation once patient has recovered from this event

## 2024-06-07 NOTE — PROCEDURES
"Joseph Mcdonald is a 89 y.o. male patient.    Temp: 97.6 °F (36.4 °C) (06/06/24 1501)  Pulse: (!) 133 (06/06/24 1815)  Resp: 16 (06/06/24 1815)  BP: (!) 111/31 (06/06/24 1800)  SpO2: 100 % (06/06/24 1815)  Weight: 64.4 kg (141 lb 15.6 oz) (05/29/24 0725)  Height: 5' 5" (165.1 cm) (05/29/24 0725)       Central Line    Date/Time: 6/6/2024 8:54 PM    Performed by: Benjamin Rodney MD  Authorized by: Benjamin Rodney MD    Location procedure was performed:  [facility]  Pre-operative diagnosis:  Cardiogenic shock  Post-operative diagnosis:  Cardiogenic shock  Consent Done ?:  Emergent Situation  Time out complete?: Verified correct patient, procedure, equipment, staff, and site/side    Indications:  Vascular access  Anesthesia:  Local infiltration  Local anesthetic:  Lidocaine 1% without epinephrine  Preparation:  Skin prepped with ChloraPrep  Skin prep agent dried: Skin prep agent completely dried prior to procedure    Sterile barriers: All five maximal sterile barriers used - gloves, gown, cap, mask and large sterile sheet    Hand hygiene: Hand hygiene performed immediately prior to central venous catheter insertion    Location:  Right internal jugular  Catheter type:  Triple lumen  Catheter size:  7 Fr  Inserted Catheter Length (cm):  15  Ultrasound guidance: Yes    Vessel Caliber:  Medium   patent  Comprressibility:  Normal  Needle advanced into vessel with real time ultrasound guidance.    Guidewire confirmed in vessel.    Steril sheath on probe.    Sterile gel used.  Manometry: No    Number of attempts:  1  Securement:  Line sutured, chlorhexidine patch, sterile dressing applied and blood return through all ports  Complications: No    XRay:  Placement verified by x-ray  Adverse Events:  NoneTermination Site: superior vena cava      6/6/2024    "

## 2024-06-07 NOTE — PT/OT/SLP PROGRESS
Physical Therapy Treatment    Patient Name:  Joseph Mcdonald   MRN:  [id number]    Recommendations:     Discharge Recommendations: Moderate Intensity Therapy  Discharge Equipment Recommendations: to be determined by next level of care  Barriers to discharge:  ongoing medical treatment    Assessment:     Joseph Mcdonald is a 89 y.o. male admitted with a medical diagnosis of Acute hypoxic respiratory failure.  He presents with the following impairments/functional limitations: weakness, impaired endurance, impaired self care skills, impaired functional mobility, edema, impaired cardiopulmonary response to activity, impaired cognition.    Pt orally intubated, however, was able to assist with exercise much better than anticipated    Rehab Prognosis: Fair; patient would benefit from acute skilled PT services to address these deficits and reach maximum level of function.    Recent Surgery: Procedure(s) (LRB):  DECORTICATION, LUNG (Right) 1 Day Post-Op    Plan:     During this hospitalization, patient to be seen 5 x/week to address the identified rehab impairments via gait training, therapeutic activities, therapeutic exercises and progress toward the following goals:    Plan of Care Expires:  06/24/24    Subjective     Chief Complaint: acute hypoxic resp failure  Patient/Family Comments/goals: pt alert this AM  Pain/Comfort:         Objective:     Communicated with Nelida Hernandez RN prior to session.  Patient found HOB elevated with blood pressure cuff, pulse ox (continuous), restraints, chest tube, CPAP, peripheral IV, telemetry upon PT entry to room.     General Precautions: Standard, fall, respiratory  Orthopedic Precautions: N/A  Braces: N/A  Respiratory Status:  CPAP     Functional Mobility:  NT      AM-PAC 6 CLICK MOBILITY          Treatment & Education:  Pt performed 2 x 15 reps (B) LE exercises: ap, straight leg raise, hip ab/adduction, short arc quad, heel slide with active assist     Patient left HOB elevated with all  lines intact, call button in reach, and restraints remained in place during tx session ..    GOALS:   Multidisciplinary Problems       Physical Therapy Goals          Problem: Physical Therapy    Goal Priority Disciplines Outcome Goal Variances Interventions   Physical Therapy Goal     PT, PT/OT Progressing     Description: Short Term Goals to be met by: 24    Patient will increase functional independence with mobility by performin. Supine to sit with Stand by assist  2. Sit to stand transfer with Stand by assist using Rolling walker  3. Bed to chair transfer with Stand by assist using Rolling walker  4. Gait  x 150 feet with Stand by assist using Rolling walker  5. Lower extremity exercise program x30 reps per handout, with assistance as needed    Long Term Goals to be met by: 24    Pt will regain full independent functional mobility with straight cane to return to home situation and prior activities of daily living.                        Time Tracking:     PT Received On: 24  PT Start Time: 0850     PT Stop Time: 0908  PT Total Time (min): 18 min     Billable Minutes: Therapeutic Exercise 15    Treatment Type: Treatment  PT/PTA: PTA     Number of PTA visits since last PT visit: 2     2024

## 2024-06-07 NOTE — SUBJECTIVE & OBJECTIVE
Past Medical History:   Diagnosis Date    Chronic kidney disease (CKD)     Diabetes mellitus     Hypertension     PVD (peripheral vascular disease)        Past Surgical History:   Procedure Laterality Date    LEFT HEART CATHETERIZATION N/A 8/3/2023    Procedure: Left heart cath;  Surgeon: Vazquez Aguilar MD;  Location: [facility] CATH LAB;  Service: Cardiology;  Laterality: N/A;    left toe amputation      PLACEMENT OF DIALYSIS ACCESS         Review of patient's allergies indicates:   Allergen Reactions    Azithromycin Other (See Comments)     Note: - [employer] 01/11/2019       No current facility-administered medications on file prior to encounter.     Current Outpatient Medications on File Prior to Encounter   Medication Sig    amLODIPine (NORVASC) 5 MG tablet Take 5 mg by mouth once daily.    atorvastatin (LIPITOR) 40 MG tablet Take 1 tablet (40 mg total) by mouth every evening.    hydrALAZINE (APRESOLINE) 100 MG tablet Take 1 tablet by mouth 3 (three) times daily with meals.    nebivoloL (BYSTOLIC) 2.5 MG Tab Take 2.5 mg by mouth once daily.    methoxy peg-epoetin beta (MIRCERA INJ) 50 mcg.    timolol maleate 0.5% (TIMOPTIC) 0.5 % Drop Place 1 drop into the right eye once daily.     Family History    None       Tobacco Use    Smoking status: Former     Types: Cigarettes    Smokeless tobacco: Never   Substance and Sexual Activity    Alcohol use: Not Currently    Drug use: Never    Sexual activity: Not on file     Review of Systems   Unable to perform ROS: Intubated     Objective:     Vital Signs (Most Recent):  Temp: 97.9 °F (36.6 °C) (06/07/24 1107)  Pulse: (!) 56 (06/07/24 1115)  Resp: 16 (06/07/24 1115)  BP: (!) 59/28 (06/07/24 1101)  SpO2: 100 % (06/07/24 1115) Vital Signs (24h Range):  Temp:  [96.4 °F (35.8 °C)-98.7 °F (37.1 °C)] 97.9 °F (36.6 °C)  Pulse:  [] 56  Resp:  [8-23] 16  SpO2:  [86 %-100 %] 100 %  BP: ()/() 59/28     Weight: 64.4 kg (141 lb 15.6 oz)  Body mass index is 23.63  kg/m².    SpO2: 100 %         Intake/Output Summary (Last 24 hours) at 6/7/2024 1122  Last data filed at 6/7/2024 0701  Gross per 24 hour   Intake 2829.54 ml   Output 20 ml   Net 2809.54 ml       Lines/Drains/Airways       Central Venous Catheter Line  Duration             Percutaneous Central Line - Triple Lumen  06/06/24 1215 Internal Jugular Right <1 day              Drain  Duration                  Chest Tube 05/29/24 0835 Tube - 1 Right 9 days         NG/OG Tube 06/06/24 0325 Left nostril 1 day              Arterial Line  Duration             Arterial Line 06/06/24 1053 Left Radial 1 day              Peripheral Intravenous Line  Duration                  Hemodialysis AV Fistula Right upper arm -- days         Peripheral IV - Single Lumen 05/29/24 1738 22 G Anterior;Left Forearm 8 days                     Physical Exam  Vitals reviewed.   Constitutional:       Interventions: He is sedated and intubated.   HENT:      Head: Normocephalic.   Cardiovascular:      Rate and Rhythm: Normal rate and regular rhythm.      Pulses: Normal pulses.      Heart sounds: Normal heart sounds.   Pulmonary:      Effort: He is intubated.      Comments: Respirations synchronized with the ventilator  Abdominal:      Palpations: Abdomen is soft.   Musculoskeletal:      Cervical back: Neck supple.   Skin:     General: Skin is warm and dry.      Capillary Refill: Capillary refill takes less than 2 seconds.   Neurological:      Comments: Presently sedated          Significant Labs: All pertinent lab results from the last 24 hours have been reviewed.

## 2024-06-07 NOTE — SUBJECTIVE & OBJECTIVE
Past Medical History:   Diagnosis Date    Chronic kidney disease (CKD)     Diabetes mellitus     Hypertension     PVD (peripheral vascular disease)        Past Surgical History:   Procedure Laterality Date    LEFT HEART CATHETERIZATION N/A 8/3/2023    Procedure: Left heart cath;  Surgeon: Vazquez Aguilar MD;  Location: [facility] CATH LAB;  Service: Cardiology;  Laterality: N/A;    left toe amputation      PLACEMENT OF DIALYSIS ACCESS         Review of patient's allergies indicates:   Allergen Reactions    Azithromycin Other (See Comments)     Note: - Phreesia 01/11/2019       Family History    None       Tobacco Use    Smoking status: Former     Types: Cigarettes    Smokeless tobacco: Never   Substance and Sexual Activity    Alcohol use: Not Currently    Drug use: Never    Sexual activity: Not on file      Review of Systems  Objective:     Vital Signs (Most Recent):  Temp: 97.6 °F (36.4 °C) (06/06/24 1501)  Pulse: (!) 133 (06/06/24 1815)  Resp: 16 (06/06/24 1815)  BP: (!) 111/31 (06/06/24 1800)  SpO2: 100 % (06/06/24 1815) Vital Signs (24h Range):  Temp:  [96.8 °F (36 °C)-97.6 °F (36.4 °C)] 97.6 °F (36.4 °C)  Pulse:  [] 133  Resp:  [10-21] 16  SpO2:  [86 %-100 %] 100 %  BP: ()/(11-98) 111/31   Weight: 64.4 kg (141 lb 15.6 oz)  Body mass index is 23.63 kg/m².      Intake/Output Summary (Last 24 hours) at 6/6/2024 2037  Last data filed at 6/6/2024 1949  Gross per 24 hour   Intake 3048.85 ml   Output 10 ml   Net 3038.85 ml          Physical Exam  Constitutional:       General: He is in acute distress.      Appearance: He is ill-appearing. He is not diaphoretic.   HENT:      Head: Normocephalic and atraumatic.      Nose: No congestion or rhinorrhea.   Cardiovascular:      Rate and Rhythm: Tachycardia present. Rhythm irregular.      Pulses: Normal pulses.      Heart sounds: Normal heart sounds.   Pulmonary:      Effort: Respiratory distress present.      Breath sounds: No stridor. Rhonchi present. No  wheezing or rales.   Chest:      Chest wall: No tenderness.   Abdominal:      General: Abdomen is flat.   Musculoskeletal:      Cervical back: Normal range of motion. No rigidity.      Right lower leg: No edema.      Left lower leg: No edema.   Skin:     General: Skin is dry.      Findings: No erythema.   Neurological:      Comments: Intubated, sedated (moving all 4 extremities prior to sedation), pinpoint pupils minimally reactive             Vents:  Vent Mode: A/C (06/06/24 1858)  Ventilator Initiated: Yes (06/06/24 1155)  Set Rate: 16 BPM (06/06/24 1858)  Vt Set: 400 mL (06/06/24 1858)  PEEP/CPAP: 5 cmH20 (06/06/24 1858)  Oxygen Concentration (%): 40 (06/06/24 1858)  Peak Airway Pressure: 26 cmH20 (06/06/24 1858)  Total Ve: 5.6 L/m (06/06/24 1858)  Lines/Drains/Airways       Central Venous Catheter Line  Duration             Percutaneous Central Line - Triple Lumen  06/06/24 1215 Internal Jugular Right <1 day              Drain  Duration                  Chest Tube 05/29/24 0835 Tube - 1 Right 8 days         NG/OG Tube 06/06/24 0325 Left nostril <1 day              Airway  Duration                  Airway - Non-Surgical 06/06/24 1052 <1 day              Arterial Line  Duration             Arterial Line 06/06/24 1053 Left Radial <1 day              Peripheral Intravenous Line  Duration                  Hemodialysis AV Fistula Right upper arm -- days         Peripheral IV - Single Lumen 05/29/24 1738 22 G Anterior;Left Forearm 8 days                  Significant Labs:    CBC/Anemia Profile:  Recent Labs   Lab 06/05/24  0425 06/06/24  0418   WBC 13.40* 13.27*   HGB 8.1* 8.7*   HCT 26.0* 29.0*   * 170   MCV 84.7 86.6   RDW 17.8* 18.0*        Chemistries:  Recent Labs   Lab 06/05/24  0425 06/06/24  0418 06/06/24  1212    134* 142  142   K 4.4 4.7 3.5  3.5    100 114*  114*   CO2 26 31 20*  20*   BUN 35* 51* 44*  44*   CREATININE 4.24* 5.52* 4.41*  4.41*   CALCIUM 8.1* 8.5 8.5  8.5   ALBUMIN   --   --  1.2*   PROT  --   --  4.8*   BILITOT  --   --  0.4   ALKPHOS  --   --  66   ALT  --   --  11*   AST  --   --  33   MG  --   --  2.0   PHOS  --   --  3.9       All pertinent labs within the past 24 hours have been reviewed.    Significant Imaging: I have reviewed all pertinent imaging results/findings within the past 24 hours.

## 2024-06-07 NOTE — HPI
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  The patient is seen today by Cardiology after experiencing a PEA  cardiac arrest during induction for lung decortication.  ROSC achieved after 2-3 minutes of CPR and administration of epinephrine.  Currently the patient is requiring mechanical ventilation and vasopressor for blood pressure support.

## 2024-06-07 NOTE — PROGRESS NOTES
[facility]  Adult Nutrition  Follow-up Note         Reason for Assessment  Reason For Assessment: RD follow-up   Nutrition Risk Screen: no indicators present    Assessment and Plan  6/7/2024: RD follow up. Patient currently in unit after code in OR. NG replaced but feedings held. Recommend resume tube feedings of Novasource Renal at 35mL/hour with 40mL/hour free water flush as soon as medically appropriate. RD following.     6/3/2024: RD follow up. NG was dislodged by accident yesterday. Repeat SLP evaluation today. Recommended puree with thin liquids. Recommend continue puree diet as tolerated. Encourage good PO intakes. Also recommend addition of Boost Plus with meals to improve kcal/protein intakes to better meet estimated nutritional needs. RD following.     6/1/2024: Consult received and appreciated. Consult to start tube feedings.     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 Recommend start Novasource Renal with a goal rate of 35mL/hour with 40mL/hour free water flush. Keep HOB at 30-45 degrees to reduce risk of aspiration. Start rate at 20mL  and advance by 10mL q8h until goal rate is reached. Monitor for tolerance: n/v/d/c. Hold feeding and notify RD if symptoms of intolerance develop.     Last BM 5/29 per flowsheet.     Medications/labs reviewed. RD following.          Learning Needs/Social Determinants of Health  Learning Assessment       05/23/2024 1848 [facility] (5/23/2024 - Present)   Created by Melanie Saleh RN - RN (Nurse) Status: Complete                 PRIMARY LEARNER     Primary Learner Name:  Joseph Mcdonald  - 05/23/2024 1848    Relationship:  Patient  - 05/23/2024 1848    Does the primary learner have any barriers to learning?:  No Barriers  - 05/23/2024 1848    What is the preferred language of the primary learner?:  English  - 05/23/2024 1848    Is an  required?:  No  - 05/23/2024 1848    How does the  primary learner prefer to learn new concepts?:  Listening, Reading, Demonstration RC - 05/23/2024 1848    How often do you need to have someone help you read instructions, pamphlets, or written material from your doctor or pharmacy?:  Never  - 05/23/2024 1848        CO-LEARNER #1     No question answered        CO-LEARNER #2     No question answered        SPECIAL TOPICS     No question answered        ANSWERED BY:     No question answered        Comments         Edit History       Melanie Saleh RN - RN (Nurse)   05/23/2024 1848                           Social Determinants of Health     Tobacco Use: Medium Risk (8/2/2023)    Patient History     Smoking Tobacco Use: Former     Smokeless Tobacco Use: Never     Passive Exposure: Not on file   Alcohol Use: Not At Risk (5/24/2024)    AUDIT-C     Frequency of Alcohol Consumption: Never     Average Number of Drinks: Patient does not drink     Frequency of Binge Drinking: Never   Financial Resource Strain: Low Risk  (8/2/2023)    Overall Financial Resource Strain (CARDIA)     Difficulty of Paying Living Expenses: Not hard at all   Food Insecurity: No Food Insecurity (5/24/2024)    Hunger Vital Sign     Worried About Running Out of Food in the Last Year: Never true     Ran Out of Food in the Last Year: Never true   Transportation Needs: No Transportation Needs (5/24/2024)    TRANSPORTATION NEEDS     Transportation : No   Physical Activity: Sufficiently Active (5/24/2024)    Exercise Vital Sign     Days of Exercise per Week: 5 days     Minutes of Exercise per Session: 30 min   Stress: No Stress Concern Present (5/24/2024)    [employer] - Occupational Stress Questionnaire     Feeling of Stress : Not at all   Housing Stability: Low Risk  (5/24/2024)    Housing Stability Vital Sign     Unable to Pay for Housing in the Last Year: No     Homeless in the Last Year: No   Depression: Not on file   Utilities: Not At Risk (5/24/2024)    [facility] Utilities      Threatened with loss of utilities: No   Health Literacy: Adequate Health Literacy (5/24/2024)     Health Literacy     Frequency of need for help with medical instructions: Never   Social Isolation: Socially Integrated (5/24/2024)    Social Isolation     Social Isolation: 1            Malnutrition  Is Patient Malnourished: No    Nutrition Diagnosis  Inadequate energy intake related to Appetite loss and Inadequate Caloric intake as evidenced by poor PO intakes  Comments: initiate enteral feeding    Recent Labs   Lab 06/07/24  0401   *     Note: Glucose elevated    Nutrition Prescription / Recommendations  Recommendation/Intervention: Recommend resume tube feedings of Novasource Renal at 35mL/hour with 40mL/hour free water flush as soon as medically appropriate.  Goals: tube feeding tolerance at goal, weight maintenance during admission  Nutrition Goal Status: new  Communication of SUHA Recs: reviewed with physician  Current Diet Order: NPO  Chewing or Swallowing Difficulty?: No Chewing or swallowing difficulty  Recommended Diet: Enteral Nutrition  Recommended Oral Supplement: No Oral Supplements  Is Nutrition Support Recommended: Yes  Needs Calculated  Energy Need Method: Kcal/kg Energy Calorie Requirements (kcal): 5773-2029  Protein Requirements: 71-84  Enteral Nutrition   Enteral Nutrition Formula Provides:  1680 kcals Propofol Rate: No  76 g Protein  154 g Carbohydrates  84 g Fat Propofol Rate: No  602 ml Fluid without Flush    960 ml Fluid by flush   1562 ml Total Fluid  Enteral Nutrition Recommended Order:  Tube feeding via NG/ Price Sump  Tube feeding formula: Novasource Renal NG/ Price Sump at 35mL/hour  Free Water Flush: 40 ml hourly  Modular Supplements:No Modular Supplements needed  Enteral Nutrition meets needs?: yes  Enteral Nutrition Status: Recommended but Not Ordered    Monitor and Evaluation  % current Intake: NPO  % intake to meet estimated needs: Enteral Nutrition   Food and Nutrient  "Intake: enteral nutrition intake  Food and Nutrient Adminstration: enteral and parenteral nutrition administration  Anthropometric Measurements: weight change, weight  Biochemical Data, Medical Tests and Procedures: electrolyte and renal panel, gastrointestinal profile, glucose/endocrine profile, lipid profile, inflammatory profile       Current Medical Diagnosis and Past Medical History  Diagnosis: renal disease  Past Medical History:   Diagnosis Date    Chronic kidney disease (CKD)     Diabetes mellitus     Hypertension     PVD (peripheral vascular disease)        Nutrition/Diet History  Spiritual, Cultural Beliefs, [employer]al Practices, Values that Affect Care: no    Lab/Procedures/Meds  Recent Labs   Lab 06/06/24  1212 06/07/24  0401     142 132*   K 3.5  3.5 5.5*   BUN 44*  44* 63*   CREATININE 4.41*  4.41* 6.11*   CALCIUM 8.5  8.5 9.0   ALBUMIN 1.2* 1.6*   *  114* 100   ALT 11* 17   AST 33 60*   PHOS 3.9  --    Note: Na+, K+ low, recommend consider replete to WNL as appropriate. BUN/Cr elevated, Alb low. PMH ESRD on HD.     Last A1c: No results found for: "HGBA1C"  Lab Results   Component Value Date    RBC 2.78 (L) 06/07/2024    HGB 7.4 (L) 06/07/2024    HCT 23.9 (L) 06/07/2024    MCV 86.0 06/07/2024    MCH 26.6 (L) 06/07/2024    MCHC 31.0 (L) 06/07/2024    TIBC 105 (L) 05/23/2024   Note: H&H low    Pertinent Labs Reviewed: reviewed  Pertinent Medications Reviewed: reviewed  Scheduled Meds:   atorvastatin  40 mg Oral QHS    heparin (porcine)  5,000 Units Subcutaneous Q8H    hydrocortisone sodium succinate  100 mg Intravenous Q8H    pantoprazole  40 mg Per NG tube Daily    piperacillin-tazobactam (Zosyn) IV (PEDS and ADULTS) (extended infusion is not appropriate)  4.5 g Intravenous Q12H    sevelamer carbonate  2.4 g Per NG tube TID WM     Continuous Infusions:   vasopressin  0.04 Units/min Intravenous Continuous 12 mL/hr at 06/07/24 0608 0.04 Units/min at 06/07/24 0608     PRN Meds:.  Current " "Facility-Administered Medications:     sodium chloride 0.9%, , Intravenous, PRN    acetaminophen, 1,000 mg, Oral, Q8H PRN    dextrose 10%, 12.5 g, Intravenous, PRN    dextrose 10%, 25 g, Intravenous, PRN    fentaNYL, 25 mcg, Intravenous, Q1H PRN    glucagon (human recombinant), 1 mg, Intramuscular, PRN    glucose, 16 g, Oral, PRN    glucose, 24 g, Oral, PRN    naloxone, 0.02 mg, Intravenous, PRN    ondansetron, 8 mg, Oral, Q8H PRN    polyethylene glycol, 17 g, Oral, BID PRN    sodium chloride 0.9% 250 mL flush bag, , Intravenous, PRN    sodium chloride 0.9%, 10 mL, Intravenous, Q12H PRN    Anthropometrics  Temp: 96.4 °F (35.8 °C)  Height Method: Stated  Height: 5' 5" (165.1 cm)  Height (inches): 65 in  Weight Method: Bed Scale  Weight: 64.4 kg (141 lb 15.6 oz)  Weight (lb): 141.98 lb  Ideal Body Weight (IBW), Male: 136 lb  % Ideal Body Weight, Male (lb): 104.4 %  BMI (Calculated): 23.6       Estimated/Assessed Needs  RMR (Dunlevy-St. Jeor Equation): 1240.88   Total Ve: 7 L/m Temp: 96.4 °F (35.8 °C)Axillary  Weight Used For Calorie Calculations: 64.5 kg (142 lb 3.2 oz)   Energy Need Method: Kcal/kg Energy Calorie Requirements (kcal): 6929-0077  Weight Used For Protein Calculations: 64.5 kg (142 lb 3.2 oz)  Protein Requirements: 71-84  Estimated Fluid Requirement Method: other (see comments) (500 ml + urine output)    RDA Method (mL): 1935       Nutrition by Nursing  Diet/Nutrition Received: NPO  Intake (%): 25%  Diet/Feeding Assistance: assisted with feeding  Diet/Feeding Tolerance: good  Last Bowel Movement: 06/07/24  [REMOVED]      NG/OG Tube 05/31/24 2100 nasogastric Left nostril-Feeding Type: continuous, by pump  [REMOVED]      NG/OG Tube 05/31/24 2100 nasogastric Left nostril-Current Rate (mL/hr): 35 mL/hr  [REMOVED]      NG/OG Tube 05/31/24 2100 nasogastric Left nostril-Goal Rate (mL/hr): 35 mL/hr  [REMOVED]      NG/OG Tube 05/31/24 2100 nasogastric Left nostril-Formula Name: novasource renal    Nutrition " Follow-Up  RD Follow-up?: Yes      Nutrition Discharge Planning: too soon to determine; RD Following.          Monica Pappas, MS, RD, LD  Available via Secure Chat

## 2024-06-07 NOTE — PROGRESS NOTES
[facility]  Nephrology  Progress Note    Patient Name: Joseph Mcdonald  MRN: [id number]  Admission Date: 5/23/2024  Hospital Length of Stay: 15 days  Attending Provider: Benjamin Rodney MD   Primary Care Physician: Prakash, [facility]  Principal Problem:Acute hypoxic respiratory failure    Consults  Subjective:     Interval History:  Mr. Mcdonald is seen in follow-up of his end-stage renal disease.  He is stable today and awake.  His potassium is 5.5.  Blood pressure is 130 systolic.  We are going to dialyze him today.    Review of patient's allergies indicates:   Allergen Reactions    Azithromycin Other (See Comments)     Note: - Phreesia 01/11/2019     Current Facility-Administered Medications   Medication Frequency    0.9%  NaCl infusion PRN    acetaminophen tablet 1,000 mg Q8H PRN    atorvastatin tablet 40 mg QHS    chlorhexidine 0.12 % solution 15 mL BID    dexmedetomidine (PRECEDEX) 400mcg/100mL 0.9% NaCL infusion Continuous    dextrose 10% bolus 125 mL 125 mL PRN    dextrose 10% bolus 250 mL 250 mL PRN    fentaNYL injection 25 mcg Q1H PRN    glucagon (human recombinant) injection 1 mg PRN    glucose chewable tablet 16 g PRN    glucose chewable tablet 24 g PRN    heparin (porcine) injection 5,000 Units Q8H    hydrocortisone sodium succinate injection 100 mg Q8H    naloxone 0.4 mg/mL injection 0.02 mg PRN    NORepinephrine 32 mg in dextrose 5 % (D5W) 250 mL infusion Continuous    ondansetron disintegrating tablet 8 mg Q8H PRN    pantoprazole suspension 40 mg Daily    piperacillin-tazobactam (ZOSYN) 4.5 g in dextrose 5 % in water (D5W) 100 mL IVPB (MB+) Q12H    polyethylene glycol packet 17 g BID PRN    sevelamer carbonate pwpk 2.4 g TID WM    sodium chloride 0.9% 250 mL flush bag PRN    sodium chloride 0.9% flush 10 mL Q12H PRN    vasopressin (PITRESSIN) 0.2 Units/mL in dextrose 5 % (D5W) 100 mL infusion Continuous       Objective:     Vital Signs (Most Recent):  Temp: 96.4 °F (35.8  °C) (06/07/24 0730)  Pulse: (!) 54 (06/07/24 0900)  Resp: 16 (06/07/24 0900)  BP: (!) 184/152 (06/07/24 0900)  SpO2: 100 % (06/07/24 0900) Vital Signs (24h Range):  Temp:  [96.4 °F (35.8 °C)-98.7 °F (37.1 °C)] 96.4 °F (35.8 °C)  Pulse:  [] 54  Resp:  [8-23] 16  SpO2:  [86 %-100 %] 100 %  BP: ()/() 184/152     Weight: 64.4 kg (141 lb 15.6 oz) (05/29/24 0725)  Body mass index is 23.63 kg/m².  Body surface area is 1.72 meters squared.    I/O last 3 completed shifts:  In: 3296.3 [I.V.:2697.1; IV Piggyback:599.2]  Out: 20 [Chest Tube:20]    Physical Exam he remains intubated but is awake.  He has no edema.  Blood pressure 130 systolic.    Significant Labs:sureBMP:   Recent Labs   Lab 06/06/24  1212 06/06/24  1322 06/07/24  0401   GLU 89  89  112*   < > 170*     142  --  132*   K 3.5  3.5  --  5.5*   *  114*  --  100   CO2 20*  20*  --  23   BUN 44*  44*  --  63*   CREATININE 4.41*  4.41*  --  6.11*   CALCIUM 8.5  8.5  --  9.0   MG 2.0  --   --     < > = values in this interval not displayed.     CBC:   Recent Labs   Lab 06/07/24  0401   WBC 13.60*   RBC 2.78*   HGB 7.4*   HCT 23.9*   *   MCV 86.0   MCH 26.6*   MCHC 31.0*     All labs within the past 24 hours have been reviewed.    Significant Imaging:  Labs: Reviewed    Assessment/Plan:     Active Diagnoses:    Diagnosis Date Noted POA    PRINCIPAL PROBLEM:  Acute hypoxic respiratory failure [J96.01] 05/24/2024 Yes     Chronic    Cardiac arrest [I46.9] 06/06/2024 Yes    Acute systolic CHF (congestive heart failure) [I50.21] 06/06/2024 Yes    Atrial fibrillation with RVR [I48.91] 06/06/2024 Yes    Thrombocytopenia [D69.6] 06/05/2024 Yes    Acute metabolic encephalopathy [G93.41] 06/02/2024 No    Pleural effusion [J90] 05/31/2024 Yes    Oropharyngeal dysphagia [R13.12] 05/31/2024 No    Hemodialysis-associated hypotension [I95.3] 05/29/2024 Yes    Parapneumonic effusion [J18.9, J91.8] 05/23/2024 Yes    Acute on chronic heart  failure with preserved ejection fraction [I50.33] 05/23/2024 Yes    Sepsis [A41.9] 05/23/2024 Yes    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.9, 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] 05/23/2024 Yes    Debility [R53.81] 05/23/2024 Yes    CAD (coronary artery disease) [I25.10] 08/04/2023 Yes    Elevated troponin [R79.89] 08/02/2023 Yes    Dependence on renal dialysis [Z99.2] 10/30/2017 Not Applicable    Malnutrition of moderate degree [E44.0] 11/20/2014 Yes    DM2 (diabetes mellitus, type 2) [E11.9] 11/18/2014 Yes    End stage renal disease [N18.6] 11/06/2014 Yes    Essential (primary) hypertension [I10] 11/06/2014 Yes    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  He should not require dialysis again until Monday.  We will continue to follow.    Thank you for your consult. I will follow-up with patient. Please contact us if you have any additional questions.    Ovi Alvarado MD  Nephrology  [facility]   77

## 2024-06-07 NOTE — PLAN OF CARE
Problem: Gas Exchange Impaired  Goal: Optimal Gas Exchange  Outcome: Progressing     Problem: Pneumonia  Goal: Fluid Balance  Outcome: Progressing  Goal: Resolution of Infection Signs and Symptoms  Outcome: Progressing  Goal: Effective Oxygenation and Ventilation  Outcome: Progressing     Problem: Mechanical Ventilation Invasive  Goal: Effective Communication  Outcome: Progressing  Goal: Optimal Device Function  Outcome: Progressing  Goal: Mechanical Ventilation Liberation  Outcome: Progressing  Goal: Optimal Nutrition Delivery  Outcome: Progressing  Goal: Absence of Device-Related Skin and Tissue Injury  Outcome: Progressing  Goal: Absence of Ventilator-Induced Lung Injury  Outcome: Progressing

## 2024-06-07 NOTE — ASSESSMENT & PLAN NOTE
Dependent on dialysis.  We will request to run net even as patient does not appear to be volume overloaded on exam and dialysis mainly for ensuring potassium within normal limits.  If evidence of hypotension during dialysis, please administer additional fluid boluses of 500 cc.

## 2024-06-07 NOTE — ASSESSMENT & PLAN NOTE
Status post insertion of way pneumothorax chest tube with loculated pneumothorax suggestive of thickened pleural rind/loculated effusion.  No gross empyema but this may be a complicated parapneumonic effusion.  Continued on Zosyn at this time.  Unstable for decortication (PEA cardiac arrest as above prior to decortication procedure).  We will continue to monitor with chest tube in place, we will continue on water-seal.  May attempt a second round of tPA/DNase now that he has no longer a surgical candidate due to poor overall surgical candidacy.

## 2024-06-07 NOTE — ASSESSMENT & PLAN NOTE
Patient is identified as having Combined Systolic and Diastolic heart failure that is Acute on chronic. CHF is currently controlled. Latest ECHO performed and demonstrates- Results for orders placed during the hospital encounter of 05/23/24    Echo    Interpretation Summary    Left Ventricle: The left ventricle is smaller than normal. Increased wall thickness. There is concentric remodeling. Severe global hypokinesis present. There is severely reduced systolic function. Biplane (2D) method of discs ejection fraction is 15%.    Right Ventricle: Normal right ventricular cavity size. Systolic function is severely reduced.    Left Atrium: Left atrium is severely dilated.    Right Atrium: Right atrium is mildly dilated.    Aortic Valve: The aortic valve is a trileaflet valve. Mildly calcified cusps. Mildly restricted motion. There is mild stenosis. Aortic valve area by VTI is 1.64 cm². Aortic valve peak velocity is 2.03 m/s. Mean gradient is 10 mmHg. The dimensionless index is 0.58.    Mitral Valve: There is mild bileaflet sclerosis. Mildly thickened leaflets. There is no stenosis. The mean pressure gradient across the mitral valve is 1 mmHg at a heart rate of  bpm. There is mild regurgitation with an eccentric jet.    Tricuspid Valve: There is severe regurgitation.    Pulmonary Artery: Pulmonary artery pressure could not be accurately determined.    IVC/SVC: Patient is ventilated, cannot use IVC diameter to estimate right atrial pressure.    Pericardium: There is a trivial effusion. No indication of cardiac tamponade. Left pleural effusion.  -unable to start GDM T at this time due to hypotension  -previous echo EF 45% with diastolic dysfunction

## 2024-06-07 NOTE — ASSESSMENT & PLAN NOTE
6/7/24  - Status post cardiac arrest PEA   - Zachary achieve after 2-3 minutes CPR and administration of epinephrine  - currently requiring mechanical ventilation.  Hopefully extubation today  -  [facility] 08/2023 showed Severe two-vessel coronary artery disease involving the ostial RCA and distal left circumflex.   - previous echo showed EF 45% with the diastolic dysfunction\  -

## 2024-06-07 NOTE — ASSESSMENT & PLAN NOTE
Atrial fibrillation with RVR status post his cardiac arrest now significantly improved, off Levophed.  Rate controlled today without addition of chemical rate control agents.

## 2024-06-07 NOTE — SUBJECTIVE & OBJECTIVE
Interval History/Significant Events:  Refer to hospital course    Review of Systems  Objective:     Vital Signs (Most Recent):  Temp: 98 °F (36.7 °C) (06/07/24 1515)  Pulse: 75 (06/07/24 1700)  Resp: 16 (06/07/24 1700)  BP: (!) 102/49 (06/07/24 1600)  SpO2: 100 % (06/07/24 1700) Vital Signs (24h Range):  Temp:  [96.4 °F (35.8 °C)-98.7 °F (37.1 °C)] 98 °F (36.7 °C)  Pulse:  [] 75  Resp:  [8-23] 16  SpO2:  [98 %-100 %] 100 %  BP: ()/() 102/49   Weight: 64.4 kg (141 lb 15.6 oz)  Body mass index is 23.63 kg/m².      Intake/Output Summary (Last 24 hours) at 6/7/2024 1759  Last data filed at 6/7/2024 1716  Gross per 24 hour   Intake 1082.63 ml   Output 770 ml   Net 312.63 ml          Physical Exam  Constitutional:       General: He is in acute distress.      Appearance: He is ill-appearing. He is not diaphoretic.   HENT:      Head: Normocephalic and atraumatic.      Nose: No congestion or rhinorrhea.   Cardiovascular:      Rate and Rhythm: Tachycardia present. Rhythm irregular.      Pulses: Normal pulses.      Heart sounds: Normal heart sounds.   Pulmonary:      Effort: Respiratory distress present.      Breath sounds: No stridor. Rhonchi present. No wheezing or rales.   Chest:      Chest wall: No tenderness.   Abdominal:      General: Abdomen is flat.   Musculoskeletal:      Cervical back: Normal range of motion. No rigidity.      Right lower leg: No edema.      Left lower leg: No edema.   Skin:     General: Skin is dry.      Findings: No erythema.   Neurological:      General: No focal deficit present.      Comments: Intubated, but not sedated and patient following commands.            Vents:  Vent Mode: CPAP (06/07/24 0950)  Ventilator Initiated: Yes (06/06/24 1155)  Set Rate: 16 BPM (06/07/24 0713)  Vt Set: 400 mL (06/07/24 0713)  Pressure Support: 5 cmH20 (06/07/24 0950)  PEEP/CPAP: 5 cmH20 (06/07/24 0950)  Oxygen Concentration (%): 40 (06/07/24 1516)  Peak Airway Pressure: 27 cmH20 (06/07/24  0713)  Total Ve: 7 L/m (06/07/24 0713)  F/VT Ratio<105 (RSBI): (!) 45.33 (06/07/24 0055)  Lines/Drains/Airways       Central Venous Catheter Line  Duration             Percutaneous Central Line - Triple Lumen  06/06/24 1215 Internal Jugular Right 1 day              Drain  Duration                  Chest Tube 05/29/24 0835 Tube - 1 Right 9 days         NG/OG Tube 06/06/24 0325 Left nostril 1 day              Arterial Line  Duration             Arterial Line 06/06/24 1053 Left Radial 1 day              Peripheral Intravenous Line  Duration                  Hemodialysis AV Fistula Right upper arm -- days         Peripheral IV - Single Lumen 05/29/24 1738 22 G Anterior;Left Forearm 9 days                  Significant Labs:    CBC/Anemia Profile:  Recent Labs   Lab 06/06/24  0418 06/07/24  0401   WBC 13.27* 13.60*   HGB 8.7* 7.4*   HCT 29.0* 23.9*    145*   MCV 86.6 86.0   RDW 18.0* 18.0*        Chemistries:  Recent Labs   Lab 06/06/24  0418 06/06/24  1212 06/07/24  0401   * 142  142 132*   K 4.7 3.5  3.5 5.5*    114*  114* 100   CO2 31 20*  20* 23   BUN 51* 44*  44* 63*   CREATININE 5.52* 4.41*  4.41* 6.11*   CALCIUM 8.5 8.5  8.5 9.0   ALBUMIN  --  1.2* 1.6*   PROT  --  4.8* 6.3*   BILITOT  --  0.4 0.6   ALKPHOS  --  66 93   ALT  --  11* 17   AST  --  33 60*   MG  --  2.0  --    PHOS  --  3.9  --        All pertinent labs within the past 24 hours have been reviewed.    Significant Imaging:  I have reviewed all pertinent imaging results/findings within the past 24 hours.

## 2024-06-07 NOTE — ASSESSMENT & PLAN NOTE
Newly depressed ejection fraction on recent echocardiogram status post compressions and cardiac arrest.  This might be in the setting of stress-induced cardiomyopathy and he may potentially recover his ejection fraction  Pending his overall clinical status.  This may also be a stunned myocardium in the setting of chest compressions.    We will avoid beta blockers which may further  depressive ejection fraction.  Consulted Cardiology for new onset heart failure.  We will also avoid dobutamine at this time and he as he is already very arrhythmogenic/tachycardic.    Echo    Interpretation Summary    Left Ventricle: The left ventricle is smaller than normal. Increased wall thickness. There is concentric remodeling. Severe global hypokinesis present. There is severely reduced systolic function. Biplane (2D) method of discs ejection fraction is 15%.    Right Ventricle: Normal right ventricular cavity size. Systolic function is severely reduced.    Left Atrium: Left atrium is severely dilated.    Right Atrium: Right atrium is mildly dilated.    Aortic Valve: The aortic valve is a trileaflet valve. Mildly calcified cusps. Mildly restricted motion. There is mild stenosis. Aortic valve area by VTI is 1.64 cm². Aortic valve peak velocity is 2.03 m/s. Mean gradient is 10 mmHg. The dimensionless index is 0.58.    Mitral Valve: There is mild bileaflet sclerosis. Mildly thickened leaflets. There is no stenosis. The mean pressure gradient across the mitral valve is 1 mmHg at a heart rate of  bpm. There is mild regurgitation with an eccentric jet.    Tricuspid Valve: There is severe regurgitation.    Pulmonary Artery: Pulmonary artery pressure could not be accurately determined.    IVC/SVC: Patient is ventilated, cannot use IVC diameter to estimate right atrial pressure.    Pericardium: There is a trivial effusion. No indication of cardiac tamponade. Left pleural effusion.

## 2024-06-07 NOTE — ASSESSMENT & PLAN NOTE
Status post insertion of way pneumothorax chest tube with loculated pneumothorax suggestive of thickened pleural rind/loculated effusion.  No gross empyema but this may be a complicated parapneumonic effusion.  Continued on Zosyn at this time.  Unstable for decortication (PEA cardiac arrest as above prior to decortication procedure).  We will continue to monitor with chest tube in place, we will continue on water-seal.  May attempt a second round of tPA/DNase tomorrow 6/8/24 that he has no longer a surgical candidate due to poor overall surgical candidacy.

## 2024-06-07 NOTE — ASSESSMENT & PLAN NOTE
Patient with Hypoxic Respiratory failure which is Acute.  he is not on home oxygen. Supplemental oxygen was provided and noted- Vent Mode: CPAP  Oxygen Concentration (%):  [40] 40  Resp Rate Total:  [15 br/min-20 br/min] 20 br/min  Vt Set:  [400 mL] 400 mL  PEEP/CPAP:  [5 cmH20] 5 cmH20  Pressure Support:  [5 cmH20-10 cmH20] 5 cmH20      Intubated for airway protection and in the setting of recent cardiac arrest.      Plan for extubation today given good efforts and successful spontaneous breathing trial.

## 2024-06-07 NOTE — CONSULTS
[facility] ICU  Cardiology  Consult Note    Patient Name: Joseph Mcdonald  MRN: [id number]  Admission Date: 5/23/2024  Hospital Length of Stay: 15 days  Code Status: Full Code   Attending Provider: Benjamin Rodney MD   Consulting Provider: CÉSAR Odom  Primary Care Physician: Prakash, [facility]  Principal Problem:Acute hypoxic respiratory failure    Patient information was obtained from past medical records and ER records.     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  The patient is seen today by Cardiology after experiencing a PEA  cardiac arrest during induction for lung decortication.  ROSC achieved after 2-3 minutes of CPR and administration of epinephrine.  Currently the patient is requiring mechanical ventilation and vasopressor for blood pressure support.          Past Medical History:   Diagnosis Date    Chronic kidney disease (CKD)     Diabetes mellitus     Hypertension     PVD (peripheral vascular disease)        Past Surgical History:   Procedure Laterality Date    LEFT HEART CATHETERIZATION N/A 8/3/2023    Procedure: Left heart cath;  Surgeon: Vazquez Aguilar MD;  Location: [facility] CATH LAB;  Service: Cardiology;  Laterality: N/A;    left toe amputation      PLACEMENT OF DIALYSIS ACCESS         Review of patient's allergies indicates:   Allergen Reactions    Azithromycin Other (See Comments)     Note: - [employer] 01/11/2019       No current facility-administered medications on file prior to encounter.     Current Outpatient Medications on File Prior to Encounter   Medication Sig    amLODIPine  (NORVASC) 5 MG tablet Take 5 mg by mouth once daily.    atorvastatin (LIPITOR) 40 MG tablet Take 1 tablet (40 mg total) by mouth every evening.    hydrALAZINE (APRESOLINE) 100 MG tablet Take 1 tablet by mouth 3 (three) times daily with meals.    nebivoloL (BYSTOLIC) 2.5 MG Tab Take 2.5 mg by mouth once daily.    methoxy peg-epoetin beta (MIRCERA INJ) 50 mcg.    timolol maleate 0.5% (TIMOPTIC) 0.5 % Drop Place 1 drop into the right eye once daily.     Family History    None       Tobacco Use    Smoking status: Former     Types: Cigarettes    Smokeless tobacco: Never   Substance and Sexual Activity    Alcohol use: Not Currently    Drug use: Never    Sexual activity: Not on file     Review of Systems   Unable to perform ROS: Intubated     Objective:     Vital Signs (Most Recent):  Temp: 97.9 °F (36.6 °C) (06/07/24 1107)  Pulse: (!) 56 (06/07/24 1115)  Resp: 16 (06/07/24 1115)  BP: (!) 59/28 (06/07/24 1101)  SpO2: 100 % (06/07/24 1115) Vital Signs (24h Range):  Temp:  [96.4 °F (35.8 °C)-98.7 °F (37.1 °C)] 97.9 °F (36.6 °C)  Pulse:  [] 56  Resp:  [8-23] 16  SpO2:  [86 %-100 %] 100 %  BP: ()/() 59/28     Weight: 64.4 kg (141 lb 15.6 oz)  Body mass index is 23.63 kg/m².    SpO2: 100 %         Intake/Output Summary (Last 24 hours) at 6/7/2024 1122  Last data filed at 6/7/2024 0701  Gross per 24 hour   Intake 2829.54 ml   Output 20 ml   Net 2809.54 ml       Lines/Drains/Airways       Central Venous Catheter Line  Duration             Percutaneous Central Line - Triple Lumen  06/06/24 1215 Internal Jugular Right <1 day              Drain  Duration                  Chest Tube 05/29/24 0835 Tube - 1 Right 9 days         NG/OG Tube 06/06/24 0325 Left nostril 1 day              Arterial Line  Duration             Arterial Line 06/06/24 1053 Left Radial 1 day              Peripheral Intravenous Line  Duration                  Hemodialysis AV Fistula Right upper arm -- days         Peripheral IV - Single Lumen  05/29/24 1738 22 G Anterior;Left Forearm 8 days                     Physical Exam  Vitals reviewed.   Constitutional:       Interventions: He is sedated and intubated.   HENT:      Head: Normocephalic.   Cardiovascular:      Rate and Rhythm: Normal rate and regular rhythm.      Pulses: Normal pulses.      Heart sounds: Normal heart sounds.   Pulmonary:      Effort: He is intubated.      Comments: Respirations synchronized with the ventilator  Abdominal:      Palpations: Abdomen is soft.   Musculoskeletal:      Cervical back: Neck supple.   Skin:     General: Skin is warm and dry.      Capillary Refill: Capillary refill takes less than 2 seconds.   Neurological:      Comments: Presently sedated          Significant Labs: All pertinent lab results from the last 24 hours have been reviewed.      Assessment and Plan:     Acute on chronic heart failure with preserved ejection fraction  Patient is identified as having Combined Systolic and Diastolic heart failure that is Acute on chronic. CHF is currently controlled. Latest ECHO performed and demonstrates- Results for orders placed during the hospital encounter of 05/23/24    Echo    Interpretation Summary    Left Ventricle: The left ventricle is smaller than normal. Increased wall thickness. There is concentric remodeling. Severe global hypokinesis present. There is severely reduced systolic function. Biplane (2D) method of discs ejection fraction is 15%.    Right Ventricle: Normal right ventricular cavity size. Systolic function is severely reduced.    Left Atrium: Left atrium is severely dilated.    Right Atrium: Right atrium is mildly dilated.    Aortic Valve: The aortic valve is a trileaflet valve. Mildly calcified cusps. Mildly restricted motion. There is mild stenosis. Aortic valve area by VTI is 1.64 cm². Aortic valve peak velocity is 2.03 m/s. Mean gradient is 10 mmHg. The dimensionless index is 0.58.    Mitral Valve: There is mild bileaflet sclerosis. Mildly  thickened leaflets. There is no stenosis. The mean pressure gradient across the mitral valve is 1 mmHg at a heart rate of  bpm. There is mild regurgitation with an eccentric jet.    Tricuspid Valve: There is severe regurgitation.    Pulmonary Artery: Pulmonary artery pressure could not be accurately determined.    IVC/SVC: Patient is ventilated, cannot use IVC diameter to estimate right atrial pressure.    Pericardium: There is a trivial effusion. No indication of cardiac tamponade. Left pleural effusion.  -unable to start GDM T at this time due to hypotension  -previous echo EF 45% with diastolic dysfunction    CAD (coronary artery disease)   6/7/24  - Status post cardiac arrest PEA   - Zachary achieve after 2-3 minutes CPR and administration of epinephrine  - currently requiring mechanical ventilation.  Hopefully extubation today  -  [facility] 08/2023 showed Severe two-vessel coronary artery disease involving the ostial RCA and distal left circumflex.   - previous echo showed EF 45% with the diastolic dysfunction\  -     Elevated troponin   6/7/24  -troponin 193>298>3,648  -likely elevated due to CPR  - [facility] 08/2023 showed Severe two-vessel coronary artery disease involving the ostial RCA and distal left circumflex.  - plan for outpatient ischemic evaluation once patient has recovered from this event        VTE Risk Mitigation (From admission, onward)           Ordered     heparin (porcine) injection 5,000 Units  Every 8 hours         06/07/24 0936     IP VTE HIGH RISK PATIENT  Once         06/06/24 1154     Place sequential compression device  Until discontinued         06/06/24 1154                    Thank you for your consult. I will follow-up with patient. Please contact us if you have any additional questions.    Daniel Lynn, KALEIGHP  Cardiology   [facility]

## 2024-06-07 NOTE — ASSESSMENT & PLAN NOTE
Patient with Hypoxic Respiratory failure which is Acute.  he is not on home oxygen. Supplemental oxygen was provided and noted- Vent Mode: A/C  Oxygen Concentration (%):  [] 40  Resp Rate Total:  [16 br/min] 16 br/min  Vt Set:  [400 mL] 400 mL  PEEP/CPAP:  [5 cmH20] 5 cmH20  Mean Airway Pressure:  [7 cmH20-8 cmH20] 8 cmH20      Intubated for airway protection and in the setting of recent cardiac arrest.  Recent ABG reassuring, continue vent settings as above.

## 2024-06-08 PROBLEM — A41.9 SEPSIS: Status: RESOLVED | Noted: 2024-05-23 | Resolved: 2024-06-08

## 2024-06-08 PROBLEM — I25.10 CAD (CORONARY ARTERY DISEASE): Status: RESOLVED | Noted: 2023-01-01 | Resolved: 2024-01-01

## 2024-06-08 PROBLEM — I95.3 HEMODIALYSIS-ASSOCIATED HYPOTENSION: Status: RESOLVED | Noted: 2024-01-01 | Resolved: 2024-01-01

## 2024-06-08 NOTE — ASSESSMENT & PLAN NOTE
Newly depressed ejection fraction on recent echocardiogram status post compressions and cardiac arrest.  This might be in the setting of stress-induced cardiomyopathy and has had an improvement in his ejection fraction and therefore stunned myocardium is highest on the differential.    Latest echocardiogram from 6/7/24 as below.  Echocardiogram from yesterday with submitted fast ejection fraction 15%, now recovered.        Left Ventricle: The left ventricle is normal in size. Increased wall thickness. There is concentric remodeling. There is normal systolic function. Biplane (2D) method of discs ejection fraction is 55%. Grade II diastolic dysfunction.    Right Ventricle: Mild right ventricular enlargement. Systolic function is mildly reduced.    Left Atrium: Left atrium is mildly dilated.    Right Atrium: Right atrium is mildly dilated.    Aortic Valve: The aortic valve is a trileaflet valve. Mildly calcified cusps. There is mild stenosis. Aortic valve area by VTI is 1.59 cm². Aortic valve peak velocity is 1.48 m/s. Mean gradient is 4 mmHg. The dimensionless index is 0.60.    Mitral Valve: There is bileaflet sclerosis. Mildly thickened leaflets. There is mild regurgitation.    Tricuspid Valve: There is severe regurgitation with an eccentrically directed jet.    Pulmonary Artery: Pulmonary artery pressure could not be accurately determined due to severe TR.    IVC/SVC: Elevated venous pressure at 15 mmHg.    Pericardium: There is a trivial effusion. No indication of cardiac tamponade. Left pleural effusion.

## 2024-06-08 NOTE — PROGRESS NOTES
[facility]  Critical Care Medicine  Progress Note    Patient Name: Joseph Mcdonald  MRN: [id number]  Admission Date: 5/23/2024  Hospital Length of Stay: 16 days  Code Status: Full Code  Attending Provider: Benjamin Rodney MD  [facility]   Principal Problem: Acute hypoxic respiratory failure    Subjective:     HPI:  Mr. Mcdonald is an 89-year-old gentleman with past medical history significant for end-stage renal disease dependent on dialysis on Tuesday/Thursday/Saturday, history of iron-deficiency anemia, secondary hyperparathyroidism, diabetes, heart failure with preserved ejection fraction who had presented to the hospital in the setting of shortness of breath found to have right-sided pleural effusion with Interventional Pulmonary being consulted after initial thoracentesis was performed by Radiology,  now status post chest tube insertion and instillation of tPA and DNase x3 days.  No improvement with continuous loculated hydropneumothorax in the setting of thickened pleural rind.  Next step  in the algorithm was decortication with General surgery.  Patient has suffered pea arrest  on the operating table prior to procedure starting (in the operating room) brought to the ICU after 3 minutes of chest compressions and ACLS for  pulseless electrical activity.      When I brought the patient to the ICU, initially there was no movement of response, he was markedly hypotensive.  An emergency right internal jugular central line was placed and patient was initiated on both Levophed and vasopressin.  Subsequently, the patient has had episodes of tachycardia.  An echocardiogram done showed a severely depressed ejection fraction.  At this time, the patient began moving all 4 extremities and was grimacing, at which time propofol was started.  Prior to propofol being started, the patient was not following commands but was moving all 4 extremities.  Pupils were  pinpoint at that time (not reactive).    There was good tidal movement in the ball bearing of the atrium in the chest tube with no evidence of continuous air leak.  Chest x-ray showed no evidence of pneumothorax with adequate placement of line and right-sided chest tube.  Family updated in detail by myself, as well as general surgeon Dr. Kan.        Hospital/ICU Course:  6/7/24-overnight, the patient continued on Precedex Hemoglobin dropped to 7.4, potassium up to 5.5, troponin at 300.  Off norepinephrine this a.m. and continues on vasopressin.  We will extubate today to CPAP  6/8/24-patient with excellent mental status this morning, following commands, interacting completely.  Hemoglobin down to 6.9.  Troponin was elevated to 3600, in the setting of post cardiac arrest.  EF has normalized in terms of his systolic function.    Interval History/Significant Events:  Refer to hospital course    Review of Systems  Objective:     Vital Signs (Most Recent):  Temp: 97.2 °F (36.2 °C) (06/08/24 0730)  Pulse: 72 (06/08/24 0730)  Resp: 19 (06/08/24 0730)  BP: 116/79 (06/08/24 0730)  SpO2: 100 % (06/08/24 0730) Vital Signs (24h Range):  Temp:  [97.2 °F (36.2 °C)-98.7 °F (37.1 °C)] 97.2 °F (36.2 °C)  Pulse:  [51-77] 72  Resp:  [10-22] 19  SpO2:  [96 %-100 %] 100 %  BP: ()/() 116/79   Weight: 57.4 kg (126 lb 8.7 oz)  Body mass index is 21.06 kg/m².      Intake/Output Summary (Last 24 hours) at 6/8/2024 0843  Last data filed at 6/8/2024 0632  Gross per 24 hour   Intake 1134.87 ml   Output 780 ml   Net 354.87 ml          Physical Exam  Constitutional:       Appearance: He is ill-appearing. He is not diaphoretic.   HENT:      Head: Normocephalic and atraumatic.      Nose: No congestion or rhinorrhea.   Cardiovascular:      Rate and Rhythm: Tachycardia present. Rhythm irregular.      Pulses: Normal pulses.      Heart sounds: Normal heart sounds.   Pulmonary:      Effort: No respiratory distress.      Breath sounds: No  stridor. Rhonchi present. No wheezing or rales.   Chest:      Chest wall: No tenderness.   Abdominal:      General: Abdomen is flat.   Musculoskeletal:      Cervical back: Normal range of motion. No rigidity.      Right lower leg: No edema.      Left lower leg: No edema.   Skin:     General: Skin is dry.      Findings: No erythema.   Neurological:      General: No focal deficit present.      Mental Status: He is oriented to person, place, and time.            Vents:  Vent Mode: CPAP (06/07/24 0950)  Ventilator Initiated: Yes (06/06/24 1155)  Set Rate: 16 BPM (06/07/24 0713)  Vt Set: 400 mL (06/07/24 0713)  Pressure Support: 5 cmH20 (06/07/24 0950)  PEEP/CPAP: 5 cmH20 (06/07/24 0950)  Oxygen Concentration (%): 32 (06/08/24 0727)  Peak Airway Pressure: 27 cmH20 (06/07/24 0713)  Total Ve: 7 L/m (06/07/24 0713)  F/VT Ratio<105 (RSBI): (!) 45.33 (06/07/24 0055)  Lines/Drains/Airways       Central Venous Catheter Line  Duration             Percutaneous Central Line - Triple Lumen  06/06/24 1215 Internal Jugular Right 1 day              Drain  Duration                  Chest Tube 05/29/24 0835 Tube - 1 Right 10 days         NG/OG Tube 06/06/24 0325 Left nostril 2 days              Peripheral Intravenous Line  Duration                  Hemodialysis AV Fistula Right upper arm -- days         Peripheral IV - Single Lumen 05/29/24 1738 22 G Anterior;Left Forearm 9 days                  Significant Labs:    CBC/Anemia Profile:  Recent Labs   Lab 06/07/24  0401 06/08/24  0325   WBC 13.60* 15.21*   HGB 7.4* 6.9*   HCT 23.9* 22.3*   * 157   MCV 86.0 85.4   RDW 18.0* 18.1*        Chemistries:  Recent Labs   Lab 06/06/24  1212 06/07/24  0401 06/08/24  0325     142 132* 134*   K 3.5  3.5 5.5* 4.4   *  114* 100 100   CO2 20*  20* 23 28   BUN 44*  44* 63* 35*   CREATININE 4.41*  4.41* 6.11* 3.66*   CALCIUM 8.5  8.5 9.0 8.3*   ALBUMIN 1.2* 1.6* 1.8*   PROT 4.8* 6.3* 6.2*   BILITOT 0.4 0.6 0.4   ALKPHOS 66 93 81    ALT 11* 17 17   AST 33 60* 42*   MG 2.0  --   --    PHOS 3.9  --   --        All pertinent labs within the past 24 hours have been reviewed.    Significant Imaging:  I have reviewed all pertinent imaging results/findings within the past 24 hours.    ABG  Recent Labs   Lab 06/06/24  1329   PH 7.32*   PO2 305*   PCO2 44   HCO3 22.7     Assessment/Plan:     Pulmonary  * Acute hypoxic respiratory failure  Patient with Hypoxic Respiratory failure which is Acute.  he is not on home oxygen. Supplemental oxygen was provided and noted- Vent Mode: CPAP  Oxygen Concentration (%):  [32-40] 32  PEEP/CPAP:  [5 cmH20] 5 cmH20  Pressure Support:  [5 cmH20] 5 cmH20     Successful extubation, tolerating nasal cannula well.    Parapneumonic effusion   Status post insertion of way pneumothorax chest tube with loculated pneumothorax suggestive of thickened pleural rind/loculated effusion.  No gross empyema but this may be a complicated parapneumonic effusion.  Continued on Zosyn at this time.  Unstable for decortication (PEA cardiac arrest as above prior to decortication procedure).  We will continue to monitor with chest tube in place, we will continue on water-seal.      We will attempt additional tPA/DNase starting today for another 3 days, that he has no longer a surgical candidate due to poor overall surgical candidacy.    Cardiac/Vascular  Atrial fibrillation with RVR   Atrial fibrillation with RVR status post his cardiac arrest now significantly improved, off Levophed.  Normalized now.    Acute systolic CHF (congestive heart failure)   Newly depressed ejection fraction on recent echocardiogram status post compressions and cardiac arrest.  This might be in the setting of stress-induced cardiomyopathy and has had an improvement in his ejection fraction and therefore stunned myocardium is highest on the differential.    Latest echocardiogram from 6/7/24 as below.  Echocardiogram from yesterday with submitted fast ejection fraction  15%, now recovered.        Left Ventricle: The left ventricle is normal in size. Increased wall thickness. There is concentric remodeling. There is normal systolic function. Biplane (2D) method of discs ejection fraction is 55%. Grade II diastolic dysfunction.    Right Ventricle: Mild right ventricular enlargement. Systolic function is mildly reduced.    Left Atrium: Left atrium is mildly dilated.    Right Atrium: Right atrium is mildly dilated.    Aortic Valve: The aortic valve is a trileaflet valve. Mildly calcified cusps. There is mild stenosis. Aortic valve area by VTI is 1.59 cm². Aortic valve peak velocity is 1.48 m/s. Mean gradient is 4 mmHg. The dimensionless index is 0.60.    Mitral Valve: There is bileaflet sclerosis. Mildly thickened leaflets. There is mild regurgitation.    Tricuspid Valve: There is severe regurgitation with an eccentrically directed jet.    Pulmonary Artery: Pulmonary artery pressure could not be accurately determined due to severe TR.    IVC/SVC: Elevated venous pressure at 15 mmHg.    Pericardium: There is a trivial effusion. No indication of cardiac tamponade. Left pleural effusion.    Cardiac arrest  Sequence of events as documented below.  Patient now interactive, with no significant neurological deficits.    PEA cardiac arrest in the operating room, status post induction prior to the surgery even being started.  No evidence of tension pneumothorax.  Now moving all 4 extremities but no meaningful commands, profoundly hypotensive at this time.  Did not initiate targeted temperature management given patient was moving all 4 extremities.  We will continue to assess neurological examination.  We will move propofol over to Precedex for sedation as this will help with his bradycardia as well as hemodynamic status in the setting of newly depressed EF.    Elevated troponin    Likely secondary to demand, no evidence of ST elevations.  ST depressions likely in the setting of compressions,  now that he is aware he has no evidence of chest pain.  He also has had recovery of his ejection fraction from this done myocardium.    Essential (primary) hypertension  We will restart back on home hydralazine 100 mg t.i.d. with holding parameters for SBP less than 120.    Renal/  End stage renal disease  Dependent on dialysis.  We will request to run net even as patient does not appear to be volume overloaded on exam and dialysis mainly for ensuring potassium within normal limits.  If evidence of hypotension during dialysis, please administer additional fluid boluses of 500 cc.    Dependence on renal dialysis    Continue with dialysis as per schedule    Oncology  Anemia of renal disease  Residue downtrending hemoglobin, we will give 1 unit PRBC at a slow rate (administration over 3 hours).         Critical Care Daily Checklist:    A: Awake: RASS Goal/Actual Goal:    Actual:     B: Spontaneous Breathing Trial Performed? Spon. Breathing Trial Initiated?: Initiated (place pt on CPAP 10/5 40%) (06/07/24 0835)   C: SAT & SBT Coordinated?  Not indicated                      D: Delirium: CAM-ICU     E: Early Mobility Performed? Yes   F: Feeding Goal: Goals: tube feeding tolerance at goal, weight maintenance during admission  Status: Nutrition Goal Status: new   Current Diet Order   Procedures    Diet NPO Except for: Medication, Sips with Medication     Order Specific Question:   Except for     Answer:   Medication     Order Specific Question:   Except for     Answer:   Sips with Medication      AS: Analgesia/Sedation Not needed   T: Thromboembolic Prophylaxis Heparin   H: HOB > 300 Yes   U: Stress Ulcer Prophylaxis (if needed) As documented   G: Glucose Control Monitoring   B: Bowel Function Stool Occurrence: 1   I: Indwelling Catheter (Lines & Fonseca) Necessity Right IJ to be removed after blood products given and if no need for further vasopressor support, arterial line removed   D: De-escalation of  Antimicrobials/Pharmacotherapies Zosyn for an additional 3 days while giving tPA/DNase then discontinuation    Plan for the day/ETD As documented    Code Status:  Family/Goals of Care: Full Code  Updated     Critical Care Time:  45 minutes  Critical secondary to Patient has a condition that poses threat to life and bodily function:  Status post cardiac arrest, loculated pleural effusion    Critical care was time spent personally by me on the following activities: development of treatment plan with patient or surrogate and bedside caregivers, discussions with consultants, evaluation of patient's response to treatment, examination of patient, ordering and performing treatments and interventions, ordering and review of laboratory studies, ordering and review of radiographic studies, pulse oximetry, re-evaluation of patient's condition. This critical care time did not overlap with that of any other provider or involve time for any procedures.     Benjamin Rodney MD  Critical Care Medicine  [facility]

## 2024-06-08 NOTE — SUBJECTIVE & OBJECTIVE
Interval History/Significant Events:  Refer to hospital course    Review of Systems  Objective:     Vital Signs (Most Recent):  Temp: 97.2 °F (36.2 °C) (06/08/24 0730)  Pulse: 72 (06/08/24 0730)  Resp: 19 (06/08/24 0730)  BP: 116/79 (06/08/24 0730)  SpO2: 100 % (06/08/24 0730) Vital Signs (24h Range):  Temp:  [97.2 °F (36.2 °C)-98.7 °F (37.1 °C)] 97.2 °F (36.2 °C)  Pulse:  [51-77] 72  Resp:  [10-22] 19  SpO2:  [96 %-100 %] 100 %  BP: ()/() 116/79   Weight: 57.4 kg (126 lb 8.7 oz)  Body mass index is 21.06 kg/m².      Intake/Output Summary (Last 24 hours) at 6/8/2024 0843  Last data filed at 6/8/2024 0632  Gross per 24 hour   Intake 1134.87 ml   Output 780 ml   Net 354.87 ml          Physical Exam  Constitutional:       Appearance: He is ill-appearing. He is not diaphoretic.   HENT:      Head: Normocephalic and atraumatic.      Nose: No congestion or rhinorrhea.   Cardiovascular:      Rate and Rhythm: Tachycardia present. Rhythm irregular.      Pulses: Normal pulses.      Heart sounds: Normal heart sounds.   Pulmonary:      Effort: No respiratory distress.      Breath sounds: No stridor. Rhonchi present. No wheezing or rales.   Chest:      Chest wall: No tenderness.   Abdominal:      General: Abdomen is flat.   Musculoskeletal:      Cervical back: Normal range of motion. No rigidity.      Right lower leg: No edema.      Left lower leg: No edema.   Skin:     General: Skin is dry.      Findings: No erythema.   Neurological:      General: No focal deficit present.      Mental Status: He is oriented to person, place, and time.            Vents:  Vent Mode: CPAP (06/07/24 0950)  Ventilator Initiated: Yes (06/06/24 1155)  Set Rate: 16 BPM (06/07/24 0713)  Vt Set: 400 mL (06/07/24 0713)  Pressure Support: 5 cmH20 (06/07/24 0950)  PEEP/CPAP: 5 cmH20 (06/07/24 0950)  Oxygen Concentration (%): 32 (06/08/24 0727)  Peak Airway Pressure: 27 cmH20 (06/07/24 0713)  Total Ve: 7 L/m (06/07/24 0713)  F/VT Ratio<105 (RSBI):  (!) 45.33 (06/07/24 0055)  Lines/Drains/Airways       Central Venous Catheter Line  Duration             Percutaneous Central Line - Triple Lumen  06/06/24 1215 Internal Jugular Right 1 day              Drain  Duration                  Chest Tube 05/29/24 0835 Tube - 1 Right 10 days         NG/OG Tube 06/06/24 0325 Left nostril 2 days              Peripheral Intravenous Line  Duration                  Hemodialysis AV Fistula Right upper arm -- days         Peripheral IV - Single Lumen 05/29/24 1738 22 G Anterior;Left Forearm 9 days                  Significant Labs:    CBC/Anemia Profile:  Recent Labs   Lab 06/07/24  0401 06/08/24  0325   WBC 13.60* 15.21*   HGB 7.4* 6.9*   HCT 23.9* 22.3*   * 157   MCV 86.0 85.4   RDW 18.0* 18.1*        Chemistries:  Recent Labs   Lab 06/06/24  1212 06/07/24  0401 06/08/24  0325     142 132* 134*   K 3.5  3.5 5.5* 4.4   *  114* 100 100   CO2 20*  20* 23 28   BUN 44*  44* 63* 35*   CREATININE 4.41*  4.41* 6.11* 3.66*   CALCIUM 8.5  8.5 9.0 8.3*   ALBUMIN 1.2* 1.6* 1.8*   PROT 4.8* 6.3* 6.2*   BILITOT 0.4 0.6 0.4   ALKPHOS 66 93 81   ALT 11* 17 17   AST 33 60* 42*   MG 2.0  --   --    PHOS 3.9  --   --        All pertinent labs within the past 24 hours have been reviewed.    Significant Imaging:  I have reviewed all pertinent imaging results/findings within the past 24 hours.

## 2024-06-08 NOTE — SUBJECTIVE & OBJECTIVE
Interval History: The patient is sitting up in bed.  His family is at the bedside.  Chest tube is in place.    Review of patient's allergies indicates:   Allergen Reactions    Azithromycin Other (See Comments)     Note: - [employer] 01/11/2019     Current Facility-Administered Medications   Medication Frequency    0.9%  NaCl infusion (for blood administration) Q24H PRN    0.9%  NaCl infusion PRN    acetaminophen tablet 1,000 mg Q8H PRN    atorvastatin tablet 40 mg QHS    dextrose 10% bolus 125 mL 125 mL PRN    dextrose 10% bolus 250 mL 250 mL PRN    glucagon (human recombinant) injection 1 mg PRN    glucose chewable tablet 16 g PRN    glucose chewable tablet 24 g PRN    heparin (porcine) injection 5,000 Units Q8H    hydrALAZINE tablet 100 mg Q8H    hydrocortisone sodium succinate injection 100 mg Q8H    naloxone 0.4 mg/mL injection 0.02 mg PRN    ondansetron disintegrating tablet 8 mg Q8H PRN    pantoprazole suspension 40 mg Daily    piperacillin-tazobactam (ZOSYN) 4.5 g in dextrose 5 % in water (D5W) 100 mL IVPB (MB+) Q12H    polyethylene glycol packet 17 g BID PRN    sevelamer carbonate pwpk 2.4 g TID WM    sodium chloride 0.9% 250 mL flush bag PRN    sodium chloride 0.9% flush 10 mL Q12H PRN       Objective:     Vital Signs (Most Recent):  Temp: 96.9 °F (36.1 °C) (06/08/24 1105)  Pulse: 60 (06/08/24 1105)  Resp: 12 (06/08/24 1105)  BP: (!) 171/42 (06/08/24 1105)  SpO2: 100 % (06/08/24 1105) Vital Signs (24h Range):  Temp:  [96.9 °F (36.1 °C)-98.7 °F (37.1 °C)] 96.9 °F (36.1 °C)  Pulse:  [60-77] 60  Resp:  [10-22] 12  SpO2:  [96 %-100 %] 100 %  BP: ()/() 171/42     Weight: 57.4 kg (126 lb 8.7 oz) (06/08/24 0245)  Body mass index is 21.06 kg/m².  Body surface area is 1.62 meters squared.    I/O last 3 completed shifts:  In: 1439.7 [I.V.:1162; IV Piggyback:277.7]  Out: 800 [Other:750; Chest Tube:50]     Physical Exam  Vitals reviewed.   HENT:      Head: Normocephalic.      Mouth/Throat:      Mouth: Mucous  membranes are moist.   Cardiovascular:      Rate and Rhythm: Regular rhythm.      Heart sounds: Normal heart sounds.   Abdominal:      Palpations: Abdomen is soft.   Neurological:      Mental Status: He is alert.   Psychiatric:         Mood and Affect: Mood normal.          Significant Labs:  BMP:   Recent Labs   Lab 06/06/24  1212 06/06/24  1322 06/08/24  0325   GLU 89  89  112*   < > 118*     142   < > 134*   K 3.5  3.5   < > 4.4   *  114*   < > 100   CO2 20*  20*   < > 28   BUN 44*  44*   < > 35*   CREATININE 4.41*  4.41*   < > 3.66*   CALCIUM 8.5  8.5   < > 8.3*   MG 2.0  --   --     < > = values in this interval not displayed.     CBC:   Recent Labs   Lab 06/08/24  0325   WBC 15.21*   RBC 2.61*   HGB 6.9*   HCT 22.3*      MCV 85.4   MCH 26.4*   MCHC 30.9*        Significant Imaging:  Labs: Reviewed

## 2024-06-08 NOTE — ASSESSMENT & PLAN NOTE
Atrial fibrillation with RVR status post his cardiac arrest now significantly improved, off Levophed.  Normalized now.

## 2024-06-08 NOTE — ASSESSMENT & PLAN NOTE
Residue downtrending hemoglobin, we will give 1 unit PRBC at a slow rate (administration over 3 hours).

## 2024-06-08 NOTE — PLAN OF CARE
Problem: Gas Exchange Impaired  Goal: Optimal Gas Exchange  Outcome: Progressing     Problem: Pneumonia  Goal: Fluid Balance  Outcome: Progressing  Goal: Resolution of Infection Signs and Symptoms  Outcome: Progressing  Goal: Effective Oxygenation and Ventilation  Outcome: Progressing

## 2024-06-08 NOTE — ASSESSMENT & PLAN NOTE
Status post insertion of way pneumothorax chest tube with loculated pneumothorax suggestive of thickened pleural rind/loculated effusion.  No gross empyema but this may be a complicated parapneumonic effusion.  Continued on Zosyn at this time.  Unstable for decortication (PEA cardiac arrest as above prior to decortication procedure).  We will continue to monitor with chest tube in place, we will continue on water-seal.      We will attempt additional tPA/DNase starting today for another 3 days, that he has no longer a surgical candidate due to poor overall surgical candidacy.

## 2024-06-08 NOTE — PLAN OF CARE
Problem: Adult Inpatient Plan of Care  Goal: Plan of Care Review  6/8/2024 1109 by Angela Jimenes RN  Outcome: Progressing  6/8/2024 1107 by Angela Jimenes RN  Outcome: Progressing  Goal: Patient-Specific Goal (Individualized)  6/8/2024 1109 by Angela Jimenes RN  Outcome: Progressing  6/8/2024 1107 by Angela Jimenes RN  Outcome: Progressing  Goal: Absence of Hospital-Acquired Illness or Injury  6/8/2024 1109 by Angela Jimenes RN  Outcome: Progressing  6/8/2024 1107 by Angela Jimenes RN  Outcome: Progressing  Goal: Optimal Comfort and Wellbeing  6/8/2024 1109 by Angela Jimenes RN  Outcome: Progressing  6/8/2024 1107 by Angela Jimenes RN  Outcome: Progressing  Goal: Readiness for Transition of Care  6/8/2024 1109 by Angela Jimenes RN  Outcome: Progressing  6/8/2024 1107 by Angela Jimenes RN  Outcome: Progressing     Problem: Diabetes Comorbidity  Goal: Blood Glucose Level Within Targeted Range  6/8/2024 1109 by Angela Jimenes RN  Outcome: Progressing  6/8/2024 1107 by Angela Jimenes RN  Outcome: Progressing     Problem: Sepsis/Septic Shock  Goal: Optimal Coping  6/8/2024 1109 by Angela Jimenes RN  Outcome: Progressing  6/8/2024 1107 by Angela Jimenes RN  Outcome: Progressing  Goal: Absence of Bleeding  6/8/2024 1109 by Angela Jimenes RN  Outcome: Progressing  6/8/2024 1107 by Angela Jimenes RN  Outcome: Progressing  Goal: Blood Glucose Level Within Targeted Range  6/8/2024 1109 by Angela Jimenes RN  Outcome: Progressing  6/8/2024 1107 by Angela Jimenes RN  Outcome: Progressing  Goal: Absence of Infection Signs and Symptoms  6/8/2024 1109 by Angela Jimenes RN  Outcome: Progressing  6/8/2024 1107 by Angela Jimenes RN  Outcome: Progressing  Goal: Optimal Nutrition Intake  6/8/2024 1109 by Angela Jimenes RN  Outcome: Progressing  6/8/2024 1107 by Angela Jimenes RN  Outcome: Progressing     Problem: Fall Injury Risk  Goal: Absence of Fall and Fall-Related Injury  6/8/2024 1109 by Angela Jimenes RN  Outcome: Progressing  6/8/2024 1107 by Yudy,  RENATO Miller  Outcome: Progressing     Problem: Wound  Goal: Optimal Coping  6/8/2024 1109 by Angela Jimenes RN  Outcome: Progressing  6/8/2024 1107 by Angela Jimenes RN  Outcome: Progressing  Goal: Optimal Functional Ability  6/8/2024 1109 by Angela Jimenes RN  Outcome: Progressing  6/8/2024 1107 by Angela Jimenes RN  Outcome: Progressing  Goal: Absence of Infection Signs and Symptoms  6/8/2024 1109 by Angela Jimenes RN  Outcome: Progressing  6/8/2024 1107 by Angela Jimenes RN  Outcome: Progressing  Goal: Improved Oral Intake  6/8/2024 1109 by Angela Jimenes RN  Outcome: Progressing  6/8/2024 1107 by Angela Jimenes RN  Outcome: Progressing  Goal: Optimal Pain Control and Function  6/8/2024 1109 by Angela Jimenes RN  Outcome: Progressing  6/8/2024 1107 by Angela Jimenes RN  Outcome: Progressing  Goal: Skin Health and Integrity  6/8/2024 1109 by Angela Jimenes RN  Outcome: Progressing  6/8/2024 1107 by Angela Jimenes RN  Outcome: Progressing  Goal: Optimal Wound Healing  6/8/2024 1109 by Angela Jimenes RN  Outcome: Progressing  6/8/2024 1107 by Angela Jimenes RN  Outcome: Progressing     Problem: Hemodialysis  Goal: Safe, Effective Therapy Delivery  6/8/2024 1109 by Angela Jimenes RN  Outcome: Progressing  6/8/2024 1107 by Angela Jimenes RN  Outcome: Progressing  Goal: Effective Tissue Perfusion  6/8/2024 1109 by Angela Jimenes RN  Outcome: Progressing  6/8/2024 1107 by Angela Jimenes RN  Outcome: Progressing  Goal: Absence of Infection Signs and Symptoms  6/8/2024 1109 by Angela Jimenes RN  Outcome: Progressing  6/8/2024 1107 by Angela Jimenes RN  Outcome: Progressing     Problem: Skin Injury Risk Increased  Goal: Skin Health and Integrity  6/8/2024 1109 by Angela Jimenes RN  Outcome: Progressing  6/8/2024 1107 by Angela Jimenes RN  Outcome: Progressing     Problem: Pneumonia  Goal: Fluid Balance  Outcome: Progressing  Goal: Resolution of Infection Signs and Symptoms  Outcome: Progressing  Goal: Effective Oxygenation and  Ventilation  Outcome: Progressing     Problem: Infection  Goal: Absence of Infection Signs and Symptoms  6/8/2024 1109 by Angela Jimenes, RN  Outcome: Progressing  6/8/2024 1107 by Angela Jimenes, RN  Outcome: Progressing

## 2024-06-08 NOTE — PROCEDURES
"Joseph Mcdonald is a 89 y.o. male patient.    Temp: 97.2 °F (36.2 °C) (06/08/24 0730)  Pulse: 72 (06/08/24 0730)  Resp: 19 (06/08/24 0730)  BP: 116/79 (06/08/24 0730)  SpO2: 100 % (06/08/24 0730)  Weight: 57.4 kg (126 lb 8.7 oz) (06/08/24 0245)  Height: 5' 5" (165.1 cm) (05/29/24 0725)       Procedures    6/8/2024 6/8/2024    Time clamped:  9:00 a.m.  Time unclamped:  Plan unclamp at 10:00 a.m.  Indications:  Loculated pleural effusion for complicated parapneumonic effusion    Intrapleural fibrinolytic (tPA + DNase) dose number:  1    At bedside, 10 mg of alteplase (diluted in 0.9% sodium chloride for a total volume of 30 mL) in addition to dornase pati (5 mg diluted in sterile water 30 mL) was administered into the pleural space via the patient's chest tube.    The patient tolerated the procedure well, status post instillation of the intrapleural fibrinolytic six the chest tube was clamped for 1 hour.  During this time the patient had no additional dyspnea or pain.      At the 1 hour marked, the chest tube was unclamped and there was a total of 70ml drainage of serosanguineous colored fluid.  There was no evidence of persistent air leak or bubbling in the chest tube atrium at this time.          Benjamin Rodney MD  Interventional Pulmonary and Critical Care  [facility]    "

## 2024-06-08 NOTE — ASSESSMENT & PLAN NOTE
Patient with Hypoxic Respiratory failure which is Acute.  he is not on home oxygen. Supplemental oxygen was provided and noted- Vent Mode: CPAP  Oxygen Concentration (%):  [32-40] 32  PEEP/CPAP:  [5 cmH20] 5 cmH20  Pressure Support:  [5 cmH20] 5 cmH20     Successful extubation, tolerating nasal cannula well.

## 2024-06-08 NOTE — PROGRESS NOTES
[facility]  Nephrology  Progress Note    Patient Name: Joseph Mcdonald  MRN: [id number]  Admission Date: 5/23/2024  Hospital Length of Stay: 16 days  Attending Provider: Benjamin Rodney MD   Primary Care Physician: Prakash, [facility]  Principal Problem:Acute hypoxic respiratory failure    Subjective:     HPI: No notes on file    Interval History: The patient is sitting up in bed.  His family is at the bedside.  Chest tube is in place.    Review of patient's allergies indicates:   Allergen Reactions    Azithromycin Other (See Comments)     Note: - [employer] 01/11/2019     Current Facility-Administered Medications   Medication Frequency    0.9%  NaCl infusion (for blood administration) Q24H PRN    0.9%  NaCl infusion PRN    acetaminophen tablet 1,000 mg Q8H PRN    atorvastatin tablet 40 mg QHS    dextrose 10% bolus 125 mL 125 mL PRN    dextrose 10% bolus 250 mL 250 mL PRN    glucagon (human recombinant) injection 1 mg PRN    glucose chewable tablet 16 g PRN    glucose chewable tablet 24 g PRN    heparin (porcine) injection 5,000 Units Q8H    hydrALAZINE tablet 100 mg Q8H    hydrocortisone sodium succinate injection 100 mg Q8H    naloxone 0.4 mg/mL injection 0.02 mg PRN    ondansetron disintegrating tablet 8 mg Q8H PRN    pantoprazole suspension 40 mg Daily    piperacillin-tazobactam (ZOSYN) 4.5 g in dextrose 5 % in water (D5W) 100 mL IVPB (MB+) Q12H    polyethylene glycol packet 17 g BID PRN    sevelamer carbonate pwpk 2.4 g TID WM    sodium chloride 0.9% 250 mL flush bag PRN    sodium chloride 0.9% flush 10 mL Q12H PRN       Objective:     Vital Signs (Most Recent):  Temp: 96.9 °F (36.1 °C) (06/08/24 1105)  Pulse: 60 (06/08/24 1105)  Resp: 12 (06/08/24 1105)  BP: (!) 171/42 (06/08/24 1105)  SpO2: 100 % (06/08/24 1105) Vital Signs (24h Range):  Temp:  [96.9 °F (36.1 °C)-98.7 °F (37.1 °C)] 96.9 °F (36.1 °C)  Pulse:  [60-77] 60  Resp:  [10-22] 12  SpO2:  [96 %-100 %] 100 %  BP:  ()/() 171/42     Weight: 57.4 kg (126 lb 8.7 oz) (06/08/24 0245)  Body mass index is 21.06 kg/m².  Body surface area is 1.62 meters squared.    I/O last 3 completed shifts:  In: 1439.7 [I.V.:1162; IV Piggyback:277.7]  Out: 800 [Other:750; Chest Tube:50]     Physical Exam  Vitals reviewed.   HENT:      Head: Normocephalic.      Mouth/Throat:      Mouth: Mucous membranes are moist.   Cardiovascular:      Rate and Rhythm: Regular rhythm.      Heart sounds: Normal heart sounds.   Abdominal:      Palpations: Abdomen is soft.   Neurological:      Mental Status: He is alert.   Psychiatric:         Mood and Affect: Mood normal.          Significant Labs:  BMP:   Recent Labs   Lab 06/06/24  1212 06/06/24  1322 06/08/24  0325   GLU 89  89  112*   < > 118*     142   < > 134*   K 3.5  3.5   < > 4.4   *  114*   < > 100   CO2 20*  20*   < > 28   BUN 44*  44*   < > 35*   CREATININE 4.41*  4.41*   < > 3.66*   CALCIUM 8.5  8.5   < > 8.3*   MG 2.0  --   --     < > = values in this interval not displayed.     CBC:   Recent Labs   Lab 06/08/24  0325   WBC 15.21*   RBC 2.61*   HGB 6.9*   HCT 22.3*      MCV 85.4   MCH 26.4*   MCHC 30.9*        Significant Imaging:  Labs: Reviewed  Assessment/Plan:     Renal/  End stage renal disease  Chronic condition    Oncology  Anemia of renal disease  Chronic condition    Endocrine  DM2 (diabetes mellitus, type 2)  Chronic condition    Other  Debility  Noted.  The patient is very weak.        Thank you for your consult. I will follow-up with patient. Please contact us if you have any additional questions.    Rickey Rodriguez Jr, MD  Nephrology  [facility]

## 2024-06-08 NOTE — PROCEDURES
"Joseph Mcdonald is a 89 y.o. male patient.    Temp: 97.2 °F (36.2 °C) (06/08/24 1530)  Pulse: 66 (06/08/24 1630)  Resp: 18 (06/08/24 1630)  BP: (!) 145/40 (06/08/24 1630)  SpO2: 100 % (06/08/24 1630)  Weight: 57.4 kg (126 lb 8.7 oz) (06/08/24 0245)  Height: 5' 5" (165.1 cm) (05/29/24 0725)       Procedures    6/8/2024      Time clamped:  5:15 p.m.  Time unclamped:  Plan unclamp at 6:15 p.m..  Indications:  Loculated pleural effusion for complicated parapneumonic effusion     Intrapleural fibrinolytic (tPA + DNase) dose number:  2     At bedside, 10 mg of alteplase (diluted in 0.9% sodium chloride for a total volume of 30 mL) in addition to dornase pati (5 mg diluted in sterile water 30 mL) was administered into the pleural space via the patient's chest tube.     The patient tolerated the procedure well, status post instillation of the intrapleural fibrinolytic six the chest tube was clamped for 1 hour.  During this time the patient had no additional dyspnea or pain.       At the 1 hour marked, the chest tube was unclamped and there was a total of 70ml drainage of serosanguineous colored fluid.  There was no evidence of persistent air leak or bubbling in the chest tube atrium at this time.             Benjamin Rodney MD  Interventional Pulmonary and Critical Care  [facility]  "

## 2024-06-08 NOTE — ASSESSMENT & PLAN NOTE
We will restart back on home hydralazine 100 mg t.i.d. with holding parameters for SBP less than 120.

## 2024-06-08 NOTE — ASSESSMENT & PLAN NOTE
Sequence of events as documented below.  Patient now interactive, with no significant neurological deficits.    PEA cardiac arrest in the operating room, status post induction prior to the surgery even being started.  No evidence of tension pneumothorax.  Now moving all 4 extremities but no meaningful commands, profoundly hypotensive at this time.  Did not initiate targeted temperature management given patient was moving all 4 extremities.  We will continue to assess neurological examination.  We will move propofol over to Precedex for sedation as this will help with his bradycardia as well as hemodynamic status in the setting of newly depressed EF.

## 2024-06-08 NOTE — ASSESSMENT & PLAN NOTE
Likely secondary to demand, no evidence of ST elevations.  ST depressions likely in the setting of compressions, now that he is aware he has no evidence of chest pain.  He also has had recovery of his ejection fraction from this done myocardium.

## 2024-06-09 PROBLEM — R79.89 TROPONIN LEVEL ELEVATED: Status: ACTIVE | Noted: 2024-06-09

## 2024-06-09 PROBLEM — I25.10 CORONARY ARTERY DISEASE INVOLVING NATIVE CORONARY ARTERY OF NATIVE HEART WITHOUT ANGINA PECTORIS: Status: ACTIVE | Noted: 2024-01-01

## 2024-06-09 NOTE — ASSESSMENT & PLAN NOTE
Status post 1 unit PRBC yesterday with improvement in hemoglobin without evidence of flash pulmonary edema.

## 2024-06-09 NOTE — ASSESSMENT & PLAN NOTE
Nutrition consulted. Most recent weight and BMI monitored-     Measurements:  Wt Readings from Last 1 Encounters:   06/08/24 57.4 kg (126 lb 8.7 oz)   Body mass index is 21.06 kg/m².    Patient has been screened and assessed by RD.    Malnutrition Type:  Context:    Level:      Malnutrition Characteristic Summary:       Interventions/Recommendations (treatment strategy):  Recommend resume tube feedings of Novasource Renal at 35mL/hour with 40mL/hour free water flush as soon as medically appropriate.

## 2024-06-09 NOTE — SUBJECTIVE & OBJECTIVE
Interval History: The patient is resting.  No other changes.  He is without complaints.  Chest tube is in place.    Review of patient's allergies indicates:   Allergen Reactions    Azithromycin Other (See Comments)     Note: - [employer] 01/11/2019     Current Facility-Administered Medications   Medication Frequency    0.9%  NaCl infusion (for blood administration) Q24H PRN    0.9%  NaCl infusion PRN    acetaminophen tablet 1,000 mg Q8H PRN    alteplase (CATHFlo ACtivase) 10 mg in sodium chloride 0.9% 30 mL infusion (aka TPA) for CHEST TUBE instillation Once    And    dornase pati (PULMOZYME) 5 mg in sterile water 30 mL infusion Once    atorvastatin tablet 40 mg QHS    dextrose 10% bolus 125 mL 125 mL PRN    dextrose 10% bolus 250 mL 250 mL PRN    glucagon (human recombinant) injection 1 mg PRN    glucose chewable tablet 16 g PRN    glucose chewable tablet 24 g PRN    heparin (porcine) injection 5,000 Units Q8H    hydrALAZINE tablet 100 mg Q8H    naloxone 0.4 mg/mL injection 0.02 mg PRN    ondansetron disintegrating tablet 8 mg Q8H PRN    pantoprazole EC tablet 40 mg Daily    piperacillin-tazobactam (ZOSYN) 4.5 g in dextrose 5 % in water (D5W) 100 mL IVPB (MB+) Q12H    polyethylene glycol packet 17 g BID PRN    sevelamer carbonate pwpk 2.4 g TID WM    sodium chloride 0.9% 250 mL flush bag PRN    sodium chloride 0.9% flush 10 mL Q12H PRN       Objective:     Vital Signs (Most Recent):  Temp: 97.8 °F (36.6 °C) (06/09/24 1525)  Pulse: 61 (06/09/24 1525)  Resp: 11 (06/09/24 1525)  BP: (!) 147/41 (06/09/24 1525)  SpO2: 96 % (06/09/24 1525) Vital Signs (24h Range):  Temp:  [97.2 °F (36.2 °C)-98.7 °F (37.1 °C)] 97.8 °F (36.6 °C)  Pulse:  [61-79] 61  Resp:  [11-21] 11  SpO2:  [50 %-100 %] 96 %  BP: ()/() 147/41     Weight: 57.4 kg (126 lb 8.7 oz) (06/08/24 0245)  Body mass index is 21.06 kg/m².  Body surface area is 1.62 meters squared.    I/O last 3 completed shifts:  In: 797.7 [I.V.:60; Blood:310.4; IV  Piggyback:427.2]  Out: 580 [Chest Tube:580]     Physical Exam  Vitals reviewed.   Constitutional:       Appearance: He is ill-appearing.      Comments: Very frail   HENT:      Head: Normocephalic.   Eyes:      Pupils: Pupils are equal, round, and reactive to light.   Pulmonary:      Effort: Pulmonary effort is normal.   Abdominal:      Palpations: Abdomen is soft.   Musculoskeletal:      Cervical back: Neck supple.   Skin:     General: Skin is warm.          Significant Labs:  BMP:   Recent Labs   Lab 06/06/24  1212 06/06/24  1322 06/09/24  0259   GLU 89  89  112*   < > 144*     142   < > 133*   K 3.5  3.5   < > 4.5   *  114*   < > 98   CO2 20*  20*   < > 25   BUN 44*  44*   < > 52*   CREATININE 4.41*  4.41*   < > 4.56*   CALCIUM 8.5  8.5   < > 8.0*   MG 2.0  --   --     < > = values in this interval not displayed.     CBC:   Recent Labs   Lab 06/09/24  0259   WBC 10.90   RBC 2.91*   HGB 7.9*   HCT 24.9*   *   MCV 85.6   MCH 27.1   MCHC 31.7*        Significant Imaging:  Labs: Reviewed

## 2024-06-09 NOTE — PROGRESS NOTES
[facility]  Critical Care Medicine  Progress Note    Patient Name: Joseph Mcdonald  MRN: [id number]  Admission Date: 5/23/2024  Hospital Length of Stay: 17 days  Code Status: Full Code  Attending Provider: Benjamin Rodney MD  [facility]   Principal Problem: Acute hypoxic respiratory failure    Subjective:     HPI:  Mr. Mcdonald is an 89-year-old gentleman with past medical history significant for end-stage renal disease dependent on dialysis on Tuesday/Thursday/Saturday, history of iron-deficiency anemia, secondary hyperparathyroidism, diabetes, heart failure with preserved ejection fraction who had presented to the hospital in the setting of shortness of breath found to have right-sided pleural effusion with Interventional Pulmonary being consulted after initial thoracentesis was performed by Radiology,  now status post chest tube insertion and instillation of tPA and DNase x3 days.  No improvement with continuous loculated hydropneumothorax in the setting of thickened pleural rind.  Next step  in the algorithm was decortication with General surgery.  Patient has suffered pea arrest  on the operating table prior to procedure starting (in the operating room) brought to the ICU after 3 minutes of chest compressions and ACLS for  pulseless electrical activity.      When I brought the patient to the ICU, initially there was no movement of response, he was markedly hypotensive.  An emergency right internal jugular central line was placed and patient was initiated on both Levophed and vasopressin.  Subsequently, the patient has had episodes of tachycardia.  An echocardiogram done showed a severely depressed ejection fraction.  At this time, the patient began moving all 4 extremities and was grimacing, at which time propofol was started.  Prior to propofol being started, the patient was not following commands but was moving all 4 extremities.  Pupils were  pinpoint at that time (not reactive).    There was good tidal movement in the ball bearing of the atrium in the chest tube with no evidence of continuous air leak.  Chest x-ray showed no evidence of pneumothorax with adequate placement of line and right-sided chest tube.  Family updated in detail by myself, as well as general surgeon Dr. Kan.        Hospital/ICU Course:  6/7/24-overnight, the patient continued on Precedex Hemoglobin dropped to 7.4, potassium up to 5.5, troponin at 300.  Off norepinephrine this a.m. and continues on vasopressin.  We will extubate today to CPAP  6/8/24-patient with excellent mental status this morning, following commands, interacting completely.  Hemoglobin down to 6.9.  Troponin was elevated to 3600, in the setting of post cardiac arrest.  EF has normalized in terms of his systolic function.  6/9/24-overnight no acute events, hemoglobin stable at 7.9.  Mildly hyponatremic, potassium within normal limits.  Received third dose of tPA/DNase this morning.  Some mild improvement in opacification on this morning's x-ray.    Interval History/Significant Events:  Refer to hospital course    Review of Systems  Objective:     Vital Signs (Most Recent):  Temp: 97.4 °F (36.3 °C) (06/09/24 0730)  Pulse: 68 (06/09/24 0730)  Resp: 12 (06/09/24 0730)  BP: (!) 137/46 (06/09/24 0730)  SpO2: 100 % (06/09/24 0730) Vital Signs (24h Range):  Temp:  [96.9 °F (36.1 °C)-98.7 °F (37.1 °C)] 97.4 °F (36.3 °C)  Pulse:  [60-79] 68  Resp:  [10-21] 12  SpO2:  [50 %-100 %] 100 %  BP: (119-186)/(10-55) 137/46   Weight: 57.4 kg (126 lb 8.7 oz)  Body mass index is 21.06 kg/m².      Intake/Output Summary (Last 24 hours) at 6/9/2024 0758  Last data filed at 6/9/2024 0600  Gross per 24 hour   Intake 697.97 ml   Output 550 ml   Net 147.97 ml          Physical Exam  Constitutional:       Appearance: He is ill-appearing. He is not diaphoretic.   HENT:      Head: Normocephalic and atraumatic.      Nose: No congestion or  rhinorrhea.   Cardiovascular:      Rate and Rhythm: Tachycardia present. Rhythm irregular.      Pulses: Normal pulses.      Heart sounds: Normal heart sounds.   Pulmonary:      Effort: No respiratory distress.      Breath sounds: No stridor. Rhonchi present. No wheezing or rales.   Chest:      Chest wall: No tenderness.   Abdominal:      General: Abdomen is flat.   Musculoskeletal:      Cervical back: Normal range of motion. No rigidity.      Right lower leg: No edema.      Left lower leg: No edema.   Skin:     General: Skin is dry.      Findings: No erythema.   Neurological:      General: No focal deficit present.      Mental Status: He is oriented to person, place, and time.            Vents:  Vent Mode: CPAP (06/07/24 0950)  Ventilator Initiated: Yes (06/06/24 1155)  Set Rate: 16 BPM (06/07/24 0713)  Vt Set: 400 mL (06/07/24 0713)  Pressure Support: 5 cmH20 (06/07/24 0950)  PEEP/CPAP: 5 cmH20 (06/07/24 0950)  Oxygen Concentration (%): 32 (06/09/24 0718)  Peak Airway Pressure: 27 cmH20 (06/07/24 0713)  Total Ve: 7 L/m (06/07/24 0713)  F/VT Ratio<105 (RSBI): (!) 45.33 (06/07/24 0055)  Lines/Drains/Airways       Drain  Duration                  Chest Tube 05/29/24 0835 Tube - 1 Right 10 days              Peripheral Intravenous Line  Duration                  Hemodialysis AV Fistula Right upper arm -- days         Peripheral IV - Single Lumen 06/08/24 1620 22 G Left Antecubital <1 day                  Significant Labs:    CBC/Anemia Profile:  Recent Labs   Lab 06/08/24  0325 06/09/24  0259   WBC 15.21* 10.90   HGB 6.9* 7.9*   HCT 22.3* 24.9*    145*   MCV 85.4 85.6   RDW 18.1* 17.6*        Chemistries:  Recent Labs   Lab 06/08/24  0325 06/09/24  0259   * 133*   K 4.4 4.5    98   CO2 28 25   BUN 35* 52*   CREATININE 3.66* 4.56*   CALCIUM 8.3* 8.0*   ALBUMIN 1.8* 1.9*   PROT 6.2* 6.1*   BILITOT 0.4 0.5   ALKPHOS 81 99   ALT 17 21   AST 42* 38*       All pertinent labs within the past 24 hours have  been reviewed.    Significant Imaging:  I have reviewed all pertinent imaging results/findings within the past 24 hours.    ABG  Recent Labs   Lab 06/06/24  1329   PH 7.32*   PO2 305*   PCO2 44   HCO3 22.7     Assessment/Plan:     Pulmonary  * Acute hypoxic respiratory failure  Patient with Hypoxic Respiratory failure which is Acute.  he is not on home oxygen. Supplemental oxygen was provided and noted- Oxygen Concentration (%):  [32-40] 32     Successful extubation, tolerating nasal cannula well.    Parapneumonic effusion   Status post insertion of way pneumothorax chest tube with loculated pneumothorax suggestive of thickened pleural rind/loculated effusion.  No gross empyema but this may be a complicated parapneumonic effusion.  Continued on Zosyn at this time.  Unstable for decortication (PEA cardiac arrest as above prior to decortication procedure).  We will continue to monitor with chest tube in place, we will continue on water-seal.      We will attempt additional tPA/DNase starting today for another 3 days (total 6 doses), that he has no longer a surgical candidate due to poor overall surgical candidacy.  3rd dose of tPA/DNase given this a.m..    Cardiac/Vascular  Atrial fibrillation with RVR   Atrial fibrillation with RVR status post his cardiac arrest now significantly improved, off Levophed.  Normalized now.    Acute systolic CHF (congestive heart failure)   Newly depressed ejection fraction on recent echocardiogram status post compressions and cardiac arrest.  This might be in the setting of stress-induced cardiomyopathy and has had an improvement in his ejection fraction and therefore stunned myocardium is highest on the differential.    No ongoing signs of decompensated CHF at this time 6/9/24.    Latest echocardiogram from 6/7/24 as below.  Echocardiogram from yesterday with submitted fast ejection fraction 15%, now recovered.        Left Ventricle: The left ventricle is normal in size. Increased wall  thickness. There is concentric remodeling. There is normal systolic function. Biplane (2D) method of discs ejection fraction is 55%. Grade II diastolic dysfunction.    Right Ventricle: Mild right ventricular enlargement. Systolic function is mildly reduced.    Left Atrium: Left atrium is mildly dilated.    Right Atrium: Right atrium is mildly dilated.    Aortic Valve: The aortic valve is a trileaflet valve. Mildly calcified cusps. There is mild stenosis. Aortic valve area by VTI is 1.59 cm². Aortic valve peak velocity is 1.48 m/s. Mean gradient is 4 mmHg. The dimensionless index is 0.60.    Mitral Valve: There is bileaflet sclerosis. Mildly thickened leaflets. There is mild regurgitation.    Tricuspid Valve: There is severe regurgitation with an eccentrically directed jet.    Pulmonary Artery: Pulmonary artery pressure could not be accurately determined due to severe TR.    IVC/SVC: Elevated venous pressure at 15 mmHg.    Pericardium: There is a trivial effusion. No indication of cardiac tamponade. Left pleural effusion.    Cardiac arrest  Sequence of events as documented below.  Patient now interactive, with no significant neurological deficits.    PEA cardiac arrest in the operating room, status post induction prior to the surgery even being started.  No evidence of tension pneumothorax.  Now moving all 4 extremities but no meaningful commands, profoundly hypotensive at this time.  Did not initiate targeted temperature management given patient was moving all 4 extremities.  We will continue to assess neurological examination.  We will move propofol over to Precedex for sedation as this will help with his bradycardia as well as hemodynamic status in the setting of newly depressed EF.    Elevated troponin    Likely secondary to demand, no evidence of ST elevations.  ST depressions likely in the setting of compressions, now that he is aware he has no evidence of chest pain.  He also has had recovery of his ejection  fraction from this done myocardium.    Essential (primary) hypertension  We will restart back on home hydralazine 100 mg t.i.d. with holding parameters for SBP less than 120.    Renal/  End stage renal disease  Dependent on dialysis.  We will request to run net even as patient does not appear to be volume overloaded on exam and dialysis mainly for ensuring potassium within normal limits.  If evidence of hypotension during dialysis, please administer additional fluid boluses of 500 cc.    Dependence on renal dialysis    Continue with dialysis as per schedule    Oncology  Anemia of renal disease  Status post 1 unit PRBC yesterday with improvement in hemoglobin without evidence of flash pulmonary edema.    Endocrine  Malnutrition of moderate degree  Nutrition consulted. Most recent weight and BMI monitored-     Measurements:  Wt Readings from Last 1 Encounters:   06/08/24 57.4 kg (126 lb 8.7 oz)   Body mass index is 21.06 kg/m².    Patient has been screened and assessed by RD.    Malnutrition Type:  Context:    Level:      Malnutrition Characteristic Summary:       Interventions/Recommendations (treatment strategy):  Recommend resume tube feedings of Novasource Renal at 35mL/hour with 40mL/hour free water flush as soon as medically appropriate.         Critical Care Daily Checklist:    A: Awake: RASS Goal/Actual Goal:    Actual:     B: Spontaneous Breathing Trial Performed? Spon. Breathing Trial Initiated?: Initiated (place pt on CPAP 10/5 40%) (06/07/24 0835)   C: SAT & SBT Coordinated?  Not indicated                      D: Delirium: CAM-ICU Overall CAM-ICU: Negative   E: Early Mobility Performed? No   F: Feeding Goal: Goals: tube feeding tolerance at goal, weight maintenance during admission  Status: Nutrition Goal Status: new   Current Diet Order   Procedures    Diet Dysphagia Pureed Thin     Order Specific Question:   Fluid consistency:     Answer:   Thin      AS: Analgesia/Sedation Not applicable   T:  Thromboembolic Prophylaxis Heparin   H: HOB > 300 Yes   U: Stress Ulcer Prophylaxis (if needed) As documented   G: Glucose Control Monitoring   B: Bowel Function Stool Occurrence: 1   I: Indwelling Catheter (Lines & Fonseca) Necessity We will remove right IJ   D: De-escalation of Antimicrobials/Pharmacotherapies Not indicated    Plan for the day/ETD As documented    Code Status:  Family/Goals of Care: Full Code  Updated     Critical Care Time:  45 minutes  Critical secondary to Patient has a condition that poses threat to life and bodily function:  Status post cardiac arrest, parapneumonic effusion with active treatment with intrapleural tPA and DNase      Critical care was time spent personally by me on the following activities: development of treatment plan with patient or surrogate and bedside caregivers, discussions with consultants, evaluation of patient's response to treatment, examination of patient, ordering and performing treatments and interventions, ordering and review of laboratory studies, ordering and review of radiographic studies, pulse oximetry, re-evaluation of patient's condition. This critical care time did not overlap with that of any other provider or involve time for any procedures.     Benjamin Rodney MD  Critical Care Medicine  [facility]

## 2024-06-09 NOTE — ASSESSMENT & PLAN NOTE
Status post insertion of way pneumothorax chest tube with loculated pneumothorax suggestive of thickened pleural rind/loculated effusion.  No gross empyema but this may be a complicated parapneumonic effusion.  Continued on Zosyn at this time.  Unstable for decortication (PEA cardiac arrest as above prior to decortication procedure).  We will continue to monitor with chest tube in place, we will continue on water-seal.      We will attempt additional tPA/DNase starting today for another 3 days (total 6 doses), that he has no longer a surgical candidate due to poor overall surgical candidacy.  3rd dose of tPA/DNase given this a.m..

## 2024-06-09 NOTE — PROCEDURES
"Joseph Mcdonald is a 89 y.o. male patient.    Temp: 97.4 °F (36.3 °C) (06/09/24 0730)  Pulse: 68 (06/09/24 0730)  Resp: 12 (06/09/24 0730)  BP: (!) 137/46 (06/09/24 0730)  SpO2: 100 % (06/09/24 0730)  Weight: 57.4 kg (126 lb 8.7 oz) (06/08/24 0245)  Height: 5' 5" (165.1 cm) (05/29/24 0725)       Procedures    6/9/2024      Time clamped:  7:30 a.m..  Time unclamped:  Plan unclamp at 8:30 a.m..  Indications:  Loculated pleural effusion for complicated parapneumonic effusion     Intrapleural fibrinolytic (tPA + DNase) dose number:  3     At bedside, 10 mg of alteplase (diluted in 0.9% sodium chloride for a total volume of 30 mL) in addition to dornase pati (5 mg diluted in sterile water 30 mL) was administered into the pleural space via the patient's chest tube.     The patient tolerated the procedure well, status post instillation of the intrapleural fibrinolytic six the chest tube we will be clamped for 1 hour.      There was no evidence of persistent air leak or bubbling in the chest tube atrium at this time.             Benjamin Rodney MD  Interventional Pulmonary and Critical Care  [facility]  "

## 2024-06-09 NOTE — PLAN OF CARE
Problem: Adult Inpatient Plan of Care  Goal: Plan of Care Review  Outcome: Progressing  Goal: Optimal Comfort and Wellbeing  Outcome: Progressing  Goal: Readiness for Transition of Care  Outcome: Progressing

## 2024-06-09 NOTE — SUBJECTIVE & OBJECTIVE
Interval History/Significant Events:  Refer to hospital course    Review of Systems  Objective:     Vital Signs (Most Recent):  Temp: 97.4 °F (36.3 °C) (06/09/24 0730)  Pulse: 68 (06/09/24 0730)  Resp: 12 (06/09/24 0730)  BP: (!) 137/46 (06/09/24 0730)  SpO2: 100 % (06/09/24 0730) Vital Signs (24h Range):  Temp:  [96.9 °F (36.1 °C)-98.7 °F (37.1 °C)] 97.4 °F (36.3 °C)  Pulse:  [60-79] 68  Resp:  [10-21] 12  SpO2:  [50 %-100 %] 100 %  BP: (119-186)/(10-55) 137/46   Weight: 57.4 kg (126 lb 8.7 oz)  Body mass index is 21.06 kg/m².      Intake/Output Summary (Last 24 hours) at 6/9/2024 0758  Last data filed at 6/9/2024 0600  Gross per 24 hour   Intake 697.97 ml   Output 550 ml   Net 147.97 ml          Physical Exam  Constitutional:       Appearance: He is ill-appearing. He is not diaphoretic.   HENT:      Head: Normocephalic and atraumatic.      Nose: No congestion or rhinorrhea.   Cardiovascular:      Rate and Rhythm: Tachycardia present. Rhythm irregular.      Pulses: Normal pulses.      Heart sounds: Normal heart sounds.   Pulmonary:      Effort: No respiratory distress.      Breath sounds: No stridor. Rhonchi present. No wheezing or rales.   Chest:      Chest wall: No tenderness.   Abdominal:      General: Abdomen is flat.   Musculoskeletal:      Cervical back: Normal range of motion. No rigidity.      Right lower leg: No edema.      Left lower leg: No edema.   Skin:     General: Skin is dry.      Findings: No erythema.   Neurological:      General: No focal deficit present.      Mental Status: He is oriented to person, place, and time.            Vents:  Vent Mode: CPAP (06/07/24 0950)  Ventilator Initiated: Yes (06/06/24 1155)  Set Rate: 16 BPM (06/07/24 0713)  Vt Set: 400 mL (06/07/24 0713)  Pressure Support: 5 cmH20 (06/07/24 0950)  PEEP/CPAP: 5 cmH20 (06/07/24 0950)  Oxygen Concentration (%): 32 (06/09/24 0718)  Peak Airway Pressure: 27 cmH20 (06/07/24 0713)  Total Ve: 7 L/m (06/07/24 0713)  F/VT Ratio<105  (RSBI): (!) 45.33 (06/07/24 0055)  Lines/Drains/Airways       Drain  Duration                  Chest Tube 05/29/24 0835 Tube - 1 Right 10 days              Peripheral Intravenous Line  Duration                  Hemodialysis AV Fistula Right upper arm -- days         Peripheral IV - Single Lumen 06/08/24 1620 22 G Left Antecubital <1 day                  Significant Labs:    CBC/Anemia Profile:  Recent Labs   Lab 06/08/24 0325 06/09/24  0259   WBC 15.21* 10.90   HGB 6.9* 7.9*   HCT 22.3* 24.9*    145*   MCV 85.4 85.6   RDW 18.1* 17.6*        Chemistries:  Recent Labs   Lab 06/08/24 0325 06/09/24  0259   * 133*   K 4.4 4.5    98   CO2 28 25   BUN 35* 52*   CREATININE 3.66* 4.56*   CALCIUM 8.3* 8.0*   ALBUMIN 1.8* 1.9*   PROT 6.2* 6.1*   BILITOT 0.4 0.5   ALKPHOS 81 99   ALT 17 21   AST 42* 38*       All pertinent labs within the past 24 hours have been reviewed.    Significant Imaging:  I have reviewed all pertinent imaging results/findings within the past 24 hours.

## 2024-06-09 NOTE — PLAN OF CARE
Problem: Adult Inpatient Plan of Care  Goal: Plan of Care Review  Outcome: Progressing  Goal: Patient-Specific Goal (Individualized)  Outcome: Progressing  Goal: Absence of Hospital-Acquired Illness or Injury  Outcome: Progressing  Goal: Optimal Comfort and Wellbeing  Outcome: Progressing  Goal: Readiness for Transition of Care  Outcome: Progressing     Problem: Diabetes Comorbidity  Goal: Blood Glucose Level Within Targeted Range  Outcome: Progressing     Problem: Fall Injury Risk  Goal: Absence of Fall and Fall-Related Injury  Outcome: Progressing     Problem: Wound  Goal: Optimal Coping  Outcome: Progressing  Goal: Optimal Functional Ability  Outcome: Progressing  Goal: Absence of Infection Signs and Symptoms  Outcome: Progressing  Goal: Improved Oral Intake  Outcome: Progressing  Goal: Optimal Pain Control and Function  Outcome: Progressing  Goal: Skin Health and Integrity  Outcome: Progressing  Goal: Optimal Wound Healing  Outcome: Progressing     Problem: Hemodialysis  Goal: Safe, Effective Therapy Delivery  Outcome: Progressing  Goal: Effective Tissue Perfusion  Outcome: Progressing  Goal: Absence of Infection Signs and Symptoms  Outcome: Progressing     Problem: Skin Injury Risk Increased  Goal: Skin Health and Integrity  Outcome: Progressing

## 2024-06-09 NOTE — PROGRESS NOTES
[facility]  Adult Nutrition  Follow-up Note         Reason for Assessment  Reason For Assessment: consult   Nutrition Risk Screen: no indicators present    Assessment and Plan  6/9/2024 Consult: New consult received and appreciated for wounds. Patient currently ordered a dysphagia pureed diet. Bedside swallow completed on 6/3. Secure chat with RN with report of patient tolerating po intakes since yesterday.     Recommend to add Gallo BID + Boost Max Protein BID to aid in wound healing. Encourage po intakes. Assist with meals as needed. RD Following.     6/7/2024: RD follow up. Patient currently in unit after code in OR. NG replaced but feedings held. Recommend resume tube feedings of Novasource Renal at 35mL/hour with 40mL/hour free water flush as soon as medically appropriate. RD following.     6/3/2024: RD follow up. NG was dislodged by accident yesterday. Repeat SLP evaluation today. Recommended puree with thin liquids. Recommend continue puree diet as tolerated. Encourage good PO intakes. Also recommend addition of Boost Plus with meals to improve kcal/protein intakes to better meet estimated nutritional needs. RD following.     6/1/2024: Consult received and appreciated. Consult to start tube feedings.     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 Recommend start Novasource Renal with a goal rate of 35mL/hour with 40mL/hour free water flush. Keep HOB at 30-45 degrees to reduce risk of aspiration. Start rate at 20mL  and advance by 10mL q8h until goal rate is reached. Monitor for tolerance: n/v/d/c. Hold feeding and notify RD if symptoms of intolerance develop.     Last BM 5/29 per flowsheet.     Medications/labs reviewed. RD following.          Learning Needs/Social Determinants of Health  Learning Assessment       05/23/2024 [id number] [facility] (5/23/2024 - Present)   Created by Melanie Saleh RN - RN (Nurse) Status: Complete                 PRIMARY  LEARNER     Primary Learner Name:  Joseph Mcdonald  - 05/23/2024 1848    Relationship:  Patient  - 05/23/2024 1848    Does the primary learner have any barriers to learning?:  No Barriers  - 05/23/2024 1848    What is the preferred language of the primary learner?:  English  - 05/23/2024 1848    Is an  required?:  No  - 05/23/2024 1848    How does the primary learner prefer to learn new concepts?:  Listening, Reading, Demonstration  - 05/23/2024 1848    How often do you need to have someone help you read instructions, pamphlets, or written material from your doctor or pharmacy?:  Never  - 05/23/2024 1848        CO-LEARNER #1     No question answered        CO-LEARNER #2     No question answered        SPECIAL TOPICS     No question answered        ANSWERED BY:     No question answered        Comments         Edit History       Melanie Saleh RN - RN (Nurse)   05/23/2024 1848                           Social Determinants of Health     Tobacco Use: Medium Risk (8/2/2023)    Patient History     Smoking Tobacco Use: Former     Smokeless Tobacco Use: Never     Passive Exposure: Not on file   Alcohol Use: Not At Risk (5/24/2024)    AUDIT-C     Frequency of Alcohol Consumption: Never     Average Number of Drinks: Patient does not drink     Frequency of Binge Drinking: Never   Financial Resource Strain: Low Risk  (8/2/2023)    Overall Financial Resource Strain (CARDIA)     Difficulty of Paying Living Expenses: Not hard at all   Food Insecurity: No Food Insecurity (5/24/2024)    Hunger Vital Sign     Worried About Running Out of Food in the Last Year: Never true     Ran Out of Food in the Last Year: Never true   Transportation Needs: No Transportation Needs (5/24/2024)    TRANSPORTATION NEEDS     Transportation : No   Physical Activity: Sufficiently Active (5/24/2024)    Exercise Vital Sign     Days of Exercise per Week: 5 days     Minutes of Exercise per Session: 30 min   Stress: No Stress Concern  Present (5/24/2024)    [employer] - Occupational Stress Questionnaire     Feeling of Stress : Not at all   Housing Stability: Low Risk  (5/24/2024)    Housing Stability Vital Sign     Unable to Pay for Housing in the Last Year: No     Homeless in the Last Year: No   Depression: Not on file   Utilities: Not At Risk (5/24/2024)    [facility] Utilities     Threatened with loss of utilities: No   Health Literacy: Adequate Health Literacy (5/24/2024)     Health Literacy     Frequency of need for help with medical instructions: Never   Social Isolation: Socially Integrated (5/24/2024)    Social Isolation     Social Isolation: 1            Malnutrition  Is Patient Malnourished: No    Nutrition Diagnosis  Inadequate energy intake related to Appetite loss and Inadequate Caloric intake as evidenced by poor PO intakes  Comments: initiate enteral feeding    Recent Labs   Lab 06/08/24  1114 06/09/24  0259   GLU  --  144*   POCGLU 143*  --      Note: Glucose elevated    Nutrition Prescription / Recommendations  Recommendation/Intervention: Recommend to add Gallo to aid in wound healing, ONS to provide additional calories/protein to heal/HD  Goals: po intakes 50% by next follow up  Nutrition Goal Status: new  Communication of RD Recs: reviewed with RN  Current Diet Order: NPO  Chewing or Swallowing Difficulty?: No Chewing or swallowing difficulty  Recommended Diet: Enteral Nutrition  Recommended Oral Supplement: No Oral Supplements  Is Nutrition Support Recommended: Yes  Needs Calculated  Energy Need Method: Kcal/kg Energy Calorie Requirements (kcal): 2642-7512  Protein Requirements: 71-84  Enteral Nutrition   Enteral Nutrition Formula Provides:  1680 kcals Propofol Rate: No  76 g Protein  154 g Carbohydrates  84 g Fat Propofol Rate: No  602 ml Fluid without Flush    960 ml Fluid by flush   1562 ml Total Fluid  Enteral Nutrition Recommended Order:  Tube feeding via NG/ Hesperus Sump  Tube feeding formula:  "[facility] Renal NG/ Craig Sump at 35mL/hour  Free Water Flush: 40 ml hourly  Modular Supplements:No Modular Supplements needed  Enteral Nutrition meets needs?: yes  Enteral Nutrition Status: Recommended but Not Ordered    Monitor and Evaluation  % current Intake: NPO  % intake to meet estimated needs: Enteral Nutrition   Food and Nutrient Intake: energy intake, food and beverage intake  Food and Nutrient Adminstration: diet order  Anthropometric Measurements: height/length, weight, weight change, body mass index  Biochemical Data, Medical Tests and Procedures: electrolyte and renal panel, gastrointestinal profile, inflammatory profile, glucose/endocrine profile       Current Medical Diagnosis and Past Medical History  Diagnosis: renal disease  Past Medical History:   Diagnosis Date    Chronic kidney disease (CKD)     Diabetes mellitus     Hypertension     PVD (peripheral vascular disease)        Nutrition/Diet History  Spiritual, Cultural Beliefs, Alevism Practices, Values that Affect Care: no  Factors Affecting Nutritional Intake: behavioral (mealtime), impaired cognitive status/motor control, difficulty/impaired swallowing    Lab/Procedures/Meds  Recent Labs   Lab 06/09/24  0259   *   K 4.5   BUN 52*   CREATININE 4.56*   CALCIUM 8.0*   ALBUMIN 1.9*   CL 98   ALT 21   AST 38*   Note: Na+, K+ low, recommend consider replete to WNL as appropriate. BUN/Cr elevated, Alb low. PMH ESRD on HD.     Last A1c: No results found for: "HGBA1C"  Lab Results   Component Value Date    RBC 2.91 (L) 06/09/2024    HGB 7.9 (L) 06/09/2024    HCT 24.9 (L) 06/09/2024    MCV 85.6 06/09/2024    MCH 27.1 06/09/2024    MCHC 31.7 (L) 06/09/2024    TIBC 105 (L) 05/23/2024   Note: H&H low    Pertinent Labs Reviewed: reviewed  Pertinent Medications Reviewed: reviewed  Scheduled Meds:   atorvastatin  40 mg Oral QHS    heparin (porcine)  5,000 Units Subcutaneous Q8H    hydrALAZINE  100 mg Oral Q8H    pantoprazole  40 mg Oral Daily    " "piperacillin-tazobactam (Zosyn) IV (PEDS and ADULTS) (extended infusion is not appropriate)  4.5 g Intravenous Q12H    sevelamer carbonate  2.4 g Per NG tube TID WM     Continuous Infusions:      PRN Meds:.  Current Facility-Administered Medications:     0.9%  NaCl infusion (for blood administration), , Intravenous, Q24H PRN    sodium chloride 0.9%, , Intravenous, PRN    acetaminophen, 1,000 mg, Oral, Q8H PRN    dextrose 10%, 12.5 g, Intravenous, PRN    dextrose 10%, 25 g, Intravenous, PRN    glucagon (human recombinant), 1 mg, Intramuscular, PRN    glucose, 16 g, Oral, PRN    glucose, 24 g, Oral, PRN    naloxone, 0.02 mg, Intravenous, PRN    ondansetron, 8 mg, Oral, Q8H PRN    polyethylene glycol, 17 g, Oral, BID PRN    sodium chloride 0.9% 250 mL flush bag, , Intravenous, PRN    sodium chloride 0.9%, 10 mL, Intravenous, Q12H PRN    Anthropometrics  Temp: 97.4 °F (36.3 °C)  Height Method: Stated  Height: 5' 5" (165.1 cm)  Height (inches): 65 in  Weight Method: Bed Scale  Weight: 57.4 kg (126 lb 8.7 oz)  Weight (lb): 126.55 lb  Ideal Body Weight (IBW), Male: 136 lb  % Ideal Body Weight, Male (lb): 104.4 %  BMI (Calculated): 21.1       Estimated/Assessed Needs  RMR ([place]-St. Jeor Equation): 1170.88   Total Ve: 7 L/m Temp: 97.4 °F (36.3 °C)Oral  Weight Used For Calorie Calculations: 64.5 kg (142 lb 3.2 oz)   Energy Need Method: Kcal/kg Energy Calorie Requirements (kcal): 0487-8242  Weight Used For Protein Calculations: 64.5 kg (142 lb 3.2 oz)  Protein Requirements: 71-84  Estimated Fluid Requirement Method: other (see comments) (500 ml + urine output)    RDA Method (mL): 1935       Nutrition by Nursing  Diet/Nutrition Received: mechanical/dental soft (puree/thin liquids)  Intake (%): 25%  Diet/Feeding Assistance: total feed  Diet/Feeding Tolerance: good  Last Bowel Movement: 06/07/24  [REMOVED]      NG/OG Tube 05/31/24 2100 nasogastric Left nostril-Feeding Type: continuous, by pump  [REMOVED]      NG/OG Tube " 05/31/24 2100 nasogastric Left nostril-Current Rate (mL/hr): 35 mL/hr  [REMOVED]      NG/OG Tube 05/31/24 2100 nasogastric Left nostril-Goal Rate (mL/hr): 35 mL/hr  [REMOVED]      NG/OG Tube 05/31/24 2100 nasogastric Left nostril-Formula Name: novasource renal    Nutrition Follow-Up  RD Follow-up?: Yes      Nutrition Discharge Planning: too soon to determine; RD Following.          Monica Pappas, MS, RD, LD  Available via Secure Chat

## 2024-06-09 NOTE — PROGRESS NOTES
[facility]  Nephrology  Progress Note    Patient Name: Joseph Mcdonald  MRN: [id number]  Admission Date: 5/23/2024  Hospital Length of Stay: 17 days  Attending Provider: Benjamin Rodney MD   Primary Care Physician: Prakash, [facility]  Principal Problem:Acute hypoxic respiratory failure    Subjective:     HPI: No notes on file    Interval History: The patient is resting.  No other changes.  He is without complaints.  Chest tube is in place.    Review of patient's allergies indicates:   Allergen Reactions    Azithromycin Other (See Comments)     Note: - [employer] 01/11/2019     Current Facility-Administered Medications   Medication Frequency    0.9%  NaCl infusion (for blood administration) Q24H PRN    0.9%  NaCl infusion PRN    acetaminophen tablet 1,000 mg Q8H PRN    alteplase (CATHFlo ACtivase) 10 mg in sodium chloride 0.9% 30 mL infusion (aka TPA) for CHEST TUBE instillation Once    And    dornase pati (PULMOZYME) 5 mg in sterile water 30 mL infusion Once    atorvastatin tablet 40 mg QHS    dextrose 10% bolus 125 mL 125 mL PRN    dextrose 10% bolus 250 mL 250 mL PRN    glucagon (human recombinant) injection 1 mg PRN    glucose chewable tablet 16 g PRN    glucose chewable tablet 24 g PRN    heparin (porcine) injection 5,000 Units Q8H    hydrALAZINE tablet 100 mg Q8H    naloxone 0.4 mg/mL injection 0.02 mg PRN    ondansetron disintegrating tablet 8 mg Q8H PRN    pantoprazole EC tablet 40 mg Daily    piperacillin-tazobactam (ZOSYN) 4.5 g in dextrose 5 % in water (D5W) 100 mL IVPB (MB+) Q12H    polyethylene glycol packet 17 g BID PRN    sevelamer carbonate pwpk 2.4 g TID WM    sodium chloride 0.9% 250 mL flush bag PRN    sodium chloride 0.9% flush 10 mL Q12H PRN       Objective:     Vital Signs (Most Recent):  Temp: 97.8 °F (36.6 °C) (06/09/24 1525)  Pulse: 61 (06/09/24 1525)  Resp: 11 (06/09/24 1525)  BP: (!) 147/41 (06/09/24 1525)  SpO2: 96 % (06/09/24 1525) Vital Signs (24h  Range):  Temp:  [97.2 °F (36.2 °C)-98.7 °F (37.1 °C)] 97.8 °F (36.6 °C)  Pulse:  [61-79] 61  Resp:  [11-21] 11  SpO2:  [50 %-100 %] 96 %  BP: ()/() 147/41     Weight: 57.4 kg (126 lb 8.7 oz) (06/08/24 0245)  Body mass index is 21.06 kg/m².  Body surface area is 1.62 meters squared.    I/O last 3 completed shifts:  In: 797.7 [I.V.:60; Blood:310.4; IV Piggyback:427.2]  Out: 580 [Chest Tube:580]     Physical Exam  Vitals reviewed.   Constitutional:       Appearance: He is ill-appearing.      Comments: Very frail   HENT:      Head: Normocephalic.   Eyes:      Pupils: Pupils are equal, round, and reactive to light.   Pulmonary:      Effort: Pulmonary effort is normal.   Abdominal:      Palpations: Abdomen is soft.   Musculoskeletal:      Cervical back: Neck supple.   Skin:     General: Skin is warm.          Significant Labs:  BMP:   Recent Labs   Lab 06/06/24  1212 06/06/24  1322 06/09/24  0259   GLU 89  89  112*   < > 144*     142   < > 133*   K 3.5  3.5   < > 4.5   *  114*   < > 98   CO2 20*  20*   < > 25   BUN 44*  44*   < > 52*   CREATININE 4.41*  4.41*   < > 4.56*   CALCIUM 8.5  8.5   < > 8.0*   MG 2.0  --   --     < > = values in this interval not displayed.     CBC:   Recent Labs   Lab 06/09/24  0259   WBC 10.90   RBC 2.91*   HGB 7.9*   HCT 24.9*   *   MCV 85.6   MCH 27.1   MCHC 31.7*        Significant Imaging:  Labs: Reviewed  Assessment/Plan:     Oncology  Anemia of renal disease  Chronic condition    Other  Debility  Noted.  The patient is very weak.    Renal failure    Thank you for your consult. I will follow-up with patient. Please contact us if you have any additional questions.    Rickey Rodriguez Jr, MD  Nephrology  [facility]

## 2024-06-09 NOTE — PROCEDURES
"Joseph Mcdonald is a 89 y.o. male patient.    Temp: 97.8 °F (36.6 °C) (06/09/24 1525)  Pulse: 62 (06/09/24 1630)  Resp: 11 (06/09/24 1630)  BP: (!) 155/39 (06/09/24 1600)  SpO2: (!) 94 % (06/09/24 1630)  Weight: 57.4 kg (126 lb 8.7 oz) (06/08/24 0245)  Height: 5' 5" (165.1 cm) (05/29/24 0781)       Procedures    6/9/2024      Time clamped:  3:45 p.m..  Time unclamped:  Plan unclamp at 4:45 p.m.  Indications:  Loculated pleural effusion for complicated parapneumonic effusion     Intrapleural fibrinolytic (tPA + DNase) dose number:  4     At bedside, 10 mg of alteplase (diluted in 0.9% sodium chloride for a total volume of 30 mL) in addition to dornase pati (5 mg diluted in sterile water 30 mL) was administered into the pleural space via the patient's chest tube.     The patient tolerated the procedure well, status post instillation of the intrapleural fibrinolytic six the chest tube will be clamped for 1 hour.      There was no evidence of persistent air leak or bubbling in the chest tube atrium at this time.             Benjamin Rodney MD  Interventional Pulmonary and Critical Care  [facility]    "

## 2024-06-09 NOTE — ASSESSMENT & PLAN NOTE
Patient with Hypoxic Respiratory failure which is Acute.  he is not on home oxygen. Supplemental oxygen was provided and noted- Oxygen Concentration (%):  [32-40] 32     Successful extubation, tolerating nasal cannula well.

## 2024-06-09 NOTE — ASSESSMENT & PLAN NOTE
Newly depressed ejection fraction on recent echocardiogram status post compressions and cardiac arrest.  This might be in the setting of stress-induced cardiomyopathy and has had an improvement in his ejection fraction and therefore stunned myocardium is highest on the differential.    No ongoing signs of decompensated CHF at this time 6/9/24.    Latest echocardiogram from 6/7/24 as below.  Echocardiogram from yesterday with submitted fast ejection fraction 15%, now recovered.        Left Ventricle: The left ventricle is normal in size. Increased wall thickness. There is concentric remodeling. There is normal systolic function. Biplane (2D) method of discs ejection fraction is 55%. Grade II diastolic dysfunction.    Right Ventricle: Mild right ventricular enlargement. Systolic function is mildly reduced.    Left Atrium: Left atrium is mildly dilated.    Right Atrium: Right atrium is mildly dilated.    Aortic Valve: The aortic valve is a trileaflet valve. Mildly calcified cusps. There is mild stenosis. Aortic valve area by VTI is 1.59 cm². Aortic valve peak velocity is 1.48 m/s. Mean gradient is 4 mmHg. The dimensionless index is 0.60.    Mitral Valve: There is bileaflet sclerosis. Mildly thickened leaflets. There is mild regurgitation.    Tricuspid Valve: There is severe regurgitation with an eccentrically directed jet.    Pulmonary Artery: Pulmonary artery pressure could not be accurately determined due to severe TR.    IVC/SVC: Elevated venous pressure at 15 mmHg.    Pericardium: There is a trivial effusion. No indication of cardiac tamponade. Left pleural effusion.

## 2024-06-10 PROBLEM — K62.5 BLOOD PER RECTUM: Status: ACTIVE | Noted: 2024-01-01

## 2024-06-10 PROBLEM — J90 LOCULATED PLEURAL EFFUSION: Status: ACTIVE | Noted: 2024-01-01

## 2024-06-10 PROBLEM — K92.2 GI BLEED: Status: ACTIVE | Noted: 2024-01-01

## 2024-06-10 PROBLEM — D50.0 ANEMIA DUE TO GI BLOOD LOSS: Status: ACTIVE | Noted: 2024-01-01

## 2024-06-10 NOTE — CONSULTS
"CC: blood clots per rectum    HPI 89 y.o. male with HTN, DM, ESRD on HD, JACINTO, DMII, CAD, heart failure with preserved ejection fraction here with shortness of breath found to have right sided pleural effusion. He loculated hydropneumothorax in setting of thickened pleural rind and pulmonary placed chest tube 5/29. Had plan for lung decortication but suffered PEA arrest on the operating table prior to procedure starting (in the operating room) brought to the ICU after 3 minutes of chest compressions and ACLS for pulseless electrical activity. He was on levophed and vasopressin while in ICU. TTE showed severely depressed EF 15 %.     EGD 8/2/23 with subcentimeter polyp in the cardia not biopsied due to concern for active bleeding, otherwise normal     GI consulted because nursing noted that he passed large amount of clots in bowel movement this morning when he was changed. Hgb declined to 6.7 this afternoon from 7.9 yesterday. Two days ago was 6.9.     Medical records reviewed. Additional history supplemented by nursing.     Past Medical History:   Diagnosis Date    Chronic kidney disease (CKD)     Diabetes mellitus     Hypertension     PVD (peripheral vascular disease)      Past Surgical History:   Procedure Laterality Date    LEFT HEART CATHETERIZATION N/A 8/3/2023    Procedure: Left heart cath;  Surgeon: Vazquez Aguilar MD;  Location: [facility] CATH LAB;  Service: Cardiology;  Laterality: N/A;    left toe amputation      PLACEMENT OF DIALYSIS ACCESS       Social History  Social History     Tobacco Use    Smoking status: Former     Types: Cigarettes    Smokeless tobacco: Never   Substance Use Topics    Alcohol use: Not Currently    Drug use: Never     No family history of colon cancer    Physical Examination  BP (!) (P) 133/43   Pulse (P) 76   Temp 97.6 °F (36.4 °C)   Resp (P) 20   Ht 5' 5" (1.651 m)   Wt 57.4 kg (126 lb 8.7 oz)   SpO2 100%   BMI 21.06 kg/m²   General appearance: chronically ill " appearing, thin, cachectic lying in bed  HENT: Normocephalic, atraumatic, neck symmetrical, no nasal discharge   Eyes: conjunctivae/corneas clear, PERRL, EOM's intact  Lungs: R chest tube in place, no labored breathing, symmetric chest wall expansion bilaterally  Heart: regular rate and rhythm without rub; no displacement of the PMI   Abdomen: soft, non-tender; no organomegaly  Extremities: extremities symmetric; no clubbing, cyanosis, or edema  Integument: Skin color, texture, turgor normal; no rashes; hair distrubution normal  Psychiatric: no pressured speech; flat affect;    Labs:  Lab Results   Component Value Date    WBC 10.90 06/09/2024    HGB 6.7 (L) 06/10/2024    HCT 24.9 (L) 06/09/2024    MCV 85.6 06/09/2024     (L) 06/09/2024     CMP  Sodium   Date Value Ref Range Status   06/10/2024 134 (L) 136 - 145 mmol/L Final     Potassium   Date Value Ref Range Status   06/10/2024 4.4 3.5 - 5.1 mmol/L Final     Chloride   Date Value Ref Range Status   06/10/2024 99 98 - 107 mmol/L Final     CO2   Date Value Ref Range Status   06/10/2024 25 21 - 32 mmol/L Final     Glucose   Date Value Ref Range Status   06/10/2024 92 74 - 106 mg/dL Final     BUN   Date Value Ref Range Status   06/10/2024 65 (H) 7 - 18 mg/dL Final     Creatinine   Date Value Ref Range Status   06/10/2024 5.57 (H) 0.70 - 1.30 mg/dL Final     Calcium   Date Value Ref Range Status   06/10/2024 8.0 (L) 8.5 - 10.1 mg/dL Final     Total Protein   Date Value Ref Range Status   06/10/2024 6.2 (L) 6.4 - 8.2 g/dL Final     Albumin   Date Value Ref Range Status   06/10/2024 1.8 (L) 3.5 - 5.0 g/dL Final     Bilirubin, Total   Date Value Ref Range Status   06/10/2024 0.4 >0.0 - 1.2 mg/dL Final     Alk Phos   Date Value Ref Range Status   06/10/2024 82 45 - 115 U/L Final     AST   Date Value Ref Range Status   06/10/2024 27 15 - 37 U/L Final     ALT   Date Value Ref Range Status   06/10/2024 20 16 - 61 U/L Final     Anion Gap   Date Value Ref Range Status    06/10/2024 14 7 - 16 mmol/L Final     eGFR    Date Value Ref Range Status   06/18/2020 11       eGFR   Date Value Ref Range Status   03/24/2022 9 (L) >=60 mL/min/1.73m² Final       Imaging:  CXR:  Impression:     Right chest tube is stable in position.  There is continued loculated hydropneumothorax in the lower right hemithorax.     Interval improved aeration of the right upper lung and left mid to lower lung     Assessment:   89 y.o. male with HTN, DM, ESRD on HD, JACINTO, DMII, CAD, heart failure with preserved ejection fraction here with shortness of breath found to have right sided pleural effusion. He loculated hydropneumothorax in setting of thickened pleural rind and pulmonary placed chest tube 5/29. Had plan for lung decortication but suffered PEA arrest on the operating table prior to procedure starting and required ICU. Now with concern for GI blood loss but not stable enough to undergo sedation for procedure and unlikely will be able to tolerate prep       Plan:   -Monitor for blood loss  -Transfusion as needed to maintain Hgb>7  -Conservative management given recent PEA arrest during induction for thoracotomy  -Patient is not stable enough to undergo sedation for procedure   -Treat with Protonix IV gtt  -Clear liquid diet      Sai Sruthi Veerisetty, MD [facility]

## 2024-06-10 NOTE — ASSESSMENT & PLAN NOTE
Creatine stable for now. BMP reviewed- noted Estimated Creatinine Clearance: 7.4 mL/min (A) (based on SCr of 5.57 mg/dL (H)). according to latest data. 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    Nephrology consulted to resume HD sessions.   Continue dialysis   6/5Electrolytes okay  6/10 dialysis today

## 2024-06-10 NOTE — NURSING TRANSFER
Nursing Transfer Note      6/9/2024   8:35 PM    Nurse giving handoff:Tita RN  Nurse receiving handoff:TAI Kam charge nurse  Reason patient is being transferred: ICU monitoring no longer required    Transfer To: [id number]      Transfer via bed    Transfer with O2, cardiac monitoring    Transported by Trip Rivers RN    Transfer Vital Signs:  Blood Pressure:117/53    Heart Rate:71    O2:99  Temperature:98.1  Respirations:15    Telemetry: Rate 71    Additional Lines: Chest Tube    4eyes on Skin: yes    Medicines sent: none      Any special needs or follow-up needed:     Patient belongings transferred with patient: Yes    Chart send with patient: Yes    Notified: family aware     Upon arrival to floor: patient oriented to room, call bell in reach, and bed in lowest position

## 2024-06-10 NOTE — PLAN OF CARE
Problem: Adult Inpatient Plan of Care  Goal: Plan of Care Review  Outcome: Progressing  Goal: Patient-Specific Goal (Individualized)  Outcome: Progressing  Goal: Absence of Hospital-Acquired Illness or Injury  Outcome: Progressing  Goal: Optimal Comfort and Wellbeing  Outcome: Progressing  Goal: Readiness for Transition of Care  Outcome: Progressing     Problem: Diabetes Comorbidity  Goal: Blood Glucose Level Within Targeted Range  Outcome: Progressing     Problem: Sepsis/Septic Shock  Goal: Optimal Coping  Outcome: Progressing  Goal: Absence of Bleeding  Outcome: Progressing  Goal: Blood Glucose Level Within Targeted Range  Outcome: Progressing  Goal: Absence of Infection Signs and Symptoms  Outcome: Progressing  Goal: Optimal Nutrition Intake  Outcome: Progressing     Problem: Fall Injury Risk  Goal: Absence of Fall and Fall-Related Injury  Outcome: Progressing     Problem: Wound  Goal: Optimal Coping  Outcome: Progressing  Goal: Optimal Functional Ability  Outcome: Progressing  Goal: Absence of Infection Signs and Symptoms  Outcome: Progressing  Goal: Improved Oral Intake  Outcome: Progressing  Goal: Optimal Pain Control and Function  Outcome: Progressing  Goal: Skin Health and Integrity  Outcome: Progressing  Goal: Optimal Wound Healing  Outcome: Progressing     Problem: Gas Exchange Impaired  Goal: Optimal Gas Exchange  Outcome: Progressing     Problem: Hemodialysis  Goal: Safe, Effective Therapy Delivery  Outcome: Progressing  Goal: Effective Tissue Perfusion  Outcome: Progressing  Goal: Absence of Infection Signs and Symptoms  Outcome: Progressing     Problem: Skin Injury Risk Increased  Goal: Skin Health and Integrity  Outcome: Progressing     Problem: Pneumonia  Goal: Fluid Balance  Outcome: Progressing  Goal: Resolution of Infection Signs and Symptoms  Outcome: Progressing  Goal: Effective Oxygenation and Ventilation  Outcome: Progressing     Problem: Infection  Goal: Absence of Infection Signs and  Symptoms  Outcome: Progressing     Problem: Mechanical Ventilation Invasive  Goal: Effective Communication  Outcome: Progressing  Goal: Optimal Device Function  Outcome: Progressing  Goal: Mechanical Ventilation Liberation  Outcome: Progressing  Goal: Optimal Nutrition Delivery  Outcome: Progressing  Goal: Absence of Device-Related Skin and Tissue Injury  Outcome: Progressing  Goal: Absence of Ventilator-Induced Lung Injury  Outcome: Progressing

## 2024-06-10 NOTE — NURSING
During rounds noticed blood on gown.  Patient was bleeding from the site where   I injected his Heparin at last night.  Applied pressure dressing to area and held in place for 15 minutes.  Then patient had an incontinent BM and it was blood tinged.  Cleaned patient up and applied new sacral dressing.

## 2024-06-10 NOTE — ASSESSMENT & PLAN NOTE
Sec to pleural effusion.  Require 2L NC since admission.   Imaging consistent with large right sided pleural effusion.   S/p thoracentesis on 5/23 with 1,5L fluid removed, no pleural fluid studies sent.   Remained symptomatic so CT chest obtained on 5/24 and consistent with large effusion.  Pulmonology consulted; s/p chest tube placement (5/29), s/p intra-pleural lytics started on 5/30 (total 6 doses).  Volume removal with HD, continue broad spectrum antibiotics as below.   Monitor oxygen status.   Home O2 eval prior to discharge.      6/4Improved.  Plan for decortication of the right lung on Thursday 6/5 plan for OR tomorrow  6/10 chest tube in place still draining.

## 2024-06-10 NOTE — PROGRESS NOTES
[facility] Hospital Medicine  Progress Note    Patient Name: Joseph Mcdonald  MRN: [id number]  Patient Class: IP- Inpatient   Admission Date: 5/23/2024  Length of Stay: 18 days  Attending Physician: Cristian Ac DO  Primary Care Provider: Clinic, [facility]        Subjective:     Principal Problem:Acute hypoxic respiratory failure        HPI:    89-year-old  male With a past medical history of end-stage renal disease, hypertension, iron deficiency anemia, secondary hyperparathyroidism, type 2 diabetes, CAD, heart failure with preserved ejection fraction, hypokalemia inability presents to the ED with 1 week worsening shortness of breath.  This was particularly bad during his dialysis session this morning.  Due to this daughter brought him to the ED thereafter.  He denies any fever, chills, cough, wheezing, sputum production, palpitations.  He has been take medication as in his not missed any dialysis sessions.  He denies any nausea, vomiting, diarrhea, dysuria.  Imaging in the ED revealed near-complete he had a pacemaker patient of the right hemithorax review of systems otherwise negative.    Overview/Hospital Course:   5/24 1.5 L removed from right pleural space today.  Still has large right-sided effusion.  We will attempt to get some more off tomorrow.  5/25 dialyzing today  5/26 bedside ultrasound today still has large pleural effusion, complex appearing.  Was going to attempt bedside ultrasound but given complex nature have consulted pulmonology  5/27 D/W Pulm, plan for chest tube placement.   5/28- Midodrine added low BP.   5/29- s/p chest tube placement per Dr. Rodney, fluid studies sent.   5/30- BP low again, midodrine resumed. Started on intra-pleural lytics per Pulm.  5/31- Intra-pleural lytics continued, CXR improved.   6/1- Failed swallow and is with NG now and tube feeds. S/p 6 doses of Intra-pleural lytics (total 6 doses).   6/2- Mental status better,  resp status stable.  6/3- NG removed on accident while patient was working with PT/OT, SLP consulted for reevaluation. Pulm to follow, CXR with slightly increased right effusion.    6/4 surgery consulted today.  Plan for decortication on Thursday 6/6 6/5 plan for decortication tomorrow.  We will place NG tube due to patient not eating well  6/10  Mr. Sin has been in the ICU for the last several days.  He had a P EA arrest during induction for his decortication.  He is now out of the ICU and doing well.  Still has right-sided chest tube.  Notified this morning that he pass large clots in about movement.  We will consult GI and start IV  protonic    Interval History:   appears to have developed GI bleed    Review of Systems   HENT:  Positive for voice change.    Respiratory:  Positive for shortness of breath.      Objective:     Vital Signs (Most Recent):  Temp: 97.4 °F (36.3 °C) (06/10/24 0822)  Pulse: 74 (06/10/24 0822)  Resp: 16 (06/10/24 0822)  BP: (!) 111/52 (06/10/24 0822)  SpO2: 100 % (06/10/24 0822) Vital Signs (24h Range):  Temp:  [97.2 °F (36.2 °C)-98.1 °F (36.7 °C)] 97.4 °F (36.3 °C)  Pulse:  [60-76] 74  Resp:  [11-18] 16  SpO2:  [94 %-100 %] 100 %  BP: ()/() 111/52     Weight: 57.4 kg (126 lb 8.7 oz)  Body mass index is 21.06 kg/m².    Intake/Output Summary (Last 24 hours) at 6/10/2024 0957  Last data filed at 6/10/2024 0517  Gross per 24 hour   Intake 98.93 ml   Output 1210 ml   Net -1111.07 ml         Physical Exam  Vitals and nursing note reviewed.   Constitutional:       Appearance: He is ill-appearing.   HENT:      Head: Normocephalic and atraumatic.      Nose: Nose normal.      Mouth/Throat:      Mouth: Mucous membranes are moist.   Eyes:      Extraocular Movements: Extraocular movements intact.   Cardiovascular:      Rate and Rhythm: Normal rate and regular rhythm.      Pulses: Normal pulses.      Heart sounds: Normal heart sounds.   Pulmonary:      Effort: Pulmonary effort is  normal.      Breath sounds: Examination of the right-middle field reveals decreased breath sounds. Examination of the right-lower field reveals decreased breath sounds. Decreased breath sounds present.      Comments: Chest tube in place.   Abdominal:      General: Bowel sounds are normal.      Palpations: Abdomen is soft.      Comments: NG in place.    Musculoskeletal:      Cervical back: Normal range of motion.   Skin:     General: Skin is warm.   Neurological:      General: No focal deficit present.      Mental Status: He is alert. Mental status is at baseline.   Psychiatric:         Mood and Affect: Mood normal.         Behavior: Behavior normal.             Significant Labs: All pertinent labs within the past 24 hours have been reviewed.    Significant Imaging: I have reviewed all pertinent imaging results/findings within the past 24 hours.    Assessment/Plan:      * Acute hypoxic respiratory failure  Sec to pleural effusion.  Require 2L NC since admission.   Imaging consistent with large right sided pleural effusion.   S/p thoracentesis on 5/23 with 1,5L fluid removed, no pleural fluid studies sent.   Remained symptomatic so CT chest obtained on 5/24 and consistent with large effusion.  Pulmonology consulted; s/p chest tube placement (5/29), s/p intra-pleural lytics started on 5/30 (total 6 doses).  Volume removal with HD, continue broad spectrum antibiotics as below.   Monitor oxygen status.   Home O2 eval prior to discharge.      6/4Improved.  Plan for decortication of the right lung on Thursday 6/5 plan for OR tomorrow  6/10 chest tube in place still draining.    Parapneumonic effusion  Patient found to have large pleural effusion on imaging. I have personally reviewed and interpreted the following imaging: Xray. A thoracentesis was ordered. Most likely etiology includes Congestive Heart Failure, Pneumonia, and Renal Failure. Management to include Repeat Thoracentesis and Dialysis  S/p thoracentesis on  5/23- 1.5L fluid removed.  S/p chest tube placement (5/29), cytology negative for malignancy, fluid appears exudate in nature, cultures NGTD.   S/p 6 doses of Intra-pleural lytics BID started per Pulm (5/30-6/2).   CXR mostly stable, possibly slight increase in fluid noted on CXR from 6/3.   Continue IV Zosyn (timing and transition per Pulm), discontinued vancomycin given negative MRSA PCR.   Pulm follows.    6/4 plan for decortication on Thursday 6/5 plan for decortication tomorrow  6/10 continuing antibiotics.  Chest tube in place    GI bleed  Patient has acute blood loss due to hemorrhage, the hemorrhage is due to gastrointestinal bleed, patient does have a propensity for bleeding due to a platelet defect/deficiency and a medication, the medication is heparin.. Will trend hemoglobin/hematocrit Every 6 hours, as well as monitor and correct for any coagulation defects. CBC and vital signs have been reviewed and last CBC was noted-   Lab Results   Component Value Date    WBC 10.90 06/09/2024    HGB 7.4 (L) 06/10/2024    HCT 24.9 (L) 06/09/2024    MCV 85.6 06/09/2024     (L) 06/09/2024         Will order a type and screen and consent patient for blood transfusion. Will transfuse if Hgb is <7g/dl (<8g/dl in cases of active ACS) or if patient has rapid bleeding leading to hemodynamic instability.    Troponin level elevated   Secondary to cardiac arrest      Coronary artery disease involving native coronary artery of native heart without angina pectoris  Patient with known CAD s/p  unclear , which is controlled Will continue Statin and monitor for S/Sx of angina/ACS. Continue to monitor on telemetry.     Atrial fibrillation with RVR  Patient with Persistent (7 days or more) atrial fibrillation which is controlled currently with  Rate controlled.   with no medication needed . Patient is currently in atrial fibrillation.[id number] Score: 4. HASBLED Score: 5. Anticoagulation not indicated due to active GI bleed  ".    Acute systolic CHF (congestive heart failure)  Patient is identified as having Diastolic (HFpEF) heart failure that is Acute on chronic. CHF is currently uncontrolled due to Pulmonary edema/pleural effusion on CXR. Latest ECHO performed and demonstrates- Results for orders placed during the hospital encounter of 05/23/24    Echo    Interpretation Summary    Left Ventricle: The left ventricle is normal in size. Increased wall thickness. There is concentric remodeling. There is normal systolic function. Biplane (2D) method of discs ejection fraction is 55%. Grade II diastolic dysfunction.    Right Ventricle: Mild right ventricular enlargement. Systolic function is mildly reduced.    Left Atrium: Left atrium is mildly dilated.    Right Atrium: Right atrium is mildly dilated.    Aortic Valve: The aortic valve is a trileaflet valve. Mildly calcified cusps. There is mild stenosis. Aortic valve area by VTI is 1.59 cm². Aortic valve peak velocity is 1.48 m/s. Mean gradient is 4 mmHg. The dimensionless index is 0.60.    Mitral Valve: There is bileaflet sclerosis. Mildly thickened leaflets. There is mild regurgitation.    Tricuspid Valve: There is severe regurgitation with an eccentrically directed jet.    Pulmonary Artery: Pulmonary artery pressure could not be accurately determined due to severe TR.    IVC/SVC: Elevated venous pressure at 15 mmHg.    Pericardium: There is a trivial effusion. No indication of cardiac tamponade. Left pleural effusion.  . Continue Nitrate/Vasodilator and monitor clinical status closely. Monitor on telemetry. Patient is off CHF pathway.  Monitor strict Is&Os and daily weights.  Place on fluid restriction of 1.5 L. Cardiology has not been consulted. Continue to stress to patient importance of self efficacy and  on diet for CHF. Last BNP reviewed- and noted below No results for input(s): "BNP", "BNPTRIAGEBLO" in the last 168 hours.    Cardiac arrest   PEA arrest during induction for " "his decortication.  Likely medication effect from anesthesia.    Appears to not have any neurologic sequela from the event.      Thrombocytopenia  Patient was found to have thrombocytopenia, the likely etiology is secondary to sepsis/infection, will monitor the platelets Daily. Will transfuse if platelet count is <10k. Hold DVT prophylaxis if platelets are <50k. The patient's platelet results have been reviewed and are listed below.  No results for input(s): "PLT" in the last 24 hours.        Acute metabolic encephalopathy  Discontinued trazodone.  Management as above.  Requiring restraints to avoid treatment interruption.   Improving.    6/4  More awake today  6/5 improving  6/10 appears to be at baseline today    Oropharyngeal dysphagia  SLP consulted, failed swallow eval.  Now NPO, NG inserted for feed and meds.    NG removed per patient on accident on 6/3.  SLP to follow today as patient is more alert now.      Has reportedly become more weak and eaten less over the duration of his hospitalization.  We will place NG tube if necessary.  6/5 NG tube today      Pleural effusion  As above.    Decortication Thursday    Patient found to have large pleural effusion on imaging. I have personally reviewed and interpreted the following imaging: Xray. A thoracentesis was ordered. Most likely etiology includes Pneumonia. Management to include Repeat Thoracentesis, Pleural Catheter, and Dialysis   6/10 chest tube in place    Debility  Patient with Acute on chronic debility due to age-related physical debility. Latest AMPAC and GEMS scores have not been reviewed. Evaluation for etiology is complete.   Plan includes PT/OT consulted.  Home health on discharge.      PTOT    Anemia of renal disease  Patient's anemia is currently uncontrolled. Has not received any PRBCs to date. Etiology likely d/t chronic disease due to ESRD  Current CBC reviewed-   Lab Results   Component Value Date    HGB 7.4 (L) 06/10/2024    HCT 24.9 (L) " 06/09/2024     Monitor serial CBC and transfuse if patient becomes hemodynamically unstable, symptomatic or H/H drops below 7/21.   No evidence of bleeding   6/5 no evidence of bleeding   6/10 Large clots passed and stool this morning.  Consult GI.  IV ppi.  Trend hemoglobin    Acute on chronic heart failure with preserved ejection fraction  Patient is identified as having Diastolic (HFpEF) heart failure that is Acute on chronic. CHF is currently uncontrolled due to Pulmonary edema/pleural effusion on CXR. Latest ECHO performed and demonstrates- Results for orders placed during the hospital encounter of 08/01/23    Echo    Interpretation Summary    Left Ventricle: The left ventricle is normal in size. Increased wall thickness. There is concentric remodeling. Normal wall motion. There is normal systolic function with a visually estimated ejection fraction of 55 - 60%. Grade II diastolic dysfunction.    Left Atrium: Left atrium is mildly dilated. There is a calcified 1.5 x 2 cm full wall thickness mass noted extending from the myocardium into the pericardial space, unclear etiology, recommend cardiac MRI.    Right Ventricle: Normal right ventricular cavity size. Systolic function is normal.    Aortic Valve: The aortic valve is a trileaflet valve. There is physiologically normal aortic regurgitation.    Mitral Valve: There is no stenosis. The mean pressure gradient across the mitral valve is 3 mmHg at a heart rate of  bpm. There is mild regurgitation with a centrally directed jet.    Tricuspid Valve: There is mild to moderate regurgitation with a centrally directed jet.    Pulmonic Valve: There is no significant stenosis.    Pericardium: Small circumferential pericardial effusion present. No indication of cardiac tamponade.  . Continue Nitrate/Vasodilator and monitor clinical status closely. Monitor on telemetry. Patient is off CHF pathway.  Monitor strict Is&Os and daily weights.  Place on fluid restriction of 1.5 L.  "Cardiology has not been consulted. Continue to stress to patient importance of self efficacy and  on diet for CHF. Last BNP reviewed- and noted below No results for input(s): "BNP", "BNPTRIAGEBLO" in the last 168 hours.   Volume removal per HD.     6/4 Makes no urine.  Continue dialysis  6/5 continue dialysis    Elevated troponin   Likely secondary to cardiac arrest      Malnutrition of moderate degree  Nutrition consulted. Most recent weight and BMI monitored-     Measurements:  Wt Readings from Last 1 Encounters:   06/08/24 57.4 kg (126 lb 8.7 oz)   Body mass index is 21.06 kg/m².    Patient has been screened and assessed by RD.    Malnutrition Type:  Context:    Level:      Malnutrition Characteristic Summary:       Interventions/Recommendations (treatment strategy):  Recommend to add Gallo to aid in wound healing, ONS to provide additional calories/protein to heal/HD   We will place NG tube if necessary.  6/5 NG tube today  6/10 NG tube out.  Encourage orals    DM2 (diabetes mellitus, type 2)  HBA1c 4.5  No need for SSI or POC glucose check.     Secondary hyperparathyroidism of renal origin  Resume home Renvela.    Nephrology following  Appreciate nephrology    Essential (primary) hypertension  Chronic, controlled. Latest blood pressure and vitals reviewed-     Temp:  [97.2 °F (36.2 °C)-98.1 °F (36.7 °C)]   Pulse:  [60-76]   Resp:  [11-18]   BP: ()/()   SpO2:  [94 %-100 %] .   Home meds for hypertension were reviewed and noted below.   Hypertension Medications               amLODIPine (NORVASC) 5 MG tablet Take 5 mg by mouth once daily.    hydrALAZINE (APRESOLINE) 100 MG tablet Take 1 tablet by mouth 3 (three) times daily with meals.    nebivoloL (BYSTOLIC) 2.5 MG Tab Take 2.5 mg by mouth once daily.            While in the hospital, will manage blood pressure as follows; hold meds given hypotension. BP has been fluctuating, will likely add back beta blocker if BP gets on the higher side. "     Will utilize p.r.n. blood pressure medication only if patient's blood pressure greater than 180/110 and he develops symptoms such as worsening chest pain or shortness of breath.   BP reasonable  6/5 BP okay  Stable    End stage renal disease  Creatine stable for now. BMP reviewed- noted Estimated Creatinine Clearance: 7.4 mL/min (A) (based on SCr of 5.57 mg/dL (H)). according to latest data. 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    Nephrology consulted to resume HD sessions.   Continue dialysis   6/5Electrolytes okay  6/10 dialysis today    Dependence on renal dialysis   ESRD      Acute blood loss anemia  Patient's anemia is currently uncontrolled. Has received One units of PRBCs on 6/8 . Etiology likely d/t acute blood loss which was from GI bleed  Current CBC reviewed-   Lab Results   Component Value Date    HGB 7.4 (L) 06/10/2024    HCT 24.9 (L) 06/09/2024     Monitor serial CBC and transfuse if patient becomes hemodynamically unstable, symptomatic or H/H drops below 7/21.      VTE Risk Mitigation (From admission, onward)           Ordered     IP VTE HIGH RISK PATIENT  Once         06/06/24 1154     Place sequential compression device  Until discontinued         06/06/24 1154                    Discharge Planning   BRIAN:      Code Status: Full Code   Is the patient medically ready for discharge?:     Reason for patient still in hospital (select all that apply): Treatment  Discharge Plan A: Home, Home Health         Labs, ICU notes reviewed.  Ordered hemoglobin this a.m..  Down to 7.4.  Discussed case with Dr. Rodney.  Had large clots in a bowel movement this morning.  We will consult GI, start IV ppi, trend hemoglobin q.6 hours.  Hemodynamically stable          Cristian Ac DO  Department of Hospital Medicine   [facility]

## 2024-06-10 NOTE — ASSESSMENT & PLAN NOTE
Patient with Persistent (7 days or more) atrial fibrillation which is controlled currently with  Rate controlled.   with no medication needed . Patient is currently in atrial fibrillation.[id number] Score: 4. HASBLED Score: 5. Anticoagulation not indicated due to active GI bleed .

## 2024-06-10 NOTE — PHYSICIAN QUERY
Please clarify the conflicting documentation.        Provider, please hit the Enter button after selecting your response.    Acute Systolic Heart Failure (HFrEF or HFmrEF)      EF 15% postcardiac arrest

## 2024-06-10 NOTE — ASSESSMENT & PLAN NOTE
Patient's anemia is currently uncontrolled. Has received One units of PRBCs on 6/8 . Etiology likely d/t acute blood loss which was from GI bleed  Current CBC reviewed-   Lab Results   Component Value Date    HGB 7.4 (L) 06/10/2024    HCT 24.9 (L) 06/09/2024     Monitor serial CBC and transfuse if patient becomes hemodynamically unstable, symptomatic or H/H drops below 7/21.

## 2024-06-10 NOTE — DIALYSIS ROUNDING
"The patient is very frail.          Nephrology Department            NAME: Joseph Mcdonald   YOB: 1935  MRN: [id number]  NOTE DATE: 06/10/2024       Seen in Dialysis Unit. He is tolerating the procedure.    Patient complains:  None.      REVIEW OF SYSTEMS:  he reports no shortness of breath, nausea or vomiting. No acute changes.    VITALS:  height is 5' 5" (1.651 m) and weight is 57.4 kg (126 lb 8.7 oz). His oral temperature is 97.4 °F (36.3 °C). His blood pressure is 111/52 (abnormal) and his pulse is 74. His respiration is 16 and oxygen saturation is 100%.  Hemodialysis documentation flowsheet reviewed with dialysis nurse, including weight and vitals.    Resting comfortably, NAD.  Respiration unlabored. Lungs clear.  Chest tube in place  Heart regular, no rub.  Abdomen soft, nontender, positive bowl sounds  No edema.    Recent Labs   Lab 06/06/24  1212 06/07/24  0401 06/08/24  0325 06/09/24  0259 06/10/24  0352     142   < > 134* 133* 134*   K 3.5  3.5   < > 4.4 4.5 4.4   *  114*   < > 100 98 99   CO2 20*  20*   < > 28 25 25   BUN 44*  44*   < > 35* 52* 65*   CREATININE 4.41*  4.41*   < > 3.66* 4.56* 5.57*   CALCIUM 8.5  8.5   < > 8.3* 8.0* 8.0*   PHOS 3.9  --   --   --   --     < > = values in this interval not displayed.     Recent Labs   Lab 06/07/24  0401 06/08/24  0325 06/09/24  0259 06/10/24  0757   WBC 13.60* 15.21* 10.90  --    HGB 7.4* 6.9* 7.9* 7.4*   HCT 23.9* 22.3* 24.9*  --    * 157 145*  --        ASSESSMENT/PLAN:  Continue with scheduled hemodialysis for this patient.    Rickey Rodriguez Jr, MD  "

## 2024-06-10 NOTE — ASSESSMENT & PLAN NOTE
PEA arrest during induction for his decortication.  Likely medication effect from anesthesia.    Appears to not have any neurologic sequela from the event.

## 2024-06-10 NOTE — PT/OT/SLP PROGRESS
Physical Therapy      Patient Name:  Joseph Mcdonald   MRN:  [id number]    Patient not seen today secondary to Dialysis in AM; PM - pt returned to room from dialysis approx 1530, RN reporting pt to receive blood and not rousing. Will follow-up next treatment date.

## 2024-06-10 NOTE — ASSESSMENT & PLAN NOTE
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 CHF is currently uncontrolled due to Pulmonary edema/pleural effusion on CXR. Latest ECHO performed and demonstrates- Results for orders placed during the hospital encounter of 05/23/24    Echo    Interpretation Summary    Left Ventricle: The left ventricle is normal in size. Increased wall thickness. There is concentric remodeling. There is normal systolic function. Biplane (2D) method of discs ejection fraction is 55%. Grade II diastolic dysfunction.    Right Ventricle: Mild right ventricular enlargement. Systolic function is mildly reduced.    Left Atrium: Left atrium is mildly dilated.    Right Atrium: Right atrium is mildly dilated.    Aortic Valve: The aortic valve is a trileaflet valve. Mildly calcified cusps. There is mild stenosis. Aortic valve area by VTI is 1.59 cm². Aortic valve peak velocity is 1.48 m/s. Mean gradient is 4 mmHg. The dimensionless index is 0.60.    Mitral Valve: There is bileaflet sclerosis. Mildly thickened leaflets. There is mild regurgitation.    Tricuspid Valve: There is severe regurgitation with an eccentrically directed jet.    Pulmonary Artery: Pulmonary artery pressure could not be accurately determined due to severe TR.    IVC/SVC: Elevated venous pressure at 15 mmHg.    Pericardium: There is a trivial effusion. No indication of cardiac tamponade. Left pleural effusion.  . Continue Nitrate/Vasodilator and monitor clinical status closely. Monitor on telemetry. Patient is off CHF pathway.  Monitor strict Is&Os and daily weights.  Place on fluid restriction of 1.5 L. Cardiology has not been consulted. Continue to stress to patient importance of self efficacy and  on diet for CHF. Last BNP reviewed- and noted below No results for input(s): "BNP", "BNPTRIAGEBLO" in the last 168 hours.  "

## 2024-06-10 NOTE — PT/OT/SLP PROGRESS
Occupational Therapy      Patient Name:  Joseph Mcdonald   MRN:  [id number]    Patient not seen today secondary to Dialysis, Therapist assessment (pt gone for dialysis this AM; pt with hemoglobin 6.7 and awaiting blood transfusion this PM). Will follow-up 6/11/24.    6/10/2024

## 2024-06-10 NOTE — NURSING
Patient arrived to unit @ 20:30.  Patient is disoriented and is aaox1 to person only.  Reoriented patient and he did not reply.  Patient is on oxygen @ 3L via nasal cannula and his SpO2 is 100% and he is hooked up to continuous pulse ox.  Lung sounds are diminished and respirations are even and non-labored.  Patient has a dressing to the right lower side of his chest where his chest tube is placed and the dressing is clean, dry and intact and he has 1600mL of sanguineous drainage in the collection chamber.  I marked it with a permanent marker.  Chest tube is connected to suction @ -20 and there is no air leak.  Patient has a sacral wound and a wound on scrotum and DTIs on both heels and a skin tear on his left forearm (see previously uploaded photos).  Sacral dressing in place and heel dressing in place and foam boots are on.

## 2024-06-10 NOTE — ASSESSMENT & PLAN NOTE
Chronic, controlled. Latest blood pressure and vitals reviewed-     Temp:  [97.2 °F (36.2 °C)-98.1 °F (36.7 °C)]   Pulse:  [60-76]   Resp:  [11-18]   BP: ()/()   SpO2:  [94 %-100 %] .   Home meds for hypertension were reviewed and noted below.   Hypertension Medications               amLODIPine (NORVASC) 5 MG tablet Take 5 mg by mouth once daily.    hydrALAZINE (APRESOLINE) 100 MG tablet Take 1 tablet by mouth 3 (three) times daily with meals.    nebivoloL (BYSTOLIC) 2.5 MG Tab Take 2.5 mg by mouth once daily.            While in the hospital, will manage blood pressure as follows; hold meds given hypotension. BP has been fluctuating, will likely add back beta blocker if BP gets on the higher side.     Will utilize p.r.n. blood pressure medication only if patient's blood pressure greater than 180/110 and he develops symptoms such as worsening chest pain or shortness of breath.   BP reasonable  6/5 BP okay  Stable

## 2024-06-10 NOTE — ASSESSMENT & PLAN NOTE
Patient found to have large pleural effusion on imaging. I have personally reviewed and interpreted the following imaging: Xray. A thoracentesis was ordered. Most likely etiology includes Congestive Heart Failure, Pneumonia, and Renal Failure. Management to include Repeat Thoracentesis and Dialysis  S/p thoracentesis on 5/23- 1.5L fluid removed.  S/p chest tube placement (5/29), cytology negative for malignancy, fluid appears exudate in nature, cultures NGTD.   S/p 6 doses of Intra-pleural lytics BID started per Pulm (5/30-6/2).   CXR mostly stable, possibly slight increase in fluid noted on CXR from 6/3.   Continue IV Zosyn (timing and transition per Pulm), discontinued vancomycin given negative MRSA PCR.   Pulm follows.    6/4 plan for decortication on Thursday 6/5 plan for decortication tomorrow  6/10 continuing antibiotics.  Chest tube in place

## 2024-06-10 NOTE — PHYSICIAN QUERY
"Provider, due to conflicting documentation, Please clarify the nutritional diagnosis associated with the clinical findings (include all that apply):    Provider, please hit the "Enter" button after selecting your response.  Malnutrition Ruled Out        "

## 2024-06-10 NOTE — DIALYSIS ROUNDING
Mr. Mcdonald is seen during hishemodialysis.  Blood pressure remains low and we are unable to remove fluid today but he is tolerating dialysis otherwise.  He is awake and answering questions.  We will continue with his scheduled dialysis treatments.     He remains quite ill

## 2024-06-10 NOTE — ASSESSMENT & PLAN NOTE
Nutrition consulted. Most recent weight and BMI monitored-     Measurements:  Wt Readings from Last 1 Encounters:   06/08/24 57.4 kg (126 lb 8.7 oz)   Body mass index is 21.06 kg/m².    Patient has been screened and assessed by RD.    Malnutrition Type:  Context:    Level:      Malnutrition Characteristic Summary:       Interventions/Recommendations (treatment strategy):  Recommend to add Gallo to aid in wound healing, ONS to provide additional calories/protein to heal/HD   We will place NG tube if necessary.  6/5 NG tube today  6/10 NG tube out.  Encourage orals

## 2024-06-10 NOTE — ASSESSMENT & PLAN NOTE
Patient's anemia is currently uncontrolled. Has not received any PRBCs to date. 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   No evidence of bleeding   6/5 no evidence of bleeding   6/10 Large clots passed and stool this morning.  Consult GI.  IV ppi.  Trend hemoglobin

## 2024-06-10 NOTE — ASSESSMENT & PLAN NOTE
Patient has acute blood loss due to hemorrhage, the hemorrhage is due to gastrointestinal bleed, patient does have a propensity for bleeding due to a platelet defect/deficiency and a medication, the medication is heparin.. Will trend hemoglobin/hematocrit Every 6 hours, as well as monitor and correct for any coagulation defects. CBC and vital signs have been reviewed and last CBC was noted-   Lab Results   Component Value Date    WBC 10.90 06/09/2024    HGB 7.4 (L) 06/10/2024    HCT 24.9 (L) 06/09/2024    MCV 85.6 06/09/2024     (L) 06/09/2024         Will order a type and screen and consent patient for blood transfusion. Will transfuse if Hgb is <7g/dl (<8g/dl in cases of active ACS) or if patient has rapid bleeding leading to hemodynamic instability.

## 2024-06-10 NOTE — SUBJECTIVE & OBJECTIVE
Interval History:   appears to have developed GI bleed    Review of Systems   HENT:  Positive for voice change.    Respiratory:  Positive for shortness of breath.      Objective:     Vital Signs (Most Recent):  Temp: 97.4 °F (36.3 °C) (06/10/24 0822)  Pulse: 74 (06/10/24 0822)  Resp: 16 (06/10/24 0822)  BP: (!) 111/52 (06/10/24 0822)  SpO2: 100 % (06/10/24 0822) Vital Signs (24h Range):  Temp:  [97.2 °F (36.2 °C)-98.1 °F (36.7 °C)] 97.4 °F (36.3 °C)  Pulse:  [60-76] 74  Resp:  [11-18] 16  SpO2:  [94 %-100 %] 100 %  BP: ()/() 111/52     Weight: 57.4 kg (126 lb 8.7 oz)  Body mass index is 21.06 kg/m².    Intake/Output Summary (Last 24 hours) at 6/10/2024 0957  Last data filed at 6/10/2024 0517  Gross per 24 hour   Intake 98.93 ml   Output 1210 ml   Net -1111.07 ml         Physical Exam  Vitals and nursing note reviewed.   Constitutional:       Appearance: He is ill-appearing.   HENT:      Head: Normocephalic and atraumatic.      Nose: Nose normal.      Mouth/Throat:      Mouth: Mucous membranes are moist.   Eyes:      Extraocular Movements: Extraocular movements intact.   Cardiovascular:      Rate and Rhythm: Normal rate and regular rhythm.      Pulses: Normal pulses.      Heart sounds: Normal heart sounds.   Pulmonary:      Effort: Pulmonary effort is normal.      Breath sounds: Examination of the right-middle field reveals decreased breath sounds. Examination of the right-lower field reveals decreased breath sounds. Decreased breath sounds present.      Comments: Chest tube in place.   Abdominal:      General: Bowel sounds are normal.      Palpations: Abdomen is soft.      Comments: NG in place.    Musculoskeletal:      Cervical back: Normal range of motion.   Skin:     General: Skin is warm.   Neurological:      General: No focal deficit present.      Mental Status: He is alert. Mental status is at baseline.   Psychiatric:         Mood and Affect: Mood normal.         Behavior: Behavior normal.              Significant Labs: All pertinent labs within the past 24 hours have been reviewed.    Significant Imaging: I have reviewed all pertinent imaging results/findings within the past 24 hours.

## 2024-06-10 NOTE — CONSULTS
[facility]  Wound Care Consult    Patient Name:  Joseph Mcdonald   MRN:  [id number]  Date: 6/10/2024  Diagnosis: Acute hypoxic respiratory failure    History:     Past Medical History:   Diagnosis Date    Chronic kidney disease (CKD)     Diabetes mellitus     Hypertension     PVD (peripheral vascular disease)        Social History     Socioeconomic History    Marital status:    Tobacco Use    Smoking status: Former     Types: Cigarettes    Smokeless tobacco: Never   Substance and Sexual Activity    Alcohol use: Not Currently    Drug use: Never     Social Determinants of Health     Financial Resource Strain: Low Risk  (8/2/2023)    Overall Financial Resource Strain (CARDIA)     Difficulty of Paying Living Expenses: Not hard at all   Food Insecurity: No Food Insecurity (5/24/2024)    Hunger Vital Sign     Worried About Running Out of Food in the Last Year: Never true     Ran Out of Food in the Last Year: Never true   Transportation Needs: No Transportation Needs (5/24/2024)    TRANSPORTATION NEEDS     Transportation : No   Physical Activity: Sufficiently Active (5/24/2024)    Exercise Vital Sign     Days of Exercise per Week: 5 days     Minutes of Exercise per Session: 30 min   Stress: No Stress Concern Present (5/24/2024)    [employer] - Occupational Stress Questionnaire     Feeling of Stress : Not at all   Housing Stability: Low Risk  (5/24/2024)    Housing Stability Vital Sign     Unable to Pay for Housing in the Last Year: No     Homeless in the Last Year: No       Precautions:     Allergies as of 05/23/2024 - Reviewed 05/23/2024   Allergen Reaction Noted    Azithromycin Other (See Comments) 05/23/2024       WOC Assessment Details/Treatment        06/10/24 1629   WOCN Assessment   WOCN Total Time (mins) 90   Visit Date 06/10/24   Visit Time 1545   Consult Type New   WOCN Speciality Wound   Wound pressure;deep tissue injury;skin tear;moisture   Number of  Wounds 7   Continence Type Fecal;Urinary   Intervention chart review;assessed;changed;applied;orders;team conference   Skin Interventions   Device Skin Pressure Protection absorbent pad utilized/changed;adhesive use limited;positioning supports utilized;tubing/devices free from skin contact   Pressure Reduction Devices alternating pressure pump (JUSTIN);foam padding utilized;heel offloading device utilized;positioning supports utilized;pressure-redistributing mattress utilized   Pressure Reduction Techniques positioned off wounds;heels elevated off bed   Skin Protection incontinence pads utilized;silicone foam dressing in place   Pressure Injury Prevention    Check Moisture Management Pad Done   Sacral Foam Dressing Replace   Heel protection technique Heel boot   Check Medical Devices Done        Wound 05/25/24 0031 Skin Tear Penis #1   Date First Assessed/Time First Assessed: 05/25/24 0031   Primary Wound Type: Skin Tear  Location: Penis  Wound Number: #1  Is this injury device related?: No   Wound Image     Dressing Appearance Open to air   Drainage Amount None   Appearance Pink   Tissue loss description Partial thickness        Wound 06/08/24 1625 Pressure Injury Sacral spine   Date First Assessed/Time First Assessed: 06/08/24 1625   Primary Wound Type: Pressure Injury  Location: Sacral spine   Wound Image    Pressure Injury Stage Unstageable   Dressing Appearance Intact   Drainage Amount Small   Drainage Characteristics/Odor Serous;No odor   Appearance Pink;Red;Black;Tan;Necrotic;Eschar   Tissue loss description Full thickness   Black (%), Wound Tissue Color 80 %   Wound Length (cm) 13 cm   Wound Width (cm) 10.5 cm   Wound Depth (cm) 0.1 cm   Wound Volume (cm^3) 13.65 cm^3   Wound Surface Area (cm^2) 136.5 cm^2   Care Cleansed with:;Other (see comments)  (Foam)   Dressing Silicone;Island/border;Foam   Dressing Change Due 06/11/24        Wound 06/08/24 1705 Incontinence associated dermatitis Scrotum   Date First  "Assessed/Time First Assessed: 06/08/24 1705   Primary Wound Type: Incontinence associated dermatitis  Location: Scrotum   Wound Image     Dressing Appearance Open to air   Drainage Amount Small   Drainage Characteristics/Odor Serous   Appearance Pink;Red;Maroon   Tissue loss description Full thickness   Periwound Area Intact;Denuded;Edematous   Wound Edges Irregular;Undefined   Care Cleansed with:;Other (see comments)  (Foam/cleansing wipes)        Wound 06/08/24 1821 Pressure Injury Right Heel   Date First Assessed/Time First Assessed: 06/08/24 1821   Primary Wound Type: Pressure Injury  Side: Right  Location: Heel   Wound Image    Pressure Injury Stage Unstageable   Dressing Appearance Intact;Dry   Drainage Amount None   Appearance Maroon;Black;Necrotic;Eschar   Black (%), Wound Tissue Color 50 %   Periwound Area Intact;Dry   Wound Edges Irregular;Undefined   Dressing Removed   Off Loading Other (see comments)  (Heel boots)        Wound 06/08/24 1822 Pressure Injury Left Heel   Date First Assessed/Time First Assessed: 06/08/24 1822   Primary Wound Type: Pressure Injury  Side: Left  Location: Heel   Wound Image    Pressure Injury Stage Unstageable   Dressing Appearance Intact;Dry   Drainage Amount None   Appearance Maroon;Purple;Black;Eschar   Black (%), Wound Tissue Color 50 %   Periwound Area Intact;Dry   Wound Edges Irregular;Undefined   Dressing Removed     CWOCN consulted for "Skin tears, wounds; scrotum/penis, sacrum"    Narrative: Pt nods when asked questions, does not verbalize in response.  Tolerates turns and wound care, assessment well.      Active Wounds and Recommendations -      Unstageable Pressure Injury, R & L heels, sacrum      Heels - offload, betadine, OPEN TO AIR      Sacrum - Collagenase    Goals for Wound Healing: Promote moist wound healing, Manage drainage, Apply antimicrobial, Removal of slough/eschar, Reduce pain, Reduce bioburden, and Educate on proper wound management post D/C "     Barriers to Wound Healing: multiple co-morbidities poor vascular supply immunosuppression diabetes edema decreased granulation tissue necrosis large surface area large volume advanced age fragile skin no protective sensation infection GI Bleed    Orders placed.    Thank you for the consult.     Wound Care will continue to follow. See additional note under Notes Tab for tentative f/u plan/dates.    06/10/2024

## 2024-06-10 NOTE — ASSESSMENT & PLAN NOTE
As above.    Decortication Thursday    Patient found to have large pleural effusion on imaging. I have personally reviewed and interpreted the following imaging: Xray. A thoracentesis was ordered. Most likely etiology includes Pneumonia. Management to include Repeat Thoracentesis, Pleural Catheter, and Dialysis   6/10 chest tube in place

## 2024-06-10 NOTE — ASSESSMENT & PLAN NOTE
"Patient was found to have thrombocytopenia, the likely etiology is secondary to sepsis/infection, will monitor the platelets Daily. Will transfuse if platelet count is <10k. Hold DVT prophylaxis if platelets are <50k. The patient's platelet results have been reviewed and are listed below.  No results for input(s): "PLT" in the last 24 hours.      "

## 2024-06-10 NOTE — ASSESSMENT & PLAN NOTE
Discontinued trazodone.  Management as above.  Requiring restraints to avoid treatment interruption.   Improving.    6/4  More awake today  6/5 improving  6/10 appears to be at baseline today

## 2024-06-10 NOTE — PLAN OF CARE
Problem: Adult Inpatient Plan of Care  Goal: Plan of Care Review  6/10/2024 0438 by Emily Mcdonald RN  Outcome: Progressing  6/10/2024 0438 by Emily Mcdonald RN  Outcome: Progressing  Goal: Patient-Specific Goal (Individualized)  6/10/2024 0438 by Emily Mcdonald RN  Outcome: Progressing  6/10/2024 0438 by Emily Mcdonald RN  Outcome: Progressing  Goal: Absence of Hospital-Acquired Illness or Injury  6/10/2024 0438 by Emily Mcdonald RN  Outcome: Progressing  6/10/2024 0438 by Emily Mcdonald RN  Outcome: Progressing  Goal: Optimal Comfort and Wellbeing  6/10/2024 0438 by Emily Mcdonald RN  Outcome: Progressing  6/10/2024 0438 by Emily Mcdonald RN  Outcome: Progressing  Goal: Readiness for Transition of Care  6/10/2024 0438 by Emily Mcdonald RN  Outcome: Progressing  6/10/2024 0438 by Emily Mcdonald RN  Outcome: Progressing     Problem: Diabetes Comorbidity  Goal: Blood Glucose Level Within Targeted Range  6/10/2024 0438 by Emily Mcdonald RN  Outcome: Progressing  6/10/2024 0438 by Emily Mcdonald RN  Outcome: Progressing     Problem: Sepsis/Septic Shock  Goal: Optimal Coping  6/10/2024 0438 by Emily Mcdonald RN  Outcome: Progressing  6/10/2024 0438 by Emily Mcdonald RN  Outcome: Progressing  Goal: Absence of Bleeding  6/10/2024 0438 by Emily Mcdonald RN  Outcome: Progressing  6/10/2024 0438 by Emily Mcdonald RN  Outcome: Progressing  Goal: Blood Glucose Level Within Targeted Range  6/10/2024 0438 by Emily Mcdonald RN  Outcome: Progressing  6/10/2024 0438 by Emily Mcdonald RN  Outcome: Progressing  Goal: Absence of Infection Signs and Symptoms  6/10/2024 0438 by Emily Mcdonald RN  Outcome: Progressing  6/10/2024 0438 by Emily Mcdonald RN  Outcome: Progressing  Goal: Optimal Nutrition Intake  6/10/2024 0438 by Emily Mcdonald RN  Outcome: Progressing  6/10/2024 0438 by Emily Mcdonald RN  Outcome: Progressing     Problem: Fall Injury Risk  Goal: Absence of Fall and Fall-Related  Injury  6/10/2024 0438 by Emily Mcdonald RN  Outcome: Progressing  6/10/2024 0438 by Emily Mcdonald RN  Outcome: Progressing     Problem: Wound  Goal: Optimal Coping  6/10/2024 0438 by Emily Mcdonald RN  Outcome: Progressing  6/10/2024 0438 by Emily Mcdonald RN  Outcome: Progressing  Goal: Optimal Functional Ability  6/10/2024 0438 by Emily Mcdonald RN  Outcome: Progressing  6/10/2024 0438 by Emily Mcdonald RN  Outcome: Progressing  Goal: Absence of Infection Signs and Symptoms  6/10/2024 0438 by Emily Mcdonald RN  Outcome: Progressing  6/10/2024 0438 by Emily Mcdonald RN  Outcome: Progressing  Goal: Improved Oral Intake  6/10/2024 0438 by Emily Mcdonald RN  Outcome: Progressing  6/10/2024 0438 by Emily Mcdonald RN  Outcome: Progressing  Goal: Optimal Pain Control and Function  6/10/2024 0438 by Emily Mcdonald RN  Outcome: Progressing  6/10/2024 0438 by Emily Mcdonald RN  Outcome: Progressing  Goal: Skin Health and Integrity  6/10/2024 0438 by Emily Mcdonald RN  Outcome: Progressing  6/10/2024 0438 by Emily Mcdonald RN  Outcome: Progressing  Goal: Optimal Wound Healing  6/10/2024 0438 by Emily Mcdonald RN  Outcome: Progressing  6/10/2024 0438 by Emily Mcdonald RN  Outcome: Progressing     Problem: Gas Exchange Impaired  Goal: Optimal Gas Exchange  6/10/2024 0438 by Emily Mcdonald RN  Outcome: Progressing  6/10/2024 0438 by Emily Mcdonald RN  Outcome: Progressing     Problem: Hemodialysis  Goal: Safe, Effective Therapy Delivery  6/10/2024 0438 by Emily Mcdonald RN  Outcome: Progressing  6/10/2024 0438 by Emily Mcdonald RN  Outcome: Progressing  Goal: Effective Tissue Perfusion  6/10/2024 0438 by Emily Mcdonald RN  Outcome: Progressing  6/10/2024 0438 by Emily Mcdonald RN  Outcome: Progressing  Goal: Absence of Infection Signs and Symptoms  6/10/2024 0438 by Emily Mcdonald RN  Outcome: Progressing  6/10/2024 0438 by Harry, Emily, RN  Outcome: Progressing     Problem:  Skin Injury Risk Increased  Goal: Skin Health and Integrity  6/10/2024 0438 by Emily Mcdonald RN  Outcome: Progressing  6/10/2024 0438 by Emily Mcdonald RN  Outcome: Progressing     Problem: Pneumonia  Goal: Fluid Balance  6/10/2024 0438 by Emily Mcdonald RN  Outcome: Progressing  6/10/2024 0438 by Emily Mcdonald RN  Outcome: Progressing  Goal: Resolution of Infection Signs and Symptoms  6/10/2024 0438 by Emily Mcdonald RN  Outcome: Progressing  6/10/2024 0438 by Emily Mcdonald RN  Outcome: Progressing  Goal: Effective Oxygenation and Ventilation  6/10/2024 0438 by Emily Mcdonald RN  Outcome: Progressing  6/10/2024 0438 by Emily Mcdonald RN  Outcome: Progressing     Problem: Infection  Goal: Absence of Infection Signs and Symptoms  6/10/2024 0438 by Emily Mcdonald RN  Outcome: Progressing  6/10/2024 0438 by Emily Mcdonald RN  Outcome: Progressing     Problem: Mechanical Ventilation Invasive  Goal: Effective Communication  6/10/2024 0438 by Emily Mcdonald RN  Outcome: Progressing  6/10/2024 0438 by Emily Mcdonald RN  Outcome: Progressing  Goal: Optimal Device Function  6/10/2024 0438 by Emily Mcdonald RN  Outcome: Progressing  6/10/2024 0438 by Emily Mcdonald RN  Outcome: Progressing  Goal: Mechanical Ventilation Liberation  6/10/2024 0438 by mEily Mcdonald RN  Outcome: Progressing  6/10/2024 0438 by Emily Mcdonald RN  Outcome: Progressing  Goal: Optimal Nutrition Delivery  6/10/2024 0438 by Emily Mcdonald RN  Outcome: Progressing  6/10/2024 0438 by Emily Mcdonald RN  Outcome: Progressing  Goal: Absence of Device-Related Skin and Tissue Injury  6/10/2024 0438 by Emily Mcdonald RN  Outcome: Progressing  6/10/2024 0438 by Emily Mcdonald RN  Outcome: Progressing  Goal: Absence of Ventilator-Induced Lung Injury  6/10/2024 0438 by Emily Mcdonald RN  Outcome: Progressing  6/10/2024 0438 by Emily Mcdonald RN  Outcome: Progressing

## 2024-06-10 NOTE — PROGRESS NOTES
06/10/24 1654   Wound Care Follow Up   Wound Care Follow-up? Yes   Wound Care- Next Visit Date 06/14/24   Follow Up Plan [facility] POC

## 2024-06-10 NOTE — PHYSICIAN QUERY
Please clarify if the NSTEMI diagnosis identified in the query has been:       Provider, please hit the Enter button after selecting your response.          Other clarification or diagnosis, please specify: Unclear at this time.

## 2024-06-10 NOTE — NURSING
Notified hospitalist of patient's bleeding and he told me to let Dr. Rodney know.  Notified Dr. Rodney and he put new orders in.  Called Lab and let Ines know that lab orders are STAT and need to be drawn now and she said ok we are coming.  Called X-ray and spoke to Hattie and she said they already came and got an x-ray this morning, it just has not be interpreted yet.

## 2024-06-11 PROBLEM — L89.150 DECUBITUS ULCER OF SACRAL REGION, UNSTAGEABLE: Status: ACTIVE | Noted: 2024-01-01

## 2024-06-11 NOTE — ASSESSMENT & PLAN NOTE
Patient's anemia is currently uncontrolled. Has received 1 units of PRBCs on 06/10/2024 . Etiology likely d/t acute blood loss which was from GI bleed  Current CBC reviewed-   Lab Results   Component Value Date    HGB 10.2 (L) 06/11/2024    HCT 32.6 (L) 06/11/2024     Monitor serial CBC and transfuse if patient becomes hemodynamically unstable, symptomatic or H/H drops below 7/21.

## 2024-06-11 NOTE — NURSING
"0055 Pt expressing needs to "go home". Pt is alert and oriented to person, place and situation. However, pt continues to speak of going home soon. Blood pressures remain low. Pt remains in [place] position. When moved out of [place] position patient's BP MAP goes down into the 50s. Concern expressed to Dr. Hennessy. Care ongoing.   "

## 2024-06-11 NOTE — CONSULTS
[facility]  General Surgery  Consult Note    Patient Name: Joseph Mcdonald  MRN: [id number]  Code Status: Full Code  Admission Date: 5/23/2024  Hospital Length of Stay: 19 days  Attending Physician: Cristian Ac DO  Primary Care Provider: [facility], [facility]    Patient information was obtained from past medical records and primary team.     Inpatient consult to General Surgery  Consult performed by: Darin Mcmullen FNP  Consult ordered by: Cristian Ac DO        Subjective:     Principal Problem: Acute hypoxic respiratory failure    History of Present Illness: History of of end-stage renal disease on dialysis, hypertension, iron deficiency anemia, secondary hyperparathyroidism, type 2 diabetes, CAD, and heart failure with preserved ejection fraction.  Admitted on 05/23/2024 after presenting with dyspnea following dialysis.  Initial chest x-ray showed a large right-sided pleural effusion. Underwent ultrasound-guided thoracentesis by Radiology with removal of 1.5 L of fluid.  Following thoracentesis, the patient appeared to have a residual right-sided pleural effusion with loculations. Pulmonology has subsequently performed installation of intrapleural fibrinolytics several times without improvement.  Repeat CT chest with contrast today shows moderate right size hydropneumothorax, similar to previous.  Pleural enhancement consistent with exudative effusion.  Atelectasis/consolidation.    General surgery consult for possible lung decortication.  The patient provides minimal history and no family was in the room at time of exam.  The patient has been fed breakfast already today, will plan for decortication on 06/06/2024.      06/11/2024: Consult for sacral wound. Pt is bed bound and incontinent of stool. 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 Has granulation tissue at borders, but mostly covered in eschar that is tightly adhered to wound bed. No drainage or malodor. The patient experienced  cardiopulmonary arrest during case setup for an attempted VATS. Clearly, high risk. In this case, risks outweigh benefits, as he would likely not survive sedation/intubation. Recommend duoderm, offloading, and ensuring he remains clean and dry.    No current facility-administered medications on file prior to encounter.     Current Outpatient Medications on File Prior to Encounter   Medication Sig    amLODIPine (NORVASC) 5 MG tablet Take 5 mg by mouth once daily.    atorvastatin (LIPITOR) 40 MG tablet Take 1 tablet (40 mg total) by mouth every evening.    hydrALAZINE (APRESOLINE) 100 MG tablet Take 1 tablet by mouth 3 (three) times daily with meals.    nebivoloL (BYSTOLIC) 2.5 MG Tab Take 2.5 mg by mouth once daily.    methoxy peg-epoetin beta (MIRCERA INJ) 50 mcg.    timolol maleate 0.5% (TIMOPTIC) 0.5 % Drop Place 1 drop into the right eye once daily.       Review of patient's allergies indicates:   Allergen Reactions    Azithromycin Other (See Comments)     Note: - [employer]reesia 01/11/2019       Past Medical History:   Diagnosis Date    Chronic kidney disease (CKD)     Diabetes mellitus     Hypertension     PVD (peripheral vascular disease)      Past Surgical History:   Procedure Laterality Date    LEFT HEART CATHETERIZATION N/A 8/3/2023    Procedure: Left heart cath;  Surgeon: Vazquez Aguilar MD;  Location: [facility] CATH LAB;  Service: Cardiology;  Laterality: N/A;    left toe amputation      PLACEMENT OF DIALYSIS ACCESS       Family History    None       Tobacco Use    Smoking status: Former     Types: Cigarettes    Smokeless tobacco: Never   Substance and Sexual Activity    Alcohol use: Not Currently    Drug use: Never    Sexual activity: Not on file     Review of Systems   Unable to perform ROS: Age     Objective:     Vital Signs (Most Recent):  Temp: 97.4 °F (36.3 °C) (06/11/24 1237)  Pulse: 80 (06/11/24 1237)  Resp: 16 (06/11/24 1237)  BP: 103/60 (06/11/24 1237)  SpO2: 99 % (06/11/24 1237) Vital Signs (24h  Range):  Temp:  [97.4 °F (36.3 °C)-98.1 °F (36.7 °C)] 97.4 °F (36.3 °C)  Pulse:  [74-90] 80  Resp:  [16-19] 16  SpO2:  [98 %-100 %] 99 %  BP: ()/(22-88) 103/60     Weight: 57.4 kg (126 lb 8.7 oz)  Body mass index is 21.06 kg/m².     Physical Exam  Vitals reviewed.   Cardiovascular:      Rate and Rhythm: Normal rate.   Pulmonary:      Effort: Pulmonary effort is normal. No respiratory distress.   Skin:     General: Skin is warm and dry.      Comments: Sacral wound: see media   Neurological:      Mental Status: He is alert. Mental status is at baseline.            I have reviewed all pertinent lab results within the past 24 hours.    Significant Diagnostics:  I have reviewed all pertinent imaging results/findings within the past 24 hours.    Assessment/Plan:     Pleural effusion  6/4:  Plan for lung decortication on 06/06/2024      VTE Risk Mitigation (From admission, onward)           Ordered     IP VTE HIGH RISK PATIENT  Once         06/06/24 1154     Place sequential compression device  Until discontinued         06/06/24 1154                    Thank you for your consult. I will sign off. Please contact us if you have any additional questions.    Darin Mcmullen, USHA  General Surgery  [facility]

## 2024-06-11 NOTE — PHYSICIAN QUERY
Question: Please clarify the integumentary diagnosis related to the documentation of Right heel.    Please hit the Enter button after selecting your response.    Provider Query Response:  Pressure Injury/Decubitus Ulcer, Unstageable

## 2024-06-11 NOTE — SUBJECTIVE & OBJECTIVE
Interval History:     No acute events overnight    Review of Systems   HENT:  Positive for voice change.    Respiratory:  Positive for shortness of breath.      Objective:     Vital Signs (Most Recent):  Temp: 97.4 °F (36.3 °C) (06/11/24 0831)  Pulse: 81 (06/11/24 0831)  Resp: 18 (06/11/24 0831)  BP: 98/63 (06/11/24 0831)  SpO2: 98 % (06/11/24 0831) Vital Signs (24h Range):  Temp:  [97.4 °F (36.3 °C)-98.1 °F (36.7 °C)] 97.4 °F (36.3 °C)  Pulse:  [68-91] 81  Resp:  [16-20] 18  SpO2:  [98 %-100 %] 98 %  BP: ()/(22-88) 98/63     Weight: 57.4 kg (126 lb 8.7 oz)  Body mass index is 21.06 kg/m².    Intake/Output Summary (Last 24 hours) at 6/11/2024 1032  Last data filed at 6/11/2024 0605  Gross per 24 hour   Intake 1480.27 ml   Output 810 ml   Net 670.27 ml         Physical Exam  Vitals and nursing note reviewed.   Constitutional:       Appearance: He is ill-appearing.   HENT:      Head: Normocephalic and atraumatic.      Nose: Nose normal.      Mouth/Throat:      Mouth: Mucous membranes are moist.   Eyes:      Extraocular Movements: Extraocular movements intact.   Cardiovascular:      Rate and Rhythm: Normal rate and regular rhythm.      Pulses: Normal pulses.      Heart sounds: Normal heart sounds.   Pulmonary:      Effort: Pulmonary effort is normal.      Breath sounds: Examination of the right-middle field reveals decreased breath sounds. Examination of the right-lower field reveals decreased breath sounds. Decreased breath sounds present.      Comments: Chest tube in place.   Abdominal:      General: Bowel sounds are normal.      Palpations: Abdomen is soft.      Comments: NG in place.    Musculoskeletal:      Cervical back: Normal range of motion.   Skin:     General: Skin is warm.   Neurological:      General: No focal deficit present.      Mental Status: He is alert. Mental status is at baseline.   Psychiatric:         Mood and Affect: Mood normal.         Behavior: Behavior normal.             Significant  Labs: All pertinent labs within the past 24 hours have been reviewed.    Significant Imaging: I have reviewed all pertinent imaging results/findings within the past 24 hours.

## 2024-06-11 NOTE — ASSESSMENT & PLAN NOTE
"Patient found to have large pleural effusion on imaging. I have personally reviewed and interpreted the following imaging: Xray. A thoracentesis was performed. Pleural fluid was sent for analysis. Labs reviewed, including Serum LDH   LDH   Date Value Ref Range Status   05/29/2024 279 (H) 87 - 241 U/L Final   , Pleural Fluid LDH   Lactate Dehydrogenase (LDH), Body Fluid   Date Value Ref Range Status   05/29/2024 270 U/L Final     Comment:     Reference ranges for this analyte have not been established for the body fluid specimen submitted for analysis.    , Serum Protein   Total Protein   Date Value Ref Range Status   06/11/2024 5.7 (L) 6.4 - 8.2 g/dL Final    , Pleural Protein No results found for: "PROTEINBODYF" , Cell Count and Differential No results found for: "BFCELLCNT" , and Fluid Cultures No results found for: "BODYFLUIDCUL" . Fluid most consistent with Exudate.  . Most likely etiology includes Pneumonia. Management to include Pleural Catheter    "

## 2024-06-11 NOTE — NURSING
2014 Pt bp 105/43 with map of 64.  Pt observed to be passing blood clots from rectum. Dr. Hennessy notified. Pt placed in trendelenburg position. Orders received to administered another unit of PRBC. Care ongoing.

## 2024-06-11 NOTE — ASSESSMENT & PLAN NOTE
Patient with known CAD s/p  unclear , which is controlled Will continue Statin and monitor for S/Sx of angina/ACS. Continue to monitor on telemetry.   6/11 no chest pain

## 2024-06-11 NOTE — ASSESSMENT & PLAN NOTE
Patient's anemia is currently uncontrolled. Has not received any PRBCs to date. Etiology likely d/t chronic disease due to ESRD  Current CBC reviewed-   Lab Results   Component Value Date    HGB 10.2 (L) 06/11/2024    HCT 32.6 (L) 06/11/2024     Monitor serial CBC and transfuse if patient becomes hemodynamically unstable, symptomatic or H/H drops below 7/21.   No evidence of bleeding   6/5 no evidence of bleeding   6/10 Large clots passed and stool this morning.  Consult GI.  IV ppi.  Trend hemoglobin  6/11 hemoglobin holding today

## 2024-06-11 NOTE — PROGRESS NOTES
Mr. Mcdonald is seen in follow-up [facility]  Nephrology  Progress Note    Patient Name: Joseph Mcdonald  MRN: [id number]  Admission Date: 5/23/2024  Hospital Length of Stay: 19 days  Attending Provider: Cristian Ac DO   Primary Care Physician: Prakash, [facility]  Principal Problem:Acute hypoxic respiratory failure    Consults  Subjective:     Interval History:  Mr. Mcdonald is seen in follow-up of his end-stage renal disease.  He is stable and without complaint today.  He remains quite weak he we talked some about his plans post discharge.  He is asked when he might be going home.  We told him he would need to get he chest tube out prior to going home.  He wishes to continue with his hemodialysis and seems to think he is stronger than he appears to be he thinks he can manage at home.          Review of patient's allergies indicates:   Allergen Reactions    Azithromycin Other (See Comments)     Note: - [employer]rees[employer] 01/11/2019     Current Facility-Administered Medications   Medication Frequency    0.9%  NaCl infusion (for blood administration) Q24H PRN    0.9%  NaCl infusion (for blood administration) Q24H PRN    0.9%  NaCl infusion (for blood administration) Q24H PRN    0.9%  NaCl infusion PRN    acetaminophen tablet 1,000 mg Q8H PRN    atorvastatin tablet 40 mg QHS    collagenase ointment Daily    dextrose 10% bolus 125 mL 125 mL PRN    dextrose 10% bolus 250 mL 250 mL PRN    glucagon (human recombinant) injection 1 mg PRN    glucose chewable tablet 16 g PRN    glucose chewable tablet 24 g PRN    hydrALAZINE tablet 100 mg Q8H    miconazole NITRATE 2 % top powder BID    naloxone 0.4 mg/mL injection 0.02 mg PRN    ondansetron disintegrating tablet 8 mg Q8H PRN    pantoprazole injection 40 mg BID    polyethylene glycol packet 17 g BID PRN    sevelamer carbonate tablet 2,400 mg TID WM    sodium chloride 0.9% 250 mL flush bag PRN    sodium chloride 0.9% flush 10 mL Q12H PRN        Objective:     Vital Signs (Most Recent):  Temp: 97.4 °F (36.3 °C) (06/11/24 1237)  Pulse: 80 (06/11/24 1237)  Resp: 16 (06/11/24 1237)  BP: 103/60 (06/11/24 1237)  SpO2: 99 % (06/11/24 1237) Vital Signs (24h Range):  Temp:  [97.4 °F (36.3 °C)-98.1 °F (36.7 °C)] 97.4 °F (36.3 °C)  Pulse:  [74-90] 80  Resp:  [16-19] 16  SpO2:  [98 %-100 %] 99 %  BP: ()/(22-88) 103/60     Weight: 57.4 kg (126 lb 8.7 oz) (06/08/24 0245)  Body mass index is 21.06 kg/m².  Body surface area is 1.62 meters squared.    I/O last 3 completed shifts:  In: 1480.3 [Blood:1218.3; IV Piggyback:262]  Out: 1170 [Chest Tube:1170]    Physical Exam no edema.  Chest clear.  Heart without rub or gallop.  Chest tube output reported 1 L    Significant Labs:sureBMP:   Recent Labs   Lab 06/06/24  1212 06/06/24  1322 06/11/24  0326   GLU 89  89  112*   < > 77     142   < > 138   K 3.5  3.5   < > 3.9   *  114*   < > 105   CO2 20*  20*   < > 29   BUN 44*  44*   < > 34*   CREATININE 4.41*  4.41*   < > 3.36*   CALCIUM 8.5  8.5   < > 7.9*   MG 2.0  --   --     < > = values in this interval not displayed.     CBC:   Recent Labs   Lab 06/09/24  0259 06/10/24  0757 06/11/24  0326 06/11/24  0615   WBC 10.90  --   --   --    RBC 2.91*  --   --   --    HGB 7.9*   < > 10.2* 10.2*   HCT 24.9*  --  32.6*  --    *  --   --   --    MCV 85.6  --   --   --    MCH 27.1  --   --   --    MCHC 31.7*  --   --   --     < > = values in this interval not displayed.       Significant Imaging:  Labs: Reviewed    Assessment/Plan:     Active Diagnoses:    Diagnosis Date Noted POA    PRINCIPAL PROBLEM:  Acute hypoxic respiratory failure [J96.01] 05/24/2024 Yes     Chronic    Decubitus ulcer of sacral region, unstageable [L89.150] 06/11/2024 Unknown    GI bleed [K92.2] 06/10/2024 No    Blood per rectum [K62.5] 06/10/2024 Unknown    Anemia due to GI blood loss [D50.0] 06/10/2024 Unknown    Loculated pleural effusion [J90] 06/10/2024 Unknown    Coronary  artery disease involving native coronary artery of native heart without angina pectoris [I25.10] 06/09/2024 Yes    Troponin level elevated [R79.89] 06/09/2024 No    Cardiac arrest [I46.9] 06/06/2024 Yes    Acute systolic CHF (congestive heart failure) [I50.21] 06/06/2024 Yes    Atrial fibrillation with RVR [I48.91] 06/06/2024 Yes    Thrombocytopenia [D69.6] 06/05/2024 Yes    Acute metabolic encephalopathy [G93.41] 06/02/2024 No    Pleural effusion [J90] 05/31/2024 Yes    Oropharyngeal dysphagia [R13.12] 05/31/2024 No    Parapneumonic effusion [J18.9, J91.8] 05/23/2024 Yes    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.9, D63.1] 05/23/2024 Yes    Debility [R53.81] 05/23/2024 Yes    Elevated troponin [R79.89] 08/02/2023 Yes    Dependence on renal dialysis [Z99.2] 10/30/2017 Not Applicable    Malnutrition of moderate degree [E44.0] 11/20/2014 Yes    DM2 (diabetes mellitus, type 2) [E11.9] 11/18/2014 Yes    ESRD (end stage renal disease) [N18.6] 11/06/2014 Yes    Essential (primary) hypertension [I10] 11/06/2014 Yes    Secondary hyperparathyroidism of renal origin [N25.81] 11/06/2014 Yes    Acute blood loss anemia [D62] 11/06/2014 Yes      Problems Resolved During this Admission:    Diagnosis Date Noted Date Resolved POA    Hemodialysis-associated hypotension [I95.3] 05/29/2024 06/08/2024 Yes    Sepsis [A41.9] 05/23/2024 06/08/2024 Yes    Hypokalemia [E87.6] 05/23/2024 05/28/2024 Yes    CAD (coronary artery disease) [I25.10] 08/04/2023 06/08/2024 Yes    Iron deficiency anemia, unspecified [D50.9] 11/06/2014 05/26/2024 Yes       We will plan dialysis tomorrow.    Thank you for your consult. I will follow-up with patient. Please contact us if you have any additional questions.    Ovi Alvarado MD  Nephrology  [facility]

## 2024-06-11 NOTE — PT/OT/SLP PROGRESS
Occupational Therapy   Treatment    Name: Joseph Mcdonald  MRN: [id number]  Admitting Diagnosis:  Acute hypoxic respiratory failure  5 Days Post-Op    Recommendations:     Discharge Recommendations: Moderate Intensity Therapy  Discharge Equipment Recommendations:   (to be determined)  Barriers to discharge:       Assessment:     Joseph Mcdonald is a 89 y.o. male with a medical diagnosis of Acute hypoxic respiratory failure.  He presents with weakness and decline in ADLs. Performance deficits affecting function are weakness, impaired endurance, impaired functional mobility, impaired self care skills.     Rehab Prognosis:  Good; patient would benefit from acute skilled OT services to address these deficits and reach maximum level of function.       Plan:     Patient to be seen 5 x/week to address the above listed problems via self-care/home management, therapeutic activities, therapeutic exercises  Plan of Care Expires: 06/28/24  Plan of Care Reviewed with: patient    Subjective     Chief Complaint: acute hypoxic respiratory failure  Patient/Family Comments/goals: pt agreeable to OT tx  Pain/Comfort:  Pain Rating 1: 0/10    Objective:     Communicated with: RENATO Steven prior to session.  Patient found HOB elevated with oxygen, chest tube, peripheral IV upon OT entry to room.    General Precautions: Standard, fall    Orthopedic Precautions:N/A  Braces: N/A  Respiratory Status: Nasal cannula, flow 3 L/min     Occupational Performance:     Bed Mobility:    Not performed     Functional Mobility/Transfers:  Not performed    Activities of Daily Living:  Not performed      [facility] 6 Click ADL:      Treatment & Education:  Pt performed x 20 reps each AROM shoulder flexion, IR/ER, chest press, and elbow flexion/extension in order to improve BUE strength and endurance to perform ADL and ADL t/f with less assist.     Patient left HOB elevated with all lines intact and call button in reach    GOALS:   Multidisciplinary Problems        Occupational Therapy Goals          Problem: Occupational Therapy    Goal Priority Disciplines Outcome Interventions   Occupational Therapy Goal     OT, PT/OT Progressing    Description: STG:  Pt will perform grooming (I)  Pt will bathe with setup  Pt will perform UE dressing with (I)  Pt will perform LE dressing with I/mod I  Pt will transfer bed/chair/bsc with Mod I  Pt will perform standing task x 2 min with Mod I   Pt will tolerate 15 minutes of tx without fatigue      LT.Restore to max I with self care and mobility.                          Time Tracking:     OT Date of Treatment: 24  OT Start Time:   OT Stop Time: 1137  OT Total Time (min): 11 min    Billable Minutes:Therapeutic Exercise 11 minutes    OT/QING: OT          2024

## 2024-06-11 NOTE — ASSESSMENT & PLAN NOTE
Patient has acute blood loss due to hemorrhage, the hemorrhage is due to gastrointestinal bleed, patient does have a propensity for bleeding due to a platelet defect/deficiency and a medication, the medication is heparin.. Will trend hemoglobin/hematocrit Every 6 hours, as well as monitor and correct for any coagulation defects. CBC and vital signs have been reviewed and last CBC was noted-   Lab Results   Component Value Date    WBC 10.90 06/09/2024    HGB 10.2 (L) 06/11/2024    HCT 32.6 (L) 06/11/2024    MCV 85.6 06/09/2024     (L) 06/09/2024         Will order a type and screen and consent patient for blood transfusion. Will transfuse if Hgb is <7g/dl (<8g/dl in cases of active ACS) or if patient has rapid bleeding leading to hemodynamic instability.  6/11 no endoscopy at this time

## 2024-06-11 NOTE — ASSESSMENT & PLAN NOTE
Nutrition consulted. Most recent weight and BMI monitored-     Measurements:  Wt Readings from Last 1 Encounters:   06/08/24 57.4 kg (126 lb 8.7 oz)   Body mass index is 21.06 kg/m².    Patient has been screened and assessed by RD.    Malnutrition Type:  Context:    Level:      Malnutrition Characteristic Summary:       Interventions/Recommendations (treatment strategy):  Recommend to add Gallo to aid in wound healing, ONS to provide additional calories/protein to heal/HD   We will place NG tube if necessary.  6/5 NG tube today  6/10 NG tube out.  Encourage orals  6/11 eating during interview today

## 2024-06-11 NOTE — PROGRESS NOTES
[facility] Medicine  Progress Note    Patient Name: Joseph Mcdonald  MRN: [id number]  Patient Class: IP- Inpatient   Admission Date: 5/23/2024  Length of Stay: 19 days  Attending Physician: Cristian Ac DO  Primary Care Provider: Clinic, [facility]        Subjective:     Principal Problem:Acute hypoxic respiratory failure        HPI:    89-year-old  male With a past medical history of end-stage renal disease, hypertension, iron deficiency anemia, secondary hyperparathyroidism, type 2 diabetes, CAD, heart failure with preserved ejection fraction, hypokalemia inability presents to the ED with 1 week worsening shortness of breath.  This was particularly bad during his dialysis session this morning.  Due to this daughter brought him to the ED thereafter.  He denies any fever, chills, cough, wheezing, sputum production, palpitations.  He has been take medication as in his not missed any dialysis sessions.  He denies any nausea, vomiting, diarrhea, dysuria.  Imaging in the ED revealed near-complete he had a pacemaker patient of the right hemithorax review of systems otherwise negative.    Overview/Hospital Course:   5/24 1.5 L removed from right pleural space today.  Still has large right-sided effusion.  We will attempt to get some more off tomorrow.  5/25 dialyzing today  5/26 bedside ultrasound today still has large pleural effusion, complex appearing.  Was going to attempt bedside ultrasound but given complex nature have consulted pulmonology  5/27 D/W Pulm, plan for chest tube placement.   5/28- Midodrine added low BP.   5/29- s/p chest tube placement per Dr. Rodney, fluid studies sent.   5/30- BP low again, midodrine resumed. Started on intra-pleural lytics per Pulm.  5/31- Intra-pleural lytics continued, CXR improved.   6/1- Failed swallow and is with NG now and tube feeds. S/p 6 doses of Intra-pleural lytics (total 6 doses).   6/2- Mental status better,  resp status stable.  6/3- NG removed on accident while patient was working with PT/OT, SLP consulted for reevaluation. Pulm to follow, CXR with slightly increased right effusion.    6/4 surgery consulted today.  Plan for decortication on Thursday 6/6 6/5 plan for decortication tomorrow.  We will place NG tube due to patient not eating well  6/10  Mr. Sin has been in the ICU for the last several days.  He had a P EA arrest during induction for his decortication.  He is now out of the ICU and doing well.  Still has right-sided chest tube.  Notified this morning that he pass large clots in about movement.  We will consult GI and start IV  protonic  6/11 hemoglobin okay.  Unable to do endoscopy due to high risk of sedating patient.  Has a sacral wound that may need debriding.  We will have surgery evaluate today.  Again we will be high risk for anesthesia induction.    Interval History:     No acute events overnight    Review of Systems   HENT:  Positive for voice change.    Respiratory:  Positive for shortness of breath.      Objective:     Vital Signs (Most Recent):  Temp: 97.4 °F (36.3 °C) (06/11/24 0831)  Pulse: 81 (06/11/24 0831)  Resp: 18 (06/11/24 0831)  BP: 98/63 (06/11/24 0831)  SpO2: 98 % (06/11/24 0831) Vital Signs (24h Range):  Temp:  [97.4 °F (36.3 °C)-98.1 °F (36.7 °C)] 97.4 °F (36.3 °C)  Pulse:  [68-91] 81  Resp:  [16-20] 18  SpO2:  [98 %-100 %] 98 %  BP: ()/(22-88) 98/63     Weight: 57.4 kg (126 lb 8.7 oz)  Body mass index is 21.06 kg/m².    Intake/Output Summary (Last 24 hours) at 6/11/2024 1032  Last data filed at 6/11/2024 0605  Gross per 24 hour   Intake 1480.27 ml   Output 810 ml   Net 670.27 ml         Physical Exam  Vitals and nursing note reviewed.   Constitutional:       Appearance: He is ill-appearing.   HENT:      Head: Normocephalic and atraumatic.      Nose: Nose normal.      Mouth/Throat:      Mouth: Mucous membranes are moist.   Eyes:      Extraocular Movements: Extraocular  movements intact.   Cardiovascular:      Rate and Rhythm: Normal rate and regular rhythm.      Pulses: Normal pulses.      Heart sounds: Normal heart sounds.   Pulmonary:      Effort: Pulmonary effort is normal.      Breath sounds: Examination of the right-middle field reveals decreased breath sounds. Examination of the right-lower field reveals decreased breath sounds. Decreased breath sounds present.      Comments: Chest tube in place.   Abdominal:      General: Bowel sounds are normal.      Palpations: Abdomen is soft.      Comments: NG in place.    Musculoskeletal:      Cervical back: Normal range of motion.   Skin:     General: Skin is warm.   Neurological:      General: No focal deficit present.      Mental Status: He is alert. Mental status is at baseline.   Psychiatric:         Mood and Affect: Mood normal.         Behavior: Behavior normal.             Significant Labs: All pertinent labs within the past 24 hours have been reviewed.    Significant Imaging: I have reviewed all pertinent imaging results/findings within the past 24 hours.    Assessment/Plan:      * Acute hypoxic respiratory failure  Sec to pleural effusion.  Require 2L NC since admission.   Imaging consistent with large right sided pleural effusion.   S/p thoracentesis on 5/23 with 1,5L fluid removed, no pleural fluid studies sent.   Remained symptomatic so CT chest obtained on 5/24 and consistent with large effusion.  Pulmonology consulted; s/p chest tube placement (5/29), s/p intra-pleural lytics started on 5/30 (total 6 doses).  Volume removal with HD, continue broad spectrum antibiotics as below.   Monitor oxygen status.   Home O2 eval prior to discharge.      6/4Improved.  Plan for decortication of the right lung on Thursday 6/5 plan for OR tomorrow  6/10 chest tube in place still draining.  6/11 still requiring oxygen    Parapneumonic effusion  Patient found to have large pleural effusion on imaging. I have personally reviewed and  "interpreted the following imaging: Xray. A thoracentesis was ordered. Most likely etiology includes Congestive Heart Failure, Pneumonia, and Renal Failure. Management to include Repeat Thoracentesis and Dialysis  S/p thoracentesis on 5/23- 1.5L fluid removed.  S/p chest tube placement (5/29), cytology negative for malignancy, fluid appears exudate in nature, cultures NGTD.   S/p 6 doses of Intra-pleural lytics BID started per Pulm (5/30-6/2).   CXR mostly stable, possibly slight increase in fluid noted on CXR from 6/3.   Continue IV Zosyn (timing and transition per Pulm), discontinued vancomycin given negative MRSA PCR.   Pulm follows.    6/4 plan for decortication on Thursday 6/5 plan for decortication tomorrow  6/10 continuing antibiotics.  Chest tube in place  6/11  chest tube per pulmonology    Loculated pleural effusion  Patient found to have large pleural effusion on imaging. I have personally reviewed and interpreted the following imaging: Xray. A thoracentesis was performed. Pleural fluid was sent for analysis. Labs reviewed, including Serum LDH   LDH   Date Value Ref Range Status   05/29/2024 279 (H) 87 - 241 U/L Final   , Pleural Fluid LDH   Lactate Dehydrogenase (LDH), Body Fluid   Date Value Ref Range Status   05/29/2024 270 U/L Final     Comment:     Reference ranges for this analyte have not been established for the body fluid specimen submitted for analysis.    , Serum Protein   Total Protein   Date Value Ref Range Status   06/11/2024 5.7 (L) 6.4 - 8.2 g/dL Final    , Pleural Protein No results found for: "PROTEINBODYF" , Cell Count and Differential No results found for: "BFCELLCNT" , and Fluid Cultures No results found for: "BODYFLUIDCUL" . Fluid most consistent with Exudate.  . Most likely etiology includes Pneumonia. Management to include Pleural Catheter      Anemia due to GI blood loss  Patient's anemia is currently uncontrolled. Has received 1 units of PRBCs on 06/10/2024 . Etiology likely d/t " acute blood loss which was from GI bleed  Current CBC reviewed-   Lab Results   Component Value Date    HGB 10.2 (L) 06/11/2024    HCT 32.6 (L) 06/11/2024     Monitor serial CBC and transfuse if patient becomes hemodynamically unstable, symptomatic or H/H drops below 7/21.    Blood per rectum  Patient has acute blood loss due to hemorrhage, the hemorrhage is due to gastrointestinal bleed, patient does have a propensity for bleeding due to a platelet defect/deficiency.. Will trend hemoglobin/hematocrit Daily, as well as monitor and correct for any coagulation defects. CBC and vital signs have been reviewed and last CBC was noted-   Lab Results   Component Value Date    WBC 10.90 06/09/2024    HGB 10.2 (L) 06/11/2024    HCT 32.6 (L) 06/11/2024    MCV 85.6 06/09/2024     (L) 06/09/2024         Will order a type and screen and consent patient for blood transfusion. Will transfuse if Hgb is <7g/dl (<8g/dl in cases of active ACS) or if patient has rapid bleeding leading to hemodynamic instability.    GI bleed  Patient has acute blood loss due to hemorrhage, the hemorrhage is due to gastrointestinal bleed, patient does have a propensity for bleeding due to a platelet defect/deficiency and a medication, the medication is heparin.. Will trend hemoglobin/hematocrit Every 6 hours, as well as monitor and correct for any coagulation defects. CBC and vital signs have been reviewed and last CBC was noted-   Lab Results   Component Value Date    WBC 10.90 06/09/2024    HGB 10.2 (L) 06/11/2024    HCT 32.6 (L) 06/11/2024    MCV 85.6 06/09/2024     (L) 06/09/2024         Will order a type and screen and consent patient for blood transfusion. Will transfuse if Hgb is <7g/dl (<8g/dl in cases of active ACS) or if patient has rapid bleeding leading to hemodynamic instability.  6/11 no endoscopy at this time    Troponin level elevated   Secondary to cardiac arrest      Coronary artery disease involving native coronary artery  of native heart without angina pectoris  Patient with known CAD s/p  unclear , which is controlled Will continue Statin and monitor for S/Sx of angina/ACS. Continue to monitor on telemetry.   6/11 no chest pain    Atrial fibrillation with RVR  Patient with Persistent (7 days or more) atrial fibrillation which is controlled currently with  Rate controlled.   with no medication needed . Patient is currently in atrial fibrillation.[id number] Score: 4. HASBLED Score: 5. Anticoagulation not indicated due to active GI bleed .  Rate controlled 6/11    Acute systolic CHF (congestive heart failure)  Patient is identified as having Diastolic (HFpEF) heart failure that is Acute on chronic. CHF is currently uncontrolled due to Pulmonary edema/pleural effusion on CXR. Latest ECHO performed and demonstrates- Results for orders placed during the hospital encounter of 05/23/24    Echo    Interpretation Summary    Left Ventricle: The left ventricle is normal in size. Increased wall thickness. There is concentric remodeling. There is normal systolic function. Biplane (2D) method of discs ejection fraction is 55%. Grade II diastolic dysfunction.    Right Ventricle: Mild right ventricular enlargement. Systolic function is mildly reduced.    Left Atrium: Left atrium is mildly dilated.    Right Atrium: Right atrium is mildly dilated.    Aortic Valve: The aortic valve is a trileaflet valve. Mildly calcified cusps. There is mild stenosis. Aortic valve area by VTI is 1.59 cm². Aortic valve peak velocity is 1.48 m/s. Mean gradient is 4 mmHg. The dimensionless index is 0.60.    Mitral Valve: There is bileaflet sclerosis. Mildly thickened leaflets. There is mild regurgitation.    Tricuspid Valve: There is severe regurgitation with an eccentrically directed jet.    Pulmonary Artery: Pulmonary artery pressure could not be accurately determined due to severe TR.    IVC/SVC: Elevated venous pressure at 15 mmHg.    Pericardium: There is a trivial  "effusion. No indication of cardiac tamponade. Left pleural effusion.  . Continue Nitrate/Vasodilator and monitor clinical status closely. Monitor on telemetry. Patient is off CHF pathway.  Monitor strict Is&Os and daily weights.  Place on fluid restriction of 1.5 L. Cardiology has not been consulted. Continue to stress to patient importance of self efficacy and  on diet for CHF. Last BNP reviewed- and noted below No results for input(s): "BNP", "BNPTRIAGEBLO" in the last 168 hours.  6/11 still requiring oxygen    Cardiac arrest   PEA arrest during induction for his decortication.  Likely medication effect from anesthesia.    Appears to not have any neurologic sequela from the event.      Thrombocytopenia  Patient was found to have thrombocytopenia, the likely etiology is secondary to sepsis/infection, will monitor the platelets Daily. Will transfuse if platelet count is <10k. Hold DVT prophylaxis if platelets are <50k. The patient's platelet results have been reviewed and are listed below.  No results for input(s): "PLT" in the last 24 hours.        Acute metabolic encephalopathy  Discontinued trazodone.  Management as above.  Requiring restraints to avoid treatment interruption.   Improving.    6/4  More awake today  6/5 improving  6/10 appears to be at baseline today  Resolved    Oropharyngeal dysphagia  SLP consulted, failed swallow eval.  Now NPO, NG inserted for feed and meds.    NG removed per patient on accident on 6/3.  SLP to follow today as patient is more alert now.      Has reportedly become more weak and eaten less over the duration of his hospitalization.  We will place NG tube if necessary.  6/5 NG tube today      Pleural effusion  As above.    Decortication Thursday    Patient found to have large pleural effusion on imaging. I have personally reviewed and interpreted the following imaging: Xray. A thoracentesis was ordered. Most likely etiology includes Pneumonia. Management to include Repeat " Thoracentesis, Pleural Catheter, and Dialysis   6/10 chest tube in place  6/11 chest tube per pulmonology    Debility  Patient with Acute on chronic debility due to age-related physical debility. Latest AMPAC and GEMS scores have not been reviewed. Evaluation for etiology is complete.   Plan includes PT/OT consulted.  Home health on discharge.      PTOT    Anemia of renal disease  Patient's anemia is currently uncontrolled. Has not received any PRBCs to date. Etiology likely d/t chronic disease due to ESRD  Current CBC reviewed-   Lab Results   Component Value Date    HGB 10.2 (L) 06/11/2024    HCT 32.6 (L) 06/11/2024     Monitor serial CBC and transfuse if patient becomes hemodynamically unstable, symptomatic or H/H drops below 7/21.   No evidence of bleeding   6/5 no evidence of bleeding   6/10 Large clots passed and stool this morning.  Consult GI.  IV ppi.  Trend hemoglobin  6/11 hemoglobin holding today    Acute on chronic heart failure with preserved ejection fraction  Patient is identified as having Diastolic (HFpEF) heart failure that is Acute on chronic. CHF is currently uncontrolled due to Pulmonary edema/pleural effusion on CXR. Latest ECHO performed and demonstrates- Results for orders placed during the hospital encounter of 08/01/23    Echo    Interpretation Summary    Left Ventricle: The left ventricle is normal in size. Increased wall thickness. There is concentric remodeling. Normal wall motion. There is normal systolic function with a visually estimated ejection fraction of 55 - 60%. Grade II diastolic dysfunction.    Left Atrium: Left atrium is mildly dilated. There is a calcified 1.5 x 2 cm full wall thickness mass noted extending from the myocardium into the pericardial space, unclear etiology, recommend cardiac MRI.    Right Ventricle: Normal right ventricular cavity size. Systolic function is normal.    Aortic Valve: The aortic valve is a trileaflet valve. There is physiologically normal aortic  "regurgitation.    Mitral Valve: There is no stenosis. The mean pressure gradient across the mitral valve is 3 mmHg at a heart rate of  bpm. There is mild regurgitation with a centrally directed jet.    Tricuspid Valve: There is mild to moderate regurgitation with a centrally directed jet.    Pulmonic Valve: There is no significant stenosis.    Pericardium: Small circumferential pericardial effusion present. No indication of cardiac tamponade.  . Continue Nitrate/Vasodilator and monitor clinical status closely. Monitor on telemetry. Patient is off CHF pathway.  Monitor strict Is&Os and daily weights.  Place on fluid restriction of 1.5 L. Cardiology has not been consulted. Continue to stress to patient importance of self efficacy and  on diet for CHF. Last BNP reviewed- and noted below No results for input(s): "BNP", "BNPTRIAGEBLO" in the last 168 hours.   Volume removal per HD.     6/4 Makes no urine.  Continue dialysis  6/5 continue dialysis    Elevated troponin   Likely secondary to cardiac arrest      Malnutrition of moderate degree  Nutrition consulted. Most recent weight and BMI monitored-     Measurements:  Wt Readings from Last 1 Encounters:   06/08/24 57.4 kg (126 lb 8.7 oz)   Body mass index is 21.06 kg/m².    Patient has been screened and assessed by RD.    Malnutrition Type:  Context:    Level:      Malnutrition Characteristic Summary:       Interventions/Recommendations (treatment strategy):  Recommend to add Gallo to aid in wound healing, ONS to provide additional calories/protein to heal/HD   We will place NG tube if necessary.  6/5 NG tube today  6/10 NG tube out.  Encourage orals  6/11 eating during interview today    DM2 (diabetes mellitus, type 2)  HBA1c 4.5  No need for SSI or POC glucose check.     Secondary hyperparathyroidism of renal origin  Resume home Renvela.    Nephrology following  Appreciate nephrology    Essential (primary) hypertension  Chronic, controlled. Latest blood pressure " and vitals reviewed-     Temp:  [97.4 °F (36.3 °C)-98.1 °F (36.7 °C)]   Pulse:  [68-91]   Resp:  [16-20]   BP: ()/(22-88)   SpO2:  [98 %-100 %] .   Home meds for hypertension were reviewed and noted below.   Hypertension Medications               amLODIPine (NORVASC) 5 MG tablet Take 5 mg by mouth once daily.    hydrALAZINE (APRESOLINE) 100 MG tablet Take 1 tablet by mouth 3 (three) times daily with meals.    nebivoloL (BYSTOLIC) 2.5 MG Tab Take 2.5 mg by mouth once daily.            While in the hospital, will manage blood pressure as follows; hold meds given hypotension. BP has been fluctuating, will likely add back beta blocker if BP gets on the higher side.     Will utilize p.r.n. blood pressure medication only if patient's blood pressure greater than 180/110 and he develops symptoms such as worsening chest pain or shortness of breath.   BP reasonable  6/5 BP okay  Stable    ESRD (end stage renal disease)  Creatine stable for now. BMP reviewed- noted Estimated Creatinine Clearance: 12.3 mL/min (A) (based on SCr of 3.36 mg/dL (H)). according to latest data. 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    Nephrology consulted to resume HD sessions.   Continue dialysis   6/5Electrolytes okay  6/10 dialysis today    Dependence on renal dialysis   ESRD      Acute blood loss anemia  Patient's anemia is currently uncontrolled. Has received One units of PRBCs on 6/8 . Etiology likely d/t acute blood loss which was from GI bleed  Current CBC reviewed-   Lab Results   Component Value Date    HGB 10.2 (L) 06/11/2024    HCT 32.6 (L) 06/11/2024     Monitor serial CBC and transfuse if patient becomes hemodynamically unstable, symptomatic or H/H drops below 7/21.  Better today  6/11      VTE Risk Mitigation (From admission, onward)           Ordered     IP VTE HIGH RISK PATIENT  Once         06/06/24 1154     Place sequential compression device  Until discontinued         06/06/24 1154                    Discharge  Planning   BRIAN:      Code Status: Full Code   Is the patient medically ready for discharge?:     Reason for patient still in hospital (select all that apply): Treatment  Discharge Plan A: Home, Home Health         Discussed case with Dr. Kan concerning sacral decubitus.  Patient will be very high risk for anesthesia.  Surgery will take a look and let us know if they think.  Labs and consultant notes reviewed. Hemoglobin is holding so we will back off to daily checks.  CBC in a.m..          Cristian Ac DO  Department of Hospital Medicine   [facility]

## 2024-06-11 NOTE — ASSESSMENT & PLAN NOTE
Creatine stable for now. BMP reviewed- noted Estimated Creatinine Clearance: 12.3 mL/min (A) (based on SCr of 3.36 mg/dL (H)). according to latest data. 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    Nephrology consulted to resume HD sessions.   Continue dialysis   6/5Electrolytes okay  6/10 dialysis today

## 2024-06-11 NOTE — PLAN OF CARE
06/11/24 @ 0674 - Pt' daughter, MADINA Ferrara contacted CM. CM discussed with Ms. Ferrara pt not having d/c at this time due to not being medically appropriate for d/c. CM discussed reaching out to [facility] and also planning for pt's d/c. Family have decided on [facility] upon pt's d/c from this facility. VM left for O[facility] Peraza to return call regarding admission questions. MD updated. CM will continue to follow.     06/11/24 @ 1305 - Specialty is full; however VG advised she would review pt. MD advised; CM will continue to follow.     06/11/24 @ 1211 - CM reached out to FLORINA Ferrara re: placement of pt to Specialty. CM will continue to follow.     06/11/24 @ 3682 - CM contacted pt's daughter MELISSA Ferrara ([phone]) re: Choice for long term placement. She will contact pt's other daughter Kerry Perez and call CM back. CM will continue to follow.

## 2024-06-11 NOTE — ASSESSMENT & PLAN NOTE
Chest tube in place, received second round or tPA/Dnase, third day of tPA/Dnase administered today.  Some improvement in right-sided opacification with continued hydropneumothorax.  No longer a candidate for decortication given loss of pulse/ short code blue during induction for general anesthesia which effectively makes him a poor surgical candidate.    No growth thus far, has completed very prolonged course of antibiotics with Zosyn and now these have been discontinued.  Today was his third/final dose of tPA/Dnase (6/10/24).  Chest x-ray with continued evidence of hydropneumothorax and non expanding lung.    We will obtain chest CT tomorrow without contrast to investigate pleural space.  Pending this, chest tube will then we removed with the expectation that the   Vacuum left  will fill with fluid. No evidence of ongoing infection in the pleural space at this time.

## 2024-06-11 NOTE — ASSESSMENT & PLAN NOTE
Patient with Persistent (7 days or more) atrial fibrillation which is controlled currently with  Rate controlled.   with no medication needed . Patient is currently in atrial fibrillation.[id number] Score: 4. HASBLED Score: 5. Anticoagulation not indicated due to active GI bleed .  Rate controlled 6/11

## 2024-06-11 NOTE — PHYSICIAN QUERY
Question: Please clarify the integumentary diagnosis related to the documentation of the sacrum.    Please hit the Enter button after selecting your response.    Provider Query Response:  Pressure Injury/Decubitus Ulcer, Unstageable

## 2024-06-11 NOTE — ASSESSMENT & PLAN NOTE
Status post cardiac arrest during induction in the ICU, with stay in the ICU following his brief code blue.  Successful return of spontaneous circulation, with no residual neurological deficits

## 2024-06-11 NOTE — PHYSICIAN QUERY
Question: Please clarify the integumentary diagnosis related to the documentation of left heel.     Please hit the Enter button after selecting your response.    Provider Query Response:  Pressure Injury/Decubitus Ulcer, Unstageable

## 2024-06-11 NOTE — PROCEDURES
"Joseph Mcdonald is a 89 y.o. male patient.    Temp: 97.9 °F (36.6 °C) (06/10/24 1845)  Pulse: 82 (06/10/24 2136)  Resp: 19 (06/10/24 1935)  BP: 136/62 (06/10/24 2050)  SpO2: 100 % (06/10/24 2136)  Weight: 57.4 kg (126 lb 8.7 oz) (06/08/24 0245)  Height: 5' 5" (165.1 cm) (05/29/24 0725)       Procedures    6/10/2024    Time clamped:   12:45 p.m.  Time unclamped:  Plan unclamp at  1:45 p.m.  Indications:  Loculated pleural effusion for complicated parapneumonic effusion     Intrapleural fibrinolytic (tPA + DNase) dose number:  5     At bedside, 10 mg of alteplase (diluted in 0.9% sodium chloride for a total volume of 30 mL) in addition to dornase pati (5 mg diluted in sterile water 30 mL) was administered into the pleural space via the patient's chest tube.     The patient tolerated the procedure well, status post instillation of the intrapleural fibrinolytic six the chest tube will be clamped for 1 hour.       There was no evidence of persistent air leak or bubbling in the chest tube atrium at this time.             Benjamin Rodney MD  Interventional Pulmonary and Critical Care  [facility]  "

## 2024-06-11 NOTE — ASSESSMENT & PLAN NOTE
Patient has acute blood loss due to hemorrhage, the hemorrhage is due to gastrointestinal bleed, patient does have a propensity for bleeding due to a platelet defect/deficiency.. Will trend hemoglobin/hematocrit Daily, as well as monitor and correct for any coagulation defects. CBC and vital signs have been reviewed and last CBC was noted-   Lab Results   Component Value Date    WBC 10.90 06/09/2024    HGB 10.2 (L) 06/11/2024    HCT 32.6 (L) 06/11/2024    MCV 85.6 06/09/2024     (L) 06/09/2024         Will order a type and screen and consent patient for blood transfusion. Will transfuse if Hgb is <7g/dl (<8g/dl in cases of active ACS) or if patient has rapid bleeding leading to hemodynamic instability.

## 2024-06-11 NOTE — SUBJECTIVE & OBJECTIVE
Interval History:   Refer to hospital course      Objective:     Vital Signs (Most Recent):  Temp: 97.9 °F (36.6 °C) (06/10/24 1845)  Pulse: 82 (06/10/24 2136)  Resp: 19 (06/10/24 1935)  BP: 136/62 (06/10/24 2050)  SpO2: 100 % (06/10/24 2136) Vital Signs (24h Range):  Temp:  [97.4 °F (36.3 °C)-97.9 °F (36.6 °C)] 97.9 °F (36.6 °C)  Pulse:  [67-91] 82  Resp:  [16-20] 19  SpO2:  [98 %-100 %] 100 %  BP: (102-166)/(32-88) 136/62     Weight: 57.4 kg (126 lb 8.7 oz)  Body mass index is 21.06 kg/m².      Intake/Output Summary (Last 24 hours) at 6/10/2024 2148  Last data filed at 6/10/2024 0517  Gross per 24 hour   Intake --   Output 360 ml   Net -360 ml        Physical Exam  Constitutional:       Appearance: He is ill-appearing. He is not diaphoretic.   HENT:      Head: Normocephalic and atraumatic.      Nose: No congestion or rhinorrhea.   Cardiovascular:      Rate and Rhythm: Tachycardia present. Rhythm irregular.      Pulses: Normal pulses.      Heart sounds: Normal heart sounds.   Pulmonary:      Effort: No respiratory distress.      Breath sounds: No stridor. Rhonchi present. No wheezing or rales.      Comments:  Decreased breath sounds right base  Chest:      Chest wall: No tenderness.   Abdominal:      General: Abdomen is flat.   Musculoskeletal:      Cervical back: Normal range of motion. No rigidity.      Right lower leg: No edema.      Left lower leg: No edema.   Skin:     General: Skin is dry.      Findings: No erythema.   Neurological:      General: No focal deficit present.      Mental Status: He is oriented to person, place, and time.           Review of Systems    Vents:  Vent Mode: CPAP (06/07/24 0950)  Ventilator Initiated: Yes (06/06/24 1155)  Set Rate: 16 BPM (06/07/24 0713)  Vt Set: 400 mL (06/07/24 0713)  Pressure Support: 5 cmH20 (06/07/24 0950)  PEEP/CPAP: 5 cmH20 (06/07/24 0950)  Oxygen Concentration (%): 32 (06/09/24 0718)  Peak Airway Pressure: 27 cmH20 (06/07/24 0713)  Total Ve: 7 L/m (06/07/24  0713)  F/VT Ratio<105 (RSBI): (!) 45.33 (06/07/24 0055)    Lines/Drains/Airways       Drain  Duration                  Chest Tube 05/29/24 0835 Tube - 1 Right 12 days              Peripheral Intravenous Line  Duration                  Hemodialysis AV Fistula Right upper arm -- days         Peripheral IV - Single Lumen 06/09/24 2000 20 G Left Upper Arm 1 day                    Significant Labs:    CBC/Anemia Profile:  Recent Labs   Lab 06/09/24  0259 06/10/24  0757 06/10/24  1205 06/10/24  1742   WBC 10.90  --   --   --    HGB 7.9* 7.4* 6.7* 6.9*   HCT 24.9*  --   --   --    *  --   --   --    MCV 85.6  --   --   --    RDW 17.6*  --   --   --         Chemistries:  Recent Labs   Lab 06/09/24  0259 06/10/24  0352   * 134*   K 4.5 4.4   CL 98 99   CO2 25 25   BUN 52* 65*   CREATININE 4.56* 5.57*   CALCIUM 8.0* 8.0*   ALBUMIN 1.9* 1.8*   PROT 6.1* 6.2*   BILITOT 0.5 0.4   ALKPHOS 99 82   ALT 21 20   AST 38* 27       All pertinent labs within the past 24 hours have been reviewed.    Significant Imaging:  I have reviewed all pertinent imaging results/findings within the past 24 hours.

## 2024-06-11 NOTE — ASSESSMENT & PLAN NOTE
Sec to pleural effusion.  Require 2L NC since admission.   Imaging consistent with large right sided pleural effusion.   S/p thoracentesis on 5/23 with 1,5L fluid removed, no pleural fluid studies sent.   Remained symptomatic so CT chest obtained on 5/24 and consistent with large effusion.  Pulmonology consulted; s/p chest tube placement (5/29), s/p intra-pleural lytics started on 5/30 (total 6 doses).  Volume removal with HD, continue broad spectrum antibiotics as below.   Monitor oxygen status.   Home O2 eval prior to discharge.      6/4Improved.  Plan for decortication of the right lung on Thursday 6/5 plan for OR tomorrow  6/10 chest tube in place still draining.  6/11 still requiring oxygen

## 2024-06-11 NOTE — PROGRESS NOTES
[facility]  Pulmonology  Progress Note    Patient Name: Joseph Mcdonald  MRN: [id number]  Admission Date: 5/23/2024  Hospital Length of Stay: 18 days  Code Status: Full Code  Attending Provider: Cristian Ac DO  Primary Care Provider: [facility]   Principal Problem: Acute hypoxic respiratory failure    Subjective:     Interval History:   Refer to hospital course      Objective:     Vital Signs (Most Recent):  Temp: 97.9 °F (36.6 °C) (06/10/24 1845)  Pulse: 82 (06/10/24 2136)  Resp: 19 (06/10/24 1935)  BP: 136/62 (06/10/24 2050)  SpO2: 100 % (06/10/24 2136) Vital Signs (24h Range):  Temp:  [97.4 °F (36.3 °C)-97.9 °F (36.6 °C)] 97.9 °F (36.6 °C)  Pulse:  [67-91] 82  Resp:  [16-20] 19  SpO2:  [98 %-100 %] 100 %  BP: (102-166)/(32-88) 136/62     Weight: 57.4 kg (126 lb 8.7 oz)  Body mass index is 21.06 kg/m².      Intake/Output Summary (Last 24 hours) at 6/10/2024 2148  Last data filed at 6/10/2024 0517  Gross per 24 hour   Intake --   Output 360 ml   Net -360 ml        Physical Exam  Constitutional:       Appearance: He is ill-appearing. He is not diaphoretic.   HENT:      Head: Normocephalic and atraumatic.      Nose: No congestion or rhinorrhea.   Cardiovascular:      Rate and Rhythm: Tachycardia present. Rhythm irregular.      Pulses: Normal pulses.      Heart sounds: Normal heart sounds.   Pulmonary:      Effort: No respiratory distress.      Breath sounds: No stridor. Rhonchi present. No wheezing or rales.      Comments:  Decreased breath sounds right base  Chest:      Chest wall: No tenderness.   Abdominal:      General: Abdomen is flat.   Musculoskeletal:      Cervical back: Normal range of motion. No rigidity.      Right lower leg: No edema.      Left lower leg: No edema.   Skin:     General: Skin is dry.      Findings: No erythema.   Neurological:      General: No focal deficit present.      Mental Status: He is oriented to person, place, and time.           Review  of Systems    Vents:  Vent Mode: CPAP (06/07/24 0950)  Ventilator Initiated: Yes (06/06/24 1155)  Set Rate: 16 BPM (06/07/24 0713)  Vt Set: 400 mL (06/07/24 0713)  Pressure Support: 5 cmH20 (06/07/24 0950)  PEEP/CPAP: 5 cmH20 (06/07/24 0950)  Oxygen Concentration (%): 32 (06/09/24 0718)  Peak Airway Pressure: 27 cmH20 (06/07/24 0713)  Total Ve: 7 L/m (06/07/24 0713)  F/VT Ratio<105 (RSBI): (!) 45.33 (06/07/24 0055)    Lines/Drains/Airways       Drain  Duration                  Chest Tube 05/29/24 0835 Tube - 1 Right 12 days              Peripheral Intravenous Line  Duration                  Hemodialysis AV Fistula Right upper arm -- days         Peripheral IV - Single Lumen 06/09/24 2000 20 G Left Upper Arm 1 day                    Significant Labs:    CBC/Anemia Profile:  Recent Labs   Lab 06/09/24  0259 06/10/24  0757 06/10/24  1205 06/10/24  1742   WBC 10.90  --   --   --    HGB 7.9* 7.4* 6.7* 6.9*   HCT 24.9*  --   --   --    *  --   --   --    MCV 85.6  --   --   --    RDW 17.6*  --   --   --         Chemistries:  Recent Labs   Lab 06/09/24  0259 06/10/24  0352   * 134*   K 4.5 4.4   CL 98 99   CO2 25 25   BUN 52* 65*   CREATININE 4.56* 5.57*   CALCIUM 8.0* 8.0*   ALBUMIN 1.9* 1.8*   PROT 6.1* 6.2*   BILITOT 0.5 0.4   ALKPHOS 99 82   ALT 21 20   AST 38* 27       All pertinent labs within the past 24 hours have been reviewed.    Significant Imaging:  I have reviewed all pertinent imaging results/findings within the past 24 hours.  Assessment/Plan:     Neuro  Acute metabolic encephalopathy  Stable at this time    Pulmonary  Pleural effusion   Chest tube in place, received second round or tPA/Dnase, third day of tPA/Dnase administered today.  Some improvement in right-sided opacification with continued hydropneumothorax.  No longer a candidate for decortication given loss of pulse/ short code blue during induction for general anesthesia which effectively makes him a poor surgical candidate.    No  growth thus far, has completed very prolonged course of antibiotics with Zosyn and now these have been discontinued.  Today was his third/final dose of tPA/Dnase (6/10/24).  Chest x-ray with continued evidence of hydropneumothorax and non expanding lung.    We will obtain chest CT tomorrow without contrast to investigate pleural space.  Pending this, chest tube will then we removed with the expectation that the   Vacuum left  will fill with fluid. No evidence of ongoing infection in the pleural space at this time.      Cardiac/Vascular  Cardiac arrest   Status post cardiac arrest during induction in the ICU, with stay in the ICU following his brief code blue.  Successful return of spontaneous circulation, with no residual neurological deficits    Renal/  ESRD (end stage renal disease)    Patient received dialysis Tuesday Thursday Saturday.          Oncology  Anemia of renal disease    Hemoglobin today stable at 7.4.  Has received PRBC previously while in the intensive care unit                 Benjamin Rodney MD  Pulmonology  [facility]

## 2024-06-11 NOTE — PT/OT/SLP PROGRESS
Physical Therapy Treatment    Patient Name:  Joseph Mcdonald   MRN:  [id number]    Recommendations:     Discharge Recommendations: Moderate Intensity Therapy  Discharge Equipment Recommendations: to be determined by next level of care  Barriers to discharge:  ongoing medical treatment    Assessment:     Joseph Mcdonald is a 89 y.o. male admitted with a medical diagnosis of Acute hypoxic respiratory failure.  He presents with the following impairments/functional limitations: weakness, impaired endurance, impaired self care skills, impaired functional mobility, edema, impaired cardiopulmonary response to activity, impaired cognition.    Pt more alert and better able to assist with all activities this AM    Rehab Prognosis: Fair; patient would benefit from acute skilled PT services to address these deficits and reach maximum level of function.    Recent Surgery: Procedure(s) (LRB):  DECORTICATION, LUNG (Right) 5 Days Post-Op    Plan:     During this hospitalization, patient to be seen 5 x/week to address the identified rehab impairments via gait training, therapeutic activities, therapeutic exercises and progress toward the following goals:    Plan of Care Expires:  06/24/24    Subjective     Chief Complaint: acute hypoxic resp failure  Patient/Family Comments/goals: pt more alert and agreeable  Pain/Comfort:         Objective:     Communicated with  prior to session.  Patient found HOB elevated with chest tube, telemetry, peripheral IV, oxygen, pressure relief boots, SCD upon PT entry to room.     General Precautions: Standard, fall, respiratory  Orthopedic Precautions: N/A  Braces: N/A  Respiratory Status: Nasal cannula, flow 3 L/min     Functional Mobility:  Bed Mobility:     Rolling Left:  minimum assistance and for hygiene  Rolling Right: minimum assistance and for hygiene  Supine to Sit: minimum assistance with increased time and assist with trunk and B LE management  Sit to Supine: minimum assistance and of 2  persons with trunk and B LE management  Transfers:     Sit to Stand:  minimum assistance and of 2 persons with rolling walker and x 3 trials      AM-PAC 6 CLICK MOBILITY  Turning over in bed (including adjusting bedclothes, sheets and blankets)?: 3  Sitting down on and standing up from a chair with arms (e.g., wheelchair, bedside commode, etc.): 3  Moving from lying on back to sitting on the side of the bed?: 3  Moving to and from a bed to a chair (including a wheelchair)?: 1  Need to walk in hospital room?: 1  Climbing 3-5 steps with a railing?: 1  Basic Mobility Total Score: 12       Treatment & Education:  Pt performed 2 x 15 reps (B) LE exercises: ap, quad set, glut set, straight leg raise, hip ab/adduction,  heel slide with active assist     Contact guard assist sitting EOB x 15 minutes      Patient left HOB elevated with all lines intact, call button in reach, and bed alarm on..    GOALS:   Multidisciplinary Problems       Physical Therapy Goals          Problem: Physical Therapy    Goal Priority Disciplines Outcome Goal Variances Interventions   Physical Therapy Goal     PT, PT/OT Progressing     Description: Short Term Goals to be met by: 24    Patient will increase functional independence with mobility by performin. Supine to sit with Stand by assist  2. Sit to stand transfer with Stand by assist using Rolling walker  3. Bed to chair transfer with Stand by assist using Rolling walker  4. Gait  x 150 feet with Stand by assist using Rolling walker  5. Lower extremity exercise program x30 reps per handout, with assistance as needed    Long Term Goals to be met by: 24    Pt will regain full independent functional mobility with straight cane to return to home situation and prior activities of daily living.                        Time Tracking:     PT Received On: 24  PT Start Time: 920     PT Stop Time: 958  PT Total Time (min): 38 min     Billable Minutes: Therapeutic Activity 24 and  Therapeutic Exercise 13    Treatment Type: Treatment  PT/PTA: PTA     Number of PTA visits since last PT visit: 3     06/11/2024

## 2024-06-11 NOTE — CONSULTS
[facility]  Adult Nutrition  Consult Note         Reason for Assessment  Reason For Assessment: consult   Nutrition Risk Screen: no indicators present    Assessment and Plan  6/11/2024: Consult received and appreciated. Consult for wounds. Patient was assessed for wounds on 6/9 and started on Gallo BID to aid in wound healing. Also recommend consider addition of 500mg Vitamin C + 220mg ZnSO4 BID + MVI qd to aid in wound healing. RD following.    6/9/2024 Consult: New consult received and appreciated for wounds. Patient currently ordered a dysphagia pureed diet. Bedside swallow completed on 6/3. Secure chat with RN with report of patient tolerating po intakes since yesterday.     Recommend to add Gallo BID + Boost Max Protein BID to aid in wound healing. Encourage po intakes. Assist with meals as needed. RD Following.     6/7/2024: RD follow up. Patient currently in unit after code in OR. NG replaced but feedings held. Recommend resume tube feedings of Novasource Renal at 35mL/hour with 40mL/hour free water flush as soon as medically appropriate. RD following.     6/3/2024: RD follow up. NG was dislodged by accident yesterday. Repeat SLP evaluation today. Recommended puree with thin liquids. Recommend continue puree diet as tolerated. Encourage good PO intakes. Also recommend addition of Boost Plus with meals to improve kcal/protein intakes to better meet estimated nutritional needs. RD following.     6/1/2024: Consult received and appreciated. Consult to start tube feedings.     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 Recommend start Novasource Renal with a goal rate of 35mL/hour with 40mL/hour free water flush. Keep HOB at 30-45 degrees to reduce risk of aspiration. Start rate at 20mL  and advance by 10mL q8h until goal rate is reached. Monitor for tolerance: n/v/d/c. Hold feeding and notify RD if symptoms of intolerance develop.     Last BM 5/29 per flowsheet.      Medications/labs reviewed. RD following.          Learning Needs/Social Determinants of Health  Learning Assessment       05/23/2024 1848 [facility] (5/23/2024 - Present)   Created by Melanie Saleh RN - RN (Nurse) Status: Complete                 PRIMARY LEARNER     Primary Learner Name:  Joseph Mcdonald  - 05/23/2024 1848    Relationship:  Patient RC - 05/23/2024 1848    Does the primary learner have any barriers to learning?:  No Barriers  - 05/23/2024 1848    What is the preferred language of the primary learner?:  English  - 05/23/2024 1848    Is an  required?:  No  - 05/23/2024 1848    How does the primary learner prefer to learn new concepts?:  Listening, Reading, Demonstration  - 05/23/2024 1848    How often do you need to have someone help you read instructions, pamphlets, or written material from your doctor or pharmacy?:  Never  - 05/23/2024 1848        CO-LEARNER #1     No question answered        CO-LEARNER #2     No question answered        SPECIAL TOPICS     No question answered        ANSWERED BY:     No question answered        Comments         Edit History       Melanie Saleh RN - RN (Nurse)   05/23/2024 1848                           Social Determinants of Health     Tobacco Use: Medium Risk (8/2/2023)    Patient History     Smoking Tobacco Use: Former     Smokeless Tobacco Use: Never     Passive Exposure: Not on file   Alcohol Use: Not At Risk (5/24/2024)    AUDIT-C     Frequency of Alcohol Consumption: Never     Average Number of Drinks: Patient does not drink     Frequency of Binge Drinking: Never   Financial Resource Strain: Low Risk  (8/2/2023)    Overall Financial Resource Strain (CARDIA)     Difficulty of Paying Living Expenses: Not hard at all   Food Insecurity: No Food Insecurity (5/24/2024)    Hunger Vital Sign     Worried About Running Out of Food in the Last Year: Never true     Ran Out of Food in the Last Year: Never true   Transportation  Needs: No Transportation Needs (5/24/2024)    TRANSPORTATION NEEDS     Transportation : No   Physical Activity: Sufficiently Active (5/24/2024)    Exercise Vital Sign     Days of Exercise per Week: 5 days     Minutes of Exercise per Session: 30 min   Stress: No Stress Concern Present (5/24/2024)    [employer] - Occupational Stress Questionnaire     Feeling of Stress : Not at all   Housing Stability: Low Risk  (5/24/2024)    Housing Stability Vital Sign     Unable to Pay for Housing in the Last Year: No     Homeless in the Last Year: No   Depression: Not on file   Utilities: Not At Risk (5/24/2024)    [facility] Utilities     Threatened with loss of utilities: No   Health Literacy: Adequate Health Literacy (5/24/2024)     Health Literacy     Frequency of need for help with medical instructions: Never   Social Isolation: Socially Integrated (5/24/2024)    Social Isolation     Social Isolation: 1            Malnutrition  Is Patient Malnourished: No    Nutrition Diagnosis  Inadequate energy intake related to Appetite loss and Inadequate Caloric intake as evidenced by poor PO intakes  Comments: initiate enteral feeding    Recent Labs   Lab 06/09/24  2033 06/10/24  0352 06/11/24  0326   GLU  --    < > 77   POCGLU 134*  --   --     < > = values in this interval not displayed.         Nutrition Prescription / Recommendations  Recommendation/Intervention: Recommend to add Gallo to aid in wound healing, ONS to provide additional calories/protein to heal/HD  Goals: po intakes 50% by next follow up  Nutrition Goal Status: new  Communication of RD Recs: reviewed with RN  Current Diet Order: Puree  Chewing or Swallowing Difficulty?: No Chewing or swallowing difficulty  Recommended Diet: Puree  Recommended Oral Supplement: Gallo [90 kcals, 2.5g Protein, 10g Carbs(3g Sugar), 7g L-Arginine, 7g L-Glutamine, Vitamin C 300mg, 9.5mg Zinc] 2 times a day and Boost Plus [360kcals, 14g Protein, 45g Carbs] with  "meals  Is Nutrition Support Recommended: No    Monitor and Evaluation  % current Intake: Unknown/ No P.O. intake documented  % intake to meet estimated needs: P.O. + Supplements  Food and Nutrient Intake: energy intake, food and beverage intake  Food and Nutrient Adminstration: diet order  Anthropometric Measurements: height/length, weight, weight change, body mass index  Biochemical Data, Medical Tests and Procedures: electrolyte and renal panel, gastrointestinal profile, inflammatory profile, glucose/endocrine profile       Current Medical Diagnosis and Past Medical History  Diagnosis: renal disease  Past Medical History:   Diagnosis Date    Chronic kidney disease (CKD)     Diabetes mellitus     Hypertension     PVD (peripheral vascular disease)        Nutrition/Diet History  Spiritual, Cultural Beliefs, Yazdanism Practices, Values that Affect Care: no  Factors Affecting Nutritional Intake: behavioral (mealtime), impaired cognitive status/motor control, difficulty/impaired swallowing    Lab/Procedures/Meds  Recent Labs   Lab 06/11/24  0326      K 3.9   BUN 34*   CREATININE 3.36*   CALCIUM 7.9*   ALBUMIN 1.7*      ALT 26   AST 44*   Note: BUN/Cr elevated, Alb low. PMH ESRD on HD, Ca++ low, AST elevated.      Last A1c: No results found for: "HGBA1C"  Lab Results   Component Value Date    RBC 2.91 (L) 06/09/2024    HGB 10.2 (L) 06/11/2024    HCT 32.6 (L) 06/11/2024    MCV 85.6 06/09/2024    MCH 27.1 06/09/2024    MCHC 31.7 (L) 06/09/2024    TIBC 105 (L) 05/23/2024   Note: H&H low    Pertinent Labs Reviewed: reviewed  Pertinent Medications Reviewed: reviewed  Scheduled Meds:   atorvastatin  40 mg Oral QHS    collagenase   Topical (Top) Daily    hydrALAZINE  100 mg Oral Q8H    miconazole NITRATE 2 %   Topical (Top) BID    pantoprazole  40 mg Intravenous BID    sevelamer carbonate  2,400 mg Oral TID WM     Continuous Infusions:      PRN Meds:.  Current Facility-Administered Medications:     0.9%  NaCl " "infusion (for blood administration), , Intravenous, Q24H PRN    0.9%  NaCl infusion (for blood administration), , Intravenous, Q24H PRN    0.9%  NaCl infusion (for blood administration), , Intravenous, Q24H PRN    sodium chloride 0.9%, , Intravenous, PRN    acetaminophen, 1,000 mg, Oral, Q8H PRN    dextrose 10%, 12.5 g, Intravenous, PRN    dextrose 10%, 25 g, Intravenous, PRN    glucagon (human recombinant), 1 mg, Intramuscular, PRN    glucose, 16 g, Oral, PRN    glucose, 24 g, Oral, PRN    naloxone, 0.02 mg, Intravenous, PRN    ondansetron, 8 mg, Oral, Q8H PRN    polyethylene glycol, 17 g, Oral, BID PRN    sodium chloride 0.9% 250 mL flush bag, , Intravenous, PRN    sodium chloride 0.9%, 10 mL, Intravenous, Q12H PRN    Anthropometrics  Temp: 98.1 °F (36.7 °C)  Height Method: Stated  Height: 5' 5" (165.1 cm)  Height (inches): 65 in  Weight Method: Bed Scale  Weight: 57.4 kg (126 lb 8.7 oz)  Weight (lb): 126.55 lb  Ideal Body Weight (IBW), Male: 136 lb  % Ideal Body Weight, Male (lb): 104.4 %  BMI (Calculated): 21.1       Estimated/Assessed Needs  RMR ([place]-St. Jeor Equation): 1170.88   Total Ve: 7 L/m Temp: 98.1 °F (36.7 °C)Axillary  Weight Used For Calorie Calculations: 64.5 kg (142 lb 3.2 oz)   Energy Need Method: Kcal/kg Energy Calorie Requirements (kcal): 2982-7294  Weight Used For Protein Calculations: 64.5 kg (142 lb 3.2 oz)  Protein Requirements: 71-84  Estimated Fluid Requirement Method: other (see comments) (500 ml + urine output)    RDA Method (mL): 1935       Nutrition by Nursing  Diet/Nutrition Received: mechanical/dental soft (puree/thin liquids)  Intake (%): 25%  Diet/Feeding Assistance: total feed  Diet/Feeding Tolerance: good  Last Bowel Movement: 06/10/24  [REMOVED]      NG/OG Tube 05/31/24 2100 nasogastric Left nostril-Feeding Type: continuous, by pump  [REMOVED]      NG/OG Tube 05/31/24 2100 nasogastric Left nostril-Current Rate (mL/hr): 35 mL/hr  [REMOVED]      NG/OG Tube 05/31/24 2100 " nasogastric Left nostril-Goal Rate (mL/hr): 35 mL/hr  [REMOVED]      NG/OG Tube 05/31/24 2100 nasogastric Left nostril-Formula Name: novasource renal    Nutrition Follow-Up  RD Follow-up?: Yes      Nutrition Discharge Planning: too soon to determine; RD Following.          Monica Pappas, MS, RD, LD  Available via Secure Chat

## 2024-06-11 NOTE — ASSESSMENT & PLAN NOTE
Discontinued trazodone.  Management as above.  Requiring restraints to avoid treatment interruption.   Improving.    6/4  More awake today  6/5 improving  6/10 appears to be at baseline today  Resolved

## 2024-06-11 NOTE — SUBJECTIVE & OBJECTIVE
No current facility-administered medications on file prior to encounter.     Current Outpatient Medications on File Prior to Encounter   Medication Sig    amLODIPine (NORVASC) 5 MG tablet Take 5 mg by mouth once daily.    atorvastatin (LIPITOR) 40 MG tablet Take 1 tablet (40 mg total) by mouth every evening.    hydrALAZINE (APRESOLINE) 100 MG tablet Take 1 tablet by mouth 3 (three) times daily with meals.    nebivoloL (BYSTOLIC) 2.5 MG Tab Take 2.5 mg by mouth once daily.    methoxy peg-epoetin beta (MIRCERA INJ) 50 mcg.    timolol maleate 0.5% (TIMOPTIC) 0.5 % Drop Place 1 drop into the right eye once daily.       Review of patient's allergies indicates:   Allergen Reactions    Azithromycin Other (See Comments)     Note: - [employer] 01/11/2019       Past Medical History:   Diagnosis Date    Chronic kidney disease (CKD)     Diabetes mellitus     Hypertension     PVD (peripheral vascular disease)      Past Surgical History:   Procedure Laterality Date    LEFT HEART CATHETERIZATION N/A 8/3/2023    Procedure: Left heart cath;  Surgeon: Vazquez Aguilar MD;  Location[facility] CATH LAB;  Service: Cardiology;  Laterality: N/A;    left toe amputation      PLACEMENT OF DIALYSIS ACCESS       Family History    None       Tobacco Use    Smoking status: Former     Types: Cigarettes    Smokeless tobacco: Never   Substance and Sexual Activity    Alcohol use: Not Currently    Drug use: Never    Sexual activity: Not on file     Review of Systems   Unable to perform ROS: Age     Objective:     Vital Signs (Most Recent):  Temp: 97.4 °F (36.3 °C) (06/11/24 1237)  Pulse: 80 (06/11/24 1237)  Resp: 16 (06/11/24 1237)  BP: 103/60 (06/11/24 1237)  SpO2: 99 % (06/11/24 1237) Vital Signs (24h Range):  Temp:  [97.4 °F (36.3 °C)-98.1 °F (36.7 °C)] 97.4 °F (36.3 °C)  Pulse:  [74-90] 80  Resp:  [16-19] 16  SpO2:  [98 %-100 %] 99 %  BP: ()/(22-88) 103/60     Weight: 57.4 kg (126 lb 8.7 oz)  Body mass index is 21.06 kg/m².     Physical  Exam  Vitals reviewed.   Cardiovascular:      Rate and Rhythm: Normal rate.   Pulmonary:      Effort: Pulmonary effort is normal. No respiratory distress.   Skin:     General: Skin is warm and dry.      Comments: Sacral wound: see media   Neurological:      Mental Status: He is alert. Mental status is at baseline.            I have reviewed all pertinent lab results within the past 24 hours.    Significant Diagnostics:  I have reviewed all pertinent imaging results/findings within the past 24 hours.

## 2024-06-11 NOTE — ASSESSMENT & PLAN NOTE
Patient's anemia is currently uncontrolled. Has received One units of PRBCs on 6/8 . Etiology likely d/t acute blood loss which was from GI bleed  Current CBC reviewed-   Lab Results   Component Value Date    HGB 10.2 (L) 06/11/2024    HCT 32.6 (L) 06/11/2024     Monitor serial CBC and transfuse if patient becomes hemodynamically unstable, symptomatic or H/H drops below 7/21.  Better today  6/11

## 2024-06-11 NOTE — ASSESSMENT & PLAN NOTE
Chronic, controlled. Latest blood pressure and vitals reviewed-     Temp:  [97.4 °F (36.3 °C)-98.1 °F (36.7 °C)]   Pulse:  [68-91]   Resp:  [16-20]   BP: ()/(22-88)   SpO2:  [98 %-100 %] .   Home meds for hypertension were reviewed and noted below.   Hypertension Medications               amLODIPine (NORVASC) 5 MG tablet Take 5 mg by mouth once daily.    hydrALAZINE (APRESOLINE) 100 MG tablet Take 1 tablet by mouth 3 (three) times daily with meals.    nebivoloL (BYSTOLIC) 2.5 MG Tab Take 2.5 mg by mouth once daily.            While in the hospital, will manage blood pressure as follows; hold meds given hypotension. BP has been fluctuating, will likely add back beta blocker if BP gets on the higher side.     Will utilize p.r.n. blood pressure medication only if patient's blood pressure greater than 180/110 and he develops symptoms such as worsening chest pain or shortness of breath.   BP reasonable  6/5 BP okay  Stable

## 2024-06-11 NOTE — ASSESSMENT & PLAN NOTE
Patient found to have large pleural effusion on imaging. I have personally reviewed and interpreted the following imaging: Xray. A thoracentesis was ordered. Most likely etiology includes Congestive Heart Failure, Pneumonia, and Renal Failure. Management to include Repeat Thoracentesis and Dialysis  S/p thoracentesis on 5/23- 1.5L fluid removed.  S/p chest tube placement (5/29), cytology negative for malignancy, fluid appears exudate in nature, cultures NGTD.   S/p 6 doses of Intra-pleural lytics BID started per Pulm (5/30-6/2).   CXR mostly stable, possibly slight increase in fluid noted on CXR from 6/3.   Continue IV Zosyn (timing and transition per Pulm), discontinued vancomycin given negative MRSA PCR.   Pulm follows.    6/4 plan for decortication on Thursday 6/5 plan for decortication tomorrow  6/10 continuing antibiotics.  Chest tube in place  6/11  chest tube per pulmonology

## 2024-06-11 NOTE — ASSESSMENT & PLAN NOTE
As above.    Decortication Thursday    Patient found to have large pleural effusion on imaging. I have personally reviewed and interpreted the following imaging: Xray. A thoracentesis was ordered. Most likely etiology includes Pneumonia. Management to include Repeat Thoracentesis, Pleural Catheter, and Dialysis   6/10 chest tube in place  6/11 chest tube per pulmonology

## 2024-06-12 DIAGNOSIS — J90 PLEURAL EFFUSION: Primary | ICD-10-CM

## 2024-06-12 PROBLEM — L89.620 PRESSURE INJURY OF LEFT HEEL, UNSTAGEABLE: Status: ACTIVE | Noted: 2024-06-12

## 2024-06-12 PROBLEM — L89.610 PRESSURE INJURY OF RIGHT HEEL, UNSTAGEABLE: Status: ACTIVE | Noted: 2024-01-01

## 2024-06-12 NOTE — PT/OT/SLP PROGRESS
Physical Therapy      Patient Name:  Joseph Mcdonald   MRN:  [id number]    Patient not seen today secondary to Dialysis. Will follow-up 6/13/24.

## 2024-06-12 NOTE — PROGRESS NOTES
[facility]  Nephrology  Progress Note    Patient Name: Joseph Mcdonald  MRN: [id number]  Admission Date: 5/23/2024  Hospital Length of Stay: 20 days  Attending Provider: Cristian Ac DO   Primary Care Physician: [facility], [facility]  Principal Problem:Acute hypoxic respiratory failure    Consults  Subjective:     Interval History:  Mr. Mcdonald is seen in follow-up of his end-stage renal disease.  He is doing fairly well.  He is more animated than he has been in more interactive.  Most of that centers around, when can I go home.    Review of patient's allergies indicates:   Allergen Reactions    Azithromycin Other (See Comments)     Note: - [employer] 01/11/2019     Current Facility-Administered Medications   Medication Frequency    0.9%  NaCl infusion (for blood administration) Q24H PRN    0.9%  NaCl infusion PRN    acetaminophen tablet 1,000 mg Q8H PRN    atorvastatin tablet 40 mg QHS    collagenase ointment Daily    dextrose 10% bolus 125 mL 125 mL PRN    dextrose 10% bolus 250 mL 250 mL PRN    glucagon (human recombinant) injection 1 mg PRN    glucose chewable tablet 16 g PRN    glucose chewable tablet 24 g PRN    hydrALAZINE tablet 100 mg Q8H    miconazole NITRATE 2 % top powder BID    naloxone 0.4 mg/mL injection 0.02 mg PRN    ondansetron disintegrating tablet 8 mg Q8H PRN    pantoprazole injection 40 mg BID    polyethylene glycol packet 17 g BID PRN    sevelamer carbonate tablet 2,400 mg TID WM    sodium chloride 0.9% 250 mL flush bag PRN    sodium chloride 0.9% flush 10 mL Q12H PRN       Objective:     Vital Signs (Most Recent):  Temp: 97.4 °F (36.3 °C) (06/12/24 0736)  Pulse: 80 (06/12/24 0736)  Resp: 16 (06/12/24 0736)  BP: (!) 120/59 (06/12/24 0736)  SpO2: 99 % (06/12/24 0742) Vital Signs (24h Range):  Temp:  [97.2 °F (36.2 °C)-98.1 °F (36.7 °C)] 97.4 °F (36.3 °C)  Pulse:  [77-85] 80  Resp:  [16-20] 16  SpO2:  [98 %-100 %] 99 %  BP: (103-147)/(59-64) 120/59     Weight:  57.4 kg (126 lb 8.7 oz) (06/08/24 0245)  Body mass index is 21.06 kg/m².  Body surface area is 1.62 meters squared.    I/O last 3 completed shifts:  In: 1717.3 [P.O.:237; Blood:1218.3; IV Piggyback:262]  Out: 1310 [Chest Tube:1310]    Physical Exam chest tube in place on the right.  He is in no distress and breathing comfortably with a clear chest no edema    Significant Labs:    Significant Imaging:  Labs: Reviewed    Assessment/Plan:     Active Diagnoses:    Diagnosis Date Noted POA    PRINCIPAL PROBLEM:  Acute hypoxic respiratory failure [J96.01] 05/24/2024 Yes     Chronic    Decubitus ulcer of sacral region, unstageable [L89.150] 06/11/2024 Unknown    GI bleed [K92.2] 06/10/2024 No    Blood per rectum [K62.5] 06/10/2024 Unknown    Anemia due to GI blood loss [D50.0] 06/10/2024 Unknown    Loculated pleural effusion [J90] 06/10/2024 Unknown    Coronary artery disease involving native coronary artery of native heart without angina pectoris [I25.10] 06/09/2024 Yes    Troponin level elevated [R79.89] 06/09/2024 No    Cardiac arrest [I46.9] 06/06/2024 Yes    Acute systolic CHF (congestive heart failure) [I50.21] 06/06/2024 Yes    Atrial fibrillation with RVR [I48.91] 06/06/2024 Yes    Thrombocytopenia [D69.6] 06/05/2024 Yes    Acute metabolic encephalopathy [G93.41] 06/02/2024 No    Pleural effusion [J90] 05/31/2024 Yes    Oropharyngeal dysphagia [R13.12] 05/31/2024 No    Parapneumonic effusion [J18.9, J91.8] 05/23/2024 Yes    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.9, 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] 05/23/2024 Yes    Debility [R53.81] 05/23/2024 Yes    Elevated troponin [R79.89] 08/02/2023 Yes    Dependence on renal dialysis [Z99.2] 10/30/2017 Not Applicable    Malnutrition of moderate degree [E44.0] 11/20/2014 Yes    DM2 (diabetes mellitus, type 2) [E11.9] 11/18/2014 Yes    ESRD (end stage renal disease) [N18.6] 11/06/2014 Yes    Essential (primary) hypertension [I10] 11/06/2014 Yes    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  He will resume outpatient dialysis once discharged.    Thank you for your consult. I will follow-up with patient. Please contact us if you have any additional questions.    Ovi Alvarado MD  Nephrology  [facility]

## 2024-06-12 NOTE — ASSESSMENT & PLAN NOTE
Sec to pleural effusion.  Require 2L NC since admission.   Imaging consistent with large right sided pleural effusion.   S/p thoracentesis on 5/23 with 1,5L fluid removed, no pleural fluid studies sent.   Remained symptomatic so CT chest obtained on 5/24 and consistent with large effusion.  Pulmonology consulted; s/p chest tube placement (5/29), s/p intra-pleural lytics started on 5/30 (total 6 doses).  Volume removal with HD, continue broad spectrum antibiotics as below.   Monitor oxygen status.   Home O2 eval prior to discharge.      6/4Improved.  Plan for decortication of the right lung on Thursday 6/5 plan for OR tomorrow  6/10 chest tube in place still draining.  6/11 still requiring oxygen  6/12 chest tube removed.  Stable for discharge

## 2024-06-12 NOTE — ASSESSMENT & PLAN NOTE
Patient's anemia is currently uncontrolled. Has not received any PRBCs to date. Etiology likely d/t chronic disease due to ESRD  Current CBC reviewed-   Lab Results   Component Value Date    HGB 10.0 (L) 06/12/2024    HCT 32.7 (L) 06/12/2024     Monitor serial CBC and transfuse if patient becomes hemodynamically unstable, symptomatic or H/H drops below 7/21.   No evidence of bleeding   6/5 no evidence of bleeding   6/10 Large clots passed and stool this morning.  Consult GI.  IV ppi.  Trend hemoglobin  6/11 hemoglobin holding today

## 2024-06-12 NOTE — ASSESSMENT & PLAN NOTE
Patient was found to have thrombocytopenia, the likely etiology is secondary to sepsis/infection, will monitor the platelets Daily. Will transfuse if platelet count is <10k. Hold DVT prophylaxis if platelets are <50k. The patient's platelet results have been reviewed and are listed below.  Recent Labs   Lab 06/12/24  0458   PLT 79*

## 2024-06-12 NOTE — ASSESSMENT & PLAN NOTE
"Patient found to have large pleural effusion on imaging. I have personally reviewed and interpreted the following imaging: Xray. A thoracentesis was performed. Pleural fluid was sent for analysis. Labs reviewed, including Serum LDH   LDH   Date Value Ref Range Status   05/29/2024 279 (H) 87 - 241 U/L Final   , Pleural Fluid LDH   Lactate Dehydrogenase (LDH), Body Fluid   Date Value Ref Range Status   05/29/2024 270 U/L Final     Comment:     Reference ranges for this analyte have not been established for the body fluid specimen submitted for analysis.    , Serum Protein   Total Protein   Date Value Ref Range Status   06/11/2024 5.7 (L) 6.4 - 8.2 g/dL Final    , Pleural Protein No results found for: "PROTEINBODYF" , Cell Count and Differential No results found for: "BFCELLCNT" , and Fluid Cultures No results found for: "BODYFLUIDCUL" . Fluid most consistent with Exudate.  . Most likely etiology includes Pneumonia. Management to include Pleural Catheter   Chest tube removed 6/12  "

## 2024-06-12 NOTE — ASSESSMENT & PLAN NOTE
Patient's anemia is currently uncontrolled. Has received 1 units of PRBCs on 06/10/2024 . Etiology likely d/t acute blood loss which was from GI bleed  Current CBC reviewed-   Lab Results   Component Value Date    HGB 10.0 (L) 06/12/2024    HCT 32.7 (L) 06/12/2024     Monitor serial CBC and transfuse if patient becomes hemodynamically unstable, symptomatic or H/H drops below 7/21.

## 2024-06-12 NOTE — PROGRESS NOTES
[facility] Medicine  Progress Note    Patient Name: Joseph Mcdonald  MRN: [id number]  Patient Class: IP- Inpatient   Admission Date: 5/23/2024  Length of Stay: 20 days  Attending Physician: Cristian Ac DO  Primary Care Provider: Clinic, [facility]        Subjective:     Principal Problem:Acute hypoxic respiratory failure        HPI:    89-year-old  male With a past medical history of end-stage renal disease, hypertension, iron deficiency anemia, secondary hyperparathyroidism, type 2 diabetes, CAD, heart failure with preserved ejection fraction, hypokalemia inability presents to the ED with 1 week worsening shortness of breath.  This was particularly bad during his dialysis session this morning.  Due to this daughter brought him to the ED thereafter.  He denies any fever, chills, cough, wheezing, sputum production, palpitations.  He has been take medication as in his not missed any dialysis sessions.  He denies any nausea, vomiting, diarrhea, dysuria.  Imaging in the ED revealed near-complete he had a pacemaker patient of the right hemithorax review of systems otherwise negative.    Overview/Hospital Course:   5/24 1.5 L removed from right pleural space today.  Still has large right-sided effusion.  We will attempt to get some more off tomorrow.  5/25 dialyzing today  5/26 bedside ultrasound today still has large pleural effusion, complex appearing.  Was going to attempt bedside ultrasound but given complex nature have consulted pulmonology  5/27 D/W Pulm, plan for chest tube placement.   5/28- Midodrine added low BP.   5/29- s/p chest tube placement per Dr. Rodney, fluid studies sent.   5/30- BP low again, midodrine resumed. Started on intra-pleural lytics per Pulm.  5/31- Intra-pleural lytics continued, CXR improved.   6/1- Failed swallow and is with NG now and tube feeds. S/p 6 doses of Intra-pleural lytics (total 6 doses).   6/2- Mental status better,  resp status stable.  6/3- NG removed on accident while patient was working with PT/OT, SLP consulted for reevaluation. Pulm to follow, CXR with slightly increased right effusion.    6/4 surgery consulted today.  Plan for decortication on Thursday 6/6 6/5 plan for decortication tomorrow.  We will place NG tube due to patient not eating well  6/10  Mr. Sin has been in the ICU for the last several days.  He had a P EA arrest during induction for his decortication.  He is now out of the ICU and doing well.  Still has right-sided chest tube.  Notified this morning that he pass large clots in about movement.  We will consult GI and start IV  protonic  6/11 hemoglobin okay.  Unable to do endoscopy due to high risk of sedating patient.  Has a sacral wound that may need debriding.  We will have surgery evaluate today.  Again we will be high risk for anesthesia induction.  6/12 no surgical intervention on sacral decubitus.  Wound care.  Currently stable for discharge and awaiting placement.  Chest tube has been removed.    Interval History:     No acute events overnight    Review of Systems   HENT:  Positive for voice change.    Respiratory:  Positive for shortness of breath.      Objective:     Vital Signs (Most Recent):  Temp: 97.4 °F (36.3 °C) (06/12/24 0736)  Pulse: 80 (06/12/24 0736)  Resp: 16 (06/12/24 0736)  BP: (!) 120/59 (06/12/24 0736)  SpO2: 98 % (06/12/24 0830) Vital Signs (24h Range):  Temp:  [97.2 °F (36.2 °C)-98.1 °F (36.7 °C)] 97.4 °F (36.3 °C)  Pulse:  [77-85] 80  Resp:  [16-20] 16  SpO2:  [98 %-100 %] 98 %  BP: (103-147)/(59-64) 120/59     Weight: 57.4 kg (126 lb 8.7 oz)  Body mass index is 21.06 kg/m².    Intake/Output Summary (Last 24 hours) at 6/12/2024 1026  Last data filed at 6/11/2024 1823  Gross per 24 hour   Intake 237 ml   Output 500 ml   Net -263 ml         Physical Exam  Vitals and nursing note reviewed.   Constitutional:       Appearance: He is ill-appearing.   HENT:      Head:  Normocephalic and atraumatic.      Nose: Nose normal.      Mouth/Throat:      Mouth: Mucous membranes are moist.   Eyes:      Extraocular Movements: Extraocular movements intact.   Cardiovascular:      Rate and Rhythm: Normal rate and regular rhythm.      Pulses: Normal pulses.      Heart sounds: Normal heart sounds.   Pulmonary:      Effort: Pulmonary effort is normal.      Breath sounds: Examination of the right-middle field reveals decreased breath sounds. Examination of the right-lower field reveals decreased breath sounds. Decreased breath sounds present.      Comments: Chest tube in place.   Abdominal:      General: Bowel sounds are normal.      Palpations: Abdomen is soft.      Comments: NG in place.    Musculoskeletal:      Cervical back: Normal range of motion.   Skin:     General: Skin is warm.   Neurological:      General: No focal deficit present.      Mental Status: He is alert. Mental status is at baseline.   Psychiatric:         Mood and Affect: Mood normal.         Behavior: Behavior normal.             Significant Labs: All pertinent labs within the past 24 hours have been reviewed.    Significant Imaging: I have reviewed all pertinent imaging results/findings within the past 24 hours.    Assessment/Plan:      * Acute hypoxic respiratory failure  Sec to pleural effusion.  Require 2L NC since admission.   Imaging consistent with large right sided pleural effusion.   S/p thoracentesis on 5/23 with 1,5L fluid removed, no pleural fluid studies sent.   Remained symptomatic so CT chest obtained on 5/24 and consistent with large effusion.  Pulmonology consulted; s/p chest tube placement (5/29), s/p intra-pleural lytics started on 5/30 (total 6 doses).  Volume removal with HD, continue broad spectrum antibiotics as below.   Monitor oxygen status.   Home O2 eval prior to discharge.      6/4Improved.  Plan for decortication of the right lung on Thursday 6/5 plan for OR tomorrow  6/10 chest tube in place  still draining.  6/11 still requiring oxygen  6/12 chest tube removed.  Stable for discharge    Parapneumonic effusion  Patient found to have large pleural effusion on imaging. I have personally reviewed and interpreted the following imaging: Xray. A thoracentesis was ordered. Most likely etiology includes Congestive Heart Failure, Pneumonia, and Renal Failure. Management to include Repeat Thoracentesis and Dialysis  S/p thoracentesis on 5/23- 1.5L fluid removed.  S/p chest tube placement (5/29), cytology negative for malignancy, fluid appears exudate in nature, cultures NGTD.   S/p 6 doses of Intra-pleural lytics BID started per Pulm (5/30-6/2).   CXR mostly stable, possibly slight increase in fluid noted on CXR from 6/3.   Continue IV Zosyn (timing and transition per Pulm), discontinued vancomycin given negative MRSA PCR.   Pulm follows.    6/4 plan for decortication on Thursday 6/5 plan for decortication tomorrow  6/10 continuing antibiotics.  Chest tube in place  6/11  chest tube per pulmonology  6/12 chest tube inadvertently removed overnight.  Okay with pulmonology to leave out.  He is stable for discharge from pulmonary standpoint.    Decubitus ulcer of sacral region, unstageable   We will have surgery evaluate today   No surgical intervention.  6/12    Loculated pleural effusion  Patient found to have large pleural effusion on imaging. I have personally reviewed and interpreted the following imaging: Xray. A thoracentesis was performed. Pleural fluid was sent for analysis. Labs reviewed, including Serum LDH   LDH   Date Value Ref Range Status   05/29/2024 279 (H) 87 - 241 U/L Final   , Pleural Fluid LDH   Lactate Dehydrogenase (LDH), Body Fluid   Date Value Ref Range Status   05/29/2024 270 U/L Final     Comment:     Reference ranges for this analyte have not been established for the body fluid specimen submitted for analysis.    , Serum Protein   Total Protein   Date Value Ref Range Status   06/11/2024 5.7  "(L) 6.4 - 8.2 g/dL Final    , Pleural Protein No results found for: "PROTEINBODYF" , Cell Count and Differential No results found for: "BFCELLCNT" , and Fluid Cultures No results found for: "BODYFLUIDCUL" . Fluid most consistent with Exudate.  . Most likely etiology includes Pneumonia. Management to include Pleural Catheter   Chest tube removed 6/12    Anemia due to GI blood loss  Patient's anemia is currently uncontrolled. Has received 1 units of PRBCs on 06/10/2024 . Etiology likely d/t acute blood loss which was from GI bleed  Current CBC reviewed-   Lab Results   Component Value Date    HGB 10.0 (L) 06/12/2024    HCT 32.7 (L) 06/12/2024     Monitor serial CBC and transfuse if patient becomes hemodynamically unstable, symptomatic or H/H drops below 7/21.    Blood per rectum  Patient has acute blood loss due to hemorrhage, the hemorrhage is due to gastrointestinal bleed, patient does have a propensity for bleeding due to a platelet defect/deficiency.. Will trend hemoglobin/hematocrit Daily, as well as monitor and correct for any coagulation defects. CBC and vital signs have been reviewed and last CBC was noted-   Lab Results   Component Value Date    WBC 12.05 (H) 06/12/2024    HGB 10.0 (L) 06/12/2024    HCT 32.7 (L) 06/12/2024    MCV 88.4 06/12/2024    PLT 79 (L) 06/12/2024         Will order a type and screen and consent patient for blood transfusion. Will transfuse if Hgb is <7g/dl (<8g/dl in cases of active ACS) or if patient has rapid bleeding leading to hemodynamic instability.  Hemoglobin has been stable    GI bleed  Patient has acute blood loss due to hemorrhage, the hemorrhage is due to gastrointestinal bleed, patient does have a propensity for bleeding due to a platelet defect/deficiency and a medication, the medication is heparin.. Will trend hemoglobin/hematocrit Every 6 hours, as well as monitor and correct for any coagulation defects. CBC and vital signs have been reviewed and last CBC was noted- "   Lab Results   Component Value Date    WBC 12.05 (H) 06/12/2024    HGB 10.0 (L) 06/12/2024    HCT 32.7 (L) 06/12/2024    MCV 88.4 06/12/2024    PLT 79 (L) 06/12/2024         Will order a type and screen and consent patient for blood transfusion. Will transfuse if Hgb is <7g/dl (<8g/dl in cases of active ACS) or if patient has rapid bleeding leading to hemodynamic instability.  6/11 no endoscopy at this time    Troponin level elevated   Secondary to cardiac arrest      Coronary artery disease involving native coronary artery of native heart without angina pectoris  Patient with known CAD s/p  unclear , which is controlled Will continue Statin and monitor for S/Sx of angina/ACS. Continue to monitor on telemetry.   6/11 no chest pain    Atrial fibrillation with RVR  Patient with Persistent (7 days or more) atrial fibrillation which is controlled currently with  Rate controlled.   with no medication needed . Patient is currently in atrial fibrillation.[id number] Score: 4. HASBLED Score: 5. Anticoagulation not indicated due to active GI bleed .  Rate controlled 6/11    Acute systolic CHF (congestive heart failure)  Patient is identified as having Diastolic (HFpEF) heart failure that is Acute on chronic. CHF is currently uncontrolled due to Pulmonary edema/pleural effusion on CXR. Latest ECHO performed and demonstrates- Results for orders placed during the hospital encounter of 05/23/24    Echo    Interpretation Summary    Left Ventricle: The left ventricle is normal in size. Increased wall thickness. There is concentric remodeling. There is normal systolic function. Biplane (2D) method of discs ejection fraction is 55%. Grade II diastolic dysfunction.    Right Ventricle: Mild right ventricular enlargement. Systolic function is mildly reduced.    Left Atrium: Left atrium is mildly dilated.    Right Atrium: Right atrium is mildly dilated.    Aortic Valve: The aortic valve is a trileaflet valve. Mildly calcified cusps.  "There is mild stenosis. Aortic valve area by VTI is 1.59 cm². Aortic valve peak velocity is 1.48 m/s. Mean gradient is 4 mmHg. The dimensionless index is 0.60.    Mitral Valve: There is bileaflet sclerosis. Mildly thickened leaflets. There is mild regurgitation.    Tricuspid Valve: There is severe regurgitation with an eccentrically directed jet.    Pulmonary Artery: Pulmonary artery pressure could not be accurately determined due to severe TR.    IVC/SVC: Elevated venous pressure at 15 mmHg.    Pericardium: There is a trivial effusion. No indication of cardiac tamponade. Left pleural effusion.  . Continue Nitrate/Vasodilator and monitor clinical status closely. Monitor on telemetry. Patient is off CHF pathway.  Monitor strict Is&Os and daily weights.  Place on fluid restriction of 1.5 L. Cardiology has not been consulted. Continue to stress to patient importance of self efficacy and  on diet for CHF. Last BNP reviewed- and noted below No results for input(s): "BNP", "BNPTRIAGEBLO" in the last 168 hours.  6/11 still requiring oxygen  6/12 stable    Cardiac arrest   PEA arrest during induction for his decortication.  Likely medication effect from anesthesia.    Appears to not have any neurologic sequela from the event.      Thrombocytopenia  Patient was found to have thrombocytopenia, the likely etiology is secondary to sepsis/infection, will monitor the platelets Daily. Will transfuse if platelet count is <10k. Hold DVT prophylaxis if platelets are <50k. The patient's platelet results have been reviewed and are listed below.  Recent Labs   Lab 06/12/24  0458   PLT 79*           Acute metabolic encephalopathy  Discontinued trazodone.  Management as above.  Requiring restraints to avoid treatment interruption.   Improving.    6/4  More awake today  6/5 improving  6/10 appears to be at baseline today  Resolved    Oropharyngeal dysphagia  SLP consulted, failed swallow eval.  Now NPO, NG inserted for feed and " meds.    NG removed per patient on accident on 6/3.  SLP to follow today as patient is more alert now.      Has reportedly become more weak and eaten less over the duration of his hospitalization.  We will place NG tube if necessary.  6/5 NG tube today   Now tolerating oral    Pleural effusion  As above.    Decortication Thursday    Patient found to have large pleural effusion on imaging. I have personally reviewed and interpreted the following imaging: Xray. A thoracentesis was ordered. Most likely etiology includes Pneumonia. Management to include Repeat Thoracentesis, Pleural Catheter, and Dialysis   6/10 chest tube in place  6/11 chest tube per pulmonology  6/12 chest tube out    Debility  Patient with Acute on chronic debility due to age-related physical debility. Latest AMPAC and GEMS scores have not been reviewed. Evaluation for etiology is complete.   Plan includes PT/OT consulted.  Home health on discharge.      PTOT    Anemia of renal disease  Patient's anemia is currently uncontrolled. Has not received any PRBCs to date. Etiology likely d/t chronic disease due to ESRD  Current CBC reviewed-   Lab Results   Component Value Date    HGB 10.0 (L) 06/12/2024    HCT 32.7 (L) 06/12/2024     Monitor serial CBC and transfuse if patient becomes hemodynamically unstable, symptomatic or H/H drops below 7/21.   No evidence of bleeding   6/5 no evidence of bleeding   6/10 Large clots passed and stool this morning.  Consult GI.  IV ppi.  Trend hemoglobin  6/11 hemoglobin holding today    Acute on chronic heart failure with preserved ejection fraction  Patient is identified as having Diastolic (HFpEF) heart failure that is Acute on chronic. CHF is currently uncontrolled due to Pulmonary edema/pleural effusion on CXR. Latest ECHO performed and demonstrates- Results for orders placed during the hospital encounter of 08/01/23    Echo    Interpretation Summary    Left Ventricle: The left ventricle is normal in size.  "Increased wall thickness. There is concentric remodeling. Normal wall motion. There is normal systolic function with a visually estimated ejection fraction of 55 - 60%. Grade II diastolic dysfunction.    Left Atrium: Left atrium is mildly dilated. There is a calcified 1.5 x 2 cm full wall thickness mass noted extending from the myocardium into the pericardial space, unclear etiology, recommend cardiac MRI.    Right Ventricle: Normal right ventricular cavity size. Systolic function is normal.    Aortic Valve: The aortic valve is a trileaflet valve. There is physiologically normal aortic regurgitation.    Mitral Valve: There is no stenosis. The mean pressure gradient across the mitral valve is 3 mmHg at a heart rate of  bpm. There is mild regurgitation with a centrally directed jet.    Tricuspid Valve: There is mild to moderate regurgitation with a centrally directed jet.    Pulmonic Valve: There is no significant stenosis.    Pericardium: Small circumferential pericardial effusion present. No indication of cardiac tamponade.  . Continue Nitrate/Vasodilator and monitor clinical status closely. Monitor on telemetry. Patient is off CHF pathway.  Monitor strict Is&Os and daily weights.  Place on fluid restriction of 1.5 L. Cardiology has not been consulted. Continue to stress to patient importance of self efficacy and  on diet for CHF. Last BNP reviewed- and noted below No results for input(s): "BNP", "BNPTRIAGEBLO" in the last 168 hours.   Volume removal per HD.     6/4 Makes no urine.  Continue dialysis  6/5 continue dialysis    Elevated troponin   Likely secondary to cardiac arrest      Malnutrition of moderate degree  Nutrition consulted. Most recent weight and BMI monitored-     Measurements:  Wt Readings from Last 1 Encounters:   06/08/24 57.4 kg (126 lb 8.7 oz)   Body mass index is 21.06 kg/m².    Patient has been screened and assessed by RD.    Malnutrition Type:  Context:    Level:      Malnutrition " Characteristic Summary:       Interventions/Recommendations (treatment strategy):  Recommend to add Gallo to aid in wound healing, ONS to provide additional calories/protein to heal/HD   We will place NG tube if necessary.  6/5 NG tube today  6/10 NG tube out.  Encourage orals  6/11 eating during interview today    DM2 (diabetes mellitus, type 2)  HBA1c 4.5  No need for SSI or POC glucose check.     Secondary hyperparathyroidism of renal origin  Resume home Renvela.    Nephrology following  Appreciate nephrology    Essential (primary) hypertension  Chronic, controlled. Latest blood pressure and vitals reviewed-     Temp:  [97.2 °F (36.2 °C)-98.1 °F (36.7 °C)]   Pulse:  [77-85]   Resp:  [16-20]   BP: (103-147)/(59-64)   SpO2:  [98 %-100 %] .   Home meds for hypertension were reviewed and noted below.   Hypertension Medications               amLODIPine (NORVASC) 5 MG tablet Take 5 mg by mouth once daily.    hydrALAZINE (APRESOLINE) 100 MG tablet Take 1 tablet by mouth 3 (three) times daily with meals.    nebivoloL (BYSTOLIC) 2.5 MG Tab Take 2.5 mg by mouth once daily.            While in the hospital, will manage blood pressure as follows; hold meds given hypotension. BP has been fluctuating, will likely add back beta blocker if BP gets on the higher side.     Will utilize p.r.n. blood pressure medication only if patient's blood pressure greater than 180/110 and he develops symptoms such as worsening chest pain or shortness of breath.   BP reasonable  6/5 BP okay  Stable    ESRD (end stage renal disease)  Creatine stable for now. BMP reviewed- noted Estimated Creatinine Clearance: 12.3 mL/min (A) (based on SCr of 3.36 mg/dL (H)). according to latest data. 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    Nephrology consulted to resume HD sessions.   Continue dialysis   6/5Electrolytes okay  6/10 dialysis today    Dependence on renal dialysis   ESRD      Acute blood loss anemia  Patient's anemia is currently  uncontrolled. Has received One units of PRBCs on 6/8 . Etiology likely d/t acute blood loss which was from GI bleed  Current CBC reviewed-   Lab Results   Component Value Date    HGB 10.0 (L) 06/12/2024    HCT 32.7 (L) 06/12/2024     Monitor serial CBC and transfuse if patient becomes hemodynamically unstable, symptomatic or H/H drops below 7/21.  Better today  6/11  Hemoglobin stable      VTE Risk Mitigation (From admission, onward)           Ordered     IP VTE HIGH RISK PATIENT  Once         06/06/24 1154     Place sequential compression device  Until discontinued         06/06/24 1154                    Discharge Planning   BRIAN: 6/14/2024     Code Status: Full Code   Is the patient medically ready for discharge?:     Reason for patient still in hospital (select all that apply): Treatment  Discharge Plan A: Home, Home Health         Discussed case with Dr. Dereje Diaz to discharge from pulmonary standpoint.  Consultant notes and labs reviewed. Check metabolic panel in ryanne Ac DO  Department of Hospital Medicine   [facility]

## 2024-06-12 NOTE — ASSESSMENT & PLAN NOTE
Patient with Persistent (7 days or more) atrial fibrillation which is controlled currently with  Rate controlled.   with no medication needed . Patient is currently in atrial fibrillation.[id number] Score: 4. HASBLED Score: 5. Anticoagulation not indicated due to active GI bleed .  Rate controlled 6/11

## 2024-06-12 NOTE — ASSESSMENT & PLAN NOTE
Patient has acute blood loss due to hemorrhage, the hemorrhage is due to gastrointestinal bleed, patient does have a propensity for bleeding due to a platelet defect/deficiency and a medication, the medication is heparin.. Will trend hemoglobin/hematocrit Every 6 hours, as well as monitor and correct for any coagulation defects. CBC and vital signs have been reviewed and last CBC was noted-   Lab Results   Component Value Date    WBC 12.05 (H) 06/12/2024    HGB 10.0 (L) 06/12/2024    HCT 32.7 (L) 06/12/2024    MCV 88.4 06/12/2024    PLT 79 (L) 06/12/2024         Will order a type and screen and consent patient for blood transfusion. Will transfuse if Hgb is <7g/dl (<8g/dl in cases of active ACS) or if patient has rapid bleeding leading to hemodynamic instability.  6/11 no endoscopy at this time

## 2024-06-12 NOTE — ASSESSMENT & PLAN NOTE
As above.    Decortication Thursday    Patient found to have large pleural effusion on imaging. I have personally reviewed and interpreted the following imaging: Xray. A thoracentesis was ordered. Most likely etiology includes Pneumonia. Management to include Repeat Thoracentesis, Pleural Catheter, and Dialysis   6/10 chest tube in place  6/11 chest tube per pulmonology  6/12 chest tube out

## 2024-06-12 NOTE — ASSESSMENT & PLAN NOTE
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 CHF is currently uncontrolled due to Pulmonary edema/pleural effusion on CXR. Latest ECHO performed and demonstrates- Results for orders placed during the hospital encounter of 05/23/24    Echo    Interpretation Summary    Left Ventricle: The left ventricle is normal in size. Increased wall thickness. There is concentric remodeling. There is normal systolic function. Biplane (2D) method of discs ejection fraction is 55%. Grade II diastolic dysfunction.    Right Ventricle: Mild right ventricular enlargement. Systolic function is mildly reduced.    Left Atrium: Left atrium is mildly dilated.    Right Atrium: Right atrium is mildly dilated.    Aortic Valve: The aortic valve is a trileaflet valve. Mildly calcified cusps. There is mild stenosis. Aortic valve area by VTI is 1.59 cm². Aortic valve peak velocity is 1.48 m/s. Mean gradient is 4 mmHg. The dimensionless index is 0.60.    Mitral Valve: There is bileaflet sclerosis. Mildly thickened leaflets. There is mild regurgitation.    Tricuspid Valve: There is severe regurgitation with an eccentrically directed jet.    Pulmonary Artery: Pulmonary artery pressure could not be accurately determined due to severe TR.    IVC/SVC: Elevated venous pressure at 15 mmHg.    Pericardium: There is a trivial effusion. No indication of cardiac tamponade. Left pleural effusion.  . Continue Nitrate/Vasodilator and monitor clinical status closely. Monitor on telemetry. Patient is off CHF pathway.  Monitor strict Is&Os and daily weights.  Place on fluid restriction of 1.5 L. Cardiology has not been consulted. Continue to stress to patient importance of self efficacy and  on diet for CHF. Last BNP reviewed- and noted below No results for input(s): "BNP", "BNPTRIAGEBLO" in the last 168 hours.  6/11 still requiring oxygen  6/12 stable  "

## 2024-06-12 NOTE — PLAN OF CARE
06/12/21 @ 1204 - MOIRA spoke to MADINA Ferrara and updated her that pt is medically appropriate for d/c. She advised that she had spoke to Mr. Peraza, and he asked her to speak to him again when pt closer to d/c. She will contact him and update CM ASAP. CM will continue to follow.     06/12/24 @ 0904 - MOIRA spoke to Ovi Arringtoncutt @ [facility] @ appx 1600 yesterday. He advised both of their facilities (also, [facility]) have waiting lists months long. They can accommodate dialysis pt's on a limited basis d/t transportation limitations. He is unsure if they can accommodate a chest tube if pt is d/c's with this; they do not accept pt's with IV Abx's (if pt requires.     MOIRA spoke to pt's daughter (Yany Ferrara), this morning and advised her of above. She stated that the entire family spoke on the phone, last night about pt's potential placement options if not accepted @ [facility]; however there was no decision. Ms Ferrara advised she would again discuss with her family. She also advised she would discuss to Mr. Peraza, as well, since she works at [employer]. MD notified. MOIRA will continue to follow.

## 2024-06-12 NOTE — PROGRESS NOTES
"CC: blood clots per rectum    HPI 89 y.o. male with HTN, DM, ESRD on HD, JACINTO, DMII, CAD, heart failure with preserved ejection fraction here with shortness of breath found to have right sided pleural effusion. He loculated hydropneumothorax in setting of thickened pleural rind and pulmonary placed chest tube 5/29. Had plan for lung decortication but suffered PEA arrest on the operating table prior to procedure starting (in the operating room) brought to the ICU after 3 minutes of chest compressions and ACLS for pulseless electrical activity. He was on levophed and vasopressin while in ICU. TTE showed severely depressed EF 15 %.     EGD 8/2/23 with subcentimeter polyp in the cardia not biopsied due to concern for active bleeding, otherwise normal     GI consulted because nursing noted that he passed large amount of clots in bowel movements yesterday and day prior. Hgb declined to 6.7 this afternoon from 7.9 yesterday. Two days ago was 6.9. Given total 3 units pRBCs since 6/8.     Interval hx:  No bleeding reported. H/H stable at 10.2/32.6.     Past Medical History:   Diagnosis Date    Chronic kidney disease (CKD)     Diabetes mellitus     Hypertension     PVD (peripheral vascular disease)    Physical Examination  /63   Pulse 85   Temp 98.1 °F (36.7 °C) (Axillary)   Resp 20   Ht 5' 5" (1.651 m)   Wt 57.4 kg (126 lb 8.7 oz)   SpO2 98%   BMI 21.06 kg/m²   General appearance: chronically ill appearing, thin, cachectic lying in bed  HENT: Normocephalic, atraumatic, neck symmetrical, no nasal discharge   Eyes: conjunctivae/corneas clear, PERRL, EOM's intact  Lungs: R chest tube in place, no labored breathing, symmetric chest wall expansion bilaterally  Heart: regular rate and rhythm without rub; no displacement of the PMI   Abdomen: soft, non-tender; no organomegaly  Extremities: extremities symmetric; no clubbing, cyanosis, or edema  Integument: Skin color, texture, turgor normal; no rashes; hair distrubution " normal  Psychiatric: no pressured speech; flat affect;    Labs:  Lab Results   Component Value Date    WBC 10.90 06/09/2024    HGB 10.2 (L) 06/11/2024    HCT 32.6 (L) 06/11/2024    MCV 85.6 06/09/2024     (L) 06/09/2024     CMP  Sodium   Date Value Ref Range Status   06/11/2024 138 136 - 145 mmol/L Final     Potassium   Date Value Ref Range Status   06/11/2024 3.9 3.5 - 5.1 mmol/L Final     Chloride   Date Value Ref Range Status   06/11/2024 105 98 - 107 mmol/L Final     CO2   Date Value Ref Range Status   06/11/2024 29 21 - 32 mmol/L Final     Glucose   Date Value Ref Range Status   06/11/2024 77 74 - 106 mg/dL Final     BUN   Date Value Ref Range Status   06/11/2024 34 (H) 7 - 18 mg/dL Final     Creatinine   Date Value Ref Range Status   06/11/2024 3.36 (H) 0.70 - 1.30 mg/dL Final     Calcium   Date Value Ref Range Status   06/11/2024 7.9 (L) 8.5 - 10.1 mg/dL Final     Total Protein   Date Value Ref Range Status   06/11/2024 5.7 (L) 6.4 - 8.2 g/dL Final     Albumin   Date Value Ref Range Status   06/11/2024 1.7 (L) 3.5 - 5.0 g/dL Final     Bilirubin, Total   Date Value Ref Range Status   06/11/2024 0.8 >0.0 - 1.2 mg/dL Final     Alk Phos   Date Value Ref Range Status   06/11/2024 110 45 - 115 U/L Final     AST   Date Value Ref Range Status   06/11/2024 44 (H) 15 - 37 U/L Final     ALT   Date Value Ref Range Status   06/11/2024 26 16 - 61 U/L Final     Anion Gap   Date Value Ref Range Status   06/11/2024 8 7 - 16 mmol/L Final     eGFR    Date Value Ref Range Status   06/18/2020 11       eGFR   Date Value Ref Range Status   03/24/2022 9 (L) >=60 mL/min/1.73m² Final       Imaging:  CXR:  Impression:  Right chest tube is stable in position.  There is continued loculated hydropneumothorax in the lower right hemithorax.     Interval improved aeration of the right upper lung and left mid to lower lung     Assessment:   89 y.o. male with HTN, DM, ESRD on HD, JACINTO, DMII, CAD, heart failure with  preserved ejection fraction here with shortness of breath found to have right sided pleural effusion. Had loculated hydropneumothorax in setting of thickened pleural rind and pulmonary placed chest tube 5/29. Had plan for lung decortication but suffered PEA arrest on the operating table prior to procedure starting and required ICU. Now with concern for GI blood loss but not stable enough to undergo sedation for procedure and unlikely will be able to tolerate prep       Plan:   -Monitor for blood loss  -Transfusion as needed to maintain Hgb>7  -Conservative management given recent PEA arrest during induction for thoracotomy  -Patient is not stable enough to undergo sedation for procedure   -Treat with Protonix IV BID  -Advance diet as tolerates      Amari Sanchez MD  [facility]

## 2024-06-12 NOTE — SUBJECTIVE & OBJECTIVE
Interval History:     No acute events overnight    Review of Systems   HENT:  Positive for voice change.    Respiratory:  Positive for shortness of breath.      Objective:     Vital Signs (Most Recent):  Temp: 97.4 °F (36.3 °C) (06/12/24 0736)  Pulse: 80 (06/12/24 0736)  Resp: 16 (06/12/24 0736)  BP: (!) 120/59 (06/12/24 0736)  SpO2: 98 % (06/12/24 0830) Vital Signs (24h Range):  Temp:  [97.2 °F (36.2 °C)-98.1 °F (36.7 °C)] 97.4 °F (36.3 °C)  Pulse:  [77-85] 80  Resp:  [16-20] 16  SpO2:  [98 %-100 %] 98 %  BP: (103-147)/(59-64) 120/59     Weight: 57.4 kg (126 lb 8.7 oz)  Body mass index is 21.06 kg/m².    Intake/Output Summary (Last 24 hours) at 6/12/2024 1026  Last data filed at 6/11/2024 1823  Gross per 24 hour   Intake 237 ml   Output 500 ml   Net -263 ml         Physical Exam  Vitals and nursing note reviewed.   Constitutional:       Appearance: He is ill-appearing.   HENT:      Head: Normocephalic and atraumatic.      Nose: Nose normal.      Mouth/Throat:      Mouth: Mucous membranes are moist.   Eyes:      Extraocular Movements: Extraocular movements intact.   Cardiovascular:      Rate and Rhythm: Normal rate and regular rhythm.      Pulses: Normal pulses.      Heart sounds: Normal heart sounds.   Pulmonary:      Effort: Pulmonary effort is normal.      Breath sounds: Examination of the right-middle field reveals decreased breath sounds. Examination of the right-lower field reveals decreased breath sounds. Decreased breath sounds present.      Comments: Chest tube in place.   Abdominal:      General: Bowel sounds are normal.      Palpations: Abdomen is soft.      Comments: NG in place.    Musculoskeletal:      Cervical back: Normal range of motion.   Skin:     General: Skin is warm.   Neurological:      General: No focal deficit present.      Mental Status: He is alert. Mental status is at baseline.   Psychiatric:         Mood and Affect: Mood normal.         Behavior: Behavior normal.             Significant  Labs: All pertinent labs within the past 24 hours have been reviewed.    Significant Imaging: I have reviewed all pertinent imaging results/findings within the past 24 hours.

## 2024-06-12 NOTE — ASSESSMENT & PLAN NOTE
Patient found to have large pleural effusion on imaging. I have personally reviewed and interpreted the following imaging: Xray. A thoracentesis was ordered. Most likely etiology includes Congestive Heart Failure, Pneumonia, and Renal Failure. Management to include Repeat Thoracentesis and Dialysis  S/p thoracentesis on 5/23- 1.5L fluid removed.  S/p chest tube placement (5/29), cytology negative for malignancy, fluid appears exudate in nature, cultures NGTD.   S/p 6 doses of Intra-pleural lytics BID started per Pulm (5/30-6/2).   CXR mostly stable, possibly slight increase in fluid noted on CXR from 6/3.   Continue IV Zosyn (timing and transition per Pulm), discontinued vancomycin given negative MRSA PCR.   Pulm follows.    6/4 plan for decortication on Thursday 6/5 plan for decortication tomorrow  6/10 continuing antibiotics.  Chest tube in place  6/11  chest tube per pulmonology  6/12 chest tube inadvertently removed overnight.  Okay with pulmonology to leave out.  He is stable for discharge from pulmonary standpoint.

## 2024-06-12 NOTE — PT/OT/SLP PROGRESS
Occupational Therapy   Treatment    Name: Joseph Mcdonald  MRN: [id number]  Admitting Diagnosis:  Acute hypoxic respiratory failure  6 Days Post-Op    Recommendations:     Discharge Recommendations: Moderate Intensity Therapy  Discharge Equipment Recommendations:   (to be determined)  Barriers to discharge:       Assessment:     Joseph Mcdonald is a 89 y.o. male with a medical diagnosis of Acute hypoxic respiratory failure.  He presents with weakness and decline in ADLs. Performance deficits affecting function are weakness, impaired endurance, impaired functional mobility, impaired self care skills.     Rehab Prognosis:  Good; patient would benefit from acute skilled OT services to address these deficits and reach maximum level of function.       Plan:     Patient to be seen 5 x/week to address the above listed problems via self-care/home management, therapeutic activities, therapeutic exercises  Plan of Care Expires: 06/28/24  Plan of Care Reviewed with: patient    Subjective     Chief Complaint: acute hypoxic respiratory failure  Patient/Family Comments/goals: pt agreeable to OT tx  Pain/Comfort:  Pain Rating 1: 0/10    Objective:     Communicated with: RENATO Navarro prior to session.  Patient found HOB elevated with oxygen, peripheral IV, telemetry, bed alarm upon OT entry to room.    General Precautions: Standard, fall    Orthopedic Precautions:N/A  Braces: N/A  Respiratory Status: Nasal cannula, flow 3 L/min     Occupational Performance:     Bed Mobility:    Patient completed Rolling/Turning to Left with  minimum assistance  Patient completed Rolling/Turning to Right with minimum assistance  Patient completed Supine to Sit with minimum assistance  Patient completed Sit to Supine with minimum assistance     Functional Mobility/Transfers:  Not performed  Functional Mobility: not performed    Activities of Daily Living:  Not perofrmed      [facility] 6 Click ADL:      Treatment & Education:  Pt performed bed mobility as listed  above to perform EOB sitting. Pt with CGA to Shani to maintain sitting balance. Pt unable to maintain sitting d/t increased pain.     Patient left HOB elevated with all lines intact, call button in reach, bed alarm on, and RENATO Navarro notified    GOALS:   Multidisciplinary Problems       Occupational Therapy Goals          Problem: Occupational Therapy    Goal Priority Disciplines Outcome Interventions   Occupational Therapy Goal     OT, PT/OT Progressing    Description: STG:  Pt will perform grooming (I)  Pt will bathe with setup  Pt will perform UE dressing with (I)  Pt will perform LE dressing with I/mod I  Pt will transfer bed/chair/bsc with Mod I  Pt will perform standing task x 2 min with Mod I   Pt will tolerate 15 minutes of tx without fatigue      LT.Restore to max I with self care and mobility.                          Time Tracking:     OT Date of Treatment: 24  OT Start Time: 1101  OT Stop Time: 1116  OT Total Time (min): 15 min    Billable Minutes:Therapeutic Activity 15 minutes    OT/QING: OT          2024

## 2024-06-12 NOTE — ASSESSMENT & PLAN NOTE
Chest tube in place, received second round or tPA/Dnase, third day of tPA/Dnase administered today.  Some improvement in right-sided opacification with continued hydropneumothorax.  No longer a candidate for decortication given loss of pulse/ short code blue during induction for general anesthesia which effectively makes him a poor surgical candidate.    No growth thus far, has completed very prolonged course of antibiotics with Zosyn and now these have been discontinued.  Today was his third/final dose of tPA/Dnase (6/10/24).  Chest x-ray with continued evidence of hydropneumothorax and non expanding lung.    Chest tube removed (with the patient himself) this early morning.  CT chest yesterday with thickened pleura consistent with non expanding lung.  No evidence of ongoing infection.  Complete did prolonged course of antibiotics.  There is in  expectation that the   Vacuum left  will fill with fluid due to the pleural space physiology. No evidence of ongoing infection in the pleural space at this time.    Given his recent arrest during induction, he has no longer a candidate for surgical decortication due to high-risk of general anesthesia.    Follow up in Pulmonary Clinic in 4 weeks' time.

## 2024-06-12 NOTE — SUBJECTIVE & OBJECTIVE
Interval History:  Refer to hospital course      Objective:     Vital Signs (Most Recent):  Temp: 97.4 °F (36.3 °C) (06/12/24 0736)  Pulse: 80 (06/12/24 0736)  Resp: 16 (06/12/24 0736)  BP: (!) 120/59 (06/12/24 0736)  SpO2: 99 % (06/12/24 0742) Vital Signs (24h Range):  Temp:  [97.2 °F (36.2 °C)-98.1 °F (36.7 °C)] 97.4 °F (36.3 °C)  Pulse:  [77-85] 80  Resp:  [16-20] 16  SpO2:  [98 %-100 %] 99 %  BP: (103-147)/(59-64) 120/59     Weight: 57.4 kg (126 lb 8.7 oz)  Body mass index is 21.06 kg/m².      Intake/Output Summary (Last 24 hours) at 6/12/2024 0935  Last data filed at 6/11/2024 1823  Gross per 24 hour   Intake 237 ml   Output 500 ml   Net -263 ml        Physical Exam  Constitutional:       Appearance: He is ill-appearing. He is not diaphoretic.   HENT:      Head: Normocephalic and atraumatic.      Nose: No congestion or rhinorrhea.   Cardiovascular:      Rate and Rhythm: Tachycardia present. Rhythm irregular.      Pulses: Normal pulses.      Heart sounds: Normal heart sounds.   Pulmonary:      Effort: No respiratory distress.      Breath sounds: No stridor. Rhonchi present. No wheezing or rales.      Comments:  Decreased breath sounds right base  Chest:      Chest wall: No tenderness.   Abdominal:      General: Abdomen is flat.   Musculoskeletal:      Cervical back: Normal range of motion. No rigidity.      Right lower leg: No edema.      Left lower leg: No edema.   Skin:     General: Skin is dry.      Findings: No erythema.   Neurological:      General: No focal deficit present.      Mental Status: He is oriented to person, place, and time.           Review of Systems    Vents:  Vent Mode: CPAP (06/07/24 0950)  Ventilator Initiated: Yes (06/06/24 1155)  Set Rate: 16 BPM (06/07/24 0713)  Vt Set: 400 mL (06/07/24 0713)  Pressure Support: 5 cmH20 (06/07/24 0950)  PEEP/CPAP: 5 cmH20 (06/07/24 0950)  Oxygen Concentration (%): 32 (06/11/24 2000)  Peak Airway Pressure: 27 cmH20 (06/07/24 0713)  Total Ve: 7 L/m  (06/07/24 0713)  F/VT Ratio<105 (RSBI): (!) 45.33 (06/07/24 0055)    Lines/Drains/Airways       Peripheral Intravenous Line  Duration                  Hemodialysis AV Fistula Right upper arm -- days         Peripheral IV - Single Lumen 06/09/24 2000 20 G Left Upper Arm 2 days                    Significant Labs:    CBC/Anemia Profile:  Recent Labs   Lab 06/11/24  0326 06/11/24  0615 06/12/24  0458   WBC  --   --  12.05*   HGB 10.2* 10.2* 10.0*   HCT 32.6*  --  32.7*   PLT  --   --  79*   MCV  --   --  88.4   RDW  --   --  17.1*        Chemistries:  Recent Labs   Lab 06/11/24 0326      K 3.9      CO2 29   BUN 34*   CREATININE 3.36*   CALCIUM 7.9*   ALBUMIN 1.7*   PROT 5.7*   BILITOT 0.8   ALKPHOS 110   ALT 26   AST 44*       All pertinent labs within the past 24 hours have been reviewed.  Reviewed his lab work from yesterday which showed no evidence of hyperkalemia.  Stable hemoglobin at 10.0 this morning.    Significant Imaging:  I have reviewed all pertinent imaging results/findings within the past 24 hours.  Reviewed chest x-ray x2 this morning with no evidence of expanding pneumothorax.  Stable hydropneumothorax likely secondary to thickened pleura.

## 2024-06-12 NOTE — ASSESSMENT & PLAN NOTE
Patient's anemia is currently uncontrolled. Has received One units of PRBCs on 6/8 . Etiology likely d/t acute blood loss which was from GI bleed  Current CBC reviewed-   Lab Results   Component Value Date    HGB 10.0 (L) 06/12/2024    HCT 32.7 (L) 06/12/2024     Monitor serial CBC and transfuse if patient becomes hemodynamically unstable, symptomatic or H/H drops below 7/21.  Better today  6/11  Hemoglobin stable

## 2024-06-12 NOTE — ASSESSMENT & PLAN NOTE
Chronic, controlled. Latest blood pressure and vitals reviewed-     Temp:  [97.2 °F (36.2 °C)-98.1 °F (36.7 °C)]   Pulse:  [77-85]   Resp:  [16-20]   BP: (103-147)/(59-64)   SpO2:  [98 %-100 %] .   Home meds for hypertension were reviewed and noted below.   Hypertension Medications               amLODIPine (NORVASC) 5 MG tablet Take 5 mg by mouth once daily.    hydrALAZINE (APRESOLINE) 100 MG tablet Take 1 tablet by mouth 3 (three) times daily with meals.    nebivoloL (BYSTOLIC) 2.5 MG Tab Take 2.5 mg by mouth once daily.            While in the hospital, will manage blood pressure as follows; hold meds given hypotension. BP has been fluctuating, will likely add back beta blocker if BP gets on the higher side.     Will utilize p.r.n. blood pressure medication only if patient's blood pressure greater than 180/110 and he develops symptoms such as worsening chest pain or shortness of breath.   BP reasonable  6/5 BP okay  Stable

## 2024-06-12 NOTE — ASSESSMENT & PLAN NOTE
SLP consulted, failed swallow eval.  Now NPO, NG inserted for feed and meds.    NG removed per patient on accident on 6/3.  SLP to follow today as patient is more alert now.      Has reportedly become more weak and eaten less over the duration of his hospitalization.  We will place NG tube if necessary.  6/5 NG tube today   Now tolerating oral

## 2024-06-12 NOTE — ASSESSMENT & PLAN NOTE
Patient has acute blood loss due to hemorrhage, the hemorrhage is due to gastrointestinal bleed, patient does have a propensity for bleeding due to a platelet defect/deficiency.. Will trend hemoglobin/hematocrit Daily, as well as monitor and correct for any coagulation defects. CBC and vital signs have been reviewed and last CBC was noted-   Lab Results   Component Value Date    WBC 12.05 (H) 06/12/2024    HGB 10.0 (L) 06/12/2024    HCT 32.7 (L) 06/12/2024    MCV 88.4 06/12/2024    PLT 79 (L) 06/12/2024         Will order a type and screen and consent patient for blood transfusion. Will transfuse if Hgb is <7g/dl (<8g/dl in cases of active ACS) or if patient has rapid bleeding leading to hemodynamic instability.  Hemoglobin has been stable

## 2024-06-12 NOTE — PLAN OF CARE
Problem: Adult Inpatient Plan of Care  Goal: Plan of Care Review  Outcome: Progressing  Goal: Patient-Specific Goal (Individualized)  Outcome: Progressing  Goal: Absence of Hospital-Acquired Illness or Injury  Outcome: Progressing  Goal: Optimal Comfort and Wellbeing  Outcome: Progressing  Goal: Readiness for Transition of Care  Outcome: Progressing     Problem: Diabetes Comorbidity  Goal: Blood Glucose Level Within Targeted Range  Outcome: Progressing     Problem: Sepsis/Septic Shock  Goal: Optimal Coping  Outcome: Progressing  Goal: Absence of Bleeding  Outcome: Progressing  Goal: Blood Glucose Level Within Targeted Range  Outcome: Progressing  Goal: Absence of Infection Signs and Symptoms  Outcome: Progressing  Goal: Optimal Nutrition Intake  Outcome: Progressing     Problem: Fall Injury Risk  Goal: Absence of Fall and Fall-Related Injury  Outcome: Progressing     Problem: Wound  Goal: Optimal Coping  Outcome: Progressing  Goal: Optimal Functional Ability  Outcome: Progressing  Goal: Absence of Infection Signs and Symptoms  Outcome: Progressing  Goal: Improved Oral Intake  Outcome: Progressing  Goal: Optimal Pain Control and Function  Outcome: Progressing  Goal: Skin Health and Integrity  Outcome: Progressing  Goal: Optimal Wound Healing  Outcome: Progressing     Problem: Gas Exchange Impaired  Goal: Optimal Gas Exchange  Outcome: Progressing     Problem: Hemodialysis  Goal: Safe, Effective Therapy Delivery  Outcome: Progressing  Goal: Effective Tissue Perfusion  Outcome: Progressing  Goal: Absence of Infection Signs and Symptoms  Outcome: Progressing     Problem: Skin Injury Risk Increased  Goal: Skin Health and Integrity  Outcome: Progressing     Problem: Pneumonia  Goal: Fluid Balance  Outcome: Progressing  Goal: Resolution of Infection Signs and Symptoms  Outcome: Progressing  Goal: Effective Oxygenation and Ventilation  Outcome: Progressing     Problem: Mechanical Ventilation Invasive  Goal: Effective  Communication  Outcome: Progressing  Goal: Optimal Device Function  Outcome: Progressing  Goal: Mechanical Ventilation Liberation  Outcome: Progressing  Goal: Optimal Nutrition Delivery  Outcome: Progressing  Goal: Absence of Device-Related Skin and Tissue Injury  Outcome: Progressing  Goal: Absence of Ventilator-Induced Lung Injury  Outcome: Progressing

## 2024-06-12 NOTE — PROGRESS NOTES
[facility]  Pulmonology  Progress Note    Patient Name: Joseph Mcdonald  MRN: [id number]  Admission Date: 5/23/2024  Hospital Length of Stay: 20 days  Code Status: Full Code  Attending Provider: Cristian Ac DO  Primary Care Provider: [facility]   Principal Problem: Acute hypoxic respiratory failure    Subjective:     Interval History:  Refer to hospital course      Objective:     Vital Signs (Most Recent):  Temp: 97.4 °F (36.3 °C) (06/12/24 0736)  Pulse: 80 (06/12/24 0736)  Resp: 16 (06/12/24 0736)  BP: (!) 120/59 (06/12/24 0736)  SpO2: 99 % (06/12/24 0742) Vital Signs (24h Range):  Temp:  [97.2 °F (36.2 °C)-98.1 °F (36.7 °C)] 97.4 °F (36.3 °C)  Pulse:  [77-85] 80  Resp:  [16-20] 16  SpO2:  [98 %-100 %] 99 %  BP: (103-147)/(59-64) 120/59     Weight: 57.4 kg (126 lb 8.7 oz)  Body mass index is 21.06 kg/m².      Intake/Output Summary (Last 24 hours) at 6/12/2024 0935  Last data filed at 6/11/2024 1823  Gross per 24 hour   Intake 237 ml   Output 500 ml   Net -263 ml        Physical Exam  Constitutional:       Appearance: He is ill-appearing. He is not diaphoretic.   HENT:      Head: Normocephalic and atraumatic.      Nose: No congestion or rhinorrhea.   Cardiovascular:      Rate and Rhythm: Tachycardia present. Rhythm irregular.      Pulses: Normal pulses.      Heart sounds: Normal heart sounds.   Pulmonary:      Effort: No respiratory distress.      Breath sounds: No stridor. Rhonchi present. No wheezing or rales.      Comments:  Decreased breath sounds right base  Chest:      Chest wall: No tenderness.   Abdominal:      General: Abdomen is flat.   Musculoskeletal:      Cervical back: Normal range of motion. No rigidity.      Right lower leg: No edema.      Left lower leg: No edema.   Skin:     General: Skin is dry.      Findings: No erythema.   Neurological:      General: No focal deficit present.      Mental Status: He is oriented to person, place, and time.            Review of Systems    Vents:  Vent Mode: CPAP (06/07/24 0950)  Ventilator Initiated: Yes (06/06/24 1155)  Set Rate: 16 BPM (06/07/24 0713)  Vt Set: 400 mL (06/07/24 0713)  Pressure Support: 5 cmH20 (06/07/24 0950)  PEEP/CPAP: 5 cmH20 (06/07/24 0950)  Oxygen Concentration (%): 32 (06/11/24 2000)  Peak Airway Pressure: 27 cmH20 (06/07/24 0713)  Total Ve: 7 L/m (06/07/24 0713)  F/VT Ratio<105 (RSBI): (!) 45.33 (06/07/24 0055)    Lines/Drains/Airways       Peripheral Intravenous Line  Duration                  Hemodialysis AV Fistula Right upper arm -- days         Peripheral IV - Single Lumen 06/09/24 2000 20 G Left Upper Arm 2 days                    Significant Labs:    CBC/Anemia Profile:  Recent Labs   Lab 06/11/24  0326 06/11/24  0615 06/12/24  0458   WBC  --   --  12.05*   HGB 10.2* 10.2* 10.0*   HCT 32.6*  --  32.7*   PLT  --   --  79*   MCV  --   --  88.4   RDW  --   --  17.1*        Chemistries:  Recent Labs   Lab 06/11/24  0326      K 3.9      CO2 29   BUN 34*   CREATININE 3.36*   CALCIUM 7.9*   ALBUMIN 1.7*   PROT 5.7*   BILITOT 0.8   ALKPHOS 110   ALT 26   AST 44*       All pertinent labs within the past 24 hours have been reviewed.  Reviewed his lab work from yesterday which showed no evidence of hyperkalemia.  Stable hemoglobin at 10.0 this morning.    Significant Imaging:  I have reviewed all pertinent imaging results/findings within the past 24 hours.  Reviewed chest x-ray x2 this morning with no evidence of expanding pneumothorax.  Stable hydropneumothorax likely secondary to thickened pleura.  Assessment/Plan:     Neuro  Acute metabolic encephalopathy  Stable at this time    Pulmonary  Pleural effusion   Chest tube in place, received second round or tPA/Dnase, third day of tPA/Dnase administered today.  Some improvement in right-sided opacification with continued hydropneumothorax.  No longer a candidate for decortication given loss of pulse/ short code blue during induction for general  anesthesia which effectively makes him a poor surgical candidate.    No growth thus far, has completed very prolonged course of antibiotics with Zosyn and now these have been discontinued.  Today was his third/final dose of tPA/Dnase (6/10/24).  Chest x-ray with continued evidence of hydropneumothorax and non expanding lung.    Chest tube removed (with the patient himself) this early morning.  CT chest yesterday with thickened pleura consistent with non expanding lung.  No evidence of ongoing infection.  Complete did prolonged course of antibiotics.  There is in  expectation that the   Vacuum left  will fill with fluid due to the pleural space physiology. No evidence of ongoing infection in the pleural space at this time.    Given his recent arrest during induction, he has no longer a candidate for surgical decortication due to high-risk of general anesthesia.    Follow up in Pulmonary Clinic in 4 weeks' time.      Cardiac/Vascular  Cardiac arrest   Status post cardiac arrest during induction in the ICU, with stay in the ICU following his brief code blue.  Successful return of spontaneous circulation, with no residual neurological deficits    Renal/  ESRD (end stage renal disease)    Patient received dialysis Tuesday Thursday Saturday.                       Benjamin Rodney MD  Pulmonology  [facility]

## 2024-06-13 NOTE — PT/OT/SLP PROGRESS
Physical Therapy Treatment    Patient Name:  Joseph Mcdonald   MRN:  [id number]    Recommendations:     Discharge Recommendations: Moderate Intensity Therapy  Discharge Equipment Recommendations: to be determined by next level of care  Barriers to discharge:  ongoing medical treatment    Assessment:     Joseph Mcdonald is a 89 y.o. male admitted with a medical diagnosis of Acute hypoxic respiratory failure.  He presents with the following impairments/functional limitations: weakness, impaired endurance, impaired self care skills, impaired functional mobility, edema, impaired skin, decreased lower extremity function.    Pt participated well with PT, however, remains unable to progress to gait    Rehab Prognosis: Fair; patient would benefit from acute skilled PT services to address these deficits and reach maximum level of function.    Recent Surgery: Procedure(s) (LRB):  DECORTICATION, LUNG (Right) 7 Days Post-Op    Plan:     During this hospitalization, patient to be seen 5 x/week to address the identified rehab impairments via gait training, therapeutic activities, therapeutic exercises and progress toward the following goals:    Plan of Care Expires:  06/24/24    Subjective     Chief Complaint: acute hypoxic resp failure  Patient/Family Comments/goals: pt alert and agreeble  Pain/Comfort:         Objective:     Communicated with Melanie Saleh RN prior to session.  Patient found HOB elevated with oxygen, telemetry (tele sitter) upon PT entry to room.     General Precautions: Standard, fall  Orthopedic Precautions: N/A  Braces: N/A  Respiratory Status: Nasal cannula, flow 2 L/min     Functional Mobility:  Bed Mobility:     Rolling Left:  minimum assistance and for hygiene  Rolling Right: minimum assistance and for hygiene  Supine to Sit: minimum assistance, of 1-2 persons, and assist with trunk and  BLE management; increased time and cueing   Sit to Supine: minimum assistance, of 2 persons, and assist with trunk and  B LE  management  Transfers:     Sit to Stand:  moderate assistance and of 2 persons with rolling walker and x 3 trials      AM-PAC 6 CLICK MOBILITY  Turning over in bed (including adjusting bedclothes, sheets and blankets)?: 3  Sitting down on and standing up from a chair with arms (e.g., wheelchair, bedside commode, etc.): 2  Moving from lying on back to sitting on the side of the bed?: 3  Moving to and from a bed to a chair (including a wheelchair)?: 1  Need to walk in hospital room?: 1  Climbing 3-5 steps with a railing?: 1  Basic Mobility Total Score: 11       Treatment & Education:  Pt performed 2 x 15 reps (B) LE exercises: ap, quad set, glut set, straight leg raise, hip ab/adduction, heel slide with active assist and near constant verbal and tactile stimuli to assist with exercise and remain on task    Contact guard assist sitting EOB x 10 minutes    Patient left HOB elevated with all lines intact, call button in reach, and bed alarm on..    GOALS:   Multidisciplinary Problems       Physical Therapy Goals          Problem: Physical Therapy    Goal Priority Disciplines Outcome Goal Variances Interventions   Physical Therapy Goal     PT, PT/OT Progressing     Description: Short Term Goals to be met by: 24    Patient will increase functional independence with mobility by performin. Supine to sit with Stand by assist  2. Sit to stand transfer with Stand by assist using Rolling walker  3. Bed to chair transfer with Stand by assist using Rolling walker  4. Gait  x 150 feet with Stand by assist using Rolling walker  5. Lower extremity exercise program x30 reps per handout, with assistance as needed    Long Term Goals to be met by: 24    Pt will regain full independent functional mobility with straight cane to return to home situation and prior activities of daily living.                        Time Tracking:     PT Received On: 24  PT Start Time: 1323     PT Stop Time: 1350  PT Total Time (min): 27  min     Billable Minutes: Therapeutic Activity 15 and Therapeutic Exercise 10    Treatment Type: Treatment  PT/PTA: PTA     Number of PTA visits since last PT visit: 4     06/13/2024

## 2024-06-13 NOTE — ASSESSMENT & PLAN NOTE
Creatine stable for now. BMP reviewed- noted Estimated Creatinine Clearance: 12.6 mL/min (A) (based on SCr of 3.28 mg/dL (H)). according to latest data. 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    Nephrology consulted to resume HD sessions.   Continue dialysis   6/5Electrolytes okay  6/10 dialysis today

## 2024-06-13 NOTE — PROGRESS NOTES
[facility] Medicine  Progress Note    Patient Name: Joseph Mcdonald  MRN: [id number]  Patient Class: IP- Inpatient   Admission Date: 5/23/2024  Length of Stay: 21 days  Attending Physician: Cristian Ac DO  Primary Care Provider: Clinic, [facility]        Subjective:     Principal Problem:Acute hypoxic respiratory failure        HPI:    89-year-old  male With a past medical history of end-stage renal disease, hypertension, iron deficiency anemia, secondary hyperparathyroidism, type 2 diabetes, CAD, heart failure with preserved ejection fraction, hypokalemia inability presents to the ED with 1 week worsening shortness of breath.  This was particularly bad during his dialysis session this morning.  Due to this daughter brought him to the ED thereafter.  He denies any fever, chills, cough, wheezing, sputum production, palpitations.  He has been take medication as in his not missed any dialysis sessions.  He denies any nausea, vomiting, diarrhea, dysuria.  Imaging in the ED revealed near-complete he had a pacemaker patient of the right hemithorax review of systems otherwise negative.    Overview/Hospital Course:   5/24 1.5 L removed from right pleural space today.  Still has large right-sided effusion.  We will attempt to get some more off tomorrow.  5/25 dialyzing today  5/26 bedside ultrasound today still has large pleural effusion, complex appearing.  Was going to attempt bedside ultrasound but given complex nature have consulted pulmonology  5/27 D/W Pulm, plan for chest tube placement.   5/28- Midodrine added low BP.   5/29- s/p chest tube placement per Dr. Rodney, fluid studies sent.   5/30- BP low again, midodrine resumed. Started on intra-pleural lytics per Pulm.  5/31- Intra-pleural lytics continued, CXR improved.   6/1- Failed swallow and is with NG now and tube feeds. S/p 6 doses of Intra-pleural lytics (total 6 doses).   6/2- Mental status better,  resp status stable.  6/3- NG removed on accident while patient was working with PT/OT, SLP consulted for reevaluation. Pulm to follow, CXR with slightly increased right effusion.    6/4 surgery consulted today.  Plan for decortication on Thursday 6/6 6/5 plan for decortication tomorrow.  We will place NG tube due to patient not eating well  6/10  Mr. Sin has been in the ICU for the last several days.  He had a P EA arrest during induction for his decortication.  He is now out of the ICU and doing well.  Still has right-sided chest tube.  Notified this morning that he pass large clots in about movement.  We will consult GI and start IV  protonic  6/11 hemoglobin okay.  Unable to do endoscopy due to high risk of sedating patient.  Has a sacral wound that may need debriding.  We will have surgery evaluate today.  Again we will be high risk for anesthesia induction.  6/12 no surgical intervention on sacral decubitus.  Wound care.  Currently stable for discharge and awaiting placement.  Chest tube has been removed.  6/13 medically ready for discharge.  Awaiting placement    Interval History:     No acute events overnight    Review of Systems   HENT:  Positive for voice change.    Respiratory:  Positive for shortness of breath.      Objective:     Vital Signs (Most Recent):  Temp: 98.5 °F (36.9 °C) (06/13/24 0735)  Pulse: 88 (06/13/24 0735)  Resp: 18 (06/13/24 0735)  BP: 126/86 (06/13/24 0735)  SpO2: 95 % (06/13/24 0745) Vital Signs (24h Range):  Temp:  [97.3 °F (36.3 °C)-98.9 °F (37.2 °C)] 98.5 °F (36.9 °C)  Pulse:  [56-98] 88  Resp:  [15-21] 18  SpO2:  [94 %-100 %] 95 %  BP: (106-164)/() 126/86     Weight: 57.4 kg (126 lb 8.7 oz)  Body mass index is 21.06 kg/m².    Intake/Output Summary (Last 24 hours) at 6/13/2024 0901  Last data filed at 6/12/2024 1534  Gross per 24 hour   Intake 0 ml   Output 1500 ml   Net -1500 ml         Physical Exam  Vitals and nursing note reviewed.   Constitutional:        Appearance: He is ill-appearing.   HENT:      Head: Normocephalic and atraumatic.      Nose: Nose normal.      Mouth/Throat:      Mouth: Mucous membranes are moist.   Eyes:      Extraocular Movements: Extraocular movements intact.   Cardiovascular:      Rate and Rhythm: Normal rate and regular rhythm.      Pulses: Normal pulses.      Heart sounds: Normal heart sounds.   Pulmonary:      Effort: Pulmonary effort is normal.      Breath sounds: Examination of the right-middle field reveals decreased breath sounds. Examination of the right-lower field reveals decreased breath sounds. Decreased breath sounds present.      Comments: Chest tube in place.   Abdominal:      General: Bowel sounds are normal.      Palpations: Abdomen is soft.      Comments: NG in place.    Musculoskeletal:      Cervical back: Normal range of motion.   Skin:     General: Skin is warm.   Neurological:      General: No focal deficit present.      Mental Status: He is alert. Mental status is at baseline.   Psychiatric:         Mood and Affect: Mood normal.         Behavior: Behavior normal.             Significant Labs: All pertinent labs within the past 24 hours have been reviewed.    Significant Imaging: I have reviewed all pertinent imaging results/findings within the past 24 hours.    Assessment/Plan:      * Acute hypoxic respiratory failure  Sec to pleural effusion.  Require 2L NC since admission.   Imaging consistent with large right sided pleural effusion.   S/p thoracentesis on 5/23 with 1,5L fluid removed, no pleural fluid studies sent.   Remained symptomatic so CT chest obtained on 5/24 and consistent with large effusion.  Pulmonology consulted; s/p chest tube placement (5/29), s/p intra-pleural lytics started on 5/30 (total 6 doses).  Volume removal with HD, continue broad spectrum antibiotics as below.   Monitor oxygen status.   Home O2 eval prior to discharge.      6/4Improved.  Plan for decortication of the right lung on Thursday 6/5  plan for OR tomorrow  6/10 chest tube in place still draining.  6/11 still requiring oxygen  6/12 chest tube removed.  Stable for discharge  Doing well    Parapneumonic effusion  Patient found to have large pleural effusion on imaging. I have personally reviewed and interpreted the following imaging: Xray. A thoracentesis was ordered. Most likely etiology includes Congestive Heart Failure, Pneumonia, and Renal Failure. Management to include Repeat Thoracentesis and Dialysis  S/p thoracentesis on 5/23- 1.5L fluid removed.  S/p chest tube placement (5/29), cytology negative for malignancy, fluid appears exudate in nature, cultures NGTD.   S/p 6 doses of Intra-pleural lytics BID started per Pulm (5/30-6/2).   CXR mostly stable, possibly slight increase in fluid noted on CXR from 6/3.   Continue IV Zosyn (timing and transition per Pulm), discontinued vancomycin given negative MRSA PCR.   Pulm follows.    6/4 plan for decortication on Thursday 6/5 plan for decortication tomorrow  6/10 continuing antibiotics.  Chest tube in place  6/11  chest tube per pulmonology  6/12 chest tube inadvertently removed overnight.  Okay with pulmonology to leave out.  He is stable for discharge from pulmonary standpoint.  Chest tube out, completed antibiotics.    Pressure injury of left heel, unstageable  Unclear chronicity      Pressure injury of right heel, unstageable  Unclear chronicity      Decubitus ulcer of sacral region, unstageable   We will have surgery evaluate today   No surgical intervention.  6/12    Loculated pleural effusion  Patient found to have large pleural effusion on imaging. I have personally reviewed and interpreted the following imaging: Xray. A thoracentesis was performed. Pleural fluid was sent for analysis. Labs reviewed, including Serum LDH   LDH   Date Value Ref Range Status   05/29/2024 279 (H) 87 - 241 U/L Final   , Pleural Fluid LDH   Lactate Dehydrogenase (LDH), Body Fluid   Date Value Ref Range Status  "  05/29/2024 270 U/L Final     Comment:     Reference ranges for this analyte have not been established for the body fluid specimen submitted for analysis.    , Serum Protein   Total Protein   Date Value Ref Range Status   06/11/2024 5.7 (L) 6.4 - 8.2 g/dL Final    , Pleural Protein No results found for: "PROTEINBODYF" , Cell Count and Differential No results found for: "BFCELLCNT" , and Fluid Cultures No results found for: "BODYFLUIDCUL" . Fluid most consistent with Exudate.  . Most likely etiology includes Pneumonia. Management to include Pleural Catheter   Chest tube removed 6/12    Anemia due to GI blood loss  Patient's anemia is currently uncontrolled. Has received 1 units of PRBCs on 06/10/2024 . Etiology likely d/t acute blood loss which was from GI bleed  Current CBC reviewed-   Lab Results   Component Value Date    HGB 9.8 (L) 06/13/2024    HCT 31.0 (L) 06/13/2024     Monitor serial CBC and transfuse if patient becomes hemodynamically unstable, symptomatic or H/H drops below 7/21.    Blood per rectum  Patient has acute blood loss due to hemorrhage, the hemorrhage is due to gastrointestinal bleed, patient does have a propensity for bleeding due to a platelet defect/deficiency.. Will trend hemoglobin/hematocrit Daily, as well as monitor and correct for any coagulation defects. CBC and vital signs have been reviewed and last CBC was noted-   Lab Results   Component Value Date    WBC 14.39 (H) 06/13/2024    HGB 9.8 (L) 06/13/2024    HCT 31.0 (L) 06/13/2024    MCV 84.0 06/13/2024    PLT 76 (L) 06/13/2024         Will order a type and screen and consent patient for blood transfusion. Will transfuse if Hgb is <7g/dl (<8g/dl in cases of active ACS) or if patient has rapid bleeding leading to hemodynamic instability.  Hemoglobin has been stable    GI bleed  Patient has acute blood loss due to hemorrhage, the hemorrhage is due to gastrointestinal bleed, patient does have a propensity for bleeding due to a platelet " defect/deficiency and a medication, the medication is heparin.. Will trend hemoglobin/hematocrit Every 6 hours, as well as monitor and correct for any coagulation defects. CBC and vital signs have been reviewed and last CBC was noted-   Lab Results   Component Value Date    WBC 14.39 (H) 06/13/2024    HGB 9.8 (L) 06/13/2024    HCT 31.0 (L) 06/13/2024    MCV 84.0 06/13/2024    PLT 76 (L) 06/13/2024         Will order a type and screen and consent patient for blood transfusion. Will transfuse if Hgb is <7g/dl (<8g/dl in cases of active ACS) or if patient has rapid bleeding leading to hemodynamic instability.  6/11 no endoscopy at this time  No further evidence of bleeding    Troponin level elevated   Secondary to cardiac arrest      Coronary artery disease involving native coronary artery of native heart without angina pectoris  Patient with known CAD s/p  unclear , which is controlled Will continue Statin and monitor for S/Sx of angina/ACS. Continue to monitor on telemetry.   6/11 no chest pain    Atrial fibrillation with RVR  Patient with Persistent (7 days or more) atrial fibrillation which is controlled currently with  Rate controlled.   with no medication needed . Patient is currently in atrial fibrillation.[id number] Score: 4. HASBLED Score: 5. Anticoagulation not indicated due to active GI bleed .  Rate controlled 6/11    Acute systolic CHF (congestive heart failure)  Patient is identified as having Diastolic (HFpEF) heart failure that is Acute on chronic. CHF is currently uncontrolled due to Pulmonary edema/pleural effusion on CXR. Latest ECHO performed and demonstrates- Results for orders placed during the hospital encounter of 05/23/24    Echo    Interpretation Summary    Left Ventricle: The left ventricle is normal in size. Increased wall thickness. There is concentric remodeling. There is normal systolic function. Biplane (2D) method of discs ejection fraction is 55%. Grade II diastolic dysfunction.     "Right Ventricle: Mild right ventricular enlargement. Systolic function is mildly reduced.    Left Atrium: Left atrium is mildly dilated.    Right Atrium: Right atrium is mildly dilated.    Aortic Valve: The aortic valve is a trileaflet valve. Mildly calcified cusps. There is mild stenosis. Aortic valve area by VTI is 1.59 cm². Aortic valve peak velocity is 1.48 m/s. Mean gradient is 4 mmHg. The dimensionless index is 0.60.    Mitral Valve: There is bileaflet sclerosis. Mildly thickened leaflets. There is mild regurgitation.    Tricuspid Valve: There is severe regurgitation with an eccentrically directed jet.    Pulmonary Artery: Pulmonary artery pressure could not be accurately determined due to severe TR.    IVC/SVC: Elevated venous pressure at 15 mmHg.    Pericardium: There is a trivial effusion. No indication of cardiac tamponade. Left pleural effusion.  . Continue Nitrate/Vasodilator and monitor clinical status closely. Monitor on telemetry. Patient is off CHF pathway.  Monitor strict Is&Os and daily weights.  Place on fluid restriction of 1.5 L. Cardiology has not been consulted. Continue to stress to patient importance of self efficacy and  on diet for CHF. Last BNP reviewed- and noted below No results for input(s): "BNP", "BNPTRIAGEBLO" in the last 168 hours.  6/11 still requiring oxygen  6/12 stable    Cardiac arrest   PEA arrest during induction for his decortication.  Likely medication effect from anesthesia.    Appears to not have any neurologic sequela from the event.      Thrombocytopenia  Patient was found to have thrombocytopenia, the likely etiology is secondary to sepsis/infection, will monitor the platelets Daily. Will transfuse if platelet count is <10k. Hold DVT prophylaxis if platelets are <50k. The patient's platelet results have been reviewed and are listed below.  Recent Labs   Lab 06/13/24  0450   PLT 76*           Acute metabolic encephalopathy  Discontinued trazodone.  Management as " above.  Requiring restraints to avoid treatment interruption.   Improving.    6/4  More awake today  6/5 improving  6/10 appears to be at baseline today  Resolved    Oropharyngeal dysphagia  SLP consulted, failed swallow eval.  Now NPO, NG inserted for feed and meds.    NG removed per patient on accident on 6/3.  SLP to follow today as patient is more alert now.      Has reportedly become more weak and eaten less over the duration of his hospitalization.  We will place NG tube if necessary.  6/5 NG tube today   Now tolerating oral    Pleural effusion  As above.    Decortication Thursday    Patient found to have large pleural effusion on imaging. I have personally reviewed and interpreted the following imaging: Xray. A thoracentesis was ordered. Most likely etiology includes Pneumonia. Management to include Repeat Thoracentesis, Pleural Catheter, and Dialysis   6/10 chest tube in place  6/11 chest tube per pulmonology  6/12 chest tube out    Debility  Patient with Acute on chronic debility due to age-related physical debility. Latest AMPAC and GEMS scores have not been reviewed. Evaluation for etiology is complete.   Plan includes PT/OT consulted.  Home health on discharge.      PTOT    Anemia of renal disease  Patient's anemia is currently uncontrolled. Has not received any PRBCs to date. Etiology likely d/t chronic disease due to ESRD  Current CBC reviewed-   Lab Results   Component Value Date    HGB 9.8 (L) 06/13/2024    HCT 31.0 (L) 06/13/2024     Monitor serial CBC and transfuse if patient becomes hemodynamically unstable, symptomatic or H/H drops below 7/21.   No evidence of bleeding   6/5 no evidence of bleeding   6/10 Large clots passed and stool this morning.  Consult GI.  IV ppi.  Trend hemoglobin  6/11 hemoglobin holding today    Acute on chronic heart failure with preserved ejection fraction  Patient is identified as having Diastolic (HFpEF) heart failure that is Acute on chronic. CHF is currently  "uncontrolled due to Pulmonary edema/pleural effusion on CXR. Latest ECHO performed and demonstrates- Results for orders placed during the hospital encounter of 08/01/23    Echo    Interpretation Summary    Left Ventricle: The left ventricle is normal in size. Increased wall thickness. There is concentric remodeling. Normal wall motion. There is normal systolic function with a visually estimated ejection fraction of 55 - 60%. Grade II diastolic dysfunction.    Left Atrium: Left atrium is mildly dilated. There is a calcified 1.5 x 2 cm full wall thickness mass noted extending from the myocardium into the pericardial space, unclear etiology, recommend cardiac MRI.    Right Ventricle: Normal right ventricular cavity size. Systolic function is normal.    Aortic Valve: The aortic valve is a trileaflet valve. There is physiologically normal aortic regurgitation.    Mitral Valve: There is no stenosis. The mean pressure gradient across the mitral valve is 3 mmHg at a heart rate of  bpm. There is mild regurgitation with a centrally directed jet.    Tricuspid Valve: There is mild to moderate regurgitation with a centrally directed jet.    Pulmonic Valve: There is no significant stenosis.    Pericardium: Small circumferential pericardial effusion present. No indication of cardiac tamponade.  . Continue Nitrate/Vasodilator and monitor clinical status closely. Monitor on telemetry. Patient is off CHF pathway.  Monitor strict Is&Os and daily weights.  Place on fluid restriction of 1.5 L. Cardiology has not been consulted. Continue to stress to patient importance of self efficacy and  on diet for CHF. Last BNP reviewed- and noted below No results for input(s): "BNP", "BNPTRIAGEBLO" in the last 168 hours.   Volume removal per HD.     6/4 Makes no urine.  Continue dialysis  6/5 continue dialysis    Elevated troponin   Likely secondary to cardiac arrest      Malnutrition of moderate degree  Nutrition consulted. Most recent " weight and BMI monitored-     Measurements:  Wt Readings from Last 1 Encounters:   06/08/24 57.4 kg (126 lb 8.7 oz)   Body mass index is 21.06 kg/m².    Patient has been screened and assessed by RD.    Malnutrition Type:  Context:    Level:      Malnutrition Characteristic Summary:       Interventions/Recommendations (treatment strategy):  Recommend to add Gallo to aid in wound healing, ONS to provide additional calories/protein to heal/HD   We will place NG tube if necessary.  6/5 NG tube today  6/10 NG tube out.  Encourage orals  6/11 eating during interview today    DM2 (diabetes mellitus, type 2)  HBA1c 4.5  No need for SSI or POC glucose check.     Secondary hyperparathyroidism of renal origin  Resume home Renvela.    Nephrology following  Appreciate nephrology    Essential (primary) hypertension  Chronic, controlled. Latest blood pressure and vitals reviewed-     Temp:  [97.3 °F (36.3 °C)-98.9 °F (37.2 °C)]   Pulse:  [56-98]   Resp:  [15-21]   BP: (106-164)/()   SpO2:  [94 %-100 %] .   Home meds for hypertension were reviewed and noted below.   Hypertension Medications               amLODIPine (NORVASC) 5 MG tablet Take 5 mg by mouth once daily.    hydrALAZINE (APRESOLINE) 100 MG tablet Take 1 tablet by mouth 3 (three) times daily with meals.    nebivoloL (BYSTOLIC) 2.5 MG Tab Take 2.5 mg by mouth once daily.            While in the hospital, will manage blood pressure as follows; hold meds given hypotension. BP has been fluctuating, will likely add back beta blocker if BP gets on the higher side.     Will utilize p.r.n. blood pressure medication only if patient's blood pressure greater than 180/110 and he develops symptoms such as worsening chest pain or shortness of breath.   BP reasonable  6/5 BP okay  Stable    ESRD (end stage renal disease)  Creatine stable for now. BMP reviewed- noted Estimated Creatinine Clearance: 12.6 mL/min (A) (based on SCr of 3.28 mg/dL (H)). according to latest data. Based  on current GFR, CKD stage is end stage.    Nephrology consulted to resume HD sessions.   Continue dialysis   6/5Electrolytes okay  6/10 dialysis today    Dependence on renal dialysis   ESRD      Acute blood loss anemia  Patient's anemia is currently uncontrolled. Has received One units of PRBCs on 6/8 . Etiology likely d/t acute blood loss which was from GI bleed  Current CBC reviewed-   Lab Results   Component Value Date    HGB 9.8 (L) 06/13/2024    HCT 31.0 (L) 06/13/2024     Monitor serial CBC and transfuse if patient becomes hemodynamically unstable, symptomatic or H/H drops below 7/21.  Better today  6/11  Hemoglobin stable  No evidence of bleeding      VTE Risk Mitigation (From admission, onward)           Ordered     IP VTE HIGH RISK PATIENT  Once         06/06/24 1154     Place sequential compression device  Until discontinued         06/06/24 1154                    Discharge Planning   BRIAN: 6/14/2024     Code Status: Full Code   Is the patient medically ready for discharge?:     Reason for patient still in hospital (select all that apply): Treatment  Discharge Plan A: Home, Home Health        Stop Zofran.          Cristian Ac DO  Department of Hospital Medicine   [facility]

## 2024-06-13 NOTE — ASSESSMENT & PLAN NOTE
Patient's anemia is currently uncontrolled. Has received 1 units of PRBCs on 06/10/2024 . Etiology likely d/t acute blood loss which was from GI bleed  Current CBC reviewed-   Lab Results   Component Value Date    HGB 9.8 (L) 06/13/2024    HCT 31.0 (L) 06/13/2024     Monitor serial CBC and transfuse if patient becomes hemodynamically unstable, symptomatic or H/H drops below 7/21.

## 2024-06-13 NOTE — ASSESSMENT & PLAN NOTE
Sec to pleural effusion.  Require 2L NC since admission.   Imaging consistent with large right sided pleural effusion.   S/p thoracentesis on 5/23 with 1,5L fluid removed, no pleural fluid studies sent.   Remained symptomatic so CT chest obtained on 5/24 and consistent with large effusion.  Pulmonology consulted; s/p chest tube placement (5/29), s/p intra-pleural lytics started on 5/30 (total 6 doses).  Volume removal with HD, continue broad spectrum antibiotics as below.   Monitor oxygen status.   Home O2 eval prior to discharge.      6/4Improved.  Plan for decortication of the right lung on Thursday 6/5 plan for OR tomorrow  6/10 chest tube in place still draining.  6/11 still requiring oxygen  6/12 chest tube removed.  Stable for discharge  Doing well

## 2024-06-13 NOTE — ASSESSMENT & PLAN NOTE
Patient with Persistent (7 days or more) atrial fibrillation which is controlled currently with  Rate controlled.   with no medication needed . Patient is currently in atrial fibrillation.[id number] Score: 4. HASBLED Score: 5. Anticoagulation not indicated due to active GI bleed .  Rate controlled 6/11

## 2024-06-13 NOTE — ASSESSMENT & PLAN NOTE
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 CHF is currently uncontrolled due to Pulmonary edema/pleural effusion on CXR. Latest ECHO performed and demonstrates- Results for orders placed during the hospital encounter of 05/23/24    Echo    Interpretation Summary    Left Ventricle: The left ventricle is normal in size. Increased wall thickness. There is concentric remodeling. There is normal systolic function. Biplane (2D) method of discs ejection fraction is 55%. Grade II diastolic dysfunction.    Right Ventricle: Mild right ventricular enlargement. Systolic function is mildly reduced.    Left Atrium: Left atrium is mildly dilated.    Right Atrium: Right atrium is mildly dilated.    Aortic Valve: The aortic valve is a trileaflet valve. Mildly calcified cusps. There is mild stenosis. Aortic valve area by VTI is 1.59 cm². Aortic valve peak velocity is 1.48 m/s. Mean gradient is 4 mmHg. The dimensionless index is 0.60.    Mitral Valve: There is bileaflet sclerosis. Mildly thickened leaflets. There is mild regurgitation.    Tricuspid Valve: There is severe regurgitation with an eccentrically directed jet.    Pulmonary Artery: Pulmonary artery pressure could not be accurately determined due to severe TR.    IVC/SVC: Elevated venous pressure at 15 mmHg.    Pericardium: There is a trivial effusion. No indication of cardiac tamponade. Left pleural effusion.  . Continue Nitrate/Vasodilator and monitor clinical status closely. Monitor on telemetry. Patient is off CHF pathway.  Monitor strict Is&Os and daily weights.  Place on fluid restriction of 1.5 L. Cardiology has not been consulted. Continue to stress to patient importance of self efficacy and  on diet for CHF. Last BNP reviewed- and noted below No results for input(s): "BNP", "BNPTRIAGEBLO" in the last 168 hours.  6/11 still requiring oxygen  6/12 stable  "

## 2024-06-13 NOTE — ASSESSMENT & PLAN NOTE
Patient was found to have thrombocytopenia, the likely etiology is secondary to sepsis/infection, will monitor the platelets Daily. Will transfuse if platelet count is <10k. Hold DVT prophylaxis if platelets are <50k. The patient's platelet results have been reviewed and are listed below.  Recent Labs   Lab 06/13/24  0450   PLT 76*

## 2024-06-13 NOTE — ASSESSMENT & PLAN NOTE
Patient's anemia is currently uncontrolled. Has not received any PRBCs to date. Etiology likely d/t chronic disease due to ESRD  Current CBC reviewed-   Lab Results   Component Value Date    HGB 9.8 (L) 06/13/2024    HCT 31.0 (L) 06/13/2024     Monitor serial CBC and transfuse if patient becomes hemodynamically unstable, symptomatic or H/H drops below 7/21.   No evidence of bleeding   6/5 no evidence of bleeding   6/10 Large clots passed and stool this morning.  Consult GI.  IV ppi.  Trend hemoglobin  6/11 hemoglobin holding today

## 2024-06-13 NOTE — ASSESSMENT & PLAN NOTE
Patient has acute blood loss due to hemorrhage, the hemorrhage is due to gastrointestinal bleed, patient does have a propensity for bleeding due to a platelet defect/deficiency and a medication, the medication is heparin.. Will trend hemoglobin/hematocrit Every 6 hours, as well as monitor and correct for any coagulation defects. CBC and vital signs have been reviewed and last CBC was noted-   Lab Results   Component Value Date    WBC 14.39 (H) 06/13/2024    HGB 9.8 (L) 06/13/2024    HCT 31.0 (L) 06/13/2024    MCV 84.0 06/13/2024    PLT 76 (L) 06/13/2024         Will order a type and screen and consent patient for blood transfusion. Will transfuse if Hgb is <7g/dl (<8g/dl in cases of active ACS) or if patient has rapid bleeding leading to hemodynamic instability.  6/11 no endoscopy at this time  No further evidence of bleeding

## 2024-06-13 NOTE — ASSESSMENT & PLAN NOTE
Patient has acute blood loss due to hemorrhage, the hemorrhage is due to gastrointestinal bleed, patient does have a propensity for bleeding due to a platelet defect/deficiency.. Will trend hemoglobin/hematocrit Daily, as well as monitor and correct for any coagulation defects. CBC and vital signs have been reviewed and last CBC was noted-   Lab Results   Component Value Date    WBC 14.39 (H) 06/13/2024    HGB 9.8 (L) 06/13/2024    HCT 31.0 (L) 06/13/2024    MCV 84.0 06/13/2024    PLT 76 (L) 06/13/2024         Will order a type and screen and consent patient for blood transfusion. Will transfuse if Hgb is <7g/dl (<8g/dl in cases of active ACS) or if patient has rapid bleeding leading to hemodynamic instability.  Hemoglobin has been stable

## 2024-06-13 NOTE — PLAN OF CARE
06/13/24 @ 1110 - MOIRA spoke to Ms. Ferrara - she advised they would like to attempt placement @ [facility]. MOIRA submitted referral to [facility] and notified Myra. MOIRA will continue to follow.     06/13/24 @ 4018 [facility] [phone] and spoke to staff to verify pt still has a chair available for his Tu, Th, Sa scheduled dialysis. Staff confirmed he does have chair availability.    CM attempted to contact pt's daughter, Yany Ferrara.  left for return call re: Choice for long term placement for pt. CM will continue to follow.

## 2024-06-13 NOTE — PT/OT/SLP PROGRESS
Occupational Therapy   Treatment    Name: Joseph Mcdonald  MRN: [id number]  Admitting Diagnosis:  Acute hypoxic respiratory failure  7 Days Post-Op    Recommendations:     Discharge Recommendations: Moderate Intensity Therapy  Discharge Equipment Recommendations:   (to be determined)  Barriers to discharge:       Assessment:     Joseph Mcdonald is a 89 y.o. male with a medical diagnosis of Acute hypoxic respiratory failure.  Performance deficits affecting function are weakness, impaired endurance, impaired self care skills.     Rehab Prognosis:  Good; patient would benefit from acute skilled OT services to address these deficits and reach maximum level of function.       Plan:     Patient to be seen 5 x/week to address the above listed problems via self-care/home management, therapeutic activities, therapeutic exercises  Plan of Care Expires: 06/28/24  Plan of Care Reviewed with: patient    Subjective     Chief Complaint:   Patient/Family Comments/goals:   Pain/Comfort:  Pain Rating 1: 0/10    Objective:     Communicated with: Melanie Saleh RN prior to session.  Patient found supine with peripheral IV, telemetry, bed alarm (tele  sitter) upon OT entry to room.    General Precautions: Standard, fall    Orthopedic Precautions:N/A  Braces: N/A  Respiratory Status: Nasal cannula, flow 2 L/min     Occupational Performance:     Bed Mobility:         Functional Mobility/Transfers:    Functional Mobility:     Activities of Daily Living:        [facility]      Treatment & Education:  Pt performed hand helper with 1 rubber band resistances 25 reps x 2, aarom 15 reps x  2 B shld flex,abd/add,elbow flex/ext      Patient left supine with all lines intact, call button in reach, and bed alarm on    GOALS:   Multidisciplinary Problems       Occupational Therapy Goals          Problem: Occupational Therapy    Goal Priority Disciplines Outcome Interventions   Occupational Therapy Goal     OT, PT/OT Progressing    Description:  STG:  Pt will perform grooming (I)  Pt will bathe with setup  Pt will perform UE dressing with (I)  Pt will perform LE dressing with I/mod I  Pt will transfer bed/chair/bsc with Mod I  Pt will perform standing task x 2 min with Mod I   Pt will tolerate 15 minutes of tx without fatigue      LT.Restore to max I with self care and mobility.                          Time Tracking:     OT Date of Treatment: 24  OT Start Time:   OT Stop Time:   OT Total Time (min): 13 min    Billable Minutes:Therapeutic Exercise 12    OT/QING: QING          2024

## 2024-06-13 NOTE — PROGRESS NOTES
[facility]  Nephrology  Progress Note    Patient Name: Joseph Mcdonald  MRN: [id number]  Admission Date: 5/23/2024  Hospital Length of Stay: 21 days  Attending Provider: Cristian Ac DO   Primary Care Physician: [facility]  Principal Problem:Acute hypoxic respiratory failure    Consults  Subjective:     Interval History:  Mr. Mcdonald is seen in follow-up of his end-stage renal disease.  He is stable today and conversant.  Chest tube has been removed from his right chest.  He is lying flat and breathing comfortably.    Review of patient's allergies indicates:   Allergen Reactions    Azithromycin Other (See Comments)     Note: - [employer] 01/11/2019     Current Facility-Administered Medications   Medication Frequency    0.9%  NaCl infusion (for blood administration) Q24H PRN    0.9%  NaCl infusion PRN    acetaminophen tablet 1,000 mg Q8H PRN    atorvastatin tablet 40 mg QHS    collagenase ointment Daily    dextrose 10% bolus 125 mL 125 mL PRN    dextrose 10% bolus 250 mL 250 mL PRN    glucagon (human recombinant) injection 1 mg PRN    glucose chewable tablet 16 g PRN    glucose chewable tablet 24 g PRN    hydrALAZINE tablet 100 mg Q8H    miconazole NITRATE 2 % top powder BID    naloxone 0.4 mg/mL injection 0.02 mg PRN    ondansetron disintegrating tablet 8 mg Q8H PRN    pantoprazole injection 40 mg BID    polyethylene glycol packet 17 g BID PRN    sevelamer carbonate tablet 2,400 mg TID WM    sodium chloride 0.9% 250 mL flush bag PRN    sodium chloride 0.9% flush 10 mL Q12H PRN       Objective:     Vital Signs (Most Recent):  Temp: 98.5 °F (36.9 °C) (06/13/24 0735)  Pulse: 88 (06/13/24 0735)  Resp: 18 (06/13/24 0735)  BP: 126/86 (06/13/24 0735)  SpO2: 95 % (06/13/24 0735) Vital Signs (24h Range):  Temp:  [97.3 °F (36.3 °C)-98.9 °F (37.2 °C)] 98.5 °F (36.9 °C)  Pulse:  [56-98] 88  Resp:  [15-21] 18  SpO2:  [94 %-100 %] 95 %  BP: (106-164)/( 126/86     Weight: 57.4 kg  (126 lb 8.7 oz) (06/08/24 0245)  Body mass index is 21.06 kg/m².  Body surface area is 1.62 meters squared.    I/O last 3 completed shifts:  In: 0   Out: 1500 [Other:1500]    Physical Exam he has no edema.  His chest later with decreased breath sounds over the right lower lung.    Significant Labs:sureBMP:   Recent Labs   Lab 06/06/24  1212 06/06/24  1322 06/13/24  0450   GLU 89  89  112*   < > 77     142   < > 137   K 3.5  3.5   < > 4.2   *  114*   < > 104   CO2 20*  20*   < > 27   BUN 44*  44*   < > 33*   CREATININE 4.41*  4.41*   < > 3.28*   CALCIUM 8.5  8.5   < > 7.7*   MG 2.0  --   --     < > = values in this interval not displayed.     CBC:   Recent Labs   Lab 06/13/24  0450   WBC 14.39*   RBC 3.69*   HGB 9.8*   HCT 31.0*   PLT 76*   MCV 84.0   MCH 26.6*   MCHC 31.6*     All labs within the past 24 hours have been reviewed.    Significant Imaging:  Labs: Reviewed    Assessment/Plan:     Active Diagnoses:    Diagnosis Date Noted POA    PRINCIPAL PROBLEM:  Acute hypoxic respiratory failure [J96.01] 05/24/2024 Yes     Chronic    Pressure injury of right heel, unstageable [L89.610] 06/12/2024 Clinically Undetermined    Pressure injury of left heel, unstageable [L89.620] 06/12/2024 Clinically Undetermined    Decubitus ulcer of sacral region, unstageable [L89.150] 06/11/2024 Yes    GI bleed [K92.2] 06/10/2024 No    Blood per rectum [K62.5] 06/10/2024 No    Anemia due to GI blood loss [D50.0] 06/10/2024 No    Loculated pleural effusion [J90] 06/10/2024 Yes    Coronary artery disease involving native coronary artery of native heart without angina pectoris [I25.10] 06/09/2024 Yes    Troponin level elevated [R79.89] 06/09/2024 No    Cardiac arrest [I46.9] 06/06/2024 Yes    Acute systolic CHF (congestive heart failure) [I50.21] 06/06/2024 Yes    Atrial fibrillation with RVR [I48.91] 06/06/2024 Yes    Thrombocytopenia [D69.6] 06/05/2024 Yes    Acute metabolic encephalopathy [G93.41] 06/02/2024 No     Pleural effusion [J90] 05/31/2024 Yes    Oropharyngeal dysphagia [R13.12] 05/31/2024 No    Parapneumonic effusion [J18.9, J91.8] 05/23/2024 Yes    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.9, D63.1] 05/23/2024 Yes    Debility [R53.81] 05/23/2024 Yes    Elevated troponin [R79.89] 08/02/2023 Yes    Dependence on renal dialysis [Z99.2] 10/30/2017 Not Applicable    Malnutrition of moderate degree [E44.0] 11/20/2014 Yes    DM2 (diabetes mellitus, type 2) [E11.9] 11/18/2014 Yes    ESRD (end stage renal disease) [N18.6] 11/06/2014 Yes    Essential (primary) hypertension [I10] 11/06/2014 Yes    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  No changes for now.    Thank you for your consult. I will follow-up with patient. Please contact us if you have any additional questions.    Ovi Alvarado MD  Nephrology  [facility]

## 2024-06-13 NOTE — ASSESSMENT & PLAN NOTE
Patient's anemia is currently uncontrolled. Has received One units of PRBCs on 6/8 . Etiology likely d/t acute blood loss which was from GI bleed  Current CBC reviewed-   Lab Results   Component Value Date    HGB 9.8 (L) 06/13/2024    HCT 31.0 (L) 06/13/2024     Monitor serial CBC and transfuse if patient becomes hemodynamically unstable, symptomatic or H/H drops below 7/21.  Better today  6/11  Hemoglobin stable  No evidence of bleeding

## 2024-06-13 NOTE — ASSESSMENT & PLAN NOTE
Patient found to have large pleural effusion on imaging. I have personally reviewed and interpreted the following imaging: Xray. A thoracentesis was ordered. Most likely etiology includes Congestive Heart Failure, Pneumonia, and Renal Failure. Management to include Repeat Thoracentesis and Dialysis  S/p thoracentesis on 5/23- 1.5L fluid removed.  S/p chest tube placement (5/29), cytology negative for malignancy, fluid appears exudate in nature, cultures NGTD.   S/p 6 doses of Intra-pleural lytics BID started per Pulm (5/30-6/2).   CXR mostly stable, possibly slight increase in fluid noted on CXR from 6/3.   Continue IV Zosyn (timing and transition per Pulm), discontinued vancomycin given negative MRSA PCR.   Pulm follows.    6/4 plan for decortication on Thursday 6/5 plan for decortication tomorrow  6/10 continuing antibiotics.  Chest tube in place  6/11  chest tube per pulmonology  6/12 chest tube inadvertently removed overnight.  Okay with pulmonology to leave out.  He is stable for discharge from pulmonary standpoint.  Chest tube out, completed antibiotics.

## 2024-06-13 NOTE — SUBJECTIVE & OBJECTIVE
Interval History:     No acute events overnight    Review of Systems   HENT:  Positive for voice change.    Respiratory:  Positive for shortness of breath.      Objective:     Vital Signs (Most Recent):  Temp: 98.5 °F (36.9 °C) (06/13/24 0735)  Pulse: 88 (06/13/24 0735)  Resp: 18 (06/13/24 0735)  BP: 126/86 (06/13/24 0735)  SpO2: 95 % (06/13/24 0745) Vital Signs (24h Range):  Temp:  [97.3 °F (36.3 °C)-98.9 °F (37.2 °C)] 98.5 °F (36.9 °C)  Pulse:  [56-98] 88  Resp:  [15-21] 18  SpO2:  [94 %-100 %] 95 %  BP: (106-164)/() 126/86     Weight: 57.4 kg (126 lb 8.7 oz)  Body mass index is 21.06 kg/m².    Intake/Output Summary (Last 24 hours) at 6/13/2024 0928  Last data filed at 6/12/2024 1534  Gross per 24 hour   Intake 0 ml   Output 1500 ml   Net -1500 ml         Physical Exam  Vitals and nursing note reviewed.   Constitutional:       Appearance: He is ill-appearing.   HENT:      Head: Normocephalic and atraumatic.      Nose: Nose normal.      Mouth/Throat:      Mouth: Mucous membranes are moist.   Eyes:      Extraocular Movements: Extraocular movements intact.   Cardiovascular:      Rate and Rhythm: Normal rate and regular rhythm.      Pulses: Normal pulses.      Heart sounds: Normal heart sounds.   Pulmonary:      Effort: Pulmonary effort is normal.      Breath sounds: Examination of the right-middle field reveals decreased breath sounds. Examination of the right-lower field reveals decreased breath sounds. Decreased breath sounds present.      Comments: Chest tube in place.   Abdominal:      General: Bowel sounds are normal.      Palpations: Abdomen is soft.      Comments: NG in place.    Musculoskeletal:      Cervical back: Normal range of motion.   Skin:     General: Skin is warm.   Neurological:      General: No focal deficit present.      Mental Status: He is alert. Mental status is at baseline.   Psychiatric:         Mood and Affect: Mood normal.         Behavior: Behavior normal.             Significant  Labs: All pertinent labs within the past 24 hours have been reviewed.    Significant Imaging: I have reviewed all pertinent imaging results/findings within the past 24 hours.

## 2024-06-13 NOTE — ASSESSMENT & PLAN NOTE
Chronic, controlled. Latest blood pressure and vitals reviewed-     Temp:  [97.3 °F (36.3 °C)-98.9 °F (37.2 °C)]   Pulse:  [56-98]   Resp:  [15-21]   BP: (106-164)/()   SpO2:  [94 %-100 %] .   Home meds for hypertension were reviewed and noted below.   Hypertension Medications               amLODIPine (NORVASC) 5 MG tablet Take 5 mg by mouth once daily.    hydrALAZINE (APRESOLINE) 100 MG tablet Take 1 tablet by mouth 3 (three) times daily with meals.    nebivoloL (BYSTOLIC) 2.5 MG Tab Take 2.5 mg by mouth once daily.            While in the hospital, will manage blood pressure as follows; hold meds given hypotension. BP has been fluctuating, will likely add back beta blocker if BP gets on the higher side.     Will utilize p.r.n. blood pressure medication only if patient's blood pressure greater than 180/110 and he develops symptoms such as worsening chest pain or shortness of breath.   BP reasonable  6/5 BP okay  Stable

## 2024-06-14 NOTE — ASSESSMENT & PLAN NOTE
Patient's anemia is currently uncontrolled. Has received 1 units of PRBCs on 06/10/2024 . Etiology likely d/t acute blood loss which was from GI bleed  Current CBC reviewed-   Lab Results   Component Value Date    HGB 8.8 (L) 06/14/2024    HCT 28.6 (L) 06/14/2024     Monitor serial CBC and transfuse if patient becomes hemodynamically unstable, symptomatic or H/H drops below 7/21.

## 2024-06-14 NOTE — PLAN OF CARE
Problem: Adult Inpatient Plan of Care  Goal: Plan of Care Review  Outcome: Progressing  Goal: Patient-Specific Goal (Individualized)  Outcome: Progressing  Goal: Absence of Hospital-Acquired Illness or Injury  Outcome: Progressing  Goal: Optimal Comfort and Wellbeing  Outcome: Progressing  Goal: Readiness for Transition of Care  Outcome: Progressing     Problem: Diabetes Comorbidity  Goal: Blood Glucose Level Within Targeted Range  Outcome: Progressing     Problem: Sepsis/Septic Shock  Goal: Optimal Coping  Outcome: Progressing  Goal: Absence of Bleeding  Outcome: Progressing  Goal: Blood Glucose Level Within Targeted Range  Outcome: Progressing  Goal: Absence of Infection Signs and Symptoms  Outcome: Progressing  Goal: Optimal Nutrition Intake  Outcome: Progressing     Problem: Fall Injury Risk  Goal: Absence of Fall and Fall-Related Injury  Outcome: Progressing     Problem: Wound  Goal: Optimal Coping  Outcome: Progressing  Goal: Optimal Functional Ability  Outcome: Progressing  Goal: Absence of Infection Signs and Symptoms  Outcome: Progressing

## 2024-06-14 NOTE — NURSING
Pt sitting up in bed resting, skin warm and dry to touch, skin color appropriate for race. Pt easily aroused to verbal stimuli, no active bleeding noted. Systolic blood pressure above 90. Will continue to monitor.

## 2024-06-14 NOTE — PROGRESS NOTES
06/14/24 0923   Wound Care Follow Up   Wound Care Follow-up? Yes   Wound Care- Next Visit Date 06/17/24   Follow Up Plan Yulisa POC

## 2024-06-14 NOTE — DIALYSIS ROUNDING
Mr. Mcdonald is seen during his hemodialysis.  He is doing well and has a stable blood pressure.  He is tolerating today's dialysis well.  He continues to ask about going home.  He will resume his outpatient dialysis schedule once discharged.  If he remains in the hospital over the weekend we will dialyze him again on Monday.

## 2024-06-14 NOTE — SUBJECTIVE & OBJECTIVE
Interval History:     No acute events overnight    Review of Systems   HENT:  Positive for voice change.    Respiratory:  Positive for shortness of breath.      Objective:     Vital Signs (Most Recent):  Temp: 98.3 °F (36.8 °C) (06/14/24 0734)  Pulse: 72 (06/14/24 0816)  Resp: 18 (06/14/24 0816)  BP: (!) 90/50 (06/14/24 0734)  SpO2: 99 % (06/14/24 0816) Vital Signs (24h Range):  Temp:  [97.9 °F (36.6 °C)-98.3 °F (36.8 °C)] 98.3 °F (36.8 °C)  Pulse:  [72-79] 72  Resp:  [18] 18  SpO2:  [96 %-100 %] 99 %  BP: ()/(50-69) 90/50     Weight: 57.4 kg (126 lb 8.7 oz)  Body mass index is 21.06 kg/m².    Intake/Output Summary (Last 24 hours) at 6/14/2024 1104  Last data filed at 6/13/2024 1752  Gross per 24 hour   Intake 75 ml   Output --   Net 75 ml         Physical Exam  Vitals and nursing note reviewed.   Constitutional:       Appearance: He is ill-appearing.   HENT:      Head: Normocephalic and atraumatic.      Nose: Nose normal.      Mouth/Throat:      Mouth: Mucous membranes are moist.   Eyes:      Extraocular Movements: Extraocular movements intact.   Cardiovascular:      Rate and Rhythm: Normal rate and regular rhythm.      Pulses: Normal pulses.      Heart sounds: Normal heart sounds.   Pulmonary:      Effort: Pulmonary effort is normal.      Breath sounds: Examination of the right-middle field reveals decreased breath sounds. Examination of the right-lower field reveals decreased breath sounds. Decreased breath sounds present.      Comments: Chest tube in place.   Abdominal:      General: Bowel sounds are normal.      Palpations: Abdomen is soft.      Comments: NG in place.    Musculoskeletal:      Cervical back: Normal range of motion.   Skin:     General: Skin is warm.   Neurological:      General: No focal deficit present.      Mental Status: He is alert. Mental status is at baseline.   Psychiatric:         Mood and Affect: Mood normal.         Behavior: Behavior normal.             Significant Labs: All  pertinent labs within the past 24 hours have been reviewed.    Significant Imaging: I have reviewed all pertinent imaging results/findings within the past 24 hours.

## 2024-06-14 NOTE — ASSESSMENT & PLAN NOTE
Patient was found to have thrombocytopenia, the likely etiology is secondary to sepsis/infection, will monitor the platelets Daily. Will transfuse if platelet count is <10k. Hold DVT prophylaxis if platelets are <50k. The patient's platelet results have been reviewed and are listed below.  Recent Labs   Lab 06/14/24  0439   PLT 77*

## 2024-06-14 NOTE — PT/OT/SLP PROGRESS
Occupational Therapy      Patient Name:  Joseph Mcdonald   MRN:  [id number]    Patient not seen today secondary to Nurse/ JUSTIN hold (secondary to pt lost a lot of blood with dialysis today and nurse request OT to hold). Will follow-up on next treatment day.    6/14/2024

## 2024-06-14 NOTE — ASSESSMENT & PLAN NOTE
Patient has acute blood loss due to hemorrhage, the hemorrhage is due to gastrointestinal bleed, patient does have a propensity for bleeding due to a platelet defect/deficiency.. Will trend hemoglobin/hematocrit Daily, as well as monitor and correct for any coagulation defects. CBC and vital signs have been reviewed and last CBC was noted-   Lab Results   Component Value Date    WBC 12.67 (H) 06/14/2024    HGB 8.8 (L) 06/14/2024    HCT 28.6 (L) 06/14/2024    MCV 86.9 06/14/2024    PLT 77 (L) 06/14/2024         Will order a type and screen and consent patient for blood transfusion. Will transfuse if Hgb is <7g/dl (<8g/dl in cases of active ACS) or if patient has rapid bleeding leading to hemodynamic instability.  Hemoglobin has been stable

## 2024-06-14 NOTE — ASSESSMENT & PLAN NOTE
Patient with Persistent (7 days or more) atrial fibrillation which is controlled currently with  Rate controlled.   with no medication needed . Patient is currently in atrial fibrillation.[id number] Score: 4. HASBLED Score: 5. Anticoagulation not indicated due to active GI bleed .  Rate controlled 6/11

## 2024-06-14 NOTE — ASSESSMENT & PLAN NOTE
Chronic, controlled. Latest blood pressure and vitals reviewed-     Temp:  [97.9 °F (36.6 °C)-98.3 °F (36.8 °C)]   Pulse:  [72-79]   Resp:  [18]   BP: ()/(50-69)   SpO2:  [96 %-100 %] .   Home meds for hypertension were reviewed and noted below.   Hypertension Medications               amLODIPine (NORVASC) 5 MG tablet Take 5 mg by mouth once daily.    hydrALAZINE (APRESOLINE) 100 MG tablet Take 1 tablet by mouth 3 (three) times daily with meals.    nebivoloL (BYSTOLIC) 2.5 MG Tab Take 2.5 mg by mouth once daily.            While in the hospital, will manage blood pressure as follows; hold meds given hypotension. BP has been fluctuating, will likely add back beta blocker if BP gets on the higher side.     Will utilize p.r.n. blood pressure medication only if patient's blood pressure greater than 180/110 and he develops symptoms such as worsening chest pain or shortness of breath.   BP reasonable  6/5 BP okay  Stable

## 2024-06-14 NOTE — ASSESSMENT & PLAN NOTE
Patient has acute blood loss due to hemorrhage, the hemorrhage is due to gastrointestinal bleed, patient does have a propensity for bleeding due to a platelet defect/deficiency and a medication, the medication is heparin.. Will trend hemoglobin/hematocrit Every 6 hours, as well as monitor and correct for any coagulation defects. CBC and vital signs have been reviewed and last CBC was noted-   Lab Results   Component Value Date    WBC 12.67 (H) 06/14/2024    HGB 8.8 (L) 06/14/2024    HCT 28.6 (L) 06/14/2024    MCV 86.9 06/14/2024    PLT 77 (L) 06/14/2024         Will order a type and screen and consent patient for blood transfusion. Will transfuse if Hgb is <7g/dl (<8g/dl in cases of active ACS) or if patient has rapid bleeding leading to hemodynamic instability.  6/11 no endoscopy at this time  No further evidence of bleeding

## 2024-06-14 NOTE — PT/OT/SLP PROGRESS
Physical Therapy Treatment    Patient Name:  Joseph Mcdonald   MRN:  [id number]    Recommendations:     Discharge Recommendations: Moderate Intensity Therapy  Discharge Equipment Recommendations: to be determined by next level of care  Barriers to discharge:  ongoing medical treatment    Assessment:     Joseph Mcdonald is a 89 y.o. male admitted with a medical diagnosis of Acute hypoxic respiratory failure.  He presents with the following impairments/functional limitations: weakness, impaired endurance, impaired self care skills, impaired functional mobility, edema, impaired skin, decreased lower extremity function.    Pt required near constant verbal and tactile stimuli to remain awake during exercise    Rehab Prognosis: Fair; patient would benefit from acute skilled PT services to address these deficits and reach maximum level of function.    Recent Surgery: Procedure(s) (LRB):  DECORTICATION, LUNG (Right) 8 Days Post-Op    Plan:     During this hospitalization, patient to be seen 5 x/week to address the identified rehab impairments via gait training, therapeutic activities, therapeutic exercises and progress toward the following goals:    Plan of Care Expires:  06/24/24    Subjective     Chief Complaint: acute hypoxic resp failure  Patient/Family Comments/goals: pt lethargic this PM post dialysis  Pain/Comfort:         Objective:     Communicated with  prior to session.  Patient found HOB elevated with peripheral IV, SCD, telemetry, bed alarm, blood pressure cuff (tele sitter) upon PT entry to room.     General Precautions: Standard, fall  Orthopedic Precautions: N/A  Braces: N/A  Respiratory Status: Nasal cannula, flow 2 L/min     Functional Mobility:  NT      AM-PAC 6 CLICK MOBILITY          Treatment & Education:  Pt performed 2 x 15 reps (B) LE exercises: ap, quad set, glut set, straight leg raise, hip ab/adduction, heel slide with active assist and near constant verbal and tactile stimuli to remain awake and on  task      Patient left HOB elevated with all lines intact, call button in reach, and bed alarm on..    GOALS:   Multidisciplinary Problems       Physical Therapy Goals          Problem: Physical Therapy    Goal Priority Disciplines Outcome Goal Variances Interventions   Physical Therapy Goal     PT, PT/OT Progressing     Description: Short Term Goals to be met by: 24    Patient will increase functional independence with mobility by performin. Supine to sit with Stand by assist  2. Sit to stand transfer with Stand by assist using Rolling walker  3. Bed to chair transfer with Stand by assist using Rolling walker  4. Gait  x 150 feet with Stand by assist using Rolling walker  5. Lower extremity exercise program x30 reps per handout, with assistance as needed    Long Term Goals to be met by: 24    Pt will regain full independent functional mobility with straight cane to return to home situation and prior activities of daily living.                        Time Tracking:     PT Received On: 24  PT Start Time: 1415     PT Stop Time: 1429  PT Total Time (min): 14 min     Billable Minutes: Therapeutic Exercise 14    Treatment Type: Treatment  PT/PTA: PTA     Number of PTA visits since last PT visit: 2024

## 2024-06-14 NOTE — ASSESSMENT & PLAN NOTE
Nutrition consulted. Most recent weight and BMI monitored-     Measurements:  Wt Readings from Last 1 Encounters:   06/08/24 57.4 kg (126 lb 8.7 oz)   Body mass index is 21.06 kg/m².    Patient has been screened and assessed by RD.    Malnutrition Type:  Context:    Level:      Malnutrition Characteristic Summary:       Interventions/Recommendations (treatment strategy):  Recommend to add Gallo to aid in wound healing, ONS to provide additional calories/protein to heal/HD   We will place NG tube if necessary.  6/5 NG tube today  6/10 NG tube out.  Encourage orals  6/11 eating during interview today  6/12 eating good

## 2024-06-14 NOTE — NURSING
11:00 am Pt pulled out venous needled during dialysis. Pressure was held at venous site, while fluid was given back through arterial lines. Pt symptomatic 1L fluid given back. Pt responsive to voice, answering questions. 11:26 am /21 HR 80. Changed pt gown, removed covers with blood on them. 11:46 am Attempted to call floor nurse to give report of incident, left on hold. 12:06 pm called floor again gave report to RENATO Fairbanks BP: 90/26 HR 76. Dr. Alvarado nephrologist was notified of incident at 1128 am. About 1330 went to floor to check on pt. Pt alert in bed /61.

## 2024-06-14 NOTE — PLAN OF CARE
06/14/24 @ 1406 - MOIRA rec'd message from Myra @ [employer] -- auth still pending for admission to facility. MD notified. MOIRA will continue to follow.     06/14/24 @ 1035 - At appx 0900 this AM, MOIRA attempted to contact Myra @ [email or web address] regarding status of acceptance of pt to [facility] and insurance approval. No answer; message left for return call. MOIRA will continue to follow.

## 2024-06-14 NOTE — ASSESSMENT & PLAN NOTE
Patient's anemia is currently uncontrolled. Has not received any PRBCs to date. 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   No evidence of bleeding   6/5 no evidence of bleeding   6/10 Large clots passed and stool this morning.  Consult GI.  IV ppi.  Trend hemoglobin  6/11 hemoglobin holding today

## 2024-06-14 NOTE — ASSESSMENT & PLAN NOTE
Patient's anemia is currently uncontrolled. Has received One units of PRBCs on 6/8 . Etiology likely d/t acute blood loss which was from GI bleed  Current CBC reviewed-   Lab Results   Component Value Date    HGB 8.8 (L) 06/14/2024    HCT 28.6 (L) 06/14/2024     Monitor serial CBC and transfuse if patient becomes hemodynamically unstable, symptomatic or H/H drops below 7/21.  Better today  6/11  Hemoglobin stable  No evidence of bleeding  Stable

## 2024-06-14 NOTE — PROGRESS NOTES
[facility] Medicine  Progress Note    Patient Name: Joseph Mcdonald  MRN: [id number]  Patient Class: IP- Inpatient   Admission Date: 5/23/2024  Length of Stay: 22 days  Attending Physician: Cristian Ac DO  Primary Care Provider: Clinic, [facility]        Subjective:     Principal Problem:Acute hypoxic respiratory failure        HPI:    89-year-old  male With a past medical history of end-stage renal disease, hypertension, iron deficiency anemia, secondary hyperparathyroidism, type 2 diabetes, CAD, heart failure with preserved ejection fraction, hypokalemia inability presents to the ED with 1 week worsening shortness of breath.  This was particularly bad during his dialysis session this morning.  Due to this daughter brought him to the ED thereafter.  He denies any fever, chills, cough, wheezing, sputum production, palpitations.  He has been take medication as in his not missed any dialysis sessions.  He denies any nausea, vomiting, diarrhea, dysuria.  Imaging in the ED revealed near-complete he had a pacemaker patient of the right hemithorax review of systems otherwise negative.    Overview/Hospital Course:   5/24 1.5 L removed from right pleural space today.  Still has large right-sided effusion.  We will attempt to get some more off tomorrow.  5/25 dialyzing today  5/26 bedside ultrasound today still has large pleural effusion, complex appearing.  Was going to attempt bedside ultrasound but given complex nature have consulted pulmonology  5/27 D/W Pulm, plan for chest tube placement.   5/28- Midodrine added low BP.   5/29- s/p chest tube placement per Dr. Rodney, fluid studies sent.   5/30- BP low again, midodrine resumed. Started on intra-pleural lytics per Pulm.  5/31- Intra-pleural lytics continued, CXR improved.   6/1- Failed swallow and is with NG now and tube feeds. S/p 6 doses of Intra-pleural lytics (total 6 doses).   6/2- Mental status better,  resp status stable.  6/3- NG removed on accident while patient was working with PT/OT, SLP consulted for reevaluation. Pulm to follow, CXR with slightly increased right effusion.    6/4 surgery consulted today.  Plan for decortication on Thursday 6/6 6/5 plan for decortication tomorrow.  We will place NG tube due to patient not eating well  6/10  Mr. Sin has been in the ICU for the last several days.  He had a P EA arrest during induction for his decortication.  He is now out of the ICU and doing well.  Still has right-sided chest tube.  Notified this morning that he pass large clots in about movement.  We will consult GI and start IV  protonic  6/11 hemoglobin okay.  Unable to do endoscopy due to high risk of sedating patient.  Has a sacral wound that may need debriding.  We will have surgery evaluate today.  Again we will be high risk for anesthesia induction.  6/12 no surgical intervention on sacral decubitus.  Wound care.  Currently stable for discharge and awaiting placement.  Chest tube has been removed.  6/13 medically ready for discharge.  Awaiting placement  6/14 attempting to get [facility].  Awaiting placement    Interval History:     No acute events overnight    Review of Systems   HENT:  Positive for voice change.    Respiratory:  Positive for shortness of breath.      Objective:     Vital Signs (Most Recent):  Temp: 98.3 °F (36.8 °C) (06/14/24 0734)  Pulse: 72 (06/14/24 0816)  Resp: 18 (06/14/24 0816)  BP: (!) 90/50 (06/14/24 0734)  SpO2: 99 % (06/14/24 0816) Vital Signs (24h Range):  Temp:  [97.9 °F (36.6 °C)-98.3 °F (36.8 °C)] 98.3 °F (36.8 °C)  Pulse:  [72-79] 72  Resp:  [18] 18  SpO2:  [96 %-100 %] 99 %  BP: ()/(50-69) 90/50     Weight: 57.4 kg (126 lb 8.7 oz)  Body mass index is 21.06 kg/m².    Intake/Output Summary (Last 24 hours) at 6/14/2024 1104  Last data filed at 6/13/2024 1752  Gross per 24 hour   Intake 75 ml   Output --   Net 75 ml         Physical Exam  Vitals and nursing  note reviewed.   Constitutional:       Appearance: He is ill-appearing.   HENT:      Head: Normocephalic and atraumatic.      Nose: Nose normal.      Mouth/Throat:      Mouth: Mucous membranes are moist.   Eyes:      Extraocular Movements: Extraocular movements intact.   Cardiovascular:      Rate and Rhythm: Normal rate and regular rhythm.      Pulses: Normal pulses.      Heart sounds: Normal heart sounds.   Pulmonary:      Effort: Pulmonary effort is normal.      Breath sounds: Examination of the right-middle field reveals decreased breath sounds. Examination of the right-lower field reveals decreased breath sounds. Decreased breath sounds present.      Comments: Chest tube in place.   Abdominal:      General: Bowel sounds are normal.      Palpations: Abdomen is soft.      Comments: NG in place.    Musculoskeletal:      Cervical back: Normal range of motion.   Skin:     General: Skin is warm.   Neurological:      General: No focal deficit present.      Mental Status: He is alert. Mental status is at baseline.   Psychiatric:         Mood and Affect: Mood normal.         Behavior: Behavior normal.             Significant Labs: All pertinent labs within the past 24 hours have been reviewed.    Significant Imaging: I have reviewed all pertinent imaging results/findings within the past 24 hours.    Assessment/Plan:      * Acute hypoxic respiratory failure  Sec to pleural effusion.  Require 2L NC since admission.   Imaging consistent with large right sided pleural effusion.   S/p thoracentesis on 5/23 with 1,5L fluid removed, no pleural fluid studies sent.   Remained symptomatic so CT chest obtained on 5/24 and consistent with large effusion.  Pulmonology consulted; s/p chest tube placement (5/29), s/p intra-pleural lytics started on 5/30 (total 6 doses).  Volume removal with HD, continue broad spectrum antibiotics as below.   Monitor oxygen status.   Home O2 eval prior to discharge.      6/4Improved.  Plan for  decortication of the right lung on Thursday 6/5 plan for OR tomorrow  6/10 chest tube in place still draining.  6/11 still requiring oxygen  6/12 chest tube removed.  Stable for discharge  Doing well    Parapneumonic effusion  Patient found to have large pleural effusion on imaging. I have personally reviewed and interpreted the following imaging: Xray. A thoracentesis was ordered. Most likely etiology includes Congestive Heart Failure, Pneumonia, and Renal Failure. Management to include Repeat Thoracentesis and Dialysis  S/p thoracentesis on 5/23- 1.5L fluid removed.  S/p chest tube placement (5/29), cytology negative for malignancy, fluid appears exudate in nature, cultures NGTD.   S/p 6 doses of Intra-pleural lytics BID started per Pulm (5/30-6/2).   CXR mostly stable, possibly slight increase in fluid noted on CXR from 6/3.   Continue IV Zosyn (timing and transition per Pulm), discontinued vancomycin given negative MRSA PCR.   Pulm follows.    6/4 plan for decortication on Thursday 6/5 plan for decortication tomorrow  6/10 continuing antibiotics.  Chest tube in place  6/11  chest tube per pulmonology  6/12 chest tube inadvertently removed overnight.  Okay with pulmonology to leave out.  He is stable for discharge from pulmonary standpoint.  Chest tube out, completed antibiotics.    Pressure injury of left heel, unstageable  Unclear chronicity      Pressure injury of right heel, unstageable  Unclear chronicity      Decubitus ulcer of sacral region, unstageable   We will have surgery evaluate today   No surgical intervention.  6/12  Wound care    Loculated pleural effusion  Patient found to have large pleural effusion on imaging. I have personally reviewed and interpreted the following imaging: Xray. A thoracentesis was performed. Pleural fluid was sent for analysis. Labs reviewed, including Serum LDH   LDH   Date Value Ref Range Status   05/29/2024 279 (H) 87 - 241 U/L Final   , Pleural Fluid LDH   Lactate  "Dehydrogenase (LDH), Body Fluid   Date Value Ref Range Status   05/29/2024 270 U/L Final     Comment:     Reference ranges for this analyte have not been established for the body fluid specimen submitted for analysis.    , Serum Protein   Total Protein   Date Value Ref Range Status   06/11/2024 5.7 (L) 6.4 - 8.2 g/dL Final    , Pleural Protein No results found for: "PROTEINBODYF" , Cell Count and Differential No results found for: "BFCELLCNT" , and Fluid Cultures No results found for: "BODYFLUIDCUL" . Fluid most consistent with Exudate.  . Most likely etiology includes Pneumonia. Management to include Pleural Catheter   Chest tube removed 6/12    Anemia due to GI blood loss  Patient's anemia is currently uncontrolled. Has received 1 units of PRBCs on 06/10/2024 . Etiology likely d/t acute blood loss which was from GI bleed  Current CBC reviewed-   Lab Results   Component Value Date    HGB 8.8 (L) 06/14/2024    HCT 28.6 (L) 06/14/2024     Monitor serial CBC and transfuse if patient becomes hemodynamically unstable, symptomatic or H/H drops below 7/21.    Blood per rectum  Patient has acute blood loss due to hemorrhage, the hemorrhage is due to gastrointestinal bleed, patient does have a propensity for bleeding due to a platelet defect/deficiency.. Will trend hemoglobin/hematocrit Daily, as well as monitor and correct for any coagulation defects. CBC and vital signs have been reviewed and last CBC was noted-   Lab Results   Component Value Date    WBC 12.67 (H) 06/14/2024    HGB 8.8 (L) 06/14/2024    HCT 28.6 (L) 06/14/2024    MCV 86.9 06/14/2024    PLT 77 (L) 06/14/2024         Will order a type and screen and consent patient for blood transfusion. Will transfuse if Hgb is <7g/dl (<8g/dl in cases of active ACS) or if patient has rapid bleeding leading to hemodynamic instability.  Hemoglobin has been stable    GI bleed  Patient has acute blood loss due to hemorrhage, the hemorrhage is due to gastrointestinal bleed, " patient does have a propensity for bleeding due to a platelet defect/deficiency and a medication, the medication is heparin.. Will trend hemoglobin/hematocrit Every 6 hours, as well as monitor and correct for any coagulation defects. CBC and vital signs have been reviewed and last CBC was noted-   Lab Results   Component Value Date    WBC 12.67 (H) 06/14/2024    HGB 8.8 (L) 06/14/2024    HCT 28.6 (L) 06/14/2024    MCV 86.9 06/14/2024    PLT 77 (L) 06/14/2024         Will order a type and screen and consent patient for blood transfusion. Will transfuse if Hgb is <7g/dl (<8g/dl in cases of active ACS) or if patient has rapid bleeding leading to hemodynamic instability.  6/11 no endoscopy at this time  No further evidence of bleeding    Troponin level elevated   Secondary to cardiac arrest      Coronary artery disease involving native coronary artery of native heart without angina pectoris  Patient with known CAD s/p  unclear , which is controlled Will continue Statin and monitor for S/Sx of angina/ACS. Continue to monitor on telemetry.   6/11 no chest pain    Atrial fibrillation with RVR  Patient with Persistent (7 days or more) atrial fibrillation which is controlled currently with  Rate controlled.   with no medication needed . Patient is currently in atrial fibrillation.[id number] Score: 4. HASBLED Score: 5. Anticoagulation not indicated due to active GI bleed .  Rate controlled 6/11    Acute systolic CHF (congestive heart failure)  Patient is identified as having Diastolic (HFpEF) heart failure that is Acute on chronic. CHF is currently uncontrolled due to Pulmonary edema/pleural effusion on CXR. Latest ECHO performed and demonstrates- Results for orders placed during the hospital encounter of 05/23/24    Echo    Interpretation Summary    Left Ventricle: The left ventricle is normal in size. Increased wall thickness. There is concentric remodeling. There is normal systolic function. Biplane (2D) method of discs  "ejection fraction is 55%. Grade II diastolic dysfunction.    Right Ventricle: Mild right ventricular enlargement. Systolic function is mildly reduced.    Left Atrium: Left atrium is mildly dilated.    Right Atrium: Right atrium is mildly dilated.    Aortic Valve: The aortic valve is a trileaflet valve. Mildly calcified cusps. There is mild stenosis. Aortic valve area by VTI is 1.59 cm². Aortic valve peak velocity is 1.48 m/s. Mean gradient is 4 mmHg. The dimensionless index is 0.60.    Mitral Valve: There is bileaflet sclerosis. Mildly thickened leaflets. There is mild regurgitation.    Tricuspid Valve: There is severe regurgitation with an eccentrically directed jet.    Pulmonary Artery: Pulmonary artery pressure could not be accurately determined due to severe TR.    IVC/SVC: Elevated venous pressure at 15 mmHg.    Pericardium: There is a trivial effusion. No indication of cardiac tamponade. Left pleural effusion.  . Continue Nitrate/Vasodilator and monitor clinical status closely. Monitor on telemetry. Patient is off CHF pathway.  Monitor strict Is&Os and daily weights.  Place on fluid restriction of 1.5 L. Cardiology has not been consulted. Continue to stress to patient importance of self efficacy and  on diet for CHF. Last BNP reviewed- and noted below No results for input(s): "BNP", "BNPTRIAGEBLO" in the last 168 hours.  6/11 still requiring oxygen  6/12 stable    Cardiac arrest   PEA arrest during induction for his decortication.  Likely medication effect from anesthesia.    Appears to not have any neurologic sequela from the event.      Thrombocytopenia  Patient was found to have thrombocytopenia, the likely etiology is secondary to sepsis/infection, will monitor the platelets Daily. Will transfuse if platelet count is <10k. Hold DVT prophylaxis if platelets are <50k. The patient's platelet results have been reviewed and are listed below.  Recent Labs   Lab 06/14/24  0439   PLT 77*           Acute " metabolic encephalopathy  Discontinued trazodone.  Management as above.  Requiring restraints to avoid treatment interruption.   Improving.    6/4  More awake today  6/5 improving  6/10 appears to be at baseline today  Resolved    Oropharyngeal dysphagia  SLP consulted, failed swallow eval.  Now NPO, NG inserted for feed and meds.    NG removed per patient on accident on 6/3.  SLP to follow today as patient is more alert now.      Has reportedly become more weak and eaten less over the duration of his hospitalization.  We will place NG tube if necessary.  6/5 NG tube today   Now tolerating oral    Pleural effusion  As above.    Decortication Thursday    Patient found to have large pleural effusion on imaging. I have personally reviewed and interpreted the following imaging: Xray. A thoracentesis was ordered. Most likely etiology includes Pneumonia. Management to include Repeat Thoracentesis, Pleural Catheter, and Dialysis   6/10 chest tube in place  6/11 chest tube per pulmonology  6/12 chest tube out    Debility  Patient with Acute on chronic debility due to age-related physical debility. Latest AMPAC and GEMS scores have not been reviewed. Evaluation for etiology is complete.   Plan includes PT/OT consulted.  Home health on discharge.      PTOT    Anemia of renal disease  Patient's anemia is currently uncontrolled. Has not received any PRBCs to date. 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   No evidence of bleeding   6/5 no evidence of bleeding   6/10 Large clots passed and stool this morning.  Consult GI.  IV ppi.  Trend hemoglobin  6/11 hemoglobin holding today    Acute on chronic heart failure with preserved ejection fraction  Patient is identified as having Diastolic (HFpEF)  "heart failure that is Acute on chronic. CHF is currently uncontrolled due to Pulmonary edema/pleural effusion on CXR. Latest ECHO performed and demonstrates- Results for orders placed during the hospital encounter of 08/01/23    Echo    Interpretation Summary    Left Ventricle: The left ventricle is normal in size. Increased wall thickness. There is concentric remodeling. Normal wall motion. There is normal systolic function with a visually estimated ejection fraction of 55 - 60%. Grade II diastolic dysfunction.    Left Atrium: Left atrium is mildly dilated. There is a calcified 1.5 x 2 cm full wall thickness mass noted extending from the myocardium into the pericardial space, unclear etiology, recommend cardiac MRI.    Right Ventricle: Normal right ventricular cavity size. Systolic function is normal.    Aortic Valve: The aortic valve is a trileaflet valve. There is physiologically normal aortic regurgitation.    Mitral Valve: There is no stenosis. The mean pressure gradient across the mitral valve is 3 mmHg at a heart rate of  bpm. There is mild regurgitation with a centrally directed jet.    Tricuspid Valve: There is mild to moderate regurgitation with a centrally directed jet.    Pulmonic Valve: There is no significant stenosis.    Pericardium: Small circumferential pericardial effusion present. No indication of cardiac tamponade.  . Continue Nitrate/Vasodilator and monitor clinical status closely. Monitor on telemetry. Patient is off CHF pathway.  Monitor strict Is&Os and daily weights.  Place on fluid restriction of 1.5 L. Cardiology has not been consulted. Continue to stress to patient importance of self efficacy and  on diet for CHF. Last BNP reviewed- and noted below No results for input(s): "BNP", "BNPTRIAGEBLO" in the last 168 hours.   Volume removal per HD.     6/4 Makes no urine.  Continue dialysis  6/5 continue dialysis    Elevated troponin   Likely secondary to cardiac arrest      Malnutrition " of moderate degree  Nutrition consulted. Most recent weight and BMI monitored-     Measurements:  Wt Readings from Last 1 Encounters:   06/08/24 57.4 kg (126 lb 8.7 oz)   Body mass index is 21.06 kg/m².    Patient has been screened and assessed by RD.    Malnutrition Type:  Context:    Level:      Malnutrition Characteristic Summary:       Interventions/Recommendations (treatment strategy):  Recommend to add Gallo to aid in wound healing, ONS to provide additional calories/protein to heal/HD   We will place NG tube if necessary.  6/5 NG tube today  6/10 NG tube out.  Encourage orals  6/11 eating during interview today  6/12 eating good    DM2 (diabetes mellitus, type 2)  HBA1c 4.5  No need for SSI or POC glucose check.     Secondary hyperparathyroidism of renal origin  Resume home Renvela.    Nephrology following  Appreciate nephrology    Essential (primary) hypertension  Chronic, controlled. Latest blood pressure and vitals reviewed-     Temp:  [97.9 °F (36.6 °C)-98.3 °F (36.8 °C)]   Pulse:  [72-79]   Resp:  [18]   BP: ()/(50-69)   SpO2:  [96 %-100 %] .   Home meds for hypertension were reviewed and noted below.   Hypertension Medications               amLODIPine (NORVASC) 5 MG tablet Take 5 mg by mouth once daily.    hydrALAZINE (APRESOLINE) 100 MG tablet Take 1 tablet by mouth 3 (three) times daily with meals.    nebivoloL (BYSTOLIC) 2.5 MG Tab Take 2.5 mg by mouth once daily.            While in the hospital, will manage blood pressure as follows; hold meds given hypotension. BP has been fluctuating, will likely add back beta blocker if BP gets on the higher side.     Will utilize p.r.n. blood pressure medication only if patient's blood pressure greater than 180/110 and he develops symptoms such as worsening chest pain or shortness of breath.   BP reasonable  6/5 BP okay  Stable    ESRD (end stage renal disease)  Creatine stable for now. BMP reviewed- noted Estimated Creatinine Clearance: 12.6 mL/min (A)  (based on SCr of 3.28 mg/dL (H)). according to latest data. 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    Nephrology consulted to resume HD sessions.   Continue dialysis   6/5Electrolytes okay  6/10 dialysis today    Dependence on renal dialysis   ESRD      Acute blood loss anemia  Patient's anemia is currently uncontrolled. Has received One units of PRBCs on 6/8 . Etiology likely d/t acute blood loss which was from GI bleed  Current CBC reviewed-   Lab Results   Component Value Date    HGB 8.8 (L) 06/14/2024    HCT 28.6 (L) 06/14/2024     Monitor serial CBC and transfuse if patient becomes hemodynamically unstable, symptomatic or H/H drops below 7/21.  Better today  6/11  Hemoglobin stable  No evidence of bleeding  Stable      VTE Risk Mitigation (From admission, onward)           Ordered     IP VTE HIGH RISK PATIENT  Once         06/06/24 1154     Place sequential compression device  Until discontinued         06/06/24 1154                    Discharge Planning   BRIAN: 6/14/2024     Code Status: Full Code   Is the patient medically ready for discharge?:     Reason for patient still in hospital (select all that apply): Treatment  Discharge Plan A: Home, Home Health      Greater than 35 minutes spent on face to face patient interaction, discussion with family, ancillary staff, and/or other physicians as well as review of pertinent labs, images, and notes.               Cristian Ac DO  Department of Hospital Medicine   [facility]

## 2024-06-14 NOTE — NURSING
Rc'd patient back from [facility] saturated in blood, pale,bed sheets, TAP system, and pts back all found saturated in blood. Pt found to be hypotensive, lethargic but easily aroused to verbal stimuli. I,000cc bolus infusing to intact and patent IV to left upper arm. Infusion started by the charge nurse per MD order. Pt cleaned and hooked up to the vital sign machine set to frequent vital sign checks. Heart monitor placed back on the patient. Lab at the bedside to obtain stat H&H. Pt fed lunch and tolerated it well. Bed left in lowest position, call light in reach of patient, vs machine hooked up, and bed alarm on.

## 2024-06-15 NOTE — ASSESSMENT & PLAN NOTE
Chronic, controlled. Latest blood pressure and vitals reviewed-     Temp:  [97.9 °F (36.6 °C)-98.4 °F (36.9 °C)]   Pulse:  [60-93]   Resp:  [16-20]   BP: ()/(21-75)   SpO2:  [98 %-100 %] .   Home meds for hypertension were reviewed and noted below.   Hypertension Medications               amLODIPine (NORVASC) 5 MG tablet Take 5 mg by mouth once daily.    hydrALAZINE (APRESOLINE) 100 MG tablet Take 1 tablet by mouth 3 (three) times daily with meals.    nebivoloL (BYSTOLIC) 2.5 MG Tab Take 2.5 mg by mouth once daily.            While in the hospital, will manage blood pressure as follows; hold meds given hypotension. BP has been fluctuating, will likely add back beta blocker if BP gets on the higher side.     Will utilize p.r.n. blood pressure medication only if patient's blood pressure greater than 180/110 and he develops symptoms such as worsening chest pain or shortness of breath.   BP reasonable  6/5 BP okay  Stable  BP okay 6/15   No

## 2024-06-15 NOTE — SUBJECTIVE & OBJECTIVE
Interval History:     No acute events overnight    Review of Systems   HENT:  Positive for voice change.    Respiratory:  Positive for shortness of breath.      Objective:     Vital Signs (Most Recent):  Temp: 98.4 °F (36.9 °C) (06/15/24 0800)  Pulse: 83 (06/15/24 0800)  Resp: 16 (06/15/24 0800)  BP: (!) 104/31 (06/15/24 0800)  SpO2: 99 % (06/15/24 0800) Vital Signs (24h Range):  Temp:  [97.9 °F (36.6 °C)-98.4 °F (36.9 °C)] 98.4 °F (36.9 °C)  Pulse:  [60-93] 83  Resp:  [16-20] 16  SpO2:  [98 %-100 %] 99 %  BP: ()/(21-75) 104/31     Weight: 57.4 kg (126 lb 8.7 oz)  Body mass index is 21.06 kg/m².    Intake/Output Summary (Last 24 hours) at 6/15/2024 1047  Last data filed at 6/14/2024 1126  Gross per 24 hour   Intake 100 ml   Output 1000 ml   Net -900 ml         Physical Exam  Vitals and nursing note reviewed.   Constitutional:       Appearance: He is ill-appearing.   HENT:      Head: Normocephalic and atraumatic.      Nose: Nose normal.      Mouth/Throat:      Mouth: Mucous membranes are moist.   Eyes:      Extraocular Movements: Extraocular movements intact.   Cardiovascular:      Rate and Rhythm: Normal rate and regular rhythm.      Pulses: Normal pulses.      Heart sounds: Normal heart sounds.   Pulmonary:      Effort: Pulmonary effort is normal.      Breath sounds: Examination of the right-middle field reveals decreased breath sounds. Examination of the right-lower field reveals decreased breath sounds. Decreased breath sounds present.      Comments: Chest tube in place.   Abdominal:      General: Bowel sounds are normal.      Palpations: Abdomen is soft.      Comments: NG in place.    Musculoskeletal:      Cervical back: Normal range of motion.   Skin:     General: Skin is warm.   Neurological:      General: No focal deficit present.      Mental Status: He is alert. Mental status is at baseline.   Psychiatric:         Mood and Affect: Mood normal.         Behavior: Behavior normal.             Significant  Labs: All pertinent labs within the past 24 hours have been reviewed.    Significant Imaging: I have reviewed all pertinent imaging results/findings within the past 24 hours.

## 2024-06-15 NOTE — ASSESSMENT & PLAN NOTE
Patient has acute blood loss due to hemorrhage, the hemorrhage is due to gastrointestinal bleed, patient does have a propensity for bleeding due to a platelet defect/deficiency and a medication, the medication is heparin.. Will trend hemoglobin/hematocrit Every 6 hours, as well as monitor and correct for any coagulation defects. CBC and vital signs have been reviewed and last CBC was noted-   Lab Results   Component Value Date    WBC 12.34 (H) 06/15/2024    HGB 6.0 (L) 06/15/2024    HCT 20.1 (L) 06/15/2024    MCV 89.3 06/15/2024    PLT 82 (L) 06/15/2024         Will order a type and screen and consent patient for blood transfusion. Will transfuse if Hgb is <7g/dl (<8g/dl in cases of active ACS) or if patient has rapid bleeding leading to hemodynamic instability.  6/11 no endoscopy at this time  No further evidence of bleeding  6/15 no further evidence of GI blood loss

## 2024-06-15 NOTE — ASSESSMENT & PLAN NOTE
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 Latest AMPAC and GEMS scores have not been reviewed. Evaluation for etiology is complete.   Plan includes PT/OT consulted.  Home health on discharge.      PTOT  Plan for [facility] once insurance approves

## 2024-06-15 NOTE — ASSESSMENT & PLAN NOTE
Patient has acute blood loss due to hemorrhage, the hemorrhage is due to gastrointestinal bleed, patient does have a propensity for bleeding due to a platelet defect/deficiency.. Will trend hemoglobin/hematocrit Daily, as well as monitor and correct for any coagulation defects. CBC and vital signs have been reviewed and last CBC was noted-   Lab Results   Component Value Date    WBC 12.34 (H) 06/15/2024    HGB 6.0 (L) 06/15/2024    HCT 20.1 (L) 06/15/2024    MCV 89.3 06/15/2024    PLT 82 (L) 06/15/2024         Will order a type and screen and consent patient for blood transfusion. Will transfuse if Hgb is <7g/dl (<8g/dl in cases of active ACS) or if patient has rapid bleeding leading to hemodynamic instability.  Hemoglobin has been stable  6/15 no further evidence of GI blood loss

## 2024-06-15 NOTE — ASSESSMENT & PLAN NOTE
Patient with Persistent (7 days or more) atrial fibrillation which is controlled currently with  Rate controlled.   with no medication needed . Patient is currently in atrial fibrillation.[id number] Score: 4. HASBLED Score: 5. Anticoagulation not indicated due to active GI bleed .  Rate controlled 6/11  6/15 rate controlled

## 2024-06-15 NOTE — ASSESSMENT & PLAN NOTE
Patient presents with 3 days of orbital and periorbital edema right > left with associated blurry vision  No leukocytosis, afebrile, and HDS  Ophthalmology consulted in the ED, normal intraocular pressures  - CT orbits: facial edema including orbits     - appreciate ophthalmology assistance   - not concerned for cellulitis   - retina specialist follow up outpatient  - f/u cultures   - consider broad spectrum abx if imaging, cultures, labs or vitals become concerning for infection    Improving   Patient was found to have thrombocytopenia, the likely etiology is secondary to sepsis/infection, will monitor the platelets Daily. Will transfuse if platelet count is <10k. Hold DVT prophylaxis if platelets are <50k. The patient's platelet results have been reviewed and are listed below.  Recent Labs   Lab 06/15/24  0613   PLT 82*

## 2024-06-15 NOTE — ASSESSMENT & PLAN NOTE
Patient's anemia is currently uncontrolled. Has received One units of PRBCs on 6/8 . Etiology likely d/t acute blood loss which was from GI bleed  Current CBC reviewed-   Lab Results   Component Value Date    HGB 6.0 (L) 06/15/2024    HCT 20.1 (L) 06/15/2024     Monitor serial CBC and transfuse if patient becomes hemodynamically unstable, symptomatic or H/H drops below 7/21.  Better today  6/11  Hemoglobin stable  No evidence of bleeding  Stable  6/15 yesterday he dislodged his dialysis catheter during treatment and had significant amount of bleeding.  Hemoglobin today 6.0.  Likely from blood loss yesterday.  We will transfuse 1 unit PRBCs

## 2024-06-15 NOTE — ASSESSMENT & PLAN NOTE
Patient's anemia is currently uncontrolled. Has not received any PRBCs to date. 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   No evidence of bleeding   6/5 no evidence of bleeding   6/10 Large clots passed and stool this morning.  Consult GI.  IV ppi.  Trend hemoglobin  6/11 hemoglobin holding today  6/15 acute blood loss during dialysis yesterday secondary to dislodging catheter.  Transfusing today

## 2024-06-15 NOTE — PROGRESS NOTES
[facility] Medicine  Progress Note    Patient Name: Joseph Mcdonald  MRN: [id number]  Patient Class: IP- Inpatient   Admission Date: 5/23/2024  Length of Stay: 23 days  Attending Physician: Cristian Ac DO  Primary Care Provider: Clinic, [facility]        Subjective:     Principal Problem:Acute hypoxic respiratory failure        HPI:    89-year-old  male With a past medical history of end-stage renal disease, hypertension, iron deficiency anemia, secondary hyperparathyroidism, type 2 diabetes, CAD, heart failure with preserved ejection fraction, hypokalemia inability presents to the ED with 1 week worsening shortness of breath.  This was particularly bad during his dialysis session this morning.  Due to this daughter brought him to the ED thereafter.  He denies any fever, chills, cough, wheezing, sputum production, palpitations.  He has been take medication as in his not missed any dialysis sessions.  He denies any nausea, vomiting, diarrhea, dysuria.  Imaging in the ED revealed near-complete he had a pacemaker patient of the right hemithorax review of systems otherwise negative.    Overview/Hospital Course:   5/24 1.5 L removed from right pleural space today.  Still has large right-sided effusion.  We will attempt to get some more off tomorrow.  5/25 dialyzing today  5/26 bedside ultrasound today still has large pleural effusion, complex appearing.  Was going to attempt bedside ultrasound but given complex nature have consulted pulmonology  5/27 D/W Pulm, plan for chest tube placement.   5/28- Midodrine added low BP.   5/29- s/p chest tube placement per Dr. Rodney, fluid studies sent.   5/30- BP low again, midodrine resumed. Started on intra-pleural lytics per Pulm.  5/31- Intra-pleural lytics continued, CXR improved.   6/1- Failed swallow and is with NG now and tube feeds. S/p 6 doses of Intra-pleural lytics (total 6 doses).   6/2- Mental status better,  resp status stable.  6/3- NG removed on accident while patient was working with PT/OT, SLP consulted for reevaluation. Pulm to follow, CXR with slightly increased right effusion.    6/4 surgery consulted today.  Plan for decortication on Thursday 6/6 6/5 plan for decortication tomorrow.  We will place NG tube due to patient not eating well  6/10  Mr. Sin has been in the ICU for the last several days.  He had a P EA arrest during induction for his decortication.  He is now out of the ICU and doing well.  Still has right-sided chest tube.  Notified this morning that he pass large clots in about movement.  We will consult GI and start IV  protonic  6/11 hemoglobin okay.  Unable to do endoscopy due to high risk of sedating patient.  Has a sacral wound that may need debriding.  We will have surgery evaluate today.  Again we will be high risk for anesthesia induction.  6/12 no surgical intervention on sacral decubitus.  Wound care.  Currently stable for discharge and awaiting placement.  Chest tube has been removed.  6/13 medically ready for discharge.  Awaiting placement  6/14 attempting to get [facility].  Awaiting placement  6/15 dislodged dialysis catheter during treatment yesterday and had significant blood loss.  Hemoglobin today 6.0.  We will transfuse 1 unit.    Interval History:     No acute events overnight    Review of Systems   HENT:  Positive for voice change.    Respiratory:  Positive for shortness of breath.      Objective:     Vital Signs (Most Recent):  Temp: 98.4 °F (36.9 °C) (06/15/24 0800)  Pulse: 83 (06/15/24 0800)  Resp: 16 (06/15/24 0800)  BP: (!) 104/31 (06/15/24 0800)  SpO2: 99 % (06/15/24 0800) Vital Signs (24h Range):  Temp:  [97.9 °F (36.6 °C)-98.4 °F (36.9 °C)] 98.4 °F (36.9 °C)  Pulse:  [60-93] 83  Resp:  [16-20] 16  SpO2:  [98 %-100 %] 99 %  BP: ()/(21-75) 104/31     Weight: 57.4 kg (126 lb 8.7 oz)  Body mass index is 21.06 kg/m².    Intake/Output Summary (Last 24 hours) at  6/15/2024 1047  Last data filed at 6/14/2024 1126  Gross per 24 hour   Intake 100 ml   Output 1000 ml   Net -900 ml         Physical Exam  Vitals and nursing note reviewed.   Constitutional:       Appearance: He is ill-appearing.   HENT:      Head: Normocephalic and atraumatic.      Nose: Nose normal.      Mouth/Throat:      Mouth: Mucous membranes are moist.   Eyes:      Extraocular Movements: Extraocular movements intact.   Cardiovascular:      Rate and Rhythm: Normal rate and regular rhythm.      Pulses: Normal pulses.      Heart sounds: Normal heart sounds.   Pulmonary:      Effort: Pulmonary effort is normal.      Breath sounds: Examination of the right-middle field reveals decreased breath sounds. Examination of the right-lower field reveals decreased breath sounds. Decreased breath sounds present.      Comments: Chest tube in place.   Abdominal:      General: Bowel sounds are normal.      Palpations: Abdomen is soft.      Comments: NG in place.    Musculoskeletal:      Cervical back: Normal range of motion.   Skin:     General: Skin is warm.   Neurological:      General: No focal deficit present.      Mental Status: He is alert. Mental status is at baseline.   Psychiatric:         Mood and Affect: Mood normal.         Behavior: Behavior normal.             Significant Labs: All pertinent labs within the past 24 hours have been reviewed.    Significant Imaging: I have reviewed all pertinent imaging results/findings within the past 24 hours.    Assessment/Plan:      * Acute hypoxic respiratory failure  Sec to pleural effusion.  Require 2L NC since admission.   Imaging consistent with large right sided pleural effusion.   S/p thoracentesis on 5/23 with 1,5L fluid removed, no pleural fluid studies sent.   Remained symptomatic so CT chest obtained on 5/24 and consistent with large effusion.  Pulmonology consulted; s/p chest tube placement (5/29), s/p intra-pleural lytics started on 5/30 (total 6 doses).  Volume  removal with HD, continue broad spectrum antibiotics as below.   Monitor oxygen status.   Home O2 eval prior to discharge.      6/4Improved.  Plan for decortication of the right lung on Thursday 6/5 plan for OR tomorrow  6/10 chest tube in place still draining.  6/11 still requiring oxygen  6/12 chest tube removed.  Stable for discharge  Doing well      Parapneumonic effusion  Patient found to have large pleural effusion on imaging. I have personally reviewed and interpreted the following imaging: Xray. A thoracentesis was ordered. Most likely etiology includes Congestive Heart Failure, Pneumonia, and Renal Failure. Management to include Repeat Thoracentesis and Dialysis  S/p thoracentesis on 5/23- 1.5L fluid removed.  S/p chest tube placement (5/29), cytology negative for malignancy, fluid appears exudate in nature, cultures NGTD.   S/p 6 doses of Intra-pleural lytics BID started per Pulm (5/30-6/2).   CXR mostly stable, possibly slight increase in fluid noted on CXR from 6/3.   Continue IV Zosyn (timing and transition per Pulm), discontinued vancomycin given negative MRSA PCR.   Pulm follows.    6/4 plan for decortication on Thursday 6/5 plan for decortication tomorrow  6/10 continuing antibiotics.  Chest tube in place  6/11  chest tube per pulmonology  6/12 chest tube inadvertently removed overnight.  Okay with pulmonology to leave out.  He is stable for discharge from pulmonary standpoint.  Chest tube out, completed antibiotics.    Pressure injury of left heel, unstageable  Unclear chronicity  Wound care    Pressure injury of right heel, unstageable  Unclear chronicity  Wound care    Decubitus ulcer of sacral region, unstageable   We will have surgery evaluate today   No surgical intervention.  6/12  Wound care    Loculated pleural effusion  Patient found to have large pleural effusion on imaging. I have personally reviewed and interpreted the following imaging: Xray. A thoracentesis was performed. Pleural  "fluid was sent for analysis. Labs reviewed, including Serum LDH   LDH   Date Value Ref Range Status   05/29/2024 279 (H) 87 - 241 U/L Final   , Pleural Fluid LDH   Lactate Dehydrogenase (LDH), Body Fluid   Date Value Ref Range Status   05/29/2024 270 U/L Final     Comment:     Reference ranges for this analyte have not been established for the body fluid specimen submitted for analysis.    , Serum Protein   Total Protein   Date Value Ref Range Status   06/11/2024 5.7 (L) 6.4 - 8.2 g/dL Final    , Pleural Protein No results found for: "PROTEINBODYF" , Cell Count and Differential No results found for: "BFCELLCNT" , and Fluid Cultures No results found for: "BODYFLUIDCUL" . Fluid most consistent with Exudate.  . Most likely etiology includes Pneumonia. Management to include Pleural Catheter   Chest tube removed 6/12    Anemia due to GI blood loss  Patient's anemia is currently uncontrolled. Has received 1 units of PRBCs on 06/10/2024 . Etiology likely d/t acute blood loss which was from GI bleed  Current CBC reviewed-   Lab Results   Component Value Date    HGB 6.0 (L) 06/15/2024    HCT 20.1 (L) 06/15/2024     Monitor serial CBC and transfuse if patient becomes hemodynamically unstable, symptomatic or H/H drops below 7/21.    6/15 no further evidence of GI blood loss    Blood per rectum  Patient has acute blood loss due to hemorrhage, the hemorrhage is due to gastrointestinal bleed, patient does have a propensity for bleeding due to a platelet defect/deficiency.. Will trend hemoglobin/hematocrit Daily, as well as monitor and correct for any coagulation defects. CBC and vital signs have been reviewed and last CBC was noted-   Lab Results   Component Value Date    WBC 12.34 (H) 06/15/2024    HGB 6.0 (L) 06/15/2024    HCT 20.1 (L) 06/15/2024    MCV 89.3 06/15/2024    PLT 82 (L) 06/15/2024         Will order a type and screen and consent patient for blood transfusion. Will transfuse if Hgb is <7g/dl (<8g/dl in cases of " active ACS) or if patient has rapid bleeding leading to hemodynamic instability.  Hemoglobin has been stable  6/15 no further evidence of GI blood loss    GI bleed  Patient has acute blood loss due to hemorrhage, the hemorrhage is due to gastrointestinal bleed, patient does have a propensity for bleeding due to a platelet defect/deficiency and a medication, the medication is heparin.. Will trend hemoglobin/hematocrit Every 6 hours, as well as monitor and correct for any coagulation defects. CBC and vital signs have been reviewed and last CBC was noted-   Lab Results   Component Value Date    WBC 12.34 (H) 06/15/2024    HGB 6.0 (L) 06/15/2024    HCT 20.1 (L) 06/15/2024    MCV 89.3 06/15/2024    PLT 82 (L) 06/15/2024         Will order a type and screen and consent patient for blood transfusion. Will transfuse if Hgb is <7g/dl (<8g/dl in cases of active ACS) or if patient has rapid bleeding leading to hemodynamic instability.  6/11 no endoscopy at this time  No further evidence of bleeding  6/15 no further evidence of GI blood loss    Troponin level elevated   Secondary to cardiac arrest      Coronary artery disease involving native coronary artery of native heart without angina pectoris  Patient with known CAD s/p  unclear , which is controlled Will continue Statin and monitor for S/Sx of angina/ACS. Continue to monitor on telemetry.   6/11 no chest pain    Atrial fibrillation with RVR  Patient with Persistent (7 days or more) atrial fibrillation which is controlled currently with  Rate controlled.   with no medication needed . Patient is currently in atrial fibrillation.[id number] Score: 4. HASBLED Score: 5. Anticoagulation not indicated due to active GI bleed .  Rate controlled 6/11  6/15 rate controlled    Acute systolic CHF (congestive heart failure)  Patient is identified as having Diastolic (HFpEF) heart failure that is Acute on chronic. CHF is currently uncontrolled due to Pulmonary edema/pleural effusion on  "CXR. Latest ECHO performed and demonstrates- Results for orders placed during the hospital encounter of 05/23/24    Echo    Interpretation Summary    Left Ventricle: The left ventricle is normal in size. Increased wall thickness. There is concentric remodeling. There is normal systolic function. Biplane (2D) method of discs ejection fraction is 55%. Grade II diastolic dysfunction.    Right Ventricle: Mild right ventricular enlargement. Systolic function is mildly reduced.    Left Atrium: Left atrium is mildly dilated.    Right Atrium: Right atrium is mildly dilated.    Aortic Valve: The aortic valve is a trileaflet valve. Mildly calcified cusps. There is mild stenosis. Aortic valve area by VTI is 1.59 cm². Aortic valve peak velocity is 1.48 m/s. Mean gradient is 4 mmHg. The dimensionless index is 0.60.    Mitral Valve: There is bileaflet sclerosis. Mildly thickened leaflets. There is mild regurgitation.    Tricuspid Valve: There is severe regurgitation with an eccentrically directed jet.    Pulmonary Artery: Pulmonary artery pressure could not be accurately determined due to severe TR.    IVC/SVC: Elevated venous pressure at 15 mmHg.    Pericardium: There is a trivial effusion. No indication of cardiac tamponade. Left pleural effusion.  . Continue Nitrate/Vasodilator and monitor clinical status closely. Monitor on telemetry. Patient is off CHF pathway.  Monitor strict Is&Os and daily weights.  Place on fluid restriction of 1.5 L. Cardiology has not been consulted. Continue to stress to patient importance of self efficacy and  on diet for CHF. Last BNP reviewed- and noted below No results for input(s): "BNP", "BNPTRIAGEBLO" in the last 168 hours.  6/11 still requiring oxygen  6/12 stable    Cardiac arrest   PEA arrest during induction for his decortication.  Likely medication effect from anesthesia.    Appears to not have any neurologic sequela from the event.      Thrombocytopenia  Patient was found to have " thrombocytopenia, the likely etiology is secondary to sepsis/infection, will monitor the platelets Daily. Will transfuse if platelet count is <10k. Hold DVT prophylaxis if platelets are <50k. The patient's platelet results have been reviewed and are listed below.  Recent Labs   Lab 06/15/24  0613   PLT 82*           Acute metabolic encephalopathy  Discontinued trazodone.  Management as above.  Requiring restraints to avoid treatment interruption.   Improving.    6/4  More awake today  6/5 improving  6/10 appears to be at baseline today  Resolved    Oropharyngeal dysphagia  SLP consulted, failed swallow eval.  Now NPO, NG inserted for feed and meds.    NG removed per patient on accident on 6/3.  SLP to follow today as patient is more alert now.      Has reportedly become more weak and eaten less over the duration of his hospitalization.  We will place NG tube if necessary.  6/5 NG tube today   Now tolerating oral    Pleural effusion  As above.    Decortication Thursday    Patient found to have large pleural effusion on imaging. I have personally reviewed and interpreted the following imaging: Xray. A thoracentesis was ordered. Most likely etiology includes Pneumonia. Management to include Repeat Thoracentesis, Pleural Catheter, and Dialysis   6/10 chest tube in place  6/11 chest tube per pulmonology  6/12 chest tube out    Debility  Patient with Acute on chronic debility due to age-related physical debility. Latest AMPAC and GEMS scores have not been reviewed. Evaluation for etiology is complete.   Plan includes PT/OT consulted.  Home health on discharge.      PTOT  Plan for [facility] once insurance approves    Anemia of renal disease  Patient's anemia is currently uncontrolled. Has not received any PRBCs to date. Etiology likely d/t chronic disease due to ESRD  Current CBC reviewed-   Lab Results   Component Value Date    HGB 6.0 (L) 06/15/2024    HCT 20.1 (L) 06/15/2024     Monitor serial CBC and transfuse if  patient becomes hemodynamically unstable, symptomatic or H/H drops below 7/21.   No evidence of bleeding   6/5 no evidence of bleeding   6/10 Large clots passed and stool this morning.  Consult GI.  IV ppi.  Trend hemoglobin  6/11 hemoglobin holding today  6/15 acute blood loss during dialysis yesterday secondary to dislodging catheter.  Transfusing today    Acute on chronic heart failure with preserved ejection fraction  Patient is identified as having Diastolic (HFpEF) heart failure that is Acute on chronic. CHF is currently uncontrolled due to Pulmonary edema/pleural effusion on CXR. Latest ECHO performed and demonstrates- Results for orders placed during the hospital encounter of 08/01/23    Echo    Interpretation Summary    Left Ventricle: The left ventricle is normal in size. Increased wall thickness. There is concentric remodeling. Normal wall motion. There is normal systolic function with a visually estimated ejection fraction of 55 - 60%. Grade II diastolic dysfunction.    Left Atrium: Left atrium is mildly dilated. There is a calcified 1.5 x 2 cm full wall thickness mass noted extending from the myocardium into the pericardial space, unclear etiology, recommend cardiac MRI.    Right Ventricle: Normal right ventricular cavity size. Systolic function is normal.    Aortic Valve: The aortic valve is a trileaflet valve. There is physiologically normal aortic regurgitation.    Mitral Valve: There is no stenosis. The mean pressure gradient across the mitral valve is 3 mmHg at a heart rate of  bpm. There is mild regurgitation with a centrally directed jet.    Tricuspid Valve: There is mild to moderate regurgitation with a centrally directed jet.    Pulmonic Valve: There is no significant stenosis.    Pericardium: Small circumferential pericardial effusion present. No indication of cardiac tamponade.  . Continue Nitrate/Vasodilator and monitor clinical status closely. Monitor on telemetry. Patient is off CHF  "pathway.  Monitor strict Is&Os and daily weights.  Place on fluid restriction of 1.5 L. Cardiology has not been consulted. Continue to stress to patient importance of self efficacy and  on diet for CHF. Last BNP reviewed- and noted below No results for input(s): "BNP", "BNPTRIAGEBLO" in the last 168 hours.   Volume removal per HD.     6/4 Makes no urine.  Continue dialysis  6/5 continue dialysis    Elevated troponin   Likely secondary to cardiac arrest      Malnutrition of moderate degree  Nutrition consulted. Most recent weight and BMI monitored-     Measurements:  Wt Readings from Last 1 Encounters:   06/08/24 57.4 kg (126 lb 8.7 oz)   Body mass index is 21.06 kg/m².    Patient has been screened and assessed by RD.    Malnutrition Type:  Context:    Level:      Malnutrition Characteristic Summary:       Interventions/Recommendations (treatment strategy):  Recommend to add Gallo to aid in wound healing, ONS to provide additional calories/protein to heal/HD   We will place NG tube if necessary.  6/5 NG tube today  6/10 NG tube out.  Encourage orals  6/11 eating during interview today  6/12 eating good    DM2 (diabetes mellitus, type 2)  HBA1c 4.5  No need for SSI or POC glucose check.     Secondary hyperparathyroidism of renal origin  Resume home Renvela.    Nephrology following  Appreciate nephrology    Essential (primary) hypertension  Chronic, controlled. Latest blood pressure and vitals reviewed-     Temp:  [97.9 °F (36.6 °C)-98.4 °F (36.9 °C)]   Pulse:  [60-93]   Resp:  [16-20]   BP: ()/(21-75)   SpO2:  [98 %-100 %] .   Home meds for hypertension were reviewed and noted below.   Hypertension Medications               amLODIPine (NORVASC) 5 MG tablet Take 5 mg by mouth once daily.    hydrALAZINE (APRESOLINE) 100 MG tablet Take 1 tablet by mouth 3 (three) times daily with meals.    nebivoloL (BYSTOLIC) 2.5 MG Tab Take 2.5 mg by mouth once daily.            While in the hospital, will manage blood " pressure as follows; hold meds given hypotension. BP has been fluctuating, will likely add back beta blocker if BP gets on the higher side.     Will utilize p.r.n. blood pressure medication only if patient's blood pressure greater than 180/110 and he develops symptoms such as worsening chest pain or shortness of breath.   BP reasonable  6/5 BP okay  Stable  BP okay 6/15    ESRD (end stage renal disease)  Creatine stable for now. BMP reviewed- noted Estimated Creatinine Clearance: 12.6 mL/min (A) (based on SCr of 3.28 mg/dL (H)). according to latest data. 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    Nephrology consulted to resume HD sessions.   Continue dialysis   6/5Electrolytes okay  6/10 dialysis today    Dependence on renal dialysis   ESRD      Acute blood loss anemia  Patient's anemia is currently uncontrolled. Has received One units of PRBCs on 6/8 . Etiology likely d/t acute blood loss which was from GI bleed  Current CBC reviewed-   Lab Results   Component Value Date    HGB 6.0 (L) 06/15/2024    HCT 20.1 (L) 06/15/2024     Monitor serial CBC and transfuse if patient becomes hemodynamically unstable, symptomatic or H/H drops below 7/21.  Better today  6/11  Hemoglobin stable  No evidence of bleeding  Stable  6/15 yesterday he dislodged his dialysis catheter during treatment and had significant amount of bleeding.  Hemoglobin today 6.0.  Likely from blood loss yesterday.  We will transfuse 1 unit PRBCs      VTE Risk Mitigation (From admission, onward)           Ordered     IP VTE HIGH RISK PATIENT  Once         06/06/24 1154     Place sequential compression device  Until discontinued         06/06/24 1154                    Discharge Planning   BRIAN: 6/14/2024     Code Status: Full Code   Is the patient medically ready for discharge?:     Reason for patient still in hospital (select all that apply): Treatment  Discharge Plan A: Home, Home Health        Dislodged dialysis catheter yesterday during treatment  lost significant amount of blood.  Hemoglobin down to 6 today.  We will transfuse.  No evidence of GI blood loss.          Cristian Ac DO  Department of Hospital Medicine   [facility]

## 2024-06-15 NOTE — ASSESSMENT & PLAN NOTE
Internal Medicine Daily Progress Note  Elmer Sorto   [id number]  87 year old male    Date: 2/24/2018     Chief complaint:    S: Complains of intermittent chest tightness.        Problem list:   Atrial flutter, new onset  Borderline hypotension  Acute diastolic CHF exacrebation  Troponin leak  History of bifascicular block  Hyperlipidemia  Vitamin D deficiency    Vital Last Value 24 Hour Range   Temperature 98.5 °F (36.9 °C) Temp  Min: 98.2 °F (36.8 °C)  Max: 99.4 °F (37.4 °C)   Pulse 126 Pulse  Min: 116  Max: 146   Respiratory 18 Resp  Min: 15  Max: 34   Blood Pressure 100/60 BP  Min: 72/50  Max: 142/84   Pulse Oximetry 96 % SpO2  Min: 93 %  Max: 99 %   CVP   No Data Recorded     Vital Today Admit   Weight 84 kg Weight: 82.1 kg   Height N/A Height: 5' 6\" (167.6 cm)   BMI N/A BMI (Calculated): 29.28       Weight over the past 48 Hours:  Patient Vitals for the past 48 hrs:   Weight   02/23/18 1705 82.1 kg   02/23/18 2023 83.3 kg   02/24/18 0540 84 kg        Intake/Output:  Last Stool Occurrence:    I/O this shift:  In: 180 [P.O.:180]  Out: -   No intake/output data recorded.  Weight change:         Medications/Infusions:  Scheduled:   • metoPROLOL tartrate  12.5 mg Oral 2 times per day   • rivaroxaban  20 mg Oral Daily with dinner   • sodium chloride (PF)  2 mL Injection 2 times per day   • aspirin  81 mg Oral Daily   • finasteride  5 mg Oral Daily   • vitamin - therapeutic multivitamins w/minerals  1 tablet Oral Daily   • simvastatin  40 mg Oral Nightly   • digoxin  0.25 mg Oral Q6H   • [START ON 2/25/2018] digoxin  0.125 mg Oral Daily       Review of systems: 10 systems  were reviewed by me, pertinent positives mentioned above, other systems are negative    Physical Exam  GENERAL : Alert. No Respiratory Distress  HEENT: Sclerae is anicteric. Oral mucosa is moist. Pupils are equally round and reactive to light. Extraocular movements are intact, oropharynx clear .   NECK: No palpable nodes or mass. No bruits  Patient's anemia is currently uncontrolled. Has received 1 units of PRBCs on 06/10/2024 . Etiology likely d/t acute blood loss which was from GI bleed  Current CBC reviewed-   Lab Results   Component Value Date    HGB 6.0 (L) 06/15/2024    HCT 20.1 (L) 06/15/2024     Monitor serial CBC and transfuse if patient becomes hemodynamically unstable, symptomatic or H/H drops below 7/21.    6/15 no further evidence of GI blood loss   heard. No thyromegaly  LYMPHATICS: No cervical or inguinal lymphadenopathy  CARDIAC: S1, S2, irregular. No S3, S4, pedal pulses well felt    LUNGS: CTA bilaterally No wheezes, no accessory muscles active .   ABDOMEN: soft, non-tender, non-distended, bowel sounds present, no hepatosplenomegaly    SKIN: No evidence of rash or excoriations.   MUSCULOSKELETAL: No peda edema, clubbing or cyanosis. Range of motion normal in all four extremities   NEURO: The patient is alert, oriented, cranial nerves intact , no motor deficits  sensations intact to touch,deep tendon  Reflexes present   PSYCH: mood and affect are normal       Recent Labs  Lab 02/24/18  0414 02/23/18  1705   WBC 10.0 11.6*   HCT 35.1* 38.5*   HGB 11.1* 12.4*    191   SODIUM 137 141   POTASSIUM 5.1 4.1   CHLORIDE 105 105   CO2 24 26   CALCIUM 8.8 9.0   GLUCOSE 142* 131*   BUN 27* 21*   CREATININE 1.38* 1.18*   AST  --  35   GPT  --  44   ALKPT  --  64   BILIRUBIN  --  0.9   ALBUMIN  --  4.1   LIPA  --  62*   GFRNA 46 56         Imaging: Xr Chest Ap Or Pa    Result Date: 2/23/2018  EXAM: XR CHEST AP OR PA INDICATION: Chest pain COMPARISON: 3/24/2015.     FINDINGS/IMPRESSION: 1. Very mild congestive change and mild cardiomegaly. 2. No pleural effusion, alveolar consolidation, or pneumothorax.           DVT/VTE Prophylaxis:  VTE Pharmacologic Prophylaxis:yes  VTE Mechanical Prophylaxis: yes    Assessment:   Atrial flutter, new onset  Borderline hypotension  Acute diastolic CHF excessive patient  History of bifascicular block  Hyperlipidemia  Vitamin D deficiency    Plan:   Initially patient was on esmolol but had hypotension  Received digoxin, metoprolol added  Cardiology consulted, continue to follow troponins  Telemetry monitoring  Patient started on Lovenox and then for anticoagulation  Continue Pravachol for hyperlipidemia   PT, OT as tolerated   Continue to monitor renal function    Discharge     Expected Discharge Date: 02/27/18  Barriers:  none  Destination: home        Zaire Roman MD  2/24/2018

## 2024-06-16 NOTE — SUBJECTIVE & OBJECTIVE
Interval History:  Patient reports he is breathing okay today.  His main complaint is his backside is sore.  Patient has a pressure injury that does not require debridement per surgery.  CNA and I repositioned patient for comfort.    Review of Systems  Objective:     Vital Signs (Most Recent):  Temp: 98.4 °F (36.9 °C) (06/16/24 0754)  Pulse: 86 (06/16/24 0754)  Resp: 20 (06/16/24 0754)  BP: (!) 168/57 (06/16/24 0754)  SpO2: (!) 94 % (06/16/24 0754) Vital Signs (24h Range):  Temp:  [97.3 °F (36.3 °C)-99.2 °F (37.3 °C)] 98.4 °F (36.9 °C)  Pulse:  [79-89] 86  Resp:  [16-20] 20  SpO2:  [94 %-100 %] 94 %  BP: ()/(33-64) 168/57     Weight: 57.4 kg (126 lb 8.7 oz)  Body mass index is 21.06 kg/m².    Intake/Output Summary (Last 24 hours) at 6/16/2024 0912  Last data filed at 6/15/2024 1632  Gross per 24 hour   Intake 624.17 ml   Output --   Net 624.17 ml         Physical Exam  Vitals and nursing note reviewed.   Constitutional:       Appearance: He is ill-appearing.   HENT:      Head: Normocephalic and atraumatic.      Nose: Nose normal.      Mouth/Throat:      Mouth: Mucous membranes are moist.   Eyes:      Extraocular Movements: Extraocular movements intact.   Cardiovascular:      Rate and Rhythm: Normal rate and regular rhythm.      Pulses: Normal pulses.      Heart sounds: Normal heart sounds.   Pulmonary:      Effort: Pulmonary effort is normal.      Breath sounds: Examination of the right-middle field reveals decreased breath sounds. Examination of the right-lower field reveals decreased breath sounds. Decreased breath sounds present.   Abdominal:      General: Bowel sounds are normal.      Palpations: Abdomen is soft.   Musculoskeletal:      Cervical back: Normal range of motion.   Skin:     General: Skin is warm.   Neurological:      General: No focal deficit present.      Mental Status: He is alert. Mental status is at baseline.   Psychiatric:         Mood and Affect: Mood normal.         Behavior: Behavior  "normal.             Significant Labs: All pertinent labs within the past 24 hours have been reviewed.  BMP: No results for input(s): "GLU", "NA", "K", "CL", "CO2", "BUN", "CREATININE", "CALCIUM", "MG" in the last 48 hours.  CBC:   Recent Labs   Lab 06/14/24  1251 06/15/24  0613 06/15/24  1536   WBC  --  12.34*  --    HGB 7.5* 6.0* 7.6*   HCT 24.1* 20.1* 25.1*   PLT  --  82*  --        Significant Imaging: I have reviewed all pertinent imaging results/findings within the past 24 hours.  "

## 2024-06-16 NOTE — ASSESSMENT & PLAN NOTE
Patient's anemia is currently uncontrolled. Has received One units of PRBCs on 6/8 . Etiology likely d/t acute blood loss which was from GI bleed  Current CBC reviewed-   Lab Results   Component Value Date    HGB 7.6 (L) 06/15/2024    HCT 25.1 (L) 06/15/2024     Monitor serial CBC and transfuse if patient becomes hemodynamically unstable, symptomatic or H/H drops below 7/21.  Better today  6/11  Hemoglobin stable  No evidence of bleeding  Stable  6/15 yesterday he dislodged his dialysis catheter during treatment and had significant amount of bleeding.  Hemoglobin today 6.0.  Likely from blood loss yesterday.  We will transfuse 1 unit PRBCs.  6/16 - Post transfusion H/H as above. No lab today. Recheck tomorrow.

## 2024-06-16 NOTE — PLAN OF CARE
Problem: Sepsis/Septic Shock  Goal: Optimal Coping  Outcome: Progressing  Goal: Absence of Bleeding  Outcome: Progressing  Goal: Blood Glucose Level Within Targeted Range  Outcome: Progressing  Goal: Absence of Infection Signs and Symptoms  Outcome: Progressing  Goal: Optimal Nutrition Intake  Outcome: Progressing     Problem: Fall Injury Risk  Goal: Absence of Fall and Fall-Related Injury  Outcome: Progressing  Intervention: Promote Injury-Free Environment  Flowsheets (Taken 6/15/2024 2205)  Safety Promotion/Fall Prevention:   assistive device/personal item within reach   medications reviewed   side rails raised x 3     Problem: Wound  Goal: Optimal Coping  Outcome: Progressing  Goal: Optimal Functional Ability  Outcome: Progressing  Goal: Absence of Infection Signs and Symptoms  Outcome: Progressing

## 2024-06-16 NOTE — PROGRESS NOTES
[facility] Medicine  Progress Note    Patient Name: Joseph Mcdonald  MRN: [id number]  Patient Class: IP- Inpatient   Admission Date: 5/23/2024  Length of Stay: 24 days  Attending Physician: Manas Sin Jr., MD  Primary Care Provider: Clinic, [facility]        Subjective:     Principal Problem:Acute hypoxic respiratory failure        HPI:    89-year-old  male With a past medical history of end-stage renal disease, hypertension, iron deficiency anemia, secondary hyperparathyroidism, type 2 diabetes, CAD, heart failure with preserved ejection fraction, hypokalemia inability presents to the ED with 1 week worsening shortness of breath.  This was particularly bad during his dialysis session this morning.  Due to this daughter brought him to the ED thereafter.  He denies any fever, chills, cough, wheezing, sputum production, palpitations.  He has been take medication as in his not missed any dialysis sessions.  He denies any nausea, vomiting, diarrhea, dysuria.  Imaging in the ED revealed near-complete he had a pacemaker patient of the right hemithorax review of systems otherwise negative.    Overview/Hospital Course:   5/24 1.5 L removed from right pleural space today.  Still has large right-sided effusion.  We will attempt to get some more off tomorrow.  5/25 dialyzing today  5/26 bedside ultrasound today still has large pleural effusion, complex appearing.  Was going to attempt bedside ultrasound but given complex nature have consulted pulmonology  5/27 D/W Pulm, plan for chest tube placement.   5/28- Midodrine added low BP.   5/29- s/p chest tube placement per Dr. Rodney, fluid studies sent.   5/30- BP low again, midodrine resumed. Started on intra-pleural lytics per Pulm.  5/31- Intra-pleural lytics continued, CXR improved.   6/1- Failed swallow and is with NG now and tube feeds. S/p 6 doses of Intra-pleural lytics (total 6 doses).   6/2- Mental status  better, resp status stable.  6/3- NG removed on accident while patient was working with PT/OT, SLP consulted for reevaluation. Pulm to follow, CXR with slightly increased right effusion.    6/4 surgery consulted today.  Plan for decortication on Thursday 6/6 6/5 plan for decortication tomorrow.  We will place NG tube due to patient not eating well  6/10  Mr. Sin has been in the ICU for the last several days.  He had a P EA arrest during induction for his decortication.  He is now out of the ICU and doing well.  Still has right-sided chest tube.  Notified this morning that he pass large clots in about movement.  We will consult GI and start IV  protonic  6/11 hemoglobin okay.  Unable to do endoscopy due to high risk of sedating patient.  Has a sacral wound that may need debriding.  We will have surgery evaluate today.  Again we will be high risk for anesthesia induction.  6/12 no surgical intervention on sacral decubitus.  Wound care.  Currently stable for discharge and awaiting placement.  Chest tube has been removed.  6/13 medically ready for discharge.  Awaiting placement  6/14 attempting to get [facility].  Awaiting placement  6/15 dislodged dialysis catheter during treatment yesterday and had significant blood loss.  Hemoglobin today 6.0.  We will transfuse 1 unit.    Interval History:  Patient reports he is breathing okay today.  His main complaint is his backside is sore.  Patient has a pressure injury that does not require debridement per surgery.  CNA and I repositioned patient for comfort.    Review of Systems  Objective:     Vital Signs (Most Recent):  Temp: 98.4 °F (36.9 °C) (06/16/24 0754)  Pulse: 86 (06/16/24 0754)  Resp: 20 (06/16/24 0754)  BP: (!) 168/57 (06/16/24 0754)  SpO2: (!) 94 % (06/16/24 0754) Vital Signs (24h Range):  Temp:  [97.3 °F (36.3 °C)-99.2 °F (37.3 °C)] 98.4 °F (36.9 °C)  Pulse:  [79-89] 86  Resp:  [16-20] 20  SpO2:  [94 %-100 %] 94 %  BP: ()/(33-64) 168/57     Weight:  "57.4 kg (126 lb 8.7 oz)  Body mass index is 21.06 kg/m².    Intake/Output Summary (Last 24 hours) at 6/16/2024 0912  Last data filed at 6/15/2024 1632  Gross per 24 hour   Intake 624.17 ml   Output --   Net 624.17 ml         Physical Exam  Vitals and nursing note reviewed.   Constitutional:       Appearance: He is ill-appearing.   HENT:      Head: Normocephalic and atraumatic.      Nose: Nose normal.      Mouth/Throat:      Mouth: Mucous membranes are moist.   Eyes:      Extraocular Movements: Extraocular movements intact.   Cardiovascular:      Rate and Rhythm: Normal rate and regular rhythm.      Pulses: Normal pulses.      Heart sounds: Normal heart sounds.   Pulmonary:      Effort: Pulmonary effort is normal.      Breath sounds: Examination of the right-middle field reveals decreased breath sounds. Examination of the right-lower field reveals decreased breath sounds. Decreased breath sounds present.   Abdominal:      General: Bowel sounds are normal.      Palpations: Abdomen is soft.   Musculoskeletal:      Cervical back: Normal range of motion.   Skin:     General: Skin is warm.   Neurological:      General: No focal deficit present.      Mental Status: He is alert. Mental status is at baseline.   Psychiatric:         Mood and Affect: Mood normal.         Behavior: Behavior normal.             Significant Labs: All pertinent labs within the past 24 hours have been reviewed.  BMP: No results for input(s): "GLU", "NA", "K", "CL", "CO2", "BUN", "CREATININE", "CALCIUM", "MG" in the last 48 hours.  CBC:   Recent Labs   Lab 06/14/24  1251 06/15/24  0613 06/15/24  1536   WBC  --  12.34*  --    HGB 7.5* 6.0* 7.6*   HCT 24.1* 20.1* 25.1*   PLT  --  82*  --        Significant Imaging: I have reviewed all pertinent imaging results/findings within the past 24 hours.    Assessment/Plan:      * Acute hypoxic respiratory failure  Sec to pleural effusion.  Require 2L NC since admission.   Imaging consistent with large right " sided pleural effusion.   S/p thoracentesis on 5/23 with 1,5L fluid removed, no pleural fluid studies sent.   Remained symptomatic so CT chest obtained on 5/24 and consistent with large effusion.  Pulmonology consulted; s/p chest tube placement (5/29), s/p intra-pleural lytics started on 5/30 (total 6 doses).  Volume removal with HD, continue broad spectrum antibiotics as below.   Monitor oxygen status.   Home O2 eval prior to discharge.      6/4Improved.  Plan for decortication of the right lung on Thursday 6/5 plan for OR tomorrow  6/10 chest tube in place still draining.  6/11 still requiring oxygen  6/12 chest tube removed.  Stable for discharge  Doing well      Acute blood loss anemia  Patient's anemia is currently uncontrolled. Has received One units of PRBCs on 6/8 . Etiology likely d/t acute blood loss which was from GI bleed  Current CBC reviewed-   Lab Results   Component Value Date    HGB 7.6 (L) 06/15/2024    HCT 25.1 (L) 06/15/2024     Monitor serial CBC and transfuse if patient becomes hemodynamically unstable, symptomatic or H/H drops below 7/21.  Better today  6/11  Hemoglobin stable  No evidence of bleeding  Stable  6/15 yesterday he dislodged his dialysis catheter during treatment and had significant amount of bleeding.  Hemoglobin today 6.0.  Likely from blood loss yesterday.  We will transfuse 1 unit PRBCs.  6/16 - Post transfusion H/H as above. No lab today. Recheck tomorrow.     ESRD (end stage renal disease)  Creatine stable for now. BMP reviewed- noted Estimated Creatinine Clearance: 12.6 mL/min (A) (based on SCr of 3.28 mg/dL (H)). according to latest data. 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    Nephrology consulted to resume HD sessions.   Continue dialysis   6/5Electrolytes okay  6/10 dialysis today    DM2 (diabetes mellitus, type 2)  HBA1c 4.5  No need for SSI or POC glucose check.     Parapneumonic effusion  Patient found to have large pleural effusion on imaging. I have  personally reviewed and interpreted the following imaging: Xray. A thoracentesis was ordered. Most likely etiology includes Congestive Heart Failure, Pneumonia, and Renal Failure. Management to include Repeat Thoracentesis and Dialysis  S/p thoracentesis on 5/23- 1.5L fluid removed.  S/p chest tube placement (5/29), cytology negative for malignancy, fluid appears exudate in nature, cultures NGTD.   S/p 6 doses of Intra-pleural lytics BID started per Pulm (5/30-6/2).   CXR mostly stable, possibly slight increase in fluid noted on CXR from 6/3.   Continue IV Zosyn (timing and transition per Pulm), discontinued vancomycin given negative MRSA PCR.   Pulm follows.    6/4 plan for decortication on Thursday 6/5 plan for decortication tomorrow  6/10 continuing antibiotics.  Chest tube in place  6/11  chest tube per pulmonology  6/12 chest tube inadvertently removed overnight.  Okay with pulmonology to leave out.  He is stable for discharge from pulmonary standpoint.  Chest tube out, completed antibiotics.    Debility  Patient with Acute on chronic debility due to age-related physical debility. Latest AMPAC and GEMS scores have not been reviewed. Evaluation for etiology is complete.   Plan includes PT/OT consulted.  Home health on discharge.      PTOT  Plan for [facility] once insurance approves    Pressure injury of left heel, unstageable  Unclear chronicity  Wound care    Pressure injury of right heel, unstageable  Unclear chronicity  Wound care    Decubitus ulcer of sacral region, unstageable   We will have surgery evaluate today   No surgical intervention.  6/12  Wound care    Loculated pleural effusion  Patient found to have large pleural effusion on imaging. I have personally reviewed and interpreted the following imaging: Xray. A thoracentesis was performed. Pleural fluid was sent for analysis. Labs reviewed, including Serum LDH   LDH   Date Value Ref Range Status   05/29/2024 279 (H) 87 - 241 U/L Final   , Pleural  "Fluid LDH   Lactate Dehydrogenase (LDH), Body Fluid   Date Value Ref Range Status   05/29/2024 270 U/L Final     Comment:     Reference ranges for this analyte have not been established for the body fluid specimen submitted for analysis.    , Serum Protein   Total Protein   Date Value Ref Range Status   06/11/2024 5.7 (L) 6.4 - 8.2 g/dL Final    , Pleural Protein No results found for: "PROTEINBODYF" , Cell Count and Differential No results found for: "BFCELLCNT" , and Fluid Cultures No results found for: "BODYFLUIDCUL" . Fluid most consistent with Exudate.  . Most likely etiology includes Pneumonia. Management to include Pleural Catheter   Chest tube removed 6/12    Anemia due to GI blood loss  Patient's anemia is currently uncontrolled. Has received 1 units of PRBCs on 06/10/2024 . Etiology likely d/t acute blood loss which was from GI bleed  Current CBC reviewed-   Lab Results   Component Value Date    HGB 6.0 (L) 06/15/2024    HCT 20.1 (L) 06/15/2024     Monitor serial CBC and transfuse if patient becomes hemodynamically unstable, symptomatic or H/H drops below 7/21.    6/15 no further evidence of GI blood loss    Blood per rectum  Patient has acute blood loss due to hemorrhage, the hemorrhage is due to gastrointestinal bleed, patient does have a propensity for bleeding due to a platelet defect/deficiency.. Will trend hemoglobin/hematocrit Daily, as well as monitor and correct for any coagulation defects. CBC and vital signs have been reviewed and last CBC was noted-   Lab Results   Component Value Date    WBC 12.34 (H) 06/15/2024    HGB 6.0 (L) 06/15/2024    HCT 20.1 (L) 06/15/2024    MCV 89.3 06/15/2024    PLT 82 (L) 06/15/2024         Will order a type and screen and consent patient for blood transfusion. Will transfuse if Hgb is <7g/dl (<8g/dl in cases of active ACS) or if patient has rapid bleeding leading to hemodynamic instability.  Hemoglobin has been stable  6/15 no further evidence of GI blood " loss    GI bleed  Patient has acute blood loss due to hemorrhage, the hemorrhage is due to gastrointestinal bleed, patient does have a propensity for bleeding due to a platelet defect/deficiency and a medication, the medication is heparin.. Will trend hemoglobin/hematocrit Every 6 hours, as well as monitor and correct for any coagulation defects. CBC and vital signs have been reviewed and last CBC was noted-   Lab Results   Component Value Date    WBC 12.34 (H) 06/15/2024    HGB 6.0 (L) 06/15/2024    HCT 20.1 (L) 06/15/2024    MCV 89.3 06/15/2024    PLT 82 (L) 06/15/2024         Will order a type and screen and consent patient for blood transfusion. Will transfuse if Hgb is <7g/dl (<8g/dl in cases of active ACS) or if patient has rapid bleeding leading to hemodynamic instability.  6/11 no endoscopy at this time  No further evidence of bleeding  6/15 no further evidence of GI blood loss    Troponin level elevated   Secondary to cardiac arrest      Coronary artery disease involving native coronary artery of native heart without angina pectoris  Patient with known CAD s/p  unclear , which is controlled Will continue Statin and monitor for S/Sx of angina/ACS. Continue to monitor on telemetry.   6/11 no chest pain    Atrial fibrillation with RVR  Patient with Persistent (7 days or more) atrial fibrillation which is controlled currently with  Rate controlled.   with no medication needed . Patient is currently in atrial fibrillation.[id number] Score: 4. HASBLED Score: 5. Anticoagulation not indicated due to active GI bleed .  Rate controlled 6/11  6/15 rate controlled    Acute systolic CHF (congestive heart failure)  Patient is identified as having Diastolic (HFpEF) heart failure that is Acute on chronic. CHF is currently uncontrolled due to Pulmonary edema/pleural effusion on CXR. Latest ECHO performed and demonstrates- Results for orders placed during the hospital encounter of 05/23/24    Echo    Interpretation Summary    " Left Ventricle: The left ventricle is normal in size. Increased wall thickness. There is concentric remodeling. There is normal systolic function. Biplane (2D) method of discs ejection fraction is 55%. Grade II diastolic dysfunction.    Right Ventricle: Mild right ventricular enlargement. Systolic function is mildly reduced.    Left Atrium: Left atrium is mildly dilated.    Right Atrium: Right atrium is mildly dilated.    Aortic Valve: The aortic valve is a trileaflet valve. Mildly calcified cusps. There is mild stenosis. Aortic valve area by VTI is 1.59 cm². Aortic valve peak velocity is 1.48 m/s. Mean gradient is 4 mmHg. The dimensionless index is 0.60.    Mitral Valve: There is bileaflet sclerosis. Mildly thickened leaflets. There is mild regurgitation.    Tricuspid Valve: There is severe regurgitation with an eccentrically directed jet.    Pulmonary Artery: Pulmonary artery pressure could not be accurately determined due to severe TR.    IVC/SVC: Elevated venous pressure at 15 mmHg.    Pericardium: There is a trivial effusion. No indication of cardiac tamponade. Left pleural effusion.  . Continue Nitrate/Vasodilator and monitor clinical status closely. Monitor on telemetry. Patient is off CHF pathway.  Monitor strict Is&Os and daily weights.  Place on fluid restriction of 1.5 L. Cardiology has not been consulted. Continue to stress to patient importance of self efficacy and  on diet for CHF. Last BNP reviewed- and noted below No results for input(s): "BNP", "BNPTRIAGEBLO" in the last 168 hours.  6/11 still requiring oxygen  6/12 stable    Cardiac arrest   PEA arrest during induction for his decortication.  Likely medication effect from anesthesia.    Appears to not have any neurologic sequela from the event.      Thrombocytopenia  Patient was found to have thrombocytopenia, the likely etiology is secondary to sepsis/infection, will monitor the platelets Daily. Will transfuse if platelet count is <10k. " Hold DVT prophylaxis if platelets are <50k. The patient's platelet results have been reviewed and are listed below.  Recent Labs   Lab 06/15/24  0613   PLT 82*           Acute metabolic encephalopathy  Discontinued trazodone.  Management as above.  Requiring restraints to avoid treatment interruption.   Improving.    6/4  More awake today  6/5 improving  6/10 appears to be at baseline today  Resolved    Oropharyngeal dysphagia  SLP consulted, failed swallow eval.  Now NPO, NG inserted for feed and meds.    NG removed per patient on accident on 6/3.  SLP to follow today as patient is more alert now.      Has reportedly become more weak and eaten less over the duration of his hospitalization.  We will place NG tube if necessary.  6/5 NG tube today   Now tolerating oral    Pleural effusion  As above.    Decortication Thursday    Patient found to have large pleural effusion on imaging. I have personally reviewed and interpreted the following imaging: Xray. A thoracentesis was ordered. Most likely etiology includes Pneumonia. Management to include Repeat Thoracentesis, Pleural Catheter, and Dialysis   6/10 chest tube in place  6/11 chest tube per pulmonology  6/12 chest tube out    Anemia of renal disease  Patient's anemia is currently uncontrolled. Has not received any PRBCs to date. 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   No evidence of bleeding   6/5 no evidence of bleeding   6/10 Large clots passed and stool this morning.  Consult GI.  IV ppi.  Trend hemoglobin  6/11 hemoglobin holding today  6/15 acute blood loss during dialysis yesterday secondary to dislodging catheter.  Transfusing today    Acute on chronic heart failure with preserved ejection fraction  Patient is identified  "as having Diastolic (HFpEF) heart failure that is Acute on chronic. CHF is currently uncontrolled due to Pulmonary edema/pleural effusion on CXR. Latest ECHO performed and demonstrates- Results for orders placed during the hospital encounter of 08/01/23    Echo    Interpretation Summary    Left Ventricle: The left ventricle is normal in size. Increased wall thickness. There is concentric remodeling. Normal wall motion. There is normal systolic function with a visually estimated ejection fraction of 55 - 60%. Grade II diastolic dysfunction.    Left Atrium: Left atrium is mildly dilated. There is a calcified 1.5 x 2 cm full wall thickness mass noted extending from the myocardium into the pericardial space, unclear etiology, recommend cardiac MRI.    Right Ventricle: Normal right ventricular cavity size. Systolic function is normal.    Aortic Valve: The aortic valve is a trileaflet valve. There is physiologically normal aortic regurgitation.    Mitral Valve: There is no stenosis. The mean pressure gradient across the mitral valve is 3 mmHg at a heart rate of  bpm. There is mild regurgitation with a centrally directed jet.    Tricuspid Valve: There is mild to moderate regurgitation with a centrally directed jet.    Pulmonic Valve: There is no significant stenosis.    Pericardium: Small circumferential pericardial effusion present. No indication of cardiac tamponade.  . Continue Nitrate/Vasodilator and monitor clinical status closely. Monitor on telemetry. Patient is off CHF pathway.  Monitor strict Is&Os and daily weights.  Place on fluid restriction of 1.5 L. Cardiology has not been consulted. Continue to stress to patient importance of self efficacy and  on diet for CHF. Last BNP reviewed- and noted below No results for input(s): "BNP", "BNPTRIAGEBLO" in the last 168 hours.   Volume removal per HD.     6/4 Makes no urine.  Continue dialysis  6/5 continue dialysis    Elevated troponin   Likely secondary to " cardiac arrest      Malnutrition of moderate degree  Nutrition consulted. Most recent weight and BMI monitored-     Measurements:  Wt Readings from Last 1 Encounters:   06/08/24 57.4 kg (126 lb 8.7 oz)   Body mass index is 21.06 kg/m².    Patient has been screened and assessed by RD.    Malnutrition Type:  Context:    Level:      Malnutrition Characteristic Summary:       Interventions/Recommendations (treatment strategy):  Recommend to add Gallo to aid in wound healing, ONS to provide additional calories/protein to heal/HD   We will place NG tube if necessary.  6/5 NG tube today  6/10 NG tube out.  Encourage orals  6/11 eating during interview today  6/12 eating good    Secondary hyperparathyroidism of renal origin  Resume home Renvela.    Nephrology following  Appreciate nephrology    Essential (primary) hypertension  Chronic, controlled. Latest blood pressure and vitals reviewed-     Temp:  [97.9 °F (36.6 °C)-98.4 °F (36.9 °C)]   Pulse:  [60-93]   Resp:  [16-20]   BP: ()/(21-75)   SpO2:  [98 %-100 %] .   Home meds for hypertension were reviewed and noted below.   Hypertension Medications               amLODIPine (NORVASC) 5 MG tablet Take 5 mg by mouth once daily.    hydrALAZINE (APRESOLINE) 100 MG tablet Take 1 tablet by mouth 3 (three) times daily with meals.    nebivoloL (BYSTOLIC) 2.5 MG Tab Take 2.5 mg by mouth once daily.            While in the hospital, will manage blood pressure as follows; hold meds given hypotension. BP has been fluctuating, will likely add back beta blocker if BP gets on the higher side.     Will utilize p.r.n. blood pressure medication only if patient's blood pressure greater than 180/110 and he develops symptoms such as worsening chest pain or shortness of breath.   BP reasonable  6/5 BP okay  Stable  BP okay 6/15    Dependence on renal dialysis   ESRD.  Nephrology is following.  Continue 3 times a week hemodialysis        VTE Risk Mitigation (From admission, onward)            Ordered     IP VTE HIGH RISK PATIENT  Once         06/06/24 1154     Place sequential compression device  Until discontinued         06/06/24 1154                    Discharge Planning   BRIAN: 6/14/2024     Code Status: Full Code   Is the patient medically ready for discharge?:     Reason for patient still in hospital (select all that apply): Treatment  Discharge Plan A: Home, Home Health                  Manas Sin Jr, MD  Department of Hospital Medicine   [facility]

## 2024-06-17 NOTE — ASSESSMENT & PLAN NOTE
Patient's anemia is currently uncontrolled. Has received One units of PRBCs on 6/8 . Etiology likely d/t acute blood loss which was from GI bleed  Current CBC reviewed-   Lab Results   Component Value Date    HGB 7.6 (L) 06/17/2024    HCT 24.4 (L) 06/17/2024     Monitor serial CBC and transfuse if patient becomes hemodynamically unstable, symptomatic or H/H drops below 7/21.  Better today  6/11  Hemoglobin stable  No evidence of bleeding  Stable  6/15 yesterday he dislodged his dialysis catheter during treatment and had significant amount of bleeding.  Hemoglobin today 6.0.  Likely from blood loss yesterday.  We will transfuse 1 unit PRBCs.  6/16 - Post transfusion H/H as above. No lab today. Recheck tomorrow.   6/17 hemoglobin stable at 7.6.  Monitor periodically

## 2024-06-17 NOTE — PLAN OF CARE
06/17/24 @ 0953 - Kenneth Myra @ [facility] -- auth is still pending. She will keep CM updated. CM will continue to follow.     06/17/24 @ 0836 - MOIRA left message to check the status of insurance auth for admission to [facility]. CM will continue to follow.

## 2024-06-17 NOTE — PT/OT/SLP PROGRESS
Occupational Therapy   Treatment    Name: Joseph Mcdonald  MRN: [id number]  Admitting Diagnosis:  Acute hypoxic respiratory failure  11 Days Post-Op    Recommendations:     Discharge Recommendations: Moderate Intensity Therapy  Discharge Equipment Recommendations:   (to be determined)  Barriers to discharge:       Assessment:     Joseph Mcdonald is a 89 y.o. male with a medical diagnosis of Acute hypoxic respiratory failure. Performance deficits affecting function are weakness, impaired endurance, impaired self care skills.     Rehab Prognosis:  Good; patient would benefit from acute skilled OT services to address these deficits and reach maximum level of function.       Plan:     Patient to be seen 5 x/week to address the above listed problems via self-care/home management, therapeutic activities, therapeutic exercises  Plan of Care Expires: 06/28/24  Plan of Care Reviewed with: patient    Subjective     Chief Complaint:   Patient/Family Comments/goals:   Pain/Comfort:  Pain Rating 1: 0/10    Objective:     Communicated with: Shonna Bedolla RN  prior to session.  Patient found HOB elevated with peripheral IV, telemetry, bed alarm (tele-sitter) upon OT entry to room.    General Precautions: Standard, fall    Orthopedic Precautions:N/A  Braces: N/A  Respiratory Status: Nasal cannula, flow 2 L/min     Occupational Performance:     Bed Mobility:         Functional Mobility/Transfers:    Functional Mobility:     Activities of Daily Living:        [facility] 6 Click ADL:      Treatment & Education:  Pt performed aarom with 1lb wt 15 reps x 2 B shld flex,abd/add,elbow flex/ext. No additional ex's performed secondary to pt taken to dialysis    Patient left HOB elevated with  transport taking him to dialysis    GOALS:   Multidisciplinary Problems       Occupational Therapy Goals          Problem: Occupational Therapy    Goal Priority Disciplines Outcome Interventions   Occupational Therapy Goal     OT, PT/OT Progressing     Description: STG:  Pt will perform grooming (I)  Pt will bathe with setup  Pt will perform UE dressing with (I)  Pt will perform LE dressing with I/mod I  Pt will transfer bed/chair/bsc with Mod I  Pt will perform standing task x 2 min with Mod I   Pt will tolerate 15 minutes of tx without fatigue      LT.Restore to max I with self care and mobility.                          Time Tracking:     OT Date of Treatment: 24  OT Start Time: 858  OT Stop Time: 906  OT Total Time (min): 8 min    Billable Minutes:Therapeutic Exercise 8    OT/QING: QING          2024

## 2024-06-17 NOTE — PROGRESS NOTES
Patient: Sarbjit GAMBLE Naslund    Procedure: Procedure(s):  OPEN RIGHT INGUINAL HERNIORRHAPHY WITH MESH       Diagnosis: Right inguinal hernia [K40.90]  Diagnosis Additional Information: No value filed.    Anesthesia Type:   General     Note:    Oropharynx: oropharynx clear of all foreign objects and spontaneously breathing  Level of Consciousness: drowsy  Oxygen Supplementation: face mask  Level of Supplemental Oxygen (L/min / FiO2): 6  Independent Airway: airway patency satisfactory and stable  Dentition: dentition unchanged  Vital Signs Stable: post-procedure vital signs reviewed and stable  Report to RN Given: handoff report given  Patient transferred to: PACU    Handoff Report: Identifed the Patient, Identified the Reponsible Provider, Reviewed the pertinent medical history, Discussed the surgical course, Reviewed Intra-OP anesthesia mangement and issues during anesthesia, Set expectations for post-procedure period and Allowed opportunity for questions and acknowledgement of understanding      Vitals:  Vitals Value Taken Time   BP     Temp     Pulse     Resp     SpO2         Electronically Signed By: IGGY Frias CRNA  March 18, 2022  8:38 AM   [facility]  Nephrology  Progress Note    Patient Name: Joseph Mcdonald  MRN: [id number]  Admission Date: 5/23/2024  Hospital Length of Stay: 24 days  Attending Provider: Manas Sin Jr., MD   Primary Care Physician: [facility], [facility]  Principal Problem:Acute hypoxic respiratory failure    Consults  Subjective:     Interval History: The patient is lethargic and difficult to arouse today    Review of patient's allergies indicates:   Allergen Reactions    Azithromycin Other (See Comments)     Note: - [employer] 01/11/2019     Current Facility-Administered Medications   Medication Frequency    0.9%  NaCl infusion (for blood administration) Q24H PRN    0.9%  NaCl infusion PRN    acetaminophen tablet 1,000 mg Q8H PRN    atorvastatin tablet 40 mg QHS    collagenase ointment Daily    dextrose 10% bolus 125 mL 125 mL PRN    dextrose 10% bolus 250 mL 250 mL PRN    glucagon (human recombinant) injection 1 mg PRN    glucose chewable tablet 16 g PRN    glucose chewable tablet 24 g PRN    hydrALAZINE tablet 100 mg Q8H    hydrOXYzine HCL tablet 25 mg QID PRN    miconazole NITRATE 2 % top powder BID    naloxone 0.4 mg/mL injection 0.02 mg PRN    pantoprazole injection 40 mg BID    polyethylene glycol packet 17 g BID PRN    sevelamer carbonate tablet 2,400 mg TID WM    sodium chloride 0.9% 250 mL flush bag PRN    sodium chloride 0.9% flush 10 mL Q12H PRN       Objective:     Vital Signs (Most Recent):  Temp: 97.3 °F (36.3 °C) (06/16/24 1625)  Pulse: 79 (06/16/24 1625)  Resp: 18 (06/16/24 1625)  BP: 134/62 (06/16/24 1625)  SpO2: 98 % (06/16/24 1625) Vital Signs (24h Range):  Temp:  [97.3 °F (36.3 °C)-99.2 °F (37.3 °C)] 97.3 °F (36.3 °C)  Pulse:  [79-89] 79  Resp:  [17-20] 18  SpO2:  [94 %-100 %] 98 %  BP: ()/(33-62) 134/62     Weight: 57.4 kg (126 lb 8.7 oz) (06/08/24 0245)  Body mass index is 21.06 kg/m².  Body surface area is 1.62 meters squared.    I/O last 3 completed shifts:  In: 824.2  [P.O.:275; Blood:549.2]  Out: -     Physical Exam  Lungs-clear  Cor-no rub  Abd-soft,    Significant Labs:sure    Significant Imaging:      Assessment/Plan:     Active Diagnoses:    Diagnosis Date Noted POA    PRINCIPAL PROBLEM:  Acute hypoxic respiratory failure [J96.01] 05/24/2024 Yes     Chronic    Pressure injury of right heel, unstageable [L89.610] 06/12/2024 Clinically Undetermined    Pressure injury of left heel, unstageable [L89.620] 06/12/2024 Clinically Undetermined    Decubitus ulcer of sacral region, unstageable [L89.150] 06/11/2024 Yes    GI bleed [K92.2] 06/10/2024 No    Blood per rectum [K62.5] 06/10/2024 No    Anemia due to GI blood loss [D50.0] 06/10/2024 No    Loculated pleural effusion [J90] 06/10/2024 Yes    Coronary artery disease involving native coronary artery of native heart without angina pectoris [I25.10] 06/09/2024 Yes    Troponin level elevated [R79.89] 06/09/2024 No    Cardiac arrest [I46.9] 06/06/2024 Yes    Acute systolic CHF (congestive heart failure) [I50.21] 06/06/2024 Yes    Atrial fibrillation with RVR [I48.91] 06/06/2024 Yes    Thrombocytopenia [D69.6] 06/05/2024 Yes    Acute metabolic encephalopathy [G93.41] 06/02/2024 No    Pleural effusion [J90] 05/31/2024 Yes    Oropharyngeal dysphagia [R13.12] 05/31/2024 No    Parapneumonic effusion [J18.9, J91.8] 05/23/2024 Yes    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.9, D63.1] 05/23/2024 Yes    Debility [R53.81] 05/23/2024 Yes    Elevated troponin [R79.89] 08/02/2023 Yes    Dependence on renal dialysis [Z99.2] 10/30/2017 Not Applicable    Malnutrition of moderate degree [E44.0] 11/20/2014 Yes    DM2 (diabetes mellitus, type 2) [E11.9] 11/18/2014 Yes    ESRD (end stage renal disease) [N18.6] 11/06/2014 Yes    Essential (primary) hypertension [I10] 11/06/2014 Yes    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     Jethro Krishna MD  Nephrology  [facility]

## 2024-06-17 NOTE — ASSESSMENT & PLAN NOTE
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 Latest AMPAC and GEMS scores have not been reviewed. Evaluation for etiology is complete.   Plan includes PT/OT consulted.  Home health on discharge.      PTOT  Plan for [facility] once insurance approves,    6/17 patient still pending authorization for swing bed.  [facility] will accept.  Adding appetite stimulant.  Hopefully strength will improve with increased p.o. intake

## 2024-06-17 NOTE — PLAN OF CARE
Problem: Adult Inpatient Plan of Care  Goal: Plan of Care Review  Outcome: Progressing  Goal: Patient-Specific Goal (Individualized)  Outcome: Progressing  Goal: Absence of Hospital-Acquired Illness or Injury  Outcome: Progressing  Goal: Optimal Comfort and Wellbeing  Outcome: Progressing  Goal: Readiness for Transition of Care  Outcome: Progressing     Problem: Diabetes Comorbidity  Goal: Blood Glucose Level Within Targeted Range  Outcome: Progressing     Problem: Sepsis/Septic Shock  Goal: Optimal Coping  Outcome: Progressing     Problem: Sepsis/Septic Shock  Goal: Optimal Coping  Outcome: Progressing  Goal: Absence of Infection Signs and Symptoms  Outcome: Progressing  Goal: Optimal Nutrition Intake  Outcome: Progressing

## 2024-06-17 NOTE — PROGRESS NOTES
[facility]  Wound Care    Patient Name:  Joseph Mcdonald   MRN:  [id number]  Date: 6/17/2024  Diagnosis: Acute hypoxic respiratory failure    History:     Past Medical History:   Diagnosis Date    Chronic kidney disease (CKD)     Diabetes mellitus     Hypertension     PVD (peripheral vascular disease)        Social History     Socioeconomic History    Marital status:    Tobacco Use    Smoking status: Former     Types: Cigarettes    Smokeless tobacco: Never   Substance and Sexual Activity    Alcohol use: Not Currently    Drug use: Never     Social Determinants of Health     Financial Resource Strain: Low Risk  (8/2/2023)    Overall Financial Resource Strain (CARDIA)     Difficulty of Paying Living Expenses: Not hard at all   Food Insecurity: No Food Insecurity (5/24/2024)    Hunger Vital Sign     Worried About Running Out of Food in the Last Year: Never true     Ran Out of Food in the Last Year: Never true   Transportation Needs: No Transportation Needs (5/24/2024)    TRANSPORTATION NEEDS     Transportation : No   Physical Activity: Sufficiently Active (5/24/2024)    Exercise Vital Sign     Days of Exercise per Week: 5 days     Minutes of Exercise per Session: 30 min   Stress: No Stress Concern Present (5/24/2024)    [employer] - Occupational Stress Questionnaire     Feeling of Stress : Not at all   Housing Stability: Low Risk  (5/24/2024)    Housing Stability Vital Sign     Unable to Pay for Housing in the Last Year: No     Homeless in the Last Year: No       Precautions:     Allergies as of 05/23/2024 - Reviewed 05/23/2024   Allergen Reaction Noted    Azithromycin Other (See Comments) 05/23/2024       WOC Assessment Details/Treatment        06/17/24 0820   WOCN Assessment   WOCN Total Time (mins) 90   Visit Date 06/17/24   Visit Time 0815   Consult Type Follow Up   WOCN Speciality Wound   Wound pressure;deep tissue injury;moisture   Number of Wounds 5    Continence Type Urinary;Fecal   Intervention chart review;assessed;applied   Skin Interventions   Device Skin Pressure Protection absorbent pad utilized/changed;adhesive use limited;pressure points protected;positioning supports utilized   Pressure Reduction Devices foam padding utilized;positioning supports utilized   Pressure Reduction Techniques positioned off wounds;pressure points protected   Skin Protection incontinence pads utilized;silicone foam dressing in place   Positioning   Body Position head facing, left   Head of Bed (HOB) Positioning HOB elevated   Positioning/Transfer Devices pillows;in use   Pressure Injury Prevention    Check Moisture Management Pad Done   Sacral Foam Dressing Peel back sacral foam dressing, assess skin and reapply   Heel protection technique Foam dressing        Wound 05/25/24 0031 Skin Tear Penis #1   Date First Assessed/Time First Assessed: 05/25/24 0031   Primary Wound Type: Skin Tear  Location: Penis  Wound Number: #1  Is this injury device related?: No   Wound Image    Dressing Appearance Open to air   Drainage Amount Small   Drainage Characteristics/Odor Creamy;Tan   Appearance Tan;White;Moist;Not granulating   Periwound Area Dry   Care Cleansed with:;Applied:;Skin Barrier  (Incontinence wipes)        Wound 06/08/24 1625 Pressure Injury Sacral spine   Date First Assessed/Time First Assessed: 06/08/24 1625   Primary Wound Type: Pressure Injury  Location: Sacral spine   Wound Image   (10 x 11 )   Pressure Injury Stage U   Dressing Appearance Intact;Moist drainage   Drainage Amount Small   Drainage Characteristics/Odor Serosanguineous;Tan   Appearance Tan;Yellow;Slough;Moist;Not granulating   Periwound Area Dry;Pink;Scar tissue   Wound Edges Defined;Irregular   Wound Length (cm) 10 cm   Wound Width (cm) 11 cm   Wound Depth (cm) 0 cm   Wound Volume (cm^3) 0 cm^3   Wound Surface Area (cm^2) 110 cm^2   Care Cleansed with:;Antimicrobial agent;Wound cleanser;Applied:  (Santyl)  "  Dressing Changed;Silicone;Island/border;Foam   Periwound Care Absorptive dressing applied;Cleansed with pH balanced cleanser;Dry periwound area maintained;Skin barrier film applied        Wound 06/08/24 1705 Incontinence associated dermatitis Scrotum   Date First Assessed/Time First Assessed: 06/08/24 1705   Primary Wound Type: Incontinence associated dermatitis  Location: Scrotum   Wound Image     Dressing Appearance Open to air;Dry   Drainage Amount None   Appearance Dry   Periwound Area Intact;Dry   Wound Edges Irregular;Undefined   Care Cleansed with:;Antimicrobial agent;Wound cleanser;Applied:;Skin Barrier        Wound 06/08/24 1821 Pressure Injury Right Heel   Date First Assessed/Time First Assessed: 06/08/24 1821   Primary Wound Type: Pressure Injury  Side: Right  Location: Heel   Wound Image        Dressing Appearance Open to air   Drainage Amount None   Appearance Intact;Maroon;Black   Periwound Area Dry   Wound Edges Irregular;Undefined   Care Applied:;Povidone iodine        Wound 06/08/24 1822 Pressure Injury Left Heel   Date First Assessed/Time First Assessed: 06/08/24 1822   Primary Wound Type: Pressure Injury  Side: Left  Location: Heel   Wound Image        Dressing Appearance Open to air   Drainage Amount None   Appearance Intact;Maroon;Black;Dry   Periwound Area Intact;Dry   Wound Edges Irregular   Care Applied:;Povidone iodine     WOC Team consulted for "Skin tears, wounds; scrotum/penis, sacrum"     Narrative: Pt alert and oriented, but sort of sluggish. Pt on O2 Stat monitor. Pt tolerated wound care with little groaning and grimace.    Active Wounds and Recommendations: Continue POC    Goals for Wound Healing: Moisture management, Apply antimicrobial, Removal of slough/eschar, Reduce bioburden, and Educate on proper wound management post D/C     Barriers to Wound Healing: multiple co-morbidities poor vascular supply diabetes decreased granulation tissue advanced age fragile skin no protective " sensation    Orders placed.    Thank you for the consult.     We will continue to follow. See additional note under Notes Tab for tentative f/u plan/dates.      06/17/2024

## 2024-06-17 NOTE — DIALYSIS ROUNDING
Mr. Mcdonald is seen during his hemodialysis.  He remains very weak.  He is minimally interactive.  Chest is clear.  Respirations are unlabored.  We will continue with his scheduled hemodialysis.  Chance for return to a level of independence is minimal.  Overall outlook remains poor.

## 2024-06-17 NOTE — PROGRESS NOTES
06/17/24 1440   Wound Care Follow Up   Wound Care Follow-up? Yes   Wound Care- Next Visit Date 06/21/24   Follow Up Plan Yulisa POC

## 2024-06-17 NOTE — PT/OT/SLP PROGRESS
Physical Therapy Treatment    Patient Name:  Joseph Mcdonald   MRN:  [id number]    Recommendations:     Discharge Recommendations: Moderate Intensity Therapy  Discharge Equipment Recommendations: to be determined by next level of care  Barriers to discharge: Inaccessible home and Decreased caregiver support    Assessment:     Joseph Mcdonald is a 89 y.o. male admitted with a medical diagnosis of Acute hypoxic respiratory failure.  He presents with the following impairments/functional limitations: weakness, impaired functional mobility, impaired self care skills, impaired balance, impaired cognition, impaired skin Pt is lethargic following dialysis. Required constant cues to participate with [place]nn. Pt had difficulty maintaining sitting balance at edge of bed and unable to stand with assistance. Pt is progressing poorly with PT and will need long term care at d/c.    Rehab Prognosis: Poor; patient would benefit from acute skilled PT services to address these deficits and reach maximum level of function.    Recent Surgery: Procedure(s) (LRB):  DECORTICATION, LUNG (Right) 11 Days Post-Op    Plan:     During this hospitalization, patient to be seen 5 x/week to address the identified rehab impairments via gait training, therapeutic activities, therapeutic exercises, neuromuscular re-education and progress toward the following goals:    Plan of Care Expires:  06/24/24    Subjective     Chief Complaint: weakness  Patient/Family Comments/goals: Pt appears somewhat confused  Pain/Comfort:  Pain Rating 1: 4/10  Location - Side 1: Right  Location 1: leg  Pain Addressed 1: Reposition  Pain Rating Post-Intervention 1: 4/10      Objective:     Communicated with ABHIJIT Bedolla RN prior to session.  Patient found HOB elevated with peripheral IV, telemetry, SCD, pressure relief boots upon PT entry to room.     General Precautions: Standard, fall  Orthopedic Precautions: N/A  Braces: N/A  Respiratory Status: Nasal cannula, flow 1 L/min      Functional Mobility:  Bed Mobility:     Rolling Left:  moderate assistance  Rolling Right: moderate assistance  Scooting: moderate assistance  Supine to Sit: maximal assistance  Sit to Supine: maximal assistance  Transfers:     Sit to Stand:  maximal assistance and unable to achieve full upright stance with rolling walker  Balance: poor      AM-PAC 6 CLICK MOBILITY  Turning over in bed (including adjusting bedclothes, sheets and blankets)?: 2  Sitting down on and standing up from a chair with arms (e.g., wheelchair, bedside commode, etc.): 2  Moving from lying on back to sitting on the side of the bed?: 2  Moving to and from a bed to a chair (including a wheelchair)?: 1  Need to walk in hospital room?: 1  Climbing 3-5 steps with a railing?: 1  Basic Mobility Total Score: 9       Treatment & Education:  RAMAN KAUR, assisted to edge of bed with maximum assistance, attempted sit to stand unsuccessfully.   Anterior/posterior weight shifts in sitting    Patient left HOB elevated with all lines intact and call button in reach..    GOALS:   Multidisciplinary Problems       Physical Therapy Goals          Problem: Physical Therapy    Goal Priority Disciplines Outcome Goal Variances Interventions   Physical Therapy Goal     PT, PT/OT Progressing     Description: Short Term Goals to be met by: 24    Patient will increase functional independence with mobility by performin. Supine to sit with Stand by assist  2. Sit to stand transfer with Stand by assist using Rolling walker  3. Bed to chair transfer with Stand by assist using Rolling walker  4. Gait  x 150 feet with Stand by assist using Rolling walker  5. Lower extremity exercise program x30 reps per handout, with assistance as needed    Long Term Goals to be met by: 24    Pt will regain full independent functional mobility with straight cane to return to home situation and prior activities of daily living.                        Time Tracking:     PT  Received On: 06/17/24  PT Start Time: 1511     PT Stop Time: 1530  PT Total Time (min): 19 min     Billable Minutes: Therapeutic Activity 18    Treatment Type: Treatment  PT/PTA: PT     Number of PTA visits since last PT visit: 0     06/17/2024

## 2024-06-17 NOTE — SUBJECTIVE & OBJECTIVE
Interval History:  Patient seen on hemodialysis.  He has tolerating the procedure well.  Nursing reports poor intake.  Discussed with nutrition as well.  We will add appetite stimulant.  Still pending authorization for swing bed.    Review of Systems  Objective:     Vital Signs (Most Recent):  Temp: 98.3 °F (36.8 °C) (06/17/24 1255)  Pulse: 93 (06/17/24 1255)  Resp: 20 (06/17/24 1255)  BP: (!) 161/57 (06/17/24 1255)  SpO2: 100 % (06/17/24 1440) Vital Signs (24h Range):  Temp:  [97.6 °F (36.4 °C)-99 °F (37.2 °C)] 98.3 °F (36.8 °C)  Pulse:  [80-94] 93  Resp:  [16-20] 20  SpO2:  [97 %-100 %] 100 %  BP: ()/(29-69) 161/57     Weight: 57.4 kg (126 lb 8.7 oz)  Body mass index is 21.06 kg/m².    Intake/Output Summary (Last 24 hours) at 6/17/2024 1628  Last data filed at 6/17/2024 1255  Gross per 24 hour   Intake 195 ml   Output 1300 ml   Net -1105 ml         Physical Exam  Vitals and nursing note reviewed.   Constitutional:       Appearance: He is ill-appearing.   HENT:      Head: Normocephalic and atraumatic.      Nose: Nose normal.      Mouth/Throat:      Mouth: Mucous membranes are moist.   Eyes:      Extraocular Movements: Extraocular movements intact.   Cardiovascular:      Rate and Rhythm: Normal rate and regular rhythm.      Pulses: Normal pulses.      Heart sounds: Normal heart sounds.   Pulmonary:      Effort: Pulmonary effort is normal.      Breath sounds: Examination of the right-middle field reveals decreased breath sounds. Examination of the right-lower field reveals decreased breath sounds. Decreased breath sounds present.   Abdominal:      General: Bowel sounds are normal.      Palpations: Abdomen is soft.   Musculoskeletal:      Cervical back: Normal range of motion.   Skin:     General: Skin is warm and dry.   Neurological:      Mental Status: He is alert. Mental status is at baseline.   Psychiatric:         Mood and Affect: Mood normal.         Behavior: Behavior normal.             Significant Labs:  All pertinent labs within the past 24 hours have been reviewed.  BMP:   Recent Labs   Lab 06/17/24  0410   GLU 63*      K 4.7   *   CO2 22   BUN 64*   CREATININE 5.57*   CALCIUM 7.9*     CBC:   Recent Labs   Lab 06/17/24  0550   WBC 11.05*   HGB 7.6*   HCT 24.4*   *       Significant Imaging: I have reviewed all pertinent imaging results/findings within the past 24 hours.

## 2024-06-17 NOTE — ASSESSMENT & PLAN NOTE
Creatine stable for now. BMP reviewed- noted Estimated Creatinine Clearance: 7.4 mL/min (A) (based on SCr of 5.57 mg/dL (H)). according to latest data. 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    Nephrology consulted to resume HD sessions.   Continue dialysis   6/5Electrolytes okay  6/10 dialysis today  6/17 continue Monday Wednesday and Friday hemodialysis.  Tolerating well.

## 2024-06-17 NOTE — PROGRESS NOTES
[facility] Medicine  Progress Note    Patient Name: Joseph Mcdonald  MRN: [id number]  Patient Class: IP- Inpatient   Admission Date: 5/23/2024  Length of Stay: 25 days  Attending Physician: Manas Sin Jr., MD  Primary Care Provider: Prakash, [facility]        Subjective:     Principal Problem:Acute hypoxic respiratory failure        HPI:    89-year-old  male With a past medical history of end-stage renal disease, hypertension, iron deficiency anemia, secondary hyperparathyroidism, type 2 diabetes, CAD, heart failure with preserved ejection fraction, hypokalemia inability presents to the ED with 1 week worsening shortness of breath.  This was particularly bad during his dialysis session this morning.  Due to this daughter brought him to the ED thereafter.  He denies any fever, chills, cough, wheezing, sputum production, palpitations.  He has been take medication as in his not missed any dialysis sessions.  He denies any nausea, vomiting, diarrhea, dysuria.  Imaging in the ED revealed near-complete he had a pacemaker patient of the right hemithorax review of systems otherwise negative.    Overview/Hospital Course:   5/24 1.5 L removed from right pleural space today.  Still has large right-sided effusion.  We will attempt to get some more off tomorrow.  5/25 dialyzing today  5/26 bedside ultrasound today still has large pleural effusion, complex appearing.  Was going to attempt bedside ultrasound but given complex nature have consulted pulmonology  5/27 D/W Pulm, plan for chest tube placement.   5/28- Midodrine added low BP.   5/29- s/p chest tube placement per Dr. Rodney, fluid studies sent.   5/30- BP low again, midodrine resumed. Started on intra-pleural lytics per Pulm.  5/31- Intra-pleural lytics continued, CXR improved.   6/1- Failed swallow and is with NG now and tube feeds. S/p 6 doses of Intra-pleural lytics (total 6 doses).   6/2- Mental status  better, resp status stable.  6/3- NG removed on accident while patient was working with PT/OT, SLP consulted for reevaluation. Pulm to follow, CXR with slightly increased right effusion.    6/4 surgery consulted today.  Plan for decortication on Thursday 6/6 6/5 plan for decortication tomorrow.  We will place NG tube due to patient not eating well  6/10  Mr. Sin has been in the ICU for the last several days.  He had a P EA arrest during induction for his decortication.  He is now out of the ICU and doing well.  Still has right-sided chest tube.  Notified this morning that he pass large clots in about movement.  We will consult GI and start IV  protonic  6/11 hemoglobin okay.  Unable to do endoscopy due to high risk of sedating patient.  Has a sacral wound that may need debriding.  We will have surgery evaluate today.  Again we will be high risk for anesthesia induction.  6/12 no surgical intervention on sacral decubitus.  Wound care.  Currently stable for discharge and awaiting placement.  Chest tube has been removed.  6/13 medically ready for discharge.  Awaiting placement  6/14 attempting to get [facility].  Awaiting placement  6/15 dislodged dialysis catheter during treatment yesterday and had significant blood loss.  Hemoglobin today 6.0.  We will transfuse 1 unit.    Interval History:  Patient seen on hemodialysis.  He has tolerating the procedure well.  Nursing reports poor intake.  Discussed with nutrition as well.  We will add appetite stimulant.  Still pending authorization for swing bed.    Review of Systems  Objective:     Vital Signs (Most Recent):  Temp: 98.3 °F (36.8 °C) (06/17/24 1255)  Pulse: 93 (06/17/24 1255)  Resp: 20 (06/17/24 1255)  BP: (!) 161/57 (06/17/24 1255)  SpO2: 100 % (06/17/24 1440) Vital Signs (24h Range):  Temp:  [97.6 °F (36.4 °C)-99 °F (37.2 °C)] 98.3 °F (36.8 °C)  Pulse:  [80-94] 93  Resp:  [16-20] 20  SpO2:  [97 %-100 %] 100 %  BP: ()/(29-69) 161/57     Weight: 57.4  kg (126 lb 8.7 oz)  Body mass index is 21.06 kg/m².    Intake/Output Summary (Last 24 hours) at 6/17/2024 1628  Last data filed at 6/17/2024 1255  Gross per 24 hour   Intake 195 ml   Output 1300 ml   Net -1105 ml         Physical Exam  Vitals and nursing note reviewed.   Constitutional:       Appearance: He is ill-appearing.   HENT:      Head: Normocephalic and atraumatic.      Nose: Nose normal.      Mouth/Throat:      Mouth: Mucous membranes are moist.   Eyes:      Extraocular Movements: Extraocular movements intact.   Cardiovascular:      Rate and Rhythm: Normal rate and regular rhythm.      Pulses: Normal pulses.      Heart sounds: Normal heart sounds.   Pulmonary:      Effort: Pulmonary effort is normal.      Breath sounds: Examination of the right-middle field reveals decreased breath sounds. Examination of the right-lower field reveals decreased breath sounds. Decreased breath sounds present.   Abdominal:      General: Bowel sounds are normal.      Palpations: Abdomen is soft.   Musculoskeletal:      Cervical back: Normal range of motion.   Skin:     General: Skin is warm and dry.   Neurological:      Mental Status: He is alert. Mental status is at baseline.   Psychiatric:         Mood and Affect: Mood normal.         Behavior: Behavior normal.             Significant Labs: All pertinent labs within the past 24 hours have been reviewed.  BMP:   Recent Labs   Lab 06/17/24  0410   GLU 63*      K 4.7   *   CO2 22   BUN 64*   CREATININE 5.57*   CALCIUM 7.9*     CBC:   Recent Labs   Lab 06/17/24  0550   WBC 11.05*   HGB 7.6*   HCT 24.4*   *       Significant Imaging: I have reviewed all pertinent imaging results/findings within the past 24 hours.    Assessment/Plan:      * Acute hypoxic respiratory failure  Sec to pleural effusion.  Require 2L NC since admission.   Imaging consistent with large right sided pleural effusion.   S/p thoracentesis on 5/23 with 1,5L fluid removed, no pleural fluid  studies sent.   Remained symptomatic so CT chest obtained on 5/24 and consistent with large effusion.  Pulmonology consulted; s/p chest tube placement (5/29), s/p intra-pleural lytics started on 5/30 (total 6 doses).  Volume removal with HD, continue broad spectrum antibiotics as below.   Monitor oxygen status.   Home O2 eval prior to discharge.      6/4Improved.  Plan for decortication of the right lung on Thursday 6/5 plan for OR tomorrow  6/10 chest tube in place still draining.  6/11 still requiring oxygen  6/12 chest tube removed.  Stable for discharge  Doing well      Acute blood loss anemia  Patient's anemia is currently uncontrolled. Has received One units of PRBCs on 6/8 . Etiology likely d/t acute blood loss which was from GI bleed  Current CBC reviewed-   Lab Results   Component Value Date    HGB 7.6 (L) 06/17/2024    HCT 24.4 (L) 06/17/2024     Monitor serial CBC and transfuse if patient becomes hemodynamically unstable, symptomatic or H/H drops below 7/21.  Better today  6/11  Hemoglobin stable  No evidence of bleeding  Stable  6/15 yesterday he dislodged his dialysis catheter during treatment and had significant amount of bleeding.  Hemoglobin today 6.0.  Likely from blood loss yesterday.  We will transfuse 1 unit PRBCs.  6/16 - Post transfusion H/H as above. No lab today. Recheck tomorrow.   6/17 hemoglobin stable at 7.6.  Monitor periodically    Debility  Patient with Acute on chronic debility due to age-related physical debility. Latest AMPAC and GEMS scores have not been reviewed. Evaluation for etiology is complete.   Plan includes PT/OT consulted.  Home health on discharge.      PTOT  Plan for [facility] once insurance approves,    6/17 patient still pending authorization for swing bed.  [facility] will accept.  Adding appetite stimulant.  Hopefully strength will improve with increased p.o. intake    ESRD (end stage renal disease)  Creatine stable for now. BMP reviewed- noted Estimated  Creatinine Clearance: 7.4 mL/min (A) (based on SCr of 5.57 mg/dL (H)). according to latest data. 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    Nephrology consulted to resume HD sessions.   Continue dialysis   6/5Electrolytes okay  6/10 dialysis today  6/17 continue Monday Wednesday and Friday hemodialysis.  Tolerating well.    DM2 (diabetes mellitus, type 2)  HBA1c 4.5  No need for SSI or POC glucose check.     Parapneumonic effusion  Patient found to have large pleural effusion on imaging. I have personally reviewed and interpreted the following imaging: Xray. A thoracentesis was ordered. Most likely etiology includes Congestive Heart Failure, Pneumonia, and Renal Failure. Management to include Repeat Thoracentesis and Dialysis  S/p thoracentesis on 5/23- 1.5L fluid removed.  S/p chest tube placement (5/29), cytology negative for malignancy, fluid appears exudate in nature, cultures NGTD.   S/p 6 doses of Intra-pleural lytics BID started per Pulm (5/30-6/2).   CXR mostly stable, possibly slight increase in fluid noted on CXR from 6/3.   Continue IV Zosyn (timing and transition per Pulm), discontinued vancomycin given negative MRSA PCR.   Pulm follows.    6/4 plan for decortication on Thursday 6/5 plan for decortication tomorrow  6/10 continuing antibiotics.  Chest tube in place  6/11  chest tube per pulmonology  6/12 chest tube inadvertently removed overnight.  Okay with pulmonology to leave out.  He is stable for discharge from pulmonary standpoint.  Chest tube out, completed antibiotics.    Pressure injury of left heel, unstageable  Unclear chronicity  Wound care    Pressure injury of right heel, unstageable  Unclear chronicity  Wound care    Decubitus ulcer of sacral region, unstageable   We will have surgery evaluate today   No surgical intervention.  6/12  Wound care    Loculated pleural effusion  Patient found to have large pleural effusion on imaging. I have personally reviewed and interpreted the  "following imaging: Xray. A thoracentesis was performed. Pleural fluid was sent for analysis. Labs reviewed, including Serum LDH   LDH   Date Value Ref Range Status   05/29/2024 279 (H) 87 - 241 U/L Final   , Pleural Fluid LDH   Lactate Dehydrogenase (LDH), Body Fluid   Date Value Ref Range Status   05/29/2024 270 U/L Final     Comment:     Reference ranges for this analyte have not been established for the body fluid specimen submitted for analysis.    , Serum Protein   Total Protein   Date Value Ref Range Status   06/11/2024 5.7 (L) 6.4 - 8.2 g/dL Final    , Pleural Protein No results found for: "PROTEINBODYF" , Cell Count and Differential No results found for: "BFCELLCNT" , and Fluid Cultures No results found for: "BODYFLUIDCUL" . Fluid most consistent with Exudate.  . Most likely etiology includes Pneumonia. Management to include Pleural Catheter   Chest tube removed 6/12    Anemia due to GI blood loss  Patient's anemia is currently uncontrolled. Has received 1 units of PRBCs on 06/10/2024 . Etiology likely d/t acute blood loss which was from GI bleed  Current CBC reviewed-   Lab Results   Component Value Date    HGB 6.0 (L) 06/15/2024    HCT 20.1 (L) 06/15/2024     Monitor serial CBC and transfuse if patient becomes hemodynamically unstable, symptomatic or H/H drops below 7/21.    6/15 no further evidence of GI blood loss    Blood per rectum  Patient has acute blood loss due to hemorrhage, the hemorrhage is due to gastrointestinal bleed, patient does have a propensity for bleeding due to a platelet defect/deficiency.. Will trend hemoglobin/hematocrit Daily, as well as monitor and correct for any coagulation defects. CBC and vital signs have been reviewed and last CBC was noted-   Lab Results   Component Value Date    WBC 12.34 (H) 06/15/2024    HGB 6.0 (L) 06/15/2024    HCT 20.1 (L) 06/15/2024    MCV 89.3 06/15/2024    PLT 82 (L) 06/15/2024         Will order a type and screen and consent patient for blood " transfusion. Will transfuse if Hgb is <7g/dl (<8g/dl in cases of active ACS) or if patient has rapid bleeding leading to hemodynamic instability.  Hemoglobin has been stable  6/15 no further evidence of GI blood loss    GI bleed  Patient has acute blood loss due to hemorrhage, the hemorrhage is due to gastrointestinal bleed, patient does have a propensity for bleeding due to a platelet defect/deficiency and a medication, the medication is heparin.. Will trend hemoglobin/hematocrit Every 6 hours, as well as monitor and correct for any coagulation defects. CBC and vital signs have been reviewed and last CBC was noted-   Lab Results   Component Value Date    WBC 12.34 (H) 06/15/2024    HGB 6.0 (L) 06/15/2024    HCT 20.1 (L) 06/15/2024    MCV 89.3 06/15/2024    PLT 82 (L) 06/15/2024         Will order a type and screen and consent patient for blood transfusion. Will transfuse if Hgb is <7g/dl (<8g/dl in cases of active ACS) or if patient has rapid bleeding leading to hemodynamic instability.  6/11 no endoscopy at this time  No further evidence of bleeding  6/15 no further evidence of GI blood loss    Troponin level elevated   Secondary to cardiac arrest      Coronary artery disease involving native coronary artery of native heart without angina pectoris  Patient with known CAD s/p  unclear , which is controlled Will continue Statin and monitor for S/Sx of angina/ACS. Continue to monitor on telemetry.   6/11 no chest pain    Atrial fibrillation with RVR  Patient with Persistent (7 days or more) atrial fibrillation which is controlled currently with  Rate controlled.   with no medication needed . Patient is currently in atrial fibrillation.[id number] Score: 4. HASBLED Score: 5. Anticoagulation not indicated due to active GI bleed .  Rate controlled 6/11  6/15 rate controlled    Acute systolic CHF (congestive heart failure)  Patient is identified as having Diastolic (HFpEF) heart failure that is Acute on chronic. CHF is  "currently uncontrolled due to Pulmonary edema/pleural effusion on CXR. Latest ECHO performed and demonstrates- Results for orders placed during the hospital encounter of 05/23/24    Echo    Interpretation Summary    Left Ventricle: The left ventricle is normal in size. Increased wall thickness. There is concentric remodeling. There is normal systolic function. Biplane (2D) method of discs ejection fraction is 55%. Grade II diastolic dysfunction.    Right Ventricle: Mild right ventricular enlargement. Systolic function is mildly reduced.    Left Atrium: Left atrium is mildly dilated.    Right Atrium: Right atrium is mildly dilated.    Aortic Valve: The aortic valve is a trileaflet valve. Mildly calcified cusps. There is mild stenosis. Aortic valve area by VTI is 1.59 cm². Aortic valve peak velocity is 1.48 m/s. Mean gradient is 4 mmHg. The dimensionless index is 0.60.    Mitral Valve: There is bileaflet sclerosis. Mildly thickened leaflets. There is mild regurgitation.    Tricuspid Valve: There is severe regurgitation with an eccentrically directed jet.    Pulmonary Artery: Pulmonary artery pressure could not be accurately determined due to severe TR.    IVC/SVC: Elevated venous pressure at 15 mmHg.    Pericardium: There is a trivial effusion. No indication of cardiac tamponade. Left pleural effusion.  . Continue Nitrate/Vasodilator and monitor clinical status closely. Monitor on telemetry. Patient is off CHF pathway.  Monitor strict Is&Os and daily weights.  Place on fluid restriction of 1.5 L. Cardiology has not been consulted. Continue to stress to patient importance of self efficacy and  on diet for CHF. Last BNP reviewed- and noted below No results for input(s): "BNP", "BNPTRIAGEBLO" in the last 168 hours.  6/11 still requiring oxygen  6/12 stable    Cardiac arrest   PEA arrest during induction for his decortication.  Likely medication effect from anesthesia.    Appears to not have any neurologic sequela " from the event.      Thrombocytopenia  Patient was found to have thrombocytopenia, the likely etiology is secondary to sepsis/infection, will monitor the platelets Daily. Will transfuse if platelet count is <10k. Hold DVT prophylaxis if platelets are <50k. The patient's platelet results have been reviewed and are listed below.  Recent Labs   Lab 06/15/24  0613   PLT 82*           Acute metabolic encephalopathy  Discontinued trazodone.  Management as above.  Requiring restraints to avoid treatment interruption.   Improving.    6/4  More awake today  6/5 improving  6/10 appears to be at baseline today  Resolved    Oropharyngeal dysphagia  SLP consulted, failed swallow eval.  Now NPO, NG inserted for feed and meds.    NG removed per patient on accident on 6/3.  SLP to follow today as patient is more alert now.      Has reportedly become more weak and eaten less over the duration of his hospitalization.  We will place NG tube if necessary.  6/5 NG tube today   Now tolerating oral    Pleural effusion  As above.    Decortication Thursday    Patient found to have large pleural effusion on imaging. I have personally reviewed and interpreted the following imaging: Xray. A thoracentesis was ordered. Most likely etiology includes Pneumonia. Management to include Repeat Thoracentesis, Pleural Catheter, and Dialysis   6/10 chest tube in place  6/11 chest tube per pulmonology  6/12 chest tube out    Anemia of renal disease  Patient's anemia is currently uncontrolled. Has not received any PRBCs to date. 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   No evidence of bleeding   6/5 no evidence of bleeding   6/10 Large clots passed and stool this morning.  Consult GI.  IV ppi.  Trend hemoglobin  6/11  hemoglobin holding today  6/15 acute blood loss during dialysis yesterday secondary to dislodging catheter.  Transfusing today    Acute on chronic heart failure with preserved ejection fraction  Patient is identified as having Diastolic (HFpEF) heart failure that is Acute on chronic. CHF is currently uncontrolled due to Pulmonary edema/pleural effusion on CXR. Latest ECHO performed and demonstrates- Results for orders placed during the hospital encounter of 08/01/23    Echo    Interpretation Summary    Left Ventricle: The left ventricle is normal in size. Increased wall thickness. There is concentric remodeling. Normal wall motion. There is normal systolic function with a visually estimated ejection fraction of 55 - 60%. Grade II diastolic dysfunction.    Left Atrium: Left atrium is mildly dilated. There is a calcified 1.5 x 2 cm full wall thickness mass noted extending from the myocardium into the pericardial space, unclear etiology, recommend cardiac MRI.    Right Ventricle: Normal right ventricular cavity size. Systolic function is normal.    Aortic Valve: The aortic valve is a trileaflet valve. There is physiologically normal aortic regurgitation.    Mitral Valve: There is no stenosis. The mean pressure gradient across the mitral valve is 3 mmHg at a heart rate of  bpm. There is mild regurgitation with a centrally directed jet.    Tricuspid Valve: There is mild to moderate regurgitation with a centrally directed jet.    Pulmonic Valve: There is no significant stenosis.    Pericardium: Small circumferential pericardial effusion present. No indication of cardiac tamponade.  . Continue Nitrate/Vasodilator and monitor clinical status closely. Monitor on telemetry. Patient is off CHF pathway.  Monitor strict Is&Os and daily weights.  Place on fluid restriction of 1.5 L. Cardiology has not been consulted. Continue to stress to patient importance of self efficacy and  on diet for CHF. Last BNP reviewed- and  "noted below No results for input(s): "BNP", "BNPTRIAGEBLO" in the last 168 hours.   Volume removal per HD.     6/4 Makes no urine.  Continue dialysis  6/5 continue dialysis    Elevated troponin   Likely secondary to cardiac arrest      Malnutrition of moderate degree  Nutrition consulted. Most recent weight and BMI monitored-     Measurements:  Wt Readings from Last 1 Encounters:   06/08/24 57.4 kg (126 lb 8.7 oz)   Body mass index is 21.06 kg/m².    Patient has been screened and assessed by RD.    Malnutrition Type:  Context:    Level:      Malnutrition Characteristic Summary:       Interventions/Recommendations (treatment strategy):  Recommend to add Gallo to aid in wound healing, ONS to provide additional calories/protein to heal/HD   We will place NG tube if necessary.  6/5 NG tube today  6/10 NG tube out.  Encourage orals  6/11 eating during interview today  6/12 eating good    Secondary hyperparathyroidism of renal origin  Resume home Renvela.    Nephrology following  Appreciate nephrology    Essential (primary) hypertension  Chronic, controlled. Latest blood pressure and vitals reviewed-     Temp:  [97.9 °F (36.6 °C)-98.4 °F (36.9 °C)]   Pulse:  [60-93]   Resp:  [16-20]   BP: ()/(21-75)   SpO2:  [98 %-100 %] .   Home meds for hypertension were reviewed and noted below.   Hypertension Medications               amLODIPine (NORVASC) 5 MG tablet Take 5 mg by mouth once daily.    hydrALAZINE (APRESOLINE) 100 MG tablet Take 1 tablet by mouth 3 (three) times daily with meals.    nebivoloL (BYSTOLIC) 2.5 MG Tab Take 2.5 mg by mouth once daily.            While in the hospital, will manage blood pressure as follows; hold meds given hypotension. BP has been fluctuating, will likely add back beta blocker if BP gets on the higher side.     Will utilize p.r.n. blood pressure medication only if patient's blood pressure greater than 180/110 and he develops symptoms such as worsening chest pain or shortness of " breath.   BP reasonable  6/5 BP okay  Stable  BP okay 6/15    Dependence on renal dialysis   ESRD.  Nephrology is following.  Continue 3 times a week hemodialysis        VTE Risk Mitigation (From admission, onward)           Ordered     IP VTE HIGH RISK PATIENT  Once         06/06/24 1154     Place sequential compression device  Until discontinued         06/06/24 1154                    Discharge Planning   BRIAN: 6/14/2024     Code Status: Full Code   Is the patient medically ready for discharge?:     Reason for patient still in hospital (select all that apply): Treatment  Discharge Plan A: Home, Home Health                  Manas Sin Jr, MD  Department of Hospital Medicine   [facility]

## 2024-06-17 NOTE — PLAN OF CARE
Problem: Fall Injury Risk  Goal: Absence of Fall and Fall-Related Injury  Intervention: Identify and Manage Contributors  Flowsheets (Taken 6/17/2024 7450)  Self-Care Promotion:   independence encouraged   adaptive equipment use encouraged  Medication Review/Management: medications reviewed

## 2024-06-18 PROBLEM — K62.5 BLOOD PER RECTUM: Status: RESOLVED | Noted: 2024-01-01 | Resolved: 2024-01-01

## 2024-06-18 PROBLEM — R79.89 ELEVATED TROPONIN: Status: RESOLVED | Noted: 2023-01-01 | Resolved: 2024-01-01

## 2024-06-18 NOTE — PT/OT/SLP PROGRESS
Physical Therapy Treatment    Patient Name:  Joseph Mcdonald   MRN:  [id number]    Recommendations:     Discharge Recommendations: Moderate Intensity Therapy  Discharge Equipment Recommendations: to be determined by next level of care  Barriers to discharge: Inaccessible home and Decreased caregiver support    Assessment:     Joseph Mcdonald is a 89 y.o. male admitted with a medical diagnosis of Acute hypoxic respiratory failure.  He presents with the following impairments/functional limitations: weakness, impaired functional mobility, impaired self care skills, impaired balance, impaired cognition, impaired skin Pt requires significant assistance with all mobility. Very frail in appearance. Limited in mobility by sacral wound.    Rehab Prognosis: Poor; patient would benefit from acute skilled PT services to address these deficits and reach maximum level of function.    Recent Surgery: Procedure(s) (LRB):  DECORTICATION, LUNG (Right) 12 Days Post-Op    Plan:     During this hospitalization, patient to be seen 5 x/week to address the identified rehab impairments via gait training, therapeutic activities, therapeutic exercises, neuromuscular re-education and progress toward the following goals:    Plan of Care Expires:  06/24/24    Subjective     Chief Complaint: sacral wound  Patient/Family Comments/goals: Pt is agreeable to PT   Pain/Comfort:  Pain Rating 1: 6/10  Location 1: sacral spine  Pain Addressed 1: Pre-medicate for activity  Pain Rating Post-Intervention 1: 6/10      Objective:     Communicated with ABHIJIT Bedolla RN prior to session.  Patient found supine with peripheral IV, telemetry, SCD, oxygen upon PT entry to room.     General Precautions: Standard, fall  Orthopedic Precautions: N/A  Braces: N/A  Respiratory Status: Nasal cannula, flow 1 L/min     Functional Mobility:  Bed Mobility:     Rolling Left:  minimum assistance  Rolling Right: minimum assistance  Scooting: minimum assistance  Supine to Sit: moderate  assistance  Sit to Supine: moderate assistance  Transfers:     Sit to Stand:  maximal assistance and of 2 persons with rolling walker  Balance: poor      AM-PAC 6 CLICK MOBILITY  Turning over in bed (including adjusting bedclothes, sheets and blankets)?: 3  Sitting down on and standing up from a chair with arms (e.g., wheelchair, bedside commode, etc.): 2  Moving from lying on back to sitting on the side of the bed?: 2  Moving to and from a bed to a chair (including a wheelchair)?: 1  Need to walk in hospital room?: 1  Climbing 3-5 steps with a railing?: 1  Basic Mobility Total Score: 10       Treatment & Education:  Pt performed bilateral LE: bed level exercises: ankle pumps and heel slides and seated exercises: long arc quads and marches x 30 each  Edge of bed sitting x 15 minutes, minimal assistance to contact guard assistance for upright positioning  Attempted sit to stand x 3 trials, pt lifting LE with attempts to stand    Patient left HOB elevated with all lines intact and call button in reach..    GOALS:   Multidisciplinary Problems       Physical Therapy Goals          Problem: Physical Therapy    Goal Priority Disciplines Outcome Goal Variances Interventions   Physical Therapy Goal     PT, PT/OT Progressing     Description: Short Term Goals to be met by: 24    Patient will increase functional independence with mobility by performin. Supine to sit with Stand by assist  2. Sit to stand transfer with Stand by assist using Rolling walker  3. Bed to chair transfer with Stand by assist using Rolling walker  4. Gait  x 150 feet with Stand by assist using Rolling walker  5. Lower extremity exercise program x30 reps per handout, with assistance as needed    Long Term Goals to be met by: 24    Pt will regain full independent functional mobility with straight cane to return to home situation and prior activities of daily living.                        Time Tracking:     PT Received On: 24  PT  Start Time: 0953     PT Stop Time: 1023  PT Total Time (min): 30 min     Billable Minutes: Therapeutic Activity 30    Treatment Type: Treatment  PT/PTA: PT     Number of PTA visits since last PT visit: 0     06/18/2024

## 2024-06-18 NOTE — ASSESSMENT & PLAN NOTE
Patient with Persistent (7 days or more) atrial fibrillation which is controlled currently with  Rate controlled.   with no medication needed . Patient is currently in atrial fibrillation.[id number] Score: 4. HASBLED Score: 5. Anticoagulation not indicated due to active GI bleed .  Rate controlled 6/11  6/15 rate controlled

## 2024-06-18 NOTE — PLAN OF CARE
06/18/24 @ 0845 -- Myra @ [facility] reached out to CM and advised that [facility] has approved admission to their facility. They are ready to accept pt today. MD notified. MOIRA will continue to follow.     06/18/24 @ 0891 - MOIRA left message for Myra Daley re status of auth from [facility] for admission to facility. CM will continue to follow.

## 2024-06-18 NOTE — PROGRESS NOTES
[facility]  Nephrology  Progress Note    Patient Name: Joseph Mcdonald  MRN: [id number]  Admission Date: 5/23/2024  Hospital Length of Stay: 26 days  Attending Provider: Manas Sin Jr., MD   Primary Care Physician: [facility], [facility]  Principal Problem:Acute hypoxic respiratory failure    Consults  Subjective:     Interval History:  Mr. Mcdonald is seen in follow-up of his end-stage renal disease.  He is alert and in no distress.  He was dialyzed yesterday.    Review of patient's allergies indicates:   Allergen Reactions    Azithromycin Other (See Comments)     Note: - [employer] 01/11/2019     Current Facility-Administered Medications   Medication Frequency    0.9%  NaCl infusion (for blood administration) Q24H PRN    0.9%  NaCl infusion PRN    acetaminophen tablet 1,000 mg Q8H PRN    atorvastatin tablet 40 mg QHS    collagenase ointment Daily    cyproheptadine 4 mg tablet 4 mg BID    dextrose 10% bolus 125 mL 125 mL PRN    dextrose 10% bolus 250 mL 250 mL PRN    glucagon (human recombinant) injection 1 mg PRN    glucose chewable tablet 16 g PRN    glucose chewable tablet 24 g PRN    hydrOXYzine HCL tablet 25 mg QID PRN    miconazole NITRATE 2 % top powder BID    naloxone 0.4 mg/mL injection 0.02 mg PRN    pantoprazole injection 40 mg BID    polyethylene glycol packet 17 g BID PRN    sevelamer carbonate tablet 2,400 mg TID WM    sodium chloride 0.9% 250 mL flush bag PRN    sodium chloride 0.9% flush 10 mL Q12H PRN       Objective:     Vital Signs (Most Recent):  Temp: 98.4 °F (36.9 °C) (06/18/24 1137)  Pulse: 81 (06/18/24 1137)  Resp: 16 (06/18/24 1137)  BP: (!) 112/32 (06/18/24 1137)  SpO2: 98 % (06/18/24 1137) Vital Signs (24h Range):  Temp:  [98 °F (36.7 °C)-99.7 °F (37.6 °C)] 98.4 °F (36.9 °C)  Pulse:  [74-95] 81  Resp:  [16-20] 16  SpO2:  [96 %-100 %] 98 %  BP: ()/(23-62) 112/32     Weight: 57.4 kg (126 lb 8.7 oz) (06/08/24 1278)  Body mass index is 21.06 kg/m².  Body  surface area is 1.62 meters squared.    I/O last 3 completed shifts:  In: 120 [P.O.:120]  Out: 1300 [Other:1300]    Physical Exam he has in no distress.  He has no edema his chest is clear.  Heart without rub or gallop.    Significant Labs:sureBMP:   Recent Labs   Lab 06/17/24  0410   GLU 63*      K 4.7   *   CO2 22   BUN 64*   CREATININE 5.57*   CALCIUM 7.9*     CBC:   Recent Labs   Lab 06/17/24  0550   WBC 11.05*   RBC 2.77*   HGB 7.6*   HCT 24.4*   *   MCV 88.1   MCH 27.4   MCHC 31.1*     All labs within the past 24 hours have been reviewed.    Significant Imaging:  Labs: Reviewed    Assessment/Plan:     Active Diagnoses:    Diagnosis Date Noted POA    PRINCIPAL PROBLEM:  Acute hypoxic respiratory failure [J96.01] 05/24/2024 Yes     Chronic    Pressure injury of right heel, unstageable [L89.610] 06/12/2024 Clinically Undetermined    Pressure injury of left heel, unstageable [L89.620] 06/12/2024 Clinically Undetermined    Decubitus ulcer of sacral region, unstageable [L89.150] 06/11/2024 Yes    GI bleed [K92.2] 06/10/2024 No    Blood per rectum [K62.5] 06/10/2024 No    Anemia due to GI blood loss [D50.0] 06/10/2024 No    Loculated pleural effusion [J90] 06/10/2024 Yes    Coronary artery disease involving native coronary artery of native heart without angina pectoris [I25.10] 06/09/2024 Yes    Troponin level elevated [R79.89] 06/09/2024 No    Cardiac arrest [I46.9] 06/06/2024 Yes    Acute systolic CHF (congestive heart failure) [I50.21] 06/06/2024 Yes    Atrial fibrillation with RVR [I48.91] 06/06/2024 Yes    Thrombocytopenia [D69.6] 06/05/2024 Yes    Acute metabolic encephalopathy [G93.41] 06/02/2024 No    Pleural effusion [J90] 05/31/2024 Yes    Oropharyngeal dysphagia [R13.12] 05/31/2024 No    Parapneumonic effusion [J18.9, J91.8] 05/23/2024 Yes    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.9, D63.1] 05/23/2024 Yes    Debility [R53.81] 05/23/2024 Yes    Elevated troponin [R79.89] 08/02/2023 Yes     Dependence on renal dialysis [Z99.2] 10/30/2017 Not Applicable    Malnutrition of moderate degree [E44.0] 11/20/2014 Yes    DM2 (diabetes mellitus, type 2) [E11.9] 11/18/2014 Yes    ESRD (end stage renal disease) [N18.6] 11/06/2014 Yes    Essential (primary) hypertension [I10] 11/06/2014 Yes    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  We will dialyze him in the outpatient unit if he is transferred to [facility] and continue to dialyze him here if he remains here.  [place] for any return of vigor is poor.    Thank you for your consult. I will follow-up with patient. Please contact us if you have any additional questions.    Ovi Alvarado MD  Nephrology  [facility]

## 2024-06-18 NOTE — PT/OT/SLP PROGRESS
Occupational Therapy   Treatment    Name: Joseph Mcdonald  MRN: [id number]  Admitting Diagnosis:  Acute hypoxic respiratory failure  12 Days Post-Op    Recommendations:     Discharge Recommendations: Moderate Intensity Therapy  Discharge Equipment Recommendations:   (to be determined)  Barriers to discharge:       Assessment:     Joseph Mcdonald is a 89 y.o. male with a medical diagnosis of Acute hypoxic respiratory failure.Performance deficits affecting function are weakness, impaired endurance, impaired self care skills, impaired skin.     Rehab Prognosis:  Good; patient would benefit from acute skilled OT services to address these deficits and reach maximum level of function.       Plan:     Patient to be seen 5 x/week to address the above listed problems via self-care/home management, therapeutic activities, therapeutic exercises  Plan of Care Expires: 06/28/24  Plan of Care Reviewed with: patient    Subjective     Chief Complaint:   Patient/Family Comments/goals:   Pain/Comfort:  Pain Rating 1: 6/10  Location 1: sacral spine  Pain Addressed 1: Pre-medicate for activity, Reposition    Objective:     Communicated with: Shonna Bedolla RN prior to session.  Patient found supine  with peripheral IV, pressure relief boots, SCD, oxygen upon OT entry to room.    General Precautions: Standard, fall    Orthopedic Precautions:N/A  Braces: N/A  Respiratory Status: Nasal cannula, flow 1 L/min     Occupational Performance:     Bed Mobility:    Supine tos sit mod a performed by PT  Sit to supine mod a ( DANIEL assisted pt  with lifting  his legs back into bed )   Rolling min a with sw     Functional Mobility/Transfers:  Sit to stand ma x a x 2    Functional Mobility:     Activities of Daily Living:  Sat eob cga/min a  Dep to librado socks  Max  a to doff/librado gown  Set up to wash his face and hands  Dep to clean his buttocks        AMPAC 6 Click ADL:      Treatment & Education:  Pt performed hand helper with 2 rubber band resistances  20 reps , yellow t ball ex's 20 reps , rom ex's with 1 lb wt 15 reps x 2 B elbow flex/ext,abd/add, 10 reps shld flex      Patient left supine with all lines intact and call button in reach    GOALS:   Multidisciplinary Problems       Occupational Therapy Goals          Problem: Occupational Therapy    Goal Priority Disciplines Outcome Interventions   Occupational Therapy Goal     OT, PT/OT Progressing    Description: STG:  Pt will perform grooming (I)  Pt will bathe with setup  Pt will perform UE dressing with (I)  Pt will perform LE dressing with I/mod I  Pt will transfer bed/chair/bsc with Mod I  Pt will perform standing task x 2 min with Mod I   Pt will tolerate 15 minutes of tx without fatigue      LT.Restore to max I with self care and mobility.                          Time Tracking:     OT Date of Treatment: 24  OT Start Time: 953  OT Stop Time:   OT Total Time (min): 46 min    Billable Minutes:Self Care/Home Management 24  Therapeutic Exercise 15    OT/QING: QING          2024

## 2024-06-18 NOTE — ASSESSMENT & PLAN NOTE
"Patient was found to have thrombocytopenia, the likely etiology is secondary to sepsis/infection, will monitor the platelets Daily. Will transfuse if platelet count is <10k. Hold DVT prophylaxis if platelets are <50k. The patient's platelet results have been reviewed and are listed below.  No results for input(s): "PLT" in the last 24 hours.    6/18 platelets 116, improved    "

## 2024-06-18 NOTE — ASSESSMENT & PLAN NOTE
"Patient found to have large pleural effusion on imaging. I have personally reviewed and interpreted the following imaging: Xray. A thoracentesis was performed. Pleural fluid was sent for analysis. Labs reviewed, including Serum LDH   LDH   Date Value Ref Range Status   05/29/2024 279 (H) 87 - 241 U/L Final   , Pleural Fluid LDH   Lactate Dehydrogenase (LDH), Body Fluid   Date Value Ref Range Status   05/29/2024 270 U/L Final     Comment:     Reference ranges for this analyte have not been established for the body fluid specimen submitted for analysis.    , Serum Protein   Total Protein   Date Value Ref Range Status   06/17/2024 5.8 (L) 6.4 - 8.2 g/dL Final    , Pleural Protein No results found for: "PROTEINBODYF" , Cell Count and Differential No results found for: "BFCELLCNT" , and Fluid Cultures No results found for: "BODYFLUIDCUL" . Fluid most consistent with Exudate.  . Most likely etiology includes Pneumonia. Management to include Pleural Catheter   Chest tube removed 6/12  "

## 2024-06-18 NOTE — ASSESSMENT & PLAN NOTE
Patient's anemia is currently uncontrolled. Has not received any PRBCs to date. Etiology likely d/t chronic disease due to ESRD  Current CBC reviewed-   Lab Results   Component Value Date    HGB 7.6 (L) 06/17/2024    HCT 24.4 (L) 06/17/2024     Monitor serial CBC and transfuse if patient becomes hemodynamically unstable, symptomatic or H/H drops below 7/21.   No evidence of bleeding   6/5 no evidence of bleeding   6/10 Large clots passed and stool this morning.  Consult GI.  IV ppi.  Trend hemoglobin  6/11 hemoglobin holding today  6/15 acute blood loss during dialysis yesterday secondary to dislodging catheter.  Transfusing today  6/17 H&H stable

## 2024-06-18 NOTE — ASSESSMENT & PLAN NOTE
Chronic, controlled. Latest blood pressure and vitals reviewed-     Temp:  [98 °F (36.7 °C)-99.7 °F (37.6 °C)]   Pulse:  [74-95]   Resp:  [16-18]   BP: ()/(23-62)   SpO2:  [96 %-100 %] .   Home meds for hypertension were reviewed and noted below.   Hypertension Medications               amLODIPine (NORVASC) 5 MG tablet Take 5 mg by mouth once daily.    hydrALAZINE (APRESOLINE) 100 MG tablet Take 1 tablet by mouth 3 (three) times daily with meals.    nebivoloL (BYSTOLIC) 2.5 MG Tab Take 2.5 mg by mouth once daily.            While in the hospital, will manage blood pressure as follows; hold meds given hypotension. BP has been fluctuating, will likely add back beta blocker if BP gets on the higher side.     Will utilize p.r.n. blood pressure medication only if patient's blood pressure greater than 180/110 and he develops symptoms such as worsening chest pain or shortness of breath.   BP reasonable  6/5 BP okay  Stable  BP okay 6/15    6/18 blood pressure medicines have been discontinued.  Blood pressure remains acceptable

## 2024-06-18 NOTE — CONSULTS
[facility]  Adult Nutrition  Consult Note         Reason for Assessment  Reason For Assessment: RD follow-up   Nutrition Risk Screen: no indicators present    Assessment and Plan  6/18/2024 RD Follow up: Patient is ordered a dysphagia pureed diet with Boost Plus all meals + Gallo BID. PO intakes improved to 75%. Cyproheptadine added on 6/17. Continue current plan of care. RD Following.     6/11/2024: Consult received and appreciated. Consult for wounds. Patient was assessed for wounds on 6/9 and started on Gallo BID to aid in wound healing. Also recommend consider addition of 500mg Vitamin C + 220mg ZnSO4 BID + MVI qd to aid in wound healing. RD following.    6/9/2024 Consult: New consult received and appreciated for wounds. Patient currently ordered a dysphagia pureed diet. Bedside swallow completed on 6/3. Secure chat with RN with report of patient tolerating po intakes since yesterday.     Recommend to add Gallo BID + Boost Max Protein BID to aid in wound healing. Encourage po intakes. Assist with meals as needed. RD Following.     6/7/2024: RD follow up. Patient currently in unit after code in OR. NG replaced but feedings held. Recommend resume tube feedings of Novasource Renal at 35mL/hour with 40mL/hour free water flush as soon as medically appropriate. RD following.     6/3/2024: RD follow up. NG was dislodged by accident yesterday. Repeat SLP evaluation today. Recommended puree with thin liquids. Recommend continue puree diet as tolerated. Encourage good PO intakes. Also recommend addition of Boost Plus with meals to improve kcal/protein intakes to better meet estimated nutritional needs. RD following.     6/1/2024: Consult received and appreciated. Consult to start tube feedings.     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 Recommend start Novasource Renal with a goal rate of 35mL/hour with 40mL/hour free water flush. Keep HOB at 30-45 degrees to reduce risk of  aspiration. Start rate at 20mL  and advance by 10mL q8h until goal rate is reached. Monitor for tolerance: n/v/d/c. Hold feeding and notify RD if symptoms of intolerance develop.     Last BM 5/29 per flowsheet.     Medications/labs reviewed. RD following.          Learning Needs/Social Determinants of Health  Learning Assessment       05/23/2024 1848 [facility] (5/23/2024 - Present)   Created by Melanie Saleh RN - RN (Nurse) Status: Complete                 PRIMARY LEARNER     Primary Learner Name:  Joseph Mcdonald  - 05/23/2024 1848    Relationship:  Patient  - 05/23/2024 1848    Does the primary learner have any barriers to learning?:  No Barriers  - 05/23/2024 1848    What is the preferred language of the primary learner?:  English  - 05/23/2024 1848    Is an  required?:  No  - 05/23/2024 1848    How does the primary learner prefer to learn new concepts?:  Listening, Reading, Demonstration  - 05/23/2024 1848    How often do you need to have someone help you read instructions, pamphlets, or written material from your doctor or pharmacy?:  Never  - 05/23/2024 1848        CO-LEARNER #1     No question answered        CO-LEARNER #2     No question answered        SPECIAL TOPICS     No question answered        ANSWERED BY:     No question answered        Comments         Edit History       Melanie Saleh, RN - RN (Nurse)   05/23/2024 1848                           Social Determinants of Health     Tobacco Use: Medium Risk (8/2/2023)    Patient History     Smoking Tobacco Use: Former     Smokeless Tobacco Use: Never     Passive Exposure: Not on file   Alcohol Use: Not At Risk (5/24/2024)    AUDIT-C     Frequency of Alcohol Consumption: Never     Average Number of Drinks: Patient does not drink     Frequency of Binge Drinking: Never   Financial Resource Strain: Low Risk  (8/2/2023)    Overall Financial Resource Strain (CARDIA)     Difficulty of Paying Living Expenses: Not hard  at all   Food Insecurity: No Food Insecurity (5/24/2024)    Hunger Vital Sign     Worried About Running Out of Food in the Last Year: Never true     Ran Out of Food in the Last Year: Never true   Transportation Needs: No Transportation Needs (5/24/2024)    TRANSPORTATION NEEDS     Transportation : No   Physical Activity: Sufficiently Active (5/24/2024)    Exercise Vital Sign     Days of Exercise per Week: 5 days     Minutes of Exercise per Session: 30 min   Stress: No Stress Concern Present (5/24/2024)    [employer] - Occupational Stress Questionnaire     Feeling of Stress : Not at all   Housing Stability: Low Risk  (5/24/2024)    Housing Stability Vital Sign     Unable to Pay for Housing in the Last Year: No     Homeless in the Last Year: No   Depression: Not on file   Utilities: Not At Risk (5/24/2024)    [facility] Utilities     Threatened with loss of utilities: No   Health Literacy: Adequate Health Literacy (5/24/2024)     Health Literacy     Frequency of need for help with medical instructions: Never   Social Isolation: Socially Integrated (5/24/2024)    Social Isolation     Social Isolation: 1            Malnutrition  Is Patient Malnourished: No    Nutrition Diagnosis  Increased Protein Needs related to Renal dysfunction and Wound healing as evidenced by wounds and HD  Comments: Boost plus/Gallo added    Recent Labs   Lab 06/17/24  0410   GLU 63*         Nutrition Prescription / Recommendations  Recommendation/Intervention: Recommend to add Gallo to aid in wound healing, ONS to provide additional calories/protein to heal/HD  Goals: po intakes 50% by next follow up  Nutrition Goal Status: goal met  Communication of RD Recs: reviewed with RN  Current Diet Order: Puree  Chewing or Swallowing Difficulty?: No Chewing or swallowing difficulty  Recommended Diet: Puree  Recommended Oral Supplement: Gallo [90 kcals, 2.5g Protein, 10g Carbs(3g Sugar), 7g L-Arginine, 7g L-Glutamine, Vitamin C  "300mg, 9.5mg Zinc] 2 times a day and Boost Plus [360kcals, 14g Protein, 45g Carbs] with meals  Is Nutrition Support Recommended: No    Monitor and Evaluation  % current Intake: P.O. intake of 75 - 100 %  % intake to meet estimated needs: P.O. + Supplements  Food and Nutrient Intake: energy intake, food and beverage intake  Food and Nutrient Adminstration: diet order  Anthropometric Measurements: height/length, weight, weight change, body mass index  Biochemical Data, Medical Tests and Procedures: electrolyte and renal panel, gastrointestinal profile, inflammatory profile, glucose/endocrine profile  Energy Calories Required: meeting needs  Protein Required: meeting needs  Fluid Required: meeting needs    Current Medical Diagnosis and Past Medical History  Diagnosis: renal disease  Past Medical History:   Diagnosis Date    Chronic kidney disease (CKD)     Diabetes mellitus     Hypertension     PVD (peripheral vascular disease)        Nutrition/Diet History  Spiritual, Cultural Beliefs, Moravian Practices, Values that Affect Care: no  Factors Affecting Nutritional Intake: None identified at this time    Lab/Procedures/Meds  Recent Labs   Lab 06/17/24  0410      K 4.7   BUN 64*   CREATININE 5.57*   CALCIUM 7.9*   ALBUMIN 1.5*   *   ALT 16   AST 31   PHOS 2.2*   Note: BUN/Cr elevated, Alb low. PMH ESRD on HD, Ca++ low, AST elevated.      Last A1c: No results found for: "HGBA1C"  Lab Results   Component Value Date    RBC 2.77 (L) 06/17/2024    HGB 7.6 (L) 06/17/2024    HCT 24.4 (L) 06/17/2024    MCV 88.1 06/17/2024    MCH 27.4 06/17/2024    MCHC 31.1 (L) 06/17/2024    TIBC 105 (L) 05/23/2024   Note: H&H low    Pertinent Labs Reviewed: reviewed  Pertinent Medications Reviewed: reviewed  Scheduled Meds:   atorvastatin  40 mg Oral QHS    collagenase   Topical (Top) Daily    cyproheptadine  4 mg Oral BID    miconazole NITRATE 2 %   Topical (Top) BID    pantoprazole  40 mg Intravenous BID    sevelamer carbonate  " "2,400 mg Oral TID WM     Continuous Infusions:      PRN Meds:.  Current Facility-Administered Medications:     0.9%  NaCl infusion (for blood administration), , Intravenous, Q24H PRN    sodium chloride 0.9%, , Intravenous, PRN    acetaminophen, 1,000 mg, Oral, Q8H PRN    dextrose 10%, 12.5 g, Intravenous, PRN    dextrose 10%, 25 g, Intravenous, PRN    glucagon (human recombinant), 1 mg, Intramuscular, PRN    glucose, 16 g, Oral, PRN    glucose, 24 g, Oral, PRN    hydrOXYzine HCL, 25 mg, Oral, QID PRN    naloxone, 0.02 mg, Intravenous, PRN    polyethylene glycol, 17 g, Oral, BID PRN    sodium chloride 0.9% 250 mL flush bag, , Intravenous, PRN    sodium chloride 0.9%, 10 mL, Intravenous, Q12H PRN    Anthropometrics  Temp: 98.7 °F (37.1 °C)  Height Method: Stated  Height: 5' 5" (165.1 cm)  Height (inches): 65 in  Weight Method: Bed Scale  Weight: 57.4 kg (126 lb 8.7 oz)  Weight (lb): 126.55 lb  Ideal Body Weight (IBW), Male: 136 lb  % Ideal Body Weight, Male (lb): 104.4 %  BMI (Calculated): 21.1       Estimated/Assessed Needs  RMR ([place]-St. Jeor Equation): 1170.88   Total Ve: 7 L/m Temp: 98.7 °F (37.1 °C)Axillary  Weight Used For Calorie Calculations: 64.5 kg (142 lb 3.2 oz)   Energy Need Method: Kcal/kg Energy Calorie Requirements (kcal): 3815-7303  Weight Used For Protein Calculations: 64.5 kg (142 lb 3.2 oz)  Protein Requirements: 71-84  Estimated Fluid Requirement Method: other (see comments) (500 ml + urine output)    RDA Method (mL): 1935       Nutrition by Nursing  Diet/Nutrition Received: mechanical/dental soft  Intake (%): 75%  Diet/Feeding Assistance: assisted with feeding  Diet/Feeding Tolerance: fair  Last Bowel Movement: 06/17/24  [REMOVED]      NG/OG Tube 05/31/24 2100 nasogastric Left nostril-Feeding Type: continuous, by pump  [REMOVED]      NG/OG Tube 05/31/24 2100 nasogastric Left nostril-Current Rate (mL/hr): 35 mL/hr  [REMOVED]      NG/OG Tube 05/31/24 2100 nasogastric Left nostril-Goal Rate " (mL/hr): 35 mL/hr  [REMOVED]      NG/OG Tube 05/31/24 2100 nasogastric Left nostril-Formula Name: novasource renal    Nutrition Follow-Up  RD Follow-up?: Yes      Nutrition Discharge Planning: [facility]; recommend dysphagia pureed diet with ONS on d/c          Available via Secure Chat

## 2024-06-18 NOTE — ASSESSMENT & PLAN NOTE
Patient has acute blood loss due to hemorrhage, the hemorrhage is due to gastrointestinal bleed, patient does have a propensity for bleeding due to a platelet defect/deficiency and a medication, the medication is heparin.. Will trend hemoglobin/hematocrit Every 6 hours, as well as monitor and correct for any coagulation defects. CBC and vital signs have been reviewed and last CBC was noted-   Lab Results   Component Value Date    WBC 11.05 (H) 06/17/2024    HGB 7.6 (L) 06/17/2024    HCT 24.4 (L) 06/17/2024    MCV 88.1 06/17/2024     (L) 06/17/2024         Will order a type and screen and consent patient for blood transfusion. Will transfuse if Hgb is <7g/dl (<8g/dl in cases of active ACS) or if patient has rapid bleeding leading to hemodynamic instability.  6/11 no endoscopy at this time  No further evidence of bleeding  6/15 no further evidence of GI blood loss  6/18 stable

## 2024-06-18 NOTE — ASSESSMENT & PLAN NOTE
Nutrition consulted. Most recent weight and BMI monitored-     Measurements:  Wt Readings from Last 1 Encounters:   06/08/24 57.4 kg (126 lb 8.7 oz)   Body mass index is 21.06 kg/m².    Patient has been screened and assessed by RD.    Malnutrition Type:  Context:    Level:      Malnutrition Characteristic Summary:       Interventions/Recommendations (treatment strategy):  Recommend to add Gallo to aid in wound healing, ONS to provide additional calories/protein to heal/HD   We will place NG tube if necessary.  6/5 NG tube today  6/10 NG tube out.  Encourage orals  6/11 eating during interview today  6/12 eating good  6/17 poor intake the last few days.  Have added Periactin.

## 2024-06-18 NOTE — DISCHARGE SUMMARY
[facility] Medicine  Discharge Summary      Patient Name: Joseph Mcdonald  MRN: [id number]  LUKE: [id number]  Patient Class: IP- Inpatient  Admission Date: 5/23/2024  Hospital Length of Stay: 26 days  Discharge Date and Time: No discharge date for patient encounter.  Attending Physician: Manas Sin Jr., MD   Discharging Provider: Manas Sin Jr, MD  Primary Care Provider: Clinic, [facility]    Primary Care Team: Networked reference to record PCT     HPI:     89-year-old  male With a past medical history of end-stage renal disease, hypertension, iron deficiency anemia, secondary hyperparathyroidism, type 2 diabetes, CAD, heart failure with preserved ejection fraction, hypokalemia inability presents to the ED with 1 week worsening shortness of breath.  This was particularly bad during his dialysis session this morning.  Due to this daughter brought him to the ED thereafter.  He denies any fever, chills, cough, wheezing, sputum production, palpitations.  He has been take medication as in his not missed any dialysis sessions.  He denies any nausea, vomiting, diarrhea, dysuria.  Imaging in the ED revealed near-complete he had a pacemaker patient of the right hemithorax review of systems otherwise negative.    Procedure(s) (LRB):  DECORTICATION, LUNG (Right)      Hospital Course:    5/24 1.5 L removed from right pleural space today.  Still has large right-sided effusion.  We will attempt to get some more off tomorrow.  5/25 dialyzing today  5/26 bedside ultrasound today still has large pleural effusion, complex appearing.  Was going to attempt bedside ultrasound but given complex nature have consulted pulmonology  5/27 D/W Pulm, plan for chest tube placement.   5/28- Midodrine added low BP.   5/29- s/p chest tube placement per Dr. Rodney, fluid studies sent.   5/30- BP low again, midodrine resumed. Started on intra-pleural lytics per Pulm.  5/31-  Intra-pleural lytics continued, CXR improved.   6/1- Failed swallow and is with NG now and tube feeds. S/p 6 doses of Intra-pleural lytics (total 6 doses).   6/2- Mental status better, resp status stable.  6/3- NG removed on accident while patient was working with PT/OT, SLP consulted for reevaluation. Pulm to follow, CXR with slightly increased right effusion.    6/4 surgery consulted today.  Plan for decortication on Thursday 6/6 6/5 plan for decortication tomorrow.  We will place NG tube due to patient not eating well  6/10  Mr. Mcdonald has been in the ICU for the last several days.  He had a P EA arrest during induction for his decortication.  He is now out of the ICU and doing well.  Still has right-sided chest tube.  Notified this morning that he pass large clots in about movement.  We will consult GI and start IV  protonic  6/11 hemoglobin okay.  Unable to do endoscopy due to high risk of sedating patient.  Has a sacral wound that may need debriding.  We will have surgery evaluate today.  Again we will be high risk for anesthesia induction.  6/12 no surgical intervention on sacral decubitus.  Wound care.  Currently stable for discharge and awaiting placement.  Chest tube has been removed.  6/13 medically ready for discharge.  Awaiting placement  6/14 attempting to get [facility].  Awaiting placement  6/15 dislodged dialysis catheter during treatment yesterday and had significant blood loss.  Hemoglobin today 6.0.  We will transfuse 1 unit.  6/18 - summary-    Patient is an 89-year-old  male that was originally admitted with hypoxic respiratory failure.  He had a large right pleural effusion.  This was drained by Interventional Radiology.  Cytology was negative.  Fluid was bloody.  Effusion recurred and chest tube was placed.  Pulmonary was consulted.  Effusion was somewhat complex and loculated and decortication was planned.  However during induction for surgery patient coded but was  successfully resuscitated.  Thankfully patient had no neurologic sequela.  Since this time he is improved.  Chest tube has been able to be removed.  Remainder of hospital stay complicated by some GI bleeding.  Patient was not a candidate for endoscopy due to his recent code.  However bleeding stopped spontaneously with proton pump inhibitors and holding anticoagulants.  Hemoglobin is stable.  The patient does have some decubitus wounds that have been evaluated by surgery and felt to not require debridement.  At this point patient has stable hemoglobin and stable respiratory status.  He is tolerating hemodialysis on Monday Wednesday and Friday without difficulty.  He is receiving ongoing care for his decubiti.  He is felt to have reached maximum hospital benefit and is ready for transfer to [facility].  His p.o. intake is poor in this may be contributing to his general debility.  He has been started on Periactin as an appetite stimulant.  Hopefully with improved p.o. intake and physical therapy he will improve.       Goals of Care Treatment Preferences:  Code Status: Full Code      Consults:   Consults (From admission, onward)          Status Ordering Provider     Inpatient consult to General Surgery  Once        Provider:  (Not yet assigned)    Completed BENNETT TEIXEIRA     Inpatient consult to Registered Dietitian/Nutritionist  Once        Provider:  (Not yet assigned)    Completed BENNETT TEIXEIRA     Inpatient consult to Gastroenterology  Once        Provider:  Amari Sanchez MD    Completed BENNETT TEIXEIRA     Inpatient consult to Registered Dietitian/Nutritionist  Once        Provider:  (Not yet assigned)    Completed ODALYS RODNEY     Inpatient consult to Cardiology  Once        Provider:  Yohana Rodney MD    Completed ODALYS RODNEY     Inpatient consult to General Surgery  Once        Provider:  Ramakrishna Kan MD    Completed ODALYS RODNEY     Inpatient consult to Registered Dietitian/Nutritionist  Once         Provider:  (Not yet assigned)    Completed MADHU PASCUAL     Inpatient consult to Registered Dietitian/Nutritionist  Once        Provider:  (Not yet assigned)    Completed MADHU PASCUAL     Inpatient consult to Pulmonology  Once        Provider:  Indiana Washington MD    Completed BENNETT TEIXEIRA     Inpatient consult to Social Work  Once        Provider:  (Not yet assigned)    Completed BENNETT TEIXEIRA     Pharmacy to renally dose medication  Once        Provider:  (Not yet assigned)    Completed BENNETT TEIXEIRA     Inpatient consult to Nephrology  Once        Provider:  (Not yet assigned)    Acknowledged BENNETT TEIXEIRA            Neuro  Acute metabolic encephalopathy  Discontinued trazodone.  Management as above.  Requiring restraints to avoid treatment interruption.   Improving.    6/4  More awake today  6/5 improving  6/10 appears to be at baseline today  Resolved    Pulmonary  * Acute hypoxic respiratory failure  Sec to pleural effusion.  Require 2L NC since admission.   Imaging consistent with large right sided pleural effusion.   S/p thoracentesis on 5/23 with 1,5L fluid removed, no pleural fluid studies sent.   Remained symptomatic so CT chest obtained on 5/24 and consistent with large effusion.  Pulmonology consulted; s/p chest tube placement (5/29), s/p intra-pleural lytics started on 5/30 (total 6 doses).  Volume removal with HD, continue broad spectrum antibiotics as below.   Monitor oxygen status.   Home O2 eval prior to discharge.      6/4Improved.  Plan for decortication of the right lung on Thursday 6/5 plan for OR tomorrow  6/10 chest tube in place still draining.  6/11 still requiring oxygen  6/12 chest tube removed.  Stable for discharge  Doing well      Loculated pleural effusion  Patient found to have large pleural effusion on imaging. I have personally reviewed and interpreted the following imaging: Xray. A thoracentesis was performed. Pleural fluid was sent for analysis. Labs reviewed,  "including Serum LDH   LDH   Date Value Ref Range Status   05/29/2024 279 (H) 87 - 241 U/L Final   , Pleural Fluid LDH   Lactate Dehydrogenase (LDH), Body Fluid   Date Value Ref Range Status   05/29/2024 270 U/L Final     Comment:     Reference ranges for this analyte have not been established for the body fluid specimen submitted for analysis.    , Serum Protein   Total Protein   Date Value Ref Range Status   06/17/2024 5.8 (L) 6.4 - 8.2 g/dL Final    , Pleural Protein No results found for: "PROTEINBODYF" , Cell Count and Differential No results found for: "BFCELLCNT" , and Fluid Cultures No results found for: "BODYFLUIDCUL" . Fluid most consistent with Exudate.  . Most likely etiology includes Pneumonia. Management to include Pleural Catheter   Chest tube removed 6/12    Pleural effusion  As above.    Decortication Thursday    Patient found to have large pleural effusion on imaging. I have personally reviewed and interpreted the following imaging: Xray. A thoracentesis was ordered. Most likely etiology includes Pneumonia. Management to include Repeat Thoracentesis, Pleural Catheter, and Dialysis   6/10 chest tube in place  6/11 chest tube per pulmonology  6/12 chest tube out    Parapneumonic effusion  Patient found to have large pleural effusion on imaging. I have personally reviewed and interpreted the following imaging: Xray. A thoracentesis was ordered. Most likely etiology includes Congestive Heart Failure, Pneumonia, and Renal Failure. Management to include Repeat Thoracentesis and Dialysis  S/p thoracentesis on 5/23- 1.5L fluid removed.  S/p chest tube placement (5/29), cytology negative for malignancy, fluid appears exudate in nature, cultures NGTD.   S/p 6 doses of Intra-pleural lytics BID started per Pulm (5/30-6/2).   CXR mostly stable, possibly slight increase in fluid noted on CXR from 6/3.   Continue IV Zosyn (timing and transition per Pulm), discontinued vancomycin given negative MRSA PCR.   Pulm " follows.    6/4 plan for decortication on Thursday 6/5 plan for decortication tomorrow  6/10 continuing antibiotics.  Chest tube in place  6/11  chest tube per pulmonology  6/12 chest tube inadvertently removed overnight.  Okay with pulmonology to leave out.  He is stable for discharge from pulmonary standpoint.  Chest tube out, completed antibiotics.    Cardiac/Vascular  Coronary artery disease involving native coronary artery of native heart without angina pectoris  Patient with known CAD s/p  unclear , which is controlled Will continue Statin and monitor for S/Sx of angina/ACS. Continue to monitor on telemetry.   6/11 no chest pain    Atrial fibrillation with RVR  Patient with Persistent (7 days or more) atrial fibrillation which is controlled currently with  Rate controlled.   with no medication needed . Patient is currently in atrial fibrillation.[id number] Score: 4. HASBLED Score: 5. Anticoagulation not indicated due to active GI bleed .  Rate controlled 6/11  6/15 rate controlled    Acute systolic CHF (congestive heart failure)  Patient is identified as having Diastolic (HFpEF) heart failure that is Acute on chronic. CHF is currently uncontrolled due to Pulmonary edema/pleural effusion on CXR. Latest ECHO performed and demonstrates- Results for orders placed during the hospital encounter of 05/23/24    Echo    Interpretation Summary    Left Ventricle: The left ventricle is normal in size. Increased wall thickness. There is concentric remodeling. There is normal systolic function. Biplane (2D) method of discs ejection fraction is 55%. Grade II diastolic dysfunction.    Right Ventricle: Mild right ventricular enlargement. Systolic function is mildly reduced.    Left Atrium: Left atrium is mildly dilated.    Right Atrium: Right atrium is mildly dilated.    Aortic Valve: The aortic valve is a trileaflet valve. Mildly calcified cusps. There is mild stenosis. Aortic valve area by VTI is 1.59 cm². Aortic valve peak  "velocity is 1.48 m/s. Mean gradient is 4 mmHg. The dimensionless index is 0.60.    Mitral Valve: There is bileaflet sclerosis. Mildly thickened leaflets. There is mild regurgitation.    Tricuspid Valve: There is severe regurgitation with an eccentrically directed jet.    Pulmonary Artery: Pulmonary artery pressure could not be accurately determined due to severe TR.    IVC/SVC: Elevated venous pressure at 15 mmHg.    Pericardium: There is a trivial effusion. No indication of cardiac tamponade. Left pleural effusion.  . Continue Nitrate/Vasodilator and monitor clinical status closely. Monitor on telemetry. Patient is off CHF pathway.  Monitor strict Is&Os and daily weights.  Place on fluid restriction of 1.5 L. Cardiology has not been consulted. Continue to stress to patient importance of self efficacy and  on diet for CHF. Last BNP reviewed- and noted below No results for input(s): "BNP", "BNPTRIAGEBLO" in the last 168 hours.  6/11 still requiring oxygen  6/12 stable  6/17 stable    Cardiac arrest   PEA arrest during induction for his decortication.  Likely medication effect from anesthesia.    Appears to not have any neurologic sequela from the event.      Essential (primary) hypertension  Chronic, controlled. Latest blood pressure and vitals reviewed-     Temp:  [98 °F (36.7 °C)-99.7 °F (37.6 °C)]   Pulse:  [74-95]   Resp:  [16-18]   BP: ()/(23-62)   SpO2:  [96 %-100 %] .   Home meds for hypertension were reviewed and noted below.   Hypertension Medications               amLODIPine (NORVASC) 5 MG tablet Take 5 mg by mouth once daily.    hydrALAZINE (APRESOLINE) 100 MG tablet Take 1 tablet by mouth 3 (three) times daily with meals.    nebivoloL (BYSTOLIC) 2.5 MG Tab Take 2.5 mg by mouth once daily.            While in the hospital, will manage blood pressure as follows; hold meds given hypotension. BP has been fluctuating, will likely add back beta blocker if BP gets on the higher side.     Will " "utilize p.r.n. blood pressure medication only if patient's blood pressure greater than 180/110 and he develops symptoms such as worsening chest pain or shortness of breath.   BP reasonable  6/5 BP okay  Stable  BP okay 6/15    6/18 blood pressure medicines have been discontinued.  Blood pressure remains acceptable    Elevated troponin-resolved as of 6/18/2024   Likely secondary to cardiac arrest      Renal/  Dependence on renal dialysis   ESRD.  Nephrology is following.  Continue 3 times a week hemodialysis      ESRD (end stage renal disease)  Creatine stable for now. BMP reviewed- noted Estimated Creatinine Clearance: 7.4 mL/min (A) (based on SCr of 5.57 mg/dL (H)). according to latest data. 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    Nephrology consulted to resume HD sessions.   Continue dialysis   6/5Electrolytes okay  6/10 dialysis today  6/17 continue Monday Wednesday and Friday hemodialysis.  Tolerating well.    Hematology  Thrombocytopenia  Patient was found to have thrombocytopenia, the likely etiology is secondary to sepsis/infection, will monitor the platelets Daily. Will transfuse if platelet count is <10k. Hold DVT prophylaxis if platelets are <50k. The patient's platelet results have been reviewed and are listed below.  No results for input(s): "PLT" in the last 24 hours.    6/18 platelets 116, improved      Oncology  Anemia due to GI blood loss  Patient's anemia is currently uncontrolled. Has received 1 units of PRBCs on 06/10/2024 . Etiology likely d/t acute blood loss which was from GI bleed  Current CBC reviewed-   Lab Results   Component Value Date    HGB 7.6 (L) 06/17/2024    HCT 24.4 (L) 06/17/2024     Monitor serial CBC and transfuse if patient becomes hemodynamically unstable, symptomatic or H/H drops below 7/21.    6/15 no further evidence of GI blood loss    Anemia of renal disease  Patient's anemia is currently uncontrolled. Has not received any PRBCs to date. Etiology likely d/t " chronic disease due to ESRD  Current CBC reviewed-   Lab Results   Component Value Date    HGB 7.6 (L) 06/17/2024    HCT 24.4 (L) 06/17/2024     Monitor serial CBC and transfuse if patient becomes hemodynamically unstable, symptomatic or H/H drops below 7/21.   No evidence of bleeding   6/5 no evidence of bleeding   6/10 Large clots passed and stool this morning.  Consult GI.  IV ppi.  Trend hemoglobin  6/11 hemoglobin holding today  6/15 acute blood loss during dialysis yesterday secondary to dislodging catheter.  Transfusing today  6/17 H&H stable    Acute blood loss anemia  Patient's anemia is currently uncontrolled. Has received One units of PRBCs on 6/8 . Etiology likely d/t acute blood loss which was from GI bleed  Current CBC reviewed-   Lab Results   Component Value Date    HGB 7.6 (L) 06/17/2024    HCT 24.4 (L) 06/17/2024     Monitor serial CBC and transfuse if patient becomes hemodynamically unstable, symptomatic or H/H drops below 7/21.  Better today  6/11  Hemoglobin stable  No evidence of bleeding  Stable  6/15 yesterday he dislodged his dialysis catheter during treatment and had significant amount of bleeding.  Hemoglobin today 6.0.  Likely from blood loss yesterday.  We will transfuse 1 unit PRBCs.  6/16 - Post transfusion H/H as above. No lab today. Recheck tomorrow.   6/17 hemoglobin stable at 7.6.  Monitor periodically    Endocrine  Malnutrition of moderate degree  Nutrition consulted. Most recent weight and BMI monitored-     Measurements:  Wt Readings from Last 1 Encounters:   06/08/24 57.4 kg (126 lb 8.7 oz)   Body mass index is 21.06 kg/m².    Patient has been screened and assessed by RD.    Malnutrition Type:  Context:    Level:      Malnutrition Characteristic Summary:       Interventions/Recommendations (treatment strategy):  Recommend to add Gallo to aid in wound healing, ONS to provide additional calories/protein to heal/HD   We will place NG tube if necessary.  6/5 NG tube today  6/10 NG  tube out.  Encourage orals  6/11 eating during interview today  6/12 eating good  6/17 poor intake the last few days.  Have added Periactin.    Secondary hyperparathyroidism of renal origin  Resume home Renvela.    Nephrology following  Appreciate nephrology    DM2 (diabetes mellitus, type 2)  HBA1c 4.5  No need for SSI or POC glucose check.     GI  GI bleed  Patient has acute blood loss due to hemorrhage, the hemorrhage is due to gastrointestinal bleed, patient does have a propensity for bleeding due to a platelet defect/deficiency and a medication, the medication is heparin.. Will trend hemoglobin/hematocrit Every 6 hours, as well as monitor and correct for any coagulation defects. CBC and vital signs have been reviewed and last CBC was noted-   Lab Results   Component Value Date    WBC 11.05 (H) 06/17/2024    HGB 7.6 (L) 06/17/2024    HCT 24.4 (L) 06/17/2024    MCV 88.1 06/17/2024     (L) 06/17/2024         Will order a type and screen and consent patient for blood transfusion. Will transfuse if Hgb is <7g/dl (<8g/dl in cases of active ACS) or if patient has rapid bleeding leading to hemodynamic instability.  6/11 no endoscopy at this time  No further evidence of bleeding  6/15 no further evidence of GI blood loss  6/18 stable    Oropharyngeal dysphagia  SLP consulted, failed swallow eval.  Now NPO, NG inserted for feed and meds.    NG removed per patient on accident on 6/3.  SLP to follow today as patient is more alert now.      Has reportedly become more weak and eaten less over the duration of his hospitalization.  We will place NG tube if necessary.  6/5 NG tube today   Now tolerating oral    Orthopedic  Pressure injury of left heel, unstageable  Unclear chronicity  Wound care    Pressure injury of right heel, unstageable  Unclear chronicity  Wound care    Other  Debility  Patient with Acute on chronic debility due to age-related physical debility. Latest AMPAC and GEMS scores have not been reviewed.  Evaluation for etiology is complete.   Plan includes PT/OT consulted.  Home health on discharge.      PTOT  Plan for [facility] once insurance approves,    6/17 patient still pending authorization for swing bed.  [facility] will accept.  Adding appetite stimulant.  Hopefully strength will improve with increased p.o. intake      Final Active Diagnoses:    Diagnosis Date Noted POA    PRINCIPAL PROBLEM:  Acute hypoxic respiratory failure [J96.01] 05/24/2024 Yes     Chronic    Acute blood loss anemia [D62] 11/06/2014 Yes    Debility [R53.81] 05/23/2024 Yes    ESRD (end stage renal disease) [N18.6] 11/06/2014 Yes    DM2 (diabetes mellitus, type 2) [E11.9] 11/18/2014 Yes    Parapneumonic effusion [J18.9, J91.8] 05/23/2024 Yes    Pressure injury of right heel, unstageable [L89.610] 06/12/2024 No    Pressure injury of left heel, unstageable [L89.620] 06/12/2024 Clinically Undetermined    Decubitus ulcer of sacral region, unstageable [L89.150] 06/11/2024 Yes    GI bleed [K92.2] 06/10/2024 No    Anemia due to GI blood loss [D50.0] 06/10/2024 No    Loculated pleural effusion [J90] 06/10/2024 Yes    Coronary artery disease involving native coronary artery of native heart without angina pectoris [I25.10] 06/09/2024 Yes    Cardiac arrest [I46.9] 06/06/2024 Yes    Acute systolic CHF (congestive heart failure) [I50.21] 06/06/2024 Yes    Atrial fibrillation with RVR [I48.91] 06/06/2024 Yes    Thrombocytopenia [D69.6] 06/05/2024 Yes    Acute metabolic encephalopathy [G93.41] 06/02/2024 No    Pleural effusion [J90] 05/31/2024 Yes    Oropharyngeal dysphagia [R13.12] 05/31/2024 No    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.9, D63.1] 05/23/2024 Yes    Dependence on renal dialysis [Z99.2] 10/30/2017 Not Applicable    Malnutrition of moderate degree [E44.0] 11/20/2014 Yes    Essential (primary) hypertension [I10] 11/06/2014 Yes    Secondary hyperparathyroidism of renal origin [N25.81] 11/06/2014 Yes      Problems Resolved During this Admission:     Diagnosis Date Noted Date Resolved POA    Blood per rectum [K62.5] 06/10/2024 06/18/2024 No    Hemodialysis-associated hypotension [I95.3] 05/29/2024 06/08/2024 Yes    Sepsis [A41.9] 05/23/2024 06/08/2024 Yes    Hypokalemia [E87.6] 05/23/2024 05/28/2024 Yes    CAD (coronary artery disease) [I25.10] 08/04/2023 06/08/2024 Yes    Elevated troponin [R79.89] 08/02/2023 06/18/2024 Yes    Iron deficiency anemia, unspecified [D50.9] 11/06/2014 05/26/2024 Yes       Discharged Condition: fair    Disposition: Skilled Nursing Facility    Follow Up:    Patient Instructions:   No discharge procedures on file.    Significant Diagnostic Studies: N/A    Pending Diagnostic Studies:       None           Medications:  Reconciled Home Medications:      Medication List        START taking these medications      calcium acetate(phosphat bind) 667 mg tablet  Commonly known as: PHOSLO  Take 2 tablets (1,334 mg total) by mouth 3 (three) times daily with meals.     cyproheptadine 4 mg tablet  Commonly known as: PERIACTIN  Take 1 tablet (4 mg total) by mouth 2 (two) times daily.     miconazole NITRATE 2 % 2 % top powder  Commonly known as: MICOTIN  Apply topically 2 (two) times daily.     pantoprazole 40 mg injection  Commonly known as: PROTONIX  Inject 40 mg into the vein 2 (two) times a day.            CONTINUE taking these medications      atorvastatin 40 MG tablet  Commonly known as: LIPITOR  Take 1 tablet (40 mg total) by mouth every evening.     timolol maleate 0.5% 0.5 % Drop  Commonly known as: TIMOPTIC  Place 1 drop into the right eye once daily.            STOP taking these medications      amLODIPine 5 MG tablet  Commonly known as: NORVASC     hydrALAZINE 100 MG tablet  Commonly known as: APRESOLINE     MIRCERA INJ     nebivoloL 2.5 MG Tab  Commonly known as: BYSTOLIC              Indwelling Lines/Drains at time of discharge:   Lines/Drains/Airways       Drain  Duration                  Hemodialysis AV Fistula Right upper arm --  days                    Time spent on the discharge of patient: 50 minutes         Manas Sin Jr, MD  Department of Hospital Medicine  [facility]

## 2024-06-18 NOTE — ASSESSMENT & PLAN NOTE
Patient's anemia is currently uncontrolled. Has received 1 units of PRBCs on 06/10/2024 . Etiology likely d/t acute blood loss which was from GI bleed  Current CBC reviewed-   Lab Results   Component Value Date    HGB 7.6 (L) 06/17/2024    HCT 24.4 (L) 06/17/2024     Monitor serial CBC and transfuse if patient becomes hemodynamically unstable, symptomatic or H/H drops below 7/21.    6/15 no further evidence of GI blood loss

## 2024-06-18 NOTE — ASSESSMENT & PLAN NOTE
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 CHF is currently uncontrolled due to Pulmonary edema/pleural effusion on CXR. Latest ECHO performed and demonstrates- Results for orders placed during the hospital encounter of 05/23/24    Echo    Interpretation Summary    Left Ventricle: The left ventricle is normal in size. Increased wall thickness. There is concentric remodeling. There is normal systolic function. Biplane (2D) method of discs ejection fraction is 55%. Grade II diastolic dysfunction.    Right Ventricle: Mild right ventricular enlargement. Systolic function is mildly reduced.    Left Atrium: Left atrium is mildly dilated.    Right Atrium: Right atrium is mildly dilated.    Aortic Valve: The aortic valve is a trileaflet valve. Mildly calcified cusps. There is mild stenosis. Aortic valve area by VTI is 1.59 cm². Aortic valve peak velocity is 1.48 m/s. Mean gradient is 4 mmHg. The dimensionless index is 0.60.    Mitral Valve: There is bileaflet sclerosis. Mildly thickened leaflets. There is mild regurgitation.    Tricuspid Valve: There is severe regurgitation with an eccentrically directed jet.    Pulmonary Artery: Pulmonary artery pressure could not be accurately determined due to severe TR.    IVC/SVC: Elevated venous pressure at 15 mmHg.    Pericardium: There is a trivial effusion. No indication of cardiac tamponade. Left pleural effusion.  . Continue Nitrate/Vasodilator and monitor clinical status closely. Monitor on telemetry. Patient is off CHF pathway.  Monitor strict Is&Os and daily weights.  Place on fluid restriction of 1.5 L. Cardiology has not been consulted. Continue to stress to patient importance of self efficacy and  on diet for CHF. Last BNP reviewed- and noted below No results for input(s): "BNP", "BNPTRIAGEBLO" in the last 168 hours.  6/11 still requiring oxygen  6/12 stable  6/17 stable  "

## 2024-06-19 ENCOUNTER — TELEPHONE (OUTPATIENT)
Dept: UROLOGY | Facility: CLINIC | Age: 89
End: 2024-06-19
Payer: MEDICARE

## 2024-06-19 NOTE — TELEPHONE ENCOUNTER
----- Message from Kelsey De La Rosa sent at 6/19/2024 12:58 PM CDT -----  Who Called: [phone]    Sofia is wanting to follow up w information in regards to pt. 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  Reports that he has severe necrosis around the tip/head of penis and has a little bit of purulent drainage coming from it.  Has not had fever or chills since returning to their facility yesterday from a hospital stay.  Reports no mention of this necrosis in any papers they received on the pt. Reports pt was at dialysis and got weak, so his daughter brought him to ER.  Were going to do bronchoscopy on him and he went into resp failure then arrested.  Tip of penis is hard, barely blanchable.  He garcia snot urinate at all.  Is dialysis pt on tues, Thurs, Sat at 6am.  Relayed this info to LISA Christian and he discussed with Dr Chiang.  Pt is cheduled to work in Friday 6-21-24 at 8am.  Relayed this appt date/time to Ms mckeon.  She voiced understanding and said is writing it down.

## 2024-06-19 NOTE — PLAN OF CARE
[facility]  Discharge Final Note    Primary Care Provider: [facility]    Expected Discharge Date: 6/18/2024    Final Discharge Note (most recent)       Final Note - 06/19/24 1421          Final Note    Assessment Type Final Discharge Note     Anticipated Discharge Disposition Skilled Nursing Facility        Post-Acute Status    Post-Acute Authorization Placement     Post-Acute Placement Status Set-up Complete/Auth obtained     Patient choice form signed by patient/caregiver List from CMS Compare     Discharge Delays None known at this time                     Important Message from Medicare  Important Message from Medicare regarding Discharge Appeal Rights: Signed/date by patient/caregiver     Date IMM was signed: 06/17/24  Time IMM was signed: 1004      Pt d/c to [facility].

## 2024-06-21 PROBLEM — Z92.89 HISTORY OF ETT: Status: ACTIVE | Noted: 2024-01-01

## 2024-06-21 PROBLEM — E87.20 LACTIC ACIDOSIS: Status: ACTIVE | Noted: 2024-01-01

## 2024-06-21 PROBLEM — E87.5 HYPERKALEMIA: Status: ACTIVE | Noted: 2024-01-01

## 2024-06-21 NOTE — ED PROVIDER NOTES
Encounter Date: 6/21/2024       History     Chief Complaint   Patient presents with    Cardiac Arrest     89 Y/O MALE DIALYSIS PATIENT ARRIVES VIA EMS ENDOTRACHEALLY INTUBATED WITH CPR IN PROGRESS.  HE RECEIVED MULTIPLE CODE MEDICATIONS EN ROUTE.  BREATH SOUNDS DECREASED ON RIGHT AT BASE.  UNRESPONSIVE.        Review of patient's allergies indicates:   Allergen Reactions    Azithromycin Other (See Comments)     Note: - [employer] 01/11/2019     Past Medical History:   Diagnosis Date    Blood per rectum 06/10/2024    Chronic kidney disease (CKD)     Diabetes mellitus     Hypertension     PVD (peripheral vascular disease)      Past Surgical History:   Procedure Laterality Date    LEFT HEART CATHETERIZATION N/A 8/3/2023    Procedure: Left heart cath;  Surgeon: Vazquez Aguilar MD;  Location: [facility] CATH LAB;  Service: Cardiology;  Laterality: N/A;    left toe amputation      PLACEMENT OF DIALYSIS ACCESS       No family history on file.  Social History     Tobacco Use    Smoking status: Former     Types: Cigarettes    Smokeless tobacco: Never   Substance Use Topics    Alcohol use: Not Currently    Drug use: Never     Review of Systems   Unable to perform ROS: Acuity of condition       Physical Exam     Initial Vitals [06/21/24 0633]   BP Pulse Resp Temp SpO2   -- 60 -- -- --      MAP       --         Physical Exam    Nursing note and vitals reviewed.  Constitutional: He appears well-developed.   THIS ELDERLY UNRESPONSIVE MALE.  NOT BREATHING ON HIS OWN.  INTUBATED WITH CPR IN PROGRESS.     HENT:   Head: Normocephalic and atraumatic.   Eyes:   PUPILS ARE NOT DILATED, BUT THEY ARE NOT RESPONSIVE.     Neck: Neck supple.   Normal range of motion.  Cardiovascular:            NO APPRECIABLE CARDIAC ACTIVITY INITIALLY.     Pulmonary/Chest:   DECREASED BREATH SOUNDS RIGHT BASE.     Abdominal: Abdomen is soft.   Musculoskeletal:      Cervical back: Normal range of motion and neck supple.      Comments: DIALYSIS SHUNT RIGHT  UPPER ARM.  HEEL PROTECTORS AND SOME SORT OF GAUZE DRESSING VISIBLE UNDERNEATH.            Medical Screening Exam   See Full Note    ED Course   Procedures  Labs Reviewed   PROTIME-INR - Abnormal; Notable for the following components:       Result Value    PT 29.9 (*)     All other components within normal limits   APTT - Abnormal; Notable for the following components:    PTT 76.9 (*)     All other components within normal limits   CBC W/ AUTO DIFFERENTIAL    Narrative:     The following orders were created for panel order CBC auto differential.  Procedure                               Abnormality         Status                     ---------                               -----------         ------                     CBC with Differential[[id number]]                           In process                   Please view results for these tests on the individual orders.   COMPREHENSIVE METABOLIC PANEL   TROPONIN I   MAGNESIUM   LACTIC ACID, PLASMA   CBC WITH DIFFERENTIAL          Imaging Results              X-Ray Chest AP Portable (In process)                      Medications   calcium chloride 100 mg/mL (10 %) injection (1 g Intravenous Given 6/21/24 0627)   sodium bicarbonate 8.4 % (1 mEq/mL) injection (50 mEq Intravenous Given 6/21/24 0627)   EPINEPHrine 0.1 mg/mL injection (1 mg Intravenous Given 6/21/24 0654)   NORepinephrine bitartrate-D5W 4 mg/250 mL (16 mcg/mL) infusion Soln (  Override Pull 6/21/24 0700)   NORepinephrine 4 mg in D5W 250 mL infusion (3 mcg/kg/min × 56.7 kg Intravenous Rate/Dose Change 6/21/24 0649)   EPINEPHrine (ADRENALIN) 0.1 mg/mL injection (  Override Pull 6/21/24 0700)     Medical Decision Making  Amount and/or Complexity of Data Reviewed  Labs: ordered.  Radiology: ordered.    Risk  Prescription drug management.                                      Clinical Impression:   Final diagnoses:  [I46.9] Cardiorespiratory arrest  [Z92.89] History of ETT  [E87.5] Hyperkalemia (Primary)  [N18.6,  Z99.2] ESRD on dialysis  [E87.20] Lactic acidosis        ED Disposition Condition                     Keegan Lerma MD  24 0712

## 2024-06-25 LAB
OHS QRS DURATION: 102 MS
OHS QTC CALCULATION: 420 MS

## 2024-08-12 NOTE — ASSESSMENT & PLAN NOTE
Diastolic low with preserved mean arterial blood pressure during procedure chest tube.    Agree with starting midodrine and maintain a mean arterial pressure above 65.   ----- Message from Sal Da Silva MD sent at 8/10/2024  2:41 PM CDT -----  Hi team     Please note the following post discharge instructions:    Labs:   8/15, 8/29, 9/12 - Renal panel, Uric acid, CBC      Medication changes:   STOP - entresto 25/26mg BID   START - amlodipine 5mg daily   CHANGE - bumex 1mg daily to PRN if weights > 5lbs within 2 days    Other orders:   Obtain daily weights and blood pressures - call in 1 week  Dialysis education   Referral to vascular surgery for AVF placement   Renal ultrasound in 6 months    Follow up:   In clinic in 1 month with me

## 2025-03-12 NOTE — PT/OT/SLP EVAL
Speech Language Pathology Evaluation  Bedside Swallow    Patient Name:  Joseph Mcdonald   MRN:  [id number]  Admitting Diagnosis: Acute hypoxic respiratory failure    Recommendations:                 General Recommendations:   Repeat BSE when patient more alert  Diet recommendations:  NPO, NPO   Aspiration Precautions: Alternate means of nutrition/hydration   General Precautions: Standard, aspiration  Communication strategies:  provide increased time to answer    Assessment:     Joseph Mcdonald is a 89 y.o. male with an SLP diagnosis of Dysphagia.  He presents with no overt s/s of aspiration with 2 trials of thin liquid via spoon and 1 trial of pudding.  Patient required increased time and verbal/tactile cues to initiate swallow with pudding. Patient inconsistently followed commands and responded to verbal stimuli but did not open eyes during BSE.  Rec NPO with alternate route of nutrition and repeat BSE when patient more alert.     History:     Past Medical History:   Diagnosis Date    Chronic kidney disease (CKD)     Diabetes mellitus     Hypertension     PVD (peripheral vascular disease)        Past Surgical History:   Procedure Laterality Date    LEFT HEART CATHETERIZATION N/A 8/3/2023    Procedure: Left heart cath;  Surgeon: Vazquez Aguilar MD;  Location: [facility] CATH [facility];  Service: Cardiology;  Laterality: N/A;    left toe amputation      PLACEMENT OF DIALYSIS ACCESS         Social History: Patient lives alone.    Prior Intubation HX:  none    Modified Barium Swallow: none    Chest X-Rays: see chart     Prior diet: unknown.    Occupation/hobbies/homemaking: none stated.    Subjective     Pt lying in bed; responds to verbal stimuli. Agreeable to BSE; SLP assisted patient to upright position in bed.  Patient goals: None stated     Pain/Comfort:  Pain Rating 1: 0/10    Respiratory Status: Nasal cannula, flow 2 L/min    Objective:     Oral Musculature Evaluation  Oral Musculature: general weakness, facial  asymmetry present (Patient did not follow commands for complete oral-motor examination.)  Dentition:  (Patient retains upper natural teeth; patient has lower dentures but dentures were not present during BSE.)  Secretion Management: adequate  Mucosal Quality: adequate  Lingual Strength and Mobility: impaired strength  Oral Musculature Comments: Patient presents with generalized weakness and inability to consistently follow commands.    Bedside Swallow Eval:   Consistencies Assessed:Limited Trials  Thin liquids Pt tolerated two small trials of water via spoon without overt s/s of aspiration.  Nurse reported coughing this am with administration of water.   Puree Pt tolerated one small bolus of pudding without overt s/s of aspiration.  Pt required verbal and tactile cues to facilitate swallow.       Oral Phase:   Poor oral acceptance  Slow oral transit time    Pharyngeal Phase:   no overt clinical signs/symptoms of aspiration  no overt clinical signs/symptoms of pharyngeal dysphagia    Compensatory Strategies  None    Treatment: St not indicated     Goals:   Multidisciplinary Problems       SLP Goals       Not on file                    Plan:     Patient to be seen:    na  Plan of Care expires:   na  Plan of Care reviewed with:    ky  SLP Follow-Up:  No       Discharge recommendations:      Barriers to Discharge:  Level of Skilled Assistance Needed patient may require increased assistance at discharge    Time Tracking:     SLP Treatment Date:   05/31/24 BSE ONLY  Speech Start Time:  1158  Speech Stop Time:  1210     Speech Total Time (min):  12 min    Billable Minutes: Eval Swallow and Oral Function 12 minutes    05/31/2024          Opt out

## (undated) DEVICE — INTRODUCER CATH 6F 11CM

## (undated) DEVICE — CATH IMPULSE D6F PIG 5PK

## (undated) DEVICE — GOWN NONREINF SET-IN SLV 2XL

## (undated) DEVICE — GLOVE SENSICARE PI GRN 6.5

## (undated) DEVICE — SYR MED RAD 150ML

## (undated) DEVICE — CLIPPER BLADE MOD 4406 (CAREF)

## (undated) DEVICE — TROCAR KII FIOS ZTHREAD 12X100

## (undated) DEVICE — ETCO2 NC MICROSTR FEM ST ADLT

## (undated) DEVICE — SET IV PRIMARY

## (undated) DEVICE — PROTECTOR ULNAR NERVE FOAM

## (undated) DEVICE — DEVICE MYNX GRIP 6/7F

## (undated) DEVICE — CHLORAPREP 10.5 ML APPLICATOR

## (undated) DEVICE — OXISENSOR ADULT DIGIT N/S

## (undated) DEVICE — WARMER BLUE HEAT SCOPE 3-12MM

## (undated) DEVICE — STRIP MEDI WND CLSR 1X5IN

## (undated) DEVICE — SUT VICRYL PLUS 4-0 FS-2 27IN

## (undated) DEVICE — CATH DIAG IMPULSE 6FR FR4

## (undated) DEVICE — GLOVE BIOGEL SKINSENSE PI 7.0

## (undated) DEVICE — Device

## (undated) DEVICE — GLOVE SENSICARE PI SURG 6.5

## (undated) DEVICE — CATH DIAG IMPULSE 6FR FL4

## (undated) DEVICE — GLOVE SENSICARE PI SURG 8

## (undated) DEVICE — CUFF FLEXIPORT BP LONG ADULT

## (undated) DEVICE — DRESSING TRANS 4X4 TEGADERM

## (undated) DEVICE — GLOVE SURG BIOGEL LATEX SZ 7.5

## (undated) DEVICE — PROBE DOPPLER VTI DISP 8MHZ

## (undated) DEVICE — CANNULA LAP SEAL Z THRD 5X100

## (undated) DEVICE — CONTRAST ISOVUE 370 100ML

## (undated) DEVICE — CATH GIGNOSTIC 6FR WRP

## (undated) DEVICE — TROCAR ENDO Z THREAD KII 5X100

## (undated) DEVICE — SOL IRR SOD CHL .9% POUR

## (undated) DEVICE — SET EXTENSION CLEARLINK 2INJ

## (undated) DEVICE — TRAY CATH FOL SIL URIMTR 16FR

## (undated) DEVICE — GOWN ASTOUND AAMI LVL3 BLUE LG

## (undated) DEVICE — SOL NACL IRR 1000ML BTL

## (undated) DEVICE — DECANTER FLUID TRNSF WHITE 9IN

## (undated) DEVICE — NDL PERCUTANEOUS ENTRYBSDN 18